# Patient Record
Sex: MALE | Race: WHITE | NOT HISPANIC OR LATINO | URBAN - METROPOLITAN AREA
[De-identification: names, ages, dates, MRNs, and addresses within clinical notes are randomized per-mention and may not be internally consistent; named-entity substitution may affect disease eponyms.]

---

## 2021-03-08 ENCOUNTER — APPOINTMENT (OUTPATIENT)
Dept: CT IMAGING | Facility: HOSPITAL | Age: 61
End: 2021-03-08

## 2021-03-08 ENCOUNTER — HOSPITAL ENCOUNTER (EMERGENCY)
Facility: HOSPITAL | Age: 61
Discharge: HOME | End: 2021-03-08
Attending: EMERGENCY MEDICINE | Admitting: EMERGENCY MEDICINE

## 2021-03-08 VITALS
WEIGHT: 215.8 LBS | DIASTOLIC BLOOD PRESSURE: 79 MMHG | OXYGEN SATURATION: 92 % | HEART RATE: 87 BPM | SYSTOLIC BLOOD PRESSURE: 147 MMHG | HEIGHT: 66 IN | TEMPERATURE: 97.3 F | BODY MASS INDEX: 34.68 KG/M2 | RESPIRATION RATE: 18 BRPM

## 2021-03-08 DIAGNOSIS — R36.9 BLOODY DISCHARGE FROM PENIS: ICD-10-CM

## 2021-03-08 DIAGNOSIS — R10.9 FLANK PAIN: Primary | ICD-10-CM

## 2021-03-08 LAB
ALBUMIN SERPL-MCNC: 4 G/DL (ref 3.5–5.2)
ALBUMIN/GLOB SERPL: 1.3 G/DL
ALP SERPL-CCNC: 63 U/L (ref 39–117)
ALT SERPL W P-5'-P-CCNC: 20 U/L (ref 1–41)
ANION GAP SERPL CALCULATED.3IONS-SCNC: 7.5 MMOL/L (ref 5–15)
AST SERPL-CCNC: 25 U/L (ref 1–40)
BASOPHILS # BLD AUTO: 0.06 10*3/MM3 (ref 0–0.2)
BASOPHILS NFR BLD AUTO: 0.9 % (ref 0–1.5)
BILIRUB SERPL-MCNC: <0.2 MG/DL (ref 0–1.2)
BILIRUB UR QL STRIP: NEGATIVE
BUN SERPL-MCNC: 14 MG/DL (ref 8–23)
BUN/CREAT SERPL: 22.2 (ref 7–25)
CALCIUM SPEC-SCNC: 9.2 MG/DL (ref 8.6–10.5)
CHLORIDE SERPL-SCNC: 107 MMOL/L (ref 98–107)
CLARITY UR: CLEAR
CO2 SERPL-SCNC: 25.5 MMOL/L (ref 22–29)
COLOR UR: YELLOW
CREAT SERPL-MCNC: 0.63 MG/DL (ref 0.76–1.27)
DEPRECATED RDW RBC AUTO: 44.1 FL (ref 37–54)
EOSINOPHIL # BLD AUTO: 0.16 10*3/MM3 (ref 0–0.4)
EOSINOPHIL NFR BLD AUTO: 2.4 % (ref 0.3–6.2)
ERYTHROCYTE [DISTWIDTH] IN BLOOD BY AUTOMATED COUNT: 14 % (ref 12.3–15.4)
GFR SERPL CREATININE-BSD FRML MDRD: 129 ML/MIN/1.73
GLOBULIN UR ELPH-MCNC: 3 GM/DL
GLUCOSE SERPL-MCNC: 133 MG/DL (ref 65–99)
GLUCOSE UR STRIP-MCNC: ABNORMAL MG/DL
HCT VFR BLD AUTO: 40.5 % (ref 37.5–51)
HGB BLD-MCNC: 13.2 G/DL (ref 13–17.7)
HGB UR QL STRIP.AUTO: NEGATIVE
IMM GRANULOCYTES # BLD AUTO: 0.02 10*3/MM3 (ref 0–0.05)
IMM GRANULOCYTES NFR BLD AUTO: 0.3 % (ref 0–0.5)
KETONES UR QL STRIP: NEGATIVE
LEUKOCYTE ESTERASE UR QL STRIP.AUTO: NEGATIVE
LIPASE SERPL-CCNC: 20 U/L (ref 13–60)
LYMPHOCYTES # BLD AUTO: 1.17 10*3/MM3 (ref 0.7–3.1)
LYMPHOCYTES NFR BLD AUTO: 17.5 % (ref 19.6–45.3)
MCH RBC QN AUTO: 28.8 PG (ref 26.6–33)
MCHC RBC AUTO-ENTMCNC: 32.6 G/DL (ref 31.5–35.7)
MCV RBC AUTO: 88.2 FL (ref 79–97)
MONOCYTES # BLD AUTO: 0.51 10*3/MM3 (ref 0.1–0.9)
MONOCYTES NFR BLD AUTO: 7.6 % (ref 5–12)
NEUTROPHILS NFR BLD AUTO: 4.78 10*3/MM3 (ref 1.7–7)
NEUTROPHILS NFR BLD AUTO: 71.3 % (ref 42.7–76)
NITRITE UR QL STRIP: NEGATIVE
NRBC BLD AUTO-RTO: 0 /100 WBC (ref 0–0.2)
PH UR STRIP.AUTO: 5.5 [PH] (ref 5–8)
PLATELET # BLD AUTO: 287 10*3/MM3 (ref 140–450)
PMV BLD AUTO: 9.9 FL (ref 6–12)
POTASSIUM SERPL-SCNC: 4.2 MMOL/L (ref 3.5–5.2)
PROT SERPL-MCNC: 7 G/DL (ref 6–8.5)
PROT UR QL STRIP: ABNORMAL
RBC # BLD AUTO: 4.59 10*6/MM3 (ref 4.14–5.8)
SODIUM SERPL-SCNC: 140 MMOL/L (ref 136–145)
SP GR UR STRIP: >=1.03 (ref 1–1.03)
UROBILINOGEN UR QL STRIP: ABNORMAL
WBC # BLD AUTO: 6.7 10*3/MM3 (ref 3.4–10.8)

## 2021-03-08 PROCEDURE — 74176 CT ABD & PELVIS W/O CONTRAST: CPT

## 2021-03-08 PROCEDURE — 81003 URINALYSIS AUTO W/O SCOPE: CPT | Performed by: PHYSICIAN ASSISTANT

## 2021-03-08 PROCEDURE — 99283 EMERGENCY DEPT VISIT LOW MDM: CPT

## 2021-03-08 PROCEDURE — 85025 COMPLETE CBC W/AUTO DIFF WBC: CPT | Performed by: PHYSICIAN ASSISTANT

## 2021-03-08 PROCEDURE — 80053 COMPREHEN METABOLIC PANEL: CPT | Performed by: PHYSICIAN ASSISTANT

## 2021-03-08 PROCEDURE — 83690 ASSAY OF LIPASE: CPT | Performed by: PHYSICIAN ASSISTANT

## 2021-03-08 RX ORDER — SODIUM CHLORIDE 0.9 % (FLUSH) 0.9 %
10 SYRINGE (ML) INJECTION AS NEEDED
Status: DISCONTINUED | OUTPATIENT
Start: 2021-03-08 | End: 2021-03-08 | Stop reason: HOSPADM

## 2021-03-08 NOTE — ED NOTES
Pt with intermittent, spasmatic right flank pain for about 1 week. He is also noticing intermittent blood from his penis, such as last night when he sneezed. But he has voided this morning and he noted there was no blood present. Also states he had one episode of sudden and explosive diarrhea at work last week that he felt coming on but was unable to make it to the restroom in time. He does report having a normal bowel movement this morning. Patient was wearing a face mask when I entered the room and they continued to wear a mask throughout our encounter. I wore PPE including gloves, eye protection, and a surgical mask whenever I was in the room with patient. Hand hygiene performed.     Harvey Max RN  03/08/21 5941

## 2021-03-08 NOTE — PROGRESS NOTES
Entered room, iintroduced self and explained role w/mask in place on self and patient. Verified information on facesheet; Patient verified that he is currently without a PCP- Provided [facility] provider list and instructed on contact information to obtain an appt. No further needs at this time. Gely Esquivel RN

## 2021-03-08 NOTE — ED PROVIDER NOTES
Pt presents to the ED c/o  1 week of intermittent right-sided flank pain.  Today he reports that he sneezed and had some bloody discharge in his underwear that he believes comes from his penis.  He has since urinated without difficulty.     On exam,   Awake and alert, no acute distress.  Normal work of breathing.     Plan: Labs and CT abdomen reviewed and reassuring today.  Recommended outpatient urology follow-up for further management as indicated.      I wore a mask, face shield, and gloves during this patient encounter.  Patient also wearing a surgical mask.  Hand hygeine performed before and after seeing the patient.     Attestation:  The DAR and I have discussed this patient's history, physical exam, and treatment plan.  I have reviewed the documentation and personally had a face to face interaction with the patient. I affirm the documentation and agree with the treatment and plan.  The attached note describes my personal findings.            Jorge Luis Rodriguez MD  03/08/21 [id number]

## 2021-03-08 NOTE — ED TRIAGE NOTES
Pt has right flank pain.  He sneezed last night and blood came out of his penis.  No urinary symptoms    Patient was placed in face mask during first look triage.  Patient was wearing a face mask throughout encounter.  I wore personal protective equipment throughout the encounter.  Hand hygiene was performed before and after patient encounter.

## 2021-03-08 NOTE — ED PROVIDER NOTES
EMERGENCY DEPARTMENT ENCOUNTER    Room Number:  02/02  Date of encounter:  3/8/2021  PCP: Provider, No Known  Historian: Patient      I used full protective equipment while examining this patient.  This includes face mask, gloves and protective eyewear.  I washed my hands before entering the room and immediately upon leaving the room      HPI:  Chief Complaint: Flank pain  A complete HPI/ROS/PMH/PSH/SH/FH are unobtainable due to: Nothing    Context: Sebastien Tang is a 61 y.o. male who presents to the ED c/o 1 week history of intermittent right flank pain.  Patient states the pain is intermittent.  He denies any precipitating alleviating factors.  He describes the pain is an achy sensation.  He states the pain can last several hours.  He denies any radiation of the pain.  He denies any associated nausea or vomiting.  Patient states yesterday he had an episode of pain followed by a sneeze.  He reports that after the sneeze he noticed that he had bloody discharge in his underwear.  He suspected this is from his penis.  He has denied any subsequent bleeding.  He denies any dysuria or gross hematuria.  He denies any history of kidney stones.  He denies any radicular pain down his legs.    Review of Medical Records  I reviewed patient's urgent care visit from yesterday.  Patient has a negative urinalysis.  He was referred to the ER for further evaluation.    PAST MEDICAL HISTORY  Active Ambulatory Problems     Diagnosis Date Noted   • No Active Ambulatory Problems     Resolved Ambulatory Problems     Diagnosis Date Noted   • No Resolved Ambulatory Problems     No Additional Past Medical History         PAST SURGICAL HISTORY  History reviewed. No pertinent surgical history.      FAMILY HISTORY  History reviewed. No pertinent family history.      SOCIAL HISTORY  Social History     Socioeconomic History   • Marital status: Single     Spouse name: Not on file   • Number of children: Not on file   • Years of education: Not on  file   • Highest education level: Not on file   Tobacco Use   • Smoking status: Current Every Day Smoker     Packs/day: 1.50     Years: 10.00     Pack years: 15.00     Types: Cigarettes   Substance and Sexual Activity   • Drug use: Not Currently   • Sexual activity: Not Currently         ALLERGIES  Patient has no known allergies.        REVIEW OF SYSTEMS  All systems reviewed and negative except for those discussed in HPI.       PHYSICAL EXAM    I have reviewed the triage vital signs and nursing notes.    ED Triage Vitals   Temp Heart Rate Resp BP SpO2   03/08/21 0948 03/08/21 0948 03/08/21 0948 03/08/21 1014 03/08/21 0948   97.3 °F (36.3 °C) 94 18 (!) 162/105 92 %      Temp src Heart Rate Source Patient Position BP Location FiO2 (%)   03/08/21 0948 03/08/21 0948 03/08/21 1014 03/08/21 1014 --   Tympanic Monitor Lying Left arm        Physical Exam  GENERAL: Alert, oriented, not distressed  HENT: head atraumatic, no nuchal rigidity  EYES: no scleral icterus, EOMI  CV: regular rhythm, regular rate, no murmur  RESPIRATORY: normal effort, CTA  ABDOMEN: soft, nontender  : Normal external genitalia.  No bleeding on exam.  MUSCULOSKELETAL: Mild right flank tenderness.  No vertebral tenderness.  NEURO: alert, moves all extremities, follows commands  SKIN: warm, dry, no rash        LAB RESULTS  Recent Results (from the past 24 hour(s))   Comprehensive Metabolic Panel    Collection Time: 03/08/21 11:08 AM    Specimen: Blood   Result Value Ref Range    Glucose 133 (H) 65 - 99 mg/dL    BUN 14 8 - 23 mg/dL    Creatinine 0.63 (L) 0.76 - 1.27 mg/dL    Sodium 140 136 - 145 mmol/L    Potassium 4.2 3.5 - 5.2 mmol/L    Chloride 107 98 - 107 mmol/L    CO2 25.5 22.0 - 29.0 mmol/L    Calcium 9.2 8.6 - 10.5 mg/dL    Total Protein 7.0 6.0 - 8.5 g/dL    Albumin 4.00 3.50 - 5.20 g/dL    ALT (SGPT) 20 1 - 41 U/L    AST (SGOT) 25 1 - 40 U/L    Alkaline Phosphatase 63 39 - 117 U/L    Total Bilirubin <0.2 0.0 - 1.2 mg/dL    eGFR Non African  Amer 129 >60 mL/min/1.73    Globulin 3.0 gm/dL    A/G Ratio 1.3 g/dL    BUN/Creatinine Ratio 22.2 7.0 - 25.0    Anion Gap 7.5 5.0 - 15.0 mmol/L   Lipase    Collection Time: 03/08/21 11:08 AM    Specimen: Blood   Result Value Ref Range    Lipase 20 13 - 60 U/L   CBC Auto Differential    Collection Time: 03/08/21 11:08 AM    Specimen: Blood   Result Value Ref Range    WBC 6.70 3.40 - 10.80 10*3/mm3    RBC 4.59 4.14 - 5.80 10*6/mm3    Hemoglobin 13.2 13.0 - 17.7 g/dL    Hematocrit 40.5 37.5 - 51.0 %    MCV 88.2 79.0 - 97.0 fL    MCH 28.8 26.6 - 33.0 pg    MCHC 32.6 31.5 - 35.7 g/dL    RDW 14.0 12.3 - 15.4 %    RDW-SD 44.1 37.0 - 54.0 fl    MPV 9.9 6.0 - 12.0 fL    Platelets 287 140 - 450 10*3/mm3    Neutrophil % 71.3 42.7 - 76.0 %    Lymphocyte % 17.5 (L) 19.6 - 45.3 %    Monocyte % 7.6 5.0 - 12.0 %    Eosinophil % 2.4 0.3 - 6.2 %    Basophil % 0.9 0.0 - 1.5 %    Immature Grans % 0.3 0.0 - 0.5 %    Neutrophils, Absolute 4.78 1.70 - 7.00 10*3/mm3    Lymphocytes, Absolute 1.17 0.70 - 3.10 10*3/mm3    Monocytes, Absolute 0.51 0.10 - 0.90 10*3/mm3    Eosinophils, Absolute 0.16 0.00 - 0.40 10*3/mm3    Basophils, Absolute 0.06 0.00 - 0.20 10*3/mm3    Immature Grans, Absolute 0.02 0.00 - 0.05 10*3/mm3    nRBC 0.0 0.0 - 0.2 /100 WBC   Urinalysis With Microscopic If Indicated (No Culture) - Urine, Clean Catch    Collection Time: 03/08/21 11:09 AM    Specimen: Urine, Clean Catch   Result Value Ref Range    Color, UA Yellow Yellow, Straw    Appearance, UA Clear Clear    pH, UA 5.5 5.0 - 8.0    Specific Gravity, UA >=1.030 1.005 - 1.030    Glucose,  mg/dL (Trace) (A) Negative    Ketones, UA Negative Negative    Bilirubin, UA Negative Negative    Blood, UA Negative Negative    Protein, UA Trace (A) Negative    Leuk Esterase, UA Negative Negative    Nitrite, UA Negative Negative    Urobilinogen, UA 0.2 E.U./dL 0.2 - 1.0 E.U./dL       Ordered the above labs and independently reviewed the results.        RADIOLOGY  CT Abdomen  Pelvis Without Contrast    Result Date: 3/8/2021  CT OF THE ABDOMEN AND PELVIS WITHOUT CONTRAST 03/08/2021  HISTORY: Left flank pain.  Axial images were obtained from the lung bases to the symphysis pubis. No intravenous or oral contrast was given.  There are emphysematous changes of the bilateral lung bases.  The liver, gallbladder, spleen, pancreas, and adrenals appear unremarkable. There is an approximately 2 mm nonobstructing left renal stone on image 70. One or 2 small low-density left renal lesions are seen, presumably renal cysts.  There is colonic diverticulosis. Prostate gland is moderately enlarged. Urinary bladder is unremarkable.      1.  Emphysematous changes of both lung bases. 2.  Tiny nonobstructing lower pole left renal stone. No hydronephrosis or hydroureter is seen bilaterally. 3.  Colonic diverticulosis. 4.  Prostate gland enlargement.  Radiation dose reduction techniques were utilized, including automated exposure control and exposure modulation based on body size.           I ordered the above noted radiological studies. Reviewed by me and discussed with radiologist.  See dictation for official radiology interpretation.    PROGRESS, DATA ANALYSIS, CONSULTS, AND MEDICAL DECISION MAKING    All labs have been independently reviewed by me.  All radiology studies have been reviewed by me and discussed with radiologist dictating the report.   EKG's independently viewed and interpreted by me.  Discussion below represents my analysis of pertinent findings related to patient's condition, differential diagnosis, treatment plan and final disposition.    I have discussed case with Dr. Rodriguez, emergency room physician.  He has performed his own bedside examination and agrees with treatment plan.    ED Course as of Mar 08 1459   Mon Mar 08, 2021   1115 Patient presents with 1 week history of right flank pain with episode of bloody penile discharge.  Differential diagnoses include but not limited to  ureterolithiasis, UTI, urethritis.    [EE]   1124 WBC: 6.70 [EE]   1207 I discussed CT findings with Dr. Chin.  Patient has no acute ureteral stones.  He does have an enlarged prostate.    [EE]   1207 Blood, UA: Negative [EE]   1207 I updated patient on work-up.  No clear etiology to patient's flank pain and bloody discharge.  Plan to have him follow-up with urology and a new family doctor.    [EE]      ED Course User Index  [EE] Gal Corrigan PA       AS OF 14:59 EST VITALS:    BP - 147/79  HR - 87  TEMP - 97.3 °F (36.3 °C) (Tympanic)  O2 SATS - 92%        DIAGNOSIS  Final diagnoses:   Flank pain   Bloody discharge from penis         DISPOSITION  Discharged           Gal Corrigan PA  03/08/21 1500

## 2021-03-16 ENCOUNTER — BULK ORDERING (OUTPATIENT)
Dept: CASE MANAGEMENT | Facility: OTHER | Age: 61
End: 2021-03-16

## 2021-03-16 DIAGNOSIS — Z23 IMMUNIZATION DUE: ICD-10-CM

## 2021-04-01 ENCOUNTER — APPOINTMENT (OUTPATIENT)
Dept: CT IMAGING | Facility: HOSPITAL | Age: 61
End: 2021-04-01

## 2021-04-01 ENCOUNTER — APPOINTMENT (OUTPATIENT)
Dept: CARDIOLOGY | Facility: HOSPITAL | Age: 61
End: 2021-04-01

## 2021-04-01 ENCOUNTER — APPOINTMENT (OUTPATIENT)
Dept: GENERAL RADIOLOGY | Facility: HOSPITAL | Age: 61
End: 2021-04-01

## 2021-04-01 ENCOUNTER — HOSPITAL ENCOUNTER (INPATIENT)
Facility: HOSPITAL | Age: 61
LOS: 7 days | Discharge: HOME | End: 2021-04-08
Attending: EMERGENCY MEDICINE | Admitting: INTERNAL MEDICINE

## 2021-04-01 DIAGNOSIS — I49.01 VENTRICULAR FIBRILLATION (HCC): ICD-10-CM

## 2021-04-01 DIAGNOSIS — I25.5 ISCHEMIC CARDIOMYOPATHY: ICD-10-CM

## 2021-04-01 DIAGNOSIS — I46.9 CARDIAC ARREST (HCC): Primary | ICD-10-CM

## 2021-04-01 DIAGNOSIS — R73.9 HYPERGLYCEMIA: ICD-10-CM

## 2021-04-01 DIAGNOSIS — R94.31 ABNORMAL EKG: ICD-10-CM

## 2021-04-01 DIAGNOSIS — E87.6 HYPOKALEMIA: ICD-10-CM

## 2021-04-01 DIAGNOSIS — Z74.09 DECREASED MOBILITY AND ENDURANCE: ICD-10-CM

## 2021-04-01 DIAGNOSIS — R93.89 ABNORMAL CXR: ICD-10-CM

## 2021-04-01 LAB
ALBUMIN SERPL-MCNC: 4.2 G/DL (ref 3.5–5.2)
ALBUMIN/GLOB SERPL: 1.5 G/DL
ALP SERPL-CCNC: 81 U/L (ref 39–117)
ALT SERPL W P-5'-P-CCNC: 43 U/L (ref 1–41)
ANION GAP SERPL CALCULATED.3IONS-SCNC: 23.3 MMOL/L (ref 5–15)
AORTIC DIMENSIONLESS INDEX: 0.6 (DI)
APTT PPP: 39.7 SECONDS (ref 22.7–35.4)
ARTERIAL PATENCY WRIST A: POSITIVE
ARTERIAL PATENCY WRIST A: POSITIVE
AST SERPL-CCNC: 53 U/L (ref 1–40)
ATMOSPHERIC PRESS: 760.8 MMHG
ATMOSPHERIC PRESS: 761 MMHG
BASE EXCESS BLDA CALC-SCNC: -1.9 MMOL/L (ref 0–2)
BASE EXCESS BLDA CALC-SCNC: -16.9 MMOL/L (ref 0–2)
BASOPHILS # BLD AUTO: 0.09 10*3/MM3 (ref 0–0.2)
BASOPHILS NFR BLD AUTO: 0.9 % (ref 0–1.5)
BDY SITE: ABNORMAL
BDY SITE: ABNORMAL
BH CV ECHO MEAS - AO MAX PG (FULL): 2.5 MMHG
BH CV ECHO MEAS - AO MAX PG: 3.9 MMHG
BH CV ECHO MEAS - AO MEAN PG (FULL): 2 MMHG
BH CV ECHO MEAS - AO MEAN PG: 3 MMHG
BH CV ECHO MEAS - AO ROOT AREA (BSA CORRECTED): 1.8
BH CV ECHO MEAS - AO ROOT AREA: 10.2 CM^2
BH CV ECHO MEAS - AO ROOT DIAM: 3.6 CM
BH CV ECHO MEAS - AO V2 MAX: 99.2 CM/SEC
BH CV ECHO MEAS - AO V2 MEAN: 78.1 CM/SEC
BH CV ECHO MEAS - AO V2 VTI: 16.6 CM
BH CV ECHO MEAS - AVA(I,A): 2.4 CM^2
BH CV ECHO MEAS - AVA(I,D): 2.4 CM^2
BH CV ECHO MEAS - AVA(V,A): 2.5 CM^2
BH CV ECHO MEAS - AVA(V,D): 2.5 CM^2
BH CV ECHO MEAS - BSA(HAYCOCK): 2.2 M^2
BH CV ECHO MEAS - BSA: 2.1 M^2
BH CV ECHO MEAS - BZI_BMI: 34.4 KILOGRAMS/M^2
BH CV ECHO MEAS - BZI_METRIC_HEIGHT: 167.6 CM
BH CV ECHO MEAS - BZI_METRIC_WEIGHT: 96.6 KG
BH CV ECHO MEAS - EDV(CUBED): 157.5 ML
BH CV ECHO MEAS - EDV(MOD-SP2): 119 ML
BH CV ECHO MEAS - EDV(MOD-SP4): 96 ML
BH CV ECHO MEAS - EDV(TEICH): 141.3 ML
BH CV ECHO MEAS - EF(CUBED): 42.1 %
BH CV ECHO MEAS - EF(MOD-BP): 38.9 %
BH CV ECHO MEAS - EF(MOD-SP2): 40.3 %
BH CV ECHO MEAS - EF(MOD-SP4): 40.6 %
BH CV ECHO MEAS - EF(TEICH): 34.6 %
BH CV ECHO MEAS - ESV(CUBED): 91.1 ML
BH CV ECHO MEAS - ESV(MOD-SP2): 71 ML
BH CV ECHO MEAS - ESV(MOD-SP4): 57 ML
BH CV ECHO MEAS - ESV(TEICH): 92.4 ML
BH CV ECHO MEAS - FS: 16.7 %
BH CV ECHO MEAS - IVS/LVPW: 1.3
BH CV ECHO MEAS - IVSD: 1.4 CM
BH CV ECHO MEAS - LAT PEAK E' VEL: 4.8 CM/SEC
BH CV ECHO MEAS - LV DIASTOLIC VOL/BSA (35-75): 46.7 ML/M^2
BH CV ECHO MEAS - LV MASS(C)D: 279.8 GRAMS
BH CV ECHO MEAS - LV MASS(C)DI: 136.2 GRAMS/M^2
BH CV ECHO MEAS - LV MAX PG: 1.5 MMHG
BH CV ECHO MEAS - LV MEAN PG: 1 MMHG
BH CV ECHO MEAS - LV SYSTOLIC VOL/BSA (12-30): 27.7 ML/M^2
BH CV ECHO MEAS - LV V1 MAX: 60.4 CM/SEC
BH CV ECHO MEAS - LV V1 MEAN: 41.2 CM/SEC
BH CV ECHO MEAS - LV V1 VTI: 9.6 CM
BH CV ECHO MEAS - LVIDD: 5.4 CM
BH CV ECHO MEAS - LVIDS: 4.5 CM
BH CV ECHO MEAS - LVLD AP2: 8.5 CM
BH CV ECHO MEAS - LVLD AP4: 8.8 CM
BH CV ECHO MEAS - LVLS AP2: 8.3 CM
BH CV ECHO MEAS - LVLS AP4: 7.7 CM
BH CV ECHO MEAS - LVOT AREA (M): 4.2 CM^2
BH CV ECHO MEAS - LVOT AREA: 4.2 CM^2
BH CV ECHO MEAS - LVOT DIAM: 2.3 CM
BH CV ECHO MEAS - LVPWD: 1.1 CM
BH CV ECHO MEAS - MED PEAK E' VEL: 5.8 CM/SEC
BH CV ECHO MEAS - MR MAX PG: 28.9 MMHG
BH CV ECHO MEAS - MR MAX VEL: 269 CM/SEC
BH CV ECHO MEAS - MV A MAX VEL: 33.8 CM/SEC
BH CV ECHO MEAS - MV DEC SLOPE: 197 CM/SEC^2
BH CV ECHO MEAS - MV DEC TIME: 186 SEC
BH CV ECHO MEAS - MV E MAX VEL: 48.7 CM/SEC
BH CV ECHO MEAS - MV E/A: 1.4
BH CV ECHO MEAS - MV MAX PG: 1.2 MMHG
BH CV ECHO MEAS - MV MEAN PG: 1 MMHG
BH CV ECHO MEAS - MV P1/2T MAX VEL: 55.7 CM/SEC
BH CV ECHO MEAS - MV P1/2T: 82.8 MSEC
BH CV ECHO MEAS - MV V2 MAX: 55.4 CM/SEC
BH CV ECHO MEAS - MV V2 MEAN: 34.7 CM/SEC
BH CV ECHO MEAS - MV V2 VTI: 21.2 CM
BH CV ECHO MEAS - MVA P1/2T LCG: 3.9 CM^2
BH CV ECHO MEAS - MVA(P1/2T): 2.7 CM^2
BH CV ECHO MEAS - MVA(VTI): 1.9 CM^2
BH CV ECHO MEAS - RAP SYSTOLE: 3 MMHG
BH CV ECHO MEAS - RVSP: 24 MMHG
BH CV ECHO MEAS - SI(AO): 82.2 ML/M^2
BH CV ECHO MEAS - SI(CUBED): 32.3 ML/M^2
BH CV ECHO MEAS - SI(LVOT): 19.3 ML/M^2
BH CV ECHO MEAS - SI(MOD-SP2): 23.4 ML/M^2
BH CV ECHO MEAS - SI(MOD-SP4): 19 ML/M^2
BH CV ECHO MEAS - SI(TEICH): 23.8 ML/M^2
BH CV ECHO MEAS - SV(AO): 169 ML
BH CV ECHO MEAS - SV(CUBED): 66.3 ML
BH CV ECHO MEAS - SV(LVOT): 39.7 ML
BH CV ECHO MEAS - SV(MOD-SP2): 48 ML
BH CV ECHO MEAS - SV(MOD-SP4): 39 ML
BH CV ECHO MEAS - SV(TEICH): 48.9 ML
BH CV ECHO MEAS - TAPSE (>1.6): 2 CM
BH CV ECHO MEAS - TR MAX VEL: 226 CM/SEC
BH CV ECHO MEASUREMENTS AVERAGE E/E' RATIO: 9.19
BH CV XLRA - RV BASE: 3.9 CM
BH CV XLRA - TDI S': 12.4 CM/SEC
BILIRUB SERPL-MCNC: 0.2 MG/DL (ref 0–1.2)
BUN SERPL-MCNC: 15 MG/DL (ref 8–23)
BUN/CREAT SERPL: 14.6 (ref 7–25)
CALCIUM SPEC-SCNC: 9.7 MG/DL (ref 8.6–10.5)
CHLORIDE SERPL-SCNC: 99 MMOL/L (ref 98–107)
CO2 SERPL-SCNC: 18.7 MMOL/L (ref 22–29)
CREAT SERPL-MCNC: 1.03 MG/DL (ref 0.76–1.27)
DEPRECATED RDW RBC AUTO: 46.1 FL (ref 37–54)
EOSINOPHIL # BLD AUTO: 0.16 10*3/MM3 (ref 0–0.4)
EOSINOPHIL NFR BLD AUTO: 1.6 % (ref 0.3–6.2)
ERYTHROCYTE [DISTWIDTH] IN BLOOD BY AUTOMATED COUNT: 13.2 % (ref 12.3–15.4)
GFR SERPL CREATININE-BSD FRML MDRD: 73 ML/MIN/1.73
GLOBULIN UR ELPH-MCNC: 2.8 GM/DL
GLUCOSE BLDC GLUCOMTR-MCNC: 127 MG/DL (ref 70–130)
GLUCOSE BLDC GLUCOMTR-MCNC: 198 MG/DL (ref 70–130)
GLUCOSE SERPL-MCNC: 316 MG/DL (ref 65–99)
HCO3 BLDA-SCNC: 13.3 MMOL/L (ref 22–28)
HCO3 BLDA-SCNC: 24.4 MMOL/L (ref 22–28)
HCT VFR BLD AUTO: 45.8 % (ref 37.5–51)
HCT VFR BLDA CALC: 40 % (ref 38–51)
HGB BLD-MCNC: 14.3 G/DL (ref 13–17.7)
HGB BLDA-MCNC: 13.6 G/DL (ref 12–17)
HOLD SPECIMEN: NORMAL
HOLD SPECIMEN: NORMAL
IMM GRANULOCYTES # BLD AUTO: 0.5 10*3/MM3 (ref 0–0.05)
IMM GRANULOCYTES NFR BLD AUTO: 5 % (ref 0–0.5)
INHALED O2 CONCENTRATION: 100 %
INR PPP: 1.17 (ref 0.9–1.1)
LEFT ATRIUM VOLUME INDEX: 28 ML/M2
LYMPHOCYTES # BLD AUTO: 3.19 10*3/MM3 (ref 0.7–3.1)
LYMPHOCYTES NFR BLD AUTO: 32.2 % (ref 19.6–45.3)
MAXIMAL PREDICTED HEART RATE: 157 BPM
MCH RBC QN AUTO: 29.4 PG (ref 26.6–33)
MCHC RBC AUTO-ENTMCNC: 31.2 G/DL (ref 31.5–35.7)
MCV RBC AUTO: 94.2 FL (ref 79–97)
MODALITY: ABNORMAL
MODALITY: ABNORMAL
MONOCYTES # BLD AUTO: 0.75 10*3/MM3 (ref 0.1–0.9)
MONOCYTES NFR BLD AUTO: 7.6 % (ref 5–12)
NEUTROPHILS NFR BLD AUTO: 5.23 10*3/MM3 (ref 1.7–7)
NEUTROPHILS NFR BLD AUTO: 52.7 % (ref 42.7–76)
NRBC BLD AUTO-RTO: 0.2 /100 WBC (ref 0–0.2)
O2 A-A PPRESDIFF RESPIRATORY: 0.2 MMHG
PCO2 BLDA: 46.2 MM HG (ref 35–45)
PCO2 BLDA: 46.6 MM HG (ref 35–45)
PEEP RESPIRATORY: 7 CM[H2O]
PH BLDA: 7.06 PH UNITS (ref 7.35–7.45)
PH BLDA: 7.33 PH UNITS (ref 7.35–7.45)
PLATELET # BLD AUTO: 313 10*3/MM3 (ref 140–450)
PMV BLD AUTO: 10 FL (ref 6–12)
PO2 BLDA: 120.2 MM HG (ref 80–100)
PO2 BLDA: 59.6 MM HG (ref 80–100)
POTASSIUM SERPL-SCNC: 2.9 MMOL/L (ref 3.5–5.2)
PROT SERPL-MCNC: 7 G/DL (ref 6–8.5)
PROTHROMBIN TIME: 14.8 SECONDS (ref 11.7–14.2)
PSA SERPL-MCNC: 10.9 NG/ML (ref 0–4)
QT INTERVAL: 279 MS
QT INTERVAL: 368 MS
QT INTERVAL: 402 MS
RBC # BLD AUTO: 4.86 10*6/MM3 (ref 4.14–5.8)
SAO2 % BLDA: 99 % (ref 95–98)
SAO2 % BLDCOA: 78.1 % (ref 92–99)
SAO2 % BLDCOA: 98.4 % (ref 92–99)
SARS-COV-2 RNA RESP QL NAA+PROBE: NOT DETECTED
SET MECH RESP RATE: 24
SODIUM SERPL-SCNC: 141 MMOL/L (ref 136–145)
STRESS TARGET HR: 133 BPM
TOTAL RATE: 20 BREATHS/MINUTE
TOTAL RATE: 24 BREATHS/MINUTE
TROPONIN T SERPL-MCNC: 0.01 NG/ML (ref 0–0.03)
TROPONIN T SERPL-MCNC: 1.05 NG/ML (ref 0–0.03)
VENTILATOR MODE: AC
WBC # BLD AUTO: 9.92 10*3/MM3 (ref 3.4–10.8)
WHOLE BLOOD HOLD SPECIMEN: NORMAL
WHOLE BLOOD HOLD SPECIMEN: NORMAL

## 2021-04-01 PROCEDURE — 25010000002 MIDAZOLAM PER 1 MG: Performed by: INTERNAL MEDICINE

## 2021-04-01 PROCEDURE — 25010000002 HEPARIN (PORCINE) PER 1000 UNITS: Performed by: INTERNAL MEDICINE

## 2021-04-01 PROCEDURE — 71045 X-RAY EXAM CHEST 1 VIEW: CPT

## 2021-04-01 PROCEDURE — 85610 PROTHROMBIN TIME: CPT | Performed by: EMERGENCY MEDICINE

## 2021-04-01 PROCEDURE — 25010000002 PROPOFOL 10 MG/ML EMULSION: Performed by: INTERNAL MEDICINE

## 2021-04-01 PROCEDURE — 99223 1ST HOSP IP/OBS HIGH 75: CPT | Performed by: INTERNAL MEDICINE

## 2021-04-01 PROCEDURE — 82803 BLOOD GASES ANY COMBINATION: CPT

## 2021-04-01 PROCEDURE — U0003 INFECTIOUS AGENT DETECTION BY NUCLEIC ACID (DNA OR RNA); SEVERE ACUTE RESPIRATORY SYNDROME CORONAVIRUS 2 (SARS-COV-2) (CORONAVIRUS DISEASE [COVID-19]), AMPLIFIED PROBE TECHNIQUE, MAKING USE OF HIGH THROUGHPUT TECHNOLOGIES AS DESCRIBED BY CMS-2020-01-R: HCPCS | Performed by: EMERGENCY MEDICINE

## 2021-04-01 PROCEDURE — 71275 CT ANGIOGRAPHY CHEST: CPT

## 2021-04-01 PROCEDURE — C1887 CATHETER, GUIDING: HCPCS | Performed by: INTERNAL MEDICINE

## 2021-04-01 PROCEDURE — 5A1935Z RESPIRATORY VENTILATION, LESS THAN 24 CONSECUTIVE HOURS: ICD-10-PCS | Performed by: INTERNAL MEDICINE

## 2021-04-01 PROCEDURE — C1751 CATH, INF, PER/CENT/MIDLINE: HCPCS | Performed by: INTERNAL MEDICINE

## 2021-04-01 PROCEDURE — 25010000002 FENTANYL CITRATE (PF) 100 MCG/2ML SOLUTION: Performed by: INTERNAL MEDICINE

## 2021-04-01 PROCEDURE — 4A023N7 MEASUREMENT OF CARDIAC SAMPLING AND PRESSURE, LEFT HEART, PERCUTANEOUS APPROACH: ICD-10-PCS | Performed by: INTERNAL MEDICINE

## 2021-04-01 PROCEDURE — 85730 THROMBOPLASTIN TIME PARTIAL: CPT | Performed by: EMERGENCY MEDICINE

## 2021-04-01 PROCEDURE — 36600 WITHDRAWAL OF ARTERIAL BLOOD: CPT

## 2021-04-01 PROCEDURE — B2111ZZ FLUOROSCOPY OF MULTIPLE CORONARY ARTERIES USING LOW OSMOLAR CONTRAST: ICD-10-PCS | Performed by: INTERNAL MEDICINE

## 2021-04-01 PROCEDURE — 85018 HEMOGLOBIN: CPT

## 2021-04-01 PROCEDURE — 25010000002 PHENYLEPHRINE PER 1 ML: Performed by: INTERNAL MEDICINE

## 2021-04-01 PROCEDURE — 93005 ELECTROCARDIOGRAM TRACING: CPT | Performed by: EMERGENCY MEDICINE

## 2021-04-01 PROCEDURE — 25010000002 ENOXAPARIN PER 10 MG: Performed by: INTERNAL MEDICINE

## 2021-04-01 PROCEDURE — C1894 INTRO/SHEATH, NON-LASER: HCPCS | Performed by: INTERNAL MEDICINE

## 2021-04-01 PROCEDURE — 93458 L HRT ARTERY/VENTRICLE ANGIO: CPT | Performed by: INTERNAL MEDICINE

## 2021-04-01 PROCEDURE — 02HV33Z INSERTION OF INFUSION DEVICE INTO SUPERIOR VENA CAVA, PERCUTANEOUS APPROACH: ICD-10-PCS | Performed by: INTERNAL MEDICINE

## 2021-04-01 PROCEDURE — 94640 AIRWAY INHALATION TREATMENT: CPT

## 2021-04-01 PROCEDURE — 93306 TTE W/DOPPLER COMPLETE: CPT

## 2021-04-01 PROCEDURE — 25010000002 FENTANYL CITRATE (PF) 100 MCG/2ML SOLUTION: Performed by: EMERGENCY MEDICINE

## 2021-04-01 PROCEDURE — 0 IOPAMIDOL PER 1 ML: Performed by: INTERNAL MEDICINE

## 2021-04-01 PROCEDURE — 25010000002 AMIODARONE IN DEXTROSE 5% 360-4.14 MG/200ML-% SOLUTION: Performed by: INTERNAL MEDICINE

## 2021-04-01 PROCEDURE — 84153 ASSAY OF PSA TOTAL: CPT | Performed by: INTERNAL MEDICINE

## 2021-04-01 PROCEDURE — 25010000002 FUROSEMIDE PER 20 MG: Performed by: INTERNAL MEDICINE

## 2021-04-01 PROCEDURE — 94799 UNLISTED PULMONARY SVC/PX: CPT

## 2021-04-01 PROCEDURE — 25010000002 MAGNESIUM SULFATE 2 GM/50ML SOLUTION: Performed by: INTERNAL MEDICINE

## 2021-04-01 PROCEDURE — 25010000002 LORAZEPAM PER 2 MG: Performed by: INTERNAL MEDICINE

## 2021-04-01 PROCEDURE — 85025 COMPLETE CBC W/AUTO DIFF WBC: CPT | Performed by: EMERGENCY MEDICINE

## 2021-04-01 PROCEDURE — 82565 ASSAY OF CREATININE: CPT

## 2021-04-01 PROCEDURE — 25010000003 POTASSIUM CHLORIDE 10 MEQ/100ML SOLUTION: Performed by: INTERNAL MEDICINE

## 2021-04-01 PROCEDURE — 36556 INSERT NON-TUNNEL CV CATH: CPT | Performed by: INTERNAL MEDICINE

## 2021-04-01 PROCEDURE — C1769 GUIDE WIRE: HCPCS | Performed by: INTERNAL MEDICINE

## 2021-04-01 PROCEDURE — 85347 COAGULATION TIME ACTIVATED: CPT

## 2021-04-01 PROCEDURE — 25010000003 POTASSIUM CHLORIDE 10 MEQ/100ML SOLUTION: Performed by: EMERGENCY MEDICINE

## 2021-04-01 PROCEDURE — 84484 ASSAY OF TROPONIN QUANT: CPT | Performed by: EMERGENCY MEDICINE

## 2021-04-01 PROCEDURE — 0 IOPAMIDOL PER 1 ML: Performed by: EMERGENCY MEDICINE

## 2021-04-01 PROCEDURE — 85014 HEMATOCRIT: CPT

## 2021-04-01 PROCEDURE — 74174 CTA ABD&PLVS W/CONTRAST: CPT

## 2021-04-01 PROCEDURE — 93010 ELECTROCARDIOGRAM REPORT: CPT | Performed by: INTERNAL MEDICINE

## 2021-04-01 PROCEDURE — 93005 ELECTROCARDIOGRAM TRACING: CPT | Performed by: INTERNAL MEDICINE

## 2021-04-01 PROCEDURE — 99285 EMERGENCY DEPT VISIT HI MDM: CPT

## 2021-04-01 PROCEDURE — B2151ZZ FLUOROSCOPY OF LEFT HEART USING LOW OSMOLAR CONTRAST: ICD-10-PCS | Performed by: INTERNAL MEDICINE

## 2021-04-01 PROCEDURE — 80053 COMPREHEN METABOLIC PANEL: CPT | Performed by: EMERGENCY MEDICINE

## 2021-04-01 PROCEDURE — 0BH17EZ INSERTION OF ENDOTRACHEAL AIRWAY INTO TRACHEA, VIA NATURAL OR ARTIFICIAL OPENING: ICD-10-PCS | Performed by: ANESTHESIOLOGY

## 2021-04-01 PROCEDURE — 93306 TTE W/DOPPLER COMPLETE: CPT | Performed by: INTERNAL MEDICINE

## 2021-04-01 PROCEDURE — 92920 PRQ TRLUML C ANGIOP 1ART&/BR: CPT | Performed by: INTERNAL MEDICINE

## 2021-04-01 PROCEDURE — 94002 VENT MGMT INPAT INIT DAY: CPT

## 2021-04-01 PROCEDURE — 82962 GLUCOSE BLOOD TEST: CPT

## 2021-04-01 RX ORDER — PHENYLEPHRINE HYDROCHLORIDE 10 MG/ML
INJECTION INTRAVENOUS
Status: DISPENSED
Start: 2021-04-01 | End: 2021-04-02

## 2021-04-01 RX ORDER — LORAZEPAM 2 MG/ML
2 INJECTION INTRAMUSCULAR ONCE
Status: COMPLETED | OUTPATIENT
Start: 2021-04-01 | End: 2021-04-01

## 2021-04-01 RX ORDER — POTASSIUM CHLORIDE 7.45 MG/ML
10 INJECTION INTRAVENOUS
Status: DISCONTINUED | OUTPATIENT
Start: 2021-04-01 | End: 2021-04-08 | Stop reason: HOSPADM

## 2021-04-01 RX ORDER — POTASSIUM CHLORIDE 7.45 MG/ML
10 INJECTION INTRAVENOUS ONCE
Status: COMPLETED | OUTPATIENT
Start: 2021-04-01 | End: 2021-04-01

## 2021-04-01 RX ORDER — NOREPINEPHRINE BIT/0.9 % NACL 8 MG/250ML
INFUSION BOTTLE (ML) INTRAVENOUS CONTINUOUS PRN
Status: COMPLETED | OUTPATIENT
Start: 2021-04-01 | End: 2021-04-01

## 2021-04-01 RX ORDER — SODIUM CHLORIDE 0.9 % (FLUSH) 0.9 %
10 SYRINGE (ML) INJECTION EVERY 12 HOURS SCHEDULED
Status: DISCONTINUED | OUTPATIENT
Start: 2021-04-01 | End: 2021-04-08 | Stop reason: HOSPADM

## 2021-04-01 RX ORDER — FENTANYL CITRATE 50 UG/ML
100 INJECTION, SOLUTION INTRAMUSCULAR; INTRAVENOUS
Status: DISCONTINUED | OUTPATIENT
Start: 2021-04-01 | End: 2021-04-03

## 2021-04-01 RX ORDER — POTASSIUM CHLORIDE 7.45 MG/ML
INJECTION INTRAVENOUS CONTINUOUS PRN
Status: COMPLETED | OUTPATIENT
Start: 2021-04-01 | End: 2021-04-01

## 2021-04-01 RX ORDER — NOREPINEPHRINE BIT/0.9 % NACL 8 MG/250ML
.02-.3 INFUSION BOTTLE (ML) INTRAVENOUS
Status: DISCONTINUED | OUTPATIENT
Start: 2021-04-01 | End: 2021-04-03

## 2021-04-01 RX ORDER — ACETAMINOPHEN 325 MG/1
650 TABLET ORAL EVERY 4 HOURS PRN
Status: DISCONTINUED | OUTPATIENT
Start: 2021-04-01 | End: 2021-04-05 | Stop reason: SDUPTHER

## 2021-04-01 RX ORDER — FENTANYL CITRATE 50 UG/ML
50 INJECTION, SOLUTION INTRAMUSCULAR; INTRAVENOUS
Status: DISCONTINUED | OUTPATIENT
Start: 2021-04-01 | End: 2021-04-03

## 2021-04-01 RX ORDER — ONDANSETRON 4 MG/1
4 TABLET, FILM COATED ORAL EVERY 6 HOURS PRN
Status: DISCONTINUED | OUTPATIENT
Start: 2021-04-01 | End: 2021-04-08 | Stop reason: HOSPADM

## 2021-04-01 RX ORDER — MAGNESIUM SULFATE HEPTAHYDRATE 40 MG/ML
2 INJECTION, SOLUTION INTRAVENOUS AS NEEDED
Status: DISCONTINUED | OUTPATIENT
Start: 2021-04-01 | End: 2021-04-08 | Stop reason: HOSPADM

## 2021-04-01 RX ORDER — FENTANYL CITRATE 50 UG/ML
50 INJECTION, SOLUTION INTRAMUSCULAR; INTRAVENOUS ONCE
Status: COMPLETED | OUTPATIENT
Start: 2021-04-01 | End: 2021-04-01

## 2021-04-01 RX ORDER — FENTANYL CITRATE 50 UG/ML
INJECTION, SOLUTION INTRAMUSCULAR; INTRAVENOUS AS NEEDED
Status: DISCONTINUED | OUTPATIENT
Start: 2021-04-01 | End: 2021-04-01 | Stop reason: HOSPADM

## 2021-04-01 RX ORDER — FUROSEMIDE 10 MG/ML
INJECTION INTRAMUSCULAR; INTRAVENOUS AS NEEDED
Status: DISCONTINUED | OUTPATIENT
Start: 2021-04-01 | End: 2021-04-01 | Stop reason: HOSPADM

## 2021-04-01 RX ORDER — SODIUM CHLORIDE 9 MG/ML
INJECTION, SOLUTION INTRAVENOUS CONTINUOUS PRN
Status: COMPLETED | OUTPATIENT
Start: 2021-04-01 | End: 2021-04-01

## 2021-04-01 RX ORDER — MAGNESIUM SULFATE HEPTAHYDRATE 40 MG/ML
2 INJECTION, SOLUTION INTRAVENOUS ONCE
Status: COMPLETED | OUTPATIENT
Start: 2021-04-01 | End: 2021-04-01

## 2021-04-01 RX ORDER — SODIUM CHLORIDE 0.9 % (FLUSH) 0.9 %
10 SYRINGE (ML) INJECTION AS NEEDED
Status: DISCONTINUED | OUTPATIENT
Start: 2021-04-01 | End: 2021-04-08 | Stop reason: HOSPADM

## 2021-04-01 RX ORDER — ONDANSETRON 2 MG/ML
4 INJECTION INTRAMUSCULAR; INTRAVENOUS EVERY 6 HOURS PRN
Status: DISCONTINUED | OUTPATIENT
Start: 2021-04-01 | End: 2021-04-08 | Stop reason: HOSPADM

## 2021-04-01 RX ORDER — POTASSIUM CHLORIDE 7.45 MG/ML
10 INJECTION INTRAVENOUS ONCE
Status: DISCONTINUED | OUTPATIENT
Start: 2021-04-01 | End: 2021-04-08 | Stop reason: HOSPADM

## 2021-04-01 RX ORDER — ALBUTEROL SULFATE 90 UG/1
2 AEROSOL, METERED RESPIRATORY (INHALATION)
Status: DISCONTINUED | OUTPATIENT
Start: 2021-04-01 | End: 2021-04-02

## 2021-04-01 RX ORDER — POTASSIUM CHLORIDE 1.5 G/1.77G
40 POWDER, FOR SOLUTION ORAL AS NEEDED
Status: DISCONTINUED | OUTPATIENT
Start: 2021-04-01 | End: 2021-04-08 | Stop reason: HOSPADM

## 2021-04-01 RX ORDER — MIDAZOLAM HYDROCHLORIDE 1 MG/ML
INJECTION INTRAMUSCULAR; INTRAVENOUS AS NEEDED
Status: DISCONTINUED | OUTPATIENT
Start: 2021-04-01 | End: 2021-04-01 | Stop reason: HOSPADM

## 2021-04-01 RX ORDER — MAGNESIUM SULFATE HEPTAHYDRATE 40 MG/ML
4 INJECTION, SOLUTION INTRAVENOUS AS NEEDED
Status: DISCONTINUED | OUTPATIENT
Start: 2021-04-01 | End: 2021-04-08 | Stop reason: HOSPADM

## 2021-04-01 RX ORDER — POTASSIUM CHLORIDE 750 MG/1
40 TABLET, FILM COATED, EXTENDED RELEASE ORAL AS NEEDED
Status: DISCONTINUED | OUTPATIENT
Start: 2021-04-01 | End: 2021-04-08 | Stop reason: HOSPADM

## 2021-04-01 RX ORDER — ACETAMINOPHEN 650 MG/1
650 SUPPOSITORY RECTAL EVERY 4 HOURS PRN
Status: DISCONTINUED | OUTPATIENT
Start: 2021-04-01 | End: 2021-04-08 | Stop reason: HOSPADM

## 2021-04-01 RX ORDER — FUROSEMIDE 10 MG/ML
40 INJECTION INTRAMUSCULAR; INTRAVENOUS ONCE
Status: COMPLETED | OUTPATIENT
Start: 2021-04-02 | End: 2021-04-02

## 2021-04-01 RX ORDER — LIDOCAINE HYDROCHLORIDE 20 MG/ML
INJECTION, SOLUTION INFILTRATION; PERINEURAL AS NEEDED
Status: DISCONTINUED | OUTPATIENT
Start: 2021-04-01 | End: 2021-04-01 | Stop reason: HOSPADM

## 2021-04-01 RX ORDER — ASPIRIN 325 MG
325 TABLET ORAL DAILY
Status: DISCONTINUED | OUTPATIENT
Start: 2021-04-02 | End: 2021-04-05

## 2021-04-01 RX ORDER — HEPARIN SODIUM 1000 [USP'U]/ML
INJECTION, SOLUTION INTRAVENOUS; SUBCUTANEOUS AS NEEDED
Status: DISCONTINUED | OUTPATIENT
Start: 2021-04-01 | End: 2021-04-01 | Stop reason: HOSPADM

## 2021-04-01 RX ORDER — ACETAMINOPHEN 325 MG/1
650 TABLET ORAL EVERY 4 HOURS PRN
Status: DISCONTINUED | OUTPATIENT
Start: 2021-04-01 | End: 2021-04-08 | Stop reason: HOSPADM

## 2021-04-01 RX ORDER — DEXTROSE MONOHYDRATE 25 G/50ML
25 INJECTION, SOLUTION INTRAVENOUS
Status: DISCONTINUED | OUTPATIENT
Start: 2021-04-01 | End: 2021-04-04

## 2021-04-01 RX ORDER — ASPIRIN 300 MG/1
300 SUPPOSITORY RECTAL ONCE
Status: COMPLETED | OUTPATIENT
Start: 2021-04-01 | End: 2021-04-01

## 2021-04-01 RX ORDER — NICOTINE POLACRILEX 4 MG
15 LOZENGE BUCCAL
Status: DISCONTINUED | OUTPATIENT
Start: 2021-04-01 | End: 2021-04-04

## 2021-04-01 RX ADMIN — POTASSIUM CHLORIDE 10 MEQ: 7.46 INJECTION, SOLUTION INTRAVENOUS at 16:28

## 2021-04-01 RX ADMIN — PROPOFOL 20 MCG/KG/MIN: 10 INJECTION, EMULSION INTRAVENOUS at 17:30

## 2021-04-01 RX ADMIN — SODIUM CHLORIDE, PRESERVATIVE FREE 10 ML: 5 INJECTION INTRAVENOUS at 22:07

## 2021-04-01 RX ADMIN — AMIODARONE HYDROCHLORIDE 1 MG/MIN: 1.8 INJECTION, SOLUTION INTRAVENOUS at 16:00

## 2021-04-01 RX ADMIN — ASPIRIN 300 MG: 300 SUPPOSITORY RECTAL at 12:23

## 2021-04-01 RX ADMIN — MAGNESIUM SULFATE HEPTAHYDRATE 2 G: 2 INJECTION, SOLUTION INTRAVENOUS at 17:40

## 2021-04-01 RX ADMIN — POTASSIUM CHLORIDE 10 MEQ: 7.46 INJECTION, SOLUTION INTRAVENOUS at 15:26

## 2021-04-01 RX ADMIN — Medication 0.3 MCG/KG/MIN: at 15:22

## 2021-04-01 RX ADMIN — Medication 0.3 MCG/KG/MIN: at 18:41

## 2021-04-01 RX ADMIN — ENOXAPARIN SODIUM 40 MG: 40 INJECTION SUBCUTANEOUS at 16:00

## 2021-04-01 RX ADMIN — POTASSIUM CHLORIDE 10 MEQ: 7.46 INJECTION, SOLUTION INTRAVENOUS at 20:05

## 2021-04-01 RX ADMIN — Medication 0.28 MCG/KG/MIN: at 22:07

## 2021-04-01 RX ADMIN — FENTANYL CITRATE 50 MCG: 50 INJECTION, SOLUTION INTRAMUSCULAR; INTRAVENOUS at 12:21

## 2021-04-01 RX ADMIN — POTASSIUM CHLORIDE 10 MEQ: 7.46 INJECTION, SOLUTION INTRAVENOUS at 12:47

## 2021-04-01 RX ADMIN — POTASSIUM CHLORIDE 10 MEQ: 7.46 INJECTION, SOLUTION INTRAVENOUS at 18:29

## 2021-04-01 RX ADMIN — FENTANYL CITRATE 50 MCG: 0.05 INJECTION, SOLUTION INTRAMUSCULAR; INTRAVENOUS at 21:57

## 2021-04-01 RX ADMIN — LORAZEPAM 2 MG: 2 INJECTION INTRAMUSCULAR; INTRAVENOUS at 15:10

## 2021-04-01 RX ADMIN — POTASSIUM CHLORIDE 10 MEQ: 7.46 INJECTION, SOLUTION INTRAVENOUS at 14:24

## 2021-04-01 RX ADMIN — ALBUTEROL SULFATE 2 PUFF: 90 AEROSOL, METERED RESPIRATORY (INHALATION) at 20:06

## 2021-04-01 RX ADMIN — IOPAMIDOL 95 ML: 755 INJECTION, SOLUTION INTRAVENOUS at 12:34

## 2021-04-01 RX ADMIN — PROPOFOL 10 MCG/KG/MIN: 10 INJECTION, EMULSION INTRAVENOUS at 14:29

## 2021-04-01 RX ADMIN — AMIODARONE HYDROCHLORIDE 0.5 MG/MIN: 1.8 INJECTION, SOLUTION INTRAVENOUS at 21:56

## 2021-04-01 NOTE — ED NOTES
"Pt answering some questions at this time. Will say his name is \"Sebastien\" and keeps asking \"what happened\".      Aliya Smith RN  04/01/21 1248    "

## 2021-04-01 NOTE — ED PROVIDER NOTES
EMERGENCY DEPARTMENT ENCOUNTER  Patient was placed in face mask in first look and the following protective measures were taken unless additional measures were taken and documented below in the ED course. Patient was wearing facemask when I entered the room and throughout our encounter. I wore full protective equipment throughout this patient encounter including a face mask, and gloves. Hand hygiene was performed before donning protective equipment and after removal when leaving the room.    Room Number:  Pool/CATH  Date of encounter:  4/1/2021  PCP: Provider, No Known    HPI:  Context: Sebastien Tang is a 61 y.o. male who presents to the ED c/o chief complaint of cardiac arrest.  Per report patient had just quit his job, walked outside and collapsed.  Patient immediately received bystander CPR, fire was first on scene.  Prior to prior arrival, patient had ED work-up by nonmedical staff, reports delivered 1 shock.  When fire first arrived, ED again delivered 1 shock.  When EMS arrived, patient was in V. fib.  Patient received a total of 3 doses of epinephrine, 300 mg of amiodarone, 150 mg of amiodarone, received a total of 5 shocks with return of spontaneous circulation.  Patient has been crying and confused since resuscitation, not provide any history.  History limited.    MEDICAL HISTORY REVIEW  Reviewed in EPIC    PAST MEDICAL HISTORY  Active Ambulatory Problems     Diagnosis Date Noted   • No Active Ambulatory Problems     Resolved Ambulatory Problems     Diagnosis Date Noted   • No Resolved Ambulatory Problems     No Additional Past Medical History       PAST SURGICAL HISTORY  No past surgical history on file.    FAMILY HISTORY  No family history on file.    SOCIAL HISTORY  Social History     Socioeconomic History   • Marital status: Single     Spouse name: Not on file   • Number of children: Not on file   • Years of education: Not on file   • Highest education level: Not on file   Tobacco Use   • Smoking status:  Current Every Day Smoker     Packs/day: 1.50     Years: 10.00     Pack years: 15.00     Types: Cigarettes   Substance and Sexual Activity   • Drug use: Not Currently   • Sexual activity: Not Currently       ALLERGIES  Patient has no known allergies.    The patient's allergies have been reviewed    REVIEW OF SYSTEMS  Unable to obtain review of systems secondary to altered mental status  PHYSICAL EXAM  I have reviewed the triage vital signs and nursing notes.  ED Triage Vitals   Temp Pulse Resp BP SpO2   -- -- -- -- --      Temp src Heart Rate Source Patient Position BP Location FiO2 (%)   -- -- -- -- --       General: Crying, in distress, occasionally following commands, has spoken with minimal words.  HENT: NCAT, PERRL, Nares patent.  Eyes: no scleral icterus.  Neck: trachea midline, no ROM limitations.  CV: regular rhythm, regular rate.  Respiratory: normal effort, CTAB.  Abdomen: soft, nondistended, NTTP, no rebound tenderness, no guarding or rigidity  : deferred.  Musculoskeletal: no deformity.  Neuro: alert, moves all extremities, follows commands.  Skin: warm, dry.    LAB RESULTS  Recent Results (from the past 24 hour(s))   ECG 12 Lead    Collection Time: 04/01/21 12:04 PM   Result Value Ref Range    QT Interval 368 ms   ECG 12 Lead    Collection Time: 04/01/21 12:06 PM   Result Value Ref Range    QT Interval 279 ms   Protime-INR    Collection Time: 04/01/21 12:12 PM    Specimen: Blood   Result Value Ref Range    Protime 14.8 (H) 11.7 - 14.2 Seconds    INR 1.17 (H) 0.90 - 1.10   aPTT    Collection Time: 04/01/21 12:12 PM    Specimen: Blood   Result Value Ref Range    PTT 39.7 (H) 22.7 - 35.4 seconds   Comprehensive Metabolic Panel    Collection Time: 04/01/21 12:12 PM    Specimen: Blood   Result Value Ref Range    Glucose 316 (H) 65 - 99 mg/dL    BUN 15 8 - 23 mg/dL    Creatinine 1.03 0.76 - 1.27 mg/dL    Sodium 141 136 - 145 mmol/L    Potassium 2.9 (L) 3.5 - 5.2 mmol/L    Chloride 99 98 - 107 mmol/L    CO2  18.7 (L) 22.0 - 29.0 mmol/L    Calcium 9.7 8.6 - 10.5 mg/dL    Total Protein 7.0 6.0 - 8.5 g/dL    Albumin 4.20 3.50 - 5.20 g/dL    ALT (SGPT) 43 (H) 1 - 41 U/L    AST (SGOT) 53 (H) 1 - 40 U/L    Alkaline Phosphatase 81 39 - 117 U/L    Total Bilirubin 0.2 0.0 - 1.2 mg/dL    eGFR Non African Amer 73 >60 mL/min/1.73    Globulin 2.8 gm/dL    A/G Ratio 1.5 g/dL    BUN/Creatinine Ratio 14.6 7.0 - 25.0    Anion Gap 23.3 (H) 5.0 - 15.0 mmol/L   Troponin    Collection Time: 04/01/21 12:12 PM    Specimen: Blood   Result Value Ref Range    Troponin T 0.012 0.000 - 0.030 ng/mL   CBC Auto Differential    Collection Time: 04/01/21 12:12 PM    Specimen: Blood   Result Value Ref Range    WBC 9.92 3.40 - 10.80 10*3/mm3    RBC 4.86 4.14 - 5.80 10*6/mm3    Hemoglobin 14.3 13.0 - 17.7 g/dL    Hematocrit 45.8 37.5 - 51.0 %    MCV 94.2 79.0 - 97.0 fL    MCH 29.4 26.6 - 33.0 pg    MCHC 31.2 (L) 31.5 - 35.7 g/dL    RDW 13.2 12.3 - 15.4 %    RDW-SD 46.1 37.0 - 54.0 fl    MPV 10.0 6.0 - 12.0 fL    Platelets 313 140 - 450 10*3/mm3    Neutrophil % 52.7 42.7 - 76.0 %    Lymphocyte % 32.2 19.6 - 45.3 %    Monocyte % 7.6 5.0 - 12.0 %    Eosinophil % 1.6 0.3 - 6.2 %    Basophil % 0.9 0.0 - 1.5 %    Immature Grans % 5.0 (H) 0.0 - 0.5 %    Neutrophils, Absolute 5.23 1.70 - 7.00 10*3/mm3    Lymphocytes, Absolute 3.19 (H) 0.70 - 3.10 10*3/mm3    Monocytes, Absolute 0.75 0.10 - 0.90 10*3/mm3    Eosinophils, Absolute 0.16 0.00 - 0.40 10*3/mm3    Basophils, Absolute 0.09 0.00 - 0.20 10*3/mm3    Immature Grans, Absolute 0.50 (H) 0.00 - 0.05 10*3/mm3    nRBC 0.2 0.0 - 0.2 /100 WBC   Blood Gas, Arterial -    Collection Time: 04/01/21 12:22 PM    Specimen: Arterial Blood   Result Value Ref Range    Site Arterial: right radial     Elton's Test Positive     pH, Arterial 7.064 (C) 7.350 - 7.450 pH units    pCO2, Arterial 46.6 (H) 35.0 - 45.0 mm Hg    pO2, Arterial 59.6 (L) 80.0 - 100.0 mm Hg    HCO3, Arterial 13.3 (L) 22.0 - 28.0 mmol/L    Base Excess,  Arterial -16.9 (L) 0.0 - 2.0 mmol/L    O2 Saturation Calculated 78.1 (L) 92.0 - 99.0 %    Barometric Pressure for Blood Gas 761.0 mmHg    Modality Room Air     Rate 20 Breaths/minute       I ordered the above labs and reviewed the results.    RADIOLOGY  XR Chest 1 View    Result Date: 4/1/2021  ONE VIEW PORTABLE CHEST AT 12:02 PM  HISTORY: Chest pain and respiratory distress.  FINDINGS: There are no prior chest x-rays for comparison. There is cardiomegaly with what appears to be prominent vascular congestion and some vague haziness and peribronchial thickening and a component of this can also be appreciated on an abdominal CT scan dated 03/08/2021. I suspect much of this relates to congestive heart failure but there may also be a chronic component as well. I cannot completely exclude somewhat diffuse changes of pneumonia and further clinical correlation is required.  This report was finalized on 4/1/2021 12:37 PM by Dr. Romero Crespo M.D.        I ordered the above noted radiological studies. I reviewed the images and results. I agree with the radiologist interpretation.    PROCEDURES  Procedures    MEDICATIONS GIVEN IN ER  Medications   sodium chloride 0.9 % flush 10 mL ( Intravenous MAR Hold 4/1/21 1253)   sodium chloride 0.9 % bolus 500 mL ( Intravenous MAR Hold 4/1/21 1253)   magnesium sulfate 2g/50 mL (PREMIX) infusion ( Intravenous MAR Hold 4/1/21 1253)   potassium chloride 10 mEq in 100 mL IVPB (has no administration in time range)     And   potassium chloride 10 mEq in 100 mL IVPB (has no administration in time range)     And   potassium chloride 10 mEq in 100 mL IVPB (has no administration in time range)     And   potassium chloride 10 mEq in 100 mL IVPB (has no administration in time range)     And   potassium chloride 10 mEq in 100 mL IVPB (has no administration in time range)     And   potassium chloride 10 mEq in 100 mL IVPB (has no administration in time range)   fentaNYL citrate (PF) (SUBLIMAZE)  injection 50 mcg (50 mcg Intravenous Given 4/1/21 1221)   aspirin suppository 300 mg (300 mg Rectal Given 4/1/21 1223)   iopamidol (ISOVUE-370) 76 % injection 100 mL (95 mL Intravenous Given by Other 4/1/21 1234)       PROGRESS, DATA ANALYSIS, CONSULTS, AND MEDICAL DECISION MAKING  A complete history and physical exam have been performed.  All available laboratory and imaging results have been reviewed by myself prior to disposition.    MDM  After the initial H&P, I discussed pertinent information from history and physical exam with patient/family.  Discussed differential diagnosis.  Discussed plan for ED evaluation/work-up/treatment.  All questions answered.  Patient/family is agreeable with plan.  ED Course as of Apr 01 1255   Thu Apr 01, 2021   1202 Patient immediately evaluated upon his arrival to the emergency department.    [JG]   1203 I wore full protective equipment throughout this patient encounter including a CAPPR, gown and gloves. Hand hygiene was performed before donning protective equipment and after removal when leaving the room.        [JG]   1204 EKG independently viewed and contemporaneously interpreted by ED physician. Time: 12:04 PM.  Rate 120.  Interpretation: Atrial fibrillation, left axis deviation, right bundle branch block, no ST elevation, ST depression with T wave inversion in anterior lateral leads.  ST depression in V2 concerning for possible posterior MI.  Multiple multifocal PVCs.  EKG extremely difficult to read given multiple PVCs of different morphology.  Obtaining repeat.    [JG]   1206 EKG independently viewed and contemporaneously interpreted by ED physician. Time: 12:06 PM.  Rate 110.  Interpretation: Atrial fibrillation, left axis deviation.  EKG is extremely difficult to interpret as patient has numerous PVCs of different morphology.  Patient appears to have normal QRS with no obvious ST elevation but does appear to have ST depression in anterior lateral leads with T wave  inversion.    [JG]   1211 Consulting interventionalists.    [JG]   1216 Chest x-ray reviewed, patient appears to have cardiomegaly with possible mediastinal widening, diffuse pulmonary edema.  Given patient had recent cardiac arrest, concern for mediastinal widening, sending for emergent CT angiogram to rule out dissection.  Discussed with charge nurse, nursing staff, CT imaging, patient will receive point-of-care creatinine prior to CTA.    [JG]   1218 Phone call with Dr. Puga.  Discussed the patient, relevant history, exam, diagnostics, ED findings/progress, and concerns.  She agrees that the patient's presentation is concerning for cardiac arrest of primary cardiac etiology.  She agrees with CTA imaging but states if CTA is negative for dissection, she agrees to take patient to the Cath Lab.        [JG]   1220 Patient now answering questions, following commands.    [JG]   1229 Radiology aware of need for stat read to facilitate transfer to cardiac Cath Lab.    [JG]   1240 I have reviewed CTA on PACS, no dissection per my read.  Pending official read by radiology.    [JG]   1245 Phone call from Dr. Puga.  I discussed with her that I have reviewed the CTA and I do not see any dissection but I am waiting for official read by radiology.  She reports that she has also reviewed the CTA.  She request patient to be brought to the Cath Lab emergently.  She request callback from radiology if CTA shows aortic pathology.  She request consult with pulmonology for ICU admission.    [JG]   1254 Phone call with Dr. Vernon Sanford.  Discussed the patient, relevant history, exam, diagnostics, ED findings/progress, and concerns. They agree to admit the patient to ICU. Care assumed by the admitting physician at this time.        [JG]      ED Course User Index  [JG] Benjamín Roque MD       AS OF 12:55 EDT VITALS:    BP - 117/82  HR - 81  TEMP - 96.9 °F (36.1 °C) (Tympanic)  O2 SATS - 93%    DIAGNOSIS  Final diagnoses:   Cardiac  arrest (CMS/[facility])   Abnormal EKG   Abnormal CXR   Hypokalemia   Hyperglycemia     Critical care:  Total critical care time of 41 minutes is exclusive of any other billable procedures and includes time spent with direct patient care and observation, retrospective chart review, management of acute condition, and consultation with other physicians.      DISPOSITION  ADMISSION    Discussed treatment plan and reason for admission with pt/family and admitting physician.  Pt/family voiced understanding of the plan for admission for further testing/treatment as needed.          Benjamín Roque MD  04/01/21 7540

## 2021-04-01 NOTE — PLAN OF CARE
Goal Outcome Evaluation:  Plan of Care Reviewed With: patient  Pt admitted s/p vfib arrest. Replacing K. Recheck this evening. amio, levo, and propofol hanging. Max'd on levo. Epi to hang next. Hypothermic on arrival. Warming blanket applied. Wife at the bedside. Pt now following commands and nodding appropriately. Afebrile. Laure Slaughter RN

## 2021-04-01 NOTE — CONSULTS
Called at 1315 to Cath Lab #2 to intubate restless patient after cardiac arrest and resuscitation. After preoxygenation and with cricoid pressure, Propofol 100mg IV and Sux 150mg IV given, pt intubated with ease using CMAC D blade and stylet, placed on ventilator and Rocuronium 50 mg IV given, then IV  Propofol drip started.

## 2021-04-01 NOTE — PROCEDURES
Airway  Urgency: emergent    Date/Time: 4/1/2021 2:03 PM    General Information and Staff    Anesthesiologist: Gavino Neil MD    Consent for Airway (if performed for an anesthetic, see related documentation for consents)  Patient identity confirmed: hospital-assigned identification number and provided demographic data      Indications and Patient Condition  Indications for airway management: airway protection, cardio/pulmonary arrest and CNS depression

## 2021-04-01 NOTE — H&P
History and Physical    Patient Name: Sebastien Tang  Age/Sex: 61 y.o. male  : 1960  MRN: [id number]    Date of Admission: 2021  Date of Encounter Visit: 21  Encounter Provider: Meg Kessler MD  Place of Service: [facility]   Patient Care Team:  Provider, No Known as PCP - General      Subjective:     Chief Complaint: Resuscitated cardiac arrest outside the hospital    History of Present Illness:  Sebastien Tang is a 61 y.o. male with limited history who collapsed on his way out from work today, CPR initiated by bystanders and EMS were called, documented rhythm was V. fib arrest, patient received several rounds of amiodarone and epinephrine before he was able to have a ROSC.  On arrival patient had some ischemic changes on his EKG, was rushed to the cardiac Cath Lab he has chronic obstructive coronary stent were not amenable for any angioplasty and the patient was sent up to the intensive care unit for further management.  On initial presentation the patient was restless, confused but having purposeful movements so no therapeutic hypothermia or targeted temperature management was indicated.  He had to be intubated during the cardiac cath in order to allow safe procedure and to protect the airway given his altered mental status.  His imaging were done and were independently reviewed, he has background of emphysema with suspected underlying COPD, there was evidence of significant pulmonary edema as well.  His heart cath showed chronically occluded RCA with bridging and robust left-to-right collaterals and there is an OM that has faint left-to-right collateral and was felt to be chronic as well the left main and the LAD were relatively normal.  Patient had his initial ABG showing pH around 7.0, repeat was 7.1, by the time he had further adjustment on the ventilator setting and hemodynamic support his pH is up to 7.33 on the most recent ABG done just now    Pulmonary Functions Testing Results:    No  results found for: FEV1, FVC, GNW2DTV, TLC, DLCO    Review of Systems:   Review of Systems   Unable to perform ROS: Intubated       Past Medical History:  No past medical history on file.    No past surgical history on file.    Home Medications:   No medications prior to admission.       Inpatient Medications:  Scheduled Meds:magnesium sulfate, 2 g, Intravenous, Once  potassium chloride, 10 mEq, Intravenous, Once   And  potassium chloride, 10 mEq, Intravenous, Once   And  potassium chloride, 10 mEq, Intravenous, Once   And  potassium chloride, 10 mEq, Intravenous, Once  sodium chloride, 500 mL, Intravenous, Once      Continuous Infusions:norepinephrine, 0.02-0.3 mcg/kg/min, Last Rate: 0.3 mcg/kg/min (04/01/21 1522)  propofol, 5-50 mcg/kg/min, Last Rate: 20 mcg/kg/min (04/01/21 1517)      PRN Meds:.•  acetaminophen  •  sodium chloride    Allergies:  No Known Allergies    Past Social History:  Social History     Socioeconomic History   • Marital status: Single     Spouse name: Not on file   • Number of children: Not on file   • Years of education: Not on file   • Highest education level: Not on file   Tobacco Use   • Smoking status: Current Every Day Smoker     Packs/day: 1.50     Years: 10.00     Pack years: 15.00     Types: Cigarettes   Substance and Sexual Activity   • Drug use: Not Currently   • Sexual activity: Not Currently       Past Family History:  No family history on file.        Objective:   Temp:  [95.5 °F (35.3 °C)-96.9 °F (36.1 °C)] 95.5 °F (35.3 °C)  Heart Rate:  [74-91] 74  Resp:  [9-25] 25  BP: ()/() 63/35  FiO2 (%):  [100 %] 100 %   SpO2:  [80 %-100 %] 100 %  on  Flow (L/min):  [2-10] 6 Device (Oxygen Therapy): ventilator    Intake/Output Summary (Last 24 hours) at 4/1/2021 1535  Last data filed at 4/1/2021 1355  Gross per 24 hour   Intake 500 ml   Output --   Net 500 ml     Body mass index is 34.48 kg/m².      04/01/21  1437   Weight: 96.9 kg (213 lb 10 oz)     Weight change:      Physical Exam:   Physical Exam   General:   Sickly looking gentleman, orally intubated, restless, on the ventilator, moving all extremities, but is not following any commands                   Head:    Normocephalic, atraumatic.   Eyes:          Conjunctivae and sclerae normal, no icterus, PERRLA   Throat:   No oral lesions, no thrush, oral mucosa moist.  Oral ET tube   Neck:   Supple, trachea midline.   Lungs:     N on chest palpation you can palpate the broken ribs specially on the right third and fourth rib.  Otherwise patient has no major abnormality on inspection, lung exam showed coarse crackles bilaterally with frothy pinkish secretion typical of pulmonary edema with coarse rhonchi, no prolongation of the expiratory phase, lung compliance was slightly diminished.  Breathing in sync and slightly over the ventilator    Heart:    Regular rhythm and normal rate.  No murmurs, gallops, or rubs noted.   Abdomen:     Soft, non-tender, non-distended, positive bowel sounds.    Extremities:   No clubbing, cyanosis, or edema.     Pulses:   Pulses palpable and equal bilaterally.  He had his radial artery used for access with no cyanosis distal to it   Skin:   No bleeding or rash.   Neuro:  Unable to do thorough neuro assessment but he was having good strength 5/5 over both upper and lower extremity, he was moving all extremities, some of these movement were due to agitation restlessness and some of them were purposeful.   Psychiatric:  Unable to assess.     Lab Review:   Results from last 7 days   Lab Units 04/01/21  1212   SODIUM mmol/L 141   POTASSIUM mmol/L 2.9*   CHLORIDE mmol/L 99   CO2 mmol/L 18.7*   BUN mg/dL 15   CREATININE mg/dL 1.03   GLUCOSE mg/dL 316*   CALCIUM mg/dL 9.7   AST (SGOT) U/L 53*   ALT (SGPT) U/L 43*   ALBUMIN g/dL 4.20     Results from last 7 days   Lab Units 04/01/21  1212   TROPONIN T ng/mL 0.012     Results from last 7 days   Lab Units 04/01/21  1336 04/01/21  1212   WBC 10*3/mm3  --  9.92    HEMOGLOBIN g/dL  --  14.3   HEMOGLOBIN, POC g/dL 13.6  --    HEMATOCRIT %  --  45.8   HEMATOCRIT POC % 40  --    PLATELETS 10*3/mm3  --  313   MCV fL  --  94.2   MCH pg  --  29.4   MCHC g/dL  --  31.2*   RDW %  --  13.2   RDW-SD fl  --  46.1   MPV fL  --  10.0   NEUTROPHIL % %  --  52.7   LYMPHOCYTE % %  --  32.2   MONOCYTES % %  --  7.6   EOSINOPHIL % %  --  1.6   BASOPHIL % %  --  0.9   IMM GRAN % %  --  5.0*   NEUTROS ABS 10*3/mm3  --  5.23   LYMPHS ABS 10*3/mm3  --  3.19*   MONOS ABS 10*3/mm3  --  0.75   EOS ABS 10*3/mm3  --  0.16   BASOS ABS 10*3/mm3  --  0.09   IMMATURE GRANS (ABS) 10*3/mm3  --  0.50*   NRBC /100 WBC  --  0.2     Results from last 7 days   Lab Units 04/01/21  1212   INR  1.17*   APTT seconds 39.7*               Invalid input(s): LDLCALC          Results from last 7 days   Lab Units 04/01/21  1531   PH, ARTERIAL pH units 7.331*   PCO2, ARTERIAL mm Hg 46.2*   PO2 ART mm Hg 120.2*   HCO3 ART mmol/L 24.4                                   Imaging:  Imaging Results (Most Recent)     Procedure Component Value Units Date/Time    CT Angiogram Chest [[id number]] Collected: 04/01/21 1357     Updated: 04/01/21 1450    Narrative:      CT ANGIOGRAM CHEST, ABDOMEN AND PELVIS WITH IV CONTRAST     HISTORY: Cardiac arrest. Resuscitated. Rule out dissection of the  thoracic aorta.     TECHNIQUE: CT angiogram chest, abdomen and pelvis in the aortic phase  was performed to evaluate for dissection and includes imaging from the  thoracic inlet through the trochanters. Data reconstructed in coronal  and sagittal planes. 3-D volume rendering performed. As part of the  technique for this CT exam, radiation dose reduction techniques were  utilized, including automated exposure control and exposure modulation  based on body size.     COMPARISON: CT abdomen and pelvis without contrast 03/08/2021.     FINDINGS: Mild atherosclerotic disease and calcified and noncalcified  plaque is present involving the aortic arch,  particularly at the level  of the top of the aortic arch anteriorly. There is no evidence for  thoracic or abdominal aortic dissection. The great vessel origins are  patent. There is mild enlargement of the infrarenal abdominal aorta  measuring 2.8 cm AP dimension.  Atherosclerotic disease is present  involving the abdominal aorta and iliac vasculature. On the right, there  is moderate stenosis of the proximal right common iliac artery  associated with partially calcified plaque. Right internal/external  iliac arteries are patent. There is a moderate stenosis of the proximal  right external iliac artery. The right common femoral artery is patent.  The right proximal superficial and deep femoral arteries are patent. On  the left, there is mild enlargement of the left proximal common iliac  artery measuring 1.6 cm diameter. 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 This is just  proximal to its bifurcation. The left internal and external iliac  arteries are patent. There is partially calcified plaque involving the  left common femoral artery with mild narrowing. The left proximal  superficial and deep femoral arteries are patent.     NONANGIOGRAPHIC FINDINGS: The heart size is enlarged and there is  pulmonary arterial enlargement. The exam does not evaluate for pulmonary  embolus due to the phase of contrast. There is no pleural or pericardial  effusion.     There is diffuse pulmonary emphysema with superimposed thickening of the  septal lines and hazy pulmonary opacities suspected to represent  bilateral pulmonary edema. There are acute fractures of bilateral  anterior 3rd and 4th ribs. Liver, spleen, adrenal glands, and pancreas  exhibit normal early arterial phase of contrast appearance. There is a 3  mm left lower pole nonobstructing renal stone. There is no  hydronephrosis. There is no bowel  dilatation or evidence for bowel  obstruction. Sigmoid diverticulosis is present without evidence for  diverticulitis. There is prostate gland enlargement and the prostate  gland measures approximately 6.5 x 7.5 cm axial dimension with median  lobe hypertrophy.       Impression:      1. Diffuse pulmonary emphysema with hydrostatic and borderline alveolar  pulmonary edema.  2. Acute bilateral anterior 3rd and 4th rib fractures.  3. No evidence for thoracic aortic dissection. Diffuse, multifocal  atherosclerotic disease with mild aneurysmal dilatation of the  infrarenal abdominal aorta measuring 2.8 cm in diameter. 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 Moderate stenosis of right proximal common iliac  artery and distal left common iliac artery at the site of the distal  aneurysm.  4. 3 mm nonobstructing left lower pole renal stone.  5. Sigmoid diverticulosis without evidence for diverticulitis.  6. Prostate gland enlargement.     Discussed with Dr. Roque in the emergency department on 04/01/2021 at  1:05 PM.     This report was finalized on 4/1/2021 2:47 PM by Dr. Frandy Skelton M.D.       CT Angiogram Abdomen Pelvis [[id number]] Collected: 04/01/21 1357     Updated: 04/01/21 1450    Narrative:      CT ANGIOGRAM CHEST, ABDOMEN AND PELVIS WITH IV CONTRAST     HISTORY: Cardiac arrest. Resuscitated. Rule out dissection of the  thoracic aorta.     TECHNIQUE: CT angiogram chest, abdomen and pelvis in the aortic phase  was performed to evaluate for dissection and includes imaging from the  thoracic inlet through the trochanters. Data reconstructed in coronal  and sagittal planes. 3-D volume rendering performed. As part of the  technique for this CT exam, radiation dose reduction techniques were  utilized, including automated exposure control and exposure modulation  based on body size.     COMPARISON: CT abdomen and pelvis without contrast 03/08/2021.      FINDINGS: Mild atherosclerotic disease and calcified and noncalcified  plaque is present involving the aortic arch, particularly at the level  of the top of the aortic arch anteriorly. There is no evidence for  thoracic or abdominal aortic dissection. The great vessel origins are  patent. There is mild enlargement of the infrarenal abdominal aorta  measuring 2.8 cm AP dimension.  Atherosclerotic disease is present  involving the abdominal aorta and iliac vasculature. On the right, there  is moderate stenosis of the proximal right common iliac artery  associated with partially calcified plaque. Right internal/external  iliac arteries are patent. There is a moderate stenosis of the proximal  right external iliac artery. The right common femoral artery is patent.  The right proximal superficial and deep femoral arteries are patent. On  the left, there is mild enlargement of the left proximal common iliac  artery measuring 1.6 cm diameter. 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 This is just  proximal to its bifurcation. The left internal and external iliac  arteries are patent. There is partially calcified plaque involving the  left common femoral artery with mild narrowing. The left proximal  superficial and deep femoral arteries are patent.     NONANGIOGRAPHIC FINDINGS: The heart size is enlarged and there is  pulmonary arterial enlargement. The exam does not evaluate for pulmonary  embolus due to the phase of contrast. There is no pleural or pericardial  effusion.     There is diffuse pulmonary emphysema with superimposed thickening of the  septal lines and hazy pulmonary opacities suspected to represent  bilateral pulmonary edema. There are acute fractures of bilateral  anterior 3rd and 4th ribs. Liver, spleen, adrenal glands, and pancreas  exhibit normal early arterial phase of contrast  appearance. There is a 3  mm left lower pole nonobstructing renal stone. There is no  hydronephrosis. There is no bowel dilatation or evidence for bowel  obstruction. Sigmoid diverticulosis is present without evidence for  diverticulitis. There is prostate gland enlargement and the prostate  gland measures approximately 6.5 x 7.5 cm axial dimension with median  lobe hypertrophy.       Impression:      1. Diffuse pulmonary emphysema with hydrostatic and borderline alveolar  pulmonary edema.  2. Acute bilateral anterior 3rd and 4th rib fractures.  3. No evidence for thoracic aortic dissection. Diffuse, multifocal  atherosclerotic disease with mild aneurysmal dilatation of the  infrarenal abdominal aorta measuring 2.8 cm in diameter. 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 Moderate stenosis of right proximal common iliac  artery and distal left common iliac artery at the site of the distal  aneurysm.  4. 3 mm nonobstructing left lower pole renal stone.  5. Sigmoid diverticulosis without evidence for diverticulitis.  6. Prostate gland enlargement.     Discussed with Dr. Roque in the emergency department on 04/01/2021 at  1:05 PM.     This report was finalized on 4/1/2021 2:47 PM by Dr. Frandy Skelton M.D.       XR Chest 1 View [[id number]] Collected: 04/01/21 1235     Updated: 04/01/21 1240    Narrative:      ONE VIEW PORTABLE CHEST AT 12:02 PM     HISTORY: Chest pain and respiratory distress.     FINDINGS: There are no prior chest x-rays for comparison. There is  cardiomegaly with what appears to be prominent vascular congestion and  some vague haziness and peribronchial thickening and a component of this  can also be appreciated on an abdominal CT scan dated 03/08/2021. I  suspect much of this relates to congestive heart failure but there may  also be a chronic component as well. I cannot completely exclude  somewhat diffuse changes of pneumonia and  further clinical correlation  is required.     This report was finalized on 4/1/2021 12:37 PM by Dr. Romero Crespo M.D.             I personally viewed and interpreted the patient's imaging studies.    Assessment:   1. Resuscitated V. fib arrest  2. Coronary artery disease with ischemic cardiomyopathy,  3. Acute hypoxemic respiratory failure  4. Pulmonary edema  5. Emphysema with possible underlying COPD  6. Metabolic and respiratory acidosis  7. Enlarged prostate  8. Diverticulosis without diverticulitis  9. Limited medical history  10. Severe hypokalemia, likely worse after the acidosis has been corrected  11. Transaminitis, likely secondary to passive hepatic congestion  12. Hematuria, mild, no need to withhold any DVT prophylaxis or aspirin at this point unless hematuria is worse      Plan:     Patien did sustain from anoxic/hypoxic brain injury however there was no indication for any targeted temperature management since he was having purposeful movement upon initial presentation despite the confusion.  Respiratory failure is likely triggered by the element of pulmonary edema with background of emphysema and the patient will be diuresed and will support his ventilation, ventilator setting were adjusted and we will repeat the ABG for further recommendations  The films were reviewed, the prostate is enlarged and we will check a PSA level will consider further recommendation and consultation however that might not be urgent and can be finished as an outpatient.  So far no need for any antibiotic, no reported aspiration, will follow up on his clinical picture and decide on coverage if becomes necessary, the usual suspect would be aspiration pneumonia in that case.  We will follow up closely on the renal function specially after a cardiac arrest and IV contrast during the attempted PCI.  Will consider consulting nephrology in case of persistent elevation or worsening renal function despite initial supportive  measures meanwhile we will avoid any further nephrotoxic agents  Titrate IV pressors as needed to maintain adequate mean arterial pressure above 65 at all times  Follow-up on the liver enzymes  Check hemoglobin A1c  Giraldo catheter and accurate intake and output        Time: Critical care 41 min    Meg Kessler MD  [facility]   04/01/21  15:35 EDT    Dictated utilizing Dragon dictation

## 2021-04-01 NOTE — ED NOTES
Pt arrives via EMS. Pt apparently went into place of employment, quit his job and walked out and collapsed. CPR was immediately started by staff and pt was shocked by AED by staff. Vfib on monitor. Shocked 5 times by EMS. Given 5 epi and 150mg and 300 mg of amio en route to ER. ROSC obtained. Pt moaning and crying on arrival to ER.  Speech incomprehensible at this time.           Aliya Smith, RN  04/01/21 5800

## 2021-04-01 NOTE — CONSULTS
Patient Name: Sebastien Tang  :1960  61 y.o.    Date of Admission: 2021  Date of Consultation:  21  Encounter Provider: Anna Puga MD  Place of Service: [facility] CARDIOLOGY  Referring Provider: Vernon Sanford MD  Patient Care Team:  Provider, No Known as PCP - General      Chief complaint:  Cardiac arrest    History of Present Illness:  This is a 61-year-old man with unknown past medical history.  He is unable to provide a history.  History was got obtained from the emergency room physician.  Apparently he quit his job today and as he was walking out of work he collapsed.  Bystander CPR was performed.  EMS eventually arrived and was he was found to be in VF arrest.  He received several shocks.  By the time he was arrived to the ER he was awake and alert moaning not following all commands but was making purposeful movements.  He did not require intubation.  He did require several rounds of amnio and epi.  They performed a CTA chest abdomen pelvis to rule out dissection which I reviewed myself.  He has a small aortic arc arch plaque but no dissection.  His lungs appeared overloaded.  We brought him to the cardiac Cath Lab.  He is a chronically occluded right and a chronically occluded OM branch.  His EF appears moderately reduced.  He was hypotensive and unable to follow commands so for this reason we had him intubated electively for airway protection  His O2 sats on arrival to the cardiac Cath Lab were 80 so he was initially started on facemask but given the overall picture without intubation was most reasonable.      No past medical history on file.    No past surgical history on file.      Prior to Admission medications    Not on File       No Known Allergies    Social History     Socioeconomic History   • Marital status: Single     Spouse name: Not on file   • Number of children: Not on file   • Years of education: Not on file   • Highest education level: Not on  file   Tobacco Use   • Smoking status: Current Every Day Smoker     Packs/day: 1.50     Years: 10.00     Pack years: 15.00     Types: Cigarettes   Substance and Sexual Activity   • Drug use: Not Currently   • Sexual activity: Not Currently       No family history on file.    REVIEW OF SYSTEMS:   All systems reviewed.  Pertinent positives identified in HPI.  All other systems are negative.      Objective:     Vitals:    04/01/21 1345 04/01/21 1349 04/01/21 1355 04/01/21 1400   BP:       BP Location:       Patient Position:       Pulse:       Resp: 24 24 24 24   Temp:       TempSrc:       SpO2:         There is no height or weight on file to calculate BMI.    General Appearance:    intubated   Head:    Normocephalic, without obvious abnormality, atraumatic   Eyes:            Lids and lashes normal, conjunctivae and sclerae normal, no icterus, no pallor, corneas clear, PERRLA   Ears:    Ears appear intact with no abnormalities noted   Throat:   No oral lesions, no thrush, oral mucosa moist   Neck:   No adenopathy, supple, trachea midline, no thyromegaly, no carotid bruit, no JVD   Back:     No kyphosis present, no scoliosis present, no skin lesions, erythema or scars, no tenderness to percussion or palpation, range of motion normal   Lungs:     rales    Heart:    Regular rhythm and normal rate, normal S1 and S2, no murmur, no gallop, no rub, no click   Chest Wall:    No abnormalities observed   Abdomen:     Normal bowel sounds, no masses, no organomegaly, soft, nontender, nondistended, no guarding, no rebound  tenderness   Extremities:   Moves all extremities well, no edema, no cyanosis, no redness   Pulses:   Pulses palpable and equal bilaterally. Normal radial, carotid, femoral, dorsalis pedis and posterior tibial pulses bilaterally. Normal abdominal aorta   Skin:  Psychiatric:   No bleeding, bruising or rash  disoriented   Lab Review:     Results from last 7 days   Lab Units 04/01/21  1212   SODIUM mmol/L 141    POTASSIUM mmol/L 2.9*   CHLORIDE mmol/L 99   CO2 mmol/L 18.7*   BUN mg/dL 15   CREATININE mg/dL 1.03   CALCIUM mg/dL 9.7   BILIRUBIN mg/dL 0.2   ALK PHOS U/L 81   ALT (SGPT) U/L 43*   AST (SGOT) U/L 53*   GLUCOSE mg/dL 316*     Results from last 7 days   Lab Units 04/01/21  1212   TROPONIN T ng/mL 0.012     Results from last 7 days   Lab Units 04/01/21  1212   WBC 10*3/mm3 9.92   HEMOGLOBIN g/dL 14.3   HEMATOCRIT % 45.8   PLATELETS 10*3/mm3 313     Results from last 7 days   Lab Units 04/01/21  1212   INR  1.17*   APTT seconds 39.7*                       I personally viewed and interpreted the patient's EKG/Telemetry data.  Wide complex, diffuse ST changes suggesting global ischemia      Assessment and Plan:       1. VF arrest  2. MVD CAD  3. Ischemic CM  4. Acute hypsoxic respiratory failure  5. Mixed resp/metabolic acidosis    This is a 61-year-old man who suffered VF arrest.  He had return of spontaneous circulation after several rounds of epinephrine, amnio and several defibrillations.  He initially was following commands and could tell us his name.  He was brought emergently to the cardiac catheterization lab given his EKG showing global ischemia and concern for acute coronary syndrome.    During the procedure he was disoriented/combative, hypoxic, hypotensive.  We electively intubated him for airway protection.  Cardiac catheterization showed a chronically occluded RCA with bridging and robust left-to-right collaterals.  There is an OM that has faint left to left collaterals.  I tried to wire this lesion but cannot find the origin.  I the feeling it is more chronic.  The left main and LAD appear relatively normal.    The EF appears moderately reduced.  We will get a formal echo.  He has received Lasix 40 mg IV given the pulmonary edema noted on chest CT.  Thus far he has made 250 cc of blood-tinged urine.    He is hypotensive.  We started him on Levophed with good response.  He received several neosticks as  well.  If he maxes out on levo needs further pressor support we will add epi.  Extubation per pulmonary team.    Discussed with Dr. Kessler.    Anna Puga MD  04/01/21  14:07 EDT

## 2021-04-02 ENCOUNTER — APPOINTMENT (OUTPATIENT)
Dept: GENERAL RADIOLOGY | Facility: HOSPITAL | Age: 61
End: 2021-04-02

## 2021-04-02 LAB
ACT BLD: 213 SECONDS (ref 82–152)
ALBUMIN SERPL-MCNC: 3.9 G/DL (ref 3.5–5.2)
ALBUMIN/GLOB SERPL: 1.9 G/DL
ALP SERPL-CCNC: 67 U/L (ref 39–117)
ALT SERPL W P-5'-P-CCNC: 59 U/L (ref 1–41)
ANION GAP SERPL CALCULATED.3IONS-SCNC: 12.2 MMOL/L (ref 5–15)
AST SERPL-CCNC: 107 U/L (ref 1–40)
BILIRUB SERPL-MCNC: 0.3 MG/DL (ref 0–1.2)
BUN SERPL-MCNC: 24 MG/DL (ref 8–23)
BUN/CREAT SERPL: 19.2 (ref 7–25)
CA-I BLD-MCNC: 4.9 MG/DL (ref 4.6–5.4)
CA-I SERPL ISE-MCNC: 1.23 MMOL/L (ref 1.15–1.35)
CALCIUM SPEC-SCNC: 8.8 MG/DL (ref 8.6–10.5)
CHLORIDE SERPL-SCNC: 102 MMOL/L (ref 98–107)
CHOLEST SERPL-MCNC: 182 MG/DL (ref 0–200)
CO2 SERPL-SCNC: 22.8 MMOL/L (ref 22–29)
CREAT BLDA-MCNC: 1 MG/DL (ref 0.6–1.3)
CREAT SERPL-MCNC: 1.25 MG/DL (ref 0.76–1.27)
D-LACTATE SERPL-SCNC: 1.6 MMOL/L (ref 0.5–2)
DEPRECATED RDW RBC AUTO: 44.2 FL (ref 37–54)
ERYTHROCYTE [DISTWIDTH] IN BLOOD BY AUTOMATED COUNT: 13.9 % (ref 12.3–15.4)
GFR SERPL CREATININE-BSD FRML MDRD: 58 ML/MIN/1.73
GLOBULIN UR ELPH-MCNC: 2.1 GM/DL
GLUCOSE BLDC GLUCOMTR-MCNC: 101 MG/DL (ref 70–130)
GLUCOSE BLDC GLUCOMTR-MCNC: 116 MG/DL (ref 70–130)
GLUCOSE BLDC GLUCOMTR-MCNC: 130 MG/DL (ref 70–130)
GLUCOSE BLDC GLUCOMTR-MCNC: 156 MG/DL (ref 70–130)
GLUCOSE BLDC GLUCOMTR-MCNC: 159 MG/DL (ref 70–130)
GLUCOSE SERPL-MCNC: 134 MG/DL (ref 65–99)
HBA1C MFR BLD: 5.9 % (ref 4.8–5.6)
HCT VFR BLD AUTO: 38.2 % (ref 37.5–51)
HDLC SERPL-MCNC: 28 MG/DL (ref 40–60)
HGB BLD-MCNC: 12.9 G/DL (ref 13–17.7)
LDLC SERPL CALC-MCNC: 104 MG/DL (ref 0–100)
LDLC/HDLC SERPL: 3.41 {RATIO}
MAGNESIUM SERPL-MCNC: 2.4 MG/DL (ref 1.6–2.4)
MCH RBC QN AUTO: 29.6 PG (ref 26.6–33)
MCHC RBC AUTO-ENTMCNC: 33.8 G/DL (ref 31.5–35.7)
MCV RBC AUTO: 87.6 FL (ref 79–97)
NT-PROBNP SERPL-MCNC: 922.1 PG/ML (ref 0–900)
PHOSPHATE SERPL-MCNC: 2.9 MG/DL (ref 2.5–4.5)
PLATELET # BLD AUTO: 367 10*3/MM3 (ref 140–450)
PMV BLD AUTO: 10.4 FL (ref 6–12)
POTASSIUM SERPL-SCNC: 4.3 MMOL/L (ref 3.5–5.2)
PROCALCITONIN SERPL-MCNC: 7.44 NG/ML (ref 0–0.25)
PROT SERPL-MCNC: 6 G/DL (ref 6–8.5)
RBC # BLD AUTO: 4.36 10*6/MM3 (ref 4.14–5.8)
SODIUM SERPL-SCNC: 137 MMOL/L (ref 136–145)
TRIGL SERPL-MCNC: 293 MG/DL (ref 0–150)
TROPONIN T SERPL-MCNC: 1.19 NG/ML (ref 0–0.03)
TSH SERPL DL<=0.05 MIU/L-ACNC: 1.69 UIU/ML (ref 0.27–4.2)
VLDLC SERPL-MCNC: 50 MG/DL (ref 5–40)
WBC # BLD AUTO: 13.87 10*3/MM3 (ref 3.4–10.8)

## 2021-04-02 PROCEDURE — 99232 SBSQ HOSP IP/OBS MODERATE 35: CPT | Performed by: INTERNAL MEDICINE

## 2021-04-02 PROCEDURE — 97165 OT EVAL LOW COMPLEX 30 MIN: CPT

## 2021-04-02 PROCEDURE — 83735 ASSAY OF MAGNESIUM: CPT | Performed by: INTERNAL MEDICINE

## 2021-04-02 PROCEDURE — 84100 ASSAY OF PHOSPHORUS: CPT | Performed by: INTERNAL MEDICINE

## 2021-04-02 PROCEDURE — 94640 AIRWAY INHALATION TREATMENT: CPT

## 2021-04-02 PROCEDURE — 83880 ASSAY OF NATRIURETIC PEPTIDE: CPT | Performed by: INTERNAL MEDICINE

## 2021-04-02 PROCEDURE — 84484 ASSAY OF TROPONIN QUANT: CPT | Performed by: INTERNAL MEDICINE

## 2021-04-02 PROCEDURE — 71045 X-RAY EXAM CHEST 1 VIEW: CPT

## 2021-04-02 PROCEDURE — 25010000002 ENOXAPARIN PER 10 MG: Performed by: INTERNAL MEDICINE

## 2021-04-02 PROCEDURE — 85027 COMPLETE CBC AUTOMATED: CPT | Performed by: INTERNAL MEDICINE

## 2021-04-02 PROCEDURE — 25010000002 FUROSEMIDE PER 20 MG: Performed by: INTERNAL MEDICINE

## 2021-04-02 PROCEDURE — 99222 1ST HOSP IP/OBS MODERATE 55: CPT | Performed by: INTERNAL MEDICINE

## 2021-04-02 PROCEDURE — 94799 UNLISTED PULMONARY SVC/PX: CPT

## 2021-04-02 PROCEDURE — 83605 ASSAY OF LACTIC ACID: CPT | Performed by: INTERNAL MEDICINE

## 2021-04-02 PROCEDURE — 63710000001 INSULIN REGULAR HUMAN PER 5 UNITS: Performed by: INTERNAL MEDICINE

## 2021-04-02 PROCEDURE — 25010000002 PIPERACILLIN SOD-TAZOBACTAM PER 1 G: Performed by: INTERNAL MEDICINE

## 2021-04-02 PROCEDURE — 84443 ASSAY THYROID STIM HORMONE: CPT | Performed by: INTERNAL MEDICINE

## 2021-04-02 PROCEDURE — 94003 VENT MGMT INPAT SUBQ DAY: CPT

## 2021-04-02 PROCEDURE — 25010000002 PROPOFOL 10 MG/ML EMULSION: Performed by: INTERNAL MEDICINE

## 2021-04-02 PROCEDURE — 84145 PROCALCITONIN (PCT): CPT | Performed by: INTERNAL MEDICINE

## 2021-04-02 PROCEDURE — 82962 GLUCOSE BLOOD TEST: CPT

## 2021-04-02 PROCEDURE — 83036 HEMOGLOBIN GLYCOSYLATED A1C: CPT | Performed by: INTERNAL MEDICINE

## 2021-04-02 PROCEDURE — 80061 LIPID PANEL: CPT | Performed by: INTERNAL MEDICINE

## 2021-04-02 PROCEDURE — 82330 ASSAY OF CALCIUM: CPT | Performed by: INTERNAL MEDICINE

## 2021-04-02 PROCEDURE — 25010000002 FENTANYL CITRATE (PF) 100 MCG/2ML SOLUTION: Performed by: INTERNAL MEDICINE

## 2021-04-02 PROCEDURE — 80053 COMPREHEN METABOLIC PANEL: CPT | Performed by: INTERNAL MEDICINE

## 2021-04-02 RX ORDER — HYDROCODONE BITARTRATE AND ACETAMINOPHEN 5; 325 MG/1; MG/1
1 TABLET ORAL EVERY 6 HOURS PRN
Status: DISCONTINUED | OUTPATIENT
Start: 2021-04-02 | End: 2021-04-08 | Stop reason: HOSPADM

## 2021-04-02 RX ORDER — VANCOMYCIN HYDROCHLORIDE 1 G/200ML
1000 INJECTION, SOLUTION INTRAVENOUS
Status: ACTIVE | OUTPATIENT
Start: 2021-04-05 | End: 2021-04-05

## 2021-04-02 RX ORDER — HYDROCODONE BITARTRATE AND ACETAMINOPHEN 5; 325 MG/1; MG/1
2 TABLET ORAL EVERY 6 HOURS PRN
Status: DISCONTINUED | OUTPATIENT
Start: 2021-04-02 | End: 2021-04-08 | Stop reason: HOSPADM

## 2021-04-02 RX ORDER — FUROSEMIDE 10 MG/ML
40 INJECTION INTRAMUSCULAR; INTRAVENOUS EVERY 8 HOURS
Status: COMPLETED | OUTPATIENT
Start: 2021-04-02 | End: 2021-04-03

## 2021-04-02 RX ORDER — IPRATROPIUM BROMIDE AND ALBUTEROL SULFATE 2.5; .5 MG/3ML; MG/3ML
3 SOLUTION RESPIRATORY (INHALATION)
Status: DISCONTINUED | OUTPATIENT
Start: 2021-04-02 | End: 2021-04-03

## 2021-04-02 RX ADMIN — PROPOFOL 45 MCG/KG/MIN: 10 INJECTION, EMULSION INTRAVENOUS at 06:10

## 2021-04-02 RX ADMIN — IPRATROPIUM BROMIDE AND ALBUTEROL SULFATE 3 ML: 2.5; .5 SOLUTION RESPIRATORY (INHALATION) at 20:01

## 2021-04-02 RX ADMIN — ACETAMINOPHEN 650 MG: 325 TABLET ORAL at 23:11

## 2021-04-02 RX ADMIN — ALBUTEROL SULFATE 2 PUFF: 90 AEROSOL, METERED RESPIRATORY (INHALATION) at 07:11

## 2021-04-02 RX ADMIN — SODIUM CHLORIDE 500 ML: 9 INJECTION, SOLUTION INTRAVENOUS at 01:39

## 2021-04-02 RX ADMIN — TAZOBACTAM SODIUM AND PIPERACILLIN SODIUM 3.38 G: 375; 3 INJECTION, SOLUTION INTRAVENOUS at 17:51

## 2021-04-02 RX ADMIN — FENTANYL CITRATE 100 MCG: 0.05 INJECTION, SOLUTION INTRAMUSCULAR; INTRAVENOUS at 03:03

## 2021-04-02 RX ADMIN — METOPROLOL TARTRATE 12.5 MG: 25 TABLET, FILM COATED ORAL at 12:28

## 2021-04-02 RX ADMIN — FENTANYL CITRATE 100 MCG: 0.05 INJECTION, SOLUTION INTRAMUSCULAR; INTRAVENOUS at 00:02

## 2021-04-02 RX ADMIN — METOPROLOL TARTRATE 12.5 MG: 25 TABLET, FILM COATED ORAL at 20:09

## 2021-04-02 RX ADMIN — ENOXAPARIN SODIUM 40 MG: 40 INJECTION SUBCUTANEOUS at 17:51

## 2021-04-02 RX ADMIN — HYDROCODONE BITARTRATE AND ACETAMINOPHEN 2 TABLET: 5; 325 TABLET ORAL at 18:00

## 2021-04-02 RX ADMIN — PROPOFOL 40 MCG/KG/MIN: 10 INJECTION, EMULSION INTRAVENOUS at 02:59

## 2021-04-02 RX ADMIN — FENTANYL CITRATE 50 MCG: 0.05 INJECTION, SOLUTION INTRAMUSCULAR; INTRAVENOUS at 06:17

## 2021-04-02 RX ADMIN — Medication 0.24 MCG/KG/MIN: at 06:11

## 2021-04-02 RX ADMIN — Medication 0.28 MCG/KG/MIN: at 02:59

## 2021-04-02 RX ADMIN — SODIUM CHLORIDE, PRESERVATIVE FREE 10 ML: 5 INJECTION INTRAVENOUS at 08:30

## 2021-04-02 RX ADMIN — ALBUTEROL SULFATE 2 PUFF: 90 AEROSOL, METERED RESPIRATORY (INHALATION) at 00:28

## 2021-04-02 RX ADMIN — IPRATROPIUM BROMIDE AND ALBUTEROL SULFATE 3 ML: 2.5; .5 SOLUTION RESPIRATORY (INHALATION) at 11:44

## 2021-04-02 RX ADMIN — HYDROCODONE BITARTRATE AND ACETAMINOPHEN 1 TABLET: 5; 325 TABLET ORAL at 20:10

## 2021-04-02 RX ADMIN — FENTANYL CITRATE 50 MCG: 0.05 INJECTION, SOLUTION INTRAMUSCULAR; INTRAVENOUS at 04:08

## 2021-04-02 RX ADMIN — FENTANYL CITRATE 50 MCG: 0.05 INJECTION, SOLUTION INTRAMUSCULAR; INTRAVENOUS at 12:30

## 2021-04-02 RX ADMIN — TAZOBACTAM SODIUM AND PIPERACILLIN SODIUM 3.38 G: 375; 3 INJECTION, SOLUTION INTRAVENOUS at 08:39

## 2021-04-02 RX ADMIN — FUROSEMIDE 40 MG: 10 INJECTION, SOLUTION INTRAMUSCULAR; INTRAVENOUS at 17:51

## 2021-04-02 RX ADMIN — FUROSEMIDE 40 MG: 10 INJECTION, SOLUTION INTRAMUSCULAR; INTRAVENOUS at 00:30

## 2021-04-02 RX ADMIN — FUROSEMIDE 40 MG: 10 INJECTION, SOLUTION INTRAMUSCULAR; INTRAVENOUS at 08:37

## 2021-04-02 RX ADMIN — INSULIN HUMAN 2 UNITS: 100 INJECTION, SOLUTION PARENTERAL at 00:30

## 2021-04-02 RX ADMIN — TAZOBACTAM SODIUM AND PIPERACILLIN SODIUM 3.38 G: 375; 3 INJECTION, SOLUTION INTRAVENOUS at 23:05

## 2021-04-02 RX ADMIN — INSULIN HUMAN 2 UNITS: 100 INJECTION, SOLUTION PARENTERAL at 23:11

## 2021-04-02 RX ADMIN — ALBUTEROL SULFATE 2 PUFF: 90 AEROSOL, METERED RESPIRATORY (INHALATION) at 04:24

## 2021-04-02 RX ADMIN — SODIUM CHLORIDE, PRESERVATIVE FREE 10 ML: 5 INJECTION INTRAVENOUS at 20:09

## 2021-04-02 RX ADMIN — PROPOFOL 35 MCG/KG/MIN: 10 INJECTION, EMULSION INTRAVENOUS at 00:03

## 2021-04-02 NOTE — PROGRESS NOTES
PROGRESS NOTE  Patient Name: Sebastien Tang  Age/Sex: 61 y.o. male  : 1960  MRN: [id number]    Date of Admission: 2021  Date of Encounter Visit: 21   LOS: 1 day   Patient Care Team:  Provider, No Known as PCP - General    Chief Complaint: Status post V. fib resuscitated arrest with coronary artery disease, pulmonary edema, COPD and sepsis    Hospital course: Status post resuscitated V. fib cardiac arrest with septic and cardiogenic shock patient has elevated procalcitonin, his EF was down to 38% with no prior numbers to compare to.  He is a smoker, with underlying COPD and developed pulmonary edema and is currently on the ventilator.  Patient is still requiring IV pressors with norepinephrine currently at 0.25 mics per kilogram per minute.  Patient is still considered afebrile with T-max of 99.7.  This morning the patient is much more awake and coherent and able to communicate and follow complex commands.  Urine output is satisfactory      REVIEW OF SYSTEMS:   CONSTITUTIONAL: no fever or chills  CARDIOVASCULAR: Despite the rib fracture patient is denying any significant chest pain on breathing, no edema  RESPIRATORY: On the ventilator currently on 7.5 of PEEP with a 5% FiO2.   GASTROINTESTINAL: No anorexia, nausea, vomiting or diarrhea. No abdominal pain or blood.  Patient is currently n.p.o.  HEMATOLOGIC: No bleeding or bruising.     Ventilator/Non-Invasive Ventilation Settings (From admission, onward) Comment     Start     Ordered    21 0530  Ventilator - AC/PC; (24); (35); 90%; 7.5; 23  Continuous     Question Answer Comment   Vent Mode AC/PC    Breath rate  24   FiO2  35   FiO2 titrate for Sp02% =/> 90%    PEEP 7.5    Inspiratory Pressure 23        21 0530    21 1756  Ventilator - AC/PC; (24); 60; 90%; 7.5  Continuous,   Status:  Canceled     Question Answer Comment   Vent Mode AC/PC    Breath rate  24   FiO2 60    FiO2 titrate for Sp02% =/> 90%    PEEP 7.5        21 1756  "                 Vital Signs  Temp:  [95.5 °F (35.3 °C)-99.7 °F (37.6 °C)] 99.6 °F (37.6 °C)  Heart Rate:  [61-91] 68  Resp:  [9-25] 24  BP: ()/() 109/70  FiO2 (%):  [35 %-100 %] 36 %  SpO2:  [80 %-100 %] 96 %  on  Flow (L/min):  [2-10] 6 Device (Oxygen Therapy): ventilator    Intake/Output Summary (Last 24 hours) at 4/2/2021 0742  Last data filed at 4/2/2021 0400  Gross per 24 hour   Intake 3882 ml   Output 1825 ml   Net 2057 ml     Flowsheet Rows      First Filed Value   Admission Height  167.6 cm (66\") Documented at 04/01/2021 1956   Admission Weight  96.9 kg (213 lb 10 oz) Documented at 04/01/2021 1437        Body mass index is 35.09 kg/m².      04/01/21  1437 04/01/21 1956 04/02/21  0400   Weight: 96.9 kg (213 lb 10 oz) 98.3 kg (216 lb 11.4 oz) 98.6 kg (217 lb 6 oz)       Physical Exam:  GEN: Awake and responsive, orally intubated, on the ventilator, able to follow commands    EYES:   Sclerae clear. No icterus. PERRL. Normal EOM  ENT:   External ears/nose normal, no oral lesions, no thrush, mucous membranes moist, oral ET tube  NECK:  Supple, midline trachea, no JVD  LUNGS: Normal chest on inspection, crackles are much less prominent, minimal rhonchi no wheezes, breathing in sync with the ventilator, good respiratory drive and good lung compliance, tolerated CPAP bedside trial.   CV:  Regular rhythm and rate. Normal S1/S2. No murmurs, gallops, or rubs noted.  ABD:  Soft, nontender and nondistended. Normal bowel sounds. No guarding  EXT:  Moves all extremities well. No cyanosis. No redness. No edema.   Skin: Dry, intact, no bleeding    Results Review:    Results From Last 14 Days   Lab Units 04/02/21  0415   LACTATE mmol/L 1.6   TRIGLYCERIDES mg/dL 293*     Results from last 7 days   Lab Units 04/02/21  0415 04/01/21  1233 04/01/21  1212   SODIUM mmol/L 137  --  141   POTASSIUM mmol/L 4.3  --  2.9*   CHLORIDE mmol/L 102  --  99   CO2 mmol/L 22.8  --  18.7*   BUN mg/dL 24*  --  15   CREATININE mg/dL " 1.25 1.00 1.03   CALCIUM mg/dL 8.8  --  9.7   AST (SGOT) U/L 107*  --  53*   ALT (SGPT) U/L 59*  --  43*   ANION GAP mmol/L 12.2  --  23.3*   ALBUMIN g/dL 3.90  --  4.20     Results from last 7 days   Lab Units 04/02/21  0415 04/01/21  1530 04/01/21  1212   TROPONIN T ng/mL 1.190* 1.050* 0.012     Results from last 7 days   Lab Units 04/02/21  0415   TSH uIU/mL 1.690     Results from last 7 days   Lab Units 04/02/21  0415   PROBNP pg/mL 922.1*     Results from last 7 days   Lab Units 04/02/21 0415 04/01/21  1336 04/01/21  1212   WBC 10*3/mm3 13.87*  --  9.92   HEMOGLOBIN g/dL 12.9*  --  14.3   HEMOGLOBIN, POC g/dL  --  13.6  --    HEMATOCRIT % 38.2  --  45.8   HEMATOCRIT POC %  --  40  --    PLATELETS 10*3/mm3 367  --  313   MCV fL 87.6  --  94.2   NEUTROPHIL % %  --   --  52.7   LYMPHOCYTE % %  --   --  32.2   MONOCYTES % %  --   --  7.6   EOSINOPHIL % %  --   --  1.6   BASOPHIL % %  --   --  0.9   IMM GRAN % %  --   --  5.0*     Results from last 7 days   Lab Units 04/01/21  1212   INR  1.17*   APTT seconds 39.7*     Results from last 7 days   Lab Units 04/02/21  0415   MAGNESIUM mg/dL 2.4     Results from last 7 days   Lab Units 04/02/21  0415   CHOLESTEROL mg/dL 182   TRIGLYCERIDES mg/dL 293*   HDL CHOL mg/dL 28*     Results from last 7 days   Lab Units 04/01/21  1531 04/01/21  1222   PH, ARTERIAL pH units 7.331* 7.064*   PCO2, ARTERIAL mm Hg 46.2* 46.6*   PO2 ART mm Hg 120.2* 59.6*   HCO3 ART mmol/L 24.4 13.3*     Results from last 7 days   Lab Units 04/02/21  0415   HEMOGLOBIN A1C % 5.90*     Glucose   Date/Time Value Ref Range Status   04/02/2021 0610 116 70 - 130 mg/dL Final   04/02/2021 0008 159 (H) 70 - 130 mg/dL Final   04/01/2021 1834 127 70 - 130 mg/dL Final   04/01/2021 1446 198 (H) 70 - 130 mg/dL Final     Results from last 7 days   Lab Units 04/02/21  0415   PROCALCITONIN ng/mL 7.44*   LACTATE mmol/L 1.6             Results from last 7 days   Lab Units 04/01/21  1221   COVID19  Not Detected   TSH  1.69  Hemoglobin A1c 5.9  Phosphate 2.9  PSA still pending            Echocardiogram 4/1/2021:  · Calculated left ventricular EF = 38.9% Estimated left ventricular EF was in agreement with the calculated left ventricular EF. Left ventricular systolic function is moderately decreased. Normal left ventricular cavity size noted. Left ventricular wall thickness is consistent with mild concentric hypertrophy. Left ventricular diastolic function was normal.  · See wall scoring diagram.  Imaging:   Imaging Results (All)     Procedure Component Value Units Date/Time              I reviewed the patient's new clinical results.  I personally viewed and interpreted the patient's imaging results:X-ray today showed increase in the interstitial edema on the left side as compared to the prior film, no significant changes on the right        Medication Review:   albuterol sulfate HFA, 2 puff, Inhalation, 4x Daily - RT  aspirin, 325 mg, Oral, Daily  enoxaparin, 40 mg, Subcutaneous, Q24H  insulin regular, 0-7 Units, Subcutaneous, Q6H  potassium chloride, 10 mEq, Intravenous, Once  sodium chloride, 10 mL, Intravenous, Q12H        amiodarone, 0.5 mg/min, Last Rate: 0.5 mg/min (04/01/21 2156)  EPINEPHrine, 0.02-0.3 mcg/kg/min  norepinephrine, 0.02-0.3 mcg/kg/min, Last Rate: 0.24 mcg/kg/min (04/02/21 0611)  propofol, 5-50 mcg/kg/min, Last Rate: 45 mcg/kg/min (04/02/21 0610)        ASSESSMENT:   1. Resuscitated V. fib arrest  2. Septic shock, on pressors with cardiogenic shock component as well  3. Coronary artery disease with ischemic cardiomyopathy,  4. Acute non-ST elevation MI  5. Acute hypoxemic respiratory failure  6. Pulmonary edema  7. Emphysema with possible underlying COPD  8. Metabolic and respiratory acidosis  9. Enlarged prostate  10. Diverticulosis without diverticulitis  11. Limited medical history  12. Severe hypokalemia, likely worse after the acidosis has been corrected  13. Transaminitis, likely secondary to passive  hepatic congestion  14. Hematuria, mild, no need to withhold any DVT prophylaxis or aspirin at this point unless hematuria is worse     PLAN:  Patient is on amiodarone drip for the V. fib arrest  Patient is still on IV pressors with norepinephrine at 0.24 micrograms per kilogram per minute and weaning.  Patient is on Lovenox for DVT prophylaxis  Unable to do fluid challenge for the septic shock given the cardiogenic component and the existing pulmonary edema  Procalcitonin is positive at 7.44 and patient need to be covered for aspiration pneumonia  Renal function is showing some mild increase in the creatinine  Except the patient to be able to come off the pressors once he is off all the sedation specially that he is requiring 45 mcg/kg/min of propofol which is definitely affecting his blood pressure  From the neurological standpoint the patient is much more awake and alert and coherent this morning and was able to clearly respond to my conversation even though he is unable to talk with the ET tube  We will continue to follow-up on the renal function and continue with the hemodynamic support  Patient was switched to CPAP and will try to wean and extubate this morning  We will give 1 dose of Lasix prior to extubation  Discussed with patient    Disposition: Continue to monitor in the CCU    Meg Kessler MD  04/02/21  07:42 EDT      Time: Critical care 35 min      Dictated utilizing Dragon dictation

## 2021-04-02 NOTE — PROGRESS NOTES
Discharge Planning Assessment  [facility]     Patient Name: Sebastien Tang  MRN: [id number]  Today's Date: 4/2/2021    Admit Date: 4/1/2021    Discharge Needs Assessment     Row Name 04/02/21 1341       Living Environment    Lives With  other (see comments)    Current Living Arrangements  home/apartment/condo    Potentially Unsafe Housing Conditions  unable to assess    Primary Care Provided by  self    Provides Primary Care For  no one    Family Caregiver if Needed  none    Quality of Family Relationships  unable to assess    Able to Return to Prior Arrangements  yes       Transition Planning    Patient/Family Anticipates Transition to  home    Patient/Family Anticipated Services at Transition  none    Transportation Anticipated  family or friend will provide       Discharge Needs Assessment    Equipment Currently Used at Home  none    Concerns to be Addressed  discharge planning;denies needs/concerns at this time        Discharge Plan     Row Name 04/02/21 3722       Plan    Plan  Home    Plan Comments  IMM noted.  CCP spoke with patient at bedside.  CCP role explained and discharge planning discussed. Face sheet verified.  Pt emergency contact is his “mother of his daughter “ , Leighann, [phone]  Pt lives in a single story with his ”mother of his daughter.”   Pt states she is not wife, common law wife or friend  and states he will not discuss their relationship.  Pt has a daughter Zara [phone] that can be a legal decision maker if needed.  He uses no DME to ambulate.  Patient is independent with ADL’s. Pt has no past history of HH.  Pt has not been to rehab in the past.  [facility] [address] [facility].   Plan is home.  CCP discussed home health; patient declined a referral.  CCP will follow for discharge home        Continued Care and Services - Admitted Since 4/1/2021    Coordination has not been started for this encounter.         Demographic Summary    No  documentation.       Functional Status    No documentation.       Psychosocial    No documentation.       Abuse/Neglect    No documentation.       Legal    No documentation.       Substance Abuse    No documentation.       Patient Forms    No documentation.           Tarah Avalos RN

## 2021-04-02 NOTE — THERAPY EVALUATION
Patient Name: Sebastien Tang  : 1960    MRN: [id number]                              Today's Date: 2021       Admit Date: 2021    Visit Dx:     ICD-10-CM ICD-9-CM   1. Cardiac arrest (CMS/HCC)  I46.9 427.5   2. Abnormal EKG  R94.31 794.31   3. Abnormal CXR  R93.89 793.2   4. Hypokalemia  E87.6 276.8   5. Hyperglycemia  R73.9 790.29     Patient Active Problem List   Diagnosis   • Cardiac arrest (CMS/[facility])     No past medical history on file.  Past Surgical History:   Procedure Laterality Date   • CARDIAC CATHETERIZATION Left 2021    Procedure: Coronary Angiography;  Surgeon: Anna Puga MD;  Location:  [facility];  Service: Cardiology;  Laterality: Left;   • CARDIAC CATHETERIZATION N/A 2021    Procedure: Left ventriculography;  Surgeon: Anna Puga MD;  Location:  [facility];  Service: Cardiology;  Laterality: N/A;   • CARDIAC CATHETERIZATION N/A 2021    Procedure: Left Heart Cath;  Surgeon: Anna Puga MD;  Location:  [facility];  Service: Cardiology;  Laterality: N/A;     General Information     Row Name 21 1447          OT Time and Intention    Document Type  evaluation  -VS     Mode of Treatment  occupational therapy  -VS     Row Name 21 1447          General Information    Patient Profile Reviewed  yes  -VS     Prior Level of Function  independent:;ADL's;work  -VS     Existing Precautions/Restrictions  fall  -VS     Row Name 21 1447          Cognition    Orientation Status (Cognition)  oriented to;person  -VS     Row Name 21 1447          Safety Issues, Functional Mobility    Safety Issues Affecting Function (Mobility)  insight into deficits/self-awareness;safety precaution awareness;awareness of need for assistance  -VS     Impairments Affecting Function (Mobility)  other (see comments)  -VS     Comment, Safety Issues/Impairments (Mobility)  Currently both STM & LTM are a problem, forgets conversation  and ask same question several times in 10 minutes  -VS       User Key  (r) = Recorded By, (t) = Taken By, (c) = Cosigned By    Initials Name Provider Type    VS Violeta Orantes OTR Occupational Therapist          Mobility/ADL's     Row Name 04/02/21 1449          Bed Mobility    Comment (Bed Mobility)  NA at this time per RN, just extubated earlier this a.m.  -VS     Row Name 04/02/21 1449          Transfers    Comment (Transfers)  NA at this time per RN  -VS       User Key  (r) = Recorded By, (t) = Taken By, (c) = Cosigned By    Initials Name Provider Type    VS Violeta Orantes OTR Occupational Therapist        Obj/Interventions     Row Name 04/02/21 1451          Range of Motion Comprehensive    General Range of Motion  bilateral upper extremity ROM WFL  -VS     Row Name 04/02/21 1451          Strength Comprehensive (MMT)    Comment, General Manual Muscle Testing (MMT) Assessment  NA due to pain in chest area due to chest compression from CPR  -VS       User Key  (r) = Recorded By, (t) = Taken By, (c) = Cosigned By    Initials Name Provider Type    VS Violeta Orantes OTR Occupational Therapist        Goals/Plan     Row Name 04/02/21 1500          Transfer Goal 1 (OT)    Activity/Assistive Device (Transfer Goal 1, OT)  sit-to-stand/stand-to-sit;bed-to-chair/chair-to-bed;toilet  -VS     [place] Level/Cues Needed (Transfer Goal 1, OT)  independent  -VS     Time Frame (Transfer Goal 1, OT)  short term goal (STG);2 weeks  -VS     Progress/Outcome (Transfer Goal 1, OT)  continuing progress toward goal  -VS     Row Name 04/02/21 1500          Bathing Goal 1 (OT)    Activity/Device (Bathing Goal 1, OT)  bathing skills, all  -VS     [place] Level/Cues Needed (Bathing Goal 1, OT)  independent  -VS     Time Frame (Bathing Goal 1, OT)  short term goal (STG);2 weeks  -VS     Progress/Outcomes (Bathing Goal 1, OT)  good progress toward goal  -VS     Row Name 04/02/21 1500          Strength Goal 1 (OT)     Strength Goal 1 (OT)  Pt's UE strength to be WFls to (A) with BADLs  -VS     Time Frame (Strength Goal 1, OT)  short term goal (STG)  -VS       User Key  (r) = Recorded By, (t) = Taken By, (c) = Cosigned By    Initials Name Provider Type    VS Violeta Orantes OTR Occupational Therapist        Clinical Impression     Row Name 04/02/21 1452          Pain Assessment    Additional Documentation  Pain Scale: FACES Pre/Post-Treatment (Group)  -VS     Row Name 04/02/21 1452          Pain Scale: Numbers Pre/Post-Treatment    Pain Intervention(s)  Rest  -VS     [facility] Name 04/02/21 1452          Pain Scale: FACES Pre/Post-Treatment    Pain: FACES Scale, Pretreatment  0-->no hurt  -VS     Posttreatment Pain Rating  10-->hurts worst  -VS     Pain Location - Side  Right  -VS     Pain Location  chest  -VS     Pre/Posttreatment Pain Comment  RN present for eval  -VS     Row Name 04/02/21 1452          Plan of Care Review    Plan of Care Reviewed With  patient  -VS     Outcome Summary  OT:  OT began the evaluation but was not able to complete at this time. Pt. is a 61 year old male who had a MI was placed on a vent an was extubated earlier this morning.  Pt. was O x 1, asking where he was, what happen to him several times during the time the therapist was in the room.  Pt's UE AROM WFLs, MMT was not able to be completed today.  RN was prestent during the session, OT ask to come back later to complete mobility and BADL evaluation.  Pt. was not able to tolerate wearing a mask.  OT and RN were both wearing masks and eye protection.  OT washed her hands before and afte the session.  -VS     Row Name 04/02/21 1452          Therapy Assessment/Plan (OT)    Patient/Family Therapy Goal Statement (OT)  Complete functional ADL and mobility evaluation  -VS     Rehab Potential (OT)  good, to achieve stated therapy goals  -VS     Criteria for Skilled Therapeutic Interventions Met (OT)  skilled treatment is necessary  -VS     Therapy Frequency  (OT)  5 times/wk  -VS     Row Name 04/02/21 1452          Therapy Plan Review/Discharge Plan (OT)    Anticipated Discharge Disposition (OT)  home with assist  -VS     Row Name 04/02/21 1452          Positioning and Restraints    Pre-Treatment Position  in bed  -VS     Post Treatment Position  bed  -VS     In Bed  supine;call light within reach;encouraged to call for assist;with nsg;side rails up x3  -VS       User Key  (r) = Recorded By, (t) = Taken By, (c) = Cosigned By    Initials Name Provider Type    VS Violeta Orantes OTR Occupational Therapist        Outcome Measures     Row Name 04/02/21 1505          How much help from another is currently needed...    Putting on and taking off regular lower body clothing?  3  -VS     Bathing (including washing, rinsing, and drying)  3  -VS     Toileting (which includes using toilet bed pan or urinal)  3  -VS     Putting on and taking off regular upper body clothing  3  -VS     Taking care of personal grooming (such as brushing teeth)  3  -VS     Eating meals  3  -VS     AM-PAC 6 Clicks Score (OT)  18  -VS     [facility] Name 04/02/21 1505          Functional Assessment    Outcome Measure Options  AM-PAC 6 Clicks Daily Activity (OT)  -VS       User Key  (r) = Recorded By, (t) = Taken By, (c) = Cosigned By    Initials Name Provider Type    VS Violeta Orantes OTR Occupational Therapist        Occupational Therapy Education                 Title: PT OT SLP Therapies (In Progress)     Topic: Occupational Therapy (In Progress)     Point: ADL training (In Progress)     Description:   Instruct learner(s) on proper safety adaptation and remediation techniques during self care or transfers.   Instruct in proper use of assistive devices.              Learning Progress Summary           Patient Acceptance, E, NR by VS at 4/2/2021 1506    Comment: OT disussed POC and Goals with the pt.                   Point: Precautions (In Progress)     Description:   Instruct learner(s) on prescribed  precautions during self-care and functional transfers.              Learning Progress Summary           Patient Acceptance, E, NR by VS at 4/2/2021 1506    Comment: OT disussed POC and Goals with the pt.                               User Key     Initials Effective Dates Name Provider Type Discipline    VS 03/02/20 -  Violeta Orantes OTR Occupational Therapist OT              OT Recommendation and Plan  Therapy Frequency (OT): 5 times/wk  Plan of Care Review  Plan of Care Reviewed With: patient  Outcome Summary: OT:  OT began the evaluation but was not able to complete at this time. Pt. is a 61 year old male who had a MI was placed on a vent an was extubated earlier this morning.  Pt. was O x 1, asking where he was, what happen to him several times during the time the therapist was in the room.  Pt's UE AROM WFLs, MMT was not able to be completed today.  RN was prestent during the session, OT ask to come back later to complete mobility and BADL evaluation.  Pt. was not able to tolerate wearing a mask.  OT and RN were both wearing masks and eye protection.  OT washed her hands before and afte the session.     Time Calculation:   Time Calculation- OT     Row Name 04/02/21 1507             Time Calculation- OT    OT Start Time  1013  -VS      OT Stop Time  1038  -VS      OT Time Calculation (min)  25 min  -VS      OT Received On  04/02/21  -VS      OT - Next Appointment  04/05/21  -VS      OT Goal Re-Cert Due Date  04/16/21  -VS        User Key  (r) = Recorded By, (t) = Taken By, (c) = Cosigned By    Initials Name Provider Type    VS Violeta Orantes OTR Occupational Therapist        Therapy Charges for Today     Code Description Service Date Service Provider Modifiers Qty    [id number]  OT EVAL LOW COMPLEXITY 2 4/2/2021 Violeta Orantes OTR GO 1               HARITHA Pina  4/2/2021

## 2021-04-02 NOTE — PLAN OF CARE
Goal Outcome Evaluation:  Plan of Care Reviewed With: patient     Outcome Summary: OT:  OT began the evaluation but was not able to complete at this time. Pt. is a 61 year old male who had a MI was placed on a vent an was extubated earlier this morning.  Pt. was O x 1, asking where he was, what happen to him several times during the time the therapist was in the room.  Pt's UE AROM WFLs, MMT was not able to be completed today.  RN was prestent during the session, OT ask to come back later to complete mobility and BADL evaluation.  Pt. was not able to tolerate wearing a mask.  OT and RN were both wearing masks and eye protection.  OT washed her hands before and afte the session.

## 2021-04-02 NOTE — CONSULTS
Patient Name: Sebastien Tang  Age/Sex: 61 y.o. male  : 1960  MRN: [id number]    Date of Admission: 2021  Date of Encounter Visit: 21  Encounter Provider: Nik Rosas MD  Place of Service: [facility] CARDIOLOGY      Referring Provider: Vernon Sanford MD  Patient Care Team:  Provider, No Known as PCP - General    Subjective:       Chief Complaint:   Sudden Cardiac Death    History of Present Illness:  Sebastien Tang is a 61 y.o. male who presents after sudden cardiac death.     The patient was outside his workplace when he collapsed.  He received bystander CPR.  When EMS arrived he was in VF and was cardioverted several times.   He was awake, but disoriented in emergency room requiring intubation to complete angiography.  He was found to have chronic coronary artery disease, no acute lesion identified.  He has had an echocardiogram with and ejection fraction that is moderately reduced.     This morning he is intubated, but alert.  Plan is for extubation later today.           Past Medical History:  No past medical history on file.    Past Surgical History:   Procedure Laterality Date   • CARDIAC CATHETERIZATION Left 2021    Procedure: Coronary Angiography;  Surgeon: Anna Puga MD;  Location: [facility] CATH INVASIVE LOCATION;  Service: Cardiology;  Laterality: Left;   • CARDIAC CATHETERIZATION N/A 2021    Procedure: Left ventriculography;  Surgeon: Anna Puga MD;  Location: [facility] CATH INVASIVE LOCATION;  Service: Cardiology;  Laterality: N/A;   • CARDIAC CATHETERIZATION N/A 2021    Procedure: Left Heart Cath;  Surgeon: Anna Puga MD;  Location: [facility] CATH INVASIVE LOCATION;  Service: Cardiology;  Laterality: N/A;       Home Medications:   No medications prior to admission.       Allergies:  No Known Allergies    Past Social History:  Social History     Socioeconomic History   • Marital status: Single     Spouse name: Not on file   • Number of  children: Not on file   • Years of education: Not on file   • Highest education level: Not on file   Tobacco Use   • Smoking status: Current Every Day Smoker     Packs/day: 1.50     Years: 10.00     Pack years: 15.00     Types: Cigarettes   Substance and Sexual Activity   • Drug use: Not Currently   • Sexual activity: Not Currently        Past Family History:  No family history on file.    Review of Systems: All systems reviewed. Pertinent positives identified in HPI. All other systems are negative.     Review of Systems   Unable to perform ROS: intubated         Objective:     Objective:  Temp:  [95.5 °F (35.3 °C)-99.7 °F (37.6 °C)] 99.6 °F (37.6 °C)  Heart Rate:  [61-91] 80  Resp:  [9-25] 24  BP: ()/() 133/76  FiO2 (%):  [35 %-100 %] 36 %    Intake/Output Summary (Last 24 hours) at 4/2/2021 1041  Last data filed at 4/2/2021 0400  Gross per 24 hour   Intake 3882 ml   Output 1825 ml   Net 2057 ml     Body mass index is 35.09 kg/m².      04/01/21  1437 04/01/21  1956 04/02/21  0400   Weight: 96.9 kg (213 lb 10 oz) 98.3 kg (216 lb 11.4 oz) 98.6 kg (217 lb 6 oz)           Physical Exam:   Vitals and nursing note reviewed.   Constitutional:       Appearance: Healthy appearance.      Interventions: Intubated.   Eyes:      General: Lids are normal.   HENT:    Mouth/Throat:      Pharynx: Oropharynx is clear.   Pulmonary:      Effort: Intubated.      Breath sounds: Normal breath sounds.   Cardiovascular:      Normal rate. Regular rhythm.      Murmurs: There is no murmur.   Skin:     General: Skin is warm.   Neurological:      Mental Status: Alert and oriented to person, place and time.   Psychiatric:         Attention and Perception: Attention normal.         Behavior: Behavior is cooperative.           Lab Review:     Results from last 7 days   Lab Units 04/02/21  0415 04/01/21  1233 04/01/21  1212   SODIUM mmol/L 137  --  141   POTASSIUM mmol/L 4.3  --  2.9*   CHLORIDE mmol/L 102  --  99   CO2 mmol/L 22.8  --   18.7*   BUN mg/dL 24*  --  15   CREATININE mg/dL 1.25 1.00 1.03   GLUCOSE mg/dL 134*  --  316*   CALCIUM mg/dL 8.8  --  9.7       Results from last 7 days   Lab Units 04/02/21  0415 04/01/21  1530 04/01/21  1212   TROPONIN T ng/mL 1.190* 1.050* 0.012     Results from last 7 days   Lab Units 04/02/21  0415   WBC 10*3/mm3 13.87*   HEMOGLOBIN g/dL 12.9*   HEMATOCRIT % 38.2   PLATELETS 10*3/mm3 367     Results from last 7 days   Lab Units 04/01/21  1212   INR  1.17*   APTT seconds 39.7*     Results from last 7 days   Lab Units 04/02/21  0415   CHOLESTEROL mg/dL 182     Results from last 7 days   Lab Units 04/02/21  0415   MAGNESIUM mg/dL 2.4     Results from last 7 days   Lab Units 04/02/21  0415   CHOLESTEROL mg/dL 182   TRIGLYCERIDES mg/dL 293*   HDL CHOL mg/dL 28*   LDL CHOL mg/dL 104*     Results from last 7 days   Lab Units 04/02/21  0415   PROBNP pg/mL 922.1*         Results from last 7 days   Lab Units 04/02/21  0415   TSH uIU/mL 1.690       Imaging:    Imaging Results (Most Recent)     Procedure Component Value Units Date/Time    XR Chest 1 View [[id number]] Collected: 04/02/21 0445     Updated: 04/02/21 0445    Narrative:        Patient: GUMARO PRABHAKAR  Time Out: 04:44  Exam(s): FILM CXR 1 VIEW     EXAM:    XR Chest, 1 View    CLINICAL HISTORY:     Respiratory failure  Reason for exam: Respiratory failure.    TECHNIQUE:    Frontal view of the chest.    COMPARISON:  4 1 2021     FINDINGS:    Lungs: Bilateral hazy opacities, left greater than right slightly   increased in the left upper lobe compared to prior study.    Pleural space:  Unremarkable.  No pneumothorax.    Heart: Moderate cardiomegaly.    Mediastinum:  Unremarkable.    Bones joints:  Unremarkable.    Lines and tubes: Endotracheal tube terminates 2.4 cm above the level of   the mitesh.    IMPRESSION:       1. Interval intubation.  Endotracheal tube is in appropriate position.    2. Bilateral hazy opacities, left greater than right slightly increased   in  the left upper lobe compared to prior study which may be due to   worsening edema or infection.    3. No pleural effusions or pneumothorax.    Impression:          Electronically signed by Tyler Kulkarni M.D. on 04-02-21 at 0444    CT Angiogram Chest [[id number]] Collected: 04/01/21 1357     Updated: 04/01/21 1450    Narrative:      CT ANGIOGRAM CHEST, ABDOMEN AND PELVIS WITH IV CONTRAST     HISTORY: Cardiac arrest. Resuscitated. Rule out dissection of the  thoracic aorta.     TECHNIQUE: CT angiogram chest, abdomen and pelvis in the aortic phase  was performed to evaluate for dissection and includes imaging from the  thoracic inlet through the trochanters. Data reconstructed in coronal  and sagittal planes. 3-D volume rendering performed. As part of the  technique for this CT exam, radiation dose reduction techniques were  utilized, including automated exposure control and exposure modulation  based on body size.     COMPARISON: CT abdomen and pelvis without contrast 03/08/2021.     FINDINGS: Mild atherosclerotic disease and calcified and noncalcified  plaque is present involving the aortic arch, particularly at the level  of the top of the aortic arch anteriorly. There is no evidence for  thoracic or abdominal aortic dissection. The great vessel origins are  patent. There is mild enlargement of the infrarenal abdominal aorta  measuring 2.8 cm AP dimension.  Atherosclerotic disease is present  involving the abdominal aorta and iliac vasculature. On the right, there  is moderate stenosis of the proximal right common iliac artery  associated with partially calcified plaque. Right internal/external  iliac arteries are patent. There is a moderate stenosis of the proximal  right external iliac artery. The right common femoral artery is patent.  The right proximal superficial and deep femoral arteries are patent. On  the left, there is mild enlargement of the left proximal common iliac  artery measuring 1.6 cm diameter.  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 This is just  proximal to its bifurcation. The left internal and external iliac  arteries are patent. There is partially calcified plaque involving the  left common femoral artery with mild narrowing. The left proximal  superficial and deep femoral arteries are patent.     NONANGIOGRAPHIC FINDINGS: The heart size is enlarged and there is  pulmonary arterial enlargement. The exam does not evaluate for pulmonary  embolus due to the phase of contrast. There is no pleural or pericardial  effusion.     There is diffuse pulmonary emphysema with superimposed thickening of the  septal lines and hazy pulmonary opacities suspected to represent  bilateral pulmonary edema. There are acute fractures of bilateral  anterior 3rd and 4th ribs. Liver, spleen, adrenal glands, and pancreas  exhibit normal early arterial phase of contrast appearance. There is a 3  mm left lower pole nonobstructing renal stone. There is no  hydronephrosis. There is no bowel dilatation or evidence for bowel  obstruction. Sigmoid diverticulosis is present without evidence for  diverticulitis. There is prostate gland enlargement and the prostate  gland measures approximately 6.5 x 7.5 cm axial dimension with median  lobe hypertrophy.       Impression:      1. Diffuse pulmonary emphysema with hydrostatic and borderline alveolar  pulmonary edema.  2. Acute bilateral anterior 3rd and 4th rib fractures.  3. No evidence for thoracic aortic dissection. Diffuse, multifocal  atherosclerotic disease with mild aneurysmal dilatation of the  infrarenal abdominal aorta measuring 2.8 cm in diameter. 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 Moderate stenosis of right proximal common iliac  artery and distal left common iliac artery at the site of the  distal  aneurysm.  4. 3 mm nonobstructing left lower pole renal stone.  5. Sigmoid diverticulosis without evidence for diverticulitis.  6. Prostate gland enlargement.     Discussed with Dr. Roque in the emergency department on 04/01/2021 at  1:05 PM.     This report was finalized on 4/1/2021 2:47 PM by Dr. Frandy Skelton M.D.       CT Angiogram Abdomen Pelvis [[id number]] Collected: 04/01/21 1357     Updated: 04/01/21 1450    Narrative:      CT ANGIOGRAM CHEST, ABDOMEN AND PELVIS WITH IV CONTRAST     HISTORY: Cardiac arrest. Resuscitated. Rule out dissection of the  thoracic aorta.     TECHNIQUE: CT angiogram chest, abdomen and pelvis in the aortic phase  was performed to evaluate for dissection and includes imaging from the  thoracic inlet through the trochanters. Data reconstructed in coronal  and sagittal planes. 3-D volume rendering performed. As part of the  technique for this CT exam, radiation dose reduction techniques were  utilized, including automated exposure control and exposure modulation  based on body size.     COMPARISON: CT abdomen and pelvis without contrast 03/08/2021.     FINDINGS: Mild atherosclerotic disease and calcified and noncalcified  plaque is present involving the aortic arch, particularly at the level  of the top of the aortic arch anteriorly. There is no evidence for  thoracic or abdominal aortic dissection. The great vessel origins are  patent. There is mild enlargement of the infrarenal abdominal aorta  measuring 2.8 cm AP dimension.  Atherosclerotic disease is present  involving the abdominal aorta and iliac vasculature. On the right, there  is moderate stenosis of the proximal right common iliac artery  associated with partially calcified plaque. Right internal/external  iliac arteries are patent. There is a moderate stenosis of the proximal  right external iliac artery. The right common femoral artery is patent.  The right proximal superficial and deep femoral arteries are  patent. On  the left, there is mild enlargement of the left proximal common iliac  artery measuring 1.6 cm diameter. 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 This is just  proximal to its bifurcation. The left internal and external iliac  arteries are patent. There is partially calcified plaque involving the  left common femoral artery with mild narrowing. The left proximal  superficial and deep femoral arteries are patent.     NONANGIOGRAPHIC FINDINGS: The heart size is enlarged and there is  pulmonary arterial enlargement. The exam does not evaluate for pulmonary  embolus due to the phase of contrast. There is no pleural or pericardial  effusion.     There is diffuse pulmonary emphysema with superimposed thickening of the  septal lines and hazy pulmonary opacities suspected to represent  bilateral pulmonary edema. There are acute fractures of bilateral  anterior 3rd and 4th ribs. Liver, spleen, adrenal glands, and pancreas  exhibit normal early arterial phase of contrast appearance. There is a 3  mm left lower pole nonobstructing renal stone. There is no  hydronephrosis. There is no bowel dilatation or evidence for bowel  obstruction. Sigmoid diverticulosis is present without evidence for  diverticulitis. There is prostate gland enlargement and the prostate  gland measures approximately 6.5 x 7.5 cm axial dimension with median  lobe hypertrophy.       Impression:      1. Diffuse pulmonary emphysema with hydrostatic and borderline alveolar  pulmonary edema.  2. Acute bilateral anterior 3rd and 4th rib fractures.  3. No evidence for thoracic aortic dissection. Diffuse, multifocal  atherosclerotic disease with mild aneurysmal dilatation of the  infrarenal abdominal aorta measuring 2.8 cm in diameter. 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 Moderate stenosis of right proximal common iliac  artery and distal left common iliac artery at the site of the distal  aneurysm.  4. 3 mm nonobstructing left lower pole renal stone.  5. Sigmoid diverticulosis without evidence for diverticulitis.  6. Prostate gland enlargement.     Discussed with Dr. Roque in the emergency department on 04/01/2021 at  1:05 PM.     This report was finalized on 4/1/2021 2:47 PM by Dr. Frandy Skelton M.D.       XR Chest 1 View [[id number]] Collected: 04/01/21 1235     Updated: 04/01/21 1240    Narrative:      ONE VIEW PORTABLE CHEST AT 12:02 PM     HISTORY: Chest pain and respiratory distress.     FINDINGS: There are no prior chest x-rays for comparison. There is  cardiomegaly with what appears to be prominent vascular congestion and  some vague haziness and peribronchial thickening and a component of this  can also be appreciated on an abdominal CT scan dated 03/08/2021. I  suspect much of this relates to congestive heart failure but there may  also be a chronic component as well. I cannot completely exclude  somewhat diffuse changes of pneumonia and further clinical correlation  is required.     This report was finalized on 4/1/2021 12:37 PM by Dr. Romero Crespo M.D.             EKG:   His ekg demonstrates normal sinus rhythm      Baseline:     I personally viewed and interpreted the patient's EKG/Telemetry tracings.    Assessment:   Assessment/Plan         Cardiac arrest (CMS/HCC)  Chronic Coronary Artery Disease      Plan:       He has an indication for ICD implantation for secondary prevention of sudden cardiac death prior to discharge.  We he is extubated we will return and discuss with him.     Thank you for allowing me to participate in the care of Sebastien Tang. Feel free to contact me directly with any further questions or concerns.    Nik Rosas MD  04/02/21  10:41 EDT

## 2021-04-02 NOTE — PLAN OF CARE
Goal Outcome Evaluation:  Plan of Care Reviewed With: patient, significant other  Progress: improving       Pt in CCU for S/P and V- Fib arrest. Pt weaned down on ventilation to 35% FIO2. Titrated down on Levo. Potassium replaced. Prop and amnio hanging. Significant other updated at bedside. Pt following commands ands alert. Continue to monitor.

## 2021-04-03 ENCOUNTER — APPOINTMENT (OUTPATIENT)
Dept: GENERAL RADIOLOGY | Facility: HOSPITAL | Age: 61
End: 2021-04-03

## 2021-04-03 LAB
ANION GAP SERPL CALCULATED.3IONS-SCNC: 11.6 MMOL/L (ref 5–15)
BUN SERPL-MCNC: 22 MG/DL (ref 8–23)
BUN/CREAT SERPL: 25 (ref 7–25)
CALCIUM SPEC-SCNC: 9.3 MG/DL (ref 8.6–10.5)
CHLORIDE SERPL-SCNC: 101 MMOL/L (ref 98–107)
CO2 SERPL-SCNC: 29.4 MMOL/L (ref 22–29)
CREAT SERPL-MCNC: 0.88 MG/DL (ref 0.76–1.27)
DEPRECATED RDW RBC AUTO: 45.3 FL (ref 37–54)
ERYTHROCYTE [DISTWIDTH] IN BLOOD BY AUTOMATED COUNT: 14 % (ref 12.3–15.4)
GFR SERPL CREATININE-BSD FRML MDRD: 87 ML/MIN/1.73
GLUCOSE BLDC GLUCOMTR-MCNC: 122 MG/DL (ref 70–130)
GLUCOSE BLDC GLUCOMTR-MCNC: 122 MG/DL (ref 70–130)
GLUCOSE BLDC GLUCOMTR-MCNC: 125 MG/DL (ref 70–130)
GLUCOSE BLDC GLUCOMTR-MCNC: 145 MG/DL (ref 70–130)
GLUCOSE BLDC GLUCOMTR-MCNC: 146 MG/DL (ref 70–130)
GLUCOSE SERPL-MCNC: 105 MG/DL (ref 65–99)
HCT VFR BLD AUTO: 38.1 % (ref 37.5–51)
HGB BLD-MCNC: 12.5 G/DL (ref 13–17.7)
MCH RBC QN AUTO: 29.5 PG (ref 26.6–33)
MCHC RBC AUTO-ENTMCNC: 32.8 G/DL (ref 31.5–35.7)
MCV RBC AUTO: 89.9 FL (ref 79–97)
MRSA DNA SPEC QL NAA+PROBE: ABNORMAL
PLATELET # BLD AUTO: 257 10*3/MM3 (ref 140–450)
PMV BLD AUTO: 10.4 FL (ref 6–12)
POTASSIUM SERPL-SCNC: 3.9 MMOL/L (ref 3.5–5.2)
PROCALCITONIN SERPL-MCNC: 2.13 NG/ML (ref 0–0.25)
QT INTERVAL: 336 MS
RBC # BLD AUTO: 4.24 10*6/MM3 (ref 4.14–5.8)
SODIUM SERPL-SCNC: 142 MMOL/L (ref 136–145)
WBC # BLD AUTO: 13.13 10*3/MM3 (ref 3.4–10.8)

## 2021-04-03 PROCEDURE — 25010000002 PIPERACILLIN SOD-TAZOBACTAM PER 1 G: Performed by: INTERNAL MEDICINE

## 2021-04-03 PROCEDURE — 25010000002 FUROSEMIDE PER 20 MG: Performed by: INTERNAL MEDICINE

## 2021-04-03 PROCEDURE — 87070 CULTURE OTHR SPECIMN AEROBIC: CPT | Performed by: INTERNAL MEDICINE

## 2021-04-03 PROCEDURE — 97530 THERAPEUTIC ACTIVITIES: CPT

## 2021-04-03 PROCEDURE — 85027 COMPLETE CBC AUTOMATED: CPT | Performed by: INTERNAL MEDICINE

## 2021-04-03 PROCEDURE — 84145 PROCALCITONIN (PCT): CPT | Performed by: INTERNAL MEDICINE

## 2021-04-03 PROCEDURE — 97116 GAIT TRAINING THERAPY: CPT

## 2021-04-03 PROCEDURE — 80048 BASIC METABOLIC PNL TOTAL CA: CPT | Performed by: INTERNAL MEDICINE

## 2021-04-03 PROCEDURE — 93010 ELECTROCARDIOGRAM REPORT: CPT | Performed by: INTERNAL MEDICINE

## 2021-04-03 PROCEDURE — 25010000002 LORAZEPAM PER 2 MG: Performed by: INTERNAL MEDICINE

## 2021-04-03 PROCEDURE — 94640 AIRWAY INHALATION TREATMENT: CPT

## 2021-04-03 PROCEDURE — 87641 MR-STAPH DNA AMP PROBE: CPT | Performed by: INTERNAL MEDICINE

## 2021-04-03 PROCEDURE — 94799 UNLISTED PULMONARY SVC/PX: CPT

## 2021-04-03 PROCEDURE — 97162 PT EVAL MOD COMPLEX 30 MIN: CPT

## 2021-04-03 PROCEDURE — 25010000002 ENOXAPARIN PER 10 MG: Performed by: INTERNAL MEDICINE

## 2021-04-03 PROCEDURE — 94664 DEMO&/EVAL PT USE INHALER: CPT

## 2021-04-03 PROCEDURE — 93005 ELECTROCARDIOGRAM TRACING: CPT | Performed by: INTERNAL MEDICINE

## 2021-04-03 PROCEDURE — 71045 X-RAY EXAM CHEST 1 VIEW: CPT

## 2021-04-03 PROCEDURE — 87205 SMEAR GRAM STAIN: CPT | Performed by: INTERNAL MEDICINE

## 2021-04-03 PROCEDURE — 99233 SBSQ HOSP IP/OBS HIGH 50: CPT | Performed by: INTERNAL MEDICINE

## 2021-04-03 PROCEDURE — 82962 GLUCOSE BLOOD TEST: CPT

## 2021-04-03 RX ORDER — LORAZEPAM 2 MG/ML
0.5 INJECTION INTRAMUSCULAR ONCE
Status: COMPLETED | OUTPATIENT
Start: 2021-04-03 | End: 2021-04-03

## 2021-04-03 RX ORDER — FUROSEMIDE 10 MG/ML
40 INJECTION INTRAMUSCULAR; INTRAVENOUS EVERY 8 HOURS
Status: DISCONTINUED | OUTPATIENT
Start: 2021-04-03 | End: 2021-04-03

## 2021-04-03 RX ORDER — BUDESONIDE AND FORMOTEROL FUMARATE DIHYDRATE 160; 4.5 UG/1; UG/1
2 AEROSOL RESPIRATORY (INHALATION)
Status: DISCONTINUED | OUTPATIENT
Start: 2021-04-03 | End: 2021-04-08 | Stop reason: HOSPADM

## 2021-04-03 RX ORDER — NICOTINE 21 MG/24HR
1 PATCH, TRANSDERMAL 24 HOURS TRANSDERMAL
Status: DISCONTINUED | OUTPATIENT
Start: 2021-04-03 | End: 2021-04-08 | Stop reason: HOSPADM

## 2021-04-03 RX ORDER — IPRATROPIUM BROMIDE AND ALBUTEROL SULFATE 2.5; .5 MG/3ML; MG/3ML
3 SOLUTION RESPIRATORY (INHALATION) EVERY 4 HOURS PRN
Status: DISCONTINUED | OUTPATIENT
Start: 2021-04-03 | End: 2021-04-08 | Stop reason: HOSPADM

## 2021-04-03 RX ORDER — FUROSEMIDE 10 MG/ML
40 INJECTION INTRAMUSCULAR; INTRAVENOUS ONCE
Status: COMPLETED | OUTPATIENT
Start: 2021-04-03 | End: 2021-04-03

## 2021-04-03 RX ADMIN — BUDESONIDE AND FORMOTEROL FUMARATE DIHYDRATE 2 PUFF: 160; 4.5 AEROSOL RESPIRATORY (INHALATION) at 19:43

## 2021-04-03 RX ADMIN — HYDROCODONE BITARTRATE AND ACETAMINOPHEN 2 TABLET: 5; 325 TABLET ORAL at 02:48

## 2021-04-03 RX ADMIN — TAZOBACTAM SODIUM AND PIPERACILLIN SODIUM 3.38 G: 375; 3 INJECTION, SOLUTION INTRAVENOUS at 23:21

## 2021-04-03 RX ADMIN — SODIUM CHLORIDE, PRESERVATIVE FREE 10 ML: 5 INJECTION INTRAVENOUS at 20:53

## 2021-04-03 RX ADMIN — ASPIRIN 325 MG: 325 TABLET ORAL at 08:26

## 2021-04-03 RX ADMIN — SODIUM CHLORIDE, PRESERVATIVE FREE 10 ML: 5 INJECTION INTRAVENOUS at 08:27

## 2021-04-03 RX ADMIN — ENOXAPARIN SODIUM 40 MG: 40 INJECTION SUBCUTANEOUS at 16:28

## 2021-04-03 RX ADMIN — METOPROLOL TARTRATE 12.5 MG: 25 TABLET, FILM COATED ORAL at 08:27

## 2021-04-03 RX ADMIN — HYDROCODONE BITARTRATE AND ACETAMINOPHEN 2 TABLET: 5; 325 TABLET ORAL at 18:30

## 2021-04-03 RX ADMIN — FUROSEMIDE 40 MG: 10 INJECTION, SOLUTION INTRAMUSCULAR; INTRAVENOUS at 00:46

## 2021-04-03 RX ADMIN — HYDROCODONE BITARTRATE AND ACETAMINOPHEN 2 TABLET: 5; 325 TABLET ORAL at 11:57

## 2021-04-03 RX ADMIN — LORAZEPAM 0.5 MG: 2 INJECTION INTRAMUSCULAR; INTRAVENOUS at 11:40

## 2021-04-03 RX ADMIN — TAZOBACTAM SODIUM AND PIPERACILLIN SODIUM 3.38 G: 375; 3 INJECTION, SOLUTION INTRAVENOUS at 16:29

## 2021-04-03 RX ADMIN — METOPROLOL TARTRATE 25 MG: 25 TABLET, FILM COATED ORAL at 18:31

## 2021-04-03 RX ADMIN — IPRATROPIUM BROMIDE AND ALBUTEROL SULFATE 3 ML: 2.5; .5 SOLUTION RESPIRATORY (INHALATION) at 08:22

## 2021-04-03 RX ADMIN — IPRATROPIUM BROMIDE AND ALBUTEROL SULFATE 3 ML: .5; 3 SOLUTION RESPIRATORY (INHALATION) at 15:42

## 2021-04-03 RX ADMIN — Medication 1 PATCH: at 20:47

## 2021-04-03 RX ADMIN — FUROSEMIDE 40 MG: 10 INJECTION, SOLUTION INTRAMUSCULAR; INTRAVENOUS at 13:30

## 2021-04-03 RX ADMIN — FUROSEMIDE 40 MG: 10 INJECTION, SOLUTION INTRAMUSCULAR; INTRAVENOUS at 12:06

## 2021-04-03 RX ADMIN — METOPROLOL TARTRATE 12.5 MG: 25 TABLET, FILM COATED ORAL at 20:47

## 2021-04-03 RX ADMIN — IPRATROPIUM BROMIDE AND ALBUTEROL SULFATE 3 ML: 2.5; .5 SOLUTION RESPIRATORY (INHALATION) at 12:16

## 2021-04-03 RX ADMIN — TAZOBACTAM SODIUM AND PIPERACILLIN SODIUM 3.38 G: 375; 3 INJECTION, SOLUTION INTRAVENOUS at 08:27

## 2021-04-03 NOTE — THERAPY EVALUATION
Acute Care - Physical Therapy Initial Evaluation  [facility]     Patient Name: Sebastien Tang  : 1960  MRN: [id number]  Today's Date: 4/3/2021   Onset of Illness/Injury or Date of Surgery: 21       PT Assessment (last 12 hours)      PT Evaluation and Treatment     Row Name 21 1459          Physical Therapy Time and Intention    Subjective Information  complains of;pain  -JR     Document Type  evaluation  -JR     Mode of Treatment  physical therapy  -JR     Patient Effort  good  -JR     Row Name 21 1459          General Information    Patient Profile Reviewed  yes  -JR     Onset of Illness/Injury or Date of Surgery  21  -JR     Referring Physician  [place]  -JR     Patient Observations  alert;cooperative;agree to therapy  -JR     General Observations of Patient  supine in bed in no acute distress.    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    -JR     Equipment Currently Used at Home  none  -JR     Pertinent History of Current Functional Problem  sudden cardiac arrest.    -JR     Risks Reviewed  patient:;LOB;nausea/vomiting;dizziness;increased discomfort;change in vital signs;increased drainage;lines disloged  -JR     Benefits Reviewed  patient:;improve function;increase independence;increase strength;increase balance;decrease pain;decrease risk of DVT;improve skin integrity;increase knowledge  -JR     Barriers to Rehab  cognitive status  -JR     Row Name 21 1459          Living Environment    Current Living Arrangements  home/apartment/condo  -JR     Lives With  significant other  -JR     Row Name 21 1459          Cognition    Orientation Status (Cognition)  oriented to;person;place;situation;other (see comments) difficulty recalling event which led to his admit.    -JR     Follows Commands (Cognition)  follows two-step commands;75-90% accuracy  -JR     Cognitive Function  (Cognitive)  memory deficit  -     Row Name 04/03/21 1459          Pain Scale: Word Pre/Post-Treatment    Pain Intervention(s)  Repositioned  -     Row Name 04/03/21 1459          Pain Scale: FACES Pre/Post-Treatment    Pain: FACES Scale, Pretreatment  6-->hurts even more  -JR     Posttreatment Pain Rating  6-->hurts even more  -JR     Pain Location  chest  -     Row Name 04/03/21 1459          Range of Motion Comprehensive    General Range of Motion  bilateral upper extremity ROM WFL;bilateral lower extremity ROM WFL  -     Row Name 04/03/21 1459          Strength (Manual Muscle Testing)    Additional Documentation  -- 3/5 strength noted throughout all 4 extremities.    -     Row Name 04/03/21 1459          Bed Mobility    Bed Mobility  supine-sit;sit-supine  -     Supine-Sit [place] (Bed Mobility)  minimum assist (75% patient effort);1 person assist  -     Sit-Supine [place] (Bed Mobility)  moderate assist (50% patient effort);1 person assist  -     Bed Mobility, Safety Issues  impaired trunk control for bed mobility;other (see comments) required assistance due to pain in chest.    -     Row Name 04/03/21 1459          Transfers    Transfers  sit-stand transfer;stand-sit transfer  -     Sit-Stand [place] (Transfers)  minimum assist (75% patient effort);1 person assist  -     Stand-Sit [place] (Transfers)  minimum assist (75% patient effort);1 person assist  -[facility] Name 04/03/21 1459          Gait/Stairs (Locomotion)    Gait/Stairs Locomotion  gait/ambulation independence;distance ambulated;gait deviations  -     [place] Level (Gait)  minimum assist (75% patient effort);1 person assist  -     Distance in Feet (Gait)  -- 20  -JR     Pattern (Gait)  step-to  -JR     Deviations/Abnormal Patterns (Gait)  cinthia decreased;gait speed decreased;stride length decreased  -JR     Comment (Gait/Stairs)  ambulated forwards and backwards within his room x 5 reps.    -JR      Row Name 04/03/21 1459          Safety Issues, Functional Mobility    Safety Issues Affecting Function (Mobility)  insight into deficits/self-awareness;judgment  -JR     Impairments Affecting Function (Mobility)  balance;cognition;coordination;endurance/activity tolerance;strength  -     Row Name 04/03/21 1459          Balance    Balance Assessment  sitting static balance;sit to stand dynamic balance;standing static balance;standing dynamic balance  -JR     Static Sitting Balance  WFL  -JR     Sit to Stand Dynamic Balance  WFL;supported  -JR     Static Standing Balance  mild impairment;supported  -JR     Dynamic Standing Balance  mild impairment;supported  -JR     Balance Interventions  sitting;standing;sit to stand;supported  -JR     Row Name 04/03/21 1459          Plan of Care Review    Plan of Care Reviewed With  patient  -JR     Progress  improving  -     Row Name 04/03/21 1459          Vital Signs    Pre Systolic BP Rehab  102  -JR     Pre Treatment Diastolic BP  74  -JR     Pretreatment Heart Rate (beats/min)  140  -JR     Pre SpO2 (%)  94  -JR     O2 Delivery Pre Treatment  supplemental O2 4L  -JR     Intra SpO2 (%)  -- 93  -JR     O2 Delivery Intra Treatment  supplemental O2  -JR     Post SpO2 (%)  93  -JR     O2 Delivery Post Treatment  supplemental O2  -JR     Row Name 04/03/21 1459          Physical Therapy Goals    Bed Mobility Goal Selection (PT)  bed mobility, PT goal 1  -JR     Transfer Goal Selection (PT)  transfer, PT goal 1  -JR     Gait Training Goal Selection (PT)  gait training, PT goal 1  -     Row Name 04/03/21 1459          Bed Mobility Goal 1 (PT)    Activity/Assistive Device (Bed Mobility Goal 1, PT)  bed mobility activities, all  -JR     [place] Level/Cues Needed (Bed Mobility Goal 1, PT)  independent  -JR     Time Frame (Bed Mobility Goal 1, PT)  1 week  -JR     Strategies/Barriers (Bed Mobility Goal 1, PT)  cognitive status  -     Row Name 04/03/21 1459          Transfer  Goal 1 (PT)    Activity/Assistive Device (Transfer Goal 1, PT)  transfers, all  -JR     [place] Level/Cues Needed (Transfer Goal 1, PT)  independent  -JR     Time Frame (Transfer Goal 1, PT)  1 week  -JR     Strategies/Barriers (Transfers Goal 1, PT)  -- cognitive status   -JR     Row Name 04/03/21 2580          Gait Training Goal 1 (PT)    Activity/Assistive Device (Gait Training Goal 1, PT)  gait (walking locomotion);decrease asymmetrical patterns;decrease fall risk;forward stepping;improve balance and speed;increase endurance/gait distance  -JR     [place] Level (Gait Training Goal 1, PT)  independent  -JR     Distance (Gait Training Goal 1, PT)  -- 300  -JR     Time Frame (Gait Training Goal 1, PT)  1 week  -JR     Row Name 04/03/21 5113          Positioning and Restraints    Pre-Treatment Position  in bed  -JR     Post Treatment Position  bed  -JR     In Bed  notified nsg;supine;call light within reach;encouraged to call for assist;patient within staff view  -     Row Name 04/03/21 7519          Therapy Assessment/Plan (PT)    Patient/Family Therapy Goals Statement (PT)  go home  -JR     Functional Level at Time of Evaluation (PT)  min assist for mobility  -JR     PT Diagnosis (PT)  decreased mobility and endurance.   -JR     Rehab Potential (PT)  good, to achieve stated therapy goals  -JR     Criteria for Skilled Interventions Met (PT)  yes;meets criteria  -JR     Predicted Duration of Therapy Intervention (PT)  -- 1 month  -JR     Problem List (PT)  problems related to;balance;cognition;strength  -     Row Name 04/03/21 6776          PT Evaluation Complexity    History, PT Evaluation Complexity  1-2 personal factors and/or comorbidities  -JR     Examination of Body Systems (PT Eval Complexity)  1-2 elements  -JR     Clinical Presentation (PT Evaluation Complexity)  stable  -JR     Clinical Decision Making (PT Evaluation Complexity)  low complexity  -JR     Overall Complexity (PT Evaluation  Complexity)  low complexity  -     Row Name 04/03/21 1459          Therapy Plan Review/Discharge Plan (PT)    Therapy Plan Review (PT)  evaluation/treatment results reviewed;care plan/treatment goals reviewed;risks/benefits reviewed;current/potential barriers reviewed;participants voiced agreement with care plan;participants included;patient  -JR       User Key  (r) = Recorded By, (t) = Taken By, (c) = Cosigned By    Initials Name Provider Type    Abdoulaye Olivarez, PT Physical Therapist        Physical Therapy Education                 Title: PT OT SLP Therapies (In Progress)     Topic: Physical Therapy (In Progress)     Point: Mobility training (In Progress)     Learning Progress Summary           Patient Acceptance, E,D, NR by  at 4/3/2021 1455                   Point: Home exercise program (In Progress)     Learning Progress Summary           Patient Acceptance, E,D, NR by  at 4/3/2021 1455                   Point: Body mechanics (In Progress)     Learning Progress Summary           Patient Acceptance, E,D, NR by  at 4/3/2021 1455                   Point: Precautions (In Progress)     Learning Progress Summary           Patient Acceptance, E,D, NR by  at 4/3/2021 1455                               User Key     Initials Effective Dates Name Provider Type [facility] 04/03/18 -  Abdoulaye Luna, PT Physical Therapist PT              PT Recommendation and Plan  Anticipated Discharge Disposition (PT): home with assist  Planned Therapy Interventions (PT): balance training, bed mobility training, gait training, strengthening, transfer training  Therapy Frequency (PT): daily  Plan of Care Reviewed With: patient  Progress: improving  Outcome Summary: Pt evaluated for PT.  He does have difficulty with recall of recent event and has c/o chest pain throughout session.  He was able to ambulate within his room x 20 ft with CGA.  HR remained in the 140s throughout and BP was 102/74  Outcome Measures     Row  Name 04/03/21 1458             How much help from another person do you currently need...    Turning from your back to your side while in flat bed without using bedrails?  3  -JR      Moving from lying on back to sitting on the side of a flat bed without bedrails?  3  -JR      Moving to and from a bed to a chair (including a wheelchair)?  3  -JR      Standing up from a chair using your arms (e.g., wheelchair, bedside chair)?  3  -JR      Climbing 3-5 steps with a railing?  3  -JR      To walk in hospital room?  3  -JR      AM-PAC 6 Clicks Score (PT)  18  -JR        User Key  (r) = Recorded By, (t) = Taken By, (c) = Cosigned By    Initials Name Provider Type    Abdoulaye Olivarez, PT Physical Therapist           Time Calculation:   PT Charges     Row Name 04/03/21 1510             Time Calculation    Start Time  1415  -JR      Stop Time  1450  -JR      Time Calculation (min)  35 min  -JR      PT Received On  04/03/21  -      PT - Next Appointment  04/04/21  -      PT Goal Re-Cert Due Date  04/17/21  -        User Key  (r) = Recorded By, (t) = Taken By, (c) = Cosigned By    Initials Name Provider Type    Abdoulaye Olivarez, PT Physical Therapist        Therapy Charges for Today     Code Description Service Date Service Provider Modifiers Qty    [id number]  PT EVAL MOD COMPLEXITY 1 4/3/2021 Abdoualye Luna, PT GP 1    [id number]  PT THERAPEUTIC ACT EA 15 MIN 4/3/2021 Abdoulaye Luna, PT GP 1    [id number] HC GAIT TRAINING EA 15 MIN 4/3/2021 Abdoulaye Luna, PT GP 1          PT G-Codes  Outcome Measure Options: AM-PAC 6 Clicks Daily Activity (OT)  AM-PAC 6 Clicks Score (PT): 18  AM-PAC 6 Clicks Score (OT): 18    Abdoulaye Luna PT  4/3/2021

## 2021-04-03 NOTE — CONSULTS
CONSULT NOTE    INTERNAL MEDICINE   [facility]       Patient Identification:  Name: Sebastien Tang  Age: 61 y.o.  Sex: male  :  1960  MRN: [id number]             Date of Consultation:  21          Primary Care Physician: Provider, No Known                               Requesting Physician: dr jane  Reason for Consultation:assumption of care    Chief Complaint:  61 year old gentleman who presented after a v fib arrest; he was leaving work and went down; cpr was started and ems arrived; he was given amiodarone and epinephrine with achieval of rosc; he was taken to the ER and then cath lab but no lesions amenable to intervention were found; he was sent to the icu; he has been extubated and was moved out of the icu; he remains somewhat forgetful and slightly confused; he does not recall the episode    History of Present Illness:   As above      Past Medical History:  No past medical history on file.  Past Surgical History:  Past Surgical History:   Procedure Laterality Date   • CARDIAC CATHETERIZATION Left 2021    Procedure: Coronary Angiography;  Surgeon: Anna Puga MD;  Location: [facility] CATH INVASIVE LOCATION;  Service: Cardiology;  Laterality: Left;   • CARDIAC CATHETERIZATION N/A 2021    Procedure: Left ventriculography;  Surgeon: Anna Puga MD;  Location: [facility] CATH INVASIVE LOCATION;  Service: Cardiology;  Laterality: N/A;   • CARDIAC CATHETERIZATION N/A 2021    Procedure: Left Heart Cath;  Surgeon: Anna Puga MD;  Location: [facility] CATH INVASIVE LOCATION;  Service: Cardiology;  Laterality: N/A;      Home Meds:  No medications prior to admission.     Current Meds:     Current Facility-Administered Medications:   •  acetaminophen (TYLENOL) tablet 650 mg, 650 mg, Oral, Q4H PRN **OR** acetaminophen (TYLENOL) suppository 650 mg, 650 mg, Rectal, Q4H PRN, Thiago Jane MD  •  acetaminophen (TYLENOL) tablet 650 mg, 650 mg, Oral, Q4H PRN, Lucinda  Thiago Mckeon MD, 650 mg at 04/02/21 2311  •  aspirin tablet 325 mg, 325 mg, Oral, Daily, Thiago Jane MD, 325 mg at 04/03/21 0826  •  budesonide-formoterol (SYMBICORT) 160-4.5 MCG/ACT inhaler 2 puff, 2 puff, Inhalation, BID - RT, Thiago Jane MD  •  dextrose (D50W) 25 g/ 50mL Intravenous Solution 25 g, 25 g, Intravenous, Q15 Min PRN, Thiago Jane MD  •  dextrose (GLUTOSE) oral gel 15 g, 15 g, Oral, Q15 Min PRN, Thiago Jane MD  •  enoxaparin (LOVENOX) syringe 40 mg, 40 mg, Subcutaneous, Q24H, Thiago Jane MD, 40 mg at 04/03/21 1628  •  glucagon (human recombinant) (GLUCAGEN DIAGNOSTIC) injection 1 mg, 1 mg, Subcutaneous, Q15 Min PRN, Thiago Jane MD  •  HYDROcodone-acetaminophen (NORCO) 5-325 MG per tablet 1 tablet, 1 tablet, Oral, Q6H PRN, 1 tablet at 04/02/21 2010 **OR** HYDROcodone-acetaminophen (NORCO) 5-325 MG per tablet 2 tablet, 2 tablet, Oral, Q6H PRN, Thiago Jane MD, 2 tablet at 04/03/21 1157  •  insulin regular (humuLIN R,novoLIN R) injection 0-7 Units, 0-7 Units, Subcutaneous, Q6H, Thiago Jane MD, 2 Units at 04/02/21 2311  •  ipratropium-albuterol (DUO-NEB) nebulizer solution 3 mL, 3 mL, Nebulization, Q4H PRN, Thiago Jane MD, 3 mL at 04/03/21 1542  •  Magnesium Sulfate 2 gram Bolus, followed by 8 gram infusion (total Mg dose 10 grams)- Mg less than or equal to 1mg/dL, 2 g, Intravenous, PRN **OR** Magnesium Sulfate 2 gram / 50mL Infusion (GIVE X 3 BAGS TO EQUAL 6GM TOTAL DOSE) - Mg 1.1 - 1.5 mg/dl, 2 g, Intravenous, PRN **OR** Magnesium Sulfate 4 gram infusion- Mg 1.6-1.9 mg/dL, 4 g, Intravenous, PRN, Thiago Jane MD  •  metoprolol tartrate (LOPRESSOR) tablet 12.5 mg, 12.5 mg, Oral, Q12H, Thiago Jane MD, 12.5 mg at 04/03/21 0827  •  ondansetron (ZOFRAN) tablet 4 mg, 4 mg, Oral, Q6H PRN **OR** ondansetron (ZOFRAN) injection 4 mg, 4 mg, Intravenous, Q6H PRN, Lucinda  Thiago Mckeon MD  •  Pharmacy to Dose Zosyn, , Does not apply, Continuous PRN, Thiago Jane MD  •  piperacillin-tazobactam (ZOSYN) 3.375 g in iso-osmotic dextrose 50 ml (premix), 3.375 g, Intravenous, Q8H, Thiago Jane MD, 3.375 g at 04/03/21 1629  •  potassium chloride (K-DUR,KLOR-CON) ER tablet 40 mEq, 40 mEq, Oral, PRN **OR** potassium chloride (KLOR-CON) packet 40 mEq, 40 mEq, Oral, PRN **OR** potassium chloride 10 mEq in 100 mL IVPB, 10 mEq, Intravenous, Q1H PRN, Thiago Jane MD, Last Rate: 100 mL/hr at 04/01/21 1829, 10 mEq at 04/01/21 1829  •  [COMPLETED] potassium chloride 10 mEq in 100 mL IVPB, 10 mEq, Intravenous, Once, Last Rate: 100 mL/hr at 04/01/21 1424, 10 mEq at 04/01/21 1424 **AND** [COMPLETED] potassium chloride 10 mEq in 100 mL IVPB, 10 mEq, Intravenous, Once, Last Rate: 100 mL/hr at 04/01/21 1247, 10 mEq at 04/01/21 1247 **AND** [COMPLETED] potassium chloride 10 mEq in 100 mL IVPB, 10 mEq, Intravenous, Once, Last Rate: 100 mL/hr at 04/01/21 1526, 10 mEq at 04/01/21 1526 **AND** [COMPLETED] potassium chloride 10 mEq in 100 mL IVPB, 10 mEq, Intravenous, Once, Last Rate: 100 mL/hr at 04/01/21 1628, 10 mEq at 04/01/21 1628 **AND** potassium chloride 10 mEq in 100 mL IVPB, 10 mEq, Intravenous, Once **AND** [COMPLETED] potassium chloride 10 mEq in 100 mL IVPB, 10 mEq, Intravenous, Once, Last Rate: 100 mL/hr at 04/01/21 2005, 10 mEq at 04/01/21 2005 **AND** Potassium, , , PRN, Thiago Jane MD  •  potassium phosphate 45 mmol in sodium chloride 0.9 % 250 mL infusion, 45 mmol, Intravenous, PRN **OR** potassium phosphate 30 mmol in sodium chloride 0.9 % 100 mL infusion, 30 mmol, Intravenous, PRN **OR** potassium phosphate 15 mmol in sodium chloride 0.9 % 100 mL infusion, 15 mmol, Intravenous, PRN **OR** sodium phosphates 40 mmol in sodium chloride 0.9 % 500 mL IVPB, 40 mmol, Intravenous, PRN **OR** sodium phosphates 20 mmol in sodium chloride 0.9 % 250 mL IVPB,  20 mmol, Intravenous, PRN, Thiago Jane MD  •  sodium chloride 0.9 % flush 10 mL, 10 mL, Intravenous, PRN, Thiago Jane MD  •  sodium chloride 0.9 % flush 10 mL, 10 mL, Intravenous, Q12H, Thiago Jane MD, 10 mL at 04/03/21 0827  •  sodium chloride 0.9 % flush 10 mL, 10 mL, Intravenous, PRN, Thiago Jane MD  •  tiotropium (SPIRIVA RESPIMAT) 2.5 mcg/act aerosol solution inhaler, 2 puff, Inhalation, Daily - RT, Thiago Jane MD  •  [START ON 4/5/2021] vancomycin (VANCOCIN) in iso-osmotic dextrose IVPB 1 g (premix) 200 mL, 1,000 mg, Intravenous, On Call, Thiago Jane MD  Allergies:  No Known Allergies  Social History:   Social History     Socioeconomic History   • Marital status: Single     Spouse name: Not on file   • Number of children: Not on file   • Years of education: Not on file   • Highest education level: Not on file   Tobacco Use   • Smoking status: Current Every Day Smoker     Packs/day: 1.50     Years: 10.00     Pack years: 15.00     Types: Cigarettes   Substance and Sexual Activity   • Drug use: Not Currently   • Sexual activity: Not Currently     Family History:  No family history on file.       Review of Systems  See history of present illness and past medical history.  Patient denies headache, dizziness, syncope, falls, trauma, change in vision, change in hearing, change in taste, changes in weight, changes in appetite, focal weakness, numbness, or paresthesia.  Patient denies chest pain, palpitations, dyspnea, orthopnea, PND, cough, sinus pressure, rhinorrhea, epistaxis, hemoptysis, nausea, vomiting,hematemesis, diarrhea, constipation or hematchezia.  Denies cold or heat intolerance, polydipsia, polyuria, polyphagia. Denies hematuria, pyuria, dysuria, hesitancy, frequency or urgency. Denies consumption of raw and under cooked meats foods or change in water source.  Denies fever, chills, sweats, night sweats.  Denies missing any  "routine medications. Remainder of ROS is negative.      Vitals:   /88   Pulse (!) 129   Temp 99.5 °F (37.5 °C) (Rectal)   Resp 22   Ht 167.6 cm (66\")   Wt 98.6 kg (217 lb 6 oz)   SpO2 94%   BMI 35.09 kg/m²   I/O:     Intake/Output Summary (Last 24 hours) at 4/3/2021 1709  Last data filed at 4/3/2021 0400  Gross per 24 hour   Intake 801 ml   Output 3600 ml   Net -2799 ml     Exam:  General Appearance:    Alert, cooperative, no distress, appears stated age   Head:    Normocephalic, without obvious abnormality, atraumatic   Eyes:    PERRL, conjunctivae/corneas clear, EOM's intact, both eyes   Ears:    Normal external ear canals, both ears   Nose:   Nares normal, septum midline, mucosa normal, no drainage    or sinus tenderness   Throat:   Lips, tongue, gums normal; oral mucosa pink and moist   Neck:   Supple, symmetrical, trachea midline, no adenopathy;     thyroid:  no enlargement/tenderness/nodules; no carotid    bruit or JVD   Back:     Symmetric, no curvature, ROM normal, no CVA tenderness   Lungs:     Clear to auscultation bilaterally, respirations unlabored   Chest Wall:    No tenderness or deformity    Heart:    Regular rate and rhythm, S1 and S2 normal, no murmur, rub   or gallop   Abdomen:     Soft, nontender, bowel sounds active all four quadrants,     no masses, no hepatomegaly, no splenomegaly   Extremities:   Extremities normal, atraumatic, no cyanosis or edema   Pulses:   Pulses palpable in all extremities; symmetric all extremities   Skin:   Skin color normal, Skin is warm and dry,  no rashes or palpable lesions   Neurologic:   CNII-XII intact, motor strength grossly intact, sensation grossly intact to light touch, no focal deficits noted       Data Review:  Labs in chart were reviewed.  WBC   Date Value Ref Range Status   04/03/2021 13.13 (H) 3.40 - 10.80 10*3/mm3 Final     Hemoglobin   Date Value Ref Range Status   04/03/2021 12.5 (L) 13.0 - 17.7 g/dL Final     Hematocrit   Date Value Ref " Range Status   04/03/2021 38.1 37.5 - 51.0 % Final     Platelets   Date Value Ref Range Status   04/03/2021 257 140 - 450 10*3/mm3 Final     Sodium   Date Value Ref Range Status   04/03/2021 142 136 - 145 mmol/L Final     Potassium   Date Value Ref Range Status   04/03/2021 3.9 3.5 - 5.2 mmol/L Final     Chloride   Date Value Ref Range Status   04/03/2021 101 98 - 107 mmol/L Final     CO2   Date Value Ref Range Status   04/03/2021 29.4 (H) 22.0 - 29.0 mmol/L Final     BUN   Date Value Ref Range Status   04/03/2021 22 8 - 23 mg/dL Final     Creatinine   Date Value Ref Range Status   04/03/2021 0.88 0.76 - 1.27 mg/dL Final   04/01/2021 1.00 0.60 - 1.30 mg/dL Final     Comment:     Serial Number: [id number]:  [id number]     Glucose   Date Value Ref Range Status   04/03/2021 105 (H) 65 - 99 mg/dL Final     Calcium   Date Value Ref Range Status   04/03/2021 9.3 8.6 - 10.5 mg/dL Final     Magnesium   Date Value Ref Range Status   04/02/2021 2.4 1.6 - 2.4 mg/dL Final     Phosphorus   Date Value Ref Range Status   04/02/2021 2.9 2.5 - 4.5 mg/dL Final     AST (SGOT)   Date Value Ref Range Status   04/02/2021 107 (H) 1 - 40 U/L Final     ALT (SGPT)   Date Value Ref Range Status   04/02/2021 59 (H) 1 - 41 U/L Final     Alkaline Phosphatase   Date Value Ref Range Status   04/02/2021 67 39 - 117 U/L Final     Results from last 7 days   Lab Units 04/02/21  0415   TSH uIU/mL 1.690     Results from last 7 days   Lab Units 04/02/21  0415   HEMOGLOBIN A1C % 5.90*       Imaging Results (Last 7 Days)     Procedure Component Value Units Date/Time    XR Chest 1 View [[id number]] Collected: 04/03/21 0335     Updated: 04/03/21 0340    Narrative:      SINGLE VIEW OF THE CHEST     HISTORY: Respiratory failure     COMPARISON: 04/02/2021     FINDINGS:  Cardiomegaly is present. Endotracheal tube has been removed. There are  bilateral alveolar and interstitial infiltrates. These do not appear  significantly changed when compared to prior exam. No  pneumothorax or  pleural effusion is seen.       Impression:      No significant interval change in bilateral alveolar and interstitial  infiltrates.     This report was finalized on 4/3/2021 3:37 AM by Dr. Judi Chow M.D.       XR Chest 1 View [[id number]] Collected: 04/02/21 0445     Updated: 04/02/21 0445    Narrative:        Patient: GUMARO PRABHAKAR  Time Out: 04:44  Exam(s): FILM CXR 1 VIEW     EXAM:    XR Chest, 1 View    CLINICAL HISTORY:     Respiratory failure  Reason for exam: Respiratory failure.    TECHNIQUE:    Frontal view of the chest.    COMPARISON:  4 1 2021     FINDINGS:    Lungs: Bilateral hazy opacities, left greater than right slightly   increased in the left upper lobe compared to prior study.    Pleural space:  Unremarkable.  No pneumothorax.    Heart: Moderate cardiomegaly.    Mediastinum:  Unremarkable.    Bones joints:  Unremarkable.    Lines and tubes: Endotracheal tube terminates 2.4 cm above the level of   the mitesh.    IMPRESSION:       1. Interval intubation.  Endotracheal tube is in appropriate position.    2. Bilateral hazy opacities, left greater than right slightly increased   in the left upper lobe compared to prior study which may be due to   worsening edema or infection.    3. No pleural effusions or pneumothorax.    Impression:          Electronically signed by Tyler Kulkarni M.D. on 04-02-21 at 0444    CT Angiogram Chest [[id number]] Collected: 04/01/21 1357     Updated: 04/01/21 1450    Narrative:      CT ANGIOGRAM CHEST, ABDOMEN AND PELVIS WITH IV CONTRAST     HISTORY: Cardiac arrest. Resuscitated. Rule out dissection of the  thoracic aorta.     TECHNIQUE: CT angiogram chest, abdomen and pelvis in the aortic phase  was performed to evaluate for dissection and includes imaging from the  thoracic inlet through the trochanters. Data reconstructed in coronal  and sagittal planes. 3-D volume rendering performed. As part of the  technique for this CT exam, radiation dose  reduction techniques were  utilized, including automated exposure control and exposure modulation  based on body size.     COMPARISON: CT abdomen and pelvis without contrast 03/08/2021.     FINDINGS: Mild atherosclerotic disease and calcified and noncalcified  plaque is present involving the aortic arch, particularly at the level  of the top of the aortic arch anteriorly. There is no evidence for  thoracic or abdominal aortic dissection. The great vessel origins are  patent. There is mild enlargement of the infrarenal abdominal aorta  measuring 2.8 cm AP dimension.  Atherosclerotic disease is present  involving the abdominal aorta and iliac vasculature. On the right, there  is moderate stenosis of the proximal right common iliac artery  associated with partially calcified plaque. Right internal/external  iliac arteries are patent. There is a moderate stenosis of the proximal  right external iliac artery. The right common femoral artery is patent.  The right proximal superficial and deep femoral arteries are patent. On  the left, there is mild enlargement of the left proximal common iliac  artery measuring 1.6 cm diameter. 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 This is just  proximal to its bifurcation. The left internal and external iliac  arteries are patent. There is partially calcified plaque involving the  left common femoral artery with mild narrowing. The left proximal  superficial and deep femoral arteries are patent.     NONANGIOGRAPHIC FINDINGS: The heart size is enlarged and there is  pulmonary arterial enlargement. The exam does not evaluate for pulmonary  embolus due to the phase of contrast. There is no pleural or pericardial  effusion.     There is diffuse pulmonary emphysema with superimposed thickening of the  septal lines and hazy pulmonary opacities suspected to  represent  bilateral pulmonary edema. There are acute fractures of bilateral  anterior 3rd and 4th ribs. Liver, spleen, adrenal glands, and pancreas  exhibit normal early arterial phase of contrast appearance. There is a 3  mm left lower pole nonobstructing renal stone. There is no  hydronephrosis. There is no bowel dilatation or evidence for bowel  obstruction. Sigmoid diverticulosis is present without evidence for  diverticulitis. There is prostate gland enlargement and the prostate  gland measures approximately 6.5 x 7.5 cm axial dimension with median  lobe hypertrophy.       Impression:      1. Diffuse pulmonary emphysema with hydrostatic and borderline alveolar  pulmonary edema.  2. Acute bilateral anterior 3rd and 4th rib fractures.  3. No evidence for thoracic aortic dissection. Diffuse, multifocal  atherosclerotic disease with mild aneurysmal dilatation of the  infrarenal abdominal aorta measuring 2.8 cm in diameter. 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 Moderate stenosis of right proximal common iliac  artery and distal left common iliac artery at the site of the distal  aneurysm.  4. 3 mm nonobstructing left lower pole renal stone.  5. Sigmoid diverticulosis without evidence for diverticulitis.  6. Prostate gland enlargement.     Discussed with Dr. Roque in the emergency department on 04/01/2021 at  1:05 PM.     This report was finalized on 4/1/2021 2:47 PM by Dr. Frandy Skelton M.D.       CT Angiogram Abdomen Pelvis [[id number]] Collected: 04/01/21 1357     Updated: 04/01/21 1450    Narrative:      CT ANGIOGRAM CHEST, ABDOMEN AND PELVIS WITH IV CONTRAST     HISTORY: Cardiac arrest. Resuscitated. Rule out dissection of the  thoracic aorta.     TECHNIQUE: CT angiogram chest, abdomen and pelvis in the aortic phase  was performed to evaluate for dissection and includes imaging from the  thoracic inlet through the trochanters. Data  reconstructed in coronal  and sagittal planes. 3-D volume rendering performed. As part of the  technique for this CT exam, radiation dose reduction techniques were  utilized, including automated exposure control and exposure modulation  based on body size.     COMPARISON: CT abdomen and pelvis without contrast 03/08/2021.     FINDINGS: Mild atherosclerotic disease and calcified and noncalcified  plaque is present involving the aortic arch, particularly at the level  of the top of the aortic arch anteriorly. There is no evidence for  thoracic or abdominal aortic dissection. The great vessel origins are  patent. There is mild enlargement of the infrarenal abdominal aorta  measuring 2.8 cm AP dimension.  Atherosclerotic disease is present  involving the abdominal aorta and iliac vasculature. On the right, there  is moderate stenosis of the proximal right common iliac artery  associated with partially calcified plaque. Right internal/external  iliac arteries are patent. There is a moderate stenosis of the proximal  right external iliac artery. The right common femoral artery is patent.  The right proximal superficial and deep femoral arteries are patent. On  the left, there is mild enlargement of the left proximal common iliac  artery measuring 1.6 cm diameter. 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 This is just  proximal to its bifurcation. The left internal and external iliac  arteries are patent. There is partially calcified plaque involving the  left common femoral artery with mild narrowing. The left proximal  superficial and deep femoral arteries are patent.     NONANGIOGRAPHIC FINDINGS: The heart size is enlarged and there is  pulmonary arterial enlargement. The exam does not evaluate for pulmonary  embolus due to the phase of contrast. There is no pleural or pericardial  effusion.     There  is diffuse pulmonary emphysema with superimposed thickening of the  septal lines and hazy pulmonary opacities suspected to represent  bilateral pulmonary edema. There are acute fractures of bilateral  anterior 3rd and 4th ribs. Liver, spleen, adrenal glands, and pancreas  exhibit normal early arterial phase of contrast appearance. There is a 3  mm left lower pole nonobstructing renal stone. There is no  hydronephrosis. There is no bowel dilatation or evidence for bowel  obstruction. Sigmoid diverticulosis is present without evidence for  diverticulitis. There is prostate gland enlargement and the prostate  gland measures approximately 6.5 x 7.5 cm axial dimension with median  lobe hypertrophy.       Impression:      1. Diffuse pulmonary emphysema with hydrostatic and borderline alveolar  pulmonary edema.  2. Acute bilateral anterior 3rd and 4th rib fractures.  3. No evidence for thoracic aortic dissection. Diffuse, multifocal  atherosclerotic disease with mild aneurysmal dilatation of the  infrarenal abdominal aorta measuring 2.8 cm in diameter. 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 Moderate stenosis of right proximal common iliac  artery and distal left common iliac artery at the site of the distal  aneurysm.  4. 3 mm nonobstructing left lower pole renal stone.  5. Sigmoid diverticulosis without evidence for diverticulitis.  6. Prostate gland enlargement.     Discussed with Dr. Roque in the emergency department on 04/01/2021 at  1:05 PM.     This report was finalized on 4/1/2021 2:47 PM by Dr. Frandy Skelton M.D.       XR Chest 1 View [[id number]] Collected: 04/01/21 1235     Updated: 04/01/21 1240    Narrative:      ONE VIEW PORTABLE CHEST AT 12:02 PM     HISTORY: Chest pain and respiratory distress.     FINDINGS: There are no prior chest x-rays for comparison. There is  cardiomegaly with what appears to be prominent vascular congestion  and  some vague haziness and peribronchial thickening and a component of this  can also be appreciated on an abdominal CT scan dated 03/08/2021. I  suspect much of this relates to congestive heart failure but there may  also be a chronic component as well. I cannot completely exclude  somewhat diffuse changes of pneumonia and further clinical correlation  is required.     This report was finalized on 4/1/2021 12:37 PM by Dr. Romero Crespo M.D.           No past medical history on file.    Assessment:  Active Hospital Problems    Diagnosis  POA   • Cardiac arrest (CMS/HCC) [I46.9]  Yes      Resolved Hospital Problems   No resolved problems to display.   cad  Leukocytosis  Copd  Acute hypoxic respiratory failure  Obesity  Anoxic brain injury suspected    Plan:  Suspect he has a mild anoxic brain injury due to forgetfulness and confusion  icd to be placed  On amiodarone  Wean oxygen as tolerated  Control blood pressure  Thanks for involving us in his care  Notes and labs reviewed    Heather Doherty MD   4/3/2021  17:09 EDT

## 2021-04-03 NOTE — PROGRESS NOTES
Thiago Jane MD                          [phone]      Patient ID:    Name:  Sebastien Tang    MRN:  [id number]    1960   61 y.o.  male            Patient Care Team:  Provider, No Known as PCP - General    CC/ Reason for visit: V. fib arrest, acute respiratory failure, pneumonia, NSTEMI, cardiomyopathy    Subjective: Pt seen and examined this AM. No acute overnight events noted. Doing better.  Still with supplemental oxygen needs up to 2-3 L nasal cannula.  Complains of significant chest pain with any deep breath.  Confused about overall events and etiology and prognosis.    ROS: Denies any subjective fevers, syncope or presyncopal events, new neurological deficits, nausea or vomiting currently    Objective     Vital Signs past 24hrs    BP range: BP: ()/(44-92) 107/44  Pulse range: Heart Rate:  [] 120  Resp rate range: Resp:  [20-22] 22  Temp range: Temp (24hrs), Av.7 °F (37.6 °C), Min:99.7 °F (37.6 °C), Max:99.7 °F (37.6 °C)      Ventilator/Non-Invasive Ventilation Settings (From admission, onward) Comment     Device (Oxygen Therapy): nasal cannula FiO2 (%): 36 %     98.6 kg (217 lb 6 oz); Body mass index is 35.09 kg/m².      Intake/Output Summary (Last 24 hours) at 4/3/2021 1124  Last data filed at 4/3/2021 0400  Gross per 24 hour   Intake 801 ml   Output 3600 ml   Net -2799 ml       PHYSICAL EXAM   Constitutional: Middle aged pt in bed, No acute respiratory distress, + accessory muscle use  Head: - NCAT  Eyes: No pallor.  Anicteric sclerae, EOMI.  ENMT:  Mallampati 4, no oral thrush. Moist MM.   NECK: Trachea midline, No thyromegaly, no palpable cervical lymphadenopathy  Heart: RRR, no murmur. No pedal edema.  Midsternal chest pain   Lungs: SADA +, distant breath sounds.  Occasional no wheezes/ crackles heard    Abdomen: Soft.  Obese no tenderness, guarding or rigidity. No palpable masses  Extremities: Extremities warm and well perfused. No cyanosis/  clubbing  Neuro: Conscious, some confusion answers appropriately, no gross focal neuro deficits  Psych: Mood and affect appropriate    PPE recommended per [facility] infectious disease Isolation protocol for the current clinical scenario(as mentioned below) was followed.     Scheduled meds:  aspirin, 325 mg, Oral, Daily  enoxaparin, 40 mg, Subcutaneous, Q24H  insulin regular, 0-7 Units, Subcutaneous, Q6H  ipratropium-albuterol, 3 mL, Nebulization, 4x Daily - RT  LORazepam, 0.5 mg, Intravenous, Once  metoprolol tartrate, 12.5 mg, Oral, Q12H  piperacillin-tazobactam, 3.375 g, Intravenous, Q8H  potassium chloride, 10 mEq, Intravenous, Once  sodium chloride, 10 mL, Intravenous, Q12H  [START ON 4/5/2021] vancomycin, 1,000 mg, Intravenous, On Call        IV meds:                      norepinephrine, 0.02-0.3 mcg/kg/min, Last Rate: 0.24 mcg/kg/min (04/02/21 0611)  Pharmacy to Dose Zosyn,   propofol, 5-50 mcg/kg/min, Last Rate: 45 mcg/kg/min (04/02/21 0610)        Data Review:      Results from last 7 days   Lab Units 04/03/21  0402 04/02/21  0415 04/01/21  1336 04/01/21  1233 04/01/21  1212   SODIUM mmol/L 142 137  --   --  141   POTASSIUM mmol/L 3.9 4.3  --   --  2.9*   CHLORIDE mmol/L 101 102  --   --  99   CO2 mmol/L 29.4* 22.8  --   --  18.7*   BUN mg/dL 22 24*  --   --  15   CREATININE mg/dL 0.88 1.25  --  1.00 1.03   CALCIUM mg/dL 9.3 8.8  --   --  9.7   BILIRUBIN mg/dL  --  0.3  --   --  0.2   ALK PHOS U/L  --  67  --   --  81   ALT (SGPT) U/L  --  59*  --   --  43*   AST (SGOT) U/L  --  107*  --   --  53*   GLUCOSE mg/dL 105* 134*  --   --  316*   WBC 10*3/mm3 13.13* 13.87*  --   --  9.92   HEMOGLOBIN g/dL 12.5* 12.9*  --   --  14.3   HEMOGLOBIN, POC g/dL  --   --  13.6  --   --    PLATELETS 10*3/mm3 257 367  --   --  313   INR   --   --   --   --  1.17*   PROBNP pg/mL  --  922.1*  --   --   --    PROCALCITONIN ng/mL  --  7.44*  --   --   --        Lab Results   Component Value Date    CALCIUM 9.3 04/03/2021     PHOS 2.9 04/02/2021             Results from last 7 days   Lab Units 04/01/21  1531 04/01/21  1222   PH, ARTERIAL pH units 7.331* 7.064*   PO2 ART mm Hg 120.2* 59.6*   PCO2, ARTERIAL mm Hg 46.2* 46.6*   HCO3 ART mmol/L 24.4 13.3*        Results Review:    I have reviewed the relevant laboratory results and independently reviewed the chest imaging from this hospitalization including the available echocardiogram reports personally and summarized it if/ when appropriate below    Assessment    Resuscitated V. fib arrest  Acute hypoxic respiratory failure s/p vent 4/1  Shock-septic and cardiogenic  Aspiration pneumonia  Acute NSTEMI  Ischemic cardiomyopathy -EF of 40%  Mild hepatitis  CAD s/p LHC 4/2 -multivessel CAD  PAD -AAA and iliac artery aneurysm  Iliac artery stenosis - bilateral  COPD  Prostate enlargement  Diverticulosis  Tobacco abuse    PLAN:  Work-up done so far as well as recommendations from prior intensivist as well as other consultants noted.  Patient stable from a respiratory standpoint.  Will back off scheduled bronchodilators given persistent tachycardia and anxiety with it.  Persistent oxygen needs noted.  We will continue with aggressive diuresis.  Agree with concern for secondary pneumonia likely aspiration and will finish antibiotic course.  We will also get an MRSA screen and respiratory cultures  Cardiology input noted and patient was in sinus rhythm now and off amiodarone until this a.m. when he is having persistent tachycardia with heart rates in the 120s and is irregular.  We will get an EKG to evaluate rhythm. Noted plan for AICD for secondary prevention. Multivessel CAD on left heart catheter with plan for medical management  Renal function is good.  Responding well to diuretics  Patient does not have any gross neurological deficits other than some confusion. Is very anxious and concerned that he will not live whenever he gets tachycardic.  Will consult vascular surgery regarding  peripheral arterial disease  Will get therapy evaluation  Tobacco cessation counseling provided  Guarded prognosis    Patient will be transferred to the floor and we will consult hospitalist service to take over the patient's care as primary.  We will be available for any ongoing pulmonary as well as new critical care issues. Appreciate their assistance.    Thiago Jane MD  4/3/2021

## 2021-04-03 NOTE — PROGRESS NOTES
Patient Name: Sebastien Tang  Patient : 1960        Date of Service:21  Provider of Service: Jr Hardin MD  Place of Service: [facility]  Referral Provider: Vernon Sanford MD          Follow Up: VF arrest, coronary artery disease    Interval Hx: Currently no insight into his condition.  He is off pressors.  Blood pressure more stable.        OBJECTIVE  Temp:  [99.6 °F (37.6 °C)] 99.6 °F (37.6 °C)  Heart Rate:  [75-98] 75  Resp:  [20] 20  BP: ()/(66-92) 93/82  FiO2 (%):  [36 %] 36 %     Intake/Output Summary (Last 24 hours) at 4/3/2021 0701  Last data filed at 4/3/2021 0400  Gross per 24 hour   Intake 801 ml   Output 3600 ml   Net -2799 ml     Body mass index is 35.09 kg/m².      21  1437 21  1956 21  0400   Weight: 96.9 kg (213 lb 10 oz) 98.3 kg (216 lb 11.4 oz) 98.6 kg (217 lb 6 oz)         Physical Exam:   Constitutional:       Appearance: Healthy appearance.   Neck:      Vascular: JVD normal.   Pulmonary:      Effort: Pulmonary effort is normal.      Breath sounds: Normal breath sounds.   Cardiovascular:      Tachycardia present. Regular rhythm. Normal S1. Normal S2.      Murmurs: There is no murmur.      No click. No rub.   Neurological:      Mental Status: Alert. Exhibits a cognitive deficit.           CURRENT MEDS    Scheduled Meds:aspirin, 325 mg, Oral, Daily  enoxaparin, 40 mg, Subcutaneous, Q24H  insulin regular, 0-7 Units, Subcutaneous, Q6H  ipratropium-albuterol, 3 mL, Nebulization, 4x Daily - RT  metoprolol tartrate, 12.5 mg, Oral, Q12H  piperacillin-tazobactam, 3.375 g, Intravenous, Q8H  potassium chloride, 10 mEq, Intravenous, Once  sodium chloride, 10 mL, Intravenous, Q12H  [START ON 2021] vancomycin, 1,000 mg, Intravenous, On Call      Continuous Infusions:norepinephrine, 0.02-0.3 mcg/kg/min, Last Rate: 0.24 mcg/kg/min (21 0611)  Pharmacy to Dose Zosyn,   propofol, 5-50 mcg/kg/min, Last Rate: 45 mcg/kg/min (21  0610)          Lab Review:   Results from last 7 days   Lab Units 04/03/21  0402 04/02/21  0415 04/01/21  1212   SODIUM mmol/L 142 137 141   POTASSIUM mmol/L 3.9 4.3 2.9*   CHLORIDE mmol/L 101 102 99   CO2 mmol/L 29.4* 22.8 18.7*   BUN mg/dL 22 24* 15   CREATININE mg/dL 0.88 1.25 1.03   GLUCOSE mg/dL 105* 134* 316*   CALCIUM mg/dL 9.3 8.8 9.7   AST (SGOT) U/L  --  107* 53*   ALT (SGPT) U/L  --  59* 43*     Results from last 7 days   Lab Units 04/02/21  0415 04/01/21  1530 04/01/21  1212   TROPONIN T ng/mL 1.190* 1.050* 0.012     Results from last 7 days   Lab Units 04/03/21  0402 04/02/21  0415   WBC 10*3/mm3 13.13* 13.87*   HEMOGLOBIN g/dL 12.5* 12.9*   HEMATOCRIT % 38.1 38.2   PLATELETS 10*3/mm3 257 367     Results from last 7 days   Lab Units 04/01/21  1212   INR  1.17*   APTT seconds 39.7*     Results from last 7 days   Lab Units 04/02/21  0415   MAGNESIUM mg/dL 2.4     Results from last 7 days   Lab Units 04/02/21  0415   CHOLESTEROL mg/dL 182   TRIGLYCERIDES mg/dL 293*   HDL CHOL mg/dL 28*   LDL CHOL mg/dL 104*     Results from last 7 days   Lab Units 04/02/21  0415   PROBNP pg/mL 922.1*     Results from last 7 days   Lab Units 04/02/21  0415   TSH uIU/mL 1.690       I personally reviewed the patient's ECG and telemetry data    ASSESSMENT & PLAN    Cardiac arrest (CMS/[facility])    1.  Status post VF arrest: Presently off amiodarone.  Rhythm remains sinus tachycardia  2.  Coronary artery disease: Multivessel.  On medical therapy at present.  3.  Pulmonary edema: Responded to oral diuretics  4.  Cardiogenic and septic shock: On antibiotics.  Hemodynamics improving  5.  History of tobacco use    Continue to provide supportive care.  Overall condition remains critical.  Will require ICD prior to discharge for secondary prevention.      Jr Hardin MD  04/03/21

## 2021-04-03 NOTE — PLAN OF CARE
Goal Outcome Evaluation:      Patient remains in the CCU on 6L NC and no drips.  HR has become more regulated and BP is stable.  He is complaining of pain in the chest, most likely r/t cpr.  He is having some short term memory loss and has required frequent reorientation today.  He has been told several times what happen and why he is here, but still doesn't remember after a brief period of time and requires reorientation again.  He is tolerating meals.  The plan is for a possible defibrillator to be place on Monday.

## 2021-04-03 NOTE — CONSULTS
Name: Sebastien Tang ADMIT: 2021   : 1960  PCP: Nirav, No Known    MRN: [id number] LOS: 2 days   AGE/SEX: 61 y.o. male  ROOM: N3/1   [facility]      Patient Care Team:  Provider, No Known as PCP - General  Chief Complaint   Patient presents with   • Cardiac Arrest     CC: Aortoiliac aneurysms.    Subjective     Inpatient Vascular Surgery Consult  Consult performed by: Garry Huff MD  Consult ordered by: Thiago Jane MD  Reason for consult: Peripheral vascular disease and aneurysm evaluation.        History generated from review of chart and verification of findings at bedside with patient.    Sebastien Tang is a 61 y.o. male with limited history who collapsed on his way out from work today, CPR initiated by bystanders and EMS were called, documented rhythm was V. fib arrest, patient received several rounds of amiodarone and epinephrine before he was able to have a ROSC.  On arrival patient had some ischemic changes on his EKG, was rushed to the cardiac Cath Lab he has chronic obstructive coronary stent were not amenable for any angioplasty and the patient was sent up to the intensive care unit for further management.  On initial presentation the patient was restless, confused but having purposeful movements so no therapeutic hypothermia or targeted temperature management was indicated.  He had to be intubated during the cardiac cath in order to allow safe procedure and to protect the airway given his altered mental status.  His imaging were done and were independently reviewed, he has background of emphysema with suspected underlying COPD, there was evidence of significant pulmonary edema as well.  His heart cath showed chronically occluded RCA with bridging and robust left-to-right collaterals and there is an OM that has faint left-to-right collateral and was felt to be chronic as well the left main and the LAD were relatively normal.  Patient had his initial ABG showing pH around  7.0, repeat was 7.1, by the time he had further adjustment on the ventilator setting and hemodynamic support his pH is up to 7.33 on the most recent ABG done just now       CT scan to rule out dissection of the aorta iliac system revealed mild aneurysms.    Review of Systems   All other systems reviewed and are negative.      No past medical history on file.  Past Surgical History:   Procedure Laterality Date   • CARDIAC CATHETERIZATION Left 4/1/2021    Procedure: Coronary Angiography;  Surgeon: Anna Puga MD;  Location:  [facility];  Service: Cardiology;  Laterality: Left;   • CARDIAC CATHETERIZATION N/A 4/1/2021    Procedure: Left ventriculography;  Surgeon: Anna Puga MD;  Location:  [facility];  Service: Cardiology;  Laterality: N/A;   • CARDIAC CATHETERIZATION N/A 4/1/2021    Procedure: Left Heart Cath;  Surgeon: Anna Puga MD;  Location:  [facility];  Service: Cardiology;  Laterality: N/A;     No family history on file.  Social History     Tobacco Use   • Smoking status: Current Every Day Smoker     Packs/day: 1.50     Years: 10.00     Pack years: 15.00     Types: Cigarettes   Substance Use Topics   • Alcohol use: Not on file   • Drug use: Not Currently     No medications prior to admission.     aspirin, 325 mg, Oral, Daily  budesonide-formoterol, 2 puff, Inhalation, BID - RT  enoxaparin, 40 mg, Subcutaneous, Q24H  furosemide, 40 mg, Intravenous, Once  insulin regular, 0-7 Units, Subcutaneous, Q6H  metoprolol tartrate, 12.5 mg, Oral, Q12H  piperacillin-tazobactam, 3.375 g, Intravenous, Q8H  potassium chloride, 10 mEq, Intravenous, Once  sodium chloride, 10 mL, Intravenous, Q12H  tiotropium bromide monohydrate, 2 puff, Inhalation, Daily - RT  [START ON 4/5/2021] vancomycin, 1,000 mg, Intravenous, On Call      Pharmacy to Dose Zosyn,       •  acetaminophen **OR** acetaminophen  •  acetaminophen  •  dextrose  •  dextrose  •  glucagon (human  recombinant)  •  HYDROcodone-acetaminophen **OR** HYDROcodone-acetaminophen  •  ipratropium-albuterol  •  magnesium sulfate **OR** magnesium sulfate **OR** magnesium sulfate  •  ondansetron **OR** ondansetron  •  Pharmacy to Dose Zosyn  •  potassium chloride **OR** potassium chloride **OR** potassium chloride  •  potassium phosphate infusion greater than 15 mMoles **OR** potassium phosphate infusion greater than 15 mMoles **OR** potassium phosphate infusion 15 mMol or less **OR** sodium phosphate IVPB **OR** sodium phosphate IVPB  •  sodium chloride  •  sodium chloride  Patient has no known allergies.    Objective     Physical Exam:  Physical Exam  Vitals and nursing note reviewed.   Constitutional:       Appearance: Normal appearance.   HENT:      Head: Normocephalic and atraumatic.      Right Ear: External ear normal.      Left Ear: External ear normal.      Nose: Nose normal.      Mouth/Throat:      Mouth: Mucous membranes are moist.   Eyes:      Extraocular Movements: Extraocular movements intact.      Pupils: Pupils are equal, round, and reactive to light.   Cardiovascular:      Rate and Rhythm: Regular rhythm. Tachycardia present.      Pulses: Normal pulses.      Heart sounds: Normal heart sounds.   Pulmonary:      Effort: Pulmonary effort is normal.      Breath sounds: Normal breath sounds.   Abdominal:      General: Abdomen is flat.      Palpations: Abdomen is soft.   Musculoskeletal:         General: No swelling. Normal range of motion.      Cervical back: Normal range of motion and neck supple.   Skin:     General: Skin is warm.      Capillary Refill: Capillary refill takes less than 2 seconds.   Neurological:      General: No focal deficit present.      Mental Status: He is alert and oriented to person, place, and time.   Psychiatric:         Mood and Affect: Mood normal.         Behavior: Behavior normal.     Easily palpable 2+ pedal pulses bilaterally.  Easily palpable two fourths femoral pulses  bilaterally.    Vital Signs and Labs:  Vital Signs Patient Vitals for the past 24 hrs:   BP Temp Temp src Pulse Resp SpO2   04/03/21 1224 -- -- -- 116 -- 96 %   04/03/21 1216 -- -- -- 112 18 92 %   04/03/21 1200 120/88 -- -- 116 -- (!) 85 %   04/03/21 1139 -- 99.5 °F (37.5 °C) Rectal -- -- --   04/03/21 1100 100/69 -- -- 120 -- 92 %   04/03/21 1000 97/65 -- -- (!) 128 -- (!) 89 %   04/03/21 0910 -- -- -- 120 -- 91 %   04/03/21 0900 107/44 -- -- -- -- --   04/03/21 0830 -- -- -- 98 -- (!) 89 %   04/03/21 0822 -- -- -- 115 22 90 %   04/03/21 0800 91/68 -- -- 118 -- 91 %   04/03/21 0730 -- 99.7 °F (37.6 °C) Rectal -- -- --   04/03/21 0600 93/82 -- -- 75 -- 93 %   04/03/21 0501 -- -- -- 81 -- 92 %   04/03/21 0500 (!) 88/66 -- -- 81 -- 92 %   04/03/21 0400 110/78 -- -- 84 -- (!) 89 %   04/03/21 0300 116/74 -- -- 82 -- 94 %   04/03/21 0200 106/86 -- -- 84 -- 92 %   04/03/21 0100 114/77 -- -- 84 -- 94 %   04/03/21 0000 103/77 -- -- -- -- --   04/02/21 2300 113/82 -- -- -- -- --   04/02/21 2200 97/82 -- -- 82 -- 93 %   04/02/21 2100 116/90 -- -- 85 -- 92 %   04/02/21 2002 -- -- -- 98 20 94 %   04/02/21 2001 -- -- -- 87 -- 90 %   04/02/21 2000 (!) 89/72 -- -- -- -- --   04/02/21 1900 126/83 -- -- -- -- --   04/02/21 1859 -- -- -- 94 -- 92 %   04/02/21 1800 112/92 -- -- 84 -- 96 %   04/02/21 1700 107/74 -- -- 83 -- 94 %   04/02/21 1600 104/84 -- -- 80 -- 96 %   04/02/21 1500 97/69 -- -- 79 -- 94 %   04/02/21 1401 -- -- -- 82 -- 96 %   04/02/21 1400 107/82 -- -- -- -- --     I/O:  I/O last 3 completed shifts:  In: 2381 [I.V.:2381]  Out: 4150 [Urine:4150]    CBC    Results from last 7 days   Lab Units 04/03/21  0402 04/02/21  0415 04/01/21  1336 04/01/21  1212   WBC 10*3/mm3 13.13* 13.87*  --  9.92   HEMOGLOBIN g/dL 12.5* 12.9*  --  14.3   HEMOGLOBIN, POC g/dL  --   --  13.6  --    PLATELETS 10*3/mm3 257 367  --  313     BMP   Results from last 7 days   Lab Units 04/03/21  0402 04/02/21  0415 04/01/21  1233 04/01/21  1212    SODIUM mmol/L 142 137  --  141   POTASSIUM mmol/L 3.9 4.3  --  2.9*   CHLORIDE mmol/L 101 102  --  99   CO2 mmol/L 29.4* 22.8  --  18.7*   BUN mg/dL 22 24*  --  15   CREATININE mg/dL 0.88 1.25 1.00 1.03   GLUCOSE mg/dL 105* 134*  --  316*   MAGNESIUM mg/dL  --  2.4  --   --    PHOSPHORUS mg/dL  --  2.9  --   --      Cr Clearance Estimated Creatinine Clearance: 94.4 mL/min (by C-G formula based on SCr of 0.88 mg/dL).  Coag   Results from last 7 days   Lab Units 04/01/21  1212   INR  1.17*   APTT seconds 39.7*     HbA1C   Lab Results   Component Value Date    HGBA1C 5.90 (H) 04/02/2021     Blood Glucose   Glucose   Date/Time Value Ref Range Status   04/03/2021 1140 125 70 - 130 mg/dL Final   04/03/2021 0729 122 70 - 130 mg/dL Final   04/02/2021 2303 156 (H) 70 - 130 mg/dL Final   04/02/2021 1749 101 70 - 130 mg/dL Final   04/02/2021 1237 130 70 - 130 mg/dL Final   04/02/2021 0610 116 70 - 130 mg/dL Final   04/02/2021 0008 159 (H) 70 - 130 mg/dL Final   04/01/2021 1834 127 70 - 130 mg/dL Final     Infection   Results from last 7 days   Lab Units 04/02/21  0415   PROCALCITONIN ng/mL 7.44*     CMP   Results from last 7 days   Lab Units 04/03/21  0402 04/02/21  0415 04/01/21  1233 04/01/21  1212   SODIUM mmol/L 142 137  --  141   POTASSIUM mmol/L 3.9 4.3  --  2.9*   CHLORIDE mmol/L 101 102  --  99   CO2 mmol/L 29.4* 22.8  --  18.7*   BUN mg/dL 22 24*  --  15   CREATININE mg/dL 0.88 1.25 1.00 1.03   GLUCOSE mg/dL 105* 134*  --  316*   ALBUMIN g/dL  --  3.90  --  4.20   BILIRUBIN mg/dL  --  0.3  --  0.2   ALK PHOS U/L  --  67  --  81   AST (SGOT) U/L  --  107*  --  53*   ALT (SGPT) U/L  --  59*  --  43*     ABG    Results from last 7 days   Lab Units 04/01/21  1531 04/01/21  1222   PH, ARTERIAL pH units 7.331* 7.064*   PCO2, ARTERIAL mm Hg 46.2* 46.6*   PO2 ART mm Hg 120.2* 59.6*   BASE EXCESS ART mmol/L -1.9* -16.9*     UA      EFRAIN  No results found for: POCMETH, POCAMPHET, POCBARBITUR, POCBENZO, POCCOCAINE, POCOPIATES,  POCOXYCODO, POCPHENCYC, POCPROPOXY, POCTHC, POCTRICYC  Radiology(recent) XR Chest 1 View    Result Date: 4/3/2021  No significant interval change in bilateral alveolar and interstitial infiltrates.  This report was finalized on 4/3/2021 3:37 AM by Dr. Judi Chow M.D.      XR Chest 1 View    Result Date: 4/2/2021  Electronically signed by Tyler Kulkarni M.D. on 04-02-21 at 0444      Active Hospital Problems    Diagnosis  POA   • Cardiac arrest (CMS/HCC) [I46.9]  Yes      Resolved Hospital Problems   No resolved problems to display.     Problem Points:  3:  Patient has a new problem, with no additional work-up planned (max of 1)  Total problem points:3    Data Points:  1:  I have reviewed or order clinical lab test  1:  I have reviewed or order radiology test (except heart catheterization or echo)  2:  I have personally and independently review of image, tracing, or specimen  Total data points:4 or more   IMPRESSION:  1. Diffuse pulmonary emphysema with hydrostatic and borderline alveolar  pulmonary edema.  2. Acute bilateral anterior 3rd and 4th rib fractures.  3. No evidence for thoracic aortic dissection. Diffuse, multifocal  atherosclerotic disease with mild aneurysmal dilatation of the  infrarenal abdominal aorta measuring 2.8 cm in diameter. 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 Moderate stenosis of right proximal common iliac  artery and distal left common iliac artery at the site of the distal  aneurysm.  4. 3 mm nonobstructing left lower pole renal stone.  5. Sigmoid diverticulosis without evidence for diverticulitis.  6. Prostate gland enlargement.  Personally reviewed CT angiogram and agree with primary findings.  Risk Points:  Moderate: New diagnosis with unknown prognosis    MDM requires 2/3 (Problem points, Data points and Risk)  MDM Prob point Data point Risk   SF 1 1 Minimal   Low 2 2 Low   Mod 3 3 Moderate   High 4 4 High      Code requires 3/3 (MDM, History and Exam)  Code MDM History Exam Time   12908 SF/Low Detailed Detailed 30   61783 Mod Comprehensive Comprehensive 50   86467 High Comprehensive Comprehensive 70     Detailed history:  4 elements HPI or status of 3 chronic problems; 2-9 system ROS  Comprehensive:  4 elements HPI or status of 3 chronic problems;  10 system ROS    Detailed Exam:  12 findings from any organ system  Comprehensive Exam:  2 findings from each of 9 systems.   [id number]    Assessment/Plan       Cardiac arrest (CMS/[facility])      61 y.o. male patient status post cardiac arrest.  CT angiogram was performed to evaluate for possible dissection this did not show any but did show some mild aneurysms of the aortoiliac system.  There is no concerning features on the CT angiogram.  Easily palpable 2+ pedal pulses bilaterally.  We will see him back in my office in 6 months time for surveillance aortoiliac duplex.  Please call with further questions while patient made an inpatient.    I discussed the patients findings and my recommendations with patient and nursing staff.    Garry Huff MD  04/03/21  13:27 EDT    Please call my office with any question: [phone]

## 2021-04-03 NOTE — PLAN OF CARE
Problem: Adult Inpatient Plan of Care  Goal: Plan of Care Review  Flowsheets (Taken 4/3/2021 3355)  Plan of Care Reviewed With: patient  Outcome Summary: Pt evaluated for PT.  He does have difficulty with recall of recent event and has c/o chest pain throughout session.  He was able to ambulate within his room x 20 ft with CGA.  HR remained in the 140s throughout and BP was 102/74   Goal Outcome Evaluation:  Plan of Care Reviewed With: patient     Outcome Summary: Pt evaluated for PT.  He does have difficulty with recall of recent event and has c/o chest pain throughout session.  He was able to ambulate within his room x 20 ft with CGA.  HR remained in the 140s throughout and BP was 102/74. Patient was intermittently wearing a face mask during this therapy encounter. Therapist used appropriate personal protective equipment including eye protection, mask, and gloves.  Mask used was standard procedure mask. Appropriate PPE was worn during the entire therapy session. Hand hygiene was completed before and after therapy session. Patient is not in enhanced droplet precautions.

## 2021-04-04 PROBLEM — I49.01 VENTRICULAR FIBRILLATION: Status: ACTIVE | Noted: 2021-04-01

## 2021-04-04 LAB
ALBUMIN SERPL-MCNC: 3.9 G/DL (ref 3.5–5.2)
ALBUMIN/GLOB SERPL: 1.3 G/DL
ALP SERPL-CCNC: 61 U/L (ref 39–117)
ALT SERPL W P-5'-P-CCNC: 46 U/L (ref 1–41)
ANION GAP SERPL CALCULATED.3IONS-SCNC: 11.1 MMOL/L (ref 5–15)
AST SERPL-CCNC: 74 U/L (ref 1–40)
BASOPHILS # BLD AUTO: 0.04 10*3/MM3 (ref 0–0.2)
BASOPHILS NFR BLD AUTO: 0.3 % (ref 0–1.5)
BILIRUB SERPL-MCNC: 0.9 MG/DL (ref 0–1.2)
BUN SERPL-MCNC: 27 MG/DL (ref 8–23)
BUN/CREAT SERPL: 37.5 (ref 7–25)
CALCIUM SPEC-SCNC: 9.6 MG/DL (ref 8.6–10.5)
CHLORIDE SERPL-SCNC: 97 MMOL/L (ref 98–107)
CO2 SERPL-SCNC: 28.9 MMOL/L (ref 22–29)
CREAT SERPL-MCNC: 0.72 MG/DL (ref 0.76–1.27)
DEPRECATED RDW RBC AUTO: 43.1 FL (ref 37–54)
EOSINOPHIL # BLD AUTO: 0.03 10*3/MM3 (ref 0–0.4)
EOSINOPHIL NFR BLD AUTO: 0.2 % (ref 0.3–6.2)
ERYTHROCYTE [DISTWIDTH] IN BLOOD BY AUTOMATED COUNT: 13.5 % (ref 12.3–15.4)
GFR SERPL CREATININE-BSD FRML MDRD: 110 ML/MIN/1.73
GLOBULIN UR ELPH-MCNC: 3 GM/DL
GLUCOSE BLDC GLUCOMTR-MCNC: 120 MG/DL (ref 70–130)
GLUCOSE BLDC GLUCOMTR-MCNC: 121 MG/DL (ref 70–130)
GLUCOSE BLDC GLUCOMTR-MCNC: 136 MG/DL (ref 70–130)
GLUCOSE SERPL-MCNC: 119 MG/DL (ref 65–99)
HCT VFR BLD AUTO: 34.4 % (ref 37.5–51)
HGB BLD-MCNC: 11.3 G/DL (ref 13–17.7)
IMM GRANULOCYTES # BLD AUTO: 0.07 10*3/MM3 (ref 0–0.05)
IMM GRANULOCYTES NFR BLD AUTO: 0.6 % (ref 0–0.5)
LYMPHOCYTES # BLD AUTO: 1.38 10*3/MM3 (ref 0.7–3.1)
LYMPHOCYTES NFR BLD AUTO: 11.4 % (ref 19.6–45.3)
MCH RBC QN AUTO: 28.9 PG (ref 26.6–33)
MCHC RBC AUTO-ENTMCNC: 32.8 G/DL (ref 31.5–35.7)
MCV RBC AUTO: 88 FL (ref 79–97)
MONOCYTES # BLD AUTO: 1.33 10*3/MM3 (ref 0.1–0.9)
MONOCYTES NFR BLD AUTO: 11 % (ref 5–12)
NEUTROPHILS NFR BLD AUTO: 76.5 % (ref 42.7–76)
NEUTROPHILS NFR BLD AUTO: 9.25 10*3/MM3 (ref 1.7–7)
NRBC BLD AUTO-RTO: 0 /100 WBC (ref 0–0.2)
PLATELET # BLD AUTO: 263 10*3/MM3 (ref 140–450)
PMV BLD AUTO: 10.4 FL (ref 6–12)
POTASSIUM SERPL-SCNC: 4 MMOL/L (ref 3.5–5.2)
PROT SERPL-MCNC: 6.9 G/DL (ref 6–8.5)
RBC # BLD AUTO: 3.91 10*6/MM3 (ref 4.14–5.8)
SODIUM SERPL-SCNC: 137 MMOL/L (ref 136–145)
WBC # BLD AUTO: 12.1 10*3/MM3 (ref 3.4–10.8)

## 2021-04-04 PROCEDURE — 82962 GLUCOSE BLOOD TEST: CPT

## 2021-04-04 PROCEDURE — 80053 COMPREHEN METABOLIC PANEL: CPT | Performed by: INTERNAL MEDICINE

## 2021-04-04 PROCEDURE — 94799 UNLISTED PULMONARY SVC/PX: CPT

## 2021-04-04 PROCEDURE — 85025 COMPLETE CBC W/AUTO DIFF WBC: CPT | Performed by: INTERNAL MEDICINE

## 2021-04-04 PROCEDURE — 93005 ELECTROCARDIOGRAM TRACING: CPT | Performed by: INTERNAL MEDICINE

## 2021-04-04 PROCEDURE — 25010000002 PIPERACILLIN SOD-TAZOBACTAM PER 1 G: Performed by: INTERNAL MEDICINE

## 2021-04-04 PROCEDURE — 25010000002 ENOXAPARIN PER 10 MG: Performed by: INTERNAL MEDICINE

## 2021-04-04 PROCEDURE — 99233 SBSQ HOSP IP/OBS HIGH 50: CPT | Performed by: NURSE PRACTITIONER

## 2021-04-04 PROCEDURE — 93010 ELECTROCARDIOGRAM REPORT: CPT | Performed by: INTERNAL MEDICINE

## 2021-04-04 RX ORDER — DILTIAZEM HCL IN NACL,ISO-OSM 125 MG/125
5-15 PLASTIC BAG, INJECTION (ML) INTRAVENOUS
Status: DISCONTINUED | OUTPATIENT
Start: 2021-04-04 | End: 2021-04-05

## 2021-04-04 RX ORDER — AMOXICILLIN AND CLAVULANATE POTASSIUM 875; 125 MG/1; MG/1
1 TABLET, FILM COATED ORAL EVERY 12 HOURS SCHEDULED
Status: DISCONTINUED | OUTPATIENT
Start: 2021-04-04 | End: 2021-04-08 | Stop reason: HOSPADM

## 2021-04-04 RX ORDER — DILTIAZEM HYDROCHLORIDE 5 MG/ML
5 INJECTION INTRAVENOUS ONCE
Status: COMPLETED | OUTPATIENT
Start: 2021-04-04 | End: 2021-04-04

## 2021-04-04 RX ADMIN — METOPROLOL TARTRATE 25 MG: 25 TABLET, FILM COATED ORAL at 18:54

## 2021-04-04 RX ADMIN — ENOXAPARIN SODIUM 40 MG: 40 INJECTION SUBCUTANEOUS at 16:42

## 2021-04-04 RX ADMIN — HYDROCODONE BITARTRATE AND ACETAMINOPHEN 2 TABLET: 5; 325 TABLET ORAL at 16:37

## 2021-04-04 RX ADMIN — DILTIAZEM HYDROCHLORIDE 5 MG: 5 INJECTION INTRAVENOUS at 19:55

## 2021-04-04 RX ADMIN — TAZOBACTAM SODIUM AND PIPERACILLIN SODIUM 3.38 G: 375; 3 INJECTION, SOLUTION INTRAVENOUS at 08:28

## 2021-04-04 RX ADMIN — BUDESONIDE AND FORMOTEROL FUMARATE DIHYDRATE 2 PUFF: 160; 4.5 AEROSOL RESPIRATORY (INHALATION) at 06:43

## 2021-04-04 RX ADMIN — AMOXICILLIN AND CLAVULANATE POTASSIUM 1 TABLET: 875; 125 TABLET, FILM COATED ORAL at 20:42

## 2021-04-04 RX ADMIN — Medication 5 MG/HR: at 20:23

## 2021-04-04 RX ADMIN — METOPROLOL TARTRATE 12.5 MG: 25 TABLET, FILM COATED ORAL at 12:07

## 2021-04-04 RX ADMIN — HYDROCODONE BITARTRATE AND ACETAMINOPHEN 2 TABLET: 5; 325 TABLET ORAL at 00:30

## 2021-04-04 RX ADMIN — SODIUM CHLORIDE, PRESERVATIVE FREE 10 ML: 5 INJECTION INTRAVENOUS at 20:43

## 2021-04-04 RX ADMIN — METOPROLOL TARTRATE 12.5 MG: 25 TABLET, FILM COATED ORAL at 08:27

## 2021-04-04 RX ADMIN — HYDROCODONE BITARTRATE AND ACETAMINOPHEN 2 TABLET: 5; 325 TABLET ORAL at 23:02

## 2021-04-04 RX ADMIN — TIOTROPIUM BROMIDE INHALATION SPRAY 2 PUFF: 3.12 SPRAY, METERED RESPIRATORY (INHALATION) at 06:45

## 2021-04-04 RX ADMIN — HYDROCODONE BITARTRATE AND ACETAMINOPHEN 2 TABLET: 5; 325 TABLET ORAL at 08:27

## 2021-04-04 RX ADMIN — SODIUM CHLORIDE, PRESERVATIVE FREE 10 ML: 5 INJECTION INTRAVENOUS at 08:28

## 2021-04-04 RX ADMIN — Medication 1 PATCH: at 12:08

## 2021-04-04 RX ADMIN — ASPIRIN 325 MG: 325 TABLET ORAL at 08:29

## 2021-04-04 NOTE — PROGRESS NOTES
"  PROGRESS NOTE  Patient Name: Sebastien Tang  Age/Sex: 61 y.o. male  : 1960  MRN: [id number]    Date of Admission: 2021  Date of Encounter Visit: 21   LOS: 3 days   Patient Care Team:  Provider, No Known as PCP - General    Chief Complaint: Status post V. fib resuscitated arrest with coronary artery disease, pulmonary edema, COPD and sepsis    Hospital course: Patient has been progressing, he is still oxygen dependent however, complaining of the chest pain limiting his ability to cough and take a deep breath.  Afebrile, no more arrhythmias, followed by cardiology.      REVIEW OF SYSTEMS:   CONSTITUTIONAL: no fever or chills  CARDIOVASCULAR: Chest pain from the CPR and rib fracture g, no edema  RESPIRATORY: On exam cannula oxygen.   GASTROINTESTINAL: No anorexia, nausea, vomiting or diarrhea. No abdominal pain or blood.  HEMATOLOGIC: No bleeding or bruising.     Ventilator/Non-Invasive Ventilation Settings (From admission, onward) Comment         Vital Signs  Temp:  [98.8 °F (37.1 °C)-99.1 °F (37.3 °C)] 98.8 °F (37.1 °C)  Heart Rate:  [] 119  Resp:  [19-22] 20  BP: (108-144)/() 122/84  SpO2:  [91 %-94 %] 92 %  on  Flow (L/min):  [3-5] 4 Device (Oxygen Therapy): nasal cannula    Intake/Output Summary (Last 24 hours) at 2021 1516  Last data filed at 2021 1232  Gross per 24 hour   Intake 650 ml   Output 600 ml   Net 50 ml     Flowsheet Rows      First Filed Value   Admission Height  167.6 cm (66\") Documented at 2021   Admission Weight  96.9 kg (213 lb 10 oz) Documented at 2021 1437        Body mass index is 33.78 kg/m².      210 21  0548   Weight: 98.3 kg (216 lb 11.4 oz) 98.6 kg (217 lb 6 oz) 94.9 kg (209 lb 4.8 oz)       Physical Exam:  GEN: Awake and responsive, on nasal cannula oxygen, talking, forgetful, oriented x3  EYES:   Sclerae clear. No icterus. PERRL. Normal EOM  ENT:   External ears/nose normal, no oral lesions, no thrush, " mucous membranes moist  NECK:  Supple, midline trachea, no JVD  LUNGS: Normal chest on inspection, normal crackles, good breath sounds, tender to palpation.   CV:  Regular rhythm and rate. Normal S1/S2. No murmurs, gallops, or rubs noted.  ABD:  Soft, nontender and nondistended. Normal bowel sounds. No guarding  EXT:  Moves all extremities well. No cyanosis. No redness. No edema.   Skin: Dry, intact, no bleeding    Results Review:    Results From Last 14 Days   Lab Units 04/02/21  0415   LACTATE mmol/L 1.6   TRIGLYCERIDES mg/dL 293*     Results from last 7 days   Lab Units 04/04/21  0404 04/03/21  0402 04/02/21  0415 04/01/21  1233 04/01/21  1212   SODIUM mmol/L 137 142 137  --  141   POTASSIUM mmol/L 4.0 3.9 4.3  --  2.9*   CHLORIDE mmol/L 97* 101 102  --  99   CO2 mmol/L 28.9 29.4* 22.8  --  18.7*   BUN mg/dL 27* 22 24*  --  15   CREATININE mg/dL 0.72* 0.88 1.25 1.00 1.03   CALCIUM mg/dL 9.6 9.3 8.8  --  9.7   AST (SGOT) U/L 74*  --  107*  --  53*   ALT (SGPT) U/L 46*  --  59*  --  43*   ANION GAP mmol/L 11.1 11.6 12.2  --  23.3*   ALBUMIN g/dL 3.90  --  3.90  --  4.20     Results from last 7 days   Lab Units 04/02/21  0415 04/01/21  1530 04/01/21  1212   TROPONIN T ng/mL 1.190* 1.050* 0.012     Results from last 7 days   Lab Units 04/02/21  0415   TSH uIU/mL 1.690     Results from last 7 days   Lab Units 04/02/21  0415   PROBNP pg/mL 922.1*     Results from last 7 days   Lab Units 04/04/21  0404 04/03/21  0402 04/02/21  0415 04/01/21  1336 04/01/21  1212   WBC 10*3/mm3 12.10* 13.13* 13.87*  --  9.92   HEMOGLOBIN g/dL 11.3* 12.5* 12.9*  --  14.3   HEMOGLOBIN, POC g/dL  --   --   --  13.6  --    HEMATOCRIT % 34.4* 38.1 38.2  --  45.8   HEMATOCRIT POC %  --   --   --  40  --    PLATELETS 10*3/mm3 263 257 367  --  313   MCV fL 88.0 89.9 87.6  --  94.2   NEUTROPHIL % % 76.5*  --   --   --  52.7   LYMPHOCYTE % % 11.4*  --   --   --  32.2   MONOCYTES % % 11.0  --   --   --  7.6   EOSINOPHIL % % 0.2*  --   --   --  1.6    BASOPHIL % % 0.3  --   --   --  0.9   IMM GRAN % % 0.6*  --   --   --  5.0*     Results from last 7 days   Lab Units 04/01/21  1212   INR  1.17*   APTT seconds 39.7*     Results from last 7 days   Lab Units 04/02/21  0415   MAGNESIUM mg/dL 2.4     Results from last 7 days   Lab Units 04/02/21  0415   CHOLESTEROL mg/dL 182   TRIGLYCERIDES mg/dL 293*   HDL CHOL mg/dL 28*     Results from last 7 days   Lab Units 04/01/21  1531 04/01/21  1222   PH, ARTERIAL pH units 7.331* 7.064*   PCO2, ARTERIAL mm Hg 46.2* 46.6*   PO2 ART mm Hg 120.2* 59.6*   HCO3 ART mmol/L 24.4 13.3*     Results from last 7 days   Lab Units 04/02/21  0415   HEMOGLOBIN A1C % 5.90*     Glucose   Date/Time Value Ref Range Status   04/04/2021 1031 120 70 - 130 mg/dL Final   04/04/2021 0558 121 70 - 130 mg/dL Final   04/03/2021 2318 122 70 - 130 mg/dL Final   04/03/2021 1932 145 (H) 70 - 130 mg/dL Final   04/03/2021 1800 146 (H) 70 - 130 mg/dL Final   04/03/2021 1140 125 70 - 130 mg/dL Final   04/03/2021 0729 122 70 - 130 mg/dL Final   04/02/2021 2303 156 (H) 70 - 130 mg/dL Final     Results from last 7 days   Lab Units 04/03/21  1203 04/02/21  0415   PROCALCITONIN ng/mL 2.13* 7.44*   LACTATE mmol/L  --  1.6             Results from last 7 days   Lab Units 04/01/21  1221   COVID19  Not Detected   TSH 1.69  Hemoglobin A1c 5.9  Phosphate 2.9  PSA still pending            Echocardiogram 4/1/2021:  · Calculated left ventricular EF = 38.9% Estimated left ventricular EF was in agreement with the calculated left ventricular EF. Left ventricular systolic function is moderately decreased. Normal left ventricular cavity size noted. Left ventricular wall thickness is consistent with mild concentric hypertrophy. Left ventricular diastolic function was normal.  · See wall scoring diagram.  Imaging:   Imaging Results (All)     Procedure Component Value Units Date/Time              I reviewed the patient's new clinical results.  I personally viewed and interpreted  the patient's imaging results:X-ray showed persistent bilateral interstitial edema left more than right        Medication Review:   aspirin, 325 mg, Oral, Daily  budesonide-formoterol, 2 puff, Inhalation, BID - RT  enoxaparin, 40 mg, Subcutaneous, Q24H  insulin regular, 0-7 Units, Subcutaneous, Q6H  metoprolol tartrate, 25 mg, Oral, Q12H  nicotine, 1 patch, Transdermal, Q24H  piperacillin-tazobactam, 3.375 g, Intravenous, Q8H  potassium chloride, 10 mEq, Intravenous, Once  sodium chloride, 10 mL, Intravenous, Q12H  tiotropium bromide monohydrate, 2 puff, Inhalation, Daily - RT  [START ON 4/5/2021] vancomycin, 1,000 mg, Intravenous, On Call        Pharmacy to Dose Zosyn,         ASSESSMENT:   1. Resuscitated V. fib arrest  2. Septic shock, on pressors with cardiogenic shock component as well  3. Coronary artery disease with ischemic cardiomyopathy,  4. Acute non-ST elevation MI  5. Acute hypoxemic respiratory failure  6. Pulmonary edema  7. Emphysema with possible underlying COPD  8. Metabolic and respiratory acidosis  9. Enlarged prostate  10. Diverticulosis without diverticulitis  11. Limited medical history  12. Severe hypokalemia, likely worse after the acidosis has been corrected  13. Transaminitis, likely secondary to passive hepatic congestion  14. Hematuria, mild, no need to withhold any DVT prophylaxis or aspirin at this point unless hematuria is worse     PLAN:  Patient is on nasal cannula oxygen and will wean as tolerated  Continue with the physical therapy  Need to have as needed pain medication for his chest pain with encouragement to work on the deep breathing and the cough and expectoration.  Otherwise per cardiology for future measures to prevent similar events.  Patient is already on aspirin, patient is on metoprolol, he is on Zosyn antibiotic which will be transition to p.o. Augmentin since his white count is trending down and he has been afebrile for more than 24 hours.          Disposition:  Continue to monitor in the CCU    Meg Kessler MD  04/04/21  15:16 EDT        Dictated utilizing Dragon dictation

## 2021-04-04 NOTE — PROGRESS NOTES
"    Patient Name: Sebastien Tang  :1960  61 y.o.      Patient Care Team:  Provider, No Known as PCP - General    Chief Complaint: VF arrest, CAD    Interval History: Continues to be confused as to his condition and how he got to be in the hospital.  He denies any chest pain or shortness of breath.  His heart rate remains elevated in the 120s.       Objective   Vital Signs  Temp:  [98.8 °F (37.1 °C)-99.5 °F (37.5 °C)] 98.8 °F (37.1 °C)  Heart Rate:  [] 120  Resp:  [18-22] 20  BP: (100-144)/() 144/89    Intake/Output Summary (Last 24 hours) at 2021 1037  Last data filed at 2021 0935  Gross per 24 hour   Intake 530 ml   Output 600 ml   Net -70 ml     Flowsheet Rows      First Filed Value   Admission Height  167.6 cm (66\") Documented at 2021 1956   Admission Weight  96.9 kg (213 lb 10 oz) Documented at 2021 1437          Physical Exam:   General Appearance:    Alert, cooperative, in no acute distress   Lungs:     Clear to auscultation.  Normal respiratory effort and rate.      Heart:    Regular rhythm and increased rate, normal S1 and S2, no murmurs, gallops or rubs.     Chest Wall:    No abnormalities observed   Abdomen:     Soft, nontender, positive bowel sounds.     Extremities:   no cyanosis, clubbing or edema.  No marked joint deformities.  Adequate musculoskeletal strength.       Results Review:    Results from last 7 days   Lab Units 21  0404   SODIUM mmol/L 137   POTASSIUM mmol/L 4.0   CHLORIDE mmol/L 97*   CO2 mmol/L 28.9   BUN mg/dL 27*   CREATININE mg/dL 0.72*   GLUCOSE mg/dL 119*   CALCIUM mg/dL 9.6     Results from last 7 days   Lab Units 21  0415 21  1530 21  1212   TROPONIN T ng/mL 1.190* 1.050* 0.012     Results from last 7 days   Lab Units 21  0404   WBC 10*3/mm3 12.10*   HEMOGLOBIN g/dL 11.3*   HEMATOCRIT % 34.4*   PLATELETS 10*3/mm3 263     Results from last 7 days   Lab Units 21  1212   INR  1.17*   APTT seconds 39.7* "     Results from last 7 days   Lab Units 04/02/21  0415   MAGNESIUM mg/dL 2.4     Results from last 7 days   Lab Units 04/02/21  0415   CHOLESTEROL mg/dL 182   TRIGLYCERIDES mg/dL 293*   HDL CHOL mg/dL 28*   LDL CHOL mg/dL 104*               Medication Review:   aspirin, 325 mg, Oral, Daily  budesonide-formoterol, 2 puff, Inhalation, BID - RT  enoxaparin, 40 mg, Subcutaneous, Q24H  insulin regular, 0-7 Units, Subcutaneous, Q6H  metoprolol tartrate, 12.5 mg, Oral, Q12H  nicotine, 1 patch, Transdermal, Q24H  piperacillin-tazobactam, 3.375 g, Intravenous, Q8H  potassium chloride, 10 mEq, Intravenous, Once  sodium chloride, 10 mL, Intravenous, Q12H  tiotropium bromide monohydrate, 2 puff, Inhalation, Daily - RT  [START ON 4/5/2021] vancomycin, 1,000 mg, Intravenous, On Call         Pharmacy to Dose Zosyn,         Assessment/Plan     1.  Status post VF arrest: Presently off amiodarone.  Rhythm remains sinus tachycardia 120s.  He cannot feel his heart racing. I will increase his metoprolol to 25 mg BID.  Will give extra 12.5 mg this AM.  2.  Coronary artery disease: Multivessel.  On medical therapy at present.  Denies angina  3.  Pulmonary edema: Responded well to oral diuretics.  Currently off of diuretics and appears euvolemic.  4.  Cardiogenic and septic shock: remains on IV abx  5.   History of tobacco use - had refused Nicoderm patch and now willing to wear.    Overall stable.  He will require ICD prior to discharge for secondary prevention.      ERIC Valverde  [facility]  04/04/21  10:37 EDT

## 2021-04-04 NOTE — SIGNIFICANT NOTE
04/04/21 1314   OTHER   Discipline physical therapy assistant   Rehab Time/Intention   Session Not Performed patient/family declined treatment  (Pt requested that PT come back tomorrow due to pt wanting to rest because he didn't sleep much last night)   Recommendation   PT - Next Appointment 04/05/21

## 2021-04-04 NOTE — PLAN OF CARE
Goal Outcome Evaluation:     Progress: improving  Outcome Summary: Pt is tachycardic.  Pt continues to require O2 at 4L NC. c/o centralized soreness of the chest. Still has problem with forgetfullness and short term memory of events that led up to hospitalization. Contact/droplet isolation maintained. Will continue to monitor closely.

## 2021-04-04 NOTE — PROGRESS NOTES
Name: Sebastien Tang ADMIT: 2021   : 1960  PCP: Provider, No Known    MRN: [id number] LOS: 3 days   AGE/SEX: 61 y.o. male  ROOM: /     Subjective   Subjective   Patient continues to have some confusion this morning.  He denies chest pain shortness of air.  Has had some tachycardia this morning heart rate in the 120s.    Review of Systems   All other systems reviewed and are negative.       Objective   Objective   Vital Signs  Temp:  [98.8 °F (37.1 °C)-99.1 °F (37.3 °C)] 98.8 °F (37.1 °C)  Heart Rate:  [] 119  Resp:  [19-22] 20  BP: (108-144)/() 122/84  SpO2:  [91 %-94 %] 92 %  on  Flow (L/min):  [3-5] 4;   Device (Oxygen Therapy): nasal cannula  Body mass index is 33.78 kg/m².  Physical Exam  Vitals and nursing note reviewed.   Constitutional:       General: He is not in acute distress.     Appearance: Normal appearance.   HENT:      Head: Normocephalic and atraumatic.      Nose: Nose normal.      Mouth/Throat:      Pharynx: Oropharynx is clear.   Eyes:      General: No scleral icterus.     Extraocular Movements: Extraocular movements intact.   Cardiovascular:      Rate and Rhythm: Regular rhythm. Tachycardia present.      Pulses: Normal pulses.      Heart sounds: Normal heart sounds.   Pulmonary:      Effort: Pulmonary effort is normal.      Breath sounds: Normal breath sounds.   Abdominal:      General: Abdomen is flat. Bowel sounds are normal.      Palpations: Abdomen is soft.   Musculoskeletal:         General: Normal range of motion.      Cervical back: Normal range of motion and neck supple.   Skin:     General: Skin is warm and dry.      Capillary Refill: Capillary refill takes less than 2 seconds.   Neurological:      General: No focal deficit present.      Mental Status: He is alert and oriented to person, place, and time.   Psychiatric:         Mood and Affect: Mood normal.         Results Review     I reviewed the patient's new clinical results.  Results from last 7 days    Lab Units 04/04/21  0404 04/03/21  0402 04/02/21  0415 04/01/21  1336 04/01/21  1212   WBC 10*3/mm3 12.10* 13.13* 13.87*  --  9.92   HEMOGLOBIN g/dL 11.3* 12.5* 12.9*  --  14.3   HEMOGLOBIN, POC g/dL  --   --   --  13.6  --    PLATELETS 10*3/mm3 263 257 367  --  313     Results from last 7 days   Lab Units 04/04/21  0404 04/03/21  0402 04/02/21  0415 04/01/21  1233 04/01/21  1212   SODIUM mmol/L 137 142 137  --  141   POTASSIUM mmol/L 4.0 3.9 4.3  --  2.9*   CHLORIDE mmol/L 97* 101 102  --  99   CO2 mmol/L 28.9 29.4* 22.8  --  18.7*   BUN mg/dL 27* 22 24*  --  15   CREATININE mg/dL 0.72* 0.88 1.25 1.00 1.03   GLUCOSE mg/dL 119* 105* 134*  --  316*   Estimated Creatinine Clearance: 113.2 mL/min (A) (by C-G formula based on SCr of 0.72 mg/dL (L)).  Results from last 7 days   Lab Units 04/04/21 0404 04/02/21 0415 04/01/21  1212   ALBUMIN g/dL 3.90 3.90 4.20   BILIRUBIN mg/dL 0.9 0.3 0.2   ALK PHOS U/L 61 67 81   AST (SGOT) U/L 74* 107* 53*   ALT (SGPT) U/L 46* 59* 43*     Results from last 7 days   Lab Units 04/04/21  0404 04/03/21  0402 04/02/21  0415 04/01/21  1212   CALCIUM mg/dL 9.6 9.3 8.8 9.7   ALBUMIN g/dL 3.90  --  3.90 4.20   MAGNESIUM mg/dL  --   --  2.4  --    PHOSPHORUS mg/dL  --   --  2.9  --      Results from last 7 days   Lab Units 04/03/21  1203 04/02/21  0415   PROCALCITONIN ng/mL 2.13* 7.44*   LACTATE mmol/L  --  1.6     COVID19   Date Value Ref Range Status   04/01/2021 Not Detected Not Detected - Ref. Range Final     Hemoglobin A1C   Date/Time Value Ref Range Status   04/02/2021 0415 5.90 (H) 4.80 - 5.60 % Final     Glucose   Date/Time Value Ref Range Status   04/04/2021 1031 120 70 - 130 mg/dL Final   04/04/2021 0558 121 70 - 130 mg/dL Final   04/03/2021 2318 122 70 - 130 mg/dL Final   04/03/2021 1932 145 (H) 70 - 130 mg/dL Final   04/03/2021 1800 146 (H) 70 - 130 mg/dL Final   04/03/2021 1140 125 70 - 130 mg/dL Final   04/03/2021 0729 122 70 - 130 mg/dL Final       XR Chest 1 View  Narrative:  SINGLE VIEW OF THE CHEST     HISTORY: Respiratory failure     COMPARISON: 04/02/2021     FINDINGS:  Cardiomegaly is present. Endotracheal tube has been removed. There are  bilateral alveolar and interstitial infiltrates. These do not appear  significantly changed when compared to prior exam. No pneumothorax or  pleural effusion is seen.     Impression: No significant interval change in bilateral alveolar and interstitial  infiltrates.     This report was finalized on 4/3/2021 3:37 AM by Dr. Judi Chow M.D.       Scheduled Medications  aspirin, 325 mg, Oral, Daily  budesonide-formoterol, 2 puff, Inhalation, BID - RT  enoxaparin, 40 mg, Subcutaneous, Q24H  insulin regular, 0-7 Units, Subcutaneous, Q6H  metoprolol tartrate, 25 mg, Oral, Q12H  nicotine, 1 patch, Transdermal, Q24H  piperacillin-tazobactam, 3.375 g, Intravenous, Q8H  potassium chloride, 10 mEq, Intravenous, Once  sodium chloride, 10 mL, Intravenous, Q12H  tiotropium bromide monohydrate, 2 puff, Inhalation, Daily - RT  [START ON 4/5/2021] vancomycin, 1,000 mg, Intravenous, On Call    Infusions  Pharmacy to Dose Zosyn,     Diet  NPO Diet  Diet Regular       Assessment/Plan     Active Hospital Problems    Diagnosis  POA   • Cardiac arrest (CMS/[facility]) [I46.9]  Yes      Resolved Hospital Problems   No resolved problems to display.       61 y.o. male admitted with <principal problem not specified>.    Transferred to the floor out of the ICU 4/3/2021.  Status post VF arrest.  Multivessel coronary artery disease.  Echocardiogram with ejection fraction less than 40%.  On medical management at present.  Pulmonary edema resolved currently off diuretic therapy.  Cardiology increased his metoprolol tartrate 5 mg twice daily was given extra 12.5 mg this morning.  Bronchodilator therapy has been decreased secondary to tachycardia.  Maintaining adequate saturations on 2 to 3 L of oxygen.  Chest x-ray 04/03/2021 continues to show bilateral infiltrates continue  current antibiotics vancomycin and Zosyn for presumed aspiration pneumonia.      · Lovenox 40 mg SC daily for DVT prophylaxis.  · Full code.  · Discussed with patient.  · Anticipate discharge home timing yet to be determined.      Luis Flowers MD  [facility]  04/04/21  13:13 EDT

## 2021-04-05 PROBLEM — R74.01 TRANSAMINITIS: Status: ACTIVE | Noted: 2021-04-05

## 2021-04-05 PROBLEM — Z72.0 TOBACCO ABUSE: Status: ACTIVE | Noted: 2021-04-05

## 2021-04-05 PROBLEM — I48.92 ATRIAL FLUTTER (HCC): Status: ACTIVE | Noted: 2021-04-05

## 2021-04-05 PROBLEM — R79.89 AZOTEMIA: Status: ACTIVE | Noted: 2021-04-05

## 2021-04-05 PROBLEM — Z22.322 MRSA NASAL COLONIZATION: Status: ACTIVE | Noted: 2021-04-05

## 2021-04-05 PROBLEM — I25.5 ISCHEMIC CARDIOMYOPATHY: Status: ACTIVE | Noted: 2021-04-05

## 2021-04-05 PROBLEM — D64.9 ANEMIA: Status: ACTIVE | Noted: 2021-04-05

## 2021-04-05 PROBLEM — E66.9 OBESITY (BMI 30-39.9): Status: ACTIVE | Noted: 2021-04-05

## 2021-04-05 PROBLEM — J44.9 COPD (CHRONIC OBSTRUCTIVE PULMONARY DISEASE) (HCC): Status: ACTIVE | Noted: 2021-04-05

## 2021-04-05 LAB
ALBUMIN SERPL-MCNC: 3.7 G/DL (ref 3.5–5.2)
ALBUMIN/GLOB SERPL: 1.2 G/DL
ALP SERPL-CCNC: 62 U/L (ref 39–117)
ALT SERPL W P-5'-P-CCNC: 47 U/L (ref 1–41)
ANION GAP SERPL CALCULATED.3IONS-SCNC: 10.1 MMOL/L (ref 5–15)
AST SERPL-CCNC: 54 U/L (ref 1–40)
BACTERIA SPEC RESP CULT: NORMAL
BASOPHILS # BLD AUTO: 0.04 10*3/MM3 (ref 0–0.2)
BASOPHILS NFR BLD AUTO: 0.5 % (ref 0–1.5)
BILIRUB SERPL-MCNC: 0.5 MG/DL (ref 0–1.2)
BUN SERPL-MCNC: 26 MG/DL (ref 8–23)
BUN/CREAT SERPL: 44.1 (ref 7–25)
CALCIUM SPEC-SCNC: 9.8 MG/DL (ref 8.6–10.5)
CHLORIDE SERPL-SCNC: 100 MMOL/L (ref 98–107)
CO2 SERPL-SCNC: 26.9 MMOL/L (ref 22–29)
CREAT SERPL-MCNC: 0.59 MG/DL (ref 0.76–1.27)
DEPRECATED RDW RBC AUTO: 41.9 FL (ref 37–54)
EOSINOPHIL # BLD AUTO: 0.17 10*3/MM3 (ref 0–0.4)
EOSINOPHIL NFR BLD AUTO: 2.1 % (ref 0.3–6.2)
ERYTHROCYTE [DISTWIDTH] IN BLOOD BY AUTOMATED COUNT: 13.3 % (ref 12.3–15.4)
GFR SERPL CREATININE-BSD FRML MDRD: 139 ML/MIN/1.73
GLOBULIN UR ELPH-MCNC: 3.1 GM/DL
GLUCOSE SERPL-MCNC: 99 MG/DL (ref 65–99)
GRAM STN SPEC: NORMAL
HCT VFR BLD AUTO: 35 % (ref 37.5–51)
HGB BLD-MCNC: 11.6 G/DL (ref 13–17.7)
IMM GRANULOCYTES # BLD AUTO: 0.03 10*3/MM3 (ref 0–0.05)
IMM GRANULOCYTES NFR BLD AUTO: 0.4 % (ref 0–0.5)
LYMPHOCYTES # BLD AUTO: 1.64 10*3/MM3 (ref 0.7–3.1)
LYMPHOCYTES NFR BLD AUTO: 19.8 % (ref 19.6–45.3)
MCH RBC QN AUTO: 29 PG (ref 26.6–33)
MCHC RBC AUTO-ENTMCNC: 33.1 G/DL (ref 31.5–35.7)
MCV RBC AUTO: 87.5 FL (ref 79–97)
MONOCYTES # BLD AUTO: 0.89 10*3/MM3 (ref 0.1–0.9)
MONOCYTES NFR BLD AUTO: 10.7 % (ref 5–12)
NEUTROPHILS NFR BLD AUTO: 5.52 10*3/MM3 (ref 1.7–7)
NEUTROPHILS NFR BLD AUTO: 66.5 % (ref 42.7–76)
NRBC BLD AUTO-RTO: 0 /100 WBC (ref 0–0.2)
PLATELET # BLD AUTO: 260 10*3/MM3 (ref 140–450)
PMV BLD AUTO: 10.3 FL (ref 6–12)
POTASSIUM SERPL-SCNC: 4.5 MMOL/L (ref 3.5–5.2)
PROT SERPL-MCNC: 6.8 G/DL (ref 6–8.5)
QT INTERVAL: 303 MS
RBC # BLD AUTO: 4 10*6/MM3 (ref 4.14–5.8)
SODIUM SERPL-SCNC: 137 MMOL/L (ref 136–145)
WBC # BLD AUTO: 8.29 10*3/MM3 (ref 3.4–10.8)

## 2021-04-05 PROCEDURE — 94799 UNLISTED PULMONARY SVC/PX: CPT

## 2021-04-05 PROCEDURE — 97535 SELF CARE MNGMENT TRAINING: CPT

## 2021-04-05 PROCEDURE — 99232 SBSQ HOSP IP/OBS MODERATE 35: CPT | Performed by: INTERNAL MEDICINE

## 2021-04-05 PROCEDURE — 94640 AIRWAY INHALATION TREATMENT: CPT

## 2021-04-05 PROCEDURE — 25010000002 ENOXAPARIN PER 10 MG: Performed by: INTERNAL MEDICINE

## 2021-04-05 PROCEDURE — 25010000002 FUROSEMIDE PER 20 MG: Performed by: INTERNAL MEDICINE

## 2021-04-05 PROCEDURE — 99232 SBSQ HOSP IP/OBS MODERATE 35: CPT | Performed by: NURSE PRACTITIONER

## 2021-04-05 PROCEDURE — 80053 COMPREHEN METABOLIC PANEL: CPT | Performed by: INTERNAL MEDICINE

## 2021-04-05 PROCEDURE — 97110 THERAPEUTIC EXERCISES: CPT

## 2021-04-05 PROCEDURE — 85025 COMPLETE CBC W/AUTO DIFF WBC: CPT | Performed by: INTERNAL MEDICINE

## 2021-04-05 RX ORDER — LISINOPRIL 5 MG/1
5 TABLET ORAL
Status: DISCONTINUED | OUTPATIENT
Start: 2021-04-05 | End: 2021-04-08 | Stop reason: HOSPADM

## 2021-04-05 RX ORDER — ASPIRIN 81 MG/1
81 TABLET ORAL DAILY
Status: DISCONTINUED | OUTPATIENT
Start: 2021-04-05 | End: 2021-04-08 | Stop reason: HOSPADM

## 2021-04-05 RX ORDER — METOPROLOL SUCCINATE 25 MG/1
25 TABLET, EXTENDED RELEASE ORAL
Status: DISCONTINUED | OUTPATIENT
Start: 2021-04-05 | End: 2021-04-05

## 2021-04-05 RX ORDER — METOPROLOL TARTRATE 50 MG/1
50 TABLET, FILM COATED ORAL EVERY 12 HOURS SCHEDULED
Status: DISCONTINUED | OUTPATIENT
Start: 2021-04-05 | End: 2021-04-05

## 2021-04-05 RX ORDER — FUROSEMIDE 10 MG/ML
40 INJECTION INTRAMUSCULAR; INTRAVENOUS EVERY 12 HOURS
Status: DISCONTINUED | OUTPATIENT
Start: 2021-04-05 | End: 2021-04-06

## 2021-04-05 RX ORDER — METOPROLOL SUCCINATE 50 MG/1
50 TABLET, EXTENDED RELEASE ORAL EVERY 12 HOURS SCHEDULED
Status: DISCONTINUED | OUTPATIENT
Start: 2021-04-05 | End: 2021-04-05

## 2021-04-05 RX ORDER — METOPROLOL SUCCINATE 100 MG/1
100 TABLET, EXTENDED RELEASE ORAL EVERY 12 HOURS SCHEDULED
Status: DISCONTINUED | OUTPATIENT
Start: 2021-04-05 | End: 2021-04-08 | Stop reason: HOSPADM

## 2021-04-05 RX ADMIN — TIOTROPIUM BROMIDE INHALATION SPRAY 2 PUFF: 3.12 SPRAY, METERED RESPIRATORY (INHALATION) at 08:47

## 2021-04-05 RX ADMIN — SODIUM CHLORIDE, PRESERVATIVE FREE 10 ML: 5 INJECTION INTRAVENOUS at 21:56

## 2021-04-05 RX ADMIN — HYDROCODONE BITARTRATE AND ACETAMINOPHEN 1 TABLET: 5; 325 TABLET ORAL at 08:49

## 2021-04-05 RX ADMIN — LISINOPRIL 5 MG: 5 TABLET ORAL at 12:00

## 2021-04-05 RX ADMIN — METOPROLOL SUCCINATE 100 MG: 100 TABLET, EXTENDED RELEASE ORAL at 21:45

## 2021-04-05 RX ADMIN — ENOXAPARIN SODIUM 40 MG: 100 INJECTION SUBCUTANEOUS at 21:46

## 2021-04-05 RX ADMIN — METOPROLOL TARTRATE 25 MG: 25 TABLET, FILM COATED ORAL at 16:43

## 2021-04-05 RX ADMIN — AMOXICILLIN AND CLAVULANATE POTASSIUM 1 TABLET: 875; 125 TABLET, FILM COATED ORAL at 21:45

## 2021-04-05 RX ADMIN — AMOXICILLIN AND CLAVULANATE POTASSIUM 1 TABLET: 875; 125 TABLET, FILM COATED ORAL at 08:47

## 2021-04-05 RX ADMIN — BUDESONIDE AND FORMOTEROL FUMARATE DIHYDRATE 2 PUFF: 160; 4.5 AEROSOL RESPIRATORY (INHALATION) at 08:47

## 2021-04-05 RX ADMIN — HYDROCODONE BITARTRATE AND ACETAMINOPHEN 1 TABLET: 5; 325 TABLET ORAL at 16:50

## 2021-04-05 RX ADMIN — BUDESONIDE AND FORMOTEROL FUMARATE DIHYDRATE 2 PUFF: 160; 4.5 AEROSOL RESPIRATORY (INHALATION) at 21:06

## 2021-04-05 RX ADMIN — ASPIRIN 81 MG: 81 TABLET, COATED ORAL at 12:04

## 2021-04-05 RX ADMIN — FUROSEMIDE 40 MG: 10 INJECTION, SOLUTION INTRAMUSCULAR; INTRAVENOUS at 12:01

## 2021-04-05 RX ADMIN — Medication 1 PATCH: at 08:47

## 2021-04-05 RX ADMIN — FUROSEMIDE 40 MG: 10 INJECTION, SOLUTION INTRAMUSCULAR; INTRAVENOUS at 21:46

## 2021-04-05 RX ADMIN — METOPROLOL SUCCINATE 50 MG: 50 TABLET, EXTENDED RELEASE ORAL at 12:01

## 2021-04-05 NOTE — PLAN OF CARE
Goal Outcome Evaluation:  Plan of Care Reviewed With: (P) patient  Progress: (P) improving  Outcome Summary: (P) pt agreeable to PT. No c/o pain prior to PT. pt ambulated 100 ft CGA x1 with no LOB or SOA. pt had loss of memory and did not remember PT working with him over the weekend. STS was CGA x1. pt was able to stand for 4 minutes with no LOB or SOA. Verbal cues to reach back for the chair when sitting for safety. pt is improving and PT will continue to follow up.  Patient was intermittently wearing a face mask during this therapy encounter. Therapist used appropriate personal protective equipment including eye protection, mask, and gloves.  Mask used was standard procedure mask. Appropriate PPE was worn during the entire therapy session. Hand hygiene was completed before and after therapy session. Patient is not in enhanced droplet precautions.

## 2021-04-05 NOTE — PROGRESS NOTES
[facility] INPATIENT PROGRESS NOTE         [address] CVI    2021      PATIENT IDENTIFICATION:  Name: Sebastien Tang ADMIT: 2021   : 1960  PCP: Provider, No Known    MRN: [id number] LOS: 4 days   AGE/SEX: 61 y.o. male  ROOM: 6/1                     LOS 4    Reason for visit: Resuscitated V. fib arrest with septic shock      SUBJECTIVE:      Calm and cooperative with me today.  Denies productive cough, chest pain of a cardiac nature or wheezing.  Complains of chest wall discomfort from chest compressions still.  I am seeing the patient for the first time today.  All patient problems are new to me.      Objective   OBJECTIVE:    Vital Sign Min/Max for last 24 hours  Temp  Min: 97.5 °F (36.4 °C)  Max: 98.9 °F (37.2 °C)   BP  Min: 115/75  Max: 143/89   Pulse  Min: 89  Max: 125   Resp  Min: 18  Max: 20   SpO2  Min: 93 %  Max: 96 %   No data recorded   Weight  Min: 96.8 kg (213 lb 4.8 oz)  Max: 96.8 kg (213 lb 4.8 oz)                   FiO2 (%): 36 %     Body mass index is 34.43 kg/m².    Intake/Output Summary (Last 24 hours) at 2021 1132  Last data filed at 2021 0619  Gross per 24 hour   Intake 120 ml   Output 550 ml   Net -430 ml         Exam:  GEN:  No distress, appears stated age  EYES:   PERRL, anicteric sclera  ENT:    External ears/nose normal, OP clear  NECK:  No adenopathy, midline trachea  LUNGS: Normal chest on inspectionpalpation and diminished breath sounds on auscultation  CV:  Normal S1S2, without murmur  ABD:  Non tender, non distended, no hepatosplenomegaly, +BS  EXT:  No edema, cyanosis or clubbing    Assessment     Scheduled meds:  amoxicillin-clavulanate, 1 tablet, Oral, Q12H  aspirin, 81 mg, Oral, Daily  budesonide-formoterol, 2 puff, Inhalation, BID - RT  enoxaparin, 40 mg, Subcutaneous, Nightly  furosemide, 40 mg, Intravenous, Q12H  lisinopril, 5 mg, Oral, Q24H  metoprolol succinate XL, 50 mg, Oral, Q12H  nicotine, 1 patch, Transdermal, Q24H  potassium  chloride, 10 mEq, Intravenous, Once  sodium chloride, 10 mL, Intravenous, Q12H  tiotropium bromide monohydrate, 2 puff, Inhalation, Daily - RT  vancomycin, 1,000 mg, Intravenous, On Call      IV meds:                         Data Review:  Results from last 7 days   Lab Units 04/05/21 0410 04/04/21  0404 04/03/21  0402 04/02/21  0415 04/01/21  1233 04/01/21  1212   SODIUM mmol/L 137 137 142 137  --  141   POTASSIUM mmol/L 4.5 4.0 3.9 4.3  --  2.9*   CHLORIDE mmol/L 100 97* 101 102  --  99   CO2 mmol/L 26.9 28.9 29.4* 22.8  --  18.7*   BUN mg/dL 26* 27* 22 24*  --  15   CREATININE mg/dL 0.59* 0.72* 0.88 1.25 1.00 1.03   GLUCOSE mg/dL 99 119* 105* 134*  --  316*   CALCIUM mg/dL 9.8 9.6 9.3 8.8  --  9.7         Estimated Creatinine Clearance: 139.6 mL/min (A) (by C-G formula based on SCr of 0.59 mg/dL (L)).  Results from last 7 days   Lab Units 04/05/21 0410 04/04/21 0404 04/03/21  0402 04/02/21  0415 04/01/21  1336 04/01/21  1212   WBC 10*3/mm3 8.29 12.10* 13.13* 13.87*  --  9.92   HEMOGLOBIN g/dL 11.6* 11.3* 12.5* 12.9*  --  14.3   HEMOGLOBIN, POC g/dL  --   --   --   --  13.6  --    PLATELETS 10*3/mm3 260 263 257 367  --  313     Results from last 7 days   Lab Units 04/01/21  1212   INR  1.17*     Results from last 7 days   Lab Units 04/05/21  0410 04/04/21  0404 04/02/21  0415 04/01/21  1212   ALT (SGPT) U/L 47* 46* 59* 43*   AST (SGOT) U/L 54* 74* 107* 53*     Results from last 7 days   Lab Units 04/01/21  1531 04/01/21  1222   PH, ARTERIAL pH units 7.331* 7.064*   PO2 ART mm Hg 120.2* 59.6*   PCO2, ARTERIAL mm Hg 46.2* 46.6*   HCO3 ART mmol/L 24.4 13.3*     Results from last 7 days   Lab Units 04/03/21  1203 04/02/21  0415   PROCALCITONIN ng/mL 2.13* 7.44*   LACTATE mmol/L  --  1.6         Chest x-ray/3/21 reviewed        Microbiology reviewed  )        Active Hospital Problems    Diagnosis  POA   • Cardiac arrest (CMS/HCC) [I46.9]  Yes   • Ventricular fibrillation (CMS/HCC) [I49.01]  Unknown      Resolved  Hospital Problems   No resolved problems to display.         ASSESSMENT:    Resuscitated V. fib arrest  Sepsis with shock requiring pressors: Improved  Cardiogenic shock  Ischemic cardiomyopathy/CAD  Acute non-ST elevation MI  Acute hypoxemic respiratory failure  Pulmonary edema  COPD and emphysema  Metabolic and respiratory acidosis: Improved  Diverticulosis without diverticulitis  Hypokalemia: Improved  Transaminitis with passive hepatic congestion  Hematuria  BPH          PLAN:    Encourage pulmonary toilet.  Bronchodilators for COPD symptoms as needed.  Would benefit from pulmonary function testing as an outpatient for further evaluation after discharge.  Completing antibiotic course.  Cardiology plans for ICD in the near future.  Diuretics per cardiology orders.        Moe Azevedo MD  Pulmonary and Critical Care Medicine  [facility], [facility]  4/5/2021    11:32 EDT

## 2021-04-05 NOTE — PROGRESS NOTES
"    Patient Name: Sebastien Tang  :1960  61 y.o.      Patient Care Team:  Provider, No Known as PCP - General    Chief Complaint: VF    Interval History:    for an unclear reason was started on diltizaem overnight. Sinus tach. Some memory issues.     Objective   Vital Signs  Temp:  [97.5 °F (36.4 °C)-98.9 °F (37.2 °C)] 97.5 °F (36.4 °C)  Heart Rate:  [] 105  Resp:  [18-20] 20  BP: (115-143)/(62-95) 119/95    Intake/Output Summary (Last 24 hours) at 2021 0829  Last data filed at 2021 0619  Gross per 24 hour   Intake 360 ml   Output 550 ml   Net -190 ml     Flowsheet Rows      First Filed Value   Admission Height  167.6 cm (66\") Documented at 20216   Admission Weight  96.9 kg (213 lb 10 oz) Documented at 2021 1437          Physical Exam:   General Appearance:    Alert, cooperative, in no acute distress   Lungs:   Faint rales b/l    Heart:    Regular rhythm and normal rate, normal S1 and S2, no murmurs, gallops or rubs.     Chest Wall:    No abnormalities observed   Abdomen:     Soft, nontender, positive bowel sounds.     Extremities:   no cyanosis, clubbing or edema.  No marked joint deformities.  Adequate musculoskeletal strength.       Results Review:    Results from last 7 days   Lab Units 21  0410   SODIUM mmol/L 137   POTASSIUM mmol/L 4.5   CHLORIDE mmol/L 100   CO2 mmol/L 26.9   BUN mg/dL 26*   CREATININE mg/dL 0.59*   GLUCOSE mg/dL 99   CALCIUM mg/dL 9.8     Results from last 7 days   Lab Units 21  0415 21  1530 21  1212   TROPONIN T ng/mL 1.190* 1.050* 0.012     Results from last 7 days   Lab Units 21  0410   WBC 10*3/mm3 8.29   HEMOGLOBIN g/dL 11.6*   HEMATOCRIT % 35.0*   PLATELETS 10*3/mm3 260     Results from last 7 days   Lab Units 21  1212   INR  1.17*   APTT seconds 39.7*     Results from last 7 days   Lab Units 21  0415   MAGNESIUM mg/dL 2.4     Results from last 7 days   Lab Units 21  0415   CHOLESTEROL mg/dL 182 "   TRIGLYCERIDES mg/dL 293*   HDL CHOL mg/dL 28*   LDL CHOL mg/dL 104*               Medication Review:   amoxicillin-clavulanate, 1 tablet, Oral, Q12H  aspirin, 325 mg, Oral, Daily  budesonide-formoterol, 2 puff, Inhalation, BID - RT  enoxaparin, 1 mg/kg, Subcutaneous, Q12H  lisinopril, 5 mg, Oral, Q24H  metoprolol succinate XL, 25 mg, Oral, Q24H  nicotine, 1 patch, Transdermal, Q24H  potassium chloride, 10 mEq, Intravenous, Once  sodium chloride, 10 mL, Intravenous, Q12H  tiotropium bromide monohydrate, 2 puff, Inhalation, Daily - RT  vancomycin, 1,000 mg, Intravenous, On Call              Assessment/Plan   1. VF arrest  2. CAD, CTA RCA and OM  3. Ischemic CM, EF in the 30s  4. Resp failure    - ICD at some point this week. Hopefully mentation will continue to clear.   - sinus tachycardia likely related to hypoxia, CHF. Will give some lasix today, 40 IV BID. Stop dilt. No AF or flutter noted.   - Change metoprolol to succinate, add ACE for CHF  - ASA 81 is sufficient. No need for full dose lovenox.     Anna Puga MD  [facility]  04/05/21  08:29 EDT

## 2021-04-05 NOTE — THERAPY TREATMENT NOTE
Patient Name: Sebastien Tang  : 1960    MRN: [id number]                              Today's Date: 2021       Admit Date: 2021    Visit Dx:     ICD-10-CM ICD-9-CM   1. Cardiac arrest (CMS/HCC)  I46.9 427.5   2. Abnormal EKG  R94.31 794.31   3. Abnormal CXR  R93.89 793.2   4. Hypokalemia  E87.6 276.8   5. Hyperglycemia  R73.9 790.29   6. Ventricular fibrillation (CMS/HCC)  I49.01 427.41   7. Decreased mobility and endurance  Z74.09 780.99     Patient Active Problem List   Diagnosis   • Cardiac arrest (CMS/HCC)   • Ventricular fibrillation (CMS/HCC)     No past medical history on file.  Past Surgical History:   Procedure Laterality Date   • CARDIAC CATHETERIZATION Left 2021    Procedure: Coronary Angiography;  Surgeon: Anna Puga MD;  Location: [facility] CATH INVASIVE LOCATION;  Service: Cardiology;  Laterality: Left;   • CARDIAC CATHETERIZATION N/A 2021    Procedure: Left ventriculography;  Surgeon: Anna Puga MD;  Location: [facility] CATH INVASIVE LOCATION;  Service: Cardiology;  Laterality: N/A;   • CARDIAC CATHETERIZATION N/A 2021    Procedure: Left Heart Cath;  Surgeon: Anna Puga MD;  Location: [facility] CATH INVASIVE LOCATION;  Service: Cardiology;  Laterality: N/A;     General Information     Row Name 21 1409          Physical Therapy Time and Intention    Document Type  therapy note (daily note)  (Pended)   -ME     Mode of Treatment  physical therapy;individual therapy  (Pended)   -ME     Row Name 21 1409          General Information    Existing Precautions/Restrictions  fall  (Pended)   -ME       User Key  (r) = Recorded By, (t) = Taken By, (c) = Cosigned By    Initials Name Provider Type    ME Sang Chinchilla, PT Student PT Student        Mobility     Row Name 21 1410          Sit-Stand Transfer    Sit-Stand [place] (Transfers)  contact guard;1 person assist  (Pended)   -ME     Row Name 21 1410          Gait/Stairs (Locomotion)    [place]  Level (Gait)  contact guard;1 person assist  (Pended)   -ME     Distance in Feet (Gait)  100 ft  (Pended)   -ME     Deviations/Abnormal Patterns (Gait)  cinthia decreased;stride length decreased;gait speed decreased  (Pended)   -ME       User Key  (r) = Recorded By, (t) = Taken By, (c) = Cosigned By    Initials Name Provider Type    ME Sang Chinchilla, PT Student PT Student        Obj/Interventions     [facility] Name 04/05/21 1411          Motor Skills    Therapeutic Exercise  --  (Pended)  APs and LAQs  -ME     Row Name 04/05/21 1411          Balance    Static Standing Balance  WFL  (Pended)   -ME     Dynamic Standing Balance  WFL  (Pended)   -ME       User Key  (r) = Recorded By, (t) = Taken By, (c) = Cosigned By    Initials Name Provider Type    ME Sang Chinchilla, PT Student PT Student        Goals/Plan    No documentation.       Clinical Impression     Row Name 04/05/21 1412          Pain    Additional Documentation  Pain Scale: Numbers Pre/Post-Treatment (Group)  (Pended)   -ME     Row Name 04/05/21 1412          Pain Scale: Numbers Pre/Post-Treatment    Pretreatment Pain Rating  0/10 - no pain  (Pended)   -ME     Posttreatment Pain Rating  0/10 - no pain  (Pended)   -ME     Row Name 04/05/21 1412          Plan of Care Review    Plan of Care Reviewed With  patient  (Pended)   -ME     Progress  improving  (Pended)   -ME     Outcome Summary  pt agreeable to PT. No c/o pain prior to PT. pt ambulated 100 ft CGA x1 with no LOB or SOA. pt had loss of memory and did not remember PT working with him over the weekend. STS was CGA x1. pt was able to stand for 4 minutes with no LOB or SOA. Verbal cues to reach back for the chair when sitting for safety. pt is improving and PT will continue to follow up.  (Pended)   -ME     Row Name 04/05/21 1412          Vital Signs    Pre Systolic BP Rehab  127  (Pended)   -ME     Pre Treatment Diastolic BP  72  (Pended)   -ME     Pretreatment Heart Rate (beats/min)  136  (Pended)   -ME      Posttreatment Heart Rate (beats/min)  139  (Pended)   -ME     Pre SpO2 (%)  --  (Pended)  O2 sat ine was out of the monitor  -ME     O2 Delivery Pre Treatment  supplemental O2  (Pended)  4 L  -ME     Post SpO2 (%)  94  (Pended)   -ME     O2 Delivery Post Treatment  supplemental O2  (Pended)   -ME     Row Name 04/05/21 1412          Positioning and Restraints    Pre-Treatment Position  sitting in chair/recliner  (Pended)   -ME     Post Treatment Position  chair  (Pended)   -ME     In Chair  reclined;call light within reach  (Pended)   -ME       User Key  (r) = Recorded By, (t) = Taken By, (c) = Cosigned By    Initials Name Provider Type    ME Sang Chinchilla, PT Student PT Student        Outcome Measures     Row Name 04/05/21 1417          How much help from another person do you currently need...    Turning from your back to your side while in flat bed without using bedrails?  3  (Pended)   -ME     Moving from lying on back to sitting on the side of a flat bed without bedrails?  3  (Pended)   -ME     Moving to and from a bed to a chair (including a wheelchair)?  3  (Pended)   -ME     Standing up from a chair using your arms (e.g., wheelchair, bedside chair)?  3  (Pended)   -ME     Climbing 3-5 steps with a railing?  3  (Pended)   -ME     To walk in hospital room?  3  (Pended)   -ME     AM-PAC 6 Clicks Score (PT)  18  (Pended)   -ME     Row Name 04/05/21 1417          Functional Assessment    Outcome Measure Options  AM-PAC 6 Clicks Basic Mobility (PT)  (Pended)   -ME       User Key  (r) = Recorded By, (t) = Taken By, (c) = Cosigned By    Initials Name Provider Type    Sang Thomas, PT Student PT Student        Physical Therapy Education                 Title: PT OT SLP Therapies (In Progress)     Topic: Physical Therapy (In Progress)     Point: Mobility training (Done)     Learning Progress Summary           Patient Acceptance, E, VU by ME at 4/5/2021 1417    Acceptance, E,D, NR by  at 4/3/2021  1455                   Point: Home exercise program (In Progress)     Learning Progress Summary           Patient Acceptance, E,D, NR by  at 4/3/2021 1455                   Point: Body mechanics (In Progress)     Learning Progress Summary           Patient Acceptance, E,D, NR by  at 4/3/2021 1455                   Point: Precautions (In Progress)     Learning Progress Summary           Patient Acceptance, E,D, NR by  at 4/3/2021 1455                               User Key     Initials Effective Dates Name Provider Type [facility] 04/03/18 -  Abdoulaye Luna, PT Physical Therapist PT    ME 03/12/21 -  Sang Chinchilla, PT Student PT Student PT              PT Recommendation and Plan     Plan of Care Reviewed With: (P) patient  Progress: (P) improving  Outcome Summary: (P) pt agreeable to PT. No c/o pain prior to PT. pt ambulated 100 ft CGA x1 with no LOB or SOA. pt had loss of memory and did not remember PT working with him over the weekend. STS was CGA x1. pt was able to stand for 4 minutes with no LOB or SOA. Verbal cues to reach back for the chair when sitting for safety. pt is improving and PT will continue to follow up.     Time Calculation:   PT Charges     Row Name 04/05/21 1418             Time Calculation    Start Time  1352  (Pended)   -ME      Stop Time  1406  (Pended)   -ME      Time Calculation (min)  14 min  (Pended)   -ME      PT Received On  04/05/21  (Pended)   -ME      PT - Next Appointment  04/06/21  (Pended)   -ME         Time Calculation- PT    Total Timed Code Minutes- PT  14 minute(s)  (Pended)   -ME        User Key  (r) = Recorded By, (t) = Taken By, (c) = Cosigned By    Initials Name Provider Type    ME Sang Chinchilla, PT Student PT Student        Therapy Charges for Today     Code Description Service Date Service Provider Modifiers Qty    72770048950 HC PT THER PROC EA 15 MIN 4/5/2021 Sang Chinchilla, PT Student GP 1          PT G-Codes  Outcome Measure Options: (P)  AM-PAC 6 Clicks Basic Mobility (PT)  AM-PAC 6 Clicks Score (PT): (P) 18  AM-[facility] 6 Clicks Score (OT): 18    SCOOBY MAGAÑA, PT Student  4/5/2021

## 2021-04-05 NOTE — THERAPY TREATMENT NOTE
Patient Name: Sebastien Tnag  : 1960    MRN: [id number]                              Today's Date: 2021       Admit Date: 2021    Visit Dx:     ICD-10-CM ICD-9-CM   1. Cardiac arrest (CMS/HCC)  I46.9 427.5   2. Abnormal EKG  R94.31 794.31   3. Abnormal CXR  R93.89 793.2   4. Hypokalemia  E87.6 276.8   5. Hyperglycemia  R73.9 790.29   6. Ventricular fibrillation (CMS/HCC)  I49.01 427.41   7. Decreased mobility and endurance  Z74.09 780.99     Patient Active Problem List   Diagnosis   • Cardiac arrest (CMS/HCC)   • Ventricular fibrillation (CMS/HCC)   • Atrial flutter (CMS/HCC)   • Tobacco abuse   • Azotemia   • COPD (chronic obstructive pulmonary disease) (CMS/HCC)   • MRSA nasal colonization   • Transaminitis   • Obesity (BMI 30-39.9)   • Ischemic cardiomyopathy   • Anemia     No past medical history on file.  Past Surgical History:   Procedure Laterality Date   • CARDIAC CATHETERIZATION Left 2021    Procedure: Coronary Angiography;  Surgeon: Anna Puga MD;  Location:  [facility] CATH INVASIVE LOCATION;  Service: Cardiology;  Laterality: Left;   • CARDIAC CATHETERIZATION N/A 2021    Procedure: Left ventriculography;  Surgeon: Anna Puga MD;  Location:  [facility] CATH INVASIVE LOCATION;  Service: Cardiology;  Laterality: N/A;   • CARDIAC CATHETERIZATION N/A 2021    Procedure: Left Heart Cath;  Surgeon: Anna Puga MD;  Location: [facility] CATH INVASIVE LOCATION;  Service: Cardiology;  Laterality: N/A;     General Information     Row Name 21 1509          OT Time and Intention    Document Type  therapy note (daily note)  -AF     Mode of Treatment  occupational therapy;individual therapy  -AF     Row Name 21 1509          General Information    Existing Precautions/Restrictions  fall  -AF     Barriers to Rehab  cognitive status  -AF     Row Name 21 1509          Cognition    Orientation Status (Cognition)  oriented to;person;place  -AF     Row Name 21 1502          Safety  Issues, Functional Mobility    Safety Issues Affecting Function (Mobility)  insight into deficits/self-awareness;safety precaution awareness  -AF     Impairments Affecting Function (Mobility)  cognition;endurance/activity tolerance  -AF     Comment, Safety Issues/Impairments (Mobility)  demos improved STM during OT session with familiar activites  -AF       User Key  (r) = Recorded By, (t) = Taken By, (c) = Cosigned By    Initials Name Provider Type    AF Sweetie Bull, OTR Occupational Therapist          Mobility/ADL's     Row Name 04/05/21 1510          Bed Mobility    Comment (Bed Mobility)  sitting up in recliner chair  -AF     Row Name 04/05/21 1510          Transfers    Transfers  toilet transfer  -AF     Sit-Stand [place] (Transfers)  contact guard  -AF     [place] Level (Toilet Transfer)  -- stood to use commode did hold on to grab bar for support CGS/SBA  -AF     Row Name 04/05/21 1510          Functional Mobility    Functional Mobility- Comment  walked in room without AD handheld from therapist CGA  -AF     Row Name 04/05/21 1510          Activities of Daily Living    BADL Assessment/Intervention  toileting;grooming  -AF     Row Name 04/05/21 1510          Toileting Assessment/Training    [place] Level (Toileting)  toileting skills;standby assist;contact guard assist  -AF     Row Name 04/05/21 1510          Grooming Assessment/Training    [place] Level (Grooming)  grooming skills;standby assist;contact guard assist  -AF     Position (Grooming)  unsupported standing  -AF       User Key  (r) = Recorded By, (t) = Taken By, (c) = Cosigned By    Initials Name Provider Type    AF Sweetie Bull, OTR Occupational Therapist        Obj/Interventions     Row Name 04/05/21 1512          Motor Skills    Motor Skills  functional endurance  -AF     Functional Endurance  stood at sink to complete grooming tasks for 6 mins stated that he felt tired at the end  -AF     Row Name 04/05/21 1512           Balance    Static Sitting Balance  WFL  -AF     Static Standing Balance  WFL  -AF       User Key  (r) = Recorded By, (t) = Taken By, (c) = Cosigned By    Initials Name Provider Type    Sweetie Wilson OTR Occupational Therapist        Goals/Plan    No documentation.       Clinical Impression     Row Name 04/05/21 1512          Pain Scale: Numbers Pre/Post-Treatment    Pretreatment Pain Rating  0/10 - no pain  -AF     Posttreatment Pain Rating  0/10 - no pain  -AF     Row Name 04/05/21 1512          Vital Signs    O2 Delivery Pre Treatment  supplemental O2  -AF     O2 Delivery Intra Treatment  supplemental O2  -AF     O2 Delivery Post Treatment  supplemental O2  -AF     Row Name 04/05/21 1512          Positioning and Restraints    Pre-Treatment Position  sitting in chair/recliner  -AF     Post Treatment Position  chair  -AF     In Chair  call light within reach;sitting;encouraged to call for assist  -AF       User Key  (r) = Recorded By, (t) = Taken By, (c) = Cosigned By    Initials Name Provider Type    Sweetie Wilson OTR Occupational Therapist        Outcome Measures     Row Name 04/05/21 1513          How much help from another is currently needed...    Putting on and taking off regular lower body clothing?  3  -AF     Bathing (including washing, rinsing, and drying)  3  -AF     Toileting (which includes using toilet bed pan or urinal)  3  -AF     Putting on and taking off regular upper body clothing  3  -AF     Taking care of personal grooming (such as brushing teeth)  3  -AF     Eating meals  4  -AF     AM-PAC 6 Clicks Score (OT)  19  -AF       User Key  (r) = Recorded By, (t) = Taken By, (c) = Cosigned By    Initials Name Provider Type    Sweetie Wilson OTR Occupational Therapist        Occupational Therapy Education                 Title: PT OT SLP Therapies (In Progress)     Topic: Occupational Therapy (In Progress)     Point: ADL training (In Progress)     Description:   Instruct learner(s) on  proper safety adaptation and remediation techniques during self care or transfers.   Instruct in proper use of assistive devices.              Learning Progress Summary           Patient Acceptance, E, NR by VS at 4/2/2021 1506    Comment: OT disussed POC and Goals with the pt.                   Point: Precautions (In Progress)     Description:   Instruct learner(s) on prescribed precautions during self-care and functional transfers.              Learning Progress Summary           Patient Acceptance, E, NR by VS at 4/2/2021 1506    Comment: OT disussed POC and Goals with the pt.                               User Key     Initials Effective Dates Name Provider Type Discipline    VS 03/02/20 -  Violtea Orantes OTR Occupational Therapist OT              OT Recommendation and Plan           Time Calculation:   Time Calculation- OT     Row Name 04/05/21 1513 04/05/21 1437          Time Calculation- OT    OT Start Time  1426  -AF  --     OT Stop Time  1440  -AF  --     OT Time Calculation (min)  14 min  -AF  --     Total Timed Code Minutes- OT  14 minute(s)  -AF  --     OT - Next Appointment  04/06/21  -AF  04/06/21  -CC       User Key  (r) = Recorded By, (t) = Taken By, (c) = Cosigned By    Initials Name Provider Type    CC Rosa Levin OTR Occupational Therapist    AF Sweetie Bull OTBART Occupational Therapist        Therapy Charges for Today     Code Description Service Date Service Provider Modifiers Qty    11831841222 HC OT SELF CARE/MGMT/TRAIN EA 15 MIN 4/5/2021 Sweetie Bull OTBART GO 1               HARITHA Garnett  4/5/2021

## 2021-04-05 NOTE — PLAN OF CARE
Goal Outcome Evaluation:     Pt participated in OT session with CGA with mobility, CGA/SBA with toileting and standing grooming tasks.  Decreased endurance noted and memory deficits. Pt will continue to benefit from skilled OT services prior to d/c.       Patient was not wearing a face mask during this therapy encounter. Therapist used appropriate personal protective equipment including mask, gloves, and eye protection.  Mask used was standard procedure mask. Appropriate PPE was worn during the entire therapy session. Hand hygiene was completed before and after therapy session. Patient is not in enhanced droplet precautions.

## 2021-04-06 PROBLEM — K59.00 CONSTIPATION: Status: ACTIVE | Noted: 2021-04-06

## 2021-04-06 LAB
ALBUMIN SERPL-MCNC: 3.7 G/DL (ref 3.5–5.2)
ALBUMIN/GLOB SERPL: 1.2 G/DL
ALP SERPL-CCNC: 70 U/L (ref 39–117)
ALT SERPL W P-5'-P-CCNC: 59 U/L (ref 1–41)
ANION GAP SERPL CALCULATED.3IONS-SCNC: 12.7 MMOL/L (ref 5–15)
AST SERPL-CCNC: 55 U/L (ref 1–40)
BASOPHILS # BLD AUTO: 0.07 10*3/MM3 (ref 0–0.2)
BASOPHILS NFR BLD AUTO: 0.9 % (ref 0–1.5)
BILIRUB SERPL-MCNC: 0.4 MG/DL (ref 0–1.2)
BUN SERPL-MCNC: 30 MG/DL (ref 8–23)
BUN/CREAT SERPL: 39.5 (ref 7–25)
CALCIUM SPEC-SCNC: 9.9 MG/DL (ref 8.6–10.5)
CHLORIDE SERPL-SCNC: 97 MMOL/L (ref 98–107)
CO2 SERPL-SCNC: 29.3 MMOL/L (ref 22–29)
CREAT SERPL-MCNC: 0.76 MG/DL (ref 0.76–1.27)
DEPRECATED RDW RBC AUTO: 42.3 FL (ref 37–54)
EOSINOPHIL # BLD AUTO: 0.23 10*3/MM3 (ref 0–0.4)
EOSINOPHIL NFR BLD AUTO: 2.9 % (ref 0.3–6.2)
ERYTHROCYTE [DISTWIDTH] IN BLOOD BY AUTOMATED COUNT: 13.2 % (ref 12.3–15.4)
FERRITIN SERPL-MCNC: 103 NG/ML (ref 30–400)
GFR SERPL CREATININE-BSD FRML MDRD: 104 ML/MIN/1.73
GLOBULIN UR ELPH-MCNC: 3.1 GM/DL
GLUCOSE SERPL-MCNC: 104 MG/DL (ref 65–99)
HCT VFR BLD AUTO: 34.7 % (ref 37.5–51)
HGB BLD-MCNC: 11.6 G/DL (ref 13–17.7)
IMM GRANULOCYTES # BLD AUTO: 0.05 10*3/MM3 (ref 0–0.05)
IMM GRANULOCYTES NFR BLD AUTO: 0.6 % (ref 0–0.5)
IRON 24H UR-MRATE: 31 MCG/DL (ref 59–158)
IRON SATN MFR SERPL: 7 % (ref 20–50)
LYMPHOCYTES # BLD AUTO: 1.16 10*3/MM3 (ref 0.7–3.1)
LYMPHOCYTES NFR BLD AUTO: 14.4 % (ref 19.6–45.3)
MCH RBC QN AUTO: 29.4 PG (ref 26.6–33)
MCHC RBC AUTO-ENTMCNC: 33.4 G/DL (ref 31.5–35.7)
MCV RBC AUTO: 88.1 FL (ref 79–97)
MONOCYTES # BLD AUTO: 0.95 10*3/MM3 (ref 0.1–0.9)
MONOCYTES NFR BLD AUTO: 11.8 % (ref 5–12)
NEUTROPHILS NFR BLD AUTO: 5.58 10*3/MM3 (ref 1.7–7)
NEUTROPHILS NFR BLD AUTO: 69.4 % (ref 42.7–76)
NRBC BLD AUTO-RTO: 0 /100 WBC (ref 0–0.2)
PLATELET # BLD AUTO: 289 10*3/MM3 (ref 140–450)
PMV BLD AUTO: 10.5 FL (ref 6–12)
POTASSIUM SERPL-SCNC: 3.9 MMOL/L (ref 3.5–5.2)
PROT SERPL-MCNC: 6.8 G/DL (ref 6–8.5)
QT INTERVAL: 331 MS
RBC # BLD AUTO: 3.94 10*6/MM3 (ref 4.14–5.8)
SODIUM SERPL-SCNC: 139 MMOL/L (ref 136–145)
TIBC SERPL-MCNC: 434 MCG/DL (ref 298–536)
TRANSFERRIN SERPL-MCNC: 291 MG/DL (ref 200–360)
WBC # BLD AUTO: 8.04 10*3/MM3 (ref 3.4–10.8)

## 2021-04-06 PROCEDURE — 84466 ASSAY OF TRANSFERRIN: CPT | Performed by: NURSE PRACTITIONER

## 2021-04-06 PROCEDURE — 97110 THERAPEUTIC EXERCISES: CPT

## 2021-04-06 PROCEDURE — 25010000002 FUROSEMIDE PER 20 MG: Performed by: INTERNAL MEDICINE

## 2021-04-06 PROCEDURE — 99232 SBSQ HOSP IP/OBS MODERATE 35: CPT | Performed by: NURSE PRACTITIONER

## 2021-04-06 PROCEDURE — 94799 UNLISTED PULMONARY SVC/PX: CPT

## 2021-04-06 PROCEDURE — 97535 SELF CARE MNGMENT TRAINING: CPT

## 2021-04-06 PROCEDURE — 83540 ASSAY OF IRON: CPT | Performed by: NURSE PRACTITIONER

## 2021-04-06 PROCEDURE — 25010000002 ENOXAPARIN PER 10 MG: Performed by: INTERNAL MEDICINE

## 2021-04-06 PROCEDURE — 93010 ELECTROCARDIOGRAM REPORT: CPT | Performed by: INTERNAL MEDICINE

## 2021-04-06 PROCEDURE — 80053 COMPREHEN METABOLIC PANEL: CPT | Performed by: INTERNAL MEDICINE

## 2021-04-06 PROCEDURE — 25010000002 DIGOXIN PER 500 MCG: Performed by: NURSE PRACTITIONER

## 2021-04-06 PROCEDURE — 85025 COMPLETE CBC W/AUTO DIFF WBC: CPT | Performed by: INTERNAL MEDICINE

## 2021-04-06 PROCEDURE — 82728 ASSAY OF FERRITIN: CPT | Performed by: NURSE PRACTITIONER

## 2021-04-06 PROCEDURE — 25010000002 IRON SUCROSE PER 1 MG: Performed by: NURSE PRACTITIONER

## 2021-04-06 PROCEDURE — 93005 ELECTROCARDIOGRAM TRACING: CPT | Performed by: NURSE PRACTITIONER

## 2021-04-06 RX ORDER — DIGOXIN 0.25 MG/ML
250 INJECTION INTRAMUSCULAR; INTRAVENOUS ONCE
Status: COMPLETED | OUTPATIENT
Start: 2021-04-06 | End: 2021-04-06

## 2021-04-06 RX ORDER — VANCOMYCIN HYDROCHLORIDE 1 G/200ML
1000 INJECTION, SOLUTION INTRAVENOUS
Status: DISCONTINUED | OUTPATIENT
Start: 2021-04-07 | End: 2021-04-07

## 2021-04-06 RX ORDER — DOCUSATE SODIUM 100 MG/1
100 CAPSULE, LIQUID FILLED ORAL 2 TIMES DAILY
Status: DISCONTINUED | OUTPATIENT
Start: 2021-04-06 | End: 2021-04-08 | Stop reason: HOSPADM

## 2021-04-06 RX ORDER — IPRATROPIUM BROMIDE AND ALBUTEROL SULFATE 2.5; .5 MG/3ML; MG/3ML
3 SOLUTION RESPIRATORY (INHALATION)
Status: DISCONTINUED | OUTPATIENT
Start: 2021-04-06 | End: 2021-04-08 | Stop reason: HOSPADM

## 2021-04-06 RX ORDER — DIGOXIN 0.25 MG/ML
500 INJECTION INTRAMUSCULAR; INTRAVENOUS ONCE
Status: COMPLETED | OUTPATIENT
Start: 2021-04-06 | End: 2021-04-06

## 2021-04-06 RX ORDER — FUROSEMIDE 20 MG/1
20 TABLET ORAL DAILY
Status: DISCONTINUED | OUTPATIENT
Start: 2021-04-07 | End: 2021-04-07

## 2021-04-06 RX ORDER — AMOXICILLIN 250 MG
1 CAPSULE ORAL 2 TIMES DAILY
Status: DISCONTINUED | OUTPATIENT
Start: 2021-04-06 | End: 2021-04-08 | Stop reason: HOSPADM

## 2021-04-06 RX ADMIN — DOCUSATE SODIUM 50MG AND SENNOSIDES 8.6MG 1 TABLET: 8.6; 5 TABLET, FILM COATED ORAL at 22:06

## 2021-04-06 RX ADMIN — TIOTROPIUM BROMIDE INHALATION SPRAY 2 PUFF: 3.12 SPRAY, METERED RESPIRATORY (INHALATION) at 08:22

## 2021-04-06 RX ADMIN — AMOXICILLIN AND CLAVULANATE POTASSIUM 1 TABLET: 875; 125 TABLET, FILM COATED ORAL at 08:54

## 2021-04-06 RX ADMIN — ASPIRIN 81 MG: 81 TABLET, COATED ORAL at 08:53

## 2021-04-06 RX ADMIN — HYDROCODONE BITARTRATE AND ACETAMINOPHEN 1 TABLET: 5; 325 TABLET ORAL at 02:15

## 2021-04-06 RX ADMIN — DOCUSATE SODIUM 100 MG: 100 CAPSULE, LIQUID FILLED ORAL at 22:03

## 2021-04-06 RX ADMIN — IRON SUCROSE 200 MG: 20 INJECTION, SOLUTION INTRAVENOUS at 16:59

## 2021-04-06 RX ADMIN — BUDESONIDE AND FORMOTEROL FUMARATE DIHYDRATE 2 PUFF: 160; 4.5 AEROSOL RESPIRATORY (INHALATION) at 08:23

## 2021-04-06 RX ADMIN — METOPROLOL SUCCINATE 100 MG: 100 TABLET, EXTENDED RELEASE ORAL at 08:53

## 2021-04-06 RX ADMIN — HYDROCODONE BITARTRATE AND ACETAMINOPHEN 1 TABLET: 5; 325 TABLET ORAL at 15:13

## 2021-04-06 RX ADMIN — Medication 1 PATCH: at 08:54

## 2021-04-06 RX ADMIN — ENOXAPARIN SODIUM 40 MG: 100 INJECTION SUBCUTANEOUS at 22:02

## 2021-04-06 RX ADMIN — AMOXICILLIN AND CLAVULANATE POTASSIUM 1 TABLET: 875; 125 TABLET, FILM COATED ORAL at 22:05

## 2021-04-06 RX ADMIN — DIGOXIN 500 MCG: 0.25 INJECTION INTRAMUSCULAR; INTRAVENOUS at 15:16

## 2021-04-06 RX ADMIN — LISINOPRIL 5 MG: 5 TABLET ORAL at 08:53

## 2021-04-06 RX ADMIN — FUROSEMIDE 40 MG: 10 INJECTION, SOLUTION INTRAMUSCULAR; INTRAVENOUS at 08:54

## 2021-04-06 RX ADMIN — DIGOXIN 250 MCG: 0.25 INJECTION INTRAMUSCULAR; INTRAVENOUS at 22:03

## 2021-04-06 RX ADMIN — IPRATROPIUM BROMIDE AND ALBUTEROL SULFATE 3 ML: 2.5; .5 SOLUTION RESPIRATORY (INHALATION) at 16:04

## 2021-04-06 RX ADMIN — HYDROCODONE BITARTRATE AND ACETAMINOPHEN 2 TABLET: 5; 325 TABLET ORAL at 08:59

## 2021-04-06 RX ADMIN — BUDESONIDE AND FORMOTEROL FUMARATE DIHYDRATE 2 PUFF: 160; 4.5 AEROSOL RESPIRATORY (INHALATION) at 19:33

## 2021-04-06 RX ADMIN — METOPROLOL SUCCINATE 100 MG: 100 TABLET, EXTENDED RELEASE ORAL at 22:05

## 2021-04-06 NOTE — PROGRESS NOTES
Electrophysiology Follow-Up Note      Patient Name: Sebastien Tang  Age/Sex: 61 y.o. male  : 1960  MRN: [id number]      Day of Service: 21       Chief Complaint/Follow-up: Out of hospital arrest/VF, CAD>> medical therapy, Afib    S: Feeling okay, no chest pain or dyspnea, seems more clear on memory      Temp:  [97.8 °F (36.6 °C)-98.6 °F (37 °C)] 98.1 °F (36.7 °C)  Heart Rate:  [] 117  Resp:  [18-20] 18  BP: ()/(63-88) 110/70     PHYSICAL EXAM:    General Appearance: No acute distress, well developed and well nourished.   Eyes: Conjunctiva and lids: No erythema, swelling, or discharge. Sclera non-icteric.   HENT: Atraumatic, normocephalic. External eyes, ears, and nose normal.   Respiratory: No signs of respiratory distress. Respiration rhythm and depth normal.   Clear to auscultation. No rales, crackles, rhonchi, or wheezing auscultated.   Cardiovascular:  Heart Rate and Rhythm: Irregularly, irregular.  Heart Sounds: S1 and S2.  Murmurs: No murmurs noted. No rubs, thrills, or gallops.   Arterial Pulses: Posterior tibialis and dorsalis pedis pulses normal.   Lower Extremities: No edema noted.  Gastrointestinal:  Abdomen soft, non-distended, non-tender.  Musculoskeletal: Normal movement of extremities  Skin: Warm and dry.   Psychiatric: Patient alert and oriented to person, place, and time. Speech and behavior appropriate. Normal mood and affect.       ECG/TELE:           Results from last 7 days   Lab Units 21  0444 21  0410 21  0404   SODIUM mmol/L 139 137 137   POTASSIUM mmol/L 3.9 4.5 4.0   CHLORIDE mmol/L 97* 100 97*   CO2 mmol/L 29.3* 26.9 28.9   BUN mg/dL 30* 26* 27*   CREATININE mg/dL 0.76 0.59* 0.72*   GLUCOSE mg/dL 104* 99 119*   CALCIUM mg/dL 9.9 9.8 9.6     Results from last 7 days   Lab Units 21  0444 21  0410 21  0404   WBC 10*3/mm3 8.04 8.29 12.10*   HEMOGLOBIN g/dL 11.6* 11.6* 11.3*   HEMATOCRIT % 34.7* 35.0* 34.4*   PLATELETS  10*3/mm3 289 260 263     Results from last 7 days   Lab Units 04/01/21  1212   INR  1.17*     Results from last 7 days   Lab Units 04/02/21  0415 04/01/21  1530 04/01/21  1212   TROPONIN T ng/mL 1.190* 1.050* 0.012     Results from last 7 days   Lab Units 04/02/21  0415   TSH uIU/mL 1.690           Current Medications:   Scheduled Meds:amoxicillin-clavulanate, 1 tablet, Oral, Q12H  aspirin, 81 mg, Oral, Daily  budesonide-formoterol, 2 puff, Inhalation, BID - RT  enoxaparin, 40 mg, Subcutaneous, Nightly  furosemide, 40 mg, Intravenous, Q12H  lisinopril, 5 mg, Oral, Q24H  metoprolol succinate XL, 100 mg, Oral, Q12H  nicotine, 1 patch, Transdermal, Q24H  potassium chloride, 10 mEq, Intravenous, Once  sodium chloride, 10 mL, Intravenous, Q12H  tiotropium bromide monohydrate, 2 puff, Inhalation, Daily - RT            Cardiac arrest (CMS/HCC)    Ventricular fibrillation (CMS/HCC)    Atrial flutter (CMS/HCC)    Tobacco abuse    Azotemia    COPD (chronic obstructive pulmonary disease) (CMS/HCC)    MRSA nasal colonization    Transaminitis    Obesity (BMI 30-39.9)    Ischemic cardiomyopathy    Anemia       Plan:     Dr. Rosas and I saw patient this morning. He seems more clear as far a memory, he was on 4 liters oxygen--RN said she had it weaned down yesterday so must have been turned up during night---his sats were 97% so she will try to wean again.     She getting IV diuretics, will defer to primary cardiology.     He is have some afib, HR was up some yesterday afternoon- 113 this am---increased metoprolol and give some digoxin.     Will tentatively add on for ICD tomorrow.     ERIC Anderson  04/06/21  09:50 EDT

## 2021-04-06 NOTE — THERAPY TREATMENT NOTE
Acute Care - Occupational Therapy Treatment Note  [facility]     Patient Name: Sebastien Tang  : 1960  MRN: [id number]  Today's Date: 2021  Onset of Illness/Injury or Date of Surgery: 21     Referring Physician: Checo    Admit Date: 2021       ICD-10-CM ICD-9-CM   1. Cardiac arrest (CMS/HCC)  I46.9 427.5   2. Abnormal EKG  R94.31 794.31   3. Abnormal CXR  R93.89 793.2   4. Hypokalemia  E87.6 276.8   5. Hyperglycemia  R73.9 790.29   6. Ventricular fibrillation (CMS/HCC)  I49.01 427.41   7. Decreased mobility and endurance  Z74.09 780.99     Patient Active Problem List   Diagnosis   • Cardiac arrest (CMS/HCC)   • Ventricular fibrillation (CMS/HCC)   • Atrial flutter (CMS/HCC)   • Tobacco abuse   • Azotemia   • COPD (chronic obstructive pulmonary disease) (CMS/[facility])   • MRSA nasal colonization   • Transaminitis   • Obesity (BMI 30-39.9)   • Ischemic cardiomyopathy   • Anemia     No past medical history on file.  Past Surgical History:   Procedure Laterality Date   • CARDIAC CATHETERIZATION Left 2021    Procedure: Coronary Angiography;  Surgeon: Anna Puga MD;  Location: [facility] CATH INVASIVE LOCATION;  Service: Cardiology;  Laterality: Left;   • CARDIAC CATHETERIZATION N/A 2021    Procedure: Left ventriculography;  Surgeon: Anna Puga MD;  Location: [facility] INVASIVE LOCATION;  Service: Cardiology;  Laterality: N/A;   • CARDIAC CATHETERIZATION N/A 2021    Procedure: Left Heart Cath;  Surgeon: Anna Puga MD;  Location: [facility] CATH INVASIVE LOCATION;  Service: Cardiology;  Laterality: N/A;            OT ASSESSMENT FLOWSHEET (last 12 hours)      OT Evaluation and Treatment     Row Name 21 1200                   OT Time and Intention    Document Type  therapy note (daily note)  -MR        Mode of Treatment  occupational therapy  -MR           General Information    Patient Profile Reviewed  yes  -MR        Patient/Family/Caregiver Comments/Observations  pt seated in  chair, no acute distress  -MR        Existing Precautions/Restrictions  fall;cardiac  -MR           Cognition    Orientation Status (Cognition)  oriented to;person;place;situation  -MR        Follows Commands (Cognition)  follows one-step commands  -MR        Cognitive Function (Cognitive)  memory deficit  -MR           Pain Scale: Numbers Pre/Post-Treatment    Pretreatment Pain Rating  0/10 - no pain  -MR           Bed Mobility    Comment (Bed Mobility)  up in chair  -MR           Functional Mobility    Functional Mobility- Ind. Level  supervision required  -MR        Functional Mobility- Comment  pt performs functional mobility from the chair to/from bathroom, no assistive device  -MR           Transfer Assessment/Treatment    Transfers  toilet transfer  -MR           Transfers    Hallock Level (Toilet Transfer)  standby assist  -MR        Assistive Device (Toilet Transfer)  grab bars/safety frame  -MR           Toilet Transfer    Type (Toilet Transfer)  sit-stand;stand-sit  -MR           Safety Issues, Functional Mobility    Safety Issues Affecting Function (Mobility)  insight into deficits/self-awareness;safety precaution awareness  -MR        Impairments Affecting Function (Mobility)  cognition;endurance/activity tolerance  -MR           Activities of Daily Living    BADL Assessment/Intervention  grooming;toileting  -MR           Grooming Assessment/Training    Hallock Level (Grooming)  grooming skills;standby assist  -MR        Position (Grooming)  sink side;unsupported standing  -MR           Toileting Assessment/Training    Hallock Level (Toileting)  toileting skills;standby assist  -MR           Plan of Care Review    Plan of Care Reviewed With  patient  -MR        Outcome Summary  Pt seen for OT session this am, is able to perform functional mobility to the bathroom and complete toileting and grooming tasks while standing at the sink with SBA. OT provided education on role of therapy as pt  with questions regarding this. Continue OT to ensure safety and independence with performance of ADLs.  -MR           Positioning and Restraints    Pre-Treatment Position  sitting in chair/recliner  -MR        Post Treatment Position  chair  -MR        In Chair  sitting;notified nsg;call light within reach;encouraged to call for assist  -MR          User Key  (r) = Recorded By, (t) = Taken By, (c) = Cosigned By    Initials Name Effective Dates    MR Gely De Santiago, OT 06/22/16 -          Occupational Therapy Education                 Title: PT OT SLP Therapies (In Progress)     Topic: Occupational Therapy (In Progress)     Point: ADL training (Done)     Description:   Instruct learner(s) on proper safety adaptation and remediation techniques during self care or transfers.   Instruct in proper use of assistive devices.              Learning Progress Summary           Patient Acceptance, E,TB, VU,NR by MR at 4/6/2021 1216    Comment: OT provided education on role of occupational therapy in acute care, difference between occupational and physical therapy, as pt with questions regarding this.    Acceptance, E, NR by VS at 4/2/2021 1506    Comment: OT disussed POC and Goals with the pt.                   Point: Precautions (In Progress)     Description:   Instruct learner(s) on prescribed precautions during self-care and functional transfers.              Learning Progress Summary           Patient Acceptance, E, NR by VS at 4/2/2021 1506    Comment: OT disussed POC and Goals with the pt.                               User Key     Initials Effective Dates Name Provider Type Discipline    VS 03/02/20 -  Violeta Orantes OTR Occupational Therapist OT    MR 06/22/16 -  Gely De Santiago, OT Occupational Therapist OT                  OT Recommendation and Plan     Plan of Care Review  Plan of Care Reviewed With: patient  Outcome Summary: Pt seen for OT session this am, is able to perform functional mobility to the  bathroom and complete toileting and grooming tasks while standing at the sink with SBA. OT provided education on role of therapy as pt with questions regarding this. Continue OT to ensure safety and independence with performance of ADLs.  Plan of Care Reviewed With: patient  Outcome Summary: Pt seen for OT session this am, is able to perform functional mobility to the bathroom and complete toileting and grooming tasks while standing at the sink with SBA. OT provided education on role of therapy as pt with questions regarding this. Continue OT to ensure safety and independence with performance of ADLs.    Outcome Measures     Row Name 04/06/21 1200 04/03/21 1458          How much help from another person do you currently need...    Turning from your back to your side while in flat bed without using bedrails?  --  3  -JR     Moving from lying on back to sitting on the side of a flat bed without bedrails?  --  3  -JR     Moving to and from a bed to a chair (including a wheelchair)?  --  3  -JR     Standing up from a chair using your arms (e.g., wheelchair, bedside chair)?  --  3  -JR     Climbing 3-5 steps with a railing?  --  3  -JR     To walk in hospital room?  --  3  -JR     AM-PAC 6 Clicks Score (PT)  --  18  -JR        How much help from another is currently needed...    Putting on and taking off regular lower body clothing?  3  -MR  --     Bathing (including washing, rinsing, and drying)  3  -MR  --     Toileting (which includes using toilet bed pan or urinal)  3  -MR  --     Putting on and taking off regular upper body clothing  3  -MR  --     Taking care of personal grooming (such as brushing teeth)  3  -MR  --     Eating meals  4  -MR  --     AM-PAC 6 Clicks Score (OT)  19  -MR  --       User Key  (r) = Recorded By, (t) = Taken By, (c) = Cosigned By    Initials Name Provider Type    Gely Carlisle, OT Occupational Therapist    Abdoulaye Olivarez, PT Physical Therapist          Time Calculation:   Time  Calculation- OT     Row Name 04/06/21 1218             Time Calculation- OT    OT Start Time  0824  -MR      OT Stop Time  0853  -MR      OT Time Calculation (min)  29 min  -MR      Total Timed Code Minutes- OT  29 minute(s)  -MR      OT Received On  04/06/21  -MR      OT - Next Appointment  04/07/21  -MR        User Key  (r) = Recorded By, (t) = Taken By, (c) = Cosigned By    Initials Name Provider Type     Gely De Santiago, OT Occupational Therapist        Therapy Charges for Today     Code Description Service Date Service Provider Modifiers Qty    [id number]  OT SELF CARE/MGMT/TRAIN EA 15 MIN 4/6/2021 Gely De Santiago OT GO 2               Gely De Santiago OT  4/6/2021

## 2021-04-06 NOTE — PLAN OF CARE
Goal Outcome Evaluation:  Plan of Care Reviewed With: patient     Outcome Summary: Pt seen for OT session this am, is able to perform functional mobility to the bathroom and complete toileting and grooming tasks while standing at the sink with SBA. OT provided education on role of therapy as pt with questions regarding this. Continue OT to ensure safety and independence with performance of ADLs. Therapist used appropriate personal protective equipment including mask, gloves, and eye protection.  Mask used was standard procedure mask. Appropriate PPE was worn during the entire therapy session. Hand hygiene was completed before and after therapy session. Patient is not in enhanced droplet precautions.

## 2021-04-06 NOTE — PROGRESS NOTES
[facility] INPATIENT PROGRESS NOTE         [address] [facility]I    2021      PATIENT IDENTIFICATION:  Name: Sebastien Tang ADMIT: 2021   : 1960  PCP: Provider, No Known    MRN: [id number] LOS: 5 days   AGE/SEX: 61 y.o. male  ROOM: 220/1                     LOS 5    Reason for visit: Resuscitated V. fib arrest with septic shock      SUBJECTIVE:      No new complaints.      Objective   OBJECTIVE:    Vital Sign Min/Max for last 24 hours  Temp  Min: 97.8 °F (36.6 °C)  Max: 98.6 °F (37 °C)   BP  Min: 90/71  Max: 122/88   Pulse  Min: 94  Max: 125   Resp  Min: 18  Max: 20   SpO2  Min: 92 %  Max: 97 %   No data recorded   Weight  Min: 95.9 kg (211 lb 8 oz)  Max: 95.9 kg (211 lb 8 oz)                   FiO2 (%): 36 %     Body mass index is 34.14 kg/m².    Intake/Output Summary (Last 24 hours) at 2021 1142  Last data filed at 2021 0326  Gross per 24 hour   Intake --   Output 2450 ml   Net -2450 ml         Exam:  GEN:  No distress, appears stated age  EYES:   PERRL, anicteric sclera  ENT:    External ears/nose normal, OP clear  NECK:  No adenopathy, midline trachea  LUNGS: Normal chest on inspectionpalpation and diminished breath sounds on auscultation  CV:  Normal S1S2, without murmur  ABD:  Non tender, non distended, no hepatosplenomegaly, +BS  EXT:  No edema, cyanosis or clubbing    Assessment     Scheduled meds:  amoxicillin-clavulanate, 1 tablet, Oral, Q12H  aspirin, 81 mg, Oral, Daily  budesonide-formoterol, 2 puff, Inhalation, BID - RT  enoxaparin, 40 mg, Subcutaneous, Nightly  [START ON 2021] furosemide, 20 mg, Oral, Daily  lisinopril, 5 mg, Oral, Q24H  metoprolol succinate XL, 100 mg, Oral, Q12H  nicotine, 1 patch, Transdermal, Q24H  potassium chloride, 10 mEq, Intravenous, Once  sodium chloride, 10 mL, Intravenous, Q12H  tiotropium bromide monohydrate, 2 puff, Inhalation, Daily - RT  [START ON 2021] vancomycin, 1,000 mg, Intravenous, On Call      IV meds:                          Data Review:  Results from last 7 days   Lab Units 04/06/21  0444 04/05/21  0410 04/04/21  0404 04/03/21  0402 04/02/21  0415   SODIUM mmol/L 139 137 137 142 137   POTASSIUM mmol/L 3.9 4.5 4.0 3.9 4.3   CHLORIDE mmol/L 97* 100 97* 101 102   CO2 mmol/L 29.3* 26.9 28.9 29.4* 22.8   BUN mg/dL 30* 26* 27* 22 24*   CREATININE mg/dL 0.76 0.59* 0.72* 0.88 1.25   GLUCOSE mg/dL 104* 99 119* 105* 134*   CALCIUM mg/dL 9.9 9.8 9.6 9.3 8.8         Estimated Creatinine Clearance: 107.8 mL/min (by C-G formula based on SCr of 0.76 mg/dL).  Results from last 7 days   Lab Units 04/06/21 0444 04/05/21  0410 04/04/21  0404 04/03/21  0402 04/02/21  0415   WBC 10*3/mm3 8.04 8.29 12.10* 13.13* 13.87*   HEMOGLOBIN g/dL 11.6* 11.6* 11.3* 12.5* 12.9*   PLATELETS 10*3/mm3 289 260 263 257 367     Results from last 7 days   Lab Units 04/01/21  1212   INR  1.17*     Results from last 7 days   Lab Units 04/06/21  0444 04/05/21  0410 04/04/21  0404 04/02/21  0415 04/01/21  1212   ALT (SGPT) U/L 59* 47* 46* 59* 43*   AST (SGOT) U/L 55* 54* 74* 107* 53*     Results from last 7 days   Lab Units 04/01/21  1531 04/01/21  1222   PH, ARTERIAL pH units 7.331* 7.064*   PO2 ART mm Hg 120.2* 59.6*   PCO2, ARTERIAL mm Hg 46.2* 46.6*   HCO3 ART mmol/L 24.4 13.3*     Results from last 7 days   Lab Units 04/03/21  1203 04/02/21  0415   PROCALCITONIN ng/mL 2.13* 7.44*   LACTATE mmol/L  --  1.6         Chest x-ray/3/21 reviewed        Microbiology reviewed  )        Active Hospital Problems    Diagnosis  POA   • **Cardiac arrest (CMS/HCC) [I46.9]  Yes   • Atrial flutter (CMS/HCC) [I48.92]  Unknown   • Tobacco abuse [Z72.0]  Yes   • Azotemia [R79.89]  Unknown   • COPD (chronic obstructive pulmonary disease) (CMS/HCC) [J44.9]  Yes   • MRSA nasal colonization [Z22.322]  Not Applicable   • Transaminitis [R74.01]  Yes   • Obesity (BMI 30-39.9) [E66.9]  Yes   • Ischemic cardiomyopathy [I25.5]  Yes   • Anemia [D64.9]  Unknown   • Ventricular fibrillation (CMS/HCC)  [I49.01]  Unknown      Resolved Hospital Problems   No resolved problems to display.         ASSESSMENT:    Resuscitated V. fib arrest  Sepsis with shock requiring pressors: Improved  Cardiogenic shock  Ischemic cardiomyopathy/CAD  Acute non-ST elevation MI  Acute hypoxemic respiratory failure  Pulmonary edema  COPD and emphysema  Metabolic and respiratory acidosis: Improved  Diverticulosis without diverticulitis  Hypokalemia: Improved  Transaminitis with passive hepatic congestion  Hematuria  BPH          PLAN:    Encourage pulmonary toilet.  Bronchodilators for COPD symptoms as needed.  Would benefit from pulmonary function testing as an outpatient for further evaluation after discharge.  Completing antibiotic course.  Cardiology plans for ICD in the near future.  Diuretics per cardiology orders.  Following peripherally.      Moe Azevedo MD  Pulmonary and Critical Care Medicine  [facility], [facility]  4/6/2021    11:42 EDT

## 2021-04-06 NOTE — THERAPY TREATMENT NOTE
Patient Name: Sebastien Tang  : 1960    MRN: [id number]                              Today's Date: 2021       Admit Date: 2021    Visit Dx:     ICD-10-CM ICD-9-CM   1. Cardiac arrest (CMS/HCC)  I46.9 427.5   2. Abnormal EKG  R94.31 794.31   3. Abnormal CXR  R93.89 793.2   4. Hypokalemia  E87.6 276.8   5. Hyperglycemia  R73.9 790.29   6. Ventricular fibrillation (CMS/HCC)  I49.01 427.41   7. Decreased mobility and endurance  Z74.09 780.99     Patient Active Problem List   Diagnosis   • Cardiac arrest (CMS/HCC)   • Ventricular fibrillation (CMS/HCC)   • Atrial flutter (CMS/HCC)   • Tobacco abuse   • Azotemia   • COPD (chronic obstructive pulmonary disease) (CMS/HCC)   • MRSA nasal colonization   • Transaminitis   • Obesity (BMI 30-39.9)   • Ischemic cardiomyopathy   • Anemia     No past medical history on file.  Past Surgical History:   Procedure Laterality Date   • CARDIAC CATHETERIZATION Left 2021    Procedure: Coronary Angiography;  Surgeon: Anna Puga MD;  Location: [facility] CATH INVASIVE LOCATION;  Service: Cardiology;  Laterality: Left;   • CARDIAC CATHETERIZATION N/A 2021    Procedure: Left ventriculography;  Surgeon: Anna Puga MD;  Location: [facility] CATH INVASIVE LOCATION;  Service: Cardiology;  Laterality: N/A;   • CARDIAC CATHETERIZATION N/A 2021    Procedure: Left Heart Cath;  Surgeon: Anna Puga MD;  Location: [facility] CATH INVASIVE LOCATION;  Service: Cardiology;  Laterality: N/A;     General Information     Row Name 21 1136          Physical Therapy Time and Intention    Document Type  therapy note (daily note)  -PC     Mode of Treatment  physical therapy  -PC     Row Name 21 1136          General Information    Existing Precautions/Restrictions  fall;cardiac  -PC       User Key  (r) = Recorded By, (t) = Taken By, (c) = Cosigned By    Initials Name Provider Type    PC Sophie Zaragoza PT Physical Therapist        Mobility     Row Name 21 1136           Bed Mobility    Comment (Bed Mobility)  sitting up in chair  -PC     Row Name 04/06/21 1136          Sit-Stand Transfer    Sit-Stand Dickens (Transfers)  supervision  -PC     Row Name 04/06/21 1136          Gait/Stairs (Locomotion)    Dickens Level (Gait)  supervision  -PC     Distance in Feet (Gait)  150 ft  -PC     Comment (Gait/Stairs)  no loss of balance, normal gait pattern  -PC       User Key  (r) = Recorded By, (t) = Taken By, (c) = Cosigned By    Initials Name Provider Type    PC Sophie Zaragoza, PT Physical Therapist        Obj/Interventions    No documentation.       Goals/Plan    No documentation.       Clinical Impression     Row Name 04/06/21 1138          Pain Scale: Numbers Pre/Post-Treatment    Pretreatment Pain Rating  0/10 - no pain  -PC     Row Name 04/06/21 1138          Plan of Care Review    Plan of Care Reviewed With  patient  -PC     Progress  improving  -PC     Outcome Summary  pt is doing well as far as mobility goes, able to walk 150 ft with supervision, plans noted for ICD placement tomorrow, PT will follow up post procedure to ensure gait stability  -PC     Row Name 04/06/21 1138          Vital Signs    Pre SpO2 (%)  91  -PC     O2 Delivery Pre Treatment  supplemental O2  -PC     Post SpO2 (%)  90  -PC     O2 Delivery Post Treatment  supplemental O2  -PC     Row Name 04/06/21 1138          Positioning and Restraints    Pre-Treatment Position  sitting in chair/recliner  -PC     Post Treatment Position  chair  -PC     In Chair  sitting;call light within reach;encouraged to call for assist  -PC       User Key  (r) = Recorded By, (t) = Taken By, (c) = Cosigned By    Initials Name Provider Type    PC Sophie Zaragoza, PT Physical Therapist        Outcome Measures     Row Name 04/06/21 1140          How much help from another person do you currently need...    Turning from your back to your side while in flat bed without using bedrails?  4  -PC     Moving from lying on back to sitting  on the side of a flat bed without bedrails?  4  -PC     Moving to and from a bed to a chair (including a wheelchair)?  4  -PC     Standing up from a chair using your arms (e.g., wheelchair, bedside chair)?  4  -PC     Climbing 3-5 steps with a railing?  3  -PC     To walk in hospital room?  4  -PC     AM-PAC 6 Clicks Score (PT)  23  -PC       User Key  (r) = Recorded By, (t) = Taken By, (c) = Cosigned By    Initials Name Provider Type    PC Sophie Zaragoza, PT Physical Therapist        Physical Therapy Education                 Title: PT OT SLP Therapies (In Progress)     Topic: Physical Therapy (Done)     Point: Mobility training (Done)     Learning Progress Summary           Patient Acceptance, E,D, DU by  at 4/6/2021 1140    Acceptance, E, VU by ME at 4/5/2021 1417    Acceptance, E,D, NR by  at 4/3/2021 1455                   Point: Home exercise program (Done)     Learning Progress Summary           Patient Acceptance, E,D, DU by  at 4/6/2021 1140    Acceptance, E,D, NR by  at 4/3/2021 1455                   Point: Body mechanics (Done)     Learning Progress Summary           Patient Acceptance, E,D, DU by  at 4/6/2021 1140    Acceptance, E,D, NR by  at 4/3/2021 1455                   Point: Precautions (Done)     Learning Progress Summary           Patient Acceptance, E,D, DU by  at 4/6/2021 1140    Acceptance, E,D, NR by  at 4/3/2021 1455                               User Key     Initials Effective Dates Name Provider Type Discipline     04/03/18 -  Sophie Zaragoza, PT Physical Therapist PT     04/03/18 -  Abdoulaye Luna, PT Physical Therapist PT    ME 03/12/21 -  Sang Chinchilla PT Student PT Student PT              PT Recommendation and Plan     Plan of Care Reviewed With: patient  Progress: improving  Outcome Summary: pt is doing well as far as mobility goes, able to walk 150 ft with supervision, plans noted for ICD placement tomorrow, PT will follow up post procedure to ensure  gait stability     Time Calculation:   PT Charges     Row Name 04/06/21 1141             Time Calculation    Start Time  1056  -PC      Stop Time  1109  -PC      Time Calculation (min)  13 min  -PC      PT Received On  04/06/21  -PC      PT - Next Appointment  04/07/21  -PC        User Key  (r) = Recorded By, (t) = Taken By, (c) = Cosigned By    Initials Name Provider Type    PC Sophie Zaragoza, PT Physical Therapist        Therapy Charges for Today     Code Description Service Date Service Provider Modifiers Qty    [id number] HC PT THER PROC EA 15 MIN 4/6/2021 Sophie Zaragoza PT GP 1          PT G-Codes  Outcome Measure Options: AM-PAC 6 Clicks Basic Mobility (PT)  AM-PAC 6 Clicks Score (PT): 23  AM-PAC 6 Clicks Score (OT): 19    Sophie Zaragoza PT  4/6/2021

## 2021-04-06 NOTE — PROGRESS NOTES
Hospital Follow Up    LOS:  LOS: 5 days   Patient Name: Sebastien Tang  Age/Sex: 61 y.o. male  : 1960  MRN: [id number]    Day of Service: 21   Length of Stay: 5  Encounter Provider: ERIC Higgins  Place of Service: [facility] CARDIOLOGY  Patient Care Team:  Provider, No Known as PCP - General    Subjective:     Chief Complaint: VF arrest, CAD, Afib, CM Ef 30%    Interval History: No complaints today.     Objective:     Objective:  Temp:  [97.8 °F (36.6 °C)-98.6 °F (37 °C)] 98.1 °F (36.7 °C)  Heart Rate:  [] 117  Resp:  [18-20] 18  BP: ()/(63-88) 110/70     Intake/Output Summary (Last 24 hours) at 2021 0946  Last data filed at 2021 0326  Gross per 24 hour   Intake --   Output 2750 ml   Net -2750 ml     Body mass index is 34.14 kg/m².      21  2302 21  0619 21  0642   Weight: 96.8 kg (213 lb 4.8 oz) 96.8 kg (213 lb 4.8 oz) 95.9 kg (211 lb 8 oz)     Weight change: -0.816 kg (-1 lb 12.8 oz)    Physical Exam:   General Appearance:    Awake alert and oriented in no acute distress.   Color:  Skin:  Neuro:  HEENT:    Lungs:     Pink  Warm and dry  No focal, motor or sensory deficits  Neck supple, pupils equal, round and reactive. No JVD, No Bruit  Clear to auscultation,respirations regular, even and                  unlabored    Heart:    Regular rate and rhythm, S1 and S2, no murmur, no gallop, no rub. No edema, DP/PT pulses are 2+. Right radial cath site stable. No hematoma   Chest Wall:    No abnormalities observed   Abdomen:     Normal bowel sounds, no masses, no organomegaly, soft        non-tender, non-distended, no guarding, no ascites noted   Extremities:   Moves all extremities well, no edema, no cyanosis, no redness       Lab Review:   Results from last 7 days   Lab Units 21  0444 21  0410   SODIUM mmol/L 139 137   POTASSIUM mmol/L 3.9 4.5   CHLORIDE mmol/L 97* 100   CO2 mmol/L 29.3* 26.9   BUN mg/dL 30* 26*    CREATININE mg/dL 0.76 0.59*   GLUCOSE mg/dL 104* 99   CALCIUM mg/dL 9.9 9.8   AST (SGOT) U/L 55* 54*   ALT (SGPT) U/L 59* 47*     Results from last 7 days   Lab Units 04/02/21  0415 04/01/21  1530 04/01/21  1212   TROPONIN T ng/mL 1.190* 1.050* 0.012     Results from last 7 days   Lab Units 04/06/21  0444 04/05/21  0410   WBC 10*3/mm3 8.04 8.29   HEMOGLOBIN g/dL 11.6* 11.6*   HEMATOCRIT % 34.7* 35.0*   PLATELETS 10*3/mm3 289 260     Results from last 7 days   Lab Units 04/01/21  1212   INR  1.17*   APTT seconds 39.7*     Results from last 7 days   Lab Units 04/02/21  0415   MAGNESIUM mg/dL 2.4     Results from last 7 days   Lab Units 04/02/21  0415   CHOLESTEROL mg/dL 182   TRIGLYCERIDES mg/dL 293*   HDL CHOL mg/dL 28*     Results from last 7 days   Lab Units 04/02/21  0415   PROBNP pg/mL 922.1*     Results from last 7 days   Lab Units 04/02/21  0415   TSH uIU/mL 1.690     I reviewed the patient's new clinical results.  I personally viewed and interpreted the patient's EKG  Current Medications:   Scheduled Meds:amoxicillin-clavulanate, 1 tablet, Oral, Q12H  aspirin, 81 mg, Oral, Daily  budesonide-formoterol, 2 puff, Inhalation, BID - RT  enoxaparin, 40 mg, Subcutaneous, Nightly  furosemide, 40 mg, Intravenous, Q12H  lisinopril, 5 mg, Oral, Q24H  metoprolol succinate XL, 100 mg, Oral, Q12H  nicotine, 1 patch, Transdermal, Q24H  potassium chloride, 10 mEq, Intravenous, Once  sodium chloride, 10 mL, Intravenous, Q12H  tiotropium bromide monohydrate, 2 puff, Inhalation, Daily - RT      Continuous Infusions:     Allergies:  No Known Allergies    Assessment:       Cardiac arrest (CMS/HCC)    Ventricular fibrillation (CMS/HCC)    Atrial flutter (CMS/HCC)    Tobacco abuse    Azotemia    COPD (chronic obstructive pulmonary disease) (CMS/HCC)    MRSA nasal colonization    Transaminitis    Obesity (BMI 30-39.9)    Ischemic cardiomyopathy    Anemia    1. VF Arrest  2. Coronary Artery disease-  of the RCA with left to right  collaterals (unsuccesfful PCI attempt) and OM occluded  3. Ischemic CM EF 30%  4. Acute Systolic CHF  5. New onset afib with previous atrial tachycardia      Plan:       In afib on monitor today. EP saw this morning. Plan for ICD this week. HR's fast. Will optimize rate meds. Will need anticoagulation given CHADs score.    ERIC Higgins  04/06/21  09:46 EDT  Electronically signed by ERIC Higgins, 04/06/21, 9:46 AM EDT.

## 2021-04-06 NOTE — PLAN OF CARE
Goal Outcome Evaluation:  Plan of Care Reviewed With: patient  Progress: improving  Outcome Summary: pt is doing well as far as mobility goes, able to walk 150 ft with supervision, plans noted for ICD placement tomorrow, PT will follow up post procedure to ensure gait stability  Patient was intermittently wearing a face mask during this therapy encounter. Therapist used appropriate personal protective equipment including eye protection, mask, and gloves.  Mask used was standard procedure mask. Appropriate PPE was worn during the entire therapy session. Hand hygiene was completed before and after therapy session. Patient is not in enhanced droplet precautions.

## 2021-04-06 NOTE — PROGRESS NOTES
Name: Sebastien Tang ADMIT: 2021   : 1960  PCP: Provider, No Known    MRN: [id number] LOS: 5 days   AGE/SEX: 61 y.o. male  ROOM: 6/     Subjective   Subjective   Patient appears stated age, unkempt, obese, relatively comfortable, and in no apparent distress.  Answering questions appropriately today.  Tolerating P.O.      Review of Systems   Constitutional: Negative for chills and fever.   Respiratory: Negative for cough and shortness of breath.    Cardiovascular: Negative for chest pain and leg swelling.   Gastrointestinal: Negative for abdominal pain, constipation, diarrhea, nausea and vomiting.   Genitourinary: Negative for difficulty urinating and dysuria.   Neurological: Negative for dizziness, facial asymmetry, weakness, light-headedness, numbness and headaches.   Psychiatric/Behavioral: Negative for confusion and hallucinations.   All other systems reviewed and are negative.       Objective   Objective   Vital Signs  Temp:  [97.8 °F (36.6 °C)-98.6 °F (37 °C)] 98.1 °F (36.7 °C)  Heart Rate:  [] 117  Resp:  [18-20] 18  BP: ()/(63-88) 110/70  SpO2:  [92 %-97 %] 92 %  on  Flow (L/min):  [3-4] 4;   Device (Oxygen Therapy): nasal prongs  Body mass index is 34.14 kg/m².     Physical Exam  Constitutional:       Appearance: He is obese.      Comments: Unkempt   HENT:      Head: Normocephalic and atraumatic.   Eyes:      Extraocular Movements: Extraocular movements intact.      Conjunctiva/sclera: Conjunctivae normal.   Cardiovascular:      Rate and Rhythm: Tachycardia present. Rhythm irregular.      Heart sounds: Normal heart sounds.   Pulmonary:      Effort: Pulmonary effort is normal.      Comments: Diminished on expiration throughout all lung fields  Abdominal:      General: Bowel sounds are normal. There is no distension.      Palpations: Abdomen is soft.      Tenderness: There is no abdominal tenderness.   Musculoskeletal:         General: No tenderness.      Cervical back: Normal  range of motion and neck supple.      Right lower leg: Edema (1+ pitting edema BLE) present.      Left lower leg: Edema present.   Skin:     General: Skin is warm and dry.   Neurological:      Mental Status: He is alert and oriented to person, place, and time.      Cranial Nerves: No cranial nerve deficit.   Psychiatric:         Thought Content: Thought content normal.      Comments: Somewhat odd behavior prefers to joke         Results Review     I reviewed the patient's new clinical results.  Results from last 7 days   Lab Units 04/06/21 0444 04/05/21 0410 04/04/21 0404 04/03/21  0402   WBC 10*3/mm3 8.04 8.29 12.10* 13.13*   HEMOGLOBIN g/dL 11.6* 11.6* 11.3* 12.5*   PLATELETS 10*3/mm3 289 260 263 257     Results from last 7 days   Lab Units 04/06/21 0444 04/05/21 0410 04/04/21 0404 04/03/21  0402   SODIUM mmol/L 139 137 137 142   POTASSIUM mmol/L 3.9 4.5 4.0 3.9   CHLORIDE mmol/L 97* 100 97* 101   CO2 mmol/L 29.3* 26.9 28.9 29.4*   BUN mg/dL 30* 26* 27* 22   CREATININE mg/dL 0.76 0.59* 0.72* 0.88   GLUCOSE mg/dL 104* 99 119* 105*   Estimated Creatinine Clearance: 107.8 mL/min (by C-G formula based on SCr of 0.76 mg/dL).  Results from last 7 days   Lab Units 04/06/21 0444 04/05/21 0410 04/04/21 0404 04/02/21  0415   ALBUMIN g/dL 3.70 3.70 3.90 3.90   BILIRUBIN mg/dL 0.4 0.5 0.9 0.3   ALK PHOS U/L 70 62 61 67   AST (SGOT) U/L 55* 54* 74* 107*   ALT (SGPT) U/L 59* 47* 46* 59*     Results from last 7 days   Lab Units 04/06/21 0444 04/05/21 0410 04/04/21 0404 04/03/21 0402 04/02/21  0415   CALCIUM mg/dL 9.9 9.8 9.6 9.3 8.8   ALBUMIN g/dL 3.70 3.70 3.90  --  3.90   MAGNESIUM mg/dL  --   --   --   --  2.4   PHOSPHORUS mg/dL  --   --   --   --  2.9     Results from last 7 days   Lab Units 04/03/21  1203 04/02/21  0415   PROCALCITONIN ng/mL 2.13* 7.44*   LACTATE mmol/L  --  1.6     COVID19   Date Value Ref Range Status   04/01/2021 Not Detected Not Detected - Ref. Range Final     Glucose   Date/Time Value Ref  Range Status   04/04/2021 1537 136 (H) 70 - 130 mg/dL Final   04/04/2021 1031 120 70 - 130 mg/dL Final   04/04/2021 0558 121 70 - 130 mg/dL Final   04/03/2021 2318 122 70 - 130 mg/dL Final   04/03/2021 1932 145 (H) 70 - 130 mg/dL Final   04/03/2021 1800 146 (H) 70 - 130 mg/dL Final       XR Chest 1 View  Narrative: SINGLE VIEW OF THE CHEST     HISTORY: Respiratory failure     COMPARISON: 04/02/2021     FINDINGS:  Cardiomegaly is present. Endotracheal tube has been removed. There are  bilateral alveolar and interstitial infiltrates. These do not appear  significantly changed when compared to prior exam. No pneumothorax or  pleural effusion is seen.     Impression: No significant interval change in bilateral alveolar and interstitial  infiltrates.     This report was finalized on 4/3/2021 3:37 AM by Dr. Judi Chow M.D.       Scheduled Medications  amoxicillin-clavulanate, 1 tablet, Oral, Q12H  aspirin, 81 mg, Oral, Daily  budesonide-formoterol, 2 puff, Inhalation, BID - RT  digoxin, 250 mcg, Intravenous, Once  digoxin, 500 mcg, Intravenous, Once  enoxaparin, 40 mg, Subcutaneous, Nightly  [START ON 4/7/2021] furosemide, 20 mg, Oral, Daily  iron sucrose, 200 mg, Intravenous, Q24H  lisinopril, 5 mg, Oral, Q24H  metoprolol succinate XL, 100 mg, Oral, Q12H  nicotine, 1 patch, Transdermal, Q24H  potassium chloride, 10 mEq, Intravenous, Once  senna-docusate sodium, 1 tablet, Oral, BID  sodium chloride, 10 mL, Intravenous, Q12H  tiotropium bromide monohydrate, 2 puff, Inhalation, Daily - RT  [START ON 4/7/2021] vancomycin, 1,000 mg, Intravenous, On Call    Infusions   Diet  Diet Regular; Cardiac  NPO Diet       Assessment/Plan     Active Hospital Problems    Diagnosis  POA   • **Cardiac arrest (CMS/HCC) [I46.9]  Yes   • Constipation [K59.00]  Unknown   • Atrial flutter (CMS/HCC) [I48.92]  Unknown   • Tobacco abuse [Z72.0]  Yes   • Azotemia [R79.89]  Unknown   • COPD (chronic obstructive pulmonary disease) (CMS/HCC)  [J44.9]  Yes   • MRSA nasal colonization [Z22.322]  Not Applicable   • Transaminitis [R74.01]  Yes   • Obesity (BMI 30-39.9) [E66.9]  Yes   • Ischemic cardiomyopathy [I25.5]  Yes   • Anemia [D64.9]  Unknown   • Ventricular fibrillation (CMS/HCC) [I49.01]  Unknown      Resolved Hospital Problems   No resolved problems to display.       61 y.o. male admitted with Cardiac arrest (CMS/HCC).      Cardiac arrest (CMS/HCC) / Ventricular fibrillation (CMS/HCC) / Atrial flutter (CMS/HCC) / Ischemic cardiomyopathy.  Cardiology following--persistent tachycardia, afib--plan digoxin once.  Furosemide 20 mg daily for now.  Metoprolol 25 mg PO daily increased to metoprolol  mg BID with lisinopril 5 mg PO daily, ASA 81 mg, & ICD tentative plan on 4/7/2021.    Tobacco abuse.  Recommend smoking cessation per hospital protocol.     Azotemia.  Increasing with trend likely secondary to recent aggressive diuresis, renal function stable.    COPD (chronic obstructive pulmonary disease) (CMS/HCC) / MRSA nasal colonization.  Pulmonary following--plan continue antibiotics (procal 2.13 on 4/3/2021): IV vancomycin, 5 days course Augmentin BID.  Bronchodilators PRN, Spiriva, Symbicort.  PFTs in OP setting.  Flutter valve, IS.    Transaminitis.  LFTs mild elevation with trend.  Likely hepatic congestion 2/2 LVEF 38% / CHF vs other.  Afebrile.     Constipation.  Tolerating P.O.  Denies abdominal pain.  Pericolace BID.    Anemia.  Serum Hgb stable with trend.  No overt signs of active bleeding.  Noted iron profile deficiency, negative ferritin 103.  Ordering IV Venofer 200 mg daily x3 days for now.    Obesity (BMI 30-39.9).  Complicating all problems.     · enoxaparin for DVT prophylaxis.  · CPR  · Discussed with Dr. Tatum.  · Anticipate discharge pending clinical course, plan AICD this week per cards / CCP consulted for anticipated needs at [facility].      ERIC Miramontes  [facility]  04/06/21  13:37 EDT

## 2021-04-07 ENCOUNTER — APPOINTMENT (OUTPATIENT)
Dept: GENERAL RADIOLOGY | Facility: HOSPITAL | Age: 61
End: 2021-04-07

## 2021-04-07 LAB
ALBUMIN SERPL-MCNC: 4.1 G/DL (ref 3.5–5.2)
ALBUMIN/GLOB SERPL: 1.4 G/DL
ALP SERPL-CCNC: 78 U/L (ref 39–117)
ALT SERPL W P-5'-P-CCNC: 60 U/L (ref 1–41)
ANION GAP SERPL CALCULATED.3IONS-SCNC: 9.5 MMOL/L (ref 5–15)
AST SERPL-CCNC: 48 U/L (ref 1–40)
BASOPHILS # BLD AUTO: 0.08 10*3/MM3 (ref 0–0.2)
BASOPHILS NFR BLD AUTO: 0.9 % (ref 0–1.5)
BILIRUB SERPL-MCNC: 0.6 MG/DL (ref 0–1.2)
BUN SERPL-MCNC: 22 MG/DL (ref 8–23)
BUN/CREAT SERPL: 36.7 (ref 7–25)
CALCIUM SPEC-SCNC: 10.6 MG/DL (ref 8.6–10.5)
CHLORIDE SERPL-SCNC: 94 MMOL/L (ref 98–107)
CO2 SERPL-SCNC: 31.5 MMOL/L (ref 22–29)
CREAT SERPL-MCNC: 0.6 MG/DL (ref 0.76–1.27)
DEPRECATED RDW RBC AUTO: 43.1 FL (ref 37–54)
EOSINOPHIL # BLD AUTO: 0.45 10*3/MM3 (ref 0–0.4)
EOSINOPHIL NFR BLD AUTO: 5.2 % (ref 0.3–6.2)
ERYTHROCYTE [DISTWIDTH] IN BLOOD BY AUTOMATED COUNT: 13.4 % (ref 12.3–15.4)
GFR SERPL CREATININE-BSD FRML MDRD: 136 ML/MIN/1.73
GLOBULIN UR ELPH-MCNC: 3 GM/DL
GLUCOSE SERPL-MCNC: 105 MG/DL (ref 65–99)
HCT VFR BLD AUTO: 38.6 % (ref 37.5–51)
HGB BLD-MCNC: 12.6 G/DL (ref 13–17.7)
IMM GRANULOCYTES # BLD AUTO: 0.06 10*3/MM3 (ref 0–0.05)
IMM GRANULOCYTES NFR BLD AUTO: 0.7 % (ref 0–0.5)
LYMPHOCYTES # BLD AUTO: 1.39 10*3/MM3 (ref 0.7–3.1)
LYMPHOCYTES NFR BLD AUTO: 16.1 % (ref 19.6–45.3)
MCH RBC QN AUTO: 28.7 PG (ref 26.6–33)
MCHC RBC AUTO-ENTMCNC: 32.6 G/DL (ref 31.5–35.7)
MCV RBC AUTO: 87.9 FL (ref 79–97)
MONOCYTES # BLD AUTO: 0.96 10*3/MM3 (ref 0.1–0.9)
MONOCYTES NFR BLD AUTO: 11.1 % (ref 5–12)
NEUTROPHILS NFR BLD AUTO: 5.7 10*3/MM3 (ref 1.7–7)
NEUTROPHILS NFR BLD AUTO: 66 % (ref 42.7–76)
NRBC BLD AUTO-RTO: 0 /100 WBC (ref 0–0.2)
PLATELET # BLD AUTO: 352 10*3/MM3 (ref 140–450)
PMV BLD AUTO: 10.4 FL (ref 6–12)
POTASSIUM SERPL-SCNC: 4.2 MMOL/L (ref 3.5–5.2)
PROT SERPL-MCNC: 7.1 G/DL (ref 6–8.5)
RBC # BLD AUTO: 4.39 10*6/MM3 (ref 4.14–5.8)
SODIUM SERPL-SCNC: 135 MMOL/L (ref 136–145)
WBC # BLD AUTO: 8.64 10*3/MM3 (ref 3.4–10.8)

## 2021-04-07 PROCEDURE — 80053 COMPREHEN METABOLIC PANEL: CPT | Performed by: INTERNAL MEDICINE

## 2021-04-07 PROCEDURE — C1894 INTRO/SHEATH, NON-LASER: HCPCS | Performed by: INTERNAL MEDICINE

## 2021-04-07 PROCEDURE — 85025 COMPLETE CBC W/AUTO DIFF WBC: CPT | Performed by: INTERNAL MEDICINE

## 2021-04-07 PROCEDURE — 0JH608Z INSERTION OF DEFIBRILLATOR GENERATOR INTO CHEST SUBCUTANEOUS TISSUE AND FASCIA, OPEN APPROACH: ICD-10-PCS | Performed by: INTERNAL MEDICINE

## 2021-04-07 PROCEDURE — 25010000003 LIDOCAINE 1 % SOLUTION: Performed by: INTERNAL MEDICINE

## 2021-04-07 PROCEDURE — 33249 INSJ/RPLCMT DEFIB W/LEAD(S): CPT | Performed by: INTERNAL MEDICINE

## 2021-04-07 PROCEDURE — 71045 X-RAY EXAM CHEST 1 VIEW: CPT

## 2021-04-07 PROCEDURE — 94799 UNLISTED PULMONARY SVC/PX: CPT

## 2021-04-07 PROCEDURE — 99232 SBSQ HOSP IP/OBS MODERATE 35: CPT | Performed by: NURSE PRACTITIONER

## 2021-04-07 PROCEDURE — 25010000002 DIGOXIN PER 500 MCG: Performed by: NURSE PRACTITIONER

## 2021-04-07 PROCEDURE — 97535 SELF CARE MNGMENT TRAINING: CPT | Performed by: OCCUPATIONAL THERAPIST

## 2021-04-07 PROCEDURE — C1722 AICD, SINGLE CHAMBER: HCPCS | Performed by: INTERNAL MEDICINE

## 2021-04-07 PROCEDURE — C1777 LEAD, AICD, ENDO SINGLE COIL: HCPCS | Performed by: INTERNAL MEDICINE

## 2021-04-07 PROCEDURE — C1769 GUIDE WIRE: HCPCS | Performed by: INTERNAL MEDICINE

## 2021-04-07 PROCEDURE — 99152 MOD SED SAME PHYS/QHP 5/>YRS: CPT | Performed by: INTERNAL MEDICINE

## 2021-04-07 PROCEDURE — 0 IOPAMIDOL PER 1 ML: Performed by: INTERNAL MEDICINE

## 2021-04-07 PROCEDURE — 25010000002 MIDAZOLAM PER 1 MG: Performed by: INTERNAL MEDICINE

## 2021-04-07 PROCEDURE — 99153 MOD SED SAME PHYS/QHP EA: CPT | Performed by: INTERNAL MEDICINE

## 2021-04-07 PROCEDURE — 25010000002 ENOXAPARIN PER 10 MG: Performed by: INTERNAL MEDICINE

## 2021-04-07 PROCEDURE — 02HK3KZ INSERTION OF DEFIBRILLATOR LEAD INTO RIGHT VENTRICLE, PERCUTANEOUS APPROACH: ICD-10-PCS | Performed by: INTERNAL MEDICINE

## 2021-04-07 PROCEDURE — 25010000002 VANCOMYCIN PER 500 MG: Performed by: INTERNAL MEDICINE

## 2021-04-07 PROCEDURE — 25010000002 FENTANYL CITRATE (PF) 100 MCG/2ML SOLUTION: Performed by: INTERNAL MEDICINE

## 2021-04-07 DEVICE — LD ICD PLEXA PROMRI SGL COIL S DX 65/15: Type: IMPLANTABLE DEVICE | Status: FUNCTIONAL

## 2021-04-07 DEVICE — IMPLANTABLE DEVICE
Type: IMPLANTABLE DEVICE | Status: FUNCTIONAL
Brand: ACTICOR 7 VR-T DX

## 2021-04-07 RX ORDER — ACETAMINOPHEN 650 MG/1
650 SUPPOSITORY RECTAL EVERY 4 HOURS PRN
Status: DISCONTINUED | OUTPATIENT
Start: 2021-04-07 | End: 2021-04-08 | Stop reason: SDUPTHER

## 2021-04-07 RX ORDER — NALOXONE HCL 0.4 MG/ML
0.4 VIAL (ML) INJECTION
Status: DISCONTINUED | OUTPATIENT
Start: 2021-04-07 | End: 2021-04-08 | Stop reason: HOSPADM

## 2021-04-07 RX ORDER — HYDROMORPHONE HYDROCHLORIDE 1 MG/ML
0.5 INJECTION, SOLUTION INTRAMUSCULAR; INTRAVENOUS; SUBCUTANEOUS
Status: DISCONTINUED | OUTPATIENT
Start: 2021-04-07 | End: 2021-04-08 | Stop reason: HOSPADM

## 2021-04-07 RX ORDER — FUROSEMIDE 40 MG/1
40 TABLET ORAL DAILY
Status: DISCONTINUED | OUTPATIENT
Start: 2021-04-08 | End: 2021-04-08 | Stop reason: HOSPADM

## 2021-04-07 RX ORDER — MIDAZOLAM HYDROCHLORIDE 1 MG/ML
INJECTION INTRAMUSCULAR; INTRAVENOUS AS NEEDED
Status: DISCONTINUED | OUTPATIENT
Start: 2021-04-07 | End: 2021-04-07 | Stop reason: HOSPADM

## 2021-04-07 RX ORDER — FENTANYL CITRATE 50 UG/ML
INJECTION, SOLUTION INTRAMUSCULAR; INTRAVENOUS AS NEEDED
Status: DISCONTINUED | OUTPATIENT
Start: 2021-04-07 | End: 2021-04-07 | Stop reason: HOSPADM

## 2021-04-07 RX ORDER — LIDOCAINE HYDROCHLORIDE 10 MG/ML
INJECTION, SOLUTION INFILTRATION; PERINEURAL AS NEEDED
Status: DISCONTINUED | OUTPATIENT
Start: 2021-04-07 | End: 2021-04-07 | Stop reason: HOSPADM

## 2021-04-07 RX ORDER — SODIUM CHLORIDE 0.9 % (FLUSH) 0.9 %
3 SYRINGE (ML) INJECTION EVERY 12 HOURS SCHEDULED
Status: DISCONTINUED | OUTPATIENT
Start: 2021-04-07 | End: 2021-04-08 | Stop reason: HOSPADM

## 2021-04-07 RX ORDER — DIGOXIN 0.25 MG/ML
250 INJECTION INTRAMUSCULAR; INTRAVENOUS ONCE
Status: COMPLETED | OUTPATIENT
Start: 2021-04-07 | End: 2021-04-07

## 2021-04-07 RX ORDER — ACETAMINOPHEN 325 MG/1
650 TABLET ORAL EVERY 4 HOURS PRN
Status: DISCONTINUED | OUTPATIENT
Start: 2021-04-07 | End: 2021-04-08 | Stop reason: SDUPTHER

## 2021-04-07 RX ORDER — VANCOMYCIN HYDROCHLORIDE 1 G/200ML
INJECTION, SOLUTION INTRAVENOUS CONTINUOUS PRN
Status: COMPLETED | OUTPATIENT
Start: 2021-04-07 | End: 2021-04-07

## 2021-04-07 RX ORDER — SODIUM CHLORIDE 9 MG/ML
INJECTION, SOLUTION INTRAVENOUS CONTINUOUS PRN
Status: DISCONTINUED | OUTPATIENT
Start: 2021-04-07 | End: 2021-04-07 | Stop reason: HOSPADM

## 2021-04-07 RX ORDER — SODIUM CHLORIDE 0.9 % (FLUSH) 0.9 %
10 SYRINGE (ML) INJECTION AS NEEDED
Status: DISCONTINUED | OUTPATIENT
Start: 2021-04-07 | End: 2021-04-08 | Stop reason: HOSPADM

## 2021-04-07 RX ADMIN — DOCUSATE SODIUM 50MG AND SENNOSIDES 8.6MG 1 TABLET: 8.6; 5 TABLET, FILM COATED ORAL at 11:37

## 2021-04-07 RX ADMIN — SODIUM CHLORIDE, PRESERVATIVE FREE 10 ML: 5 INJECTION INTRAVENOUS at 21:04

## 2021-04-07 RX ADMIN — HYDROCODONE BITARTRATE AND ACETAMINOPHEN 1 TABLET: 5; 325 TABLET ORAL at 11:55

## 2021-04-07 RX ADMIN — HYDROCODONE BITARTRATE AND ACETAMINOPHEN 2 TABLET: 5; 325 TABLET ORAL at 19:51

## 2021-04-07 RX ADMIN — AMOXICILLIN AND CLAVULANATE POTASSIUM 1 TABLET: 875; 125 TABLET, FILM COATED ORAL at 21:05

## 2021-04-07 RX ADMIN — METOPROLOL SUCCINATE 100 MG: 100 TABLET, EXTENDED RELEASE ORAL at 21:05

## 2021-04-07 RX ADMIN — TIOTROPIUM BROMIDE INHALATION SPRAY 2 PUFF: 3.12 SPRAY, METERED RESPIRATORY (INHALATION) at 06:53

## 2021-04-07 RX ADMIN — METOPROLOL SUCCINATE 100 MG: 100 TABLET, EXTENDED RELEASE ORAL at 11:38

## 2021-04-07 RX ADMIN — Medication 1 PATCH: at 11:36

## 2021-04-07 RX ADMIN — IPRATROPIUM BROMIDE AND ALBUTEROL SULFATE 3 ML: 2.5; .5 SOLUTION RESPIRATORY (INHALATION) at 13:43

## 2021-04-07 RX ADMIN — ENOXAPARIN SODIUM 40 MG: 100 INJECTION SUBCUTANEOUS at 21:04

## 2021-04-07 RX ADMIN — LISINOPRIL 5 MG: 5 TABLET ORAL at 11:37

## 2021-04-07 RX ADMIN — AMOXICILLIN AND CLAVULANATE POTASSIUM 1 TABLET: 875; 125 TABLET, FILM COATED ORAL at 11:27

## 2021-04-07 RX ADMIN — ASPIRIN 81 MG: 81 TABLET, COATED ORAL at 11:37

## 2021-04-07 RX ADMIN — DOCUSATE SODIUM 100 MG: 100 CAPSULE, LIQUID FILLED ORAL at 11:38

## 2021-04-07 RX ADMIN — DOCUSATE SODIUM 50MG AND SENNOSIDES 8.6MG 1 TABLET: 8.6; 5 TABLET, FILM COATED ORAL at 21:05

## 2021-04-07 RX ADMIN — SODIUM CHLORIDE, PRESERVATIVE FREE 10 ML: 5 INJECTION INTRAVENOUS at 00:46

## 2021-04-07 RX ADMIN — BUDESONIDE AND FORMOTEROL FUMARATE DIHYDRATE 2 PUFF: 160; 4.5 AEROSOL RESPIRATORY (INHALATION) at 06:52

## 2021-04-07 RX ADMIN — BUDESONIDE AND FORMOTEROL FUMARATE DIHYDRATE 2 PUFF: 160; 4.5 AEROSOL RESPIRATORY (INHALATION) at 19:29

## 2021-04-07 RX ADMIN — DIGOXIN 250 MCG: 0.25 INJECTION INTRAMUSCULAR; INTRAVENOUS at 11:38

## 2021-04-07 NOTE — PROGRESS NOTES
[facility] INPATIENT PROGRESS NOTE         [address] CVI    2021      PATIENT IDENTIFICATION:  Name: Sebastien Tang ADMIT: 2021   : 1960  PCP: Provider, No Known    MRN: [id number] LOS: 6 days   AGE/SEX: 61 y.o. male  ROOM: [facility]/                     LOS 6    Reason for visit: Resuscitated V. fib arrest with septic shock      SUBJECTIVE:      No new complaints.      Objective   OBJECTIVE:    Vital Sign Min/Max for last 24 hours  Temp  Min: 97.6 °F (36.4 °C)  Max: 98.3 °F (36.8 °C)   BP  Min: 99/67  Max: 143/86   Pulse  Min: 98  Max: 116   Resp  Min: 18  Max: 20   SpO2  Min: 90 %  Max: 96 %   No data recorded   Weight  Min: 94.9 kg (209 lb 4.8 oz)  Max: 94.9 kg (209 lb 4.8 oz)                   FiO2 (%): 36 %     Body mass index is 33.78 kg/m².    Intake/Output Summary (Last 24 hours) at 2021 0948  Last data filed at 2021 0734  Gross per 24 hour   Intake 540 ml   Output 2000 ml   Net -1460 ml         Exam:  GEN:  No distress, appears stated age  EYES:   PERRL, anicteric sclera  ENT:    External ears/nose normal, OP clear  NECK:  No adenopathy, midline trachea  LUNGS: Normal chest on inspectionpalpation and diminished breath sounds on auscultation  CV:  Normal S1S2, without murmur  ABD:  Non tender, non distended, no hepatosplenomegaly, +BS  EXT:  No edema, cyanosis or clubbing    Assessment     Scheduled meds:  amoxicillin-clavulanate, 1 tablet, Oral, Q12H  aspirin, 81 mg, Oral, Daily  budesonide-formoterol, 2 puff, Inhalation, BID - RT  digoxin, 250 mcg, Intravenous, Once  docusate sodium, 100 mg, Oral, BID  enoxaparin, 40 mg, Subcutaneous, Nightly  [START ON 2021] furosemide, 40 mg, Oral, Daily  ipratropium-albuterol, 3 mL, Nebulization, TID - RT  iron sucrose, 200 mg, Intravenous, Q24H  lisinopril, 5 mg, Oral, Q24H  metoprolol succinate XL, 100 mg, Oral, Q12H  nicotine, 1 patch, Transdermal, Q24H  potassium chloride, 10 mEq, Intravenous, Once  senna-docusate sodium, 1  tablet, Oral, BID  sodium chloride, 10 mL, Intravenous, Q12H  tiotropium bromide monohydrate, 2 puff, Inhalation, Daily - RT  vancomycin, 1,000 mg, Intravenous, On Call      IV meds:                         Data Review:  Results from last 7 days   Lab Units 04/07/21 0430 04/06/21 0444 04/05/21  0410 04/04/21  0404 04/03/21  0402   SODIUM mmol/L 135* 139 137 137 142   POTASSIUM mmol/L 4.2 3.9 4.5 4.0 3.9   CHLORIDE mmol/L 94* 97* 100 97* 101   CO2 mmol/L 31.5* 29.3* 26.9 28.9 29.4*   BUN mg/dL 22 30* 26* 27* 22   CREATININE mg/dL 0.60* 0.76 0.59* 0.72* 0.88   GLUCOSE mg/dL 105* 104* 99 119* 105*   CALCIUM mg/dL 10.6* 9.9 9.8 9.6 9.3         Estimated Creatinine Clearance: 135.8 mL/min (A) (by C-G formula based on SCr of 0.6 mg/dL (L)).  Results from last 7 days   Lab Units 04/07/21  0430 04/06/21  0444 04/05/21  0410 04/04/21  0404 04/03/21  0402   WBC 10*3/mm3 8.64 8.04 8.29 12.10* 13.13*   HEMOGLOBIN g/dL 12.6* 11.6* 11.6* 11.3* 12.5*   PLATELETS 10*3/mm3 352 289 260 263 257     Results from last 7 days   Lab Units 04/01/21  1212   INR  1.17*     Results from last 7 days   Lab Units 04/07/21  0430 04/06/21  0444 04/05/21  0410 04/04/21  0404 04/02/21  0415   ALT (SGPT) U/L 60* 59* 47* 46* 59*   AST (SGOT) U/L 48* 55* 54* 74* 107*     Results from last 7 days   Lab Units 04/01/21  1531 04/01/21  1222   PH, ARTERIAL pH units 7.331* 7.064*   PO2 ART mm Hg 120.2* 59.6*   PCO2, ARTERIAL mm Hg 46.2* 46.6*   HCO3 ART mmol/L 24.4 13.3*     Results from last 7 days   Lab Units 04/03/21  1203 04/02/21  0415   PROCALCITONIN ng/mL 2.13* 7.44*   LACTATE mmol/L  --  1.6         Chest x-ray/3/21 reviewed        Microbiology reviewed  )        Active Hospital Problems    Diagnosis  POA   • **Cardiac arrest (CMS/HCC) [I46.9]  Yes   • Constipation [K59.00]  Unknown   • Atrial flutter (CMS/HCC) [I48.92]  Unknown   • Tobacco abuse [Z72.0]  Yes   • Azotemia [R79.89]  Unknown   • COPD (chronic obstructive pulmonary disease) (CMS/HCC)  [J44.9]  Yes   • MRSA nasal colonization [Z22.322]  Not Applicable   • Transaminitis [R74.01]  Yes   • Obesity (BMI 30-39.9) [E66.9]  Yes   • Ischemic cardiomyopathy [I25.5]  Yes   • Anemia [D64.9]  Unknown   • Ventricular fibrillation (CMS/HCC) [I49.01]  Unknown      Resolved Hospital Problems   No resolved problems to display.         ASSESSMENT:    Resuscitated V. fib arrest  Sepsis with shock requiring pressors: Improved  Cardiogenic shock  Ischemic cardiomyopathy/CAD  Acute non-ST elevation MI  Acute hypoxemic respiratory failure  Pulmonary edema  COPD and emphysema  Metabolic and respiratory acidosis: Improved  Diverticulosis without diverticulitis  Hypokalemia: Improved  Transaminitis with passive hepatic congestion  Hematuria  BPH          PLAN:    Encourage pulmonary toilet.  Bronchodilators for COPD symptoms as needed.  Would benefit from pulmonary function testing as an outpatient for further evaluation after discharge.  Completing antibiotic course.  Cardiology plans for ICD in the near future.  Diuretics per cardiology orders.  Following peripherally.      Moe Azevedo MD  Pulmonary and Critical Care Medicine  [facility]  4/7/2021    09:48 EDT

## 2021-04-07 NOTE — PROGRESS NOTES
Continued Stay Note  [facility]     Patient Name: Sebastien Tang  MRN: [id number]  Today's Date: 4/7/2021    Admit Date: 4/1/2021    Discharge Plan     Row Name 04/07/21 1441       Plan    Plan  4/7- Home, denies need for Home Health;  Pt request [place] if oxygen needed at d/c.      Plan Comments  CCP spoke with Pt at bedside to address his questions in regards to filing for unemployment and FMLA.  CCP introduced self and role.  CCP gave Pt [place] unemployment contact & web address information.  Pt advised he had FMLA paperwork that needed completed.  Pt is admitted to A service.  CCP gave Pt [facility] office address next door with [address] number.  CCP informed Pt he would need to have his family drop the paperwork off to their office.  Pt has no PCP.  CCP gave Pt the [facility] referral contact information & a [facility] MD directory.  Pt reports he will discharge home with assistance of his roommate, Leighann Garcia.  Pt refuses home health services.  Pt plans to return home at discharge.  Pt denies discharge needs at this time.  CCP will continue to follow…….TOBIAS MCDOWELL/ARACELI        Discharge Codes    No documentation.             Shantelle White RN

## 2021-04-07 NOTE — PROGRESS NOTES
Hospital Follow Up    LOS:  LOS: 6 days   Patient Name: Sebastien Tang  Age/Sex: 61 y.o. male  : 1960  MRN: [id number]    Day of Service: 21   Length of Stay: 6  Encounter Provider: ERIC Higgins  Place of Service: [facility] CARDIOLOGY  Patient Care Team:  Provider, No Known as PCP - General    Subjective:     Chief Complaint: VF arrest, CAD, Afib, CM Ef 30%    Interval History: No complaints except tired. Has not been able to sleep    Objective:     Objective:  Temp:  [97.6 °F (36.4 °C)-98.3 °F (36.8 °C)] 97.7 °F (36.5 °C)  Heart Rate:  [] 100  Resp:  [18-20] 18  BP: ()/(67-86) 143/86     Intake/Output Summary (Last 24 hours) at 2021 0940  Last data filed at 2021 0734  Gross per 24 hour   Intake 540 ml   Output 2000 ml   Net -1460 ml     Body mass index is 33.78 kg/m².      21  0619 21  0642 21  0721   Weight: 96.8 kg (213 lb 4.8 oz) 95.9 kg (211 lb 8 oz) 94.9 kg (209 lb 4.8 oz)     Weight change:     Physical Exam:   General Appearance:    Awake alert and oriented in no acute distress.   Color:  Skin:  Neuro:  HEENT:    Lungs:     Pink  Warm and dry  No focal, motor or sensory deficits  Neck supple, pupils equal, round and reactive. No JVD, No Bruit  Clear to auscultation,respirations regular, even and                  unlabored    Heart:    Irreg/Irreg rate and rhythm, S1 and S2, no murmur, + gallop, no rub. No edema, DP/PT pulses are 2+   Chest Wall:    No abnormalities observed   Abdomen:     Normal bowel sounds, no masses, no organomegaly, soft        non-tender, non-distended, no guarding, no ascites noted   Extremities:   Moves all extremities well, no edema, no cyanosis, no redness       Lab Review:   Results from last 7 days   Lab Units 21  0430 21  0444   SODIUM mmol/L 135* 139   POTASSIUM mmol/L 4.2 3.9   CHLORIDE mmol/L 94* 97*   CO2 mmol/L 31.5* 29.3*   BUN mg/dL 22 30*   CREATININE mg/dL 0.60* 0.76    GLUCOSE mg/dL 105* 104*   CALCIUM mg/dL 10.6* 9.9   AST (SGOT) U/L 48* 55*   ALT (SGPT) U/L 60* 59*     Results from last 7 days   Lab Units 04/02/21  0415 04/01/21  1530 04/01/21  1212   TROPONIN T ng/mL 1.190* 1.050* 0.012     Results from last 7 days   Lab Units 04/07/21  0430 04/06/21  0444   WBC 10*3/mm3 8.64 8.04   HEMOGLOBIN g/dL 12.6* 11.6*   HEMATOCRIT % 38.6 34.7*   PLATELETS 10*3/mm3 352 289     Results from last 7 days   Lab Units 04/01/21  1212   INR  1.17*   APTT seconds 39.7*     Results from last 7 days   Lab Units 04/02/21  0415   MAGNESIUM mg/dL 2.4     Results from last 7 days   Lab Units 04/02/21  0415   CHOLESTEROL mg/dL 182   TRIGLYCERIDES mg/dL 293*   HDL CHOL mg/dL 28*     Results from last 7 days   Lab Units 04/02/21  0415   PROBNP pg/mL 922.1*     Results from last 7 days   Lab Units 04/02/21  0415   TSH uIU/mL 1.690     I reviewed the patient's new clinical results.  I personally viewed and interpreted the patient's EKG  Current Medications:   Scheduled Meds:amoxicillin-clavulanate, 1 tablet, Oral, Q12H  aspirin, 81 mg, Oral, Daily  budesonide-formoterol, 2 puff, Inhalation, BID - RT  digoxin, 250 mcg, Intravenous, Once  docusate sodium, 100 mg, Oral, BID  enoxaparin, 40 mg, Subcutaneous, Nightly  furosemide, 20 mg, Oral, Daily  ipratropium-albuterol, 3 mL, Nebulization, TID - RT  iron sucrose, 200 mg, Intravenous, Q24H  lisinopril, 5 mg, Oral, Q24H  metoprolol succinate XL, 100 mg, Oral, Q12H  nicotine, 1 patch, Transdermal, Q24H  potassium chloride, 10 mEq, Intravenous, Once  senna-docusate sodium, 1 tablet, Oral, BID  sodium chloride, 10 mL, Intravenous, Q12H  tiotropium bromide monohydrate, 2 puff, Inhalation, Daily - RT  vancomycin, 1,000 mg, Intravenous, On Call      Continuous Infusions:     Allergies:  No Known Allergies    Assessment:       Cardiac arrest (CMS/HCC)    Ventricular fibrillation (CMS/HCC)    Atrial flutter (CMS/HCC)    Tobacco abuse    Azotemia    COPD (chronic  obstructive pulmonary disease) (CMS/HCC)    MRSA nasal colonization    Transaminitis    Obesity (BMI 30-39.9)    Ischemic cardiomyopathy    Anemia    Constipation    1. VF Arrest  2. Coronary Artery disease-  of the RCA with left to right collaterals (unsuccesfful PCI attempt) and OM occluded  3. Ischemic CM EF 30%  4. Acute Systolic CHF  5. New onset afib with previous atrial tachycardia       Plan:       HR still up. Will give another dose of Dig today. May need PO for rate control. Concerned about coughing up of brown sputum. Culture was negative a few days ago- Pulmonary following and getting antibiotics. Will probably need Lasix 40mg daily.     ERIC Higgins  04/07/21  09:40 EDT  Electronically signed by ERIC Higgins, 04/07/21, 9:40 AM EDT.

## 2021-04-07 NOTE — PLAN OF CARE
Problem: Adult Inpatient Plan of Care  Goal: Plan of Care Review  Outcome: Ongoing, Progressing  Goal: Patient-Specific Goal (Individualized)  Outcome: Ongoing, Progressing  Goal: Absence of Hospital-Acquired Illness or Injury  Outcome: Ongoing, Progressing  Intervention: Identify and Manage Fall Risk  Recent Flowsheet Documentation  Taken 4/7/2021 0800 by Lottie Gordon RN  Safety Promotion/Fall Prevention:   activity supervised   safety round/check completed   nonskid shoes/slippers when out of bed   fall prevention program maintained   assistive device/personal items within reach  Intervention: Prevent Skin Injury  Recent Flowsheet Documentation  Taken 4/7/2021 0800 by Lottie Gordon RN  Body Position: position changed independently  Goal: Optimal Comfort and Wellbeing  Outcome: Ongoing, Progressing  Goal: Readiness for Transition of Care  Outcome: Ongoing, Progressing     Problem: Restraint, Nonbehavioral (Nonviolent)  Goal: Discontinuation Criteria Achieved  Outcome: Ongoing, Progressing  Goal: Personal Dignity and Safety Maintained  Outcome: Ongoing, Progressing  Intervention: Protect Skin and Joint Integrity  Recent Flowsheet Documentation  Taken 4/7/2021 0800 by Lottie Gordon RN  Body Position: position changed independently     Problem: Skin Injury Risk Increased  Goal: Skin Health and Integrity  Outcome: Ongoing, Progressing  Intervention: Optimize Skin Protection  Recent Flowsheet Documentation  Taken 4/7/2021 0800 by Lottie Gordon RN  Head of Bed (HOB): HOB at 30-45 degrees     Problem: Fall Injury Risk  Goal: Absence of Fall and Fall-Related Injury  Outcome: Ongoing, Progressing  Intervention: Identify and Manage Contributors to Fall Injury Risk  Recent Flowsheet Documentation  Taken 4/7/2021 0800 by Lottie Gordon RN  Medication Review/Management: medications reviewed  Intervention: Promote Injury-Free Environment  Recent Flowsheet Documentation  Taken 4/7/2021 0800 by Lottie Gordon RN  Safety  Promotion/Fall Prevention:   activity supervised   safety round/check completed   nonskid shoes/slippers when out of bed   fall prevention program maintained   assistive device/personal items within reach     Problem: Pain Acute  Goal: Optimal Pain Control  Outcome: Ongoing, Progressing     Problem: Adjustment to Illness (Acute Coronary Syndrome)  Goal: Optimal Adaptation to Illness  Outcome: Ongoing, Progressing     Problem: Arrhythmia/Dysrhythmia (Acute Coronary Syndrome)  Goal: Normalized Cardiac Rhythm  Outcome: Ongoing, Progressing     Problem: Cardiac-Related Pain (Acute Coronary Syndrome)  Goal: Absence of Cardiac-Related Pain  Outcome: Ongoing, Progressing     Problem: Hemodynamic Instability (Acute Coronary Syndrome)  Goal: Effective Cardiac Pump Function  Outcome: Ongoing, Progressing     Problem: Tissue Perfusion (Acute Coronary Syndrome)  Goal: Adequate Tissue Perfusion  Outcome: Ongoing, Progressing  Intervention: Optimize Cardiac Tissue Perfusion  Recent Flowsheet Documentation  Taken 4/7/2021 0800 by Lottie Gordon RN  Activity Management: activity adjusted per tolerance     Problem: Communication Impairment (Mechanical Ventilation, Invasive)  Goal: Effective Communication  Outcome: Ongoing, Progressing     Problem: Device-Related Complication Risk (Mechanical Ventilation, Invasive)  Goal: Optimal Device Function  Outcome: Ongoing, Progressing  Intervention: Optimize Device Care and Function  Recent Flowsheet Documentation  Taken 4/7/2021 0800 by Lottie Gordon, RN  Aspiration Precautions: awake/alert before oral intake  Airway Safety Measures: suction at bedside     Problem: Inability to Wean (Mechanical Ventilation, Invasive)  Goal: Mechanical Ventilation Liberation  Outcome: Ongoing, Progressing  Intervention: Promote Extubation and Mechanical Ventilation Liberation  Recent Flowsheet Documentation  Taken 4/7/2021 0800 by Lottie Gordon, RN  Medication Review/Management: medications reviewed      Problem: Nutrition Impairment (Mechanical Ventilation, Invasive)  Goal: Optimal Nutrition Delivery  Outcome: Ongoing, Progressing     Problem: Skin and Tissue Injury (Mechanical Ventilation, Invasive)  Goal: Absence of Device-Related Skin and Tissue Injury  Outcome: Ongoing, Progressing     Problem: Ventilator-Induced Lung Injury (Mechanical Ventilation, Invasive)  Goal: Absence of Ventilator-Induced Lung Injury  Outcome: Ongoing, Progressing  Intervention: Prevent Ventilator-Associated Pneumonia  Recent Flowsheet Documentation  Taken 4/7/2021 0800 by Lottie Gordon RN  Head of Bed (HOB): HOB at 30-45 degrees     Problem: Adjustment to Illness (Heart Failure)  Goal: Optimal Coping  Outcome: Ongoing, Progressing     Problem: Arrhythmia/Dysrhythmia (Heart Failure)  Goal: Stable Heart Rate and Rhythm  Outcome: Ongoing, Progressing     Problem: Cardiac Output Decreased (Heart Failure)  Goal: Optimal Cardiac Output  Outcome: Ongoing, Progressing     Problem: Fluid Imbalance (Heart Failure)  Goal: Fluid Balance  Outcome: Ongoing, Progressing     Problem: Functional Ability Impaired (Heart Failure)  Goal: Optimal Functional Ability  Outcome: Ongoing, Progressing  Intervention: Optimize Functional Ability  Recent Flowsheet Documentation  Taken 4/7/2021 0800 by Lottie Gordon RN  Activity Management: activity adjusted per tolerance     Problem: Oral Intake Inadequate (Heart Failure)  Goal: Optimal Nutrition Intake  Outcome: Ongoing, Progressing     Problem: Respiratory Compromise (Heart Failure)  Goal: Effective Oxygenation and Ventilation  Outcome: Ongoing, Progressing     Problem: Sleep Disordered Breathing (Heart Failure)  Goal: Effective Breathing Pattern During Sleep  Outcome: Ongoing, Progressing   Goal Outcome Evaluation:

## 2021-04-07 NOTE — THERAPY TREATMENT NOTE
Acute Care - Occupational Therapy Treatment Note  [facility]     Patient Name: Sebastien Tang  : 1960  MRN: [id number]  Today's Date: 2021  Onset of Illness/Injury or Date of Surgery: 21     Referring Physician: [place]    Admit Date: 2021       ICD-10-CM ICD-9-CM   1. Cardiac arrest (CMS/HCC)  I46.9 427.5   2. Abnormal EKG  R94.31 794.31   3. Abnormal CXR  R93.89 793.2   4. Hypokalemia  E87.6 276.8   5. Hyperglycemia  R73.9 790.29   6. Ventricular fibrillation (CMS/HCC)  I49.01 427.41   7. Decreased mobility and endurance  Z74.09 780.99     Patient Active Problem List   Diagnosis   • Cardiac arrest (CMS/HCC)   • Ventricular fibrillation (CMS/HCC)   • Atrial flutter (CMS/HCC)   • Tobacco abuse   • Azotemia   • COPD (chronic obstructive pulmonary disease) (CMS/[facility])   • MRSA nasal colonization   • Transaminitis   • Obesity (BMI 30-39.9)   • Ischemic cardiomyopathy   • Anemia   • Constipation     No past medical history on file.  Past Surgical History:   Procedure Laterality Date   • CARDIAC CATHETERIZATION Left 2021    Procedure: Coronary Angiography;  Surgeon: Anna Puga MD;  Location: [facility] CATH INVASIVE LOCATION;  Service: Cardiology;  Laterality: Left;   • CARDIAC CATHETERIZATION N/A 2021    Procedure: Left ventriculography;  Surgeon: Anna Puga MD;  Location: [facility] INVASIVE LOCATION;  Service: Cardiology;  Laterality: N/A;   • CARDIAC CATHETERIZATION N/A 2021    Procedure: Left Heart Cath;  Surgeon: Anna Puga MD;  Location: [facility] CATH INVASIVE LOCATION;  Service: Cardiology;  Laterality: N/A;            OT ASSESSMENT FLOWSHEET (last 12 hours)      OT Evaluation and Treatment     Row Name 21 1230                   OT Time and Intention    Subjective Information  no complaints  -SG        Document Type  therapy note (daily note)  -SG        Mode of Treatment  individual therapy;occupational therapy  -SG        Patient Effort  good  -SG        Symptoms Noted  During/After Treatment  none  -SG           General Information    General Observations of Patient  Pt sitting up in chair  -SG        Existing Precautions/Restrictions  cardiac  -SG           Cognition    Affect/Mental Status (Cognitive)  -- Pt very hyper  -SG        Orientation Status (Cognition)  oriented to;person;place;situation  -SG        Follows Commands (Cognition)  follows one-step commands  -SG        Cognitive Function (Cognitive)  memory deficit  -SG           Pain Scale: FACES Pre/Post-Treatment    Pain: FACES Scale, Pretreatment  0-->no hurt  -SG           Bed Mobility    Comment (Bed Mobility)  Sitting up in chair  -SG           Functional Mobility    Functional Mobility- Ind. Level  supervision required  -SG        Functional Mobility- Comment  Walks to bathroom, back to chair  -SG           Transfer Assessment/Treatment    Transfers  sit-stand transfer;stand-sit transfer;toilet transfer  -SG           Transfers    Sit-Stand [place] (Transfers)  supervision  -SG        Stand-Sit [place] (Transfers)  supervision  -SG        [place] Level (Toilet Transfer)  supervision  -SG           Toilet Transfer    Type (Toilet Transfer)  stand pivot/stand step  -SG           Activities of Daily Living    BADL Assessment/Intervention  grooming;toileting  -SG           Grooming Assessment/Training    [place] Level (Grooming)  grooming skills;oral care regimen;wash face, hands;supervision  -SG        Position (Grooming)  sink side;unsupported standing  -SG           Toileting Assessment/Training    [place] Level (Toileting)  toileting skills;adjust/manage clothing;perform perineal hygiene;supervision  -SG           Plan of Care Review    Plan of Care Reviewed With  patient  -SG        Progress  improving  -SG        Outcome Summary  Pt at a supervision level with ADLs this am, planned to go for defibrilator today. Pt expresses concern for d/c back to work and finances. Will follow following  sx for any further OT needs.  -SG           Vital Signs    Intra SpO2 (%)  87  -SG        O2 Delivery Intra Treatment  room air  -SG        Post SpO2 (%)  92  -SG        O2 Delivery Post Treatment  supplemental O2  -SG           Positioning and Restraints    Pre-Treatment Position  sitting in chair/recliner  -SG        Post Treatment Position  chair  -SG        In Chair  sitting;call light within reach;encouraged to call for assist;notified nsg  -SG          User Key  (r) = Recorded By, (t) = Taken By, (c) = Cosigned By    Initials Name Effective Dates    SG Camila Godoy OTR 12/26/18 -          Occupational Therapy Education                 Title: PT OT SLP Therapies (In Progress)     Topic: Occupational Therapy (In Progress)     Point: ADL training (Done)     Description:   Instruct learner(s) on proper safety adaptation and remediation techniques during self care or transfers.   Instruct in proper use of assistive devices.              Learning Progress Summary           Patient Acceptance, E,TB, VU,NR by MR at 4/6/2021 1216    Comment: OT provided education on role of occupational therapy in acute care, difference between occupational and physical therapy, as pt with questions regarding this.    Acceptance, E, NR by VS at 4/2/2021 1506    Comment: OT disussed POC and Goals with the pt.                   Point: Precautions (In Progress)     Description:   Instruct learner(s) on prescribed precautions during self-care and functional transfers.              Learning Progress Summary           Patient Acceptance, E, NR by VS at 4/2/2021 1506    Comment: OT disussed POC and Goals with the pt.                               User Key     Initials Effective Dates Name Provider Type Discipline    VS 03/02/20 -  Violeta Orantes OTR Occupational Therapist OT    MR 06/22/16 -  Gely De Santiago, OT Occupational Therapist OT                  OT Recommendation and Plan     Plan of Care Review  Plan of Care Reviewed  With: patient  Progress: improving  Outcome Summary: Pt at a supervision level with ADLs this am, planned to go for defibrilator today. Pt expresses concern for d/c back to work and finances. Will follow following sx for any further OT needs.  Plan of Care Reviewed With: patient  Outcome Summary: Pt at a supervision level with ADLs this am, planned to go for defibrilator today. Pt expresses concern for d/c back to work and finances. Will follow following sx for any further OT needs.    Outcome Measures     Row Name 04/07/21 1200 04/06/21 1200          How much help from another is currently needed...    Putting on and taking off regular lower body clothing?  3  -SG  3  -MR     Bathing (including washing, rinsing, and drying)  3  -SG  3  -MR     Toileting (which includes using toilet bed pan or urinal)  3  -SG  3  -MR     Putting on and taking off regular upper body clothing  3  -SG  3  -MR     Taking care of personal grooming (such as brushing teeth)  4  -SG  3  -MR     Eating meals  4  -SG  4  -MR     AM-PAC 6 Clicks Score (OT)  20  -SG  19  -MR        Functional Assessment    Outcome Measure Options  AM-PAC 6 Clicks Daily Activity (OT)  -SG  --       User Key  (r) = Recorded By, (t) = Taken By, (c) = Cosigned By    Initials Name Provider Type    Camila Danielle OTR Occupational Therapist     Gely De SantiagoJennifer, OT Occupational Therapist          Time Calculation:   Time Calculation- OT     Row Name 04/07/21 1233             Time Calculation- OT    OT Start Time  1010  -SG      OT Stop Time  1036  -      OT Time Calculation (min)  26 min  -      Total Timed Code Minutes- OT  26 minute(s)  -      OT Received On  04/07/21  -      OT - Next Appointment  04/08/21  -        User Key  (r) = Recorded By, (t) = Taken By, (c) = Cosigned By    Initials Name Provider Type    Camila Danielle OTR Occupational Therapist        Therapy Charges for Today     Code Description Service Date Service Provider  Modifiers Qty    32345674666 HC OT SELF CARE/MGMT/TRAIN EA 15 MIN 4/7/2021 Camila Godoy, OTR GO 2               HARITHA Adame  4/7/2021

## 2021-04-07 NOTE — PROGRESS NOTES
Name: Sebastien Tang ADMIT: 2021   : 1960  PCP: Provider, No Known    MRN: [id number] LOS: 6 days   AGE/SEX: 61 y.o. male  ROOM: /     Subjective   Subjective   Patient appears stated age, unkempt, obese, relatively comfortable, and in no apparent distress.  Answering questions appropriately today.  Questions about when to return to work.  RN at bedside.  Tolerating P.O.      Review of Systems   Constitutional: Negative for chills and fever.   Respiratory: Negative for cough and shortness of breath.    Cardiovascular: Negative for chest pain and leg swelling.   Gastrointestinal: Negative for abdominal pain, constipation, diarrhea, nausea and vomiting.   Genitourinary: Negative for difficulty urinating and dysuria.   Neurological: Negative for dizziness, facial asymmetry, weakness, light-headedness, numbness and headaches.   Psychiatric/Behavioral: Negative for confusion and hallucinations.   All other systems reviewed and are negative.       Objective   Objective   Vital Signs  Temp:  [97.6 °F (36.4 °C)-98.3 °F (36.8 °C)] 97.7 °F (36.5 °C)  Heart Rate:  [] 100  Resp:  [18-20] 18  BP: ()/(67-86) 143/86  SpO2:  [90 %-96 %] 96 %  on  Flow (L/min):  [3] 3;   Device (Oxygen Therapy): nasal cannula  Body mass index is 33.78 kg/m².     Physical Exam  Constitutional:       Appearance: He is obese.      Comments: Unkempt   HENT:      Head: Normocephalic and atraumatic.   Eyes:      Extraocular Movements: Extraocular movements intact.      Conjunctiva/sclera: Conjunctivae normal.   Cardiovascular:      Rate and Rhythm: Tachycardia present. Rhythm irregular.      Heart sounds: Normal heart sounds.   Pulmonary:      Effort: Pulmonary effort is normal.      Comments: Diminished on expiration throughout all lung fields  Abdominal:      General: Bowel sounds are normal. There is no distension.      Palpations: Abdomen is soft.      Tenderness: There is no abdominal tenderness.   Musculoskeletal:          General: No tenderness.      Cervical back: Normal range of motion and neck supple.      Right lower leg: Edema (1+ pitting edema BLE) present.      Left lower leg: Edema present.   Skin:     General: Skin is warm and dry.   Neurological:      Mental Status: He is alert and oriented to person, place, and time.      Cranial Nerves: No cranial nerve deficit.   Psychiatric:         Thought Content: Thought content normal.      Comments: Somewhat odd behavior prefers to joke         Results Review     I reviewed the patient's new clinical results.  Results from last 7 days   Lab Units 04/07/21 0430 04/06/21 0444 04/05/21 0410 04/04/21  0404   WBC 10*3/mm3 8.64 8.04 8.29 12.10*   HEMOGLOBIN g/dL 12.6* 11.6* 11.6* 11.3*   PLATELETS 10*3/mm3 352 289 260 263     Results from last 7 days   Lab Units 04/07/21 0430 04/06/21 0444 04/05/21 0410 04/04/21  0404   SODIUM mmol/L 135* 139 137 137   POTASSIUM mmol/L 4.2 3.9 4.5 4.0   CHLORIDE mmol/L 94* 97* 100 97*   CO2 mmol/L 31.5* 29.3* 26.9 28.9   BUN mg/dL 22 30* 26* 27*   CREATININE mg/dL 0.60* 0.76 0.59* 0.72*   GLUCOSE mg/dL 105* 104* 99 119*   Estimated Creatinine Clearance: 135.8 mL/min (A) (by C-G formula based on SCr of 0.6 mg/dL (L)).  Results from last 7 days   Lab Units 04/07/21 0430 04/06/21 0444 04/05/21 0410 04/04/21  0404   ALBUMIN g/dL 4.10 3.70 3.70 3.90   BILIRUBIN mg/dL 0.6 0.4 0.5 0.9   ALK PHOS U/L 78 70 62 61   AST (SGOT) U/L 48* 55* 54* 74*   ALT (SGPT) U/L 60* 59* 47* 46*     Results from last 7 days   Lab Units 04/07/21 0430 04/06/21 0444 04/05/21 0410 04/04/21 0404 04/02/21  0415   CALCIUM mg/dL 10.6* 9.9 9.8 9.6 8.8   ALBUMIN g/dL 4.10 3.70 3.70 3.90 3.90   MAGNESIUM mg/dL  --   --   --   --  2.4   PHOSPHORUS mg/dL  --   --   --   --  2.9     Results from last 7 days   Lab Units 04/03/21  1203 04/02/21  0415   PROCALCITONIN ng/mL 2.13* 7.44*   LACTATE mmol/L  --  1.6     COVID19   Date Value Ref Range Status   04/01/2021 Not Detected Not  Detected - Ref. Range Final     Glucose   Date/Time Value Ref Range Status   04/04/2021 1537 136 (H) 70 - 130 mg/dL Final       XR Chest 1 View  Narrative: SINGLE VIEW OF THE CHEST     HISTORY: Respiratory failure     COMPARISON: 04/02/2021     FINDINGS:  Cardiomegaly is present. Endotracheal tube has been removed. There are  bilateral alveolar and interstitial infiltrates. These do not appear  significantly changed when compared to prior exam. No pneumothorax or  pleural effusion is seen.     Impression: No significant interval change in bilateral alveolar and interstitial  infiltrates.     This report was finalized on 4/3/2021 3:37 AM by Dr. Judi Chow M.D.       Scheduled Medications  amoxicillin-clavulanate, 1 tablet, Oral, Q12H  aspirin, 81 mg, Oral, Daily  budesonide-formoterol, 2 puff, Inhalation, BID - RT  docusate sodium, 100 mg, Oral, BID  enoxaparin, 40 mg, Subcutaneous, Nightly  [START ON 4/8/2021] furosemide, 40 mg, Oral, Daily  ipratropium-albuterol, 3 mL, Nebulization, TID - RT  iron sucrose, 200 mg, Intravenous, Q24H  lisinopril, 5 mg, Oral, Q24H  metoprolol succinate XL, 100 mg, Oral, Q12H  nicotine, 1 patch, Transdermal, Q24H  potassium chloride, 10 mEq, Intravenous, Once  senna-docusate sodium, 1 tablet, Oral, BID  sodium chloride, 10 mL, Intravenous, Q12H  tiotropium bromide monohydrate, 2 puff, Inhalation, Daily - RT  vancomycin, 1,000 mg, Intravenous, On Call    Infusions   Diet  NPO Diet       Assessment/Plan     Active Hospital Problems    Diagnosis  POA   • **Cardiac arrest (CMS/HCC) [I46.9]  Yes   • Constipation [K59.00]  Unknown   • Atrial flutter (CMS/HCC) [I48.92]  Unknown   • Tobacco abuse [Z72.0]  Yes   • Azotemia [R79.89]  Unknown   • COPD (chronic obstructive pulmonary disease) (CMS/HCC) [J44.9]  Yes   • MRSA nasal colonization [Z22.322]  Not Applicable   • Transaminitis [R74.01]  Yes   • Obesity (BMI 30-39.9) [E66.9]  Yes   • Ischemic cardiomyopathy [I25.5]  Yes   • Anemia  [D64.9]  Unknown   • Ventricular fibrillation (CMS/HCC) [I49.01]  Unknown      Resolved Hospital Problems   No resolved problems to display.       61 y.o. male admitted with Cardiac arrest (CMS/HCC).      Cardiac arrest (CMS/HCC) / Ventricular fibrillation (CMS/HCC) / Atrial flutter (CMS/HCC) / Ischemic cardiomyopathy.  Cardiology following--persistent tachycardia, afib--intermittent dosing digoxin.  Furosemide 40 mg daily.  Metoprolol  mg BID with lisinopril 5 mg PO daily, ASA 81 mg, & ICD tentative plan on 4/7/2021.    Tobacco abuse.  Recommend smoking cessation per hospital protocol. Nicoderm patch.    Azotemia.  Resolved.    COPD (chronic obstructive pulmonary disease) (CMS/HCC) / MRSA nasal colonization.  Pulmonary following--plan continue antibiotics (procal 2.13 on 4/3/2021, ordering procal in a.m.): IV vancomycin, 5 days course Augmentin BID.  Bronchodilators PRN, Spiriva, Symbicort.  PFTs in OP setting.  Flutter valve, IS.  2 Liters NC with oxygen saturation 96%--may need oxygen at [place].     Transaminitis.  Stable elevated LFTs.  Likely hepatic congestion 2/2 LVEF 38% / CHF vs other.  Afebrile.     Constipation.  Tolerating P.O.  Denies abdominal pain.  Pericolace BID.    Anemia.  Serum Hgb stable with trend.  No overt signs of active bleeding.  Noted iron profile deficiency, negative ferritin 103.  IV Venofer 200 mg daily x3 days for now.    Obesity (BMI 30-39.9).  Complicating all problems.     · enoxaparin for DVT prophylaxis.  · CPR  · Discussed with Dr. Tatum.  · Anticipate discharge pending clinical course, plan AICD on 4/7/2021 per cards / CCP following for anticipated needs at [place].      ERIC Miramontes  [facility]  04/07/21  11:57 EDT       Stable

## 2021-04-07 NOTE — PLAN OF CARE
Goal Outcome Evaluation:  Plan of Care Reviewed With: patient  Progress: improving  Outcome Summary: Pt at a supervision level with ADLs this am, planned to go for defibrilator today. Pt expresses concern for d/c back to work and finances. Will follow following sx for any further OT needs.    Patient was wearing a face mask during this therapy encounter. Therapist used appropriate personal protective equipment including mask, gloves, and eye protection.  Mask used was standard procedure mask. Appropriate PPE was worn during the entire therapy session. Hand hygiene was completed before and after therapy session. Patient is not in enhanced droplet precautions.

## 2021-04-08 VITALS
RESPIRATION RATE: 18 BRPM | OXYGEN SATURATION: 93 % | HEIGHT: 66 IN | SYSTOLIC BLOOD PRESSURE: 117 MMHG | BODY MASS INDEX: 33.91 KG/M2 | TEMPERATURE: 98.2 F | HEART RATE: 107 BPM | DIASTOLIC BLOOD PRESSURE: 77 MMHG | WEIGHT: 211 LBS

## 2021-04-08 PROBLEM — I48.0 PAROXYSMAL ATRIAL FIBRILLATION: Status: ACTIVE | Noted: 2021-04-08

## 2021-04-08 LAB
ALBUMIN SERPL-MCNC: 3.6 G/DL (ref 3.5–5.2)
ALBUMIN/GLOB SERPL: 1.2 G/DL
ALP SERPL-CCNC: 75 U/L (ref 39–117)
ALT SERPL W P-5'-P-CCNC: 44 U/L (ref 1–41)
ANION GAP SERPL CALCULATED.3IONS-SCNC: 7.2 MMOL/L (ref 5–15)
AST SERPL-CCNC: 27 U/L (ref 1–40)
BASOPHILS # BLD AUTO: 0.07 10*3/MM3 (ref 0–0.2)
BASOPHILS NFR BLD AUTO: 0.6 % (ref 0–1.5)
BILIRUB SERPL-MCNC: 0.6 MG/DL (ref 0–1.2)
BUN SERPL-MCNC: 22 MG/DL (ref 8–23)
BUN/CREAT SERPL: 26.2 (ref 7–25)
CALCIUM SPEC-SCNC: 9.6 MG/DL (ref 8.6–10.5)
CHLORIDE SERPL-SCNC: 100 MMOL/L (ref 98–107)
CO2 SERPL-SCNC: 27.8 MMOL/L (ref 22–29)
CREAT SERPL-MCNC: 0.84 MG/DL (ref 0.76–1.27)
DEPRECATED RDW RBC AUTO: 43.5 FL (ref 37–54)
EOSINOPHIL # BLD AUTO: 0.57 10*3/MM3 (ref 0–0.4)
EOSINOPHIL NFR BLD AUTO: 5.1 % (ref 0.3–6.2)
ERYTHROCYTE [DISTWIDTH] IN BLOOD BY AUTOMATED COUNT: 13.3 % (ref 12.3–15.4)
GFR SERPL CREATININE-BSD FRML MDRD: 92 ML/MIN/1.73
GLOBULIN UR ELPH-MCNC: 3 GM/DL
GLUCOSE SERPL-MCNC: 100 MG/DL (ref 65–99)
HCT VFR BLD AUTO: 37.4 % (ref 37.5–51)
HGB BLD-MCNC: 12.4 G/DL (ref 13–17.7)
IMM GRANULOCYTES # BLD AUTO: 0.08 10*3/MM3 (ref 0–0.05)
IMM GRANULOCYTES NFR BLD AUTO: 0.7 % (ref 0–0.5)
LYMPHOCYTES # BLD AUTO: 1.31 10*3/MM3 (ref 0.7–3.1)
LYMPHOCYTES NFR BLD AUTO: 11.6 % (ref 19.6–45.3)
MCH RBC QN AUTO: 29.8 PG (ref 26.6–33)
MCHC RBC AUTO-ENTMCNC: 33.2 G/DL (ref 31.5–35.7)
MCV RBC AUTO: 89.9 FL (ref 79–97)
MONOCYTES # BLD AUTO: 1.46 10*3/MM3 (ref 0.1–0.9)
MONOCYTES NFR BLD AUTO: 12.9 % (ref 5–12)
NEUTROPHILS NFR BLD AUTO: 69.1 % (ref 42.7–76)
NEUTROPHILS NFR BLD AUTO: 7.79 10*3/MM3 (ref 1.7–7)
NRBC BLD AUTO-RTO: 0 /100 WBC (ref 0–0.2)
PLATELET # BLD AUTO: 326 10*3/MM3 (ref 140–450)
PMV BLD AUTO: 10.3 FL (ref 6–12)
POTASSIUM SERPL-SCNC: 4.3 MMOL/L (ref 3.5–5.2)
PROCALCITONIN SERPL-MCNC: 0.2 NG/ML (ref 0–0.25)
PROT SERPL-MCNC: 6.6 G/DL (ref 6–8.5)
RBC # BLD AUTO: 4.16 10*6/MM3 (ref 4.14–5.8)
SODIUM SERPL-SCNC: 135 MMOL/L (ref 136–145)
WBC # BLD AUTO: 11.28 10*3/MM3 (ref 3.4–10.8)

## 2021-04-08 PROCEDURE — 80053 COMPREHEN METABOLIC PANEL: CPT | Performed by: INTERNAL MEDICINE

## 2021-04-08 PROCEDURE — 94799 UNLISTED PULMONARY SVC/PX: CPT

## 2021-04-08 PROCEDURE — 99232 SBSQ HOSP IP/OBS MODERATE 35: CPT | Performed by: NURSE PRACTITIONER

## 2021-04-08 PROCEDURE — 84145 PROCALCITONIN (PCT): CPT | Performed by: INTERNAL MEDICINE

## 2021-04-08 PROCEDURE — 99024 POSTOP FOLLOW-UP VISIT: CPT | Performed by: NURSE PRACTITIONER

## 2021-04-08 PROCEDURE — 85025 COMPLETE CBC W/AUTO DIFF WBC: CPT | Performed by: INTERNAL MEDICINE

## 2021-04-08 PROCEDURE — 97110 THERAPEUTIC EXERCISES: CPT

## 2021-04-08 RX ORDER — AMOXICILLIN AND CLAVULANATE POTASSIUM 875; 125 MG/1; MG/1
1 TABLET, FILM COATED ORAL EVERY 12 HOURS SCHEDULED
Qty: 2 TABLET | Refills: 0 | Status: SHIPPED | OUTPATIENT
Start: 2021-04-08 | End: 2021-04-09

## 2021-04-08 RX ORDER — BUDESONIDE AND FORMOTEROL FUMARATE DIHYDRATE 160; 4.5 UG/1; UG/1
2 AEROSOL RESPIRATORY (INHALATION)
Qty: 10.2 G | Refills: 0 | Status: SHIPPED | OUTPATIENT
Start: 2021-04-08 | End: 2021-05-11 | Stop reason: SDUPTHER

## 2021-04-08 RX ORDER — DIGOXIN 250 MCG
250 TABLET ORAL
Qty: 30 TABLET | Refills: 0 | Status: SHIPPED | OUTPATIENT
Start: 2021-04-09 | End: 2021-05-11

## 2021-04-08 RX ORDER — LISINOPRIL 5 MG/1
5 TABLET ORAL
Qty: 30 TABLET | Refills: 0 | Status: SHIPPED | OUTPATIENT
Start: 2021-04-09 | End: 2021-05-10 | Stop reason: SDUPTHER

## 2021-04-08 RX ORDER — POTASSIUM CHLORIDE 20 MEQ/1
20 TABLET, EXTENDED RELEASE ORAL DAILY
Qty: 30 TABLET | Refills: 0 | Status: SHIPPED | OUTPATIENT
Start: 2021-04-08 | End: 2021-05-06 | Stop reason: SDUPTHER

## 2021-04-08 RX ORDER — ASPIRIN 81 MG/1
81 TABLET ORAL DAILY
Qty: 30 TABLET | Refills: 0 | Status: SHIPPED | OUTPATIENT
Start: 2021-04-09

## 2021-04-08 RX ORDER — HYDROCODONE BITARTRATE AND ACETAMINOPHEN 5; 325 MG/1; MG/1
1 TABLET ORAL EVERY 8 HOURS PRN
Qty: 9 TABLET | Refills: 0 | Status: SHIPPED | OUTPATIENT
Start: 2021-04-08 | End: 2021-04-11

## 2021-04-08 RX ORDER — METOPROLOL SUCCINATE 100 MG/1
100 TABLET, EXTENDED RELEASE ORAL EVERY 12 HOURS SCHEDULED
Qty: 60 TABLET | Refills: 0 | Status: SHIPPED | OUTPATIENT
Start: 2021-04-08 | End: 2021-05-10 | Stop reason: SDUPTHER

## 2021-04-08 RX ORDER — DIGOXIN 250 MCG
250 TABLET ORAL
Status: DISCONTINUED | OUTPATIENT
Start: 2021-04-08 | End: 2021-04-08 | Stop reason: HOSPADM

## 2021-04-08 RX ORDER — FUROSEMIDE 40 MG/1
40 TABLET ORAL DAILY
Qty: 30 TABLET | Refills: 0 | Status: SHIPPED | OUTPATIENT
Start: 2021-04-09 | End: 2021-04-25 | Stop reason: SDUPTHER

## 2021-04-08 RX ADMIN — BUDESONIDE AND FORMOTEROL FUMARATE DIHYDRATE 2 PUFF: 160; 4.5 AEROSOL RESPIRATORY (INHALATION) at 07:03

## 2021-04-08 RX ADMIN — SODIUM CHLORIDE, PRESERVATIVE FREE 10 ML: 5 INJECTION INTRAVENOUS at 11:09

## 2021-04-08 RX ADMIN — DOCUSATE SODIUM 100 MG: 100 CAPSULE, LIQUID FILLED ORAL at 09:22

## 2021-04-08 RX ADMIN — DIGOXIN 250 MCG: 250 TABLET ORAL at 11:33

## 2021-04-08 RX ADMIN — FUROSEMIDE 40 MG: 40 TABLET ORAL at 09:21

## 2021-04-08 RX ADMIN — IPRATROPIUM BROMIDE AND ALBUTEROL SULFATE 3 ML: 2.5; .5 SOLUTION RESPIRATORY (INHALATION) at 11:30

## 2021-04-08 RX ADMIN — AMOXICILLIN AND CLAVULANATE POTASSIUM 1 TABLET: 875; 125 TABLET, FILM COATED ORAL at 09:21

## 2021-04-08 RX ADMIN — LISINOPRIL 5 MG: 5 TABLET ORAL at 09:21

## 2021-04-08 RX ADMIN — DOCUSATE SODIUM 50MG AND SENNOSIDES 8.6MG 1 TABLET: 8.6; 5 TABLET, FILM COATED ORAL at 09:22

## 2021-04-08 RX ADMIN — HYDROCODONE BITARTRATE AND ACETAMINOPHEN 2 TABLET: 5; 325 TABLET ORAL at 06:28

## 2021-04-08 RX ADMIN — Medication 1 PATCH: at 09:24

## 2021-04-08 RX ADMIN — Medication 3 ML: at 11:10

## 2021-04-08 RX ADMIN — TIOTROPIUM BROMIDE INHALATION SPRAY 2 PUFF: 3.12 SPRAY, METERED RESPIRATORY (INHALATION) at 07:03

## 2021-04-08 RX ADMIN — METOPROLOL SUCCINATE 100 MG: 100 TABLET, EXTENDED RELEASE ORAL at 09:21

## 2021-04-08 RX ADMIN — ASPIRIN 81 MG: 81 TABLET, COATED ORAL at 09:21

## 2021-04-08 NOTE — DISCHARGE SUMMARY
Patient Name: Sebastien Tang  : 1960  MRN: [id number]    Date of Admission: 2021  Date of Discharge:  2021  Primary Care Physician: Provider, No Known      Chief Complaint:   Cardiac Arrest      Discharge Diagnoses     Active Hospital Problems    Diagnosis  POA   • **Cardiac arrest (CMS/HCC) [I46.9]  Yes   • Paroxysmal atrial fibrillation (CMS/HCC) [I48.0]  Unknown   • Constipation [K59.00]  Unknown   • Atrial flutter (CMS/HCC) [I48.92]  Unknown   • Tobacco abuse [Z72.0]  Yes   • Azotemia [R79.89]  Unknown   • COPD (chronic obstructive pulmonary disease) (CMS/HCC) [J44.9]  Yes   • MRSA nasal colonization [Z22.322]  Not Applicable   • Transaminitis [R74.01]  Yes   • Obesity (BMI 30-39.9) [E66.9]  Yes   • Ischemic cardiomyopathy [I25.5]  Yes   • Anemia [D64.9]  Unknown   • Ventricular fibrillation (CMS/HCC) [I49.01]  Unknown      Resolved Hospital Problems   No resolved problems to display.        Hospital Course     Mr. Tang is a 61 y.o. male without significant medical history; however, chronic tobacco dependence syndrome noted who presented to [facility] initially complaining of cardiac arrest.  Please see the admitting history and physical for further details.  He was found to have cardiac arrest secondary to ischemic cardiomyopathy and ventricular fibrillation and was admitted to the hospital for further evaluation and treatment.      Patient admitted to ICU on 2021 for cardiac arrest secondary to ventricular fibrillation with successful ROSC.  Found with multivessel coronary artery disease noted  of the RCA with left to right collaterals and unsuccessful PCI attempt and OM occlusion.  Diuresed well during hospital stay.  LVEF less than 40%.  Metoprolol succinate  mg p.o. twice daily and digoxin provided for atrial flutter.  Patient received 5-days course Augmentin for presumed aspiration pneumonia.  Pulmonary evaluated patient and noted recommendation for  outpatient pulmonary function test following discharge and continue bronchodilators.  Recommend smoking cessation moving forward.  Cardiology recommend no driving or return to work for approximately 4 weeks and follow-up in 1 week for incision check.    Case management following for assistance with primary care provider with [facility] physician referral contact information provided.  Patient denied needs for home health & continued adequate oxygen saturation on room air prior to discharge.    Day of Discharge     Subjective:  Patient appears relatively comfortable and in no apparent distress following uncomplicated ICD placement on 4/7/2021.  Tolerating p.o. and room air.  Voices no new concerns.  Eager to discharge home today on 4/8/2021 agrees with plan for follow-up electrophysiologist, PCP, & cardiologist as previously discussed.    Physical Exam:  Temp:  [98 °F (36.7 °C)-98.8 °F (37.1 °C)] 98.2 °F (36.8 °C)  Heart Rate:  [106-118] 107  Resp:  [18-20] 18  BP: (109-162)/() 117/77  Body mass index is 34.06 kg/m².     Physical Exam  Constitutional:       General: He is not in acute distress.     Appearance: He is not ill-appearing.   HENT:      Head: Normocephalic and atraumatic.   Eyes:      Extraocular Movements: Extraocular movements intact.      Conjunctiva/sclera: Conjunctivae normal.   Cardiovascular:      Rate and Rhythm: Tachycardia present.      Heart sounds: Normal heart sounds.   Pulmonary:      Effort: Pulmonary effort is normal.      Breath sounds: Normal breath sounds.   Abdominal:      General: Bowel sounds are normal. There is no distension.      Palpations: Abdomen is soft.      Tenderness: There is no abdominal tenderness.   Musculoskeletal:         General: No tenderness.      Cervical back: Normal range of motion and neck supple.      Right lower leg: No edema.      Left lower leg: No edema.   Skin:     General: Skin is warm and dry.   Neurological:      Mental Status: He is alert and  oriented to person, place, and time.      Cranial Nerves: No cranial nerve deficit.   Psychiatric:         Behavior: Behavior normal.         Thought Content: Thought content normal.         Consultants     Consult Orders (all) (From admission, onward)     Start     Ordered    04/05/21 1446  Inpatient Case Management  Consult  Once     Provider:  (Not yet assigned)    04/05/21 1445    04/03/21 1228  Inpatient Internal Medicine Consult  Once     Specialty:  Internal Medicine  Provider:  Tato Esteves MD    04/03/21 1228    04/03/21 1136  Inpatient Vascular Surgery Consult  Once     Specialty:  Vascular Surgery  Provider:  Garry Huff MD    04/03/21 1135    04/01/21 1950  Inpatient Case Management  Consult  Once     Provider:  (Not yet assigned)    04/01/21 1949 04/01/21 1251  Pulmonology (on-call MD unless specified)  Once     Specialty:  Pulmonary Disease  Provider:  (Not yet assigned)    04/01/21 1250              Procedures     IMPLANTABLE CARDIOVERTER DEFIBRILLATOR- SC BIOTRONIK      Imaging Results (All)     Procedure Component Value Units Date/Time    XR Chest 1 View [[id number]] Collected: 04/07/21 1949     Updated: 04/07/21 1954    Narrative:      XR CHEST 1 VW-     HISTORY: Male who is 61 years-old,  pacemaker placement.     TECHNIQUE: Frontal view of the chest     COMPARISON: 04/03/2021     FINDINGS: Left-sided pacemaker, cardiac lead noted. The heart is mildly  enlarged. Aorta is tortuous. Mild prominence of vascular and  interstitial markings, with persistent bilateral infiltrates. No pleural  effusion or pneumothorax. No acute osseous process.       Impression:      Pacemaker. No pneumothorax.     This report was finalized on 4/7/2021 7:51 PM by Dr. Librado Orantes M.D.       XR Chest 1 View [[id number]] Collected: 04/03/21 0335     Updated: 04/03/21 0340    Narrative:      SINGLE VIEW OF THE CHEST     HISTORY: Respiratory failure     COMPARISON: 04/02/2021      FINDINGS:  Cardiomegaly is present. Endotracheal tube has been removed. There are  bilateral alveolar and interstitial infiltrates. These do not appear  significantly changed when compared to prior exam. No pneumothorax or  pleural effusion is seen.       Impression:      No significant interval change in bilateral alveolar and interstitial  infiltrates.     This report was finalized on 4/3/2021 3:37 AM by Dr. Judi Chow M.D.       XR Chest 1 View [[id number]] Collected: 04/02/21 0445     Updated: 04/02/21 0445    Narrative:        Patient: GUMARO PRABHAKAR  Time Out: 04:44  Exam(s): FILM CXR 1 VIEW     EXAM:    XR Chest, 1 View    CLINICAL HISTORY:     Respiratory failure  Reason for exam: Respiratory failure.    TECHNIQUE:    Frontal view of the chest.    COMPARISON:  4 1 2021     FINDINGS:    Lungs: Bilateral hazy opacities, left greater than right slightly   increased in the left upper lobe compared to prior study.    Pleural space:  Unremarkable.  No pneumothorax.    Heart: Moderate cardiomegaly.    Mediastinum:  Unremarkable.    Bones joints:  Unremarkable.    Lines and tubes: Endotracheal tube terminates 2.4 cm above the level of   the mitesh.    IMPRESSION:       1. Interval intubation.  Endotracheal tube is in appropriate position.    2. Bilateral hazy opacities, left greater than right slightly increased   in the left upper lobe compared to prior study which may be due to   worsening edema or infection.    3. No pleural effusions or pneumothorax.    Impression:          Electronically signed by Tyler Kulkarni M.D. on 04-02-21 at 0444    CT Angiogram Chest [[id number]] Collected: 04/01/21 1357     Updated: 04/01/21 1450    Narrative:      CT ANGIOGRAM CHEST, ABDOMEN AND PELVIS WITH IV CONTRAST     HISTORY: Cardiac arrest. Resuscitated. Rule out dissection of the  thoracic aorta.     TECHNIQUE: CT angiogram chest, abdomen and pelvis in the aortic phase  was performed to evaluate for dissection and  includes imaging from the  thoracic inlet through the trochanters. Data reconstructed in coronal  and sagittal planes. 3-D volume rendering performed. As part of the  technique for this CT exam, radiation dose reduction techniques were  utilized, including automated exposure control and exposure modulation  based on body size.     COMPARISON: CT abdomen and pelvis without contrast 03/08/2021.     FINDINGS: Mild atherosclerotic disease and calcified and noncalcified  plaque is present involving the aortic arch, particularly at the level  of the top of the aortic arch anteriorly. There is no evidence for  thoracic or abdominal aortic dissection. The great vessel origins are  patent. There is mild enlargement of the infrarenal abdominal aorta  measuring 2.8 cm AP dimension.  Atherosclerotic disease is present  involving the abdominal aorta and iliac vasculature. On the right, there  is moderate stenosis of the proximal right common iliac artery  associated with partially calcified plaque. Right internal/external  iliac arteries are patent. There is a moderate stenosis of the proximal  right external iliac artery. The right common femoral artery is patent.  The right proximal superficial and deep femoral arteries are patent. On  the left, there is mild enlargement of the left proximal common iliac  artery measuring 1.6 cm diameter. 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 This is just  proximal to its bifurcation. The left internal and external iliac  arteries are patent. There is partially calcified plaque involving the  left common femoral artery with mild narrowing. The left proximal  superficial and deep femoral arteries are patent.     NONANGIOGRAPHIC FINDINGS: The heart size is enlarged and there is  pulmonary arterial enlargement. The exam does not evaluate for pulmonary  embolus due to the  phase of contrast. There is no pleural or pericardial  effusion.     There is diffuse pulmonary emphysema with superimposed thickening of the  septal lines and hazy pulmonary opacities suspected to represent  bilateral pulmonary edema. There are acute fractures of bilateral  anterior 3rd and 4th ribs. Liver, spleen, adrenal glands, and pancreas  exhibit normal early arterial phase of contrast appearance. There is a 3  mm left lower pole nonobstructing renal stone. There is no  hydronephrosis. There is no bowel dilatation or evidence for bowel  obstruction. Sigmoid diverticulosis is present without evidence for  diverticulitis. There is prostate gland enlargement and the prostate  gland measures approximately 6.5 x 7.5 cm axial dimension with median  lobe hypertrophy.       Impression:      1. Diffuse pulmonary emphysema with hydrostatic and borderline alveolar  pulmonary edema.  2. Acute bilateral anterior 3rd and 4th rib fractures.  3. No evidence for thoracic aortic dissection. Diffuse, multifocal  atherosclerotic disease with mild aneurysmal dilatation of the  infrarenal abdominal aorta measuring 2.8 cm in diameter. 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 Moderate stenosis of right proximal common iliac  artery and distal left common iliac artery at the site of the distal  aneurysm.  4. 3 mm nonobstructing left lower pole renal stone.  5. Sigmoid diverticulosis without evidence for diverticulitis.  6. Prostate gland enlargement.     Discussed with Dr. Roque in the emergency department on 04/01/2021 at  1:05 PM.     This report was finalized on 4/1/2021 2:47 PM by Dr. Frandy Skelton M.D.       CT Angiogram Abdomen Pelvis [[id number]] Collected: 04/01/21 1357     Updated: 04/01/21 1450    Narrative:      CT ANGIOGRAM CHEST, ABDOMEN AND PELVIS WITH IV CONTRAST     HISTORY: Cardiac arrest. Resuscitated. Rule out dissection of the  thoracic aorta.      TECHNIQUE: CT angiogram chest, abdomen and pelvis in the aortic phase  was performed to evaluate for dissection and includes imaging from the  thoracic inlet through the trochanters. Data reconstructed in coronal  and sagittal planes. 3-D volume rendering performed. As part of the  technique for this CT exam, radiation dose reduction techniques were  utilized, including automated exposure control and exposure modulation  based on body size.     COMPARISON: CT abdomen and pelvis without contrast 03/08/2021.     FINDINGS: Mild atherosclerotic disease and calcified and noncalcified  plaque is present involving the aortic arch, particularly at the level  of the top of the aortic arch anteriorly. There is no evidence for  thoracic or abdominal aortic dissection. The great vessel origins are  patent. There is mild enlargement of the infrarenal abdominal aorta  measuring 2.8 cm AP dimension.  Atherosclerotic disease is present  involving the abdominal aorta and iliac vasculature. On the right, there  is moderate stenosis of the proximal right common iliac artery  associated with partially calcified plaque. Right internal/external  iliac arteries are patent. There is a moderate stenosis of the proximal  right external iliac artery. The right common femoral artery is patent.  The right proximal superficial and deep femoral arteries are patent. On  the left, there is mild enlargement of the left proximal common iliac  artery measuring 1.6 cm diameter. 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 This is just  proximal to its bifurcation. The left internal and external iliac  arteries are patent. There is partially calcified plaque involving the  left common femoral artery with mild narrowing. The left proximal  superficial and deep femoral arteries are patent.     NONANGIOGRAPHIC FINDINGS: The heart size is  enlarged and there is  pulmonary arterial enlargement. The exam does not evaluate for pulmonary  embolus due to the phase of contrast. There is no pleural or pericardial  effusion.     There is diffuse pulmonary emphysema with superimposed thickening of the  septal lines and hazy pulmonary opacities suspected to represent  bilateral pulmonary edema. There are acute fractures of bilateral  anterior 3rd and 4th ribs. Liver, spleen, adrenal glands, and pancreas  exhibit normal early arterial phase of contrast appearance. There is a 3  mm left lower pole nonobstructing renal stone. There is no  hydronephrosis. There is no bowel dilatation or evidence for bowel  obstruction. Sigmoid diverticulosis is present without evidence for  diverticulitis. There is prostate gland enlargement and the prostate  gland measures approximately 6.5 x 7.5 cm axial dimension with median  lobe hypertrophy.       Impression:      1. Diffuse pulmonary emphysema with hydrostatic and borderline alveolar  pulmonary edema.  2. Acute bilateral anterior 3rd and 4th rib fractures.  3. No evidence for thoracic aortic dissection. Diffuse, multifocal  atherosclerotic disease with mild aneurysmal dilatation of the  infrarenal abdominal aorta measuring 2.8 cm in diameter. 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 Moderate stenosis of right proximal common iliac  artery and distal left common iliac artery at the site of the distal  aneurysm.  4. 3 mm nonobstructing left lower pole renal stone.  5. Sigmoid diverticulosis without evidence for diverticulitis.  6. Prostate gland enlargement.     Discussed with Dr. Roque in the emergency department on 04/01/2021 at  1:05 PM.     This report was finalized on 4/1/2021 2:47 PM by Dr. Frandy Skelton M.D.       XR Chest 1 View [[id number]] Collected: 04/01/21 1235     Updated: 04/01/21 1240    Narrative:      ONE VIEW PORTABLE CHEST AT 12:02 PM      HISTORY: Chest pain and respiratory distress.     FINDINGS: There are no prior chest x-rays for comparison. There is  cardiomegaly with what appears to be prominent vascular congestion and  some vague haziness and peribronchial thickening and a component of this  can also be appreciated on an abdominal CT scan dated 03/08/2021. I  suspect much of this relates to congestive heart failure but there may  also be a chronic component as well. I cannot completely exclude  somewhat diffuse changes of pneumonia and further clinical correlation  is required.     This report was finalized on 4/1/2021 12:37 PM by Dr. Romero Crespo M.D.             Results for orders placed during the hospital encounter of 04/01/21    Adult Transthoracic Echo Complete W/ Cont if Necessary Per Protocol    Interpretation Summary  · Calculated left ventricular EF = 38.9% Estimated left ventricular EF was in agreement with the calculated left ventricular EF. Left ventricular systolic function is moderately decreased. Normal left ventricular cavity size noted. Left ventricular wall thickness is consistent with mild concentric hypertrophy. Left ventricular diastolic function was normal.  · See wall scoring diagram.    Pertinent Labs     Results from last 7 days   Lab Units 04/08/21 0404 04/07/21  0430 04/06/21 0444 04/05/21  0410   WBC 10*3/mm3 11.28* 8.64 8.04 8.29   HEMOGLOBIN g/dL 12.4* 12.6* 11.6* 11.6*   PLATELETS 10*3/mm3 326 352 289 260     Results from last 7 days   Lab Units 04/08/21 0404 04/07/21  0430 04/06/21 0444 04/05/21  0410   SODIUM mmol/L 135* 135* 139 137   POTASSIUM mmol/L 4.3 4.2 3.9 4.5   CHLORIDE mmol/L 100 94* 97* 100   CO2 mmol/L 27.8 31.5* 29.3* 26.9   BUN mg/dL 22 22 30* 26*   CREATININE mg/dL 0.84 0.60* 0.76 0.59*   GLUCOSE mg/dL 100* 105* 104* 99   Estimated Creatinine Clearance: 97.5 mL/min (by C-G formula based on SCr of 0.84 mg/dL).  Results from last 7 days   Lab Units 04/08/21 0404 04/07/21 0430  04/06/21 0444 04/05/21  0410   ALBUMIN g/dL 3.60 4.10 3.70 3.70   BILIRUBIN mg/dL 0.6 0.6 0.4 0.5   ALK PHOS U/L 75 78 70 62   AST (SGOT) U/L 27 48* 55* 54*   ALT (SGPT) U/L 44* 60* 59* 47*     Results from last 7 days   Lab Units 04/08/21  0404 04/07/21  0430 04/06/21 0444 04/05/21  0410 04/03/21  0402 04/02/21  0415   CALCIUM mg/dL 9.6 10.6* 9.9 9.8  --  8.8   ALBUMIN g/dL 3.60 4.10 3.70 3.70   < > 3.90   MAGNESIUM mg/dL  --   --   --   --   --  2.4   PHOSPHORUS mg/dL  --   --   --   --   --  2.9    < > = values in this interval not displayed.       Results from last 7 days   Lab Units 04/02/21  0415   TROPONIN T ng/mL 1.190*   PROBNP pg/mL 922.1*       Results from last 7 days   Lab Units 04/02/21  0415   CHOLESTEROL mg/dL 182   TRIGLYCERIDES mg/dL 293*   HDL CHOL mg/dL 28*   LDL CHOL mg/dL 104*     Results from last 7 days   Lab Units 04/03/21  1648   RESPCX  Rare Normal Respiratory Keke: NO S.aureus/MRSA or Pseudomonas aeruginosa         Test Results Pending at Discharge       Discharge Details        Discharge Medications      New Medications      Instructions Start Date   amoxicillin-clavulanate 875-125 MG per tablet  Commonly known as: AUGMENTIN   1 tablet, Oral, Every 12 Hours Scheduled      Aspirin Low Dose 81 MG EC tablet  Generic drug: aspirin   81 mg, Oral, Daily   Start Date: April 9, 2021     budesonide-formoterol 160-4.5 MCG/ACT inhaler  Commonly known as: SYMBICORT   2 puffs, Inhalation, 2 Times Daily - RT      digoxin 250 MCG tablet  Commonly known as: LANOXIN   250 mcg, Oral, Daily Digoxin   Start Date: April 9, 2021     Eliquis 5 MG tablet tablet  Generic drug: apixaban   5 mg, Oral, Every 12 Hours Scheduled   Start Date: April 12, 2021     furosemide 40 MG tablet  Commonly known as: LASIX   40 mg, Oral, Daily   Start Date: April 9, 2021     HYDROcodone-acetaminophen 5-325 MG per tablet  Commonly known as: NORCO   1 tablet, Oral, Every 8 Hours PRN      lisinopril 5 MG tablet  Commonly known  as: PRINIVIL,ZESTRIL   5 mg, Oral, Every 24 Hours Scheduled   Start Date: April 9, 2021     metoprolol succinate  MG 24 hr tablet  Commonly known as: TOPROL-XL   100 mg, Oral, Every 12 Hours Scheduled      potassium chloride 20 MEQ CR tablet  Commonly known as: K-DUR,KLOR-CON   20 mEq, Oral, Daily      tiotropium bromide monohydrate 2.5 MCG/ACT aerosol solution inhaler  Commonly known as: SPIRIVA RESPIMAT   2 puffs, Inhalation, Daily             No Known Allergies      Discharge Disposition:  Home or Self Care    Discharge Diet:  Diet Order   Procedures   • Diet Regular       Discharge Activity:   Activity Instructions     Driving Restrictions      Type of Restriction: Driving    Driving Restrictions: No Driving    No driving for 4 weeks    Work Restrictions      Type of Restriction: Work    May Return to Work: Other    Return to Work Instructions: No work for 4 weeks   Additional Activity Instructions:         Rest and relax  No strenuous activities  Do not lift arms above head for at least 2 weeks  Do not lift anything over 5 lbs until doctor approves            CODE STATUS:    Code Status and Medical Interventions:   Ordered at: 04/01/21 1548     Code Status:    CPR     Medical Interventions (Level of Support Prior to Arrest):    Full       Future Appointments   Date Time Provider Department Center   4/15/2021 11:00 AM [facility] IN OFFICE, [facility] None   5/11/2021  8:30 AM [facility] IN OFFICE, [facility] None   5/11/2021  9:00 AM Magda Luevano APRN [facility] None     Additional Instructions for the Follow-ups that You Need to Schedule     Ambulatory Referral to Cardiac Rehab   As directed      Discharge Follow-up with PCP   As directed       Currently Documented PCP:    Provider, No Known    PCP Phone Number:    [phone]     Follow Up Details: Please call to schedule a one week or earliest available follow-up with PCP as previously discussed; BP, CBC, BMP         Discharge  Follow-up with Specialty: Electro-physiology cardiologist; 1 Week   As directed      Specialty: Electro-physiology cardiologist    Follow Up: 1 Week    Follow Up Details: Please call to schedule a one week follow-up appointment with electrophysiologist as previously discussed           Follow-up Information     Garry Huff MD Follow up in 6 month(s).    Specialty: Vascular Surgery  Why: Aortoiliac duplex for AAA surveillance.  Contact information:  [address] [facility] [address]  [facility] [address]  [phone]             [facility] Follow up in 1 week(s).    Why: Incision check   Contact information:  [phone]           Magda Luevano APRN Follow up in 1 month(s).    Specialty: Cardiology  Why: with in clinic device check   Contact information:  [address] [facility] [address]  [facility] [address]  [phone]             Meghan Phelps APRN Follow up in 1 week(s).    Specialties: Nurse Practitioner, Cardiology  Contact information:  [address] [facility] [address]  [facility] [place] [address]  [phone]             Anna Puag MD Follow up in 1 month(s).    Specialty: Cardiology  Contact information:  [address] [facility] [address]  [place] [address]  [phone]             Provider, No Known .    Why: Please call to schedule a one week or earliest available follow-up with PCP as previously discussed; BP, CBC, BMP  Contact information:  [facility] [address]  [phone]                   Additional Instructions for the Follow-ups that You Need to Schedule     Ambulatory Referral to Cardiac Rehab   As directed      Discharge Follow-up with PCP   As directed       Currently Documented PCP:    Provider, No Known    PCP Phone Number:    [phone]     Follow Up Details: Please call to schedule a one week or earliest available follow-up with PCP as previously discussed; BP, CBC, BMP         Discharge Follow-up with Specialty: Electro-physiology cardiologist; 1 Week    As directed      Specialty: Electro-physiology cardiologist    Follow Up: 1 Week    Follow Up Details: Please call to schedule a one week follow-up appointment with electrophysiologist as previously discussed           Time Spent on Discharge:  Greater than 30 minutes      ERIC Miramontes  [facility]  04/08/21  12:54 EDT

## 2021-04-08 NOTE — PROGRESS NOTES
Hospital Follow Up    LOS:  LOS: 7 days   Patient Name: Sebastien Tang  Age/Sex: 61 y.o. male  : 1960  MRN: [id number]    Day of Service: 21   Length of Stay: 7  Encounter Provider: ERIC Higgins  Place of Service: [facility] CARDIOLOGY  Patient Care Team:  Provider, No Known as PCP - General    Subjective:     Chief Complaint: VF arrest, CAD, Afib, CM Ef 30%     Interval History: No shortness of breath or chest pain. Complains of chest wall soreness with inspiration most likely from resuscitation    Objective:     Objective:  Temp:  [98 °F (36.7 °C)-98.8 °F (37.1 °C)] 98.2 °F (36.8 °C)  Heart Rate:  [106-118] 107  Resp:  [18-20] 18  BP: (108-162)/() 117/77     Intake/Output Summary (Last 24 hours) at 2021 0927  Last data filed at 2021 0654  Gross per 24 hour   Intake 780 ml   Output 1000 ml   Net -220 ml     Body mass index is 34.06 kg/m².      21  0642 21  0721 21  0654   Weight: 95.9 kg (211 lb 8 oz) 94.9 kg (209 lb 4.8 oz) 95.7 kg (211 lb)     Weight change:     Physical Exam:   General Appearance:    Awake alert and oriented in no acute distress.   Color:  Skin:    Neuro:  HEENT:    Lungs:     Pink  Warm and dry. Left upper chest incision with some swelling but stable appearance.  No focal, motor or sensory deficits  Neck supple, pupils equal, round and reactive. No JVD, No Bruit  Clear to auscultation,respirations regular, even and                  unlabored    Heart:   irreg/irreg rate and rhythm, S1 and S2, no murmur, + gallop, no rub. No edema, DP/PT pulses are 2+   Chest Wall:    No abnormalities observed   Abdomen:     Normal bowel sounds, no masses, no organomegaly, soft        non-tender, non-distended, no guarding, no ascites noted   Extremities:   Moves all extremities well, no edema, no cyanosis, no redness       Lab Review:   Results from last 7 days   Lab Units 21  0404 21  0430   SODIUM mmol/L 135*  135*   POTASSIUM mmol/L 4.3 4.2   CHLORIDE mmol/L 100 94*   CO2 mmol/L 27.8 31.5*   BUN mg/dL 22 22   CREATININE mg/dL 0.84 0.60*   GLUCOSE mg/dL 100* 105*   CALCIUM mg/dL 9.6 10.6*   AST (SGOT) U/L 27 48*   ALT (SGPT) U/L 44* 60*     Results from last 7 days   Lab Units 04/02/21  0415 04/01/21  1530 04/01/21  1212   TROPONIN T ng/mL 1.190* 1.050* 0.012     Results from last 7 days   Lab Units 04/08/21  0404 04/07/21  0430   WBC 10*3/mm3 11.28* 8.64   HEMOGLOBIN g/dL 12.4* 12.6*   HEMATOCRIT % 37.4* 38.6   PLATELETS 10*3/mm3 326 352     Results from last 7 days   Lab Units 04/01/21  1212   INR  1.17*   APTT seconds 39.7*     Results from last 7 days   Lab Units 04/02/21  0415   MAGNESIUM mg/dL 2.4     Results from last 7 days   Lab Units 04/02/21  0415   CHOLESTEROL mg/dL 182   TRIGLYCERIDES mg/dL 293*   HDL CHOL mg/dL 28*     Results from last 7 days   Lab Units 04/02/21  0415   PROBNP pg/mL 922.1*     Results from last 7 days   Lab Units 04/02/21  0415   TSH uIU/mL 1.690     I reviewed the patient's new clinical results.  I personally viewed and interpreted the patient's EKG  Current Medications:   Scheduled Meds:amoxicillin-clavulanate, 1 tablet, Oral, Q12H  [START ON 4/12/2021] apixaban, 5 mg, Oral, Q12H  aspirin, 81 mg, Oral, Daily  budesonide-formoterol, 2 puff, Inhalation, BID - RT  digoxin, 250 mcg, Oral, Daily  docusate sodium, 100 mg, Oral, BID  furosemide, 40 mg, Oral, Daily  ipratropium-albuterol, 3 mL, Nebulization, TID - RT  iron sucrose, 200 mg, Intravenous, Q24H  lisinopril, 5 mg, Oral, Q24H  metoprolol succinate XL, 100 mg, Oral, Q12H  nicotine, 1 patch, Transdermal, Q24H  potassium chloride, 10 mEq, Intravenous, Once  senna-docusate sodium, 1 tablet, Oral, BID  sodium chloride, 10 mL, Intravenous, Q12H  sodium chloride, 3 mL, Intravenous, Q12H  tiotropium bromide monohydrate, 2 puff, Inhalation, Daily - RT      Continuous Infusions:     Allergies:  No Known Allergies    Assessment:       Cardiac  arrest (CMS/HCC)    Ventricular fibrillation (CMS/HCC)    Atrial flutter (CMS/HCC)    Tobacco abuse    Azotemia    COPD (chronic obstructive pulmonary disease) (CMS/HCC)    MRSA nasal colonization    Transaminitis    Obesity (BMI 30-39.9)    Ischemic cardiomyopathy    Anemia    Constipation  1. VF Arrest- s/p ICD  2. Coronary Artery disease-  of the RCA with left to right collaterals (unsuccesfful PCI attempt) and OM occluded  3. Ischemic CM EF 30%  4. Acute Systolic CHF  5. New onset afib with previous atrial tachycardia       Plan:       Had ICD yesterday. Incision stable. Afib with RVR still today. Place on oral dig at 250mcg. Also started on Eliquis today for anticoagulation. On aspirin for CAD. No antiplatelet med due to no stenting and needing other AC with Eliquis. Discussed with him at length his recovery time and need for lifestyle modification.  ERIC Higgins  04/08/21  09:27 EDT  Electronically signed by ERIC Higgins, 04/08/21, 9:27 AM EDT.

## 2021-04-08 NOTE — THERAPY TREATMENT NOTE
Patient Name: Sebastien Tang  : 1960    MRN: [id number]                              Today's Date: 2021       Admit Date: 2021    Visit Dx:     ICD-10-CM ICD-9-CM   1. Cardiac arrest (CMS/HCC)  I46.9 427.5   2. Abnormal EKG  R94.31 794.31   3. Abnormal CXR  R93.89 793.2   4. Hypokalemia  E87.6 276.8   5. Hyperglycemia  R73.9 790.29   6. Ventricular fibrillation (CMS/HCC)  I49.01 427.41   7. Decreased mobility and endurance  Z74.09 780.99     Patient Active Problem List   Diagnosis   • Cardiac arrest (CMS/HCC)   • Ventricular fibrillation (CMS/HCC)   • Atrial flutter (CMS/HCC)   • Tobacco abuse   • Azotemia   • COPD (chronic obstructive pulmonary disease) (CMS/HCC)   • MRSA nasal colonization   • Transaminitis   • Obesity (BMI 30-39.9)   • Ischemic cardiomyopathy   • Anemia   • Constipation     No past medical history on file.  Past Surgical History:   Procedure Laterality Date   • CARDIAC CATHETERIZATION Left 2021    Procedure: Coronary Angiography;  Surgeon: Anna Puga MD;  Location: [facility] CATH INVASIVE LOCATION;  Service: Cardiology;  Laterality: Left;   • CARDIAC CATHETERIZATION N/A 2021    Procedure: Left ventriculography;  Surgeon: Anna Puga MD;  [facility];  Service: Cardiology;  Laterality: N/A;   • CARDIAC CATHETERIZATION N/A 2021    Procedure: Left Heart Cath;  Surgeon: Anna Puga MD;  Location: [facility] CATH INVASIVE LOCATION;  Service: Cardiology;  Laterality: N/A;     General Information     Row Name 21 1058          Physical Therapy Time and Intention    Document Type  therapy note (daily note)  -CB     Mode of Treatment  individual therapy;physical therapy  -CB     Row Name 21 4555          General Information    Patient Profile Reviewed  yes  -CB     Existing Precautions/Restrictions  cardiac  -CB     Barriers to Rehab  cognitive status  -CB     Row Name 21 1673          Cognition    Orientation Status (Cognition)   oriented to;person;place;situation  -CB     Row Name 04/08/21 1058          Safety Issues, Functional Mobility    Impairments Affecting Function (Mobility)  cognition;endurance/activity tolerance  -CB       User Key  (r) = Recorded By, (t) = Taken By, (c) = Cosigned By    Initials Name Provider Type    Marilyn Cardenas Physical Therapist        Mobility     Row Name 04/08/21 1058          Bed Mobility    Bed Mobility  supine-sit  -CB     Supine-Sit [place] (Bed Mobility)  modified independence  -CB     Assistive Device (Bed Mobility)  head of bed elevated  -CB     Row Name 04/08/21 1058          Sit-Stand Transfer    Sit-Stand [place] (Transfers)  supervision  -CB     Row Name 04/08/21 1058          Gait/Stairs (Locomotion)    [place] Level (Gait)  supervision  -CB     Distance in Feet (Gait)  150ft  -CB     Deviations/Abnormal Patterns (Gait)  stride length decreased  -CB     Comment (Gait/Stairs)  no LOB, normal gait pattern.  -CB       User Key  (r) = Recorded By, (t) = Taken By, (c) = Cosigned By    Initials Name Provider Type    Marilyn Cardenas Physical Therapist        Obj/Interventions     Row Name 04/08/21 1059          Balance    Balance Assessment  sitting dynamic balance  -CB     Static Sitting Balance  WFL  -CB     Dynamic Sitting Balance  WFL  -CB     Static Standing Balance  WFL  -CB     Dynamic Standing Balance  WFL  -CB     Balance Interventions  sitting;standing;sit to stand;supported;static;dynamic;minimal challenge  -CB     Comment, Balance  static/dynamic sitting EOB with S >5min  -CB       User Key  (r) = Recorded By, (t) = Taken By, (c) = Cosigned By    Initials Name Provider Type    Marilyn Cardenas Physical Alessandro        Goals/Plan    No documentation.       Clinical Impression     Row Name 04/08/21 1100          Pain Scale: Numbers Pre/Post-Treatment    Pretreatment Pain Rating  0/10 - no pain  -CB     Posttreatment Pain Rating  0/10 - no pain  -CB     Row Name 04/08/21  1100          Plan of Care Review    Plan of Care Reviewed With  patient  -CB     Progress  improving  -CB     Outcome Summary  Patient is agreeable to PT today. The patient completed bed mobility with Marquita, STS wtih S, and ambulated 150ft with S. No LOB with ambulation and normal gait pattern but patient does state he felt mildly unsteady during further ambulation. The patient will continue to benefit from skilled PT to increase level of independence.  -CB     Row Name 04/08/21 1100          Positioning and Restraints    Pre-Treatment Position  in bed  -CB     Post Treatment Position  bed  -CB     In Bed  encouraged to call for assist;call light within reach;sitting;notified nsg  -CB       User Key  (r) = Recorded By, (t) = Taken By, (c) = Cosigned By    Initials Name Provider Type    Marilyn Cardenas Physical Therapist        Outcome Measures     Row Name 04/08/21 1103          How much help from another person do you currently need...    Turning from your back to your side while in flat bed without using bedrails?  4  -CB     Moving from lying on back to sitting on the side of a flat bed without bedrails?  4  -CB     Moving to and from a bed to a chair (including a wheelchair)?  4  -CB     Standing up from a chair using your arms (e.g., wheelchair, bedside chair)?  4  -CB     Climbing 3-5 steps with a railing?  3  -CB     To walk in hospital room?  4  -CB     AM-PAC 6 Clicks Score (PT)  23  -CB     Row Name 04/08/21 1103          Functional Assessment    Outcome Measure Options  AM-PAC 6 Clicks Basic Mobility (PT)  -CB       User Key  (r) = Recorded By, (t) = Taken By, (c) = Cosigned By    Initials Name Provider Type    Marilyn Cardenas Physical Therapist        Physical Therapy Education                 Title: PT OT SLP Therapies (In Progress)     Topic: Physical Therapy (Done)     Point: Mobility training (Done)     Learning Progress Summary           Patient Acceptance, E,TB, VU,NR by CB at 4/8/2021 1104     Acceptance, E,D, DU by PC at 4/6/2021 1140    Acceptance, E, VU by ME at 4/5/2021 1417    Acceptance, E,D, NR by JR at 4/3/2021 1455                   Point: Home exercise program (Done)     Learning Progress Summary           Patient Acceptance, E,D, DU by PC at 4/6/2021 1140    Acceptance, E,D, NR by JR at 4/3/2021 1455                   Point: Body mechanics (Done)     Learning Progress Summary           Patient Acceptance, E,TB, VU,NR by CB at 4/8/2021 1104    Acceptance, E,D, DU by PC at 4/6/2021 1140    Acceptance, E,D, NR by JR at 4/3/2021 1455                   Point: Precautions (Done)     Learning Progress Summary           Patient Acceptance, E,TB, VU,NR by CB at 4/8/2021 1104    Acceptance, E,D, DU by  at 4/6/2021 1140    Acceptance, E,D, NR by  at 4/3/2021 1455                               User Key     Initials Effective Dates Name Provider Type Discipline     04/03/18 -  Sophie Zaragoza, PT Physical Therapist PT    JR 04/03/18 -  Abdoulaye Luna, PT Physical Therapist PT    CB 12/30/20 -  Marilyn Mcmillan Physical Therapist PT    ME 03/12/21 -  Sang Chinchilla, RITA Student PT Student PT              PT Recommendation and Plan  Planned Therapy Interventions (PT): balance training, gait training, bed mobility training, home exercise program, patient/family education, transfer training, stretching, strengthening, ROM (range of motion)  Plan of Care Reviewed With: patient  Progress: improving  Outcome Summary: Patient is agreeable to PT today. The patient completed bed mobility with Marquita, STS wtih S, and ambulated 150ft with S. No LOB with ambulation and normal gait pattern but patient does state he felt mildly unsteady during further ambulation. The patient will continue to benefit from skilled PT to increase level of independence.     Time Calculation:   PT Charges     Row Name 04/08/21 1107             Time Calculation    Start Time  0956  -CB      Stop Time  1012  -CB      Time Calculation  (min)  16 min  -CB      PT Received On  04/08/21  -CB      PT - Next Appointment  04/09/21  -CB         Time Calculation- PT    Total Timed Code Minutes- PT  16 minute(s)  -CB        User Key  (r) = Recorded By, (t) = Taken By, (c) = Cosigned By    Initials Name Provider Type    Marilyn Cardenas Physical Therapist        Therapy Charges for Today     Code Description Service Date Service Provider Modifiers Qty    94692431544 HC PT THER PROC EA 15 MIN 4/8/2021 Marilyn Mcmillan GP 1          PT G-Codes  Outcome Measure Options: AM-PAC 6 Clicks Basic Mobility (PT)  AM-PAC 6 Clicks Score (PT): 23  AM-PAC 6 Clicks Score (OT): 20    Marilyn Mcmillan  4/8/2021

## 2021-04-08 NOTE — PROGRESS NOTES
Pharmacy Services- Discharge counseling    Sebastien Tang was counseled on eliquis, aspirin, and digoxin s/p ICD placement. Counseling points included the followin) Explained indication of each new medication, and patient's need for these medications.  2) Went over dosing and frequency of each new medication.  3) Discussed any special administration  4) Discussed all important drug interactions, including over-the-counter medications and supplements.  5) Explained possible side effects for each new medication.  6) Instructed the patient not to begin or discontinue any medications without informing his/her physician/pharmacist.    Patient expressed understanding and had no further questions.      Breanna Gaitan  Pharmacy Resident    done

## 2021-04-08 NOTE — PROGRESS NOTES
Dr. Rosas and I saw patient this morning. Checked device, normal testing and function. Incision site with no hematoma or drainage.     Discussed activity limitations--no heavy lifting, excessive pushing, pulling or lifting with left arm for 3-4 weeks.     Remains in afib, added daily digoxin. Can consider CV as outpatient down the road.     He will have incision check in 1 week and see me in 1 month with device check .     Will start apixaban for AC on Monday.     He can go home from EP standpoint.

## 2021-04-08 NOTE — PLAN OF CARE
Goal Outcome Evaluation:  Plan of Care Reviewed With: patient  Progress: improving  Outcome Summary: Patient is agreeable to PT today. The patient completed bed mobility with Marquita, STS wtih S, and ambulated 150ft with S. No LOB with ambulation and normal gait pattern but patient does state he felt mildly unsteady during further ambulation. The patient will continue to benefit from skilled PT to increase level of independence.Patient was intermittently wearing a face mask during this therapy encounter. Therapist used appropriate personal protective equipment including eye protection, mask, and gloves.  Mask used was standard procedure mask. Appropriate PPE was worn during the entire therapy session. Hand hygiene was completed before and after therapy session. Patient is not in enhanced droplet precautions.

## 2021-04-08 NOTE — PROGRESS NOTES
[facility] INPATIENT PROGRESS NOTE         [address] CVI    2021      PATIENT IDENTIFICATION:  Name: Sebastien Tang ADMIT: 2021   : 1960  PCP: Provider, No Known    MRN: [id number] LOS: 7 days   AGE/SEX: 61 y.o. male  ROOM: [facility]/                     LOS 7    Reason for visit: Resuscitated V. fib arrest with septic shock      SUBJECTIVE:      No new complaints.      Objective   OBJECTIVE:    Vital Sign Min/Max for last 24 hours  Temp  Min: 98 °F (36.7 °C)  Max: 98.8 °F (37.1 °C)   BP  Min: 108/72  Max: 162/129   Pulse  Min: 106  Max: 118   Resp  Min: 18  Max: 20   SpO2  Min: 93 %  Max: 96 %   No data recorded   Weight  Min: 95.7 kg (211 lb)  Max: 95.7 kg (211 lb)                   FiO2 (%): 36 %     Body mass index is 34.06 kg/m².    Intake/Output Summary (Last 24 hours) at 2021 0917  Last data filed at 2021 0654  Gross per 24 hour   Intake 780 ml   Output 1000 ml   Net -220 ml         Exam:  GEN:  No distress, appears stated age  LUNGS: Normal chest on inspectionpalpation and diminished breath sounds on auscultation  CV:  Normal S1S2, without murmur      Assessment     Scheduled meds:  amoxicillin-clavulanate, 1 tablet, Oral, Q12H  [START ON 2021] apixaban, 5 mg, Oral, Q12H  aspirin, 81 mg, Oral, Daily  budesonide-formoterol, 2 puff, Inhalation, BID - RT  digoxin, 250 mcg, Oral, Daily  docusate sodium, 100 mg, Oral, BID  furosemide, 40 mg, Oral, Daily  ipratropium-albuterol, 3 mL, Nebulization, TID - RT  iron sucrose, 200 mg, Intravenous, Q24H  lisinopril, 5 mg, Oral, Q24H  metoprolol succinate XL, 100 mg, Oral, Q12H  nicotine, 1 patch, Transdermal, Q24H  potassium chloride, 10 mEq, Intravenous, Once  senna-docusate sodium, 1 tablet, Oral, BID  sodium chloride, 10 mL, Intravenous, Q12H  sodium chloride, 3 mL, Intravenous, Q12H  tiotropium bromide monohydrate, 2 puff, Inhalation, Daily - RT      IV meds:                         Data Review:  Results from last 7 days    Lab Units 04/08/21  0404 04/07/21  0430 04/06/21  0444 04/05/21  0410 04/04/21  0404   SODIUM mmol/L 135* 135* 139 137 137   POTASSIUM mmol/L 4.3 4.2 3.9 4.5 4.0   CHLORIDE mmol/L 100 94* 97* 100 97*   CO2 mmol/L 27.8 31.5* 29.3* 26.9 28.9   BUN mg/dL 22 22 30* 26* 27*   CREATININE mg/dL 0.84 0.60* 0.76 0.59* 0.72*   GLUCOSE mg/dL 100* 105* 104* 99 119*   CALCIUM mg/dL 9.6 10.6* 9.9 9.8 9.6         Estimated Creatinine Clearance: 97.5 mL/min (by C-G formula based on SCr of 0.84 mg/dL).  Results from last 7 days   Lab Units 04/08/21  0404 04/07/21  0430 04/06/21  0444 04/05/21  0410 04/04/21  0404   WBC 10*3/mm3 11.28* 8.64 8.04 8.29 12.10*   HEMOGLOBIN g/dL 12.4* 12.6* 11.6* 11.6* 11.3*   PLATELETS 10*3/mm3 326 352 289 260 263     Results from last 7 days   Lab Units 04/01/21  1212   INR  1.17*     Results from last 7 days   Lab Units 04/08/21  0404 04/07/21  0430 04/06/21  0444 04/05/21  0410 04/04/21  0404   ALT (SGPT) U/L 44* 60* 59* 47* 46*   AST (SGOT) U/L 27 48* 55* 54* 74*     Results from last 7 days   Lab Units 04/01/21  1531 04/01/21  1222   PH, ARTERIAL pH units 7.331* 7.064*   PO2 ART mm Hg 120.2* 59.6*   PCO2, ARTERIAL mm Hg 46.2* 46.6*   HCO3 ART mmol/L 24.4 13.3*     Results from last 7 days   Lab Units 04/08/21  0404 04/03/21  1203 04/02/21  0415   PROCALCITONIN ng/mL 0.20 2.13* 7.44*   LACTATE mmol/L  --   --  1.6         Chest x-ray/3/21 reviewed        Microbiology reviewed  )        Active Hospital Problems    Diagnosis  POA   • **Cardiac arrest (CMS/HCC) [I46.9]  Yes   • Constipation [K59.00]  Unknown   • Atrial flutter (CMS/HCC) [I48.92]  Unknown   • Tobacco abuse [Z72.0]  Yes   • Azotemia [R79.89]  Unknown   • COPD (chronic obstructive pulmonary disease) (CMS/HCC) [J44.9]  Yes   • MRSA nasal colonization [Z22.322]  Not Applicable   • Transaminitis [R74.01]  Yes   • Obesity (BMI 30-39.9) [E66.9]  Yes   • Ischemic cardiomyopathy [I25.5]  Yes   • Anemia [D64.9]  Unknown   • Ventricular  fibrillation (CMS/HCC) [I49.01]  Unknown      Resolved Hospital Problems   No resolved problems to display.         ASSESSMENT:    Resuscitated V. fib arrest  Sepsis with shock requiring pressors: Improved  Cardiogenic shock  Ischemic cardiomyopathy/CAD  Acute non-ST elevation MI  Acute hypoxemic respiratory failure  Pulmonary edema  COPD and emphysema  Metabolic and respiratory acidosis: Improved  Diverticulosis without diverticulitis  Hypokalemia: Improved  Transaminitis with passive hepatic congestion  Hematuria  BPH          PLAN:    Encourage pulmonary toilet.  Bronchodilators for COPD symptoms as needed.  Would benefit from pulmonary function testing as an outpatient for further evaluation after discharge.  Completing antibiotic course.  Status post AICD 4/7/21.  Diuretics per cardiology orders.  Following peripherally.      Moe Azevedo MD  Pulmonary and Critical Care Medicine  [facility], [facility]  4/8/2021    09:17 EDT

## 2021-04-08 NOTE — DISCHARGE INSTR - ACTIVITY
Rest and relax  No strenuous activities  Do not lift arms above head for at least 2 weeks  Do not lift anything over 5 lbs until doctor approves

## 2021-04-09 ENCOUNTER — READMISSION MANAGEMENT (OUTPATIENT)
Dept: CALL CENTER | Facility: HOSPITAL | Age: 61
End: 2021-04-09

## 2021-04-09 NOTE — OUTREACH NOTE
Prep Survey      Responses   [facility] facility patient discharged from?  [place]   Is LACE score < 7 ?  No   Emergency Room discharge w/ pulse ox?  No   Eligibility  Readm Mgmt   Discharge diagnosis  Cardiac Arrest, paroxysmal atrial fibrillation, COPD [s/p inplabtable cardioverter defibrillator-SC Biotronik]   Does the patient have one of the following disease processes/diagnoses(primary or secondary)?  General Surgery   Does the patient have Home health ordered?  No   Is there a DME ordered?  No   Comments regarding appointments  Needs f/u scheduled   Medication alerts for this patient  Start Aspirin and Eliquis.  Meds per AVS.   Prep survey completed?  Yes          Abida Dixon RN

## 2021-04-09 NOTE — PROGRESS NOTES
Case Management Discharge Note      Final Note: Pt d/c home 4/8/2021 with [facility] indegent fund paying for his d/c medications (excluding his symbicort & spiriva).  Per TOBIAS Sierra, Pt was given the hospital supply of spiriva and symbicort that would last him over 30 days.  Pt denied need for home health at d/c.......TOBIAS MCDOWELL/ARACELI         Selected Continued Care - Discharged on 4/8/2021 Admission date: 4/1/2021 - Discharge disposition: Home or Self Care    Destination    No services have been selected for the patient.              Durable Medical Equipment    No services have been selected for the patient.              Dialysis/Infusion    No services have been selected for the patient.              Home Medical Care    No services have been selected for the patient.              Therapy    No services have been selected for the patient.              Community Resources    No services have been selected for the patient.                       Final Discharge Disposition Code: 01 - home or self-care

## 2021-04-12 ENCOUNTER — READMISSION MANAGEMENT (OUTPATIENT)
Dept: CALL CENTER | Facility: HOSPITAL | Age: 61
End: 2021-04-12

## 2021-04-12 NOTE — OUTREACH NOTE
General Surgery Week 1 Survey      Responses   [facility] patient discharged from?  [facility]   Does the patient have one of the following disease processes/diagnoses(primary or secondary)?  General Surgery   Week 1 attempt successful?  Yes   Call start time  1408   Call end time  1427   Discharge diagnosis  Cardiac Arrest, paroxysmal atrial fibrillation, COPD   Meds reviewed with patient/caregiver?  Yes   Is the patient having any side effects they believe may be caused by any medication additions or changes?  No   Does the patient have all medications related to this admission filled (includes all antibiotics, pain medications, etc.)  Yes   Is the patient taking all medications as directed (includes completed medication regime)?  Yes   Does the patient have a follow up appointment scheduled with their surgeon?  Yes   Has the patient kept scheduled appointments due by today?  Yes   Psychosocial issues?  No   Did the patient receive a copy of their discharge instructions?  Yes   Nursing interventions  Reviewed instructions with patient   What is the patient's perception of their health status since discharge?  Improving   Is the patient/caregiver able to teach back steps to recovery at home?  Set small, achievable goals for return to baseline health, Rest and rebuild strength, gradually increase activity   If the patient is a current smoker, are they able to teach back resources for cessation?  Not a smoker [Patient reports he has not been smoking since discharge.]   Is the patient/caregiver able to teach back the hierarchy of who to call/visit for symptoms/problems? PCP, Specialist, Home health nurse, Urgent Care, ED, 911  Yes   Week 1 call completed?  Yes   Revoked  No further contact(revokes)-requires comment   Is the patient interested in additional calls from an ambulatory ?  NOTE:  applies to high risk patients requiring additional follow-up.  No   Graduated/Revoked comments  Patient is  displeased with hospital stay.          Parisa Conde RN

## 2021-04-13 ENCOUNTER — NURSE TRIAGE (OUTPATIENT)
Dept: CALL CENTER | Facility: HOSPITAL | Age: 61
End: 2021-04-13

## 2021-04-13 NOTE — TELEPHONE ENCOUNTER
"EMELY Macario - He was discharged on 04/08/2021- He is calling because his chest is sore from chest compression during CPR. Explained this is common and it will take time to heal.     Reason for Disposition  • Health Information question, no triage required and triager able to answer question    Additional Information  • Negative: [1] Caller is not with the adult (patient) AND [2] reporting urgent symptoms  • Negative: Lab result questions  • Negative: Medication questions  • Negative: Caller can't be reached by phone  • Negative: Caller has already spoken to PCP or another triager  • Negative: RN needs further essential information from caller in order to complete triage  • Negative: Requesting regular office appointment  • Negative: [1] Caller requesting NON-URGENT health information AND [2] PCP's office is the best resource  • Negative: General information question, no triage required and triager able to answer question  • Negative: Question about upcoming scheduled test, no triage required and triager able to answer question  • Negative: [1] Caller is not with the adult (patient) AND [2] probable NON-URGENT symptoms    Answer Assessment - Initial Assessment Questions  1. REASON FOR CALL or QUESTION: \"What is your reason for calling today?\" or \"How can I best help you?\" or \"What question do you have that I can help answer?\"      See note    Protocols used: INFORMATION ONLY CALL - NO TRIAGE-ADULT-      "

## 2021-04-15 ENCOUNTER — TELEPHONE (OUTPATIENT)
Dept: CARDIOLOGY | Facility: CLINIC | Age: 61
End: 2021-04-15

## 2021-04-15 ENCOUNTER — CLINICAL SUPPORT NO REQUIREMENTS (OUTPATIENT)
Dept: CARDIOLOGY | Facility: CLINIC | Age: 61
End: 2021-04-15

## 2021-04-15 DIAGNOSIS — I49.01 VENTRICULAR FIBRILLATION (HCC): ICD-10-CM

## 2021-04-15 DIAGNOSIS — I46.9 CARDIAC ARREST (HCC): Primary | ICD-10-CM

## 2021-04-15 PROCEDURE — 93282 PRGRMG EVAL IMPLANTABLE DFB: CPT | Performed by: INTERNAL MEDICINE

## 2021-04-15 NOTE — TELEPHONE ENCOUNTER
Patient was here for incision check after implant of single chamber ICD - [employer]ronik Caticor 7 VR-T DX.  Device function normally and incision is clean, dry and in tact with minor bruising.  Patient is very anxious so we answered a lot of questions for him as he does not really remember what happened to him.  He did have a cardiac arrest and had to have CPR so he is having some severe chest soreness and we advised him he could take Tylenol but he asked if there is anything stronger he can take.  Also, when can he return to work and when can he drive?  His next appointment is on 5/11/21 with Magda.  He was informed to wait until that time but would like to know if he can drive earlier.

## 2021-04-16 NOTE — TELEPHONE ENCOUNTER
"Attempted to call patient to let him know he can drive now but no answer and \"mail box not currently accepting messages\".  Will attempt to call again later.  "

## 2021-04-19 ENCOUNTER — TELEPHONE (OUTPATIENT)
Dept: CARDIAC REHAB | Facility: HOSPITAL | Age: 61
End: 2021-04-19

## 2021-04-19 NOTE — TELEPHONE ENCOUNTER
Coverable diagnosis for Cardiac are as follows:  MI (stemi or nstemi); PCI, stent, stable angina, chronic stable systolic HF with EF less than 35% and NYHA Class II-IV symptoms, CAB, valve replacement or repair. Sorry, I know this is frustrating!

## 2021-04-19 NOTE — TELEPHONE ENCOUNTER
"Cardiac Rehab referral received. Unfortunately, it does not appear that he has a coverable diagnosis.  Called pt to discuss cardiac rehab referral and determine his interest level.   The patient was anxious, upset, and very talkative.  He doesn't understand how he \"didn't have a heart attack.\"  He is also upset that he was in the hospital so long and currently at home for 6 weeks unable to work and he \"didn't have a heart attack.\"   He is also c/o feeling like he's drowning when he lays down to sleep.  He is taking his medications, including Lasix, as prescribed. I instructed him to call [facility] [place] [place] office as soon as we hang up and report his symptoms.  He called me back almost immediately upset that he had to leave a message instead of talking to you. I explained how the process works and did repeat to him several times that if he feels like it is an emergency, he does need to call 911.  I did follow this note up with a call to your office and talked to Rhonda in triage.  Thank you so much!  "

## 2021-04-19 NOTE — TELEPHONE ENCOUNTER
I spoke with Mr. Tang and reviewed recommendations and cardiac rehab info.    Thank you,  Saige Craig RN  [facility]  Triage

## 2021-04-19 NOTE — TELEPHONE ENCOUNTER
He needs a PCP---sounds like allergy/drainage possibly     Also let him know that so far do not have diagnosis to qualify him for cardiac rehab---not our guidelines that is insurance. Will continue to look to see if we can get him qualified but if not will manage without it

## 2021-04-19 NOTE — TELEPHONE ENCOUNTER
"I got hold of Mr. Tang. He describes this as phlegm in the back of his throat. He can lay down at night to go to sleep and be fine but, as soon as he closes his eyes and drifts off, he feels like he is going to \"choke to death.\"  He doesn't have any coughing, he just needs to swallow for relief.     This has been going on for 3-4 nights now and he states he hasn't gotten any sleep since it started. I suggested he try elevating his head or sleeping in a recliner. He states he has but it didn't make a difference in his symptoms.    He denies any SOA, dyspnea except when this happens. Denies any new CP, edema. He is afraid he is \"going to die.\"    Thank you,  Saige Craig RN  [facility]  Triage    "

## 2021-04-19 NOTE — TELEPHONE ENCOUNTER
I attempted to call Mr. Tang but his phone number went to a business that didn't know him.  I called Ms. Adams at Cardiac Rehab to compare phone numbers and update ours as necessary.      Message left requesting a return call for correct phone number. Will await return call.    Thank you,  Saige Craig RN  [facility]  Triage

## 2021-04-23 ENCOUNTER — TELEPHONE (OUTPATIENT)
Dept: CARDIOLOGY | Facility: CLINIC | Age: 61
End: 2021-04-23
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
## 2021-04-25 ENCOUNTER — HOSPITAL ENCOUNTER (EMERGENCY)
Facility: HOSPITAL | Age: 61
Discharge: HOME | End: 2021-04-25
Attending: EMERGENCY MEDICINE | Admitting: EMERGENCY MEDICINE

## 2021-04-25 ENCOUNTER — APPOINTMENT (OUTPATIENT)
Dept: GENERAL RADIOLOGY | Facility: HOSPITAL | Age: 61
End: 2021-04-25

## 2021-04-25 VITALS
WEIGHT: 215 LBS | SYSTOLIC BLOOD PRESSURE: 107 MMHG | BODY MASS INDEX: 34.55 KG/M2 | OXYGEN SATURATION: 93 % | HEIGHT: 66 IN | DIASTOLIC BLOOD PRESSURE: 78 MMHG | TEMPERATURE: 97.7 F | RESPIRATION RATE: 20 BRPM | HEART RATE: 94 BPM

## 2021-04-25 DIAGNOSIS — R06.01 ORTHOPNEA: ICD-10-CM

## 2021-04-25 DIAGNOSIS — I50.9 ACUTE CONGESTIVE HEART FAILURE, UNSPECIFIED HEART FAILURE TYPE (HCC): Primary | ICD-10-CM

## 2021-04-25 DIAGNOSIS — G47.9 TROUBLE IN SLEEPING: ICD-10-CM

## 2021-04-25 LAB
ALBUMIN SERPL-MCNC: 4.2 G/DL (ref 3.5–5.2)
ALBUMIN/GLOB SERPL: 1.6 G/DL
ALP SERPL-CCNC: 111 U/L (ref 39–117)
ALT SERPL W P-5'-P-CCNC: 30 U/L (ref 1–41)
ANION GAP SERPL CALCULATED.3IONS-SCNC: 9.7 MMOL/L (ref 5–15)
AST SERPL-CCNC: 21 U/L (ref 1–40)
BASOPHILS # BLD AUTO: 0.09 10*3/MM3 (ref 0–0.2)
BASOPHILS NFR BLD AUTO: 1.2 % (ref 0–1.5)
BILIRUB SERPL-MCNC: 0.4 MG/DL (ref 0–1.2)
BUN SERPL-MCNC: 16 MG/DL (ref 8–23)
BUN/CREAT SERPL: 22.2 (ref 7–25)
CALCIUM SPEC-SCNC: 10 MG/DL (ref 8.6–10.5)
CHLORIDE SERPL-SCNC: 104 MMOL/L (ref 98–107)
CO2 SERPL-SCNC: 26.3 MMOL/L (ref 22–29)
CREAT SERPL-MCNC: 0.72 MG/DL (ref 0.76–1.27)
DEPRECATED RDW RBC AUTO: 42.3 FL (ref 37–54)
DIGOXIN SERPL-MCNC: 0.5 NG/ML (ref 0.6–1.2)
EOSINOPHIL # BLD AUTO: 0.35 10*3/MM3 (ref 0–0.4)
EOSINOPHIL NFR BLD AUTO: 4.5 % (ref 0.3–6.2)
ERYTHROCYTE [DISTWIDTH] IN BLOOD BY AUTOMATED COUNT: 13.2 % (ref 12.3–15.4)
GFR SERPL CREATININE-BSD FRML MDRD: 110 ML/MIN/1.73
GLOBULIN UR ELPH-MCNC: 2.6 GM/DL
GLUCOSE SERPL-MCNC: 102 MG/DL (ref 65–99)
HCT VFR BLD AUTO: 39.1 % (ref 37.5–51)
HGB BLD-MCNC: 12.7 G/DL (ref 13–17.7)
IMM GRANULOCYTES # BLD AUTO: 0.04 10*3/MM3 (ref 0–0.05)
IMM GRANULOCYTES NFR BLD AUTO: 0.5 % (ref 0–0.5)
LYMPHOCYTES # BLD AUTO: 1.84 10*3/MM3 (ref 0.7–3.1)
LYMPHOCYTES NFR BLD AUTO: 23.7 % (ref 19.6–45.3)
MCH RBC QN AUTO: 28.5 PG (ref 26.6–33)
MCHC RBC AUTO-ENTMCNC: 32.5 G/DL (ref 31.5–35.7)
MCV RBC AUTO: 87.9 FL (ref 79–97)
MONOCYTES # BLD AUTO: 0.7 10*3/MM3 (ref 0.1–0.9)
MONOCYTES NFR BLD AUTO: 9 % (ref 5–12)
NEUTROPHILS NFR BLD AUTO: 4.76 10*3/MM3 (ref 1.7–7)
NEUTROPHILS NFR BLD AUTO: 61.1 % (ref 42.7–76)
NRBC BLD AUTO-RTO: 0 /100 WBC (ref 0–0.2)
NT-PROBNP SERPL-MCNC: 955.6 PG/ML (ref 0–900)
PLATELET # BLD AUTO: 346 10*3/MM3 (ref 140–450)
PMV BLD AUTO: 9.5 FL (ref 6–12)
POTASSIUM SERPL-SCNC: 4.4 MMOL/L (ref 3.5–5.2)
PROT SERPL-MCNC: 6.8 G/DL (ref 6–8.5)
QT INTERVAL: 341 MS
RBC # BLD AUTO: 4.45 10*6/MM3 (ref 4.14–5.8)
SODIUM SERPL-SCNC: 140 MMOL/L (ref 136–145)
TROPONIN T SERPL-MCNC: <0.01 NG/ML (ref 0–0.03)
WBC # BLD AUTO: 7.78 10*3/MM3 (ref 3.4–10.8)

## 2021-04-25 PROCEDURE — 96374 THER/PROPH/DIAG INJ IV PUSH: CPT

## 2021-04-25 PROCEDURE — 83880 ASSAY OF NATRIURETIC PEPTIDE: CPT | Performed by: EMERGENCY MEDICINE

## 2021-04-25 PROCEDURE — 85025 COMPLETE CBC W/AUTO DIFF WBC: CPT | Performed by: EMERGENCY MEDICINE

## 2021-04-25 PROCEDURE — 93010 ELECTROCARDIOGRAM REPORT: CPT | Performed by: INTERNAL MEDICINE

## 2021-04-25 PROCEDURE — 80162 ASSAY OF DIGOXIN TOTAL: CPT | Performed by: EMERGENCY MEDICINE

## 2021-04-25 PROCEDURE — 84484 ASSAY OF TROPONIN QUANT: CPT | Performed by: EMERGENCY MEDICINE

## 2021-04-25 PROCEDURE — 25010000002 FUROSEMIDE PER 20 MG: Performed by: EMERGENCY MEDICINE

## 2021-04-25 PROCEDURE — 93005 ELECTROCARDIOGRAM TRACING: CPT | Performed by: EMERGENCY MEDICINE

## 2021-04-25 PROCEDURE — 80053 COMPREHEN METABOLIC PANEL: CPT | Performed by: EMERGENCY MEDICINE

## 2021-04-25 PROCEDURE — 71045 X-RAY EXAM CHEST 1 VIEW: CPT

## 2021-04-25 PROCEDURE — 99284 EMERGENCY DEPT VISIT MOD MDM: CPT

## 2021-04-25 RX ORDER — FUROSEMIDE 40 MG/1
40 TABLET ORAL 2 TIMES DAILY
Qty: 30 TABLET | Refills: 0 | Status: SHIPPED | OUTPATIENT
Start: 2021-04-25 | End: 2021-05-17 | Stop reason: SDUPTHER

## 2021-04-25 RX ORDER — SODIUM CHLORIDE 0.9 % (FLUSH) 0.9 %
10 SYRINGE (ML) INJECTION AS NEEDED
Status: DISCONTINUED | OUTPATIENT
Start: 2021-04-25 | End: 2021-04-25 | Stop reason: HOSPADM

## 2021-04-25 RX ORDER — FUROSEMIDE 10 MG/ML
80 INJECTION INTRAMUSCULAR; INTRAVENOUS ONCE
Status: COMPLETED | OUTPATIENT
Start: 2021-04-25 | End: 2021-04-25

## 2021-04-25 RX ORDER — FUROSEMIDE 40 MG/1
40 TABLET ORAL 2 TIMES DAILY
Qty: 30 TABLET | Refills: 0 | Status: CANCELLED | OUTPATIENT
Start: 2021-04-25

## 2021-04-25 RX ORDER — ZOLPIDEM TARTRATE 5 MG/1
5 TABLET ORAL NIGHTLY PRN
Qty: 7 TABLET | Refills: 0 | Status: SHIPPED | OUTPATIENT
Start: 2021-04-25 | End: 2021-04-26 | Stop reason: SDUPTHER

## 2021-04-25 RX ADMIN — FUROSEMIDE 80 MG: 10 INJECTION, SOLUTION INTRAMUSCULAR; INTRAVENOUS at 12:41

## 2021-04-25 NOTE — DISCHARGE INSTRUCTIONS
Take medications as prescribed.  Increase your Lasix dose to 40 mg twice a day.  Follow-up with your primary care doctor tomorrow as scheduled.  Follow-up with your cardiologist in 2 weeks as scheduled.  Return to emergency department for worsening shortness of breath, chest pain, leg swelling, weight gain, or other concern.

## 2021-04-25 NOTE — ED PROVIDER NOTES
" EMERGENCY DEPARTMENT ENCOUNTER    Room Number:  34/34  Date of encounter:  4/25/2021  PCP: Provider, No Known  Historian: Patient     I used full protective equipment while examining this patient.  This includes face mask, gloves and protective eyewear.  I washed my hands before entering the room and immediately upon leaving the room.  Patient was wearing a surgical mask.      HPI:  Chief Complaint: Shortness of breath  A complete HPI/ROS/PMH/PSH/SH/FH are unobtainable due to: None    Context: Sebastien Tang is a 61 y.o. male who presents to the ED c/o intermittent shortness of breath.  Symptoms began approximately 1 week ago.  Symptoms only occur when the patient is lying flat.  He states when he goes to sleep, he wakes up feeling like he cannot breathe and like \"there is a plunger in my throat and I am choking\".  He feels better if he is sitting up.  Denies dyspnea on exertion.  He reports not sleeping well since being discharged from the hospital earlier this month.  He has tried taking Benadryl without relief.  Reports a mild nonproductive cough, and intermittent chest soreness..  Also reports feeling shaky and lightheaded off and on for the past several days.  Denies fever, chills, sore throat, trouble swallowing, abdominal pain, nausea, vomiting, recent weight gain, leg swelling.  Patient was admitted here earlier this month status post cardiac arrest.  He was started on multiple new medications and had a defibrillator placed.  He has his first appointment with his new PCP tomorrow.      PAST MEDICAL HISTORY  Active Ambulatory Problems     Diagnosis Date Noted   • Cardiac arrest (CMS/[facility]) 04/01/2021   • Ventricular fibrillation (CMS/[facility]) 04/01/2021   • Atrial flutter (CMS/[facility]) 04/05/2021   • Tobacco abuse 04/05/2021   • Azotemia 04/05/2021   • COPD (chronic obstructive pulmonary disease) (CMS/[facility]) 04/05/2021   • MRSA nasal colonization 04/05/2021   • Transaminitis 04/05/2021   • Obesity (BMI 30-39.9) 04/05/2021 "   • Ischemic cardiomyopathy 04/05/2021   • Anemia 04/05/2021   • Constipation 04/06/2021   • Paroxysmal atrial fibrillation (CMS/HCC) 04/08/2021     Resolved Ambulatory Problems     Diagnosis Date Noted   • No Resolved Ambulatory Problems     Past Medical History:   Diagnosis Date   • Hypertension          PAST SURGICAL HISTORY  Past Surgical History:   Procedure Laterality Date   • CARDIAC CATHETERIZATION Left 4/1/2021    Procedure: Coronary Angiography;  Surgeon: Anna Puga MD;  Location:  [facility];  Service: Cardiology;  Laterality: Left;   • CARDIAC CATHETERIZATION N/A 4/1/2021    Procedure: Left ventriculography;  Surgeon: Anna Puga MD;  Location:  [facility];  Service: Cardiology;  Laterality: N/A;   • CARDIAC CATHETERIZATION N/A 4/1/2021    Procedure: Left Heart Cath;  Surgeon: Anna Puga MD;  Location:  [facility];  Service: Cardiology;  Laterality: N/A;   • CARDIAC ELECTROPHYSIOLOGY PROCEDURE N/A 4/7/2021    Procedure: IMPLANTABLE CARDIOVERTER DEFIBRILLATOR- SC BIOTRONIK;  Surgeon: Nik Rosas MD;  Location:  [facility];  Service: Cardiovascular;  Laterality: N/A;         FAMILY HISTORY  History reviewed. No pertinent family history.      SOCIAL HISTORY  Social History     Socioeconomic History   • Marital status: Single     Spouse name: Not on file   • Number of children: Not on file   • Years of education: Not on file   • Highest education level: Not on file   Tobacco Use   • Smoking status: Former Smoker     Packs/day: 1.50     Years: 10.00     Pack years: 15.00     Types: Cigarettes   • Smokeless tobacco: Never Used   • Tobacco comment: quit smoking this month-4/21   Substance and Sexual Activity   • Drug use: Not Currently   • Sexual activity: Not Currently         ALLERGIES  Patient has no known allergies.       REVIEW OF SYSTEMS  Review of Systems      All systems have been reviewed and are negative  except as as discussed in the HPI    PHYSICAL EXAM    I have reviewed the triage vital signs and nursing notes.    ED Triage Vitals   Temp Heart Rate Resp BP SpO2   04/25/21 1043 04/25/21 1036 04/25/21 1036 04/25/21 1043 04/25/21 1036   97.7 °F (36.5 °C) 101 20 137/94 98 %      Temp src Heart Rate Source Patient Position BP Location FiO2 (%)   04/25/21 1043 -- -- -- --   Oral           Physical Exam  GENERAL: Awake, alert oriented x3  HENT: NCAT, nares patent, moist mucous membranes, oropharynx is benign  NECK: supple  EYES: Extraocular muscles intact, no scleral icterus  CV: Irregularly irregular rhythm, regular rate  RESPIRATORY: normal effort, faint rales in the left base, right lung is clear  ABDOMEN: soft, nontender  MUSCULOSKELETAL: Extremities are nontender and without obvious deformity.  There is normal range of motion in all extremities.  There is no calf tenderness or pedal edema  NEURO: Strength, sensation, and coordination are grossly intact.  Speech and mentation are unremarkable.  No facial droop.  SKIN: warm, dry, no rash; healing incision without signs of infection in the left upper chest wall  PSYCH: Normal mood and affect      LAB RESULTS  Recent Results (from the past 24 hour(s))   Comprehensive Metabolic Panel    Collection Time: 04/25/21 11:04 AM    Specimen: Blood   Result Value Ref Range    Glucose 102 (H) 65 - 99 mg/dL    BUN 16 8 - 23 mg/dL    Creatinine 0.72 (L) 0.76 - 1.27 mg/dL    Sodium 140 136 - 145 mmol/L    Potassium 4.4 3.5 - 5.2 mmol/L    Chloride 104 98 - 107 mmol/L    CO2 26.3 22.0 - 29.0 mmol/L    Calcium 10.0 8.6 - 10.5 mg/dL    Total Protein 6.8 6.0 - 8.5 g/dL    Albumin 4.20 3.50 - 5.20 g/dL    ALT (SGPT) 30 1 - 41 U/L    AST (SGOT) 21 1 - 40 U/L    Alkaline Phosphatase 111 39 - 117 U/L    Total Bilirubin 0.4 0.0 - 1.2 mg/dL    eGFR Non African Amer 110 >60 mL/min/1.73    Globulin 2.6 gm/dL    A/G Ratio 1.6 g/dL    BUN/Creatinine Ratio 22.2 7.0 - 25.0    Anion Gap 9.7 5.0 -  15.0 mmol/L   BNP    Collection Time: 04/25/21 11:04 AM    Specimen: Blood   Result Value Ref Range    proBNP 955.6 (H) 0.0 - 900.0 pg/mL   Troponin    Collection Time: 04/25/21 11:04 AM    Specimen: Blood   Result Value Ref Range    Troponin T <0.010 0.000 - 0.030 ng/mL   CBC Auto Differential    Collection Time: 04/25/21 11:04 AM    Specimen: Blood   Result Value Ref Range    WBC 7.78 3.40 - 10.80 10*3/mm3    RBC 4.45 4.14 - 5.80 10*6/mm3    Hemoglobin 12.7 (L) 13.0 - 17.7 g/dL    Hematocrit 39.1 37.5 - 51.0 %    MCV 87.9 79.0 - 97.0 fL    MCH 28.5 26.6 - 33.0 pg    MCHC 32.5 31.5 - 35.7 g/dL    RDW 13.2 12.3 - 15.4 %    RDW-SD 42.3 37.0 - 54.0 fl    MPV 9.5 6.0 - 12.0 fL    Platelets 346 140 - 450 10*3/mm3    Neutrophil % 61.1 42.7 - 76.0 %    Lymphocyte % 23.7 19.6 - 45.3 %    Monocyte % 9.0 5.0 - 12.0 %    Eosinophil % 4.5 0.3 - 6.2 %    Basophil % 1.2 0.0 - 1.5 %    Immature Grans % 0.5 0.0 - 0.5 %    Neutrophils, Absolute 4.76 1.70 - 7.00 10*3/mm3    Lymphocytes, Absolute 1.84 0.70 - 3.10 10*3/mm3    Monocytes, Absolute 0.70 0.10 - 0.90 10*3/mm3    Eosinophils, Absolute 0.35 0.00 - 0.40 10*3/mm3    Basophils, Absolute 0.09 0.00 - 0.20 10*3/mm3    Immature Grans, Absolute 0.04 0.00 - 0.05 10*3/mm3    nRBC 0.0 0.0 - 0.2 /100 WBC   Digoxin Level    Collection Time: 04/25/21 11:04 AM    Specimen: Blood   Result Value Ref Range    Digoxin 0.50 (L) 0.60 - 1.20 ng/mL   ECG 12 Lead    Collection Time: 04/25/21 11:17 AM   Result Value Ref Range    QT Interval 341 ms       Ordered the above labs and independently reviewed the results.      RADIOLOGY  XR Chest 1 View    Result Date: 4/25/2021  XR CHEST 1 VW-  HISTORY: Male who is 61 years-old,  short of breath  TECHNIQUE: Frontal view of the chest  COMPARISON: 04/07/2021  FINDINGS: The heart size is borderline. Left-sided generator device and cardiac lead are noted. Aorta is tortuous. Prominence of interstitial markings and hazy opacities at the mid to lower lungs  suggest edema and/or pneumonia, clinical correlation and follow-up recommended. No pleural effusion, or pneumothorax. No acute osseous process.      Hazy opacities at the mid to lower lungs suggest edema and/or pneumonia, clinical correlation and follow-up recommended. Borderline heart size. Tortuous aorta.  This report was finalized on 4/25/2021 11:50 AM by Dr. Librado Orantes M.D.        I ordered the above noted radiological studies. Reviewed by me and discussed with radiologist.  See dictation for official radiology interpretation.      PROCEDURES  Procedures      MEDICATIONS GIVEN IN ER    Medications   sodium chloride 0.9 % flush 10 mL (has no administration in time range)   furosemide (LASIX) injection 80 mg (80 mg Intravenous Given 4/25/21 1241)         PROGRESS, DATA ANALYSIS, CONSULTS, AND MEDICAL DECISION MAKING    All labs have been independently reviewed by me.  All radiology studies have been reviewed by me and discussed with radiologist dictating the report.   EKG's independently viewed and interpreted by me.  I have reviewed the nurse's notes, vital signs, past medical history, and medication list.  Discussion below represents my analysis of pertinent findings related to patient's condition, differential diagnosis, treatment plan and final disposition.    Differential diagnosis includes but is not limited to CHF, acute coronary syndrome, pulmonary embolism, pneumothorax, pneumonia, asthma/COPD, deconditioning, anemia, anxiety.           ED Course as of Apr 25 1458   Sun Apr 25, 2021   1051 Old records reviewed.  Patient underwent placement of a single-chamber ICD on 4/7/2021 by Dr. Rosas.  Patient was admitted here 4/1 through 4/8/2021 status post cardiac arrest.  Patient was found to have multivessel CAD.  PCI was unsuccessful.  He also had paroxysmal atrial fibrillation. Echocardiogram showed an LVEF 38.9%.  There was mild concentric LVH. Patient was started on metoprolol, digoxin, Eliquis,  Lasix, and lisinopril.  Patient called the cardiology office 2 days ago complaining of difficulty sleeping and feeling anxious and short of breath.    [WH]   1051 BP: 137/94 [WH]   1051 Temp: 97.7 °F (36.5 °C) [WH]   1051 Heart Rate: 101 [WH]   1051 Resp: 20 [WH]   1051 SpO2: 98 % [WH]   1121 EKG          EKG time: 11:17 AM  Rhythm/Rate: Atrial fibrillation, rate 93  P waves and WV: Irregular, varying   QRS, axis: LAD  ST and T waves: Nonspecific T wave changes inferiorly    Interpreted Contemporaneously by me at 11:20 AM, independently viewed  EKG is not significantly changed compared to prior EKG done 4/6/2021      [WH]   1202 Stable   proBNP(!): 955.6 [WH]   1202 Chest x-ray interpreted by radiologist and independently viewed by me. There are hazy opacities in both lung bases suggesting edema and/or pneumonia.    [WH]   1202 Stable   Hemoglobin(!): 12.7 [WH]   1211 O2 sat dropped to 91% on room air while ambulating. Call will be placed to [facility].    [WH]   1234 Test results discussed with the patient.  Patient states he was smoking a pack of cigarettes daily up until his recent admission.  He has not smoked since being discharged.  Patient ganesh in A. fib.  Heart rate is in the 90s.    [WH]   1243 Discussed with Dr. Lopes.  He agrees with the plan to give the patient IV Lasix.  If the patient starts diuresing, then he can be discharged to follow-up with his PCP tomorrow his Lasix dose should be increased to 40 mg twice daily..    [WH]   1402 Urine output approximately 1.2 L since getting IV Lasix.  Patient is resting and breathing comfortably.  O2 sats 93% on room air.  Heart rate in the 90s.  Informed the patient of my discussion with Dr. Lopes with the plan for discharge.  Patient was advised to increase his Lasix dose.  He has an appointment with his PCP tomorrow and has an appointment with cardiology on 5/11.  I will give the patient a prescription for a small number of Ambien.  Precautions  were discussed.    [WH]   2823 Patient presented the ED complaining of orthopnea and difficulty sleeping.  BNP was mildly elevated but stable.  Chest x-ray showed increased vascular congestion.  His symptoms are consistent with CHF.  Case was discussed with cardiology.  Patient was given IV Lasix and began diuresing quite well in the ED.  Patient's O2 sat was 91% with exertion.  However, patient smokes a pack of cigarettes daily until few weeks ago.  O2 sats were normal at rest.    [WH]      ED Course User Index  [WH] He Hernandez MD       AS OF 14:58 EDT VITALS:    BP - 107/78  HR - 94  TEMP - 97.7 °F (36.5 °C) (Oral)  O2 SATS - 93%      DIAGNOSIS  Final diagnoses:   Acute congestive heart failure, unspecified heart failure type (CMS/HCC)   Orthopnea   Trouble in sleeping         DISPOSITION  Discharge    DISCHARGE    Patient discharged in stable condition.    Reviewed implications of results, diagnosis, meds, responsibility to follow up, warning signs and symptoms of possible worsening, potential complications and reasons to return to ER, including worsening symptoms, chest pain, leg swelling, weight gain, or other concern..    Patient/Family voiced understanding of above instructions.    Discussed plan for discharge, as there is no emergent indication for admission. Patient referred to primary care provider for BP management due to today's BP. Pt/family is agreeable and understands need for follow up and repeat testing.  Pt is aware that discharge does not mean that nothing is wrong but it indicates no emergency is present that requires admission and they must continue care with follow-up as given below or physician of their choice.     FOLLOW-UP  [facility]  [address] [place] [id number] [phone]  Go in 2 weeks  As scheduled    Your primary care provider    Go in 1 day  As scheduled         Medication List      New Prescriptions    zolpidem 5 MG  tablet  Commonly known as: AMBIEN  Take 1 tablet by mouth At Night As Needed for Sleep.        Changed    furosemide 40 MG tablet  Commonly known as: LASIX  Take 1 tablet by mouth 2 (Two) Times a Day.  What changed: when to take this           Where to Get Your Medications      These medications were sent to [id number] - [place], [place] - [address]  AT [address] - [phone]  - [phone] [address] , [facility] [address]    Phone: [phone]   · furosemide 40 MG tablet  · zolpidem 5 MG tablet           Dictated utilizing Dragon dictation:  Much of this encounter note is an electronic transcription/translation of spoken language to printed text. The electronic translation of spoken language may permit erroneous, or at times, nonsensical words or phrases to be inadvertently transcribed; Although I have reviewed the note for such errors, some may still exist.     He Hernandez MD  04/25/21 2847

## 2021-04-25 NOTE — ED TRIAGE NOTES
Pt comes to the ER for dizziness and SOA x5 days. Pt also states that the SOA wakes him up at night. Pt had pacemaker put in about 5 days ago. Pt and RN wearing mask upon triage.

## 2021-04-26 ENCOUNTER — OFFICE VISIT (OUTPATIENT)
Dept: FAMILY MEDICINE CLINIC | Facility: CLINIC | Age: 61
End: 2021-04-26

## 2021-04-26 VITALS
WEIGHT: 208.8 LBS | TEMPERATURE: 98 F | OXYGEN SATURATION: 96 % | BODY MASS INDEX: 33.56 KG/M2 | SYSTOLIC BLOOD PRESSURE: 100 MMHG | HEART RATE: 69 BPM | HEIGHT: 66 IN | DIASTOLIC BLOOD PRESSURE: 60 MMHG

## 2021-04-26 DIAGNOSIS — I72.3 ILIAC ANEURYSM (HCC): ICD-10-CM

## 2021-04-26 DIAGNOSIS — G47.9 TROUBLE IN SLEEPING: ICD-10-CM

## 2021-04-26 DIAGNOSIS — R05.9 COUGH: ICD-10-CM

## 2021-04-26 DIAGNOSIS — I46.9 CARDIAC ARREST (HCC): ICD-10-CM

## 2021-04-26 DIAGNOSIS — I71.40 ABDOMINAL ANEURYSM (HCC): ICD-10-CM

## 2021-04-26 DIAGNOSIS — R93.89 ABNORMAL CXR: ICD-10-CM

## 2021-04-26 DIAGNOSIS — I50.9 ACUTE CONGESTIVE HEART FAILURE, UNSPECIFIED HEART FAILURE TYPE (HCC): Primary | ICD-10-CM

## 2021-04-26 DIAGNOSIS — Z00.00 WELLNESS EXAMINATION: ICD-10-CM

## 2021-04-26 DIAGNOSIS — R97.20 PSA ELEVATION: ICD-10-CM

## 2021-04-26 DIAGNOSIS — Z79.899 HIGH RISK MEDICATION USE: ICD-10-CM

## 2021-04-26 PROCEDURE — 99214 OFFICE O/P EST MOD 30 MIN: CPT | Performed by: FAMILY MEDICINE

## 2021-04-26 RX ORDER — ZOLPIDEM TARTRATE 5 MG/1
5 TABLET ORAL NIGHTLY PRN
Qty: 30 TABLET | Refills: 0 | Status: SHIPPED | OUTPATIENT
Start: 2021-04-26 | End: 2021-06-01

## 2021-04-26 RX ORDER — BENZONATATE 100 MG/1
100 CAPSULE ORAL 3 TIMES DAILY PRN
Qty: 30 CAPSULE | Refills: 1 | Status: SHIPPED | OUTPATIENT
Start: 2021-04-26 | End: 2021-05-19

## 2021-04-26 RX ORDER — DOXYCYCLINE HYCLATE 100 MG
100 TABLET ORAL 2 TIMES DAILY
Qty: 20 TABLET | Refills: 0 | Status: SHIPPED | OUTPATIENT
Start: 2021-04-26 | End: 2021-05-06

## 2021-04-26 NOTE — PROGRESS NOTES
Subjective   Sebastien Tang is a 61 y.o. male.     Chief Complaint   Patient presents with   • Establish Care   • Annual Exam       History of Present Illness     Patient was admitted to [facility]  ALL records were obtained and reviewed and /or discussed with admitting physician  Date of admission 4/26/21  Date of discharge 4/26/21  Diagnosis CHF  Medications upon discharge Zolpidem and lasix  Disposition stable  Follow up today  Currently c/o today  Condition stable  Quit smoking recently since age 15, 1 ppd and 1/2, Mom with hole in heart   to see Cardio soon, cough periodically, gray phlegm and no fever, cough x a day, no meds has inhaler wheezing , no colonoscopy, no colon cancer in family  The following portions of the patient's history were reviewed and updated as appropriate: allergies, current medications, past family history, past medical history, past social history, past surgical history and problem list.  Patient does have chest pain on the right side sometimes already seen by cardiologist, yesterday    Past Medical History:   Diagnosis Date   • COPD (chronic obstructive pulmonary disease) (CMS/[facility])    • Hypertension        Past Surgical History:   Procedure Laterality Date   • CARDIAC CATHETERIZATION Left 4/1/2021    Procedure: Coronary Angiography;  Surgeon: Anna Puga MD;  [facility] C[facility]AS[facility] LOC[facility];  Service: Cardiology;  Laterality: Left;   • CARDIAC CATHETERIZATION N/A 4/1/2021    Procedure: Left ventriculography;  Surgeon: Anna Puga MD;  Location:  [facility] CATH INVASIVE LOCATION;  Service: Cardiology;  Laterality: N/A;   • CARDIAC CATHETERIZATION N/A 4/1/2021    Procedure: Left Heart Cath;  Surgeon: Anna Puga MD;  Location:  [facility] CATH INVASIVE LOCATION;  Service: Cardiology;  Laterality: N/A;   • CARDIAC ELECTROPHYSIOLOGY PROCEDURE N/A 4/7/2021    Procedure: IMPLANTABLE CARDIOVERTER DEFIBRILLATOR- SC BIOTRONIK;  Surgeon: Nik Rosas MD;  [facility] C[facility]  [facility]VAS[facility] LOC[facility];  Service: Cardiovascular;  Laterality: N/A;       History reviewed. No pertinent family history.    Social History     Socioeconomic History   • Marital status: Single     Spouse name: Not on file   • Number of children: Not on file   • Years of education: Not on file   • Highest education level: Not on file   Tobacco Use   • Smoking status: Former Smoker     Packs/day: 1.50     Years: 10.00     Pack years: 15.00     Types: Cigarettes   • Smokeless tobacco: Never Used   • Tobacco comment: quit smoking this month-4/21   Substance and Sexual Activity   • Alcohol use: Never   • Drug use: Not Currently   • Sexual activity: Not Currently       Review of Systems   Constitutional: Negative.  Negative for fatigue.   HENT: Negative.    Eyes: Negative.  Negative for blurred vision.   Respiratory: Positive for cough and shortness of breath. Negative for chest tightness.    Cardiovascular: Positive for chest pain. Negative for palpitations and leg swelling.   Gastrointestinal: Negative.    Endocrine: Negative.    Genitourinary: Negative.    Musculoskeletal: Negative.    Skin: Negative.    Allergic/Immunologic: Negative.    Neurological: Negative.  Negative for dizziness and light-headedness.   Hematological: Negative.    Psychiatric/Behavioral: Negative.    All other systems reviewed and are negative.      Objective   Vitals:    04/26/21 1334   BP: 100/60   Pulse: 69   Temp: 98 °F (36.7 °C)   SpO2: 96%     Body mass index is 33.72 kg/m².  Physical Exam  Vitals and nursing note reviewed.   Constitutional:       Appearance: He is well-developed.   HENT:      Head: Normocephalic and atraumatic.      Right Ear: Tympanic membrane, ear canal and external ear normal.      Left Ear: Tympanic membrane, ear canal and external ear normal.      Nose: Nose normal.   Eyes:      General: No scleral icterus.        Right eye: No discharge.         Left eye: No discharge.      Conjunctiva/sclera: Conjunctivae normal.       Pupils: Pupils are equal, round, and reactive to light.   Neck:      Thyroid: No thyromegaly.      Vascular: No JVD.      Trachea: No tracheal deviation.   Cardiovascular:      Rate and Rhythm: Normal rate and regular rhythm.      Heart sounds: Normal heart sounds. No murmur heard.   No friction rub. No gallop.       Comments: No bilateral lower extremity edema  Pulmonary:      Effort: Pulmonary effort is normal. No respiratory distress.      Breath sounds: Normal breath sounds. No wheezing or rales.   Chest:      Chest wall: No tenderness.   Abdominal:      General: Bowel sounds are normal. There is no distension.      Palpations: Abdomen is soft. There is no mass.      Tenderness: There is no abdominal tenderness. There is no guarding or rebound.      Hernia: No hernia is present.   Musculoskeletal:         General: No tenderness. Normal range of motion.      Cervical back: Normal range of motion and neck supple.   Lymphadenopathy:      Cervical: No cervical adenopathy.   Skin:     General: Skin is warm and dry.   Neurological:      Mental Status: He is alert.      Cranial Nerves: No cranial nerve deficit.      Sensory: No sensory deficit.      Motor: No abnormal muscle tone.      Coordination: Coordination normal.      Deep Tendon Reflexes: Reflexes normal.   Psychiatric:         Mood and Affect: Mood normal.         Behavior: Behavior normal.         Thought Content: Thought content normal.         Judgment: Judgment normal.           Assessment/Plan   Diagnoses and all orders for this visit:    1. Acute congestive heart failure, unspecified heart failure type (CMS/HCC) (Primary)  Comments:  Continue current therapy    2. Cardiac arrest (CMS/HCC)    3. PSA elevation  -     Ambulatory Referral to Urology    4. Wellness examination  -     Cologuard - Stool, Per Rectum; Future    5. High risk medication use  -     Compliance Drug Analysis, Ur - Urine, Clean Catch    6. Cough  -     benzonatate (Tessalon Perles) 100  MG capsule; Take 1 capsule by mouth 3 (Three) Times a Day As Needed for Cough.  Dispense: 30 capsule; Refill: 1    7. Abnormal CXR  -     doxycycline (VIBRAMYICN) 100 MG tablet; Take 1 tablet by mouth 2 (Two) Times a Day for 10 days.  Dispense: 20 tablet; Refill: 0    8. Trouble in sleeping  -     zolpidem (AMBIEN) 5 MG tablet; Take 1 tablet by mouth At Night As Needed for Sleep.  Dispense: 30 tablet; Refill: 0    9. Abdominal aneurysm (CMS/HCC)  -     Ambulatory Referral to Vascular Surgery    10. Iliac aneurysm (CMS/HCC)  -     Ambulatory Referral to Vascular Surgery    Side effects discussed with patient in detail, all including but not limited to every single topic discussed   Patient agreed to go to the ER if no improvement  Recheck level of digoxin in 3 weeks, discussed with patient possibility of digoxin ongoing with antibiotic, if any worse go to ER, no working until seen by cardio  Side effects discussed with patient in detail, all including but not limited to every single topic discussed

## 2021-04-26 NOTE — ADDENDUM NOTE
Addended by: PAULINE HOUSER on: 4/26/2021 02:08 PM     Modules accepted: Orders, Level of Service

## 2021-04-30 LAB — DRUGS UR: NORMAL

## 2021-05-06 DIAGNOSIS — Z79.899 HIGH RISK MEDICATION USE: Primary | ICD-10-CM

## 2021-05-06 DIAGNOSIS — I48.91 ATRIAL FIBRILLATION, UNSPECIFIED TYPE (HCC): ICD-10-CM

## 2021-05-06 RX ORDER — POTASSIUM CHLORIDE 20 MEQ/1
20 TABLET, EXTENDED RELEASE ORAL DAILY
Qty: 90 TABLET | Refills: 1 | Status: SHIPPED | OUTPATIENT
Start: 2021-05-06 | End: 2021-10-26

## 2021-05-10 ENCOUNTER — TELEPHONE (OUTPATIENT)
Dept: FAMILY MEDICINE CLINIC | Facility: CLINIC | Age: 61
End: 2021-05-10

## 2021-05-10 RX ORDER — LISINOPRIL 5 MG/1
5 TABLET ORAL
Qty: 90 TABLET | Refills: 3 | Status: SHIPPED | OUTPATIENT
Start: 2021-05-10 | End: 2021-12-21 | Stop reason: SDUPTHER

## 2021-05-10 RX ORDER — METOPROLOL SUCCINATE 100 MG/1
100 TABLET, EXTENDED RELEASE ORAL EVERY 12 HOURS SCHEDULED
Qty: 180 TABLET | Refills: 3 | Status: SHIPPED | OUTPATIENT
Start: 2021-05-10 | End: 2022-04-30 | Stop reason: SDUPTHER

## 2021-05-10 NOTE — TELEPHONE ENCOUNTER
Caller: Clyde Sebastien    Relationship: Self    Best call back number: [phone]     What is the best time to reach you: ANY    Who are you requesting to speak with (clinical staff, provider,  specific staff member): ANY    Do you know the name of the person who called:PATIENT    What was the call regarding: PATIENT CALLED ASKING IF HE SHOULD HAVE HIS MEDS REFILLED, PLEASE CALL BACK AND ADVISE.  THANK YOU.      Do you require a callback: YES

## 2021-05-11 ENCOUNTER — TRANSCRIBE ORDERS (OUTPATIENT)
Dept: SLEEP MEDICINE | Facility: HOSPITAL | Age: 61
End: 2021-05-11

## 2021-05-11 ENCOUNTER — CLINICAL SUPPORT NO REQUIREMENTS (OUTPATIENT)
Dept: CARDIOLOGY | Facility: CLINIC | Age: 61
End: 2021-05-11

## 2021-05-11 ENCOUNTER — OFFICE VISIT (OUTPATIENT)
Dept: CARDIOLOGY | Facility: CLINIC | Age: 61
End: 2021-05-11

## 2021-05-11 ENCOUNTER — TRANSCRIBE ORDERS (OUTPATIENT)
Dept: CARDIOLOGY | Facility: CLINIC | Age: 61
End: 2021-05-11

## 2021-05-11 VITALS
OXYGEN SATURATION: 98 % | WEIGHT: 217 LBS | RESPIRATION RATE: 16 BRPM | BODY MASS INDEX: 34.87 KG/M2 | DIASTOLIC BLOOD PRESSURE: 66 MMHG | HEART RATE: 66 BPM | SYSTOLIC BLOOD PRESSURE: 106 MMHG | HEIGHT: 66 IN

## 2021-05-11 DIAGNOSIS — Z01.818 OTHER SPECIFIED PRE-OPERATIVE EXAMINATION: Primary | ICD-10-CM

## 2021-05-11 DIAGNOSIS — Z01.810 PREPROCEDURAL CARDIOVASCULAR EXAMINATION: ICD-10-CM

## 2021-05-11 DIAGNOSIS — I49.01 VENTRICULAR FIBRILLATION (HCC): ICD-10-CM

## 2021-05-11 DIAGNOSIS — Z13.6 SCREENING FOR CARDIOVASCULAR CONDITION: Primary | ICD-10-CM

## 2021-05-11 DIAGNOSIS — I46.9 CARDIAC ARREST (HCC): ICD-10-CM

## 2021-05-11 DIAGNOSIS — I25.5 ISCHEMIC CARDIOMYOPATHY: ICD-10-CM

## 2021-05-11 DIAGNOSIS — I48.19 ATRIAL FIBRILLATION, PERSISTENT (HCC): ICD-10-CM

## 2021-05-11 DIAGNOSIS — Z95.810 ICD (IMPLANTABLE CARDIOVERTER-DEFIBRILLATOR) IN PLACE: ICD-10-CM

## 2021-05-11 DIAGNOSIS — I25.810 CORONARY ARTERY DISEASE INVOLVING CORONARY BYPASS GRAFT OF NATIVE HEART WITHOUT ANGINA PECTORIS: ICD-10-CM

## 2021-05-11 DIAGNOSIS — I46.9 CARDIAC ARREST (HCC): Primary | ICD-10-CM

## 2021-05-11 PROCEDURE — 93000 ELECTROCARDIOGRAM COMPLETE: CPT | Performed by: NURSE PRACTITIONER

## 2021-05-11 PROCEDURE — 99024 POSTOP FOLLOW-UP VISIT: CPT | Performed by: NURSE PRACTITIONER

## 2021-05-11 RX ORDER — BUDESONIDE AND FORMOTEROL FUMARATE DIHYDRATE 160; 4.5 UG/1; UG/1
2 AEROSOL RESPIRATORY (INHALATION)
Qty: 10.2 G | Refills: 0 | Status: SHIPPED | OUTPATIENT
Start: 2021-05-11 | End: 2021-06-08

## 2021-05-11 NOTE — TELEPHONE ENCOUNTER
I called and spoke with patient he spoke with Dr. Agustin 05/10/2021 and had f/u with cardiology. Medications were refilled.

## 2021-05-11 NOTE — PROGRESS NOTES
Date of Office Visit: 2021  Encounter Provider: ERIC Anderson  Place of Service: [facility] CARDIOLOGY  Patient Name: Sebastien Tang  :1960    Chief Complaint   Patient presents with   • Cardiac Arrest   • Cardiomyopathy     ICD check    :     HPI: Sebastien Tang is a 61 y.o. male who had no prior cardiac history. Presented to hospital . He was at work, walked out and arrested. He was resuscitated. He was taken to the cath lab found to have severe multivessel CAD,  of RCA and what appeared to be OM2, moderately reduced LV function---not revascularization>>medical treatment.     Echo that admission showed EF 38.9%, mild concentric hypertrophy and no significant valvular abnormalities.  He underwent implantation of a single-chamber ICD by  and was started on guideline directed medical therapy and discharged home on .    He did go into afib during hospitalization and was started on apixban and we added digoxin for rate control.     He did call with complaints of feeling like he had a strangling sensation and stuff caught in the back of his throat only whenever he tried to lay down at night, he did end up going to the ER on , they told him he had some fluid overload, his proBNP was mildly elevated at 955 (normal is 0-900 )and they thought his chest x-ray looked a little wet, they increased his diuretic and he said this seems to have cured his problem.    Presents today for routine post hospital, post ICD implantation follow-up.    He says that overall he thinks he is doing pretty good.  He has this chest soreness that right midsternal, it is pinpoint, this is been there since hospitalization and is improving but not completely resolved, his CT of the chest and chest x-rays did not show any broken ribs from receiving CPR but suspect that this is probably some post CPR inflammation since it continues to get better.    He is not really done much by the way  of activity except some light housework and some light walking but has done okay with that.  He denies any significant dyspnea, PND, orthopnea or palpitations.    He has had some occasional feeling of off balance --he has not had any falls or near syncope.  May be secondary to allergies as he says his ears feel stopped up.    Device interrogation shows normal testing and function with no arrhythmia episodes.     Past Medical History:   Diagnosis Date   • Acidosis     mixed resp/metabolic acidosis   • Acute congestive heart failure (CMS/HCC)    • Acute respiratory failure (CMS/HCC)     hypoxic   • Anemia    • Atrial flutter (CMS/HCC)    • Azotemia    • Cardiac arrest (CMS/HCC) 04/2021   • Chronic coronary artery disease    • Constipation    • COPD (chronic obstructive pulmonary disease) (CMS/HCC)    • Enlarged prostate    • Hypertension    • Hypokalemia     severe   • Ischemic cardiomyopathy    • MRSA nasal colonization    • Obesity (BMI 30-39.9)    • Orthopnea    • PAF (paroxysmal atrial fibrillation) (CMS/[facility])    • Pulmonary edema    • Tobacco abuse    • Transaminitis    • Trouble in sleeping    • Ventricular fibrillation (CMS/HCC)        Past Surgical History:   Procedure Laterality Date   • CARDIAC CATHETERIZATION Left 4/1/2021    Procedure: Coronary Angiography;  Surgeon: Anna Puga MD;  Location: [facility] INVASIVE LOCATION;  Service: Cardiology;  Laterality: Left;   • CARDIAC CATHETERIZATION N/A 4/1/2021    Procedure: Left ventriculography;  Surgeon: Anna Puga MD;  Location: [facility] CATH INVASIVE LOCATION;  Service: Cardiology;  Laterality: N/A;   • CARDIAC CATHETERIZATION N/A 4/1/2021    Procedure: Left Heart Cath;  Surgeon: Anna Puga MD;  Location: [facility] CATH INVASIVE LOCATION;  Service: Cardiology;  Laterality: N/A;   • CARDIAC ELECTROPHYSIOLOGY PROCEDURE N/A 4/7/2021    Procedure: IMPLANTABLE CARDIOVERTER DEFIBRILLATOR- SC BIOTRONIK;  Surgeon: Nik Rosas MD;  Location: [facility]  CATH INVASIVE LOCATION;  Service: Cardiovascular;  Laterality: N/A;       Social History     Socioeconomic History   • Marital status: Single     Spouse name: Not on file   • Number of children: Not on file   • Years of education: Not on file   • Highest education level: Not on file   Tobacco Use   • Smoking status: Former Smoker     Packs/day: 1.50     Years: 10.00     Pack years: 15.00     Types: Cigarettes   • Smokeless tobacco: Never Used   • Tobacco comment: quit smoking this month-4/21   Substance and Sexual Activity   • Alcohol use: Never   • Drug use: Not Currently   • Sexual activity: Not Currently       History reviewed. No pertinent family history.    Review of Systems   Constitutional: Negative for chills, fever and malaise/fatigue.   Cardiovascular: Negative for chest pain, dyspnea on exertion, leg swelling, near-syncope, orthopnea, palpitations, paroxysmal nocturnal dyspnea and syncope.        Chest soreness right midsternal area---has been there since hospitalization   Respiratory: Negative for cough and shortness of breath.    Hematologic/Lymphatic: Negative.    Musculoskeletal: Negative for joint pain, joint swelling and myalgias.   Gastrointestinal: Negative for abdominal pain, diarrhea, melena, nausea and vomiting.   Genitourinary: Negative for frequency and hematuria.   Neurological: Negative for light-headedness, numbness, paresthesias and seizures.   Allergic/Immunologic: Negative.    All other systems reviewed and are negative.      No Known Allergies      Current Outpatient Medications:   •  apixaban (ELIQUIS) 5 MG tablet tablet, Take 1 tablet by mouth Every 12 (Twelve) Hours. Indications: Atrial Fibrillation, Disp: 180 tablet, Rfl: 0  •  aspirin 81 MG EC tablet, Take 1 tablet by mouth Daily., Disp: 30 tablet, Rfl: 0  •  benzonatate (Tessalon Perles) 100 MG capsule, Take 1 capsule by mouth 3 (Three) Times a Day As Needed for Cough., Disp: 30 capsule, Rfl: 1  •  budesonide-formoterol  "(SYMBICORT) 160-4.5 MCG/ACT inhaler, Inhale 2 puffs 2 (Two) Times a Day., Disp: 10.2 g, Rfl: 0  •  furosemide (LASIX) 40 MG tablet, Take 1 tablet by mouth 2 (Two) Times a Day., Disp: 30 tablet, Rfl: 0  •  lisinopril (PRINIVIL,ZESTRIL) 5 MG tablet, Take 1 tablet by mouth Daily., Disp: 90 tablet, Rfl: 3  •  metoprolol succinate XL (TOPROL-XL) 100 MG 24 hr tablet, Take 1 tablet by mouth Every 12 (Twelve) Hours., Disp: 180 tablet, Rfl: 3  •  potassium chloride (K-DUR,KLOR-CON) 20 MEQ CR tablet, Take 1 tablet by mouth Daily., Disp: 90 tablet, Rfl: 1  •  zolpidem (AMBIEN) 5 MG tablet, Take 1 tablet by mouth At Night As Needed for Sleep., Disp: 30 tablet, Rfl: 0      Objective:     Vitals:    05/11/21 0906   BP: 106/66   BP Location: Right arm   Patient Position: Lying   Cuff Size: Adult   Pulse: 66   Resp: 16   SpO2: 98%   Weight: 98.4 kg (217 lb)   Height: 167.6 cm (66\")     Body mass index is 35.02 kg/m².    PHYSICAL EXAM:    Vitals Reviewed.   General Appearance: No acute distress, well developed and well nourished.   Eyes: Conjunctiva and lids: No erythema, swelling, or discharge. Sclera non-icteric.   HENT: Atraumatic, normocephalic. External eyes, ears, and nose normal.   Respiratory: No signs of respiratory distress. Respiration rhythm and depth normal.   Clear to auscultation. No rales, crackles, rhonchi, or wheezing auscultated.   Cardiovascular:  Heart Rate and Rhythm: Irregularly, irregular. Heart Sounds: S1 and S2.   Murmurs: No murmurs noted. No rubs, thrills, or gallops.   Arterial Pulses:  Posterior tibialis and dorsalis pedis pulses normal.   Lower Extremities: No edema noted.  Gastrointestinal:  Abdomen soft, non-distended, non-tender.   Musculoskeletal: Normal movement of extremities  Skin and Nails: General appearance normal. No pallor, cyanosis, diaphoresis. Skin temperature normal. No clubbing of fingernails.   Psychiatric: Patient alert and oriented to person, place, and time. Speech and behavior " appropriate. Normal mood and affect.       ECG 12 Lead    Date/Time: 5/11/2021 9:42 AM  Performed by: Magda Luevano APRN  Authorized by: Magda Luevano APRN   Comparison: compared with previous ECG   Similar to previous ECG  Rhythm: atrial fibrillation  Ectopy: unifocal PVCs  BPM: 95                Assessment:       Diagnosis Plan   1. Cardiac arrest (CMS/HCC)     2. Ischemic cardiomyopathy     3. Ventricular fibrillation (CMS/HCC)     4. ICD (implantable cardioverter-defibrillator) in place     5. Coronary artery disease involving coronary bypass graft of native heart without angina pectoris     6. Atrial fibrillation, persistent (CMS/HCC)  ECG 12 Lead    Cardioversion External in Cardiology Department          Plan:       1.-5. Cardiac arrest, VF, ICM, CAD---medical management----on GDMT, b/p on the low side so will not titrate medications at time time but continue to evaluate.     6. Persistent afib---thought he might spontaneously convert after discharge but has not. He is unaware of the irregular heart beat but discussed with Dr. Rosas and think we should try and get him back in SR with cardioversion. He ran out of digoxin today---since are going to do cardioversion next week, will not renew since we would likely stop at that time. Instructed him to call if he noted his HR being elevated prior to CV next Wednesday---we can always have him take some extra doses of metoprolol until then.     Plan for CV next week---he is on apixaban and says he has not missed any doses and is taking every 12 hours. Will go ahead and schedule him for follow up with in clinic device check with Dr. Rosas in 3 months.     As always, it has been a pleasure to participate in your patient's care.      Sincerely,         ERIC Hughes

## 2021-05-17 ENCOUNTER — LAB (OUTPATIENT)
Dept: LAB | Facility: HOSPITAL | Age: 61
End: 2021-05-17

## 2021-05-17 DIAGNOSIS — Z13.6 SCREENING FOR CARDIOVASCULAR CONDITION: ICD-10-CM

## 2021-05-17 DIAGNOSIS — Z01.810 PREPROCEDURAL CARDIOVASCULAR EXAMINATION: ICD-10-CM

## 2021-05-17 DIAGNOSIS — Z01.818 OTHER SPECIFIED PRE-OPERATIVE EXAMINATION: ICD-10-CM

## 2021-05-17 LAB
ANION GAP SERPL CALCULATED.3IONS-SCNC: 14.6 MMOL/L (ref 5–15)
BUN SERPL-MCNC: 11 MG/DL (ref 8–23)
BUN/CREAT SERPL: 12.4 (ref 7–25)
CALCIUM SPEC-SCNC: 10.1 MG/DL (ref 8.6–10.5)
CHLORIDE SERPL-SCNC: 103 MMOL/L (ref 98–107)
CO2 SERPL-SCNC: 23.4 MMOL/L (ref 22–29)
CREAT SERPL-MCNC: 0.89 MG/DL (ref 0.76–1.27)
GFR SERPL CREATININE-BSD FRML MDRD: 86 ML/MIN/1.73
GLUCOSE SERPL-MCNC: 106 MG/DL (ref 65–99)
POTASSIUM SERPL-SCNC: 4.1 MMOL/L (ref 3.5–5.2)
SODIUM SERPL-SCNC: 141 MMOL/L (ref 136–145)

## 2021-05-17 PROCEDURE — C9803 HOPD COVID-19 SPEC COLLECT: HCPCS

## 2021-05-17 PROCEDURE — U0004 COV-19 TEST NON-CDC HGH THRU: HCPCS

## 2021-05-17 PROCEDURE — 80048 BASIC METABOLIC PNL TOTAL CA: CPT

## 2021-05-17 PROCEDURE — 36415 COLL VENOUS BLD VENIPUNCTURE: CPT

## 2021-05-17 RX ORDER — FUROSEMIDE 40 MG/1
40 TABLET ORAL 2 TIMES DAILY
Qty: 60 TABLET | Refills: 3 | Status: SHIPPED | OUTPATIENT
Start: 2021-05-17 | End: 2021-08-30

## 2021-05-17 NOTE — TELEPHONE ENCOUNTER
Caller: Sebastien Tang    Relationship: Self    Best call back number: [phone]    Medication needed:   Requested Prescriptions     Pending Prescriptions Disp Refills   • furosemide (LASIX) 40 MG tablet 30 tablet 0     Sig: Take 1 tablet by mouth 2 (Two) Times a Day.       When do you need the refill by: ASAP    What additional details did the patient provide when requesting the medication: PATIENT HAS 2 TABLETS REMAINING    Does the patient have less than a 3 day supply:  [x] Yes  [] No    What is the patient's preferred pharmacy:  [id number] [address] AT [facility] - [phone] [facility] [phone]   [phone]

## 2021-05-18 LAB — SARS-COV-2 RNA RESP QL NAA+PROBE: NOT DETECTED

## 2021-05-19 ENCOUNTER — HOSPITAL ENCOUNTER (OUTPATIENT)
Dept: CARDIOLOGY | Facility: HOSPITAL | Age: 61
Discharge: HOME | End: 2021-05-19
Admitting: INTERNAL MEDICINE

## 2021-05-19 VITALS
SYSTOLIC BLOOD PRESSURE: 106 MMHG | RESPIRATION RATE: 16 BRPM | HEIGHT: 65 IN | HEART RATE: 77 BPM | TEMPERATURE: 97.1 F | BODY MASS INDEX: 35.82 KG/M2 | WEIGHT: 215 LBS | DIASTOLIC BLOOD PRESSURE: 74 MMHG | OXYGEN SATURATION: 94 %

## 2021-05-19 DIAGNOSIS — I48.19 ATRIAL FIBRILLATION, PERSISTENT (HCC): ICD-10-CM

## 2021-05-19 LAB — QT INTERVAL: 340 MS

## 2021-05-19 PROCEDURE — 92960 CARDIOVERSION ELECTRIC EXT: CPT | Performed by: INTERNAL MEDICINE

## 2021-05-19 PROCEDURE — 93010 ELECTROCARDIOGRAM REPORT: CPT | Performed by: INTERNAL MEDICINE

## 2021-05-19 PROCEDURE — 92960 CARDIOVERSION ELECTRIC EXT: CPT

## 2021-05-19 PROCEDURE — 93005 ELECTROCARDIOGRAM TRACING: CPT | Performed by: INTERNAL MEDICINE

## 2021-05-19 RX ORDER — SODIUM CHLORIDE 9 MG/ML
75 INJECTION, SOLUTION INTRAVENOUS CONTINUOUS
Status: DISCONTINUED | OUTPATIENT
Start: 2021-05-19 | End: 2021-05-20 | Stop reason: HOSPADM

## 2021-05-19 RX ORDER — SODIUM CHLORIDE 0.9 % (FLUSH) 0.9 %
3 SYRINGE (ML) INJECTION EVERY 12 HOURS SCHEDULED
Status: DISCONTINUED | OUTPATIENT
Start: 2021-05-19 | End: 2021-05-19 | Stop reason: HOSPADM

## 2021-05-19 RX ORDER — SODIUM CHLORIDE 0.9 % (FLUSH) 0.9 %
10 SYRINGE (ML) INJECTION AS NEEDED
Status: DISCONTINUED | OUTPATIENT
Start: 2021-05-19 | End: 2021-05-19 | Stop reason: HOSPADM

## 2021-05-19 RX ADMIN — METHOHEXITAL SODIUM 20 MG: 500 INJECTION, POWDER, LYOPHILIZED, FOR SOLUTION INTRAMUSCULAR; INTRAVENOUS; RECTAL at 12:58

## 2021-05-19 RX ADMIN — METHOHEXITAL SODIUM 50 MG: 500 INJECTION, POWDER, LYOPHILIZED, FOR SOLUTION INTRAMUSCULAR; INTRAVENOUS; RECTAL at 12:57

## 2021-06-01 DIAGNOSIS — G47.9 TROUBLE IN SLEEPING: ICD-10-CM

## 2021-06-01 RX ORDER — ZOLPIDEM TARTRATE 5 MG/1
5 TABLET ORAL NIGHTLY PRN
Qty: 30 TABLET | Refills: 0 | Status: SHIPPED | OUTPATIENT
Start: 2021-06-01 | End: 2021-06-14 | Stop reason: SDUPTHER

## 2021-06-01 RX ORDER — ZOLPIDEM TARTRATE 5 MG/1
5 TABLET ORAL NIGHTLY PRN
Qty: 30 TABLET | Status: CANCELLED | OUTPATIENT
Start: 2021-06-01

## 2021-06-03 ENCOUNTER — TELEPHONE (OUTPATIENT)
Dept: FAMILY MEDICINE CLINIC | Facility: CLINIC | Age: 61
End: 2021-06-03

## 2021-06-03 NOTE — TELEPHONE ENCOUNTER
Caller: Sebastien Tang    Relationship: Self    Best call back number: [phone]    What is the best time to reach you: BEFORE 10:30 AM    Who are you requesting to speak with (clinical staff, provider,  specific staff member): CLINICAL        What was the call regarding: TROUBLE SLEEPING,CONGESTION. REQUESTED MEDICATION    Do you require a callback: YES

## 2021-06-04 NOTE — TELEPHONE ENCOUNTER
Tried to return patient's call, no VM to leave message     Okay for HUB to read     Patient needs appointment to be prescribed medication for insomnia and congestion

## 2021-06-08 ENCOUNTER — TELEPHONE (OUTPATIENT)
Dept: FAMILY MEDICINE CLINIC | Facility: CLINIC | Age: 61
End: 2021-06-08

## 2021-06-08 RX ORDER — BUDESONIDE AND FORMOTEROL FUMARATE DIHYDRATE 160; 4.5 UG/1; UG/1
AEROSOL RESPIRATORY (INHALATION)
Qty: 10.2 G | Refills: 0 | Status: SHIPPED | OUTPATIENT
Start: 2021-06-08 | End: 2021-07-06

## 2021-06-08 NOTE — TELEPHONE ENCOUNTER
LOV 04/26/2021  Due for f/u  Last refill 05/11/2021 qty 10.2 w/ 0 refills     Noted on script, patient needs appointment for further refills

## 2021-06-14 ENCOUNTER — OFFICE VISIT (OUTPATIENT)
Dept: FAMILY MEDICINE CLINIC | Facility: CLINIC | Age: 61
End: 2021-06-14

## 2021-06-14 ENCOUNTER — TELEPHONE (OUTPATIENT)
Dept: CARDIOLOGY | Facility: CLINIC | Age: 61
End: 2021-06-14

## 2021-06-14 VITALS
DIASTOLIC BLOOD PRESSURE: 70 MMHG | HEART RATE: 88 BPM | HEIGHT: 65 IN | SYSTOLIC BLOOD PRESSURE: 140 MMHG | WEIGHT: 220.2 LBS | TEMPERATURE: 97.5 F | BODY MASS INDEX: 36.69 KG/M2 | OXYGEN SATURATION: 97 %

## 2021-06-14 DIAGNOSIS — J06.9 UPPER RESPIRATORY TRACT INFECTION, UNSPECIFIED TYPE: ICD-10-CM

## 2021-06-14 DIAGNOSIS — F51.01 PRIMARY INSOMNIA: ICD-10-CM

## 2021-06-14 DIAGNOSIS — F32.A ANXIETY AND DEPRESSION: ICD-10-CM

## 2021-06-14 DIAGNOSIS — R06.02 SHORTNESS OF BREATH: Primary | ICD-10-CM

## 2021-06-14 DIAGNOSIS — F41.9 ANXIETY AND DEPRESSION: ICD-10-CM

## 2021-06-14 DIAGNOSIS — G47.9 TROUBLE IN SLEEPING: ICD-10-CM

## 2021-06-14 DIAGNOSIS — I48.91 ATRIAL FIBRILLATION, UNSPECIFIED TYPE (HCC): ICD-10-CM

## 2021-06-14 PROCEDURE — 93000 ELECTROCARDIOGRAM COMPLETE: CPT | Performed by: FAMILY MEDICINE

## 2021-06-14 PROCEDURE — 99214 OFFICE O/P EST MOD 30 MIN: CPT | Performed by: FAMILY MEDICINE

## 2021-06-14 PROCEDURE — 71046 X-RAY EXAM CHEST 2 VIEWS: CPT | Performed by: FAMILY MEDICINE

## 2021-06-14 RX ORDER — AMOXICILLIN AND CLAVULANATE POTASSIUM 875; 125 MG/1; MG/1
1 TABLET, FILM COATED ORAL 2 TIMES DAILY
Qty: 20 TABLET | Refills: 0 | Status: SHIPPED | OUTPATIENT
Start: 2021-06-14 | End: 2021-07-26

## 2021-06-14 RX ORDER — ZOLPIDEM TARTRATE 10 MG/1
10 TABLET ORAL NIGHTLY PRN
Qty: 30 TABLET | Refills: 0 | Status: SHIPPED | OUTPATIENT
Start: 2021-06-14 | End: 2021-07-20

## 2021-06-14 RX ORDER — ESCITALOPRAM OXALATE 10 MG/1
10 TABLET ORAL EVERY MORNING
Qty: 30 TABLET | Refills: 1 | Status: SHIPPED | OUTPATIENT
Start: 2021-06-14 | End: 2021-06-22

## 2021-06-14 RX ORDER — DIGOXIN 125 MCG
125 TABLET ORAL
Qty: 30 TABLET | Refills: 1 | Status: SHIPPED | OUTPATIENT
Start: 2021-06-14 | End: 2021-06-19 | Stop reason: SDUPTHER

## 2021-06-14 NOTE — TELEPHONE ENCOUNTER
You can add him on Thursday to my schedule at 11:45 am    Call and let him know what time to be here please

## 2021-06-14 NOTE — PROGRESS NOTES
"AChief Complaint  Anxiety, Insomnia, Congestive Heart Failure (patient is having difficulty breathing when trying to sleep ), and PTSD    Subjective          Sebastien Tang presents to [facility]  History of Present Illness  BACK TO SINUS RHYTHM BY cARDIO, Anxiety since problems with heart, non smoker, Anxiety since April, depression also, difficulty breathing  x months, no cough, snoring unknown, feels congestion on chest, no cough but expectorates sometimes  seen by Cardio has a defribillator and was shocked and went back to normal sinus rhythm, no chest pain, he does have shortness of breath at rest and when he goes to sleep,    ECG 12 Lead    Date/Time: 6/14/2021 11:37 AM  Performed by: Florin Chaparro MD  Authorized by: Florin Chaparro MD           ECG 12 Lead    Date/Time: 6/14/2021 11:37 AM  Performed by: Florin Chaparro MD  Authorized by: Florin Chaparro MD   Comparison: compared with previous ECG from 5/19/2021  Similar to previous ECG  Rhythm: atrial fibrillation  Rate: tachycardic  Conduction: conduction normal  ST Segments: ST segments normal  T Waves: T waves normal  QRS axis: normal  Other: no other findings    Clinical impression: abnormal EKG            Objective   Vital Signs:   /70 (BP Location: Left arm, Patient Position: Sitting)   Pulse 88   Temp 97.5 °F (36.4 °C) (Temporal)   Ht 165.1 cm (65\")   Wt 99.9 kg (220 lb 3.2 oz)   SpO2 97%   BMI 36.64 kg/m²     Physical Exam  Vitals and nursing note reviewed.   Constitutional:       Appearance: He is well-developed.   HENT:      Head: Normocephalic and atraumatic.      Right Ear: Tympanic membrane, ear canal and external ear normal.      Left Ear: Tympanic membrane, ear canal and external ear normal.      Nose: Nose normal.   Eyes:      General: No scleral icterus.        Right eye: No discharge.         Left eye: No discharge.      Conjunctiva/sclera: Conjunctivae normal. "      Pupils: Pupils are equal, round, and reactive to light.   Neck:      Thyroid: No thyromegaly.      Vascular: No JVD.      Trachea: No tracheal deviation.   Cardiovascular:      Rate and Rhythm: Normal rate and regular rhythm.      Heart sounds: Normal heart sounds. No murmur heard.   No friction rub. No gallop.       Comments: No edema  Pulmonary:      Effort: Pulmonary effort is normal. No respiratory distress.      Breath sounds: Normal breath sounds. No wheezing or rales.   Chest:      Chest wall: No tenderness.   Abdominal:      General: Bowel sounds are normal. There is no distension.      Palpations: Abdomen is soft. There is no mass.      Tenderness: There is no abdominal tenderness. There is no guarding or rebound.      Hernia: No hernia is present.   Musculoskeletal:         General: No tenderness. Normal range of motion.      Cervical back: Normal range of motion and neck supple.      Right lower leg: No edema.      Left lower leg: No edema.   Lymphadenopathy:      Cervical: No cervical adenopathy.   Skin:     General: Skin is warm and dry.   Neurological:      General: No focal deficit present.      Mental Status: He is alert.      Cranial Nerves: No cranial nerve deficit.      Sensory: No sensory deficit.      Motor: No abnormal muscle tone.      Coordination: Coordination normal.      Deep Tendon Reflexes: Reflexes normal.   Psychiatric:         Mood and Affect: Mood normal.         Behavior: Behavior normal.         Thought Content: Thought content normal.         Judgment: Judgment normal.        Result Review :            ECG 12 Lead    Date/Time: 6/14/2021 11:37 AM  Performed by: Florin Chaparro MD  Authorized by: Florin Chaparro MD   Comparison: compared with previous ECG from 5/19/2021  Similar to previous ECG  Rhythm: atrial fibrillation  Rate: tachycardic  Conduction: conduction normal  ST Segments: ST segments normal  T Waves: T waves normal  QRS axis: normal  Other: no  other findings    Clinical impression: abnormal EKG              Assessment and Plan    Diagnoses and all orders for this visit:    1. Shortness of breath (Primary)  -     Comprehensive Metabolic Panel  -     BNP  -     CBC & Differential  -     TSH  -     XR Chest 2 View  -     ECG 12 Lead  -     Cancel: ECG 12 Lead  -     digoxin (Lanoxin) 125 MCG tablet; Take 1 tablet by mouth Daily.  Dispense: 30 tablet; Refill: 1    2. Anxiety and depression  -     escitalopram (Lexapro) 10 MG tablet; Take 1 tablet by mouth Every Morning.  Dispense: 30 tablet; Refill: 1  -     Ambulatory Referral to Psychiatry    3. Trouble in sleeping  -     zolpidem (AMBIEN) 10 MG tablet; Take 1 tablet by mouth At Night As Needed for Sleep.  Dispense: 30 tablet; Refill: 0    4. Primary insomnia  -     zolpidem (AMBIEN) 10 MG tablet; Take 1 tablet by mouth At Night As Needed for Sleep.  Dispense: 30 tablet; Refill: 0    5. Atrial fibrillation, unspecified type (CMS/HCC)  -     digoxin (Lanoxin) 125 MCG tablet; Take 1 tablet by mouth Daily.  Dispense: 30 tablet; Refill: 1    6. Upper respiratory tract infection, unspecified type  -     amoxicillin-clavulanate (Augmentin) 875-125 MG per tablet; Take 1 tablet by mouth 2 (Two) Times a Day.  Dispense: 20 tablet; Refill: 0        Follow Up   Return in 4 weeks (on 7/12/2021), or if symptoms worsen or fail to improve.  Patient was given instructions and counseling regarding his condition or for health maintenance advice. Please see specific information pulled into the AVS if appropriate.   I contacted his cardiologist Dr. Nik Armstrong, he recommended patient to start digoxin 125 mg orally, and he will see the patient soon, he recommended to treat  outpatient  Pt to call Cardio to get a sooner appt  Side effects discussed with patient in detail, all including but not limited to every single topic discussed   Patient agreed to go to the ER if no improvement  I did call patient later on, he still worry  about fever, wondering about going to ER, I told him he is free to go to ER if he wants to, but he was already scheduled to see cardiologist on Thursday and started new meds , patient told me he cannot go on Thursday because he is working, they will try to switch his appointment to a sooner date, he is awaiting a phone call back from the cardiologist, I  told him again that if he wants to go to the ER he is free to go there is no problem going to ER

## 2021-06-15 ENCOUNTER — HOSPITAL ENCOUNTER (EMERGENCY)
Facility: HOSPITAL | Age: 61
Discharge: HOME | End: 2021-06-15
Attending: EMERGENCY MEDICINE | Admitting: EMERGENCY MEDICINE

## 2021-06-15 ENCOUNTER — TELEPHONE (OUTPATIENT)
Dept: FAMILY MEDICINE CLINIC | Facility: CLINIC | Age: 61
End: 2021-06-15

## 2021-06-15 VITALS
HEART RATE: 108 BPM | OXYGEN SATURATION: 93 % | RESPIRATION RATE: 20 BRPM | TEMPERATURE: 97.5 F | DIASTOLIC BLOOD PRESSURE: 100 MMHG | SYSTOLIC BLOOD PRESSURE: 148 MMHG

## 2021-06-15 DIAGNOSIS — I50.9 MILD CONGESTIVE HEART FAILURE (HCC): ICD-10-CM

## 2021-06-15 DIAGNOSIS — I48.91 ATRIAL FIBRILLATION, UNSPECIFIED TYPE (HCC): Primary | ICD-10-CM

## 2021-06-15 LAB
ALBUMIN SERPL-MCNC: 4.7 G/DL (ref 3.5–5.2)
ALBUMIN/GLOB SERPL: 2 G/DL
ALP SERPL-CCNC: 97 U/L (ref 39–117)
ALT SERPL-CCNC: 34 U/L (ref 1–41)
AST SERPL-CCNC: 28 U/L (ref 1–40)
BASOPHILS # BLD AUTO: 0.06 10*3/MM3 (ref 0–0.2)
BASOPHILS NFR BLD AUTO: 0.7 % (ref 0–1.5)
BILIRUB SERPL-MCNC: 0.3 MG/DL (ref 0–1.2)
BNP SERPL-MCNC: 200.7 PG/ML (ref 0–100)
BUN SERPL-MCNC: 14 MG/DL (ref 8–23)
BUN/CREAT SERPL: 17.1 (ref 7–25)
CALCIUM SERPL-MCNC: 10.1 MG/DL (ref 8.6–10.5)
CHLORIDE SERPL-SCNC: 104 MMOL/L (ref 98–107)
CO2 SERPL-SCNC: 23.5 MMOL/L (ref 22–29)
CREAT SERPL-MCNC: 0.82 MG/DL (ref 0.76–1.27)
EOSINOPHIL # BLD AUTO: 0.37 10*3/MM3 (ref 0–0.4)
EOSINOPHIL NFR BLD AUTO: 4.3 % (ref 0.3–6.2)
ERYTHROCYTE [DISTWIDTH] IN BLOOD BY AUTOMATED COUNT: 14.2 % (ref 12.3–15.4)
GLOBULIN SER CALC-MCNC: 2.3 GM/DL
GLUCOSE SERPL-MCNC: 119 MG/DL (ref 65–99)
HCT VFR BLD AUTO: 39.6 % (ref 37.5–51)
HGB BLD-MCNC: 13 G/DL (ref 13–17.7)
IMM GRANULOCYTES # BLD AUTO: 0.04 10*3/MM3 (ref 0–0.05)
IMM GRANULOCYTES NFR BLD AUTO: 0.5 % (ref 0–0.5)
LYMPHOCYTES # BLD AUTO: 1.72 10*3/MM3 (ref 0.7–3.1)
LYMPHOCYTES NFR BLD AUTO: 20.1 % (ref 19.6–45.3)
MCH RBC QN AUTO: 28.6 PG (ref 26.6–33)
MCHC RBC AUTO-ENTMCNC: 32.8 G/DL (ref 31.5–35.7)
MCV RBC AUTO: 87 FL (ref 79–97)
MONOCYTES # BLD AUTO: 0.87 10*3/MM3 (ref 0.1–0.9)
MONOCYTES NFR BLD AUTO: 10.2 % (ref 5–12)
NEUTROPHILS # BLD AUTO: 5.51 10*3/MM3 (ref 1.7–7)
NEUTROPHILS NFR BLD AUTO: 64.2 % (ref 42.7–76)
NRBC BLD AUTO-RTO: 0 /100 WBC (ref 0–0.2)
PLATELET # BLD AUTO: 324 10*3/MM3 (ref 140–450)
POTASSIUM SERPL-SCNC: 4.7 MMOL/L (ref 3.5–5.2)
PROT SERPL-MCNC: 7 G/DL (ref 6–8.5)
RBC # BLD AUTO: 4.55 10*6/MM3 (ref 4.14–5.8)
SODIUM SERPL-SCNC: 141 MMOL/L (ref 136–145)
TSH SERPL DL<=0.005 MIU/L-ACNC: 1.31 UIU/ML (ref 0.27–4.2)
WBC # BLD AUTO: 8.57 10*3/MM3 (ref 3.4–10.8)

## 2021-06-15 PROCEDURE — 99282 EMERGENCY DEPT VISIT SF MDM: CPT

## 2021-06-15 PROCEDURE — 99283 EMERGENCY DEPT VISIT LOW MDM: CPT

## 2021-06-15 NOTE — TELEPHONE ENCOUNTER
I called pt and explained results pt told me he does not feel well, I told him about elevated BNP, is a test x CHF, and advised  pt to go to ER if not feeling better, told me he needs to go to work, I explained that I can give him a note for work, pt to go to ER in agreement, since Afib with RVR after sinus conversion, elevated BNP and symptomatic better to go to ER

## 2021-06-15 NOTE — TELEPHONE ENCOUNTER
I contacted pt regarding elevated BNP and told him I can raise his Lasix dose until seen by Cardio pt told me he does not feel well, chest congestion no edema, advised then to go to ER if feeling bad, pt told me he can loose his job, I offered him a note x work, if we add yesterday Afib with RVR and now abnormal labs is recommneded to go to ER specially based on the fact that he is not feeling better

## 2021-06-15 NOTE — TELEPHONE ENCOUNTER
I talked to the patient, he is back home, happy, has a note x work and was told has mild CHF to double his water pills dose

## 2021-06-15 NOTE — DISCHARGE INSTRUCTIONS
Double your Lasix for the next few days until you follow-up with Dr. Rosas.  Continue digoxin as previously prescribed..

## 2021-06-15 NOTE — ED PROVIDER NOTES
EMERGENCY DEPARTMENT ENCOUNTER    Room Number:  30/30  Date of encounter:  6/15/2021  PCP: Florin Chaparro MD  Historian: Patient      HPI:  Chief Complaint: Dyspnea  A complete HPI/ROS/PMH/PSH/SH/FH are unobtainable due to: N/A    Context: Sebastien Tang is a 61 y.o. male who presents to the ED at the direction of his primary care physician for an elevated BNP.  He was seen by his primary care doctor yesterday for dyspnea for the past 2 to 3 days.  He states the dyspnea seems to be worse with exertion and he does have some coughing at night.  His cough is nonproductive.  He has had no fevers or chills.  He has been vaccinated for COVID-19.  He denies chest pain.  He has had mild lower extremity edema.  He states he has been compliant with all of his medications, and started digoxin yesterday as instructed by his primary care physician.  It seems that his primary care physician was concerned about a BNP of 200.      The patient was placed in a mask in triage, hand hygiene was performed before and after my interaction with the patient.  I wore a mask, safety glasses and gloves during my entire interaction with the patient.    PAST MEDICAL HISTORY  Active Ambulatory Problems     Diagnosis Date Noted   • Cardiac arrest (CMS/[facility]) 04/01/2021   • Ventricular fibrillation (CMS/HCC) 04/01/2021   • Atrial flutter (CMS/HCC) 04/05/2021   • Tobacco abuse 04/05/2021   • Azotemia 04/05/2021   • COPD (chronic obstructive pulmonary disease) (CMS/[facility]) 04/05/2021   • MRSA nasal colonization 04/05/2021   • Transaminitis 04/05/2021   • Obesity (BMI 30-39.9) 04/05/2021   • Ischemic cardiomyopathy 04/05/2021   • Anemia 04/05/2021   • Constipation 04/06/2021   • Paroxysmal atrial fibrillation (CMS/HCC) 04/08/2021   • Acute congestive heart failure (CMS/HCC) 04/26/2021   • PSA elevation 04/26/2021   • Wellness examination 04/26/2021   • High risk medication use 04/26/2021   • Cough 04/26/2021   • Abnormal CXR 04/26/2021   •  Trouble in sleeping 04/26/2021   • Abdominal aneurysm (CMS/[facility]) 04/26/2021   • Iliac aneurysm (CMS/[facility]) 04/26/2021   • Coronary artery disease involving coronary bypass graft of native heart without angina pectoris 05/11/2021   • Atrial fibrillation, persistent (CMS/HCC) 05/11/2021   • ICD (implantable cardioverter-defibrillator) in place 05/11/2021   • Anxiety and depression 06/14/2021   • Shortness of breath 06/14/2021   • Primary insomnia 06/14/2021   • Atrial fibrillation (CMS/HCC) 06/14/2021   • Upper respiratory tract infection 06/14/2021     Resolved Ambulatory Problems     Diagnosis Date Noted   • No Resolved Ambulatory Problems     Past Medical History:   Diagnosis Date   • Acidosis    • Acute respiratory failure (CMS/[facility])    • Chronic coronary artery disease    • Enlarged prostate    • Hypertension    • Hypokalemia    • Orthopnea    • PAF (paroxysmal atrial fibrillation) (CMS/[facility])    • Pulmonary edema          PAST SURGICAL HISTORY  Past Surgical History:   Procedure Laterality Date   • CARDIAC CATHETERIZATION Left 4/1/2021    Procedure: Coronary Angiography;  Surgeon: Anna Puga MD;  Location: [facility] CATH INVASIVE LOCATION;  Service: Cardiology;  Laterality: Left;   • CARDIAC CATHETERIZATION N/A 4/1/2021    Procedure: Left ventriculography;  Surgeon: Anna Puga MD;  Location: [facility] CATH INVASIVE LOCATION;  Service: Cardiology;  Laterality: N/A;   • CARDIAC CATHETERIZATION N/A 4/1/2021    Procedure: Left Heart Cath;  Surgeon: Anna Puga MD;  Location: [facility] CATH INVASIVE LOCATION;  Service: Cardiology;  Laterality: N/A;   • CARDIAC ELECTROPHYSIOLOGY PROCEDURE N/A 4/7/2021    Procedure: IMPLANTABLE CARDIOVERTER DEFIBRILLATOR- SC BIOTRONIK;  Surgeon: Nik Rosas MD;  Location:  [facility] CATH INVASIVE LOCATION;  Service: Cardiovascular;  Laterality: N/A;         FAMILY HISTORY  No family history on file.      SOCIAL HISTORY  Social History     Socioeconomic History   • Marital status:  Single     Spouse name: Not on file   • Number of children: Not on file   • Years of education: Not on file   • Highest education level: Not on file   Tobacco Use   • Smoking status: Former Smoker     Packs/day: 1.50     Years: 10.00     Pack years: 15.00     Types: Cigarettes   • Smokeless tobacco: Never Used   • Tobacco comment: quit smoking this month-4/21   Substance and Sexual Activity   • Alcohol use: Never   • Drug use: Not Currently   • Sexual activity: Not Currently         ALLERGIES  Patient has no known allergies.        REVIEW OF SYSTEMS  Review of Systems   Respiratory: Positive for cough and shortness of breath.    Cardiovascular: Negative for chest pain and leg swelling.   Gastrointestinal: Negative for abdominal pain, blood in stool, diarrhea, nausea and vomiting.   Psychiatric/Behavioral: The patient is nervous/anxious.         All systems reviewed and negative except for those discussed in HPI.       PHYSICAL EXAM    I have reviewed the triage vital signs and nursing notes.    ED Triage Vitals   Temp Heart Rate Resp BP SpO2   06/15/21 1318 06/15/21 1318 06/15/21 1318 06/15/21 1441 06/15/21 1318   97.5 °F (36.4 °C) 60 20 148/100 93 %      Temp src Heart Rate Source Patient Position BP Location FiO2 (%)   06/15/21 1318 06/15/21 1318 06/15/21 1441 06/15/21 1441 --   Tympanic Monitor Lying Right arm        Physical Exam   Constitutional: Pt. is oriented to person, place, and time and well-developed, well-nourished, and in no distress.    HENT: Normocephalic and atraumatic,  EOM are normal. Pupils are equal, round, and reactive to light. Oropharynx moist/nonerythematous.  Neck: Normal range of motion. Neck supple. No JVD present. No tracheal deviation present. No thyromegaly present.   Cardiovascular: Irregularly irregular rhythm.  Rate is normal.    Pulmonary/Chest: Effort normal and breath sounds normal. No stridor. No respiratory distress. No wheezes, no rales.   Abdominal: Soft. Bowel sounds are  normal. No distension. There is no tenderness. There is no rebound and no guarding.   Musculoskeletal: Normal range of motion. No edema, tenderness or deformity.   Neurological: Pt. is alert and oriented to person, place, and time.  He has no focal deficits.  Skin: Skin is warm and dry. No rash noted. Pt. is not diaphoretic. No erythema.   Psychiatric: Mood anxious, affect and judgment normal.  He is pleasant and cooperative.  Nursing note and vitals reviewed.        LAB RESULTS  No results found for this or any previous visit (from the past 24 hour(s)).    Ordered the above labs and independently reviewed the results.        RADIOLOGY  No Radiology Exams Resulted Within Past 24 Hours    I ordered the above noted radiological studies. Reviewed by me and discussed with radiologist.  See dictation for official radiology interpretation.      PROCEDURES    Procedures      MEDICATIONS GIVEN IN ER    Medications - No data to display      PROGRESS, DATA ANALYSIS, CONSULTS, AND MEDICAL DECISION MAKING    Any/all labs have been independently reviewed by me.  Any/all radiology studies have been reviewed by me and discussed with radiologist dictating the report.   EKG's independently viewed and interpreted by me.  Discussion below represents my analysis of pertinent findings related to patient's condition, differential diagnosis, treatment plan and final disposition.      ED Course as of Kurt 15 1456   Tue Kurt 15, 2021   1434 Prior record review: The patient was seen by his primary care physician yesterday (Dr. Rush).  For that office note, Dr. Rush spoke with Dr.  He recommended starting the patient on digoxin 0.125 mg daily.  It appears that his primary care doctor was very concerned about his BNP level of 200 and advised for the patient to go to the emergency department.    [WC]   1444 Prior record review-the patient sustained a ventricular fibrillation cardiac arrest in April 1 of this year with successful resuscitation.   He was found to have multivessel coronary artery disease.  He also developed atrial flutter-he was started on metoprolol and digoxin.  He is on Lasix daily as well.    [WC]   3082 Case discussed with Dr. Rosas (electrophysiology)-he and I are in agreement that the patient does not need inpatient treatment this time.  The patient does have some underlying anxiety given his recent cardiac events.  His Lasix will be doubled.  He will continue digoxin and follow-up with Dr. Rosas on Thursday as scheduled.    [WC]      ED Course User Index  [WC] He Zhao MD       AS OF 21:19 EDT VITALS:    BP - 148/100  HR - 108  TEMP - 97.5 °F (36.4 °C) (Tympanic)  02 SATS - 93%        DIAGNOSIS  Final diagnoses:   Atrial fibrillation, unspecified type (CMS/HCC)   Mild congestive heart failure (CMS/HCC)         DISPOSITION  Discharged           He Zhao MD  06/15/21 [id number]

## 2021-06-15 NOTE — TELEPHONE ENCOUNTER
----- Message from Florin Rush MD sent at 6/15/2021 12:29 PM EDT -----  Please call the patient regarding his abnormal result. I called pt no answer, and voice mail not activated, sugar slightly elevated, and BNP elevated, indicates CHF, needs to see his Cardiologist I can raise the dose on his lasix on the meantime if pt is in agreement

## 2021-06-15 NOTE — TELEPHONE ENCOUNTER
Tried to call patient, unable to leave VM     Okay for HUB to read     sugar slightly elevated, and BNP elevated, indicates CHF, needs to see his Cardiologist I can raise the dose on his lasix on the meantime if pt is in agreement

## 2021-06-15 NOTE — ED TRIAGE NOTES
Pt reports he had bloodwork yest and was called to tell him he had an abnormal lab but the pt is sure what lab it is    Patient was placed in face mask during first look triage.  Patient was wearing a face mask throughout encounter.  I wore personal protective equipment throughout the encounter.  Hand hygiene was performed before and after patient encounter.

## 2021-06-19 ENCOUNTER — APPOINTMENT (OUTPATIENT)
Dept: GENERAL RADIOLOGY | Facility: HOSPITAL | Age: 61
End: 2021-06-19

## 2021-06-19 ENCOUNTER — HOSPITAL ENCOUNTER (EMERGENCY)
Facility: HOSPITAL | Age: 61
Discharge: HOME | End: 2021-06-19
Attending: EMERGENCY MEDICINE | Admitting: EMERGENCY MEDICINE

## 2021-06-19 VITALS
WEIGHT: 215 LBS | OXYGEN SATURATION: 93 % | SYSTOLIC BLOOD PRESSURE: 98 MMHG | BODY MASS INDEX: 34.55 KG/M2 | RESPIRATION RATE: 16 BRPM | DIASTOLIC BLOOD PRESSURE: 66 MMHG | HEART RATE: 95 BPM | HEIGHT: 66 IN | TEMPERATURE: 97.9 F

## 2021-06-19 DIAGNOSIS — I48.91 ATRIAL FIBRILLATION WITH TACHYCARDIC VENTRICULAR RATE (HCC): Primary | ICD-10-CM

## 2021-06-19 DIAGNOSIS — R06.02 SHORTNESS OF BREATH: ICD-10-CM

## 2021-06-19 DIAGNOSIS — I48.91 ATRIAL FIBRILLATION, UNSPECIFIED TYPE (HCC): ICD-10-CM

## 2021-06-19 LAB
ANION GAP SERPL CALCULATED.3IONS-SCNC: 10.9 MMOL/L (ref 5–15)
BASOPHILS # BLD AUTO: 0.03 10*3/MM3 (ref 0–0.2)
BASOPHILS NFR BLD AUTO: 0.4 % (ref 0–1.5)
BUN SERPL-MCNC: 13 MG/DL (ref 8–23)
BUN/CREAT SERPL: 17.6 (ref 7–25)
CALCIUM SPEC-SCNC: 9.8 MG/DL (ref 8.6–10.5)
CHLORIDE SERPL-SCNC: 98 MMOL/L (ref 98–107)
CO2 SERPL-SCNC: 25.1 MMOL/L (ref 22–29)
CREAT SERPL-MCNC: 0.74 MG/DL (ref 0.76–1.27)
DEPRECATED RDW RBC AUTO: 44.7 FL (ref 37–54)
DIGOXIN SERPL-MCNC: 0.4 NG/ML (ref 0.6–1.2)
EOSINOPHIL # BLD AUTO: 0.6 10*3/MM3 (ref 0–0.4)
EOSINOPHIL NFR BLD AUTO: 7.1 % (ref 0.3–6.2)
ERYTHROCYTE [DISTWIDTH] IN BLOOD BY AUTOMATED COUNT: 14.2 % (ref 12.3–15.4)
GFR SERPL CREATININE-BSD FRML MDRD: 107 ML/MIN/1.73
GLUCOSE BLDC GLUCOMTR-MCNC: 114 MG/DL (ref 70–130)
GLUCOSE SERPL-MCNC: 103 MG/DL (ref 65–99)
HCT VFR BLD AUTO: 41.2 % (ref 37.5–51)
HGB BLD-MCNC: 13.6 G/DL (ref 13–17.7)
IMM GRANULOCYTES # BLD AUTO: 0.03 10*3/MM3 (ref 0–0.05)
IMM GRANULOCYTES NFR BLD AUTO: 0.4 % (ref 0–0.5)
LYMPHOCYTES # BLD AUTO: 1.34 10*3/MM3 (ref 0.7–3.1)
LYMPHOCYTES NFR BLD AUTO: 15.9 % (ref 19.6–45.3)
MAGNESIUM SERPL-MCNC: 2.2 MG/DL (ref 1.6–2.4)
MCH RBC QN AUTO: 28.6 PG (ref 26.6–33)
MCHC RBC AUTO-ENTMCNC: 33 G/DL (ref 31.5–35.7)
MCV RBC AUTO: 86.7 FL (ref 79–97)
MONOCYTES # BLD AUTO: 0.97 10*3/MM3 (ref 0.1–0.9)
MONOCYTES NFR BLD AUTO: 11.5 % (ref 5–12)
NEUTROPHILS NFR BLD AUTO: 5.47 10*3/MM3 (ref 1.7–7)
NEUTROPHILS NFR BLD AUTO: 64.7 % (ref 42.7–76)
NRBC BLD AUTO-RTO: 0 /100 WBC (ref 0–0.2)
NT-PROBNP SERPL-MCNC: 766.9 PG/ML (ref 0–900)
PLATELET # BLD AUTO: 298 10*3/MM3 (ref 140–450)
PMV BLD AUTO: 9.3 FL (ref 6–12)
POTASSIUM SERPL-SCNC: 4.3 MMOL/L (ref 3.5–5.2)
RBC # BLD AUTO: 4.75 10*6/MM3 (ref 4.14–5.8)
SODIUM SERPL-SCNC: 134 MMOL/L (ref 136–145)
TROPONIN T SERPL-MCNC: <0.01 NG/ML (ref 0–0.03)
WBC # BLD AUTO: 8.44 10*3/MM3 (ref 3.4–10.8)

## 2021-06-19 PROCEDURE — 99283 EMERGENCY DEPT VISIT LOW MDM: CPT

## 2021-06-19 PROCEDURE — 83735 ASSAY OF MAGNESIUM: CPT | Performed by: EMERGENCY MEDICINE

## 2021-06-19 PROCEDURE — 80162 ASSAY OF DIGOXIN TOTAL: CPT | Performed by: EMERGENCY MEDICINE

## 2021-06-19 PROCEDURE — 80048 BASIC METABOLIC PNL TOTAL CA: CPT | Performed by: EMERGENCY MEDICINE

## 2021-06-19 PROCEDURE — 93005 ELECTROCARDIOGRAM TRACING: CPT | Performed by: EMERGENCY MEDICINE

## 2021-06-19 PROCEDURE — 93010 ELECTROCARDIOGRAM REPORT: CPT | Performed by: INTERNAL MEDICINE

## 2021-06-19 PROCEDURE — 83880 ASSAY OF NATRIURETIC PEPTIDE: CPT | Performed by: EMERGENCY MEDICINE

## 2021-06-19 PROCEDURE — 82962 GLUCOSE BLOOD TEST: CPT

## 2021-06-19 PROCEDURE — 85025 COMPLETE CBC W/AUTO DIFF WBC: CPT | Performed by: EMERGENCY MEDICINE

## 2021-06-19 PROCEDURE — 71045 X-RAY EXAM CHEST 1 VIEW: CPT

## 2021-06-19 PROCEDURE — 84484 ASSAY OF TROPONIN QUANT: CPT | Performed by: EMERGENCY MEDICINE

## 2021-06-19 RX ORDER — DIGOXIN 125 MCG
250 TABLET ORAL
Qty: 30 TABLET | Refills: 0 | Status: SHIPPED | OUTPATIENT
Start: 2021-06-19 | End: 2021-07-26 | Stop reason: HOSPADM

## 2021-06-19 NOTE — ED PROVIDER NOTES
EMERGENCY DEPARTMENT ENCOUNTER    Room Number:  18/18  Date of encounter:  6/19/2021  PCP: Florin Chaparro MD  Historian: Patient      HPI:  Chief Complaint: Multiple concerns   A complete HPI/ROS/PMH/PSH/SH/FH are unobtainable due to: N/A    Context: Sebastien Tang is a 61 y.o. male who presents to the ED c/o feeling dizzy, short of breath, having cold sweats and fatigue which started yesterday.  He was recently started on Lexapro by his primary care physician.  He has a history of ischemic cardiomyopathy, status post cardiac arrest, A. Fib/flutter.  His symptoms are intermittent and have now aggravating or alleviating factors.      The patient was placed in a mask in triage, hand hygiene was performed before and after my interaction with the patient.  I wore a mask, safety glasses and gloves during my entire interaction with the patient.    PAST MEDICAL HISTORY  Active Ambulatory Problems     Diagnosis Date Noted   • Cardiac arrest (CMS/[facility]) 04/01/2021   • Ventricular fibrillation (CMS/[facility]) 04/01/2021   • Atrial flutter (CMS/[facility]) 04/05/2021   • Tobacco abuse 04/05/2021   • Azotemia 04/05/2021   • COPD (chronic obstructive pulmonary disease) (CMS/[facility]) 04/05/2021   • MRSA nasal colonization 04/05/2021   • Transaminitis 04/05/2021   • Obesity (BMI 30-39.9) 04/05/2021   • Ischemic cardiomyopathy 04/05/2021   • Anemia 04/05/2021   • Constipation 04/06/2021   • Paroxysmal atrial fibrillation (CMS/HCC) 04/08/2021   • Acute congestive heart failure (CMS/[facility]) 04/26/2021   • PSA elevation 04/26/2021   • Wellness examination 04/26/2021   • High risk medication use 04/26/2021   • Cough 04/26/2021   • Abnormal CXR 04/26/2021   • Trouble in sleeping 04/26/2021   • Abdominal aneurysm (CMS/[facility]) 04/26/2021   • Iliac aneurysm (CMS/[facility]) 04/26/2021   • Coronary artery disease involving coronary bypass graft of native heart without angina pectoris 05/11/2021   • Atrial fibrillation, persistent (CMS/[facility]) 05/11/2021   • ICD  (implantable cardioverter-defibrillator) in place 05/11/2021   • Anxiety and depression 06/14/2021   • Shortness of breath 06/14/2021   • Primary insomnia 06/14/2021   • Atrial fibrillation (CMS/HCC) 06/14/2021   • Upper respiratory tract infection 06/14/2021     Resolved Ambulatory Problems     Diagnosis Date Noted   • No Resolved Ambulatory Problems     Past Medical History:   Diagnosis Date   • Acidosis    • Acute respiratory failure (CMS/HCC)    • Chronic coronary artery disease    • Enlarged prostate    • Hypertension    • Hypokalemia    • Orthopnea    • PAF (paroxysmal atrial fibrillation) (CMS/[facility])    • Pulmonary edema          PAST SURGICAL HISTORY  Past Surgical History:   Procedure Laterality Date   • CARDIAC CATHETERIZATION Left 4/1/2021    Procedure: Coronary Angiography;  Surgeon: Anna Puga MD;  Location:  [facility] INVASIVE LOCATION;  Service: Cardiology;  Laterality: Left;   • CARDIAC CATHETERIZATION N/A 4/1/2021    Procedure: Left ventriculography;  Surgeon: Anna Puga MD;  Location:  [facility] INVASIVE LOCATION;  Service: Cardiology;  Laterality: N/A;   • CARDIAC CATHETERIZATION N/A 4/1/2021    Procedure: Left Heart Cath;  Surgeon: Anna Puga MD;  Location:  [facility] INVASIVE LOCATION;  Service: Cardiology;  Laterality: N/A;   • CARDIAC ELECTROPHYSIOLOGY PROCEDURE N/A 4/7/2021    Procedure: IMPLANTABLE CARDIOVERTER DEFIBRILLATOR- SC BIOTRONIK;  Surgeon: Nik Rosas MD;  Location:  [facility] INVASIVE LOCATION;  Service: Cardiovascular;  Laterality: N/A;         FAMILY HISTORY  No family history on file.      SOCIAL HISTORY  Social History     Socioeconomic History   • Marital status: Single     Spouse name: Not on file   • Number of children: Not on file   • Years of education: Not on file   • Highest education level: Not on file   Tobacco Use   • Smoking status: Former Smoker     Packs/day: 1.50     Years: 10.00     Pack years: 15.00     Types: Cigarettes   • Smokeless  tobacco: Never Used   • Tobacco comment: quit smoking this month-4/21   Substance and Sexual Activity   • Alcohol use: Never   • Drug use: Not Currently   • Sexual activity: Not Currently         ALLERGIES  Patient has no known allergies.        REVIEW OF SYSTEMS  Review of Systems   Constitutional: Positive for chills and diaphoresis.   Eyes: Positive for visual disturbance.   Respiratory: Positive for shortness of breath.    Cardiovascular: Positive for palpitations.   Gastrointestinal: Negative for blood in stool, diarrhea and vomiting.   Neurological: Positive for dizziness and light-headedness.        All systems reviewed and negative except for those discussed in HPI.       PHYSICAL EXAM    I have reviewed the triage vital signs and nursing notes.    ED Triage Vitals   Temp Heart Rate Resp BP SpO2   06/19/21 1516 06/19/21 1516 06/19/21 1516 06/19/21 1516 06/19/21 1516   97.9 °F (36.6 °C) 113 16 106/92 94 %      Temp src Heart Rate Source Patient Position BP Location FiO2 (%)   06/19/21 1516 06/19/21 1516 06/19/21 1614 06/19/21 1614 --   Tympanic Monitor Lying Right arm        Physical Exam   Constitutional: Pt. is oriented to person, place, and time and well-developed, well-nourished, and in no distress.    HENT: Normocephalic and atraumatic,  EOM are normal.  Neck: Normal range of motion. Neck supple. No JVD present. No tracheal deviation present. No thyromegaly present.   Cardiovascular: Slightly tachycardic rate, irregularly irregular rhythm.   No murmur heard.  Pulmonary/Chest: Effort normal and breath sounds normal. No stridor. No respiratory distress. No wheezes, no rales.   Abdominal: Soft. Bowel sounds are normal. No distension. There is no tenderness. There is no rebound and no guarding.   Musculoskeletal: Normal range of motion. No edema, tenderness or deformity.   Neurological: Pt. is alert and oriented to person, place, and time. Pt. has normal sensation and normal strength. No cranial nerve  deficit. GCS score is 15.   Skin: Skin is warm and dry. No rash noted. Pt. is not diaphoretic. No erythema.   Psychiatric: Mood is slightly anxious, affect and judgment normal.   Nursing note and vitals reviewed.        LAB RESULTS  Recent Results (from the past 24 hour(s))   POC Glucose Once    Collection Time: 06/19/21  3:15 PM    Specimen: Blood   Result Value Ref Range    Glucose 114 70 - 130 mg/dL   ECG 12 Lead    Collection Time: 06/19/21  4:47 PM   Result Value Ref Range    QT Interval 347 ms   Basic Metabolic Panel    Collection Time: 06/19/21  4:52 PM    Specimen: Blood   Result Value Ref Range    Glucose 103 (H) 65 - 99 mg/dL    BUN 13 8 - 23 mg/dL    Creatinine 0.74 (L) 0.76 - 1.27 mg/dL    Sodium 134 (L) 136 - 145 mmol/L    Potassium 4.3 3.5 - 5.2 mmol/L    Chloride 98 98 - 107 mmol/L    CO2 25.1 22.0 - 29.0 mmol/L    Calcium 9.8 8.6 - 10.5 mg/dL    eGFR Non African Amer 107 >60 mL/min/1.73    BUN/Creatinine Ratio 17.6 7.0 - 25.0    Anion Gap 10.9 5.0 - 15.0 mmol/L   BNP    Collection Time: 06/19/21  4:52 PM    Specimen: Blood   Result Value Ref Range    proBNP 766.9 0.0 - 900.0 pg/mL   Troponin    Collection Time: 06/19/21  4:52 PM    Specimen: Blood   Result Value Ref Range    Troponin T <0.010 0.000 - 0.030 ng/mL   Magnesium    Collection Time: 06/19/21  4:52 PM    Specimen: Blood   Result Value Ref Range    Magnesium 2.2 1.6 - 2.4 mg/dL   CBC Auto Differential    Collection Time: 06/19/21  4:52 PM    Specimen: Blood   Result Value Ref Range    WBC 8.44 3.40 - 10.80 10*3/mm3    RBC 4.75 4.14 - 5.80 10*6/mm3    Hemoglobin 13.6 13.0 - 17.7 g/dL    Hematocrit 41.2 37.5 - 51.0 %    MCV 86.7 79.0 - 97.0 fL    MCH 28.6 26.6 - 33.0 pg    MCHC 33.0 31.5 - 35.7 g/dL    RDW 14.2 12.3 - 15.4 %    RDW-SD 44.7 37.0 - 54.0 fl    MPV 9.3 6.0 - 12.0 fL    Platelets 298 140 - 450 10*3/mm3    Neutrophil % 64.7 42.7 - 76.0 %    Lymphocyte % 15.9 (L) 19.6 - 45.3 %    Monocyte % 11.5 5.0 - 12.0 %    Eosinophil % 7.1 (H)  0.3 - 6.2 %    Basophil % 0.4 0.0 - 1.5 %    Immature Grans % 0.4 0.0 - 0.5 %    Neutrophils, Absolute 5.47 1.70 - 7.00 10*3/mm3    Lymphocytes, Absolute 1.34 0.70 - 3.10 10*3/mm3    Monocytes, Absolute 0.97 (H) 0.10 - 0.90 10*3/mm3    Eosinophils, Absolute 0.60 (H) 0.00 - 0.40 10*3/mm3    Basophils, Absolute 0.03 0.00 - 0.20 10*3/mm3    Immature Grans, Absolute 0.03 0.00 - 0.05 10*3/mm3    nRBC 0.0 0.0 - 0.2 /100 WBC   Digoxin Level    Collection Time: 06/19/21  4:52 PM    Specimen: Blood   Result Value Ref Range    Digoxin 0.40 (L) 0.60 - 1.20 ng/mL       Ordered the above labs and independently reviewed the results.        RADIOLOGY  XR Chest 1 View    Result Date: 6/19/2021  CHEST SINGLE VIEW  HISTORY: Chest pain and shortness of air  COMPARISON: Portable chest 4/25/2021  FINDINGS: Heart size is enlarged. There are increased interstitial markings that appear similar to the prior exam 4/25/2021. The patient has known peripheral prominent pulmonary interstitial disease. No perihilar edema is evident there is no definite infiltrate. Single lead left subclavian cardiac pacer/defibrillator is present.      Cardiomegaly. Peripheral prominent pulmonary interstitial disease appears similar to previous imaging without definite superimposed infiltrate or evidence for perihilar edema.  This report was finalized on 6/19/2021 5:24 PM by Dr. Frandy Skelton M.D.        I ordered the above noted radiological studies. Reviewed by me and discussed with radiologist.  See dictation for official radiology interpretation.      PROCEDURES    Procedures      MEDICATIONS GIVEN IN ER    Medications - No data to display      PROGRESS, DATA ANALYSIS, CONSULTS, AND MEDICAL DECISION MAKING    Any/all labs have been independently reviewed by me.  Any/all radiology studies have been reviewed by me and discussed with radiologist dictating the report.   EKG's independently viewed and interpreted by me.  Discussion below represents my analysis  of pertinent findings related to patient's condition, differential diagnosis, treatment plan and final disposition.      ED Course as of Jun 19 1828   Sat Jun 19, 2021   1633 Prior record review-the patient was seen here 5 days ago for evaluation of possible CHF.  Lasix was doubled.  BUN, creatinine and potassium were normal at that time.    [WC]   1809 Chest x-ray shows cardiomegaly and stable changes.    [WC]   1809 Digoxin(!): 0.40 [WC]   1809 Magnesium: 2.2 [WC]   1809 Troponin T: <0.010 [WC]   1809 CBC and BMP are unremarkable.    [WC]   1817 Case discussed with Dr. Landin (cardiology)-he is comfortable with having the patient double his digoxin 2.250 mg/day.  He is safe for discharge with outpatient follow-up.  He has an appointment his cardiologist on Tuesday.    [WC]      ED Course User Index  [WC] He Zhao MD       AS OF 18:28 EDT VITALS:    BP - 98/66  HR - 95  TEMP - 97.9 °F (36.6 °C) (Tympanic)  02 SATS - 93%        DIAGNOSIS  Final diagnoses:   Atrial fibrillation with tachycardic ventricular rate (CMS/HCC)         DISPOSITION  Discharged           He Zhao MD  06/19/21 2095

## 2021-06-19 NOTE — ED TRIAGE NOTES
Pt reports feeling lightheaded with blurred vision that started yesterday. Pt denies pain     Pt was wearing a mask during assessment.  This RN wore appropriate PPE

## 2021-06-19 NOTE — DISCHARGE INSTRUCTIONS
Decrease Lasix as discussed.  Keep your follow-up appointment with Dr. Rosas on Tuesday as scheduled.  Increase digoxin to 250 mcg/day (you may take 2 of your current tablets, I have written you a prescription for additional digoxin as needed.)

## 2021-06-20 LAB — QT INTERVAL: 347 MS

## 2021-06-22 ENCOUNTER — OFFICE VISIT (OUTPATIENT)
Dept: CARDIOLOGY | Facility: CLINIC | Age: 61
End: 2021-06-22

## 2021-06-22 VITALS
HEART RATE: 96 BPM | HEIGHT: 65 IN | SYSTOLIC BLOOD PRESSURE: 134 MMHG | WEIGHT: 215 LBS | DIASTOLIC BLOOD PRESSURE: 86 MMHG | BODY MASS INDEX: 35.82 KG/M2

## 2021-06-22 DIAGNOSIS — I49.01 VENTRICULAR FIBRILLATION (HCC): ICD-10-CM

## 2021-06-22 DIAGNOSIS — Z95.810 ICD (IMPLANTABLE CARDIOVERTER-DEFIBRILLATOR) IN PLACE: ICD-10-CM

## 2021-06-22 DIAGNOSIS — I48.19 ATRIAL FIBRILLATION, PERSISTENT (HCC): Primary | ICD-10-CM

## 2021-06-22 PROCEDURE — 99214 OFFICE O/P EST MOD 30 MIN: CPT | Performed by: INTERNAL MEDICINE

## 2021-06-22 PROCEDURE — 93000 ELECTROCARDIOGRAM COMPLETE: CPT | Performed by: INTERNAL MEDICINE

## 2021-06-22 RX ORDER — AMIODARONE HYDROCHLORIDE 200 MG/1
200 TABLET ORAL 2 TIMES DAILY
Qty: 90 TABLET | Refills: 1 | Status: SHIPPED | OUTPATIENT
Start: 2021-06-22 | End: 2021-07-26 | Stop reason: SDUPTHER

## 2021-06-22 NOTE — PROGRESS NOTES
Date of Office Visit: 2021  Encounter Provider: Nik Rosas MD  Place of Service: [facility] CARDIOLOGY  Patient Name: Sebastien Tang  :1960    Chief Complaint   Patient presents with   • persistent AFIB     s/p cardioversion    :     HPI: Sebastien Tang is a 61 y.o. male who presents today for persistent atrial fibrillation, ischemic cardiomyopathy, ventricular tachycardia.    Patient is relapsed into atrial fibrillation.  He was not really aware that he was back in atrial fibrillation.  He was noted at a PCP visit.  Initially, his rate was a little fast, but is improved with digoxin.  He has actually been to the ER twice in the past week.    He seems to felt okay, but his BNP was just so slightly elevated, they put him on an increased dose of his Lasix.    He went again over the weekend.  Not clear why, he just that he did not feel well.  They stopped the Lasix and doubled his digoxin.    He presents today for follow-up.  He has no specific complaints.     Past Medical History:   Diagnosis Date   • Acidosis     mixed resp/metabolic acidosis   • Acute congestive heart failure (CMS/HCC)    • Acute respiratory failure (CMS/HCC)     hypoxic   • Anemia    • Atrial flutter (CMS/HCC)    • Azotemia    • Cardiac arrest (CMS/HCC) 2021   • Chronic coronary artery disease    • Constipation    • COPD (chronic obstructive pulmonary disease) (CMS/HCC)    • Enlarged prostate    • Hypertension    • Hypokalemia     severe   • Ischemic cardiomyopathy    • MRSA nasal colonization    • Obesity (BMI 30-39.9)    • Orthopnea    • PAF (paroxysmal atrial fibrillation) (CMS/HCC)    • Pulmonary edema    • Tobacco abuse    • Transaminitis    • Trouble in sleeping    • Ventricular fibrillation (CMS/HCC)        Past Surgical History:   Procedure Laterality Date   • CARDIAC CATHETERIZATION Left 2021    Procedure: Coronary Angiography;  Surgeon: Anna Puga MD;  Location: [facility]  INVASIVE LOCATION;  Service: Cardiology;  Laterality: Left;   • CARDIAC CATHETERIZATION N/A 2021    Procedure: Left ventriculography;  Surgeon: Anna Puga MD;  Location:  [facility] CATH INVASIVE LOCATION;  Service: Cardiology;  Laterality: N/A;   • CARDIAC CATHETERIZATION N/A 2021    Procedure: Left Heart Cath;  Surgeon: Anna Puga MD;  Location:  [facility] CATH INVASIVE LOCATION;  Service: Cardiology;  Laterality: N/A;   • CARDIAC ELECTROPHYSIOLOGY PROCEDURE N/A 2021    Procedure: IMPLANTABLE CARDIOVERTER DEFIBRILLATOR- SC BIOTRONIK;  Surgeon: Nik Rosas MD;  Location:  [facility] CATH INVASIVE LOCATION;  Service: Cardiovascular;  Laterality: N/A;       Social History     Socioeconomic History   • Marital status: Single     Spouse name: Not on file   • Number of children: Not on file   • Years of education: Not on file   • Highest education level: Not on file   Tobacco Use   • Smoking status: Former Smoker     Packs/day: 1.50     Years: 10.00     Pack years: 15.00     Types: Cigarettes     Quit date: 2021     Years since quittin.1   • Smokeless tobacco: Never Used   • Tobacco comment: quit smoking this month-   Vaping Use   • Vaping Use: Never used   Substance and Sexual Activity   • Alcohol use: Never   • Drug use: Not Currently   • Sexual activity: Not Currently       History reviewed. No pertinent family history.    Review of Systems   Constitutional: Negative.   Cardiovascular: Negative.    Respiratory: Negative.    Gastrointestinal: Negative.        No Known Allergies      Current Outpatient Medications:   •  amoxicillin-clavulanate (Augmentin) 875-125 MG per tablet, Take 1 tablet by mouth 2 (Two) Times a Day., Disp: 20 tablet, Rfl: 0  •  apixaban (ELIQUIS) 5 MG tablet tablet, Take 1 tablet by mouth Every 12 (Twelve) Hours. Indications: Atrial Fibrillation, Disp: 180 tablet, Rfl: 0  •  aspirin 81 MG EC tablet, Take 1 tablet by mouth Daily., Disp: 30 tablet, Rfl: 0  •  digoxin  "(Lanoxin) 125 MCG tablet, Take 2 tablets by mouth Daily., Disp: 30 tablet, Rfl: 0  •  furosemide (LASIX) 40 MG tablet, Take 1 tablet by mouth 2 (Two) Times a Day., Disp: 60 tablet, Rfl: 3  •  lisinopril (PRINIVIL,ZESTRIL) 5 MG tablet, Take 1 tablet by mouth Daily., Disp: 90 tablet, Rfl: 3  •  metoprolol succinate XL (TOPROL-XL) 100 MG 24 hr tablet, Take 1 tablet by mouth Every 12 (Twelve) Hours., Disp: 180 tablet, Rfl: 3  •  potassium chloride (K-DUR,KLOR-CON) 20 MEQ CR tablet, Take 1 tablet by mouth Daily., Disp: 90 tablet, Rfl: 1  •  Symbicort 160-4.5 MCG/ACT inhaler, INHALE 2 PUFFS BY MOUTH TWICE DAILY, Disp: 10.2 g, Rfl: 0  •  zolpidem (AMBIEN) 10 MG tablet, Take 1 tablet by mouth At Night As Needed for Sleep., Disp: 30 tablet, Rfl: 0  •  amiodarone (PACERONE) 200 MG tablet, Take 1 tablet by mouth 2 (Two) Times a Day., Disp: 90 tablet, Rfl: 1  •  escitalopram (Lexapro) 10 MG tablet, Take 1 tablet by mouth Every Morning., Disp: 30 tablet, Rfl: 1      Objective:     Vitals:    06/22/21 1403   BP: 134/86   Pulse: 96   Weight: 97.5 kg (215 lb)   Height: 165.1 cm (65\")     Body mass index is 35.78 kg/m².    PHYSICAL EXAM:    Vitals and nursing note reviewed.   Constitutional:       Appearance: Healthy appearance. Obese.   Pulmonary:      Effort: Pulmonary effort is normal.      Breath sounds: Normal breath sounds.   Cardiovascular:      Normal rate. Irregular rhythm.   Edema:     Peripheral edema absent.   Neurological:      Mental Status: Alert and oriented to person, place and time.   Psychiatric:         Attention and Perception: Attention and perception normal.         Mood and Affect: Affect normal. Mood is anxious.             ECG 12 Lead    Date/Time: 6/22/2021 3:38 PM  Performed by: Nik Rosas MD  Authorized by: Nik Rsoas MD   Comparison: compared with previous ECG from 6/14/2021  Similar to previous ECG  Rhythm: atrial fibrillation              Assessment:       Diagnosis Plan   1. " Atrial fibrillation, persistent (CMS/HCC)  Cardioversion External in Cardiology Department   2. Ventricular fibrillation (CMS/HCC)     3. ICD (implantable cardioverter-defibrillator) in place            Plan:       He is relapsed into atrial fibrillation.  We thought his atrial fibrillation was relatively new onset, but I think it may have been pre-existing.  He was probably cardioverted during treatment for ventricular fibrillation.  He had not really regularly followed with a physician prior to this.  He is already relapsed back into atrial fibrillation.  He has fairly generalized symptoms of fatigue.  We will put him on amiodarone 200 mg daily for 2 weeks, and repeat cardioversion.  I do not think he is a good candidate for Tikosyn or sotalol.  If he notes improvement on amiodarone, we can consider ablation.    As always, it has been a pleasure to participate in your patient's care.      Sincerely,         Nik Rossa MD

## 2021-06-30 ENCOUNTER — TRANSCRIBE ORDERS (OUTPATIENT)
Dept: CARDIOLOGY | Facility: CLINIC | Age: 61
End: 2021-06-30

## 2021-06-30 DIAGNOSIS — Z01.810 PREPROCEDURAL CARDIOVASCULAR EXAMINATION: ICD-10-CM

## 2021-06-30 DIAGNOSIS — Z13.6 SCREENING FOR CARDIOVASCULAR CONDITION: Primary | ICD-10-CM

## 2021-07-06 ENCOUNTER — TELEPHONE (OUTPATIENT)
Dept: FAMILY MEDICINE CLINIC | Facility: CLINIC | Age: 61
End: 2021-07-06

## 2021-07-06 RX ORDER — BUDESONIDE AND FORMOTEROL FUMARATE DIHYDRATE 160; 4.5 UG/1; UG/1
AEROSOL RESPIRATORY (INHALATION)
Qty: 10.2 G | Refills: 0 | Status: SHIPPED | OUTPATIENT
Start: 2021-07-06 | End: 2021-08-03

## 2021-07-06 NOTE — TELEPHONE ENCOUNTER
Unable to leave VM due to no  set up     Okay for HUB to read     Calling patient in regards to his Cologuard kit. Per exact sciences the patient has not yet sent his sample in to be tested. I am reaching out to the patient to see if he still wants to participate and send his sample, if not, to let us know so that we can cancel the order

## 2021-07-14 PROCEDURE — 93295 DEV INTERROG REMOTE 1/2/MLT: CPT | Performed by: INTERNAL MEDICINE

## 2021-07-14 PROCEDURE — 93296 REM INTERROG EVL PM/IDS: CPT | Performed by: INTERNAL MEDICINE

## 2021-07-19 DIAGNOSIS — G47.9 TROUBLE IN SLEEPING: ICD-10-CM

## 2021-07-19 DIAGNOSIS — F51.01 PRIMARY INSOMNIA: ICD-10-CM

## 2021-07-20 ENCOUNTER — LAB (OUTPATIENT)
Dept: LAB | Facility: HOSPITAL | Age: 61
End: 2021-07-20

## 2021-07-20 DIAGNOSIS — Z01.810 PREPROCEDURAL CARDIOVASCULAR EXAMINATION: ICD-10-CM

## 2021-07-20 DIAGNOSIS — Z13.6 SCREENING FOR CARDIOVASCULAR CONDITION: ICD-10-CM

## 2021-07-20 LAB
ANION GAP SERPL CALCULATED.3IONS-SCNC: 10.3 MMOL/L (ref 5–15)
BUN SERPL-MCNC: 12 MG/DL (ref 8–23)
BUN/CREAT SERPL: 14.1 (ref 7–25)
CALCIUM SPEC-SCNC: 9.5 MG/DL (ref 8.6–10.5)
CHLORIDE SERPL-SCNC: 102 MMOL/L (ref 98–107)
CO2 SERPL-SCNC: 23.7 MMOL/L (ref 22–29)
CREAT SERPL-MCNC: 0.85 MG/DL (ref 0.76–1.27)
GFR SERPL CREATININE-BSD FRML MDRD: 91 ML/MIN/1.73
GLUCOSE SERPL-MCNC: 139 MG/DL (ref 65–99)
POTASSIUM SERPL-SCNC: 4.2 MMOL/L (ref 3.5–5.2)
SODIUM SERPL-SCNC: 136 MMOL/L (ref 136–145)

## 2021-07-20 PROCEDURE — 36415 COLL VENOUS BLD VENIPUNCTURE: CPT

## 2021-07-20 PROCEDURE — 80048 BASIC METABOLIC PNL TOTAL CA: CPT

## 2021-07-20 RX ORDER — ZOLPIDEM TARTRATE 10 MG/1
10 TABLET ORAL NIGHTLY PRN
Qty: 30 TABLET | Refills: 0 | Status: SHIPPED | OUTPATIENT
Start: 2021-07-20 | End: 2021-08-18 | Stop reason: SDUPTHER

## 2021-07-20 NOTE — TELEPHONE ENCOUNTER
LOV 06/14/2021  Due for f/u - Noted on script for pharmacy to relay to patient   Last refill 06/14/2021 qty 30 w/ 0 refills

## 2021-07-26 ENCOUNTER — HOSPITAL ENCOUNTER (OUTPATIENT)
Dept: CARDIOLOGY | Facility: HOSPITAL | Age: 61
Discharge: HOME | End: 2021-07-26
Admitting: INTERNAL MEDICINE

## 2021-07-26 VITALS
OXYGEN SATURATION: 93 % | DIASTOLIC BLOOD PRESSURE: 68 MMHG | HEIGHT: 66 IN | WEIGHT: 215 LBS | HEART RATE: 53 BPM | RESPIRATION RATE: 16 BRPM | BODY MASS INDEX: 34.55 KG/M2 | SYSTOLIC BLOOD PRESSURE: 94 MMHG | TEMPERATURE: 98.1 F

## 2021-07-26 DIAGNOSIS — I48.19 ATRIAL FIBRILLATION, PERSISTENT (HCC): ICD-10-CM

## 2021-07-26 LAB — QT INTERVAL: 380 MS

## 2021-07-26 PROCEDURE — 92960 CARDIOVERSION ELECTRIC EXT: CPT | Performed by: INTERNAL MEDICINE

## 2021-07-26 PROCEDURE — 93005 ELECTROCARDIOGRAM TRACING: CPT | Performed by: INTERNAL MEDICINE

## 2021-07-26 PROCEDURE — 93010 ELECTROCARDIOGRAM REPORT: CPT | Performed by: INTERNAL MEDICINE

## 2021-07-26 PROCEDURE — 92960 CARDIOVERSION ELECTRIC EXT: CPT

## 2021-07-26 RX ORDER — SODIUM CHLORIDE 0.9 % (FLUSH) 0.9 %
10 SYRINGE (ML) INJECTION AS NEEDED
Status: DISCONTINUED | OUTPATIENT
Start: 2021-07-26 | End: 2021-07-26 | Stop reason: HOSPADM

## 2021-07-26 RX ORDER — SODIUM CHLORIDE 0.9 % (FLUSH) 0.9 %
3 SYRINGE (ML) INJECTION EVERY 12 HOURS SCHEDULED
Status: DISCONTINUED | OUTPATIENT
Start: 2021-07-26 | End: 2021-07-26 | Stop reason: HOSPADM

## 2021-07-26 RX ORDER — AMIODARONE HYDROCHLORIDE 200 MG/1
200 TABLET ORAL DAILY
Qty: 90 TABLET | Refills: 1 | Status: SHIPPED | OUTPATIENT
Start: 2021-07-26 | End: 2021-08-19

## 2021-07-26 RX ORDER — SODIUM CHLORIDE 9 MG/ML
75 INJECTION, SOLUTION INTRAVENOUS CONTINUOUS
Status: DISCONTINUED | OUTPATIENT
Start: 2021-07-26 | End: 2021-07-27 | Stop reason: HOSPADM

## 2021-07-26 RX ADMIN — SODIUM CHLORIDE 75 ML/HR: 9 INJECTION, SOLUTION INTRAVENOUS at 10:41

## 2021-07-26 RX ADMIN — METHOHEXITAL SODIUM 50 MG: 500 INJECTION, POWDER, LYOPHILIZED, FOR SOLUTION INTRAMUSCULAR; INTRAVENOUS; RECTAL at 11:12

## 2021-07-26 RX ADMIN — METHOHEXITAL SODIUM 20 MG: 500 INJECTION, POWDER, LYOPHILIZED, FOR SOLUTION INTRAMUSCULAR; INTRAVENOUS; RECTAL at 11:12

## 2021-07-29 ENCOUNTER — TELEPHONE (OUTPATIENT)
Dept: CARDIOLOGY | Facility: CLINIC | Age: 61
End: 2021-07-29

## 2021-07-29 NOTE — TELEPHONE ENCOUNTER
LENI, remote transmission received yesterday for AT/AF. 9 new ATR events since cardioversion on 7/26 , EGMs show true AF that's still ongoing. He takes apixaban for AC. I spoke to him. He doesn't have any AF symptom but I told him I would let you know. He said to call his work number at [phone] if you need to reach him.

## 2021-08-02 DIAGNOSIS — I48.91 ATRIAL FIBRILLATION, UNSPECIFIED TYPE (HCC): ICD-10-CM

## 2021-08-03 RX ORDER — BUDESONIDE AND FORMOTEROL FUMARATE DIHYDRATE 160; 4.5 UG/1; UG/1
AEROSOL RESPIRATORY (INHALATION)
Qty: 10.2 G | Refills: 3 | Status: SHIPPED | OUTPATIENT
Start: 2021-08-03 | End: 2021-11-02

## 2021-08-03 NOTE — TELEPHONE ENCOUNTER
LOV 6/14/21  NOV Past due 7/12/21  Labs 7/20/21  Last RF 5/6/21    Tried to call, mailbox is not set up.  Sent a message though [facility].     Needs to be seen for recheck on shortness of breath.    HUB MAY RELAY MESSAGE TO PATIENT AND RESPOND.    PLEASE REPLY THAT PT HAS BEEN NOTIFIED.

## 2021-08-13 ENCOUNTER — TELEPHONE (OUTPATIENT)
Dept: CARDIOLOGY | Facility: CLINIC | Age: 61
End: 2021-08-13

## 2021-08-13 NOTE — TELEPHONE ENCOUNTER
Pt has had recurrent AF since 7/26 cardioversion as reported 7/29. Strips show Aflutter. PAF cont's with 1149 reported events and [place] of 88.4% since 7/27 remote. Avg Vrate during that time is 105bpm. No RVR recorded. Pt is on Eliquis. Regarding pt's AF, what do you want to be notified about? Do we still need AF alerts on? There have been 6 AF alert remotes since 7/29. Please advise.  NS

## 2021-08-17 ENCOUNTER — TELEPHONE (OUTPATIENT)
Dept: CARDIOLOGY | Facility: CLINIC | Age: 61
End: 2021-08-17

## 2021-08-17 ENCOUNTER — PATIENT MESSAGE (OUTPATIENT)
Dept: CARDIOLOGY | Facility: CLINIC | Age: 61
End: 2021-08-17

## 2021-08-17 NOTE — TELEPHONE ENCOUNTER
Tried to call him, back in afib, wanted to adjust meds    No answer and unable to leave message, no other number to call    Can we try to call him tomorrow see if we can get in touch with him---need to be sure meds in chart are what he is taking and he also has no follow up appt so we need to get that set up also

## 2021-08-18 ENCOUNTER — TELEPHONE (OUTPATIENT)
Dept: CARDIOLOGY | Facility: CLINIC | Age: 61
End: 2021-08-18

## 2021-08-18 DIAGNOSIS — G47.9 TROUBLE IN SLEEPING: ICD-10-CM

## 2021-08-18 DIAGNOSIS — F51.01 PRIMARY INSOMNIA: ICD-10-CM

## 2021-08-18 RX ORDER — ZOLPIDEM TARTRATE 10 MG/1
10 TABLET ORAL NIGHTLY PRN
Qty: 30 TABLET | Refills: 0 | Status: SHIPPED | OUTPATIENT
Start: 2021-08-18 | End: 2021-09-16

## 2021-08-18 NOTE — TELEPHONE ENCOUNTER
Yes---I sent a my chart message---can you see if he still has digoxin at home and if so what milligrams?     We had him on it in the past but we stopped it, not sure if he threw it away or not?---once I have that info will be able to give him instructions on what we need to do with is meds

## 2021-08-18 NOTE — TELEPHONE ENCOUNTER
Caller: Sebastien Tang    Relationship: Self    Best call back number: [phone] (H)  Medication needed:   Requested Prescriptions     Pending Prescriptions Disp Refills   • zolpidem (AMBIEN) 10 MG tablet 30 tablet 0     Sig: Take 1 tablet by mouth At Night As Needed for Sleep.       When do you need the refill by: 08/18/21    What additional details did the patient provide when requesting the medication:  PATIENT ALSO STATES WAS EXPOSED TO COVID AND WOULD LIKE TO KNOW IF THERE IS SOMETHING HE SHOULD BE TAKING      Does the patient have less than a 3 day supply:  [x] Yes  [] No    What is the patient's preferred pharmacy: [id number] [address] AT [facility] [phone] [facility] [phone]

## 2021-08-18 NOTE — TELEPHONE ENCOUNTER
Pt was returning your call, looks like you were needing a review of his meds    Cardiac meds reviewed  Apixaban 5mg BID  Amiodarone 200mg BID- stated he just picked up this prescription  Furosemide 40mg BID  Lisinopril 5mg daily  Metoprolol succinate 100mg BID  Potassium 20meq daily  Thanks  Lilly Bernal RN  Triage nurse

## 2021-08-18 NOTE — TELEPHONE ENCOUNTER
He still has Digoxin in the home 125mcg, he has not been taking.  The order was for 2 tabs a day  Thanks  Lilly Bernal RN  Triage nurse

## 2021-08-18 NOTE — TELEPHONE ENCOUNTER
From: ERIC Anderson  To: Sebastien Tang  Sent: 8/17/2021 4:40 PM EDT  Subject: Medications    Mr Tang,    Tried to call you but no answer and unable to leave a message---can you call the office tomorrow, want to be sure we have accurate list of your meds and your device alerted that you are having some more afib which is fine---we can make some medication adjustments and evaluated. You also need a follow up appt.     Thanks  ERIC Hughes

## 2021-08-19 ENCOUNTER — OFFICE VISIT (OUTPATIENT)
Dept: FAMILY MEDICINE CLINIC | Facility: CLINIC | Age: 61
End: 2021-08-19

## 2021-08-19 DIAGNOSIS — Z20.822 CLOSE EXPOSURE TO COVID-19 VIRUS: Primary | ICD-10-CM

## 2021-08-19 PROCEDURE — 99213 OFFICE O/P EST LOW 20 MIN: CPT | Performed by: FAMILY MEDICINE

## 2021-08-19 RX ORDER — DIGOXIN 125 MCG
250 TABLET ORAL DAILY
Qty: 60 TABLET | Refills: 11 | Status: SHIPPED | OUTPATIENT
Start: 2021-08-19 | End: 2021-10-12

## 2021-08-19 NOTE — TELEPHONE ENCOUNTER
I have spoken to the pt and passed on your recommendations.  I have sent a new rx to his pharmacy for the digoxin.    Scheduling pt is quarantined through 8/27- can we schedule fu and device check in 1 month  Thanks  Lilly Bernal RN  Triage nurse

## 2021-08-19 NOTE — TELEPHONE ENCOUNTER
Tried to call him, no answer and not able to leave a message---can you try to get in touch with him    We want him to stop his amiodarone and start back on digoxin at his previous dose of 2 tabs daily of the 0.125 mg and he needs a follow up appt with me (on day KP in office) or Dr. Rosas in about a month with device check that day.

## 2021-08-19 NOTE — PROGRESS NOTES
Chief Complaint  No chief complaint on file.  Covid exposure  Subjective        Covid exposure  Sebastien Tang presents to [facility]  History of Present Illness  Covid exposure, patient agreed to be contacted by phone  Roommate on Saturday had COVID , pt on quarantine, unable to work, white sputum, cough, fever, chills, no sore throat, no wheezing, mild SOA, on Digoxin today, got vaccinated in June against COVID  Objective   Vital Signs:   There were no vitals taken for this visit.      Result Review :                 Assessment and Plan    Diagnoses and all orders for this visit:    1. Close exposure to COVID-19 virus (Primary)  Comments:  Patient on quarantine  per order from the health department, patient declined to come for Covid test        Follow Up   No follow-ups on file.  Patient was given instructions and counseling regarding his condition or for health maintenance advice. Please see specific information pulled into the AVS if appropriate.   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

## 2021-08-30 RX ORDER — FUROSEMIDE 40 MG/1
TABLET ORAL
Qty: 60 TABLET | Refills: 0 | Status: SHIPPED | OUTPATIENT
Start: 2021-08-30 | End: 2021-09-28

## 2021-09-13 RX ORDER — AMIODARONE HYDROCHLORIDE 200 MG/1
TABLET ORAL
Qty: 90 TABLET | Refills: 1 | OUTPATIENT
Start: 2021-09-13

## 2021-09-15 DIAGNOSIS — F51.01 PRIMARY INSOMNIA: ICD-10-CM

## 2021-09-15 DIAGNOSIS — G47.9 TROUBLE IN SLEEPING: ICD-10-CM

## 2021-09-16 RX ORDER — ZOLPIDEM TARTRATE 10 MG/1
10 TABLET ORAL NIGHTLY PRN
Qty: 30 TABLET | Refills: 0 | Status: SHIPPED | OUTPATIENT
Start: 2021-09-16 | End: 2021-10-11 | Stop reason: SDUPTHER

## 2021-09-16 NOTE — TELEPHONE ENCOUNTER
Rx Refill Note  Requested Prescriptions     Pending Prescriptions Disp Refills   • zolpidem (AMBIEN) 10 MG tablet [Pharmacy Med Name: ZOLPIDEM 10MG TABLETS] 30 tablet      Sig: TAKE 1 TABLET BY MOUTH AT NIGHT AS NEEDED FOR SLEEP      Last office visit with prescribing clinician: 8/19/2021      Next office visit with prescribing clinician: Visit date not found            González Avalos MA  09/16/21, 10:39 EDT

## 2021-09-28 RX ORDER — FUROSEMIDE 40 MG/1
40 TABLET ORAL 2 TIMES DAILY
Qty: 60 TABLET | Refills: 2 | Status: SHIPPED | OUTPATIENT
Start: 2021-09-28 | End: 2021-10-12

## 2021-10-10 DIAGNOSIS — G47.9 TROUBLE IN SLEEPING: ICD-10-CM

## 2021-10-10 DIAGNOSIS — F51.01 PRIMARY INSOMNIA: ICD-10-CM

## 2021-10-11 DIAGNOSIS — G47.9 TROUBLE IN SLEEPING: ICD-10-CM

## 2021-10-11 DIAGNOSIS — F51.01 PRIMARY INSOMNIA: ICD-10-CM

## 2021-10-12 ENCOUNTER — CLINICAL SUPPORT NO REQUIREMENTS (OUTPATIENT)
Dept: CARDIOLOGY | Facility: CLINIC | Age: 61
End: 2021-10-12

## 2021-10-12 ENCOUNTER — OFFICE VISIT (OUTPATIENT)
Dept: CARDIOLOGY | Facility: CLINIC | Age: 61
End: 2021-10-12

## 2021-10-12 VITALS
HEART RATE: 72 BPM | DIASTOLIC BLOOD PRESSURE: 84 MMHG | HEIGHT: 66 IN | BODY MASS INDEX: 34.55 KG/M2 | SYSTOLIC BLOOD PRESSURE: 146 MMHG | WEIGHT: 215 LBS

## 2021-10-12 DIAGNOSIS — R03.0 ELEVATED BLOOD PRESSURE READING: ICD-10-CM

## 2021-10-12 DIAGNOSIS — I25.810 CORONARY ARTERY DISEASE INVOLVING CORONARY BYPASS GRAFT OF NATIVE HEART WITHOUT ANGINA PECTORIS: ICD-10-CM

## 2021-10-12 DIAGNOSIS — I25.5 ISCHEMIC CARDIOMYOPATHY: Primary | ICD-10-CM

## 2021-10-12 DIAGNOSIS — I46.9 CARDIAC ARREST (HCC): Primary | ICD-10-CM

## 2021-10-12 DIAGNOSIS — I48.0 PAROXYSMAL ATRIAL FIBRILLATION (HCC): ICD-10-CM

## 2021-10-12 DIAGNOSIS — Z95.810 ICD (IMPLANTABLE CARDIOVERTER-DEFIBRILLATOR) IN PLACE: ICD-10-CM

## 2021-10-12 DIAGNOSIS — I25.5 ISCHEMIC CARDIOMYOPATHY: ICD-10-CM

## 2021-10-12 PROCEDURE — 93283 PRGRMG EVAL IMPLANTABLE DFB: CPT | Performed by: INTERNAL MEDICINE

## 2021-10-12 PROCEDURE — 99213 OFFICE O/P EST LOW 20 MIN: CPT | Performed by: NURSE PRACTITIONER

## 2021-10-12 PROCEDURE — 93000 ELECTROCARDIOGRAM COMPLETE: CPT | Performed by: NURSE PRACTITIONER

## 2021-10-12 RX ORDER — ZOLPIDEM TARTRATE 10 MG/1
10 TABLET ORAL NIGHTLY PRN
Qty: 30 TABLET | OUTPATIENT
Start: 2021-10-12

## 2021-10-12 RX ORDER — ZOLPIDEM TARTRATE 10 MG/1
10 TABLET ORAL NIGHTLY PRN
Qty: 30 TABLET | Refills: 0 | Status: SHIPPED | OUTPATIENT
Start: 2021-10-12 | End: 2021-11-10

## 2021-10-12 RX ORDER — FUROSEMIDE 40 MG/1
40 TABLET ORAL DAILY
Qty: 60 TABLET | Refills: 2
Start: 2021-10-12 | End: 2021-12-26

## 2021-10-12 NOTE — TELEPHONE ENCOUNTER
Rx Refill Note  Requested Prescriptions     Pending Prescriptions Disp Refills   • zolpidem (AMBIEN) 10 MG tablet [Pharmacy Med Name: ZOLPIDEM 10MG TABLETS] 30 tablet      Sig: TAKE 1 TABLET BY MOUTH AT NIGHT AS NEEDED FOR SLEEP      Last office visit with prescribing clinician: 8/19/2021      Next office visit with prescribing clinician: 10/11/2021            González Avalos MA  10/12/21, 07:53 EDT

## 2021-10-12 NOTE — PROGRESS NOTES
Date of Office Visit: 10/12/2021  Encounter Provider: ERIC Anderson  Place of Service: [facility] CARDIOLOGY  Patient Name: Sebastien Tang  :1960    Chief Complaint   Patient presents with   • 1 month hospital f/u   • Pacemaker Check   :     HPI: Sebastien Tang is a 61 y.o. male who is a patient followed by Dr. Rosas.  He presented to the hospital in April as a resuscitated out of hospital arrest.  Was taken to the Cath Lab and found to have severe multivessel CAD,  of the RCA and what appeared to be OM 2, moderately reduced LV function, not revascularizable so he was medically treated.    Echo during that admission showed an EF of 38.9% with mild concentric hypertrophy and no significant ovular abnormalities, he had a single-chamber ICD implanted prior to discharge.    He initially was going in and out of A. fib, he then had persistent A. fib, we attempted cardioversion and tried him on amiodarone however he remained persistent but was rate controlled easily so the plan was to just pursue rate control.    He said a couple of ER visits for dyspnea, some of it seems to be anxiety other times he was treated with IV diuretics.    He did recently have Covid, he was able to remain at home and did not require hospitalization.    Presents today for routine follow-up and device check.    He is not having any chest pain, he does have some intermittent shortness of breath, he is unsure whether this is because of excess volume at times or due to his anxiety.  He does not have any PND, orthopnea or palpitations.    Complains of lower extremity edema after he has been standing on his feet all day.    He also complains of constipation.    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    He remains on apixaban for anticoagulation.  He has not had any bleeding issues.    His device interrogation today shows normal testing and  function, amazingly he apparently has been in sinus rhythm since 9/8.  He has an AF burden of 24% however his counters were not cleared when he was last here in the office we did clear them today.        Past Medical History:   Diagnosis Date   • Acidosis     mixed resp/metabolic acidosis   • Acute congestive heart failure (HCC)    • Acute respiratory failure (HCC)     hypoxic   • Anemia    • Atrial flutter (HCC)    • Azotemia    • Cardiac arrest (HCC) 04/2021   • Chronic coronary artery disease    • Constipation    • COPD (chronic obstructive pulmonary disease) (HCC)    • Enlarged prostate    • Hypertension    • Hypokalemia     severe   • ICD (implantable cardioverter-defibrillator) in place    • Ischemic cardiomyopathy    • MRSA nasal colonization    • Obesity (BMI 30-39.9)    • Orthopnea    • PAF (paroxysmal atrial fibrillation) (HCC)    • Persistent atrial fibrillation (HCC)    • Pulmonary edema    • Tobacco abuse    • Transaminitis    • Trouble in sleeping    • Ventricular fibrillation (HCC)        Past Surgical History:   Procedure Laterality Date   • CARDIAC CATHETERIZATION Left 4/1/2021    Procedure: Coronary Angiography;  Surgeon: Anna Puga MD;  Location: [facility] CATH INVASIVE LOCATION;  Service: Cardiology;  Laterality: Left;   • CARDIAC CATHETERIZATION N/A 4/1/2021    Procedure: Left ventriculography;  Surgeon: Anna Puga MD;  Location: [facility] CATH INVASIVE LOCATION;  Service: Cardiology;  Laterality: N/A;   • CARDIAC CATHETERIZATION N/A 4/1/2021    Procedure: Left Heart Cath;  Surgeon: Anna Puga MD;  Location: [facility] CATH INVASIVE LOCATION;  Service: Cardiology;  Laterality: N/A;   • CARDIAC ELECTROPHYSIOLOGY PROCEDURE N/A 4/7/2021    Procedure: IMPLANTABLE CARDIOVERTER DEFIBRILLATOR- SC BIOTRONIK;  Surgeon: Nik Rosas MD;  Location: [facility] CATH INVASIVE LOCATION;  Service: Cardiovascular;  Laterality: N/A;   • CARDIOVERSION  05/19/2021    Dr. Rosas   • CARDIOVERSION  07/26/2021     Dr. Rosas   • TONSILLECTOMY         Social History     Socioeconomic History   • Marital status: Single   Tobacco Use   • Smoking status: Former Smoker     Packs/day: 1.50     Years: 10.00     Pack years: 15.00     Types: Cigarettes     Quit date: 2021     Years since quittin.4   • Smokeless tobacco: Never Used   • Tobacco comment: 2021   Vaping Use   • Vaping Use: Never used   Substance and Sexual Activity   • Alcohol use: Yes     Comment: 1 BEER DAILY   • Drug use: Not Currently   • Sexual activity: Not Currently       History reviewed. No pertinent family history.    Review of Systems   Constitutional: Negative for chills, fever and malaise/fatigue.   Cardiovascular: Positive for dyspnea on exertion and leg swelling. Negative for chest pain, near-syncope, orthopnea, palpitations, paroxysmal nocturnal dyspnea and syncope.   Respiratory: Negative for cough and shortness of breath.    Musculoskeletal: Negative for joint pain, joint swelling and myalgias.   Gastrointestinal: Negative for abdominal pain, diarrhea, melena, nausea and vomiting.   Genitourinary: Negative for frequency and hematuria.   Neurological: Negative for light-headedness, numbness, paresthesias and seizures.   Psychiatric/Behavioral: Positive for depression. The patient is nervous/anxious.    Allergic/Immunologic: Negative.    All other systems reviewed and are negative.      No Known Allergies      Current Outpatient Medications:   •  apixaban (Eliquis) 5 MG tablet tablet, Take 1 tablet by mouth Every 12 (Twelve) Hours., Disp: 180 tablet, Rfl: 0  •  aspirin 81 MG EC tablet, Take 1 tablet by mouth Daily., Disp: 30 tablet, Rfl: 0  •  furosemide (LASIX) 40 MG tablet, Take 1 tablet by mouth Daily., Disp: 60 tablet, Rfl: 2  •  lisinopril (PRINIVIL,ZESTRIL) 5 MG tablet, Take 1 tablet by mouth Daily., Disp: 90 tablet, Rfl: 3  •  metoprolol succinate XL (TOPROL-XL) 100 MG 24 hr tablet, Take 1 tablet by mouth Every 12 (Twelve) Hours.,  "Disp: 180 tablet, Rfl: 3  •  potassium chloride (K-DUR,KLOR-CON) 20 MEQ CR tablet, Take 1 tablet by mouth Daily., Disp: 90 tablet, Rfl: 1  •  Symbicort 160-4.5 MCG/ACT inhaler, INHALE 2 PUFFS BY MOUTH TWICE DAILY, Disp: 10.2 g, Rfl: 3  •  zolpidem (AMBIEN) 10 MG tablet, Take 1 tablet by mouth At Night As Needed for Sleep., Disp: 30 tablet, Rfl: 0      Objective:     Vitals:    10/12/21 1031   BP: 146/84   Pulse: 72   Weight: 97.5 kg (215 lb)   Height: 167.6 cm (66\")     Body mass index is 34.7 kg/m².    PHYSICAL EXAM:    Vitals Reviewed.   General Appearance: No acute distress, well developed and well nourished.   Eyes: Conjunctiva and lids: No erythema, swelling, or discharge. Sclera non-icteric.   HENT: Atraumatic, normocephalic. External eyes, ears, and nose normal.   Respiratory: No signs of respiratory distress. Respiration rhythm and depth normal.   Clear to auscultation. No rales, crackles, rhonchi, or wheezing auscultated.   Cardiovascular:  Jugular Venous Pressure: Normal  Heart Rate and Rhythm: Normal, Heart Sounds: Normal S1 and S2. No S3 or S4 noted.  Murmurs: No murmurs noted. No rubs, thrills, or gallops.   Arterial Pulses:  Posterior tibialis and dorsalis pedis pulses normal.   Lower Extremities: No edema noted.  Gastrointestinal:  Abdomen soft, non-distended, non-tender.   Musculoskeletal: Normal movement of extremities  Skin and Nails: General appearance normal. No pallor, cyanosis, diaphoresis. Skin temperature normal. No clubbing of fingernails.   Psychiatric: Patient alert and oriented to person, place, and time. Speech and behavior appropriate. Normal mood and affect.       ECG 12 Lead    Date/Time: 10/12/2021 12:25 PM  Performed by: Magda Luevano APRN  Authorized by: Magda Luevano APRN   Comparison: compared with previous ECG   Comparison to previous ECG: Sinus rhythm replaces A. fib  Rhythm: sinus arrhythmia  BPM: 73                Assessment:       Diagnosis Plan   1. Cardiac arrest (HCC) "     2. Ischemic cardiomyopathy     3. ICD (implantable cardioverter-defibrillator) in place     4. Coronary artery disease involving coronary bypass graft of native heart without angina pectoris     5. Paroxysmal atrial fibrillation (HCC)     6. Elevated blood pressure reading            Plan:       1.-3.  Out-of-hospital cardiac arrest, ischemic cardiomyopathy, status post ICD.  He is on guideline directed medical therapy.  He is not volume overloaded by exam and actually complains of constipation which may be because he is on the dry side, I am going to have him decrease his Lasix to once daily and see how he does.    4.  Coronary artery disease with chronic occlusion of the RCA and OM 2, he was not revascularizable, medically treated.  No angina on guideline directed medical therapy.    5.  Atrial fib which was initially paroxysmal then became persistent but now has spontaneously gone back into sinus rhythm for about a month now.  I am going to stop his digoxin, told him not to throw it away as we may need to resume this in the future.    6.  Elevated blood pressure reading, his blood pressure is mildly elevated today, he does not check this at home, I have asked him to get a blood pressure cuff and monitor this as with his cardiac history he does need well-controlled blood pressure.  I am going to see him back in 6 weeks and will reassess at that time.    He continues to have significant issues with anxiety and I have asked him to discuss this with his primary care.  I did offer to refer him to psych but he deferred.    As always, it has been a pleasure to participate in your patient's care.      Sincerely,         ERIC Hughes

## 2021-10-12 NOTE — TELEPHONE ENCOUNTER
Rx Refill Note  Requested Prescriptions     Pending Prescriptions Disp Refills   • zolpidem (AMBIEN) 10 MG tablet 30 tablet 0     Sig: Take 1 tablet by mouth At Night As Needed for Sleep.      Last office visit with prescribing clinician: 8/19/2021      Next office visit with prescribing clinician: Visit date not found            Sri Loaiza MA  10/12/21, 09:29 EDT

## 2021-10-13 PROCEDURE — 93295 DEV INTERROG REMOTE 1/2/MLT: CPT | Performed by: INTERNAL MEDICINE

## 2021-10-13 PROCEDURE — 93296 REM INTERROG EVL PM/IDS: CPT | Performed by: INTERNAL MEDICINE

## 2021-10-22 ENCOUNTER — OFFICE VISIT (OUTPATIENT)
Dept: FAMILY MEDICINE CLINIC | Facility: CLINIC | Age: 61
End: 2021-10-22

## 2021-10-22 VITALS
BODY MASS INDEX: 34.55 KG/M2 | SYSTOLIC BLOOD PRESSURE: 122 MMHG | RESPIRATION RATE: 18 BRPM | HEART RATE: 76 BPM | DIASTOLIC BLOOD PRESSURE: 66 MMHG | WEIGHT: 215 LBS | OXYGEN SATURATION: 98 % | TEMPERATURE: 98.1 F | HEIGHT: 66 IN

## 2021-10-22 DIAGNOSIS — I71.40 ABDOMINAL ANEURYSM (HCC): ICD-10-CM

## 2021-10-22 DIAGNOSIS — R31.0 GROSS HEMATURIA: Primary | ICD-10-CM

## 2021-10-22 DIAGNOSIS — R30.0 DYSURIA: ICD-10-CM

## 2021-10-22 DIAGNOSIS — R97.20 PSA ELEVATION: ICD-10-CM

## 2021-10-22 DIAGNOSIS — I77.1 ILIAC ARTERY STENOSIS, RIGHT (HCC): ICD-10-CM

## 2021-10-22 LAB
BILIRUB BLD-MCNC: NEGATIVE MG/DL
CLARITY, POC: CLEAR
COLOR UR: YELLOW
EXPIRATION DATE: NORMAL
GLUCOSE UR STRIP-MCNC: NEGATIVE MG/DL
KETONES UR QL: NEGATIVE
LEUKOCYTE EST, POC: NEGATIVE
Lab: NORMAL
NITRITE UR-MCNC: NEGATIVE MG/ML
PH UR: 7.5 [PH] (ref 5–8)
PROT UR STRIP-MCNC: NEGATIVE MG/DL
RBC # UR STRIP: NEGATIVE /UL
SP GR UR: 1.01 (ref 1–1.03)
UROBILINOGEN UR QL: NORMAL

## 2021-10-22 PROCEDURE — 99213 OFFICE O/P EST LOW 20 MIN: CPT | Performed by: FAMILY MEDICINE

## 2021-10-22 PROCEDURE — 81003 URINALYSIS AUTO W/O SCOPE: CPT | Performed by: FAMILY MEDICINE

## 2021-10-22 RX ORDER — ACETAMINOPHEN, ASPIRIN AND CAFFEINE 250; 250; 65 MG/1; MG/1; MG/1
1 TABLET, FILM COATED ORAL EVERY 6 HOURS PRN
COMMUNITY
End: 2022-01-25

## 2021-10-22 NOTE — PROGRESS NOTES
"Chief Complaint  Urinary Urgency and Flank Pain (R back pain Sx per 3 days)    Subjective          Sebastien Tang presents to [facility]  History of Present Illness  burning with urination x 3 days, had  Stones before , normal po intake,no fever R flank pain, rated 0/10 worst with movement, saw blood on urine red, 6 months ago patient had a CAT scan of the abdomen, that showed enlarged prostate, he also had a elevated PSA, I did refer him  to urologist , patient never went, new referral placed, he also got  An  abdominal aneurysm, with Right  iliac artery narrowing, I did put a referral for vascular specialist 6 months ago, patient also did not see him, open a new referral again, he had a history of kidney stones as well,  Objective   Vital Signs:   /66   Pulse 76   Temp 98.1 °F (36.7 °C)   Resp 18   Ht 167.6 cm (66\")   Wt 97.5 kg (215 lb)   SpO2 98%   BMI 34.70 kg/m²     Physical Exam  Vitals and nursing note reviewed.   Constitutional:       Appearance: He is well-developed.   HENT:      Head: Normocephalic and atraumatic.      Right Ear: External ear normal.      Left Ear: External ear normal.      Nose: Nose normal.   Eyes:      General: No scleral icterus.        Right eye: No discharge.         Left eye: No discharge.      Conjunctiva/sclera: Conjunctivae normal.      Pupils: Pupils are equal, round, and reactive to light.   Neck:      Thyroid: No thyromegaly.      Vascular: No JVD.      Trachea: No tracheal deviation.   Cardiovascular:      Rate and Rhythm: Normal rate and regular rhythm.      Heart sounds: Normal heart sounds. No murmur heard.  No friction rub. No gallop.    Pulmonary:      Effort: Pulmonary effort is normal. No respiratory distress.      Breath sounds: Normal breath sounds. No wheezing or rales.   Chest:      Chest wall: No tenderness.   Abdominal:      General: Bowel sounds are normal. There is no distension.      Palpations: Abdomen is soft. " There is no mass.      Tenderness: There is no abdominal tenderness. There is no guarding or rebound.      Hernia: No hernia is present.   Genitourinary:     Testes: Normal.      Comments: + blood coming out of urethra  Musculoskeletal:         General: No tenderness. Normal range of motion.      Cervical back: Normal range of motion and neck supple.   Lymphadenopathy:      Cervical: No cervical adenopathy.   Skin:     General: Skin is warm and dry.   Neurological:      Mental Status: He is alert.      Cranial Nerves: No cranial nerve deficit.      Sensory: No sensory deficit.      Motor: No abnormal muscle tone.      Coordination: Coordination normal.      Deep Tendon Reflexes: Reflexes normal.   Psychiatric:         Mood and Affect: Mood normal.         Behavior: Behavior normal.         Thought Content: Thought content normal.         Judgment: Judgment normal.        Result Review :                 Assessment and Plan    Diagnoses and all orders for this visit:    1. Gross hematuria (Primary)  -     Ambulatory Referral to Urology    2. Dysuria  -     POC Urinalysis Dipstick, Automated  -     Cancel: Ambulatory Referral to Urology  -     Ambulatory Referral to Urology    3. PSA elevation  -     Cancel: Ambulatory Referral to Urology  -     Ambulatory Referral to Urology    4. Abdominal aneurysm (HCC)  -     Cancel: Ambulatory Referral to Vascular Surgery  -     Ambulatory Referral to Vascular Surgery    5. Iliac artery stenosis, right (HCC)  -     Cancel: Ambulatory Referral to Vascular Surgery  -     Ambulatory Referral to Vascular Surgery      Plenty of  fluids, strain urine,  Follow Up   No follow-ups on file.  Patient was given instructions and counseling regarding his condition or for health maintenance advice. Please see specific information pulled into the AVS if appropriate.   Tylenol only x pain  Patient agreed to go to the ER if no improvement  Patient declined to repeat the CAT scan  Also advised  patient to contact his cardiologist about his Eliquis, and let him know that he is passing blood in the urine, and see if he need to stop it or not, from my  standpoint at this moment is too risky to stop Eliquis, as long patient is passing a small amount of blood, but we need to refer him to urology soon as possible to find the source of the bleeding , patient told me he cannot see Urologist Monday  Because he is  he is working, he cannot miss work

## 2021-10-25 ENCOUNTER — TELEPHONE (OUTPATIENT)
Dept: FAMILY MEDICINE CLINIC | Facility: CLINIC | Age: 61
End: 2021-10-25

## 2021-10-25 NOTE — TELEPHONE ENCOUNTER
Called patient to give appt details for urology and mailbox is not taking messages    [facility]  [phone]  Dr Rader  October 27, 2021 at 8:00am

## 2021-10-26 DIAGNOSIS — I48.91 ATRIAL FIBRILLATION, UNSPECIFIED TYPE (HCC): ICD-10-CM

## 2021-10-26 DIAGNOSIS — Z79.899 HIGH RISK MEDICATION USE: ICD-10-CM

## 2021-10-26 RX ORDER — POTASSIUM CHLORIDE 20 MEQ/1
20 TABLET, EXTENDED RELEASE ORAL DAILY
Qty: 90 TABLET | Refills: 1 | Status: SHIPPED | OUTPATIENT
Start: 2021-10-26 | End: 2022-01-15 | Stop reason: SDUPTHER

## 2021-10-26 RX ORDER — APIXABAN 5 MG/1
TABLET, FILM COATED ORAL
Qty: 180 TABLET | Refills: 0 | Status: SHIPPED | OUTPATIENT
Start: 2021-10-26 | End: 2022-01-25

## 2021-10-26 NOTE — TELEPHONE ENCOUNTER
Rx Refill Note  Requested Prescriptions     Pending Prescriptions Disp Refills   • Eliquis 5 MG tablet tablet [Pharmacy Med Name: ELIQUIS 5MG TABLETS] 180 tablet 0     Sig: TAKE 1 TABLET BY MOUTH EVERY 12 HOURS   • potassium chloride (K-DUR,KLOR-CON) 20 MEQ CR tablet [Pharmacy Med Name: POTASSIUM CL 20MEQ ER TABLETS] 90 tablet 1     Sig: TAKE 1 TABLET BY MOUTH DAILY      Last office visit with prescribing clinician: 10/22/2021      Next office visit with prescribing clinician: Visit date not found            Aimee Ott MA  10/26/21, 16:21 EDT

## 2021-11-02 RX ORDER — BUDESONIDE AND FORMOTEROL FUMARATE DIHYDRATE 160; 4.5 UG/1; UG/1
2 AEROSOL RESPIRATORY (INHALATION) 2 TIMES DAILY
Qty: 10.2 G | Refills: 3 | Status: SHIPPED | OUTPATIENT
Start: 2021-11-02 | End: 2021-11-26 | Stop reason: SDUPTHER

## 2021-11-02 NOTE — TELEPHONE ENCOUNTER
Rx Refill Note  Requested Prescriptions     Pending Prescriptions Disp Refills   • budesonide-formoterol (SYMBICORT) 160-4.5 MCG/ACT inhaler [Pharmacy Med Name: BUDESONIDE/FORM 160/4.5MCG(120 INH)] 10.2 g 3     Sig: INHALE 2 PUFFS BY MOUTH TWICE DAILY      Last office visit with prescribing clinician: 10/22/2021      Next office visit with prescribing clinician: Due 1/22/22  23}  Renetta Garcia MA  11/02/21, 11:30 EDT

## 2021-11-07 ENCOUNTER — PATIENT MESSAGE (OUTPATIENT)
Dept: CARDIOLOGY | Facility: CLINIC | Age: 61
End: 2021-11-07

## 2021-11-08 ENCOUNTER — TELEPHONE (OUTPATIENT)
Dept: CARDIOLOGY | Facility: CLINIC | Age: 61
End: 2021-11-08

## 2021-11-08 NOTE — TELEPHONE ENCOUNTER
Regarding: Eliquis  ----- Message from Tonya Barrios MA sent at 11/5/2021 11:49 AM EDT -----       ----- Message from Sebastien Tang to Magda Luevano APRN sent at 11/5/2021 11:45 AM -----   big drops pain on left side  lower back lower flank  noctice blood when urinating and not urinating      ----- Message -----       From:MINISTERIO RODRIGUEZ       Sent:11/5/2021  7:23 AM EDT         To:Sebastien Tang    Subject:Shell    Mr. Tang, is the blood in your urine? Or do you notice when you're not urinating?  Also, how much blood?    Thanks!      ----- Message -----       From:Sebastien Tang       Sent:11/4/2021  7:04 PM EDT         To:ERIC Anderson    Subject:Eliquis    Do still need to take  eliquis urology doctor want to know blood coming from penis

## 2021-11-08 NOTE — TELEPHONE ENCOUNTER
Can you all call him and find out what is going on---yes he has paroxysmal afib and the plan is for him to remain on Eliquis but if the urologist needs to stop it until they find out why he has blood in his urine/coming from his penis then that is what we will have to do

## 2021-11-10 ENCOUNTER — TELEPHONE (OUTPATIENT)
Dept: FAMILY MEDICINE CLINIC | Facility: CLINIC | Age: 61
End: 2021-11-10

## 2021-11-10 DIAGNOSIS — G47.9 TROUBLE IN SLEEPING: ICD-10-CM

## 2021-11-10 DIAGNOSIS — F51.01 PRIMARY INSOMNIA: ICD-10-CM

## 2021-11-10 RX ORDER — ZOLPIDEM TARTRATE 10 MG/1
10 TABLET ORAL NIGHTLY PRN
Qty: 30 TABLET | Refills: 0 | Status: SHIPPED | OUTPATIENT
Start: 2021-11-10 | End: 2021-12-06 | Stop reason: SDUPTHER

## 2021-11-10 NOTE — TELEPHONE ENCOUNTER
From: Sebasiten Tang  To: ERIC Anderson  Sent: 11/7/2021 10:38 PM EST  Subject: Eliquis    Do I need to take eliquis because utopist want to know because blood is coming out of my penis

## 2021-11-11 NOTE — TELEPHONE ENCOUNTER
Spoke with Dr. Davalos's office.  They will be faxing clearance for instructions on holding blood thinner.

## 2021-11-11 NOTE — TELEPHONE ENCOUNTER
Spoke with Yolanda at Dr. Davalos's office and they are doing cystoscopy on 11/22 and per Dr. Davalos, if Mr. Tang is still bleeding he would like for him to hold eliquis until after cystoscopy.

## 2021-11-23 ENCOUNTER — OFFICE VISIT (OUTPATIENT)
Dept: CARDIOLOGY | Facility: CLINIC | Age: 61
End: 2021-11-23

## 2021-11-23 ENCOUNTER — CLINICAL SUPPORT NO REQUIREMENTS (OUTPATIENT)
Dept: CARDIOLOGY | Facility: CLINIC | Age: 61
End: 2021-11-23

## 2021-11-23 VITALS
DIASTOLIC BLOOD PRESSURE: 80 MMHG | HEIGHT: 66 IN | BODY MASS INDEX: 34.07 KG/M2 | SYSTOLIC BLOOD PRESSURE: 136 MMHG | WEIGHT: 212 LBS | HEART RATE: 64 BPM

## 2021-11-23 DIAGNOSIS — I46.9 CARDIAC ARREST (HCC): Primary | ICD-10-CM

## 2021-11-23 DIAGNOSIS — I25.810 CORONARY ARTERY DISEASE INVOLVING CORONARY BYPASS GRAFT OF NATIVE HEART WITHOUT ANGINA PECTORIS: ICD-10-CM

## 2021-11-23 DIAGNOSIS — I25.5 ISCHEMIC CARDIOMYOPATHY: ICD-10-CM

## 2021-11-23 DIAGNOSIS — I10 ESSENTIAL HYPERTENSION: ICD-10-CM

## 2021-11-23 DIAGNOSIS — I48.19 ATRIAL FIBRILLATION, PERSISTENT (HCC): Primary | ICD-10-CM

## 2021-11-23 DIAGNOSIS — Z95.810 ICD (IMPLANTABLE CARDIOVERTER-DEFIBRILLATOR) IN PLACE: ICD-10-CM

## 2021-11-23 DIAGNOSIS — I48.0 PAROXYSMAL ATRIAL FIBRILLATION (HCC): ICD-10-CM

## 2021-11-23 PROBLEM — R03.0 ELEVATED BLOOD PRESSURE READING: Status: RESOLVED | Noted: 2021-10-12 | Resolved: 2021-11-23

## 2021-11-23 PROCEDURE — 93000 ELECTROCARDIOGRAM COMPLETE: CPT | Performed by: NURSE PRACTITIONER

## 2021-11-23 PROCEDURE — 99213 OFFICE O/P EST LOW 20 MIN: CPT | Performed by: NURSE PRACTITIONER

## 2021-11-23 RX ORDER — FINASTERIDE 5 MG/1
5 TABLET, FILM COATED ORAL DAILY
COMMUNITY
Start: 2021-11-22 | End: 2022-05-02 | Stop reason: SDUPTHER

## 2021-11-23 NOTE — PROGRESS NOTES
Date of Office Visit: 2021  Encounter Provider: ERIC Anderson  Place of Service: [facility] CARDIOLOGY  Patient Name: Sebastien Tang  :1960    Chief Complaint   Patient presents with   • Cardiac Arrest     6 wk f/u    • Cardiomyopathy   • paroxysmal AFIB   • Coronary Artery Disease   • ICD check   :     HPI: Sebastien Tang is a 61 y.o. male who is a patient of Dr. Younger. He had out of hospital cardiac arrest in 2021---cath showed severe multivessel CAD, RCA and what appeared to be OM 2 with a moderately reduced LV function, he was not revascularizable and treated medically.    Echo during that admission showed an EF of 38.9%, he underwent implantation of an ICD.    He initially was going in and out of A. fib then he had persistent A. fib we attempted a cardioversion and tried him on amiodarone however persisted but now he has spontaneously converted back to sinus rhythm and has maintained so the amiodarone has been stopped.    I saw him in the office on 10/12, he has significant anxiety over what he has gone through and he worries a lot about everything.  His blood pressure was running elevated on that day so we have been monitoring things and he returns today for follow-up.    Since seeing me in October he has had problems with hematuria and has followed up with urology, they actually did a scope in the office yesterday and found enlarged prostate and have started him on finasteride, he said the hematuria resolved and he has remained on apixaban with no recent issues.    He denies chest pain, significant dyspnea although he says that he does still intermittently have wheezing, he has quit smoking.  He denies PND or orthopnea.  He has some intermittent lower extremity edema particularly after standing on his feet all day, this is improved because he has been off for the last couple of days dealing with his urological issues.    Device interrogation shows normal  testing and function. No AT/AF episodes or ventricular arrhythmia episodes.        Past Medical History:   Diagnosis Date   • Acidosis     mixed resp/metabolic acidosis   • Acute congestive heart failure (HCC)    • Acute respiratory failure (HCC)     hypoxic   • Anemia    • Asthma    • Atrial flutter (HCC)    • Azotemia    • Cardiac arrest (HCC) 04/2021   • Chronic coronary artery disease    • Constipation    • COPD (chronic obstructive pulmonary disease) (HCC)    • Enlarged prostate    • Hypertension    • Hypokalemia     severe   • ICD (implantable cardioverter-defibrillator) in place    • Ischemic cardiomyopathy    • MRSA nasal colonization    • Obesity (BMI 30-39.9)    • Orthopnea    • PAF (paroxysmal atrial fibrillation) (HCC)    • Persistent atrial fibrillation (HCC)    • Pulmonary edema    • Tobacco abuse    • Transaminitis    • Trouble in sleeping    • Ventricular fibrillation (HCC)        Past Surgical History:   Procedure Laterality Date   • CARDIAC CATHETERIZATION Left 4/1/2021    Procedure: Coronary Angiography;  Surgeon: Anna Puga MD;  Location:  [facility] INVAS[facility];  Service: Cardiology;  Laterality: Left;   • CARDIAC CATHETERIZATION N/A 4/1/2021    Procedure: Left ventriculography;  Surgeon: Anna Puga MD;  Location:  [facility] INV[facility]ATION;  Service: Cardiology;  Laterality: N/A;   • CARDIAC CATHETERIZATION N/A 4/1/2021    Procedure: Left Heart Cath;  Surgeon: Anna Puga MD;  Location:  [facility] INVASIVE LOCATION;  Service: Cardiology;  Laterality: N/A;   • CARDIAC ELECTROPHYSIOLOGY PROCEDURE N/A 4/7/2021    Procedure: IMPLANTABLE CARDIOVERTER DEFIBRILLATOR- SC BIOTRONIK;  Surgeon: Nik Rosas MD;  Location:  [facility] CATH INVASIVE LOCATION;  Service: Cardiovascular;  Laterality: N/A;   • CARDIOVERSION  05/19/2021    Dr. Rosas   • CARDIOVERSION  07/26/2021    Dr. Rosas   • TONSILLECTOMY         Social History     Socioeconomic History   • Marital status: Single    Tobacco Use   • Smoking status: Former Smoker     Packs/day: 1.50     Years: 10.00     Pack years: 15.00     Types: Cigarettes     Quit date: 2021     Years since quittin.5   • Smokeless tobacco: Never Used   • Tobacco comment: 2021   Vaping Use   • Vaping Use: Never used   Substance and Sexual Activity   • Alcohol use: Yes     Alcohol/week: 0.0 standard drinks     Comment: 1 BEER DAILY   • Drug use: Not Currently   • Sexual activity: Not Currently       History reviewed. No pertinent family history.    Review of Systems   Constitutional: Negative for chills, fever and malaise/fatigue.   Cardiovascular: Positive for leg swelling (intermittent after standing for periods of time). Negative for chest pain, dyspnea on exertion, near-syncope, orthopnea, palpitations, paroxysmal nocturnal dyspnea and syncope.   Respiratory: Positive for cough (sometimes productive --white in color) and wheezing. Negative for shortness of breath.    Hematologic/Lymphatic: Negative.    Musculoskeletal: Negative for joint pain, joint swelling and myalgias.   Gastrointestinal: Negative for abdominal pain, diarrhea, melena, nausea and vomiting.   Genitourinary: Negative for frequency and hematuria.   Neurological: Negative for light-headedness, numbness, paresthesias and seizures.   Allergic/Immunologic: Negative.    All other systems reviewed and are negative.      No Known Allergies      Current Outpatient Medications:   •  aspirin 81 MG EC tablet, Take 1 tablet by mouth Daily., Disp: 30 tablet, Rfl: 0  •  aspirin-acetaminophen-caffeine (EXCEDRIN MIGRAINE) 250-250-65 MG per tablet, Take 1 tablet by mouth Every 6 (Six) Hours As Needed for Headache., Disp: , Rfl:   •  budesonide-formoterol (SYMBICORT) 160-4.5 MCG/ACT inhaler, Inhale 2 puffs 2 (Two) Times a Day., Disp: 10.2 g, Rfl: 3  •  Eliquis 5 MG tablet tablet, TAKE 1 TABLET BY MOUTH EVERY 12 HOURS, Disp: 180 tablet, Rfl: 0  •  finasteride (PROSCAR) 5 MG tablet, Take 5 mg by  "mouth Daily., Disp: , Rfl:   •  furosemide (LASIX) 40 MG tablet, Take 1 tablet by mouth Daily., Disp: 60 tablet, Rfl: 2  •  lisinopril (PRINIVIL,ZESTRIL) 5 MG tablet, Take 1 tablet by mouth Daily., Disp: 90 tablet, Rfl: 3  •  metoprolol succinate XL (TOPROL-XL) 100 MG 24 hr tablet, Take 1 tablet by mouth Every 12 (Twelve) Hours., Disp: 180 tablet, Rfl: 3  •  potassium chloride (K-DUR,KLOR-CON) 20 MEQ CR tablet, TAKE 1 TABLET BY MOUTH DAILY, Disp: 90 tablet, Rfl: 1  •  zolpidem (AMBIEN) 10 MG tablet, TAKE 1 TABLET BY MOUTH AT NIGHT AS NEEDED FOR SLEEP, Disp: 30 tablet, Rfl: 0      Objective:     Vitals:    11/23/21 1130   BP: 136/80   Pulse: 64   Weight: 96.2 kg (212 lb)   Height: 167.6 cm (66\")     Body mass index is 34.22 kg/m².    PHYSICAL EXAM:    Vitals Reviewed.   General Appearance: No acute distress, well developed and well nourished.   Eyes: Conjunctiva and lids: No erythema, swelling, or discharge. Sclera non-icteric.   HENT: Atraumatic, normocephalic. External eyes, ears, and nose normal.   Respiratory: No signs of respiratory distress. Respiration rhythm and depth normal.   Clear to auscultation. No rales, crackles, rhonchi, or wheezing auscultated.   Cardiovascular:  Jugular Venous Pressure: Normal  Heart Rate and Rhythm: Normal, Heart Sounds: Normal S1 and S2. No S3 or S4 noted.  Murmurs: No murmurs noted. No rubs, thrills, or gallops.   Arterial Pulses:  Posterior tibialis and dorsalis pedis pulses normal.   Lower Extremities: No edema noted.  Gastrointestinal:  Abdomen soft, non-distended, non-tender.   Musculoskeletal: Normal movement of extremities  Skin and Nails: General appearance normal. No pallor, cyanosis, diaphoresis. Skin temperature normal. No clubbing of fingernails.   Psychiatric: Patient alert and oriented to person, place, and time. Speech and behavior appropriate. Normal mood and affect.       ECG 12 Lead    Date/Time: 11/23/2021 12:04 PM  Performed by: Magda Luevano APRN  Authorized " by: Magda Luevano APRN   Comparison: compared with previous ECG   Similar to previous ECG  BPM: 64                Assessment:       Diagnosis Plan   1. Cardiac arrest (HCC)     2. ICD (implantable cardioverter-defibrillator) in place     3. Essential hypertension     4. Coronary artery disease involving coronary bypass graft of native heart without angina pectoris     5. Paroxysmal atrial fibrillation (HCC)     6. Ischemic cardiomyopathy            Plan:       1.-2.  Out-of-hospital cardiac arrest, status post ICD.  Normal testing and function no ventricular arrhythmia events.    3.  Hypertension, his blood pressure was good today he says it is not really been any higher than 140 systolic, he was just started on finasteride by the urologist which may affect his blood pressure so I am hesitant to adjust anything at this time and he will continue to monitor.    4.  Multivessel CAD that was not revascularizable.  He is on medical therapy.    5.  PAF, device shows no recent episodes.  He currently is on apixaban for anticoagulation.    6. ICM, euvolemic by exam. On metoprolol succinate, lisinopril and daily diuretics. EF 38% by last echo.     Follow-up with Dr. Rosas with a device check in 3 months.    As always, it has been a pleasure to participate in your patient's care.      Sincerely,         ERIC Hughes

## 2021-11-26 RX ORDER — BUDESONIDE AND FORMOTEROL FUMARATE DIHYDRATE 160; 4.5 UG/1; UG/1
2 AEROSOL RESPIRATORY (INHALATION) 2 TIMES DAILY
Qty: 10.2 G | Refills: 3 | Status: SHIPPED | OUTPATIENT
Start: 2021-11-26 | End: 2021-12-21 | Stop reason: SDUPTHER

## 2021-12-06 DIAGNOSIS — G47.9 TROUBLE IN SLEEPING: ICD-10-CM

## 2021-12-06 DIAGNOSIS — F51.01 PRIMARY INSOMNIA: ICD-10-CM

## 2021-12-07 DIAGNOSIS — G47.9 TROUBLE IN SLEEPING: ICD-10-CM

## 2021-12-07 DIAGNOSIS — F51.01 PRIMARY INSOMNIA: ICD-10-CM

## 2021-12-08 RX ORDER — ZOLPIDEM TARTRATE 10 MG/1
10 TABLET ORAL NIGHTLY PRN
Qty: 30 TABLET | OUTPATIENT
Start: 2021-12-08

## 2021-12-08 RX ORDER — ZOLPIDEM TARTRATE 10 MG/1
10 TABLET ORAL NIGHTLY PRN
Qty: 30 TABLET | Refills: 0 | Status: SHIPPED | OUTPATIENT
Start: 2021-12-08 | End: 2022-01-06 | Stop reason: SDUPTHER

## 2021-12-08 RX ORDER — ZOLPIDEM TARTRATE 10 MG/1
10 TABLET ORAL NIGHTLY PRN
Qty: 30 TABLET | Refills: 0 | OUTPATIENT
Start: 2021-12-08

## 2021-12-08 NOTE — TELEPHONE ENCOUNTER
Rx Refill Note  Requested Prescriptions     Pending Prescriptions Disp Refills   • zolpidem (AMBIEN) 10 MG tablet 30 tablet 0     Sig: Take 1 tablet by mouth At Night As Needed for Sleep.      Last office visit with prescribing clinician: 10/22/2021      Next office visit with prescribing clinician: no visit found, return in 3 months (around 1/22/22)           Bert Hopkins  12/08/21, 11:49 EST

## 2021-12-22 RX ORDER — BUDESONIDE AND FORMOTEROL FUMARATE DIHYDRATE 160; 4.5 UG/1; UG/1
2 AEROSOL RESPIRATORY (INHALATION) 2 TIMES DAILY
Qty: 10.2 G | Refills: 3 | Status: SHIPPED | OUTPATIENT
Start: 2021-12-22 | End: 2022-04-25

## 2021-12-22 RX ORDER — LISINOPRIL 5 MG/1
5 TABLET ORAL
Qty: 30 TABLET | Refills: 0 | Status: SHIPPED | OUTPATIENT
Start: 2021-12-22 | End: 2022-01-15 | Stop reason: SDUPTHER

## 2021-12-22 NOTE — TELEPHONE ENCOUNTER
Rx Refill Note  Requested Prescriptions     Pending Prescriptions Disp Refills   • budesonide-formoterol (SYMBICORT) 160-4.5 MCG/ACT inhaler 10.2 g 3     Sig: Inhale 2 puffs 2 (Two) Times a Day.      Last office visit with prescribing clinician: 10/22/2021      Next office visit with prescribing clinician:             González Avalos MA  12/22/21, 07:20 EST

## 2021-12-22 NOTE — TELEPHONE ENCOUNTER
Rx Refill Note  Requested Prescriptions     Pending Prescriptions Disp Refills   • lisinopril (PRINIVIL,ZESTRIL) 5 MG tablet 90 tablet 3     Sig: Take 1 tablet by mouth Daily.      Last office visit with prescribing clinician: 10/22/2021      Next office visit with prescribing clinician: Visit date not found            González Avalos MA  12/22/21, 07:21 EST

## 2021-12-24 DIAGNOSIS — G47.9 TROUBLE IN SLEEPING: ICD-10-CM

## 2021-12-24 DIAGNOSIS — F51.01 PRIMARY INSOMNIA: ICD-10-CM

## 2021-12-26 RX ORDER — ZOLPIDEM TARTRATE 10 MG/1
10 TABLET ORAL NIGHTLY PRN
Qty: 30 TABLET | Refills: 0 | OUTPATIENT
Start: 2021-12-26

## 2021-12-26 RX ORDER — FUROSEMIDE 40 MG/1
TABLET ORAL
Qty: 60 TABLET | Refills: 2 | Status: SHIPPED | OUTPATIENT
Start: 2021-12-26 | End: 2022-03-31

## 2021-12-30 ENCOUNTER — TELEPHONE (OUTPATIENT)
Dept: FAMILY MEDICINE CLINIC | Facility: CLINIC | Age: 61
End: 2021-12-30

## 2021-12-30 DIAGNOSIS — G47.9 TROUBLE IN SLEEPING: ICD-10-CM

## 2021-12-30 DIAGNOSIS — F51.01 PRIMARY INSOMNIA: ICD-10-CM

## 2021-12-30 RX ORDER — ZOLPIDEM TARTRATE 10 MG/1
10 TABLET ORAL NIGHTLY PRN
Qty: 30 TABLET | OUTPATIENT
Start: 2021-12-30

## 2021-12-30 RX ORDER — ZOLPIDEM TARTRATE 10 MG/1
10 TABLET ORAL NIGHTLY PRN
Qty: 30 TABLET | Refills: 0 | OUTPATIENT
Start: 2021-12-30

## 2021-12-30 NOTE — TELEPHONE ENCOUNTER
Pt is requesting a refill on the following medication,   zolpidem (AMBIEN) 10 MG tablet    Pt ask that he is called with status of his request.

## 2021-12-30 NOTE — TELEPHONE ENCOUNTER
Spoke to pt his request is too early.  He said the pharmacy must have cut him short 12 pills.  Told him he needs to discuss that to the pharmacy, he can call for refills  Next week

## 2022-01-06 DIAGNOSIS — G47.9 TROUBLE IN SLEEPING: ICD-10-CM

## 2022-01-06 DIAGNOSIS — F51.01 PRIMARY INSOMNIA: ICD-10-CM

## 2022-01-06 RX ORDER — ZOLPIDEM TARTRATE 10 MG/1
10 TABLET ORAL NIGHTLY PRN
Qty: 30 TABLET | Refills: 0 | Status: SHIPPED | OUTPATIENT
Start: 2022-01-06 | End: 2022-02-07 | Stop reason: SDUPTHER

## 2022-01-06 NOTE — TELEPHONE ENCOUNTER
Rx Refill Note  Requested Prescriptions     Pending Prescriptions Disp Refills   • zolpidem (AMBIEN) 10 MG tablet 30 tablet 0     Sig: Take 1 tablet by mouth At Night As Needed for Sleep.      Last office visit with prescribing clinician: 10/22/2021      Next office visit with prescribing clinician: due 1/22/22    Renetta Garcia MA  01/06/22, 13:51 EST

## 2022-01-07 RX ORDER — ZOLPIDEM TARTRATE 10 MG/1
10 TABLET ORAL NIGHTLY PRN
Qty: 30 TABLET | OUTPATIENT
Start: 2022-01-07

## 2022-01-07 NOTE — TELEPHONE ENCOUNTER
Rx Refill Note  Requested Prescriptions     Pending Prescriptions Disp Refills   • zolpidem (AMBIEN) 10 MG tablet [Pharmacy Med Name: ZOLPIDEM 10MG TABLETS] 30 tablet      Sig: TAKE 1 TABLET BY MOUTH AT NIGHT AS NEEDED FOR SLEEP      Last office visit with prescribing clinician: 10/22/2021      Next office visit with prescribing clinician: Visit date not found            Bert Hopkins  01/07/22, 09:45 EST       Duplicate message

## 2022-01-12 PROCEDURE — 93296 REM INTERROG EVL PM/IDS: CPT | Performed by: INTERNAL MEDICINE

## 2022-01-12 PROCEDURE — 93295 DEV INTERROG REMOTE 1/2/MLT: CPT | Performed by: INTERNAL MEDICINE

## 2022-01-15 DIAGNOSIS — Z79.899 HIGH RISK MEDICATION USE: ICD-10-CM

## 2022-01-18 RX ORDER — LISINOPRIL 5 MG/1
5 TABLET ORAL
Qty: 30 TABLET | Refills: 0 | Status: SHIPPED | OUTPATIENT
Start: 2022-01-18 | End: 2022-03-01

## 2022-01-18 RX ORDER — POTASSIUM CHLORIDE 20 MEQ/1
20 TABLET, EXTENDED RELEASE ORAL DAILY
Qty: 90 TABLET | Refills: 1 | Status: SHIPPED | OUTPATIENT
Start: 2022-01-18 | End: 2022-07-26 | Stop reason: SDUPTHER

## 2022-01-18 NOTE — TELEPHONE ENCOUNTER
Rx Refill Note  Requested Prescriptions     Pending Prescriptions Disp Refills   • potassium chloride (K-DUR,KLOR-CON) 20 MEQ CR tablet 90 tablet 1     Sig: Take 1 tablet by mouth Daily.      Last office visit with prescribing clinician: 10/22/2021      Next office visit with prescribing clinician: Visit date not found            Herson Farooq MA  01/18/22, 07:15 EST

## 2022-01-18 NOTE — TELEPHONE ENCOUNTER
Rx Refill Note  Requested Prescriptions     Pending Prescriptions Disp Refills   • lisinopril (PRINIVIL,ZESTRIL) 5 MG tablet 30 tablet 0     Sig: Take 1 tablet by mouth Daily.      Last office visit with prescribing clinician: 10/22/2021      Next office visit with prescribing clinician: 1/15/2022            Herson Farooq MA  01/18/22, 07:16 EST

## 2022-01-25 DIAGNOSIS — I48.91 ATRIAL FIBRILLATION, UNSPECIFIED TYPE: ICD-10-CM

## 2022-01-25 NOTE — TELEPHONE ENCOUNTER
Rx Refill Note  Requested Prescriptions     Pending Prescriptions Disp Refills   • Eliquis 5 MG tablet tablet [Pharmacy Med Name: ELIQUIS 5MG TABLETS] 180 tablet 0     Sig: TAKE 1 TABLET BY MOUTH EVERY 12 HOURS      Last office visit with prescribing clinician: 10/22/2021      Next office visit with prescribing clinician:due now    Renetta Garcia MA  01/25/22, 16:49 EST

## 2022-02-04 DIAGNOSIS — F51.01 PRIMARY INSOMNIA: ICD-10-CM

## 2022-02-04 DIAGNOSIS — G47.9 TROUBLE IN SLEEPING: ICD-10-CM

## 2022-02-04 RX ORDER — ZOLPIDEM TARTRATE 10 MG/1
10 TABLET ORAL NIGHTLY PRN
Qty: 30 TABLET | Refills: 0 | OUTPATIENT
Start: 2022-02-04

## 2022-02-04 NOTE — TELEPHONE ENCOUNTER
Rx Refill Note  Requested Prescriptions     Pending Prescriptions Disp Refills   • zolpidem (AMBIEN) 10 MG tablet 30 tablet 0     Sig: Take 1 tablet by mouth At Night As Needed for Sleep.      Last office visit with prescribing clinician: Visit date not found      Next office visit with prescribing clinician: Visit date not found            González Avalos MA  02/04/22, 10:32 EST

## 2022-02-05 DIAGNOSIS — F51.01 PRIMARY INSOMNIA: ICD-10-CM

## 2022-02-05 DIAGNOSIS — G47.9 TROUBLE IN SLEEPING: ICD-10-CM

## 2022-02-07 ENCOUNTER — OFFICE VISIT (OUTPATIENT)
Dept: FAMILY MEDICINE CLINIC | Facility: CLINIC | Age: 62
End: 2022-02-07

## 2022-02-07 VITALS
OXYGEN SATURATION: 99 % | WEIGHT: 210 LBS | TEMPERATURE: 97.8 F | BODY MASS INDEX: 33.75 KG/M2 | DIASTOLIC BLOOD PRESSURE: 70 MMHG | HEIGHT: 66 IN | SYSTOLIC BLOOD PRESSURE: 136 MMHG | HEART RATE: 71 BPM

## 2022-02-07 DIAGNOSIS — G47.9 TROUBLE IN SLEEPING: ICD-10-CM

## 2022-02-07 DIAGNOSIS — F51.01 PRIMARY INSOMNIA: Primary | ICD-10-CM

## 2022-02-07 DIAGNOSIS — E78.00 HIGH CHOLESTEROL: ICD-10-CM

## 2022-02-07 PROCEDURE — 99213 OFFICE O/P EST LOW 20 MIN: CPT | Performed by: FAMILY MEDICINE

## 2022-02-07 RX ORDER — ZOLPIDEM TARTRATE 10 MG/1
10 TABLET ORAL NIGHTLY PRN
Qty: 90 TABLET | Refills: 0 | Status: SHIPPED | OUTPATIENT
Start: 2022-02-07 | End: 2022-04-18 | Stop reason: SDUPTHER

## 2022-03-01 ENCOUNTER — OFFICE VISIT (OUTPATIENT)
Dept: CARDIOLOGY | Facility: CLINIC | Age: 62
End: 2022-03-01

## 2022-03-01 ENCOUNTER — CLINICAL SUPPORT NO REQUIREMENTS (OUTPATIENT)
Dept: CARDIOLOGY | Facility: CLINIC | Age: 62
End: 2022-03-01

## 2022-03-01 VITALS
DIASTOLIC BLOOD PRESSURE: 76 MMHG | SYSTOLIC BLOOD PRESSURE: 130 MMHG | HEART RATE: 63 BPM | HEIGHT: 66 IN | BODY MASS INDEX: 33.91 KG/M2 | WEIGHT: 211 LBS

## 2022-03-01 DIAGNOSIS — I46.9 CARDIAC ARREST: ICD-10-CM

## 2022-03-01 DIAGNOSIS — I48.0 PAROXYSMAL ATRIAL FIBRILLATION: Primary | ICD-10-CM

## 2022-03-01 DIAGNOSIS — I25.5 ISCHEMIC CARDIOMYOPATHY: ICD-10-CM

## 2022-03-01 PROCEDURE — 93282 PRGRMG EVAL IMPLANTABLE DFB: CPT | Performed by: INTERNAL MEDICINE

## 2022-03-01 PROCEDURE — 93000 ELECTROCARDIOGRAM COMPLETE: CPT | Performed by: INTERNAL MEDICINE

## 2022-03-01 PROCEDURE — 99214 OFFICE O/P EST MOD 30 MIN: CPT | Performed by: INTERNAL MEDICINE

## 2022-03-01 RX ORDER — LISINOPRIL 5 MG/1
10 TABLET ORAL
Qty: 30 TABLET | Refills: 0 | Status: SHIPPED | OUTPATIENT
Start: 2022-03-01 | End: 2022-03-31 | Stop reason: SDUPTHER

## 2022-03-01 NOTE — PROGRESS NOTES
Date of Office Visit: 2022  Encounter Provider: Nik Rosas MD  Place of Service: [facility] CARDIOLOGY  Patient Name: Sebastien Tang  :1960    Chief Complaint   Patient presents with   • Cardiac Arrest     3 month f/u   • Cardiomyopathy   • paroxysmal AFIB   • Coronary Artery Disease   :     HPI: Sebastien Tang is a 62 y.o. male who presents today for sudden cardiac death, persistent atrial fibrillation.    He has been stable.  He has NYHA class II heart failure symptoms.  Current treatment consist of metoprolol succinate, lisinopril 5 mg daily, and Lasix    He has not had any tachycardia or palpitations.  Device interrogation shows no episodes of atrial fibrillation.  He is anticoagulated on Eliquis.    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          Past Medical History:   Diagnosis Date   • Acidosis     mixed resp/metabolic acidosis   • Acute congestive heart failure (HCC)    • Acute respiratory failure (HCC)     hypoxic   • Anemia    • Asthma    • Atrial flutter ([facility])    • Azotemia    • Cardiac arrest ([facility]) 2021   • Chronic coronary artery disease    • Constipation    • COPD (chronic obstructive pulmonary disease) ([facility])    • Enlarged prostate    • Hypertension    • Hypokalemia     severe   • ICD (implantable cardioverter-defibrillator) in place    • Ischemic cardiomyopathy    • MRSA nasal colonization    • Obesity (BMI 30-39.9)    • Orthopnea    • PAF (paroxysmal atrial fibrillation) ([facility])    • Persistent atrial fibrillation (HCC)    • Pulmonary edema    • Tobacco abuse    • Transaminitis    • Trouble in sleeping    • Ventricular fibrillation ([facility])        Past Surgical History:   Procedure Laterality Date   • CARDIAC CATHETERIZATION Left 2021    Procedure: Coronary Angiography;  Surgeon: Anna Puga MD;  Location: [facility] INVASIVE LOCATION;  Service: Cardiology;  Laterality: Left;   • CARDIAC CATHETERIZATION N/A 2021     Procedure: Left ventriculography;  Surgeon: Anna Puga MD;  Location:  [facility]ATH INVASIVE LOCATION;  Service: Cardiology;  Laterality: N/A;   • CARDIAC CATHETERIZATION N/A 2021    Procedure: Left Heart Cath;  Surgeon: Anna Puga MD;  Location:  [facility]ATH INVASIVE LOCATION;  Service: Cardiology;  Laterality: N/A;   • CARDIAC ELECTROPHYSIOLOGY PROCEDURE N/A 2021    Procedure: IMPLANTABLE CARDIOVERTER DEFIBRILLATOR- SC BIOTRONIK;  Surgeon: Nik Rosas MD;  Location:  [facility] CATH INVASIVE LOCATION;  Service: Cardiovascular;  Laterality: N/A;   • CARDIOVERSION  2021    Dr. Rosas   • CARDIOVERSION  2021    Dr. Rosas   • TONSILLECTOMY         Social History     Socioeconomic History   • Marital status: Single   Tobacco Use   • Smoking status: Former Smoker     Packs/day: 1.50     Years: 10.00     Pack years: 15.00     Types: Cigarettes     Quit date: 2021     Years since quittin.8   • Smokeless tobacco: Never Used   • Tobacco comment: 2021   Vaping Use   • Vaping Use: Never used   Substance and Sexual Activity   • Alcohol use: Yes     Alcohol/week: 0.0 standard drinks     Comment: 1 BEER DAILY   • Drug use: Not Currently   • Sexual activity: Not Currently       History reviewed. No pertinent family history.    Review of Systems   Constitutional: Negative.   Cardiovascular: Positive for dyspnea on exertion.   Respiratory: Negative.    Gastrointestinal: Negative.        No Known Allergies      Current Outpatient Medications:   •  apixaban (Eliquis) 5 MG tablet tablet, Take 1 tablet by mouth Every 12 (Twelve) Hours., Disp: 180 tablet, Rfl: 0  •  aspirin 81 MG EC tablet, Take 1 tablet by mouth Daily., Disp: 30 tablet, Rfl: 0  •  budesonide-formoterol (SYMBICORT) 160-4.5 MCG/ACT inhaler, Inhale 2 puffs 2 (Two) Times a Day., Disp: 10.2 g, Rfl: 3  •  finasteride (PROSCAR) 5 MG tablet, Take 5 mg by mouth Daily., Disp: , Rfl:   •  furosemide (LASIX) 40 MG tablet, TAKE 1 TABLET  "BY MOUTH TWICE DAILY (Patient taking differently: Daily.), Disp: 60 tablet, Rfl: 2  •  lisinopril (PRINIVIL,ZESTRIL) 5 MG tablet, Take 2 tablets by mouth Daily., Disp: 30 tablet, Rfl: 0  •  metoprolol succinate XL (TOPROL-XL) 100 MG 24 hr tablet, Take 1 tablet by mouth Every 12 (Twelve) Hours., Disp: 180 tablet, Rfl: 3  •  potassium chloride (K-DUR,KLOR-CON) 20 MEQ CR tablet, Take 1 tablet by mouth Daily., Disp: 90 tablet, Rfl: 1  •  zolpidem (AMBIEN) 10 MG tablet, Take 1 tablet by mouth At Night As Needed for Sleep., Disp: 90 tablet, Rfl: 0      Objective:     Vitals:    03/01/22 1415   BP: 130/76   Pulse: 63   Weight: 95.7 kg (211 lb)   Height: 167.6 cm (66\")     Body mass index is 34.06 kg/m².    PHYSICAL EXAM:    Vitals and nursing note reviewed.   Constitutional:       Appearance: Healthy appearance. Obese.   HENT:      Head: Normocephalic and atraumatic.    Mouth/Throat:      Pharynx: Oropharynx is clear.   Pulmonary:      Effort: Pulmonary effort is normal.   Chest:      Comments: ICD well healed on left chest  Cardiovascular:      PMI at left midclavicular line. Normal rate. Regular rhythm.   Edema:     Peripheral edema absent.   Skin:     General: Skin is warm.   Neurological:      General: No focal deficit present.      Mental Status: Alert, oriented to person, place, and time and oriented to person, place and time.   Psychiatric:         Attention and Perception: Attention and perception normal.         Mood and Affect: Mood and affect normal.         Behavior: Behavior is cooperative.             ECG 12 Lead    Date/Time: 3/1/2022 3:25 PM  Performed by: Nik Rosas MD  Authorized by: Nik Rosas MD   Comparison: compared with previous ECG from 11/23/2021  Rhythm: sinus rhythm              Assessment:       Diagnosis Plan   1. Paroxysmal atrial fibrillation (HCC)     2. Cardiac arrest (HCC)            Plan:       Interestingly, Mr. Tang is maintaining sinus rhythm now.  He was in " persistent atrial fibrillation, and actually had early recurrence after his cardioversion.  He has not noticed a significant change despite the change in rhythm.  He is appropriately anticoagulated on Eliquis.  His [id number] score is 2.    In regards to his heart failure.  He is only on lisinopril 5 mg daily and metoprolol succinate.  I would increase lisinopril to 10 mg daily.    Coronary artery disease, he is stable.  He is not on a statin, statin intolerance ? will address next visit.    As always, it has been a pleasure to participate in your patient's care.      Sincerely,         Nik Rosas MD

## 2022-03-07 RX ORDER — ZOLPIDEM TARTRATE 10 MG/1
10 TABLET ORAL NIGHTLY PRN
Qty: 30 TABLET | Refills: 0 | OUTPATIENT
Start: 2022-03-07

## 2022-03-07 NOTE — TELEPHONE ENCOUNTER
Rx Refill Note  Requested Prescriptions     Pending Prescriptions Disp Refills   • zolpidem (AMBIEN) 10 MG tablet 30 tablet 0     Sig: Take 1 tablet by mouth At Night As Needed for Sleep.      Last office visit with prescribing clinician: 2/7/22     Next office visit with prescribing clinician: due 5/7/22  Renetta Garcia MA  03/07/22, 16:49 EST     Last refilled 2/7/22  #90 RF x0

## 2022-03-31 RX ORDER — FUROSEMIDE 40 MG/1
40 TABLET ORAL DAILY
Qty: 90 TABLET | Refills: 1 | Status: SHIPPED | OUTPATIENT
Start: 2022-03-31

## 2022-03-31 NOTE — TELEPHONE ENCOUNTER
Rx Refill Note  Requested Prescriptions     Pending Prescriptions Disp Refills   • furosemide (LASIX) 40 MG tablet [Pharmacy Med Name: FUROSEMIDE 40MG TABLETS] 60 tablet 2     Sig: TAKE 1 TABLET BY MOUTH TWICE DAILY      Last office visit with prescribing clinician:  2/7/22  Next office visit with prescribing clinician:  Jerome @ 5/7/22  Renetta Garcia MA  03/31/22, 16:43 EDT     PT TAKING ONLY 1 DAILY

## 2022-04-01 RX ORDER — LISINOPRIL 5 MG/1
10 TABLET ORAL
Qty: 30 TABLET | Refills: 0 | Status: SHIPPED | OUTPATIENT
Start: 2022-04-01 | End: 2022-04-18 | Stop reason: SDUPTHER

## 2022-04-13 PROCEDURE — 93295 DEV INTERROG REMOTE 1/2/MLT: CPT | Performed by: INTERNAL MEDICINE

## 2022-04-13 PROCEDURE — 93296 REM INTERROG EVL PM/IDS: CPT | Performed by: INTERNAL MEDICINE

## 2022-04-18 DIAGNOSIS — G47.9 TROUBLE IN SLEEPING: ICD-10-CM

## 2022-04-18 DIAGNOSIS — F51.01 PRIMARY INSOMNIA: ICD-10-CM

## 2022-04-18 RX ORDER — ZOLPIDEM TARTRATE 10 MG/1
10 TABLET ORAL NIGHTLY PRN
Qty: 90 TABLET | Refills: 0 | Status: SHIPPED | OUTPATIENT
Start: 2022-04-18 | End: 2022-06-06

## 2022-04-18 RX ORDER — LISINOPRIL 5 MG/1
10 TABLET ORAL
Qty: 30 TABLET | Refills: 0 | Status: SHIPPED | OUTPATIENT
Start: 2022-04-18 | End: 2022-05-07 | Stop reason: SDUPTHER

## 2022-04-18 NOTE — TELEPHONE ENCOUNTER
Rx Refill Note  Requested Prescriptions     Pending Prescriptions Disp Refills   • lisinopril (PRINIVIL,ZESTRIL) 5 MG tablet 30 tablet 0     Sig: Take 2 tablets by mouth Daily.      Last office visit with prescribing clinician: 2/7/2022      Next office visit with prescribing clinician: 4/18/2022       Last prescribed on 04/01/2022.         Antwan Russell MA/LMR  04/18/22, 15:08 EDT

## 2022-04-18 NOTE — TELEPHONE ENCOUNTER
Caller: Sebastien Tang KRYSTAL    Relationship: Self    Best call back number: [phone]     Requested Prescriptions:   Requested Prescriptions     Pending Prescriptions Disp Refills   • lisinopril (PRINIVIL,ZESTRIL) 5 MG tablet 30 tablet 0     Sig: Take 2 tablets by mouth Daily.        Pharmacy where request should be sent:  [id number] [address] [place] [address]  AT [facility] [phone] [facility] [phone] FX    Additional details provided by patient:  PATIENT STATES HE HAS 5 DAYS LEFT.   PATIENT STATES WHEN HE PICKED UP THIS PRESCRIPTION LAST MONTH, HE WAS TO TAKE 2 PILLS PER DAY, BUT WAS ONLY GIVEN A QUANTITY OF 30, SO THIS MEDICATION ONLY WILL LAST HIM 2 WEEKS.   PATIENT STATES HE WOULD LIKE A 90 DAY SUPPLY.       Does the patient have less than a 3 day supply:  [] Yes  [x] No    Kelli KNAPP Rep   04/18/22 13:30 EDT

## 2022-04-25 RX ORDER — BUDESONIDE AND FORMOTEROL FUMARATE DIHYDRATE 160; 4.5 UG/1; UG/1
AEROSOL RESPIRATORY (INHALATION)
Qty: 10.2 G | Refills: 3 | Status: SHIPPED | OUTPATIENT
Start: 2022-04-25 | End: 2022-07-31 | Stop reason: SDUPTHER

## 2022-04-25 NOTE — TELEPHONE ENCOUNTER
Rx Refill Note  Requested Prescriptions     Pending Prescriptions Disp Refills   • Symbicort 160-4.5 MCG/ACT inhaler [Pharmacy Med Name: SYMBICORT 160/4.5MCG (120 ORAL INH)] 10.2 g 3     Sig: INHALE 2 PUFFS BY MOUTH INTO THE LUNGS TWICE DAILY      Last office visit with prescribing clinician: 2/7/2022      Next office visit with prescribing clinician: 5/11/2022       Last prescribed on 12/22/2021.         Antwan Russell MA/LMR  04/25/22, 08:00 EDT

## 2022-04-27 DIAGNOSIS — I48.91 ATRIAL FIBRILLATION, UNSPECIFIED TYPE: ICD-10-CM

## 2022-04-27 RX ORDER — APIXABAN 5 MG/1
TABLET, FILM COATED ORAL
Qty: 180 TABLET | Refills: 0 | Status: SHIPPED | OUTPATIENT
Start: 2022-04-27 | End: 2022-07-26

## 2022-04-27 NOTE — TELEPHONE ENCOUNTER
Rx Refill Note  Requested Prescriptions     Pending Prescriptions Disp Refills   • Eliquis 5 MG tablet tablet [Pharmacy Med Name: ELIQUIS 5MG TABLETS] 180 tablet 0     Sig: TAKE 1 TABLET BY MOUTH EVERY 12 HOURS      Last office visit with prescribing clinician: 2/7/2022      Next office visit with prescribing clinician: 5/11/2022       Last prescribed on 01/25/2022.         Antwan Russell MA/LMR  04/27/22, 07:29 EDT

## 2022-05-02 RX ORDER — FINASTERIDE 5 MG/1
5 TABLET, FILM COATED ORAL DAILY
Qty: 90 TABLET | Refills: 3 | Status: SHIPPED | OUTPATIENT
Start: 2022-05-02 | End: 2022-08-09 | Stop reason: SDUPTHER

## 2022-05-02 RX ORDER — METOPROLOL SUCCINATE 100 MG/1
100 TABLET, EXTENDED RELEASE ORAL EVERY 12 HOURS SCHEDULED
Qty: 30 TABLET | Refills: 0 | Status: SHIPPED | OUTPATIENT
Start: 2022-05-02 | End: 2022-05-17 | Stop reason: SDUPTHER

## 2022-05-02 NOTE — TELEPHONE ENCOUNTER
Rx Refill Note  Requested Prescriptions     Pending Prescriptions Disp Refills   • metoprolol succinate XL (TOPROL-XL) 100 MG 24 hr tablet 180 tablet 3     Sig: Take 1 tablet by mouth Every 12 (Twelve) Hours.      Last office visit with prescribing clinician: 2/7/2022      Next office visit with prescribing clinician: 5/11/2022       Last prescribed on 05/10/2021.         Antwan Russell MA/LMR  05/02/22, 08:00 EDT

## 2022-05-09 RX ORDER — LISINOPRIL 5 MG/1
10 TABLET ORAL
Qty: 30 TABLET | Refills: 0 | Status: SHIPPED | OUTPATIENT
Start: 2022-05-09 | End: 2022-05-23 | Stop reason: SDUPTHER

## 2022-05-09 NOTE — TELEPHONE ENCOUNTER
Rx Refill Note  Requested Prescriptions     Pending Prescriptions Disp Refills   • lisinopril (PRINIVIL,ZESTRIL) 5 MG tablet 30 tablet 0     Sig: Take 2 tablets by mouth Daily.      Last office visit with prescribing clinician: 2/7/2022      Next office visit with prescribing clinician: Visit date not found       Last prescribed on 04/18/2022.         Antwan Russell MA/POLA  05/09/22, 07:20 EDT

## 2022-05-12 ENCOUNTER — OFFICE VISIT (OUTPATIENT)
Dept: FAMILY MEDICINE CLINIC | Facility: CLINIC | Age: 62
End: 2022-05-12

## 2022-05-12 VITALS
DIASTOLIC BLOOD PRESSURE: 72 MMHG | TEMPERATURE: 98 F | BODY MASS INDEX: 32.59 KG/M2 | OXYGEN SATURATION: 98 % | HEART RATE: 65 BPM | HEIGHT: 66 IN | SYSTOLIC BLOOD PRESSURE: 123 MMHG | RESPIRATION RATE: 16 BRPM | WEIGHT: 202.8 LBS

## 2022-05-12 DIAGNOSIS — E78.00 HIGH CHOLESTEROL: Primary | ICD-10-CM

## 2022-05-12 PROCEDURE — 99213 OFFICE O/P EST LOW 20 MIN: CPT | Performed by: FAMILY MEDICINE

## 2022-05-12 NOTE — PROGRESS NOTES
"Chief Complaint  Insomnia (3 month follow up)    Subjective          Sebastien Tang presents to [facility]  History of Present Illness  Insomnia med working well, due x labs not fasting, PSA per Urologist Eliquis is very expensive, patient is pain over $100 for it  Objective   Vital Signs:  /72 (BP Location: Right arm, Patient Position: Sitting, Cuff Size: Adult)   Pulse 65   Temp 98 °F (36.7 °C) (Infrared)   Resp 16   Ht 167.6 cm (66\")   Wt 92 kg (202 lb 12.8 oz)   SpO2 98%   BMI 32.73 kg/m²           Physical Exam  Vitals and nursing note reviewed.   Constitutional:       General: He is not in acute distress.     Appearance: Normal appearance. He is well-developed. He is not ill-appearing, toxic-appearing or diaphoretic.   HENT:      Head: Normocephalic and atraumatic.      Right Ear: Tympanic membrane, ear canal and external ear normal. There is no impacted cerumen.      Left Ear: Tympanic membrane, ear canal and external ear normal. There is no impacted cerumen.      Nose: Nose normal. No congestion or rhinorrhea.      Mouth/Throat:      Mouth: Mucous membranes are moist.      Pharynx: Oropharynx is clear. No oropharyngeal exudate or posterior oropharyngeal erythema.   Eyes:      General: No scleral icterus.        Right eye: No discharge.         Left eye: No discharge.      Extraocular Movements: Extraocular movements intact.      Conjunctiva/sclera: Conjunctivae normal.      Pupils: Pupils are equal, round, and reactive to light.   Neck:      Thyroid: No thyromegaly.      Vascular: No carotid bruit or JVD.      Trachea: No tracheal deviation.   Cardiovascular:      Rate and Rhythm: Normal rate and regular rhythm.      Heart sounds: Normal heart sounds. No murmur heard.    No friction rub. No gallop.   Pulmonary:      Effort: Pulmonary effort is normal. No respiratory distress.      Breath sounds: Normal breath sounds. No stridor. No wheezing, rhonchi or rales. "   Chest:      Chest wall: No tenderness.   Abdominal:      General: Bowel sounds are normal. There is no distension.      Palpations: Abdomen is soft. There is no mass.      Tenderness: There is no abdominal tenderness. There is no right CVA tenderness, left CVA tenderness, guarding or rebound.      Hernia: No hernia is present.   Musculoskeletal:         General: Normal range of motion.      Cervical back: Normal range of motion and neck supple. No rigidity or tenderness.      Right lower leg: No edema.      Left lower leg: No edema.   Lymphadenopathy:      Cervical: No cervical adenopathy.   Skin:     General: Skin is warm and dry.      Coloration: Skin is not jaundiced.   Neurological:      General: No focal deficit present.      Mental Status: He is alert and oriented to person, place, and time. Mental status is at baseline.      Sensory: No sensory deficit.      Motor: No weakness or abnormal muscle tone.      Coordination: Coordination normal.      Gait: Gait normal.      Deep Tendon Reflexes: Reflexes normal.   Psychiatric:         Mood and Affect: Mood normal.         Behavior: Behavior normal.         Thought Content: Thought content normal.         Judgment: Judgment normal.        Result Review :                 Assessment and Plan    Diagnoses and all orders for this visit:    1. High cholesterol (Primary)  -     CBC & Differential; Future  -     Comprehensive Metabolic Panel; Future  -     Lipid Panel; Future  -     TSH; Future  -     POC Urinalysis Dipstick, Automated; Future             Follow Up   No follow-ups on file.  Patient was given instructions and counseling regarding his condition or for health maintenance advice. Please see specific information pulled into the AVS if appropriate.     Free samples of Eliquis given that would last him at least 2 months

## 2022-05-17 RX ORDER — METOPROLOL SUCCINATE 100 MG/1
100 TABLET, EXTENDED RELEASE ORAL EVERY 12 HOURS SCHEDULED
Qty: 30 TABLET | Refills: 0 | Status: SHIPPED | OUTPATIENT
Start: 2022-05-17 | End: 2022-05-17 | Stop reason: SDUPTHER

## 2022-05-17 RX ORDER — METOPROLOL SUCCINATE 100 MG/1
100 TABLET, EXTENDED RELEASE ORAL EVERY 12 HOURS SCHEDULED
Qty: 60 TABLET | Refills: 0 | Status: SHIPPED | OUTPATIENT
Start: 2022-05-17 | End: 2022-05-18 | Stop reason: SDUPTHER

## 2022-05-17 NOTE — TELEPHONE ENCOUNTER
Rx Refill Note  Requested Prescriptions     Pending Prescriptions Disp Refills   • metoprolol succinate XL (TOPROL-XL) 100 MG 24 hr tablet 30 tablet 0     Sig: Take 1 tablet by mouth Every 12 (Twelve) Hours.      Last office visit with prescribing clinician: 5/12/2022      Next office visit with prescribing clinician: Visit date not found       Last prescribed on 05/02/2022.         Antwan Russell MA/POLA  05/17/22, 07:34 EDT

## 2022-05-18 RX ORDER — METOPROLOL SUCCINATE 100 MG/1
100 TABLET, EXTENDED RELEASE ORAL EVERY 12 HOURS SCHEDULED
Qty: 120 TABLET | Refills: 5 | Status: SHIPPED | OUTPATIENT
Start: 2022-05-18 | End: 2022-08-04 | Stop reason: SDUPTHER

## 2022-05-21 DIAGNOSIS — I10 ESSENTIAL HYPERTENSION: Primary | ICD-10-CM

## 2022-05-23 RX ORDER — LISINOPRIL 5 MG/1
10 TABLET ORAL
Qty: 30 TABLET | Refills: 0 | OUTPATIENT
Start: 2022-05-23

## 2022-05-23 RX ORDER — LISINOPRIL 10 MG/1
10 TABLET ORAL DAILY
Qty: 90 TABLET | Refills: 3 | Status: SHIPPED | OUTPATIENT
Start: 2022-05-23 | End: 2022-08-18 | Stop reason: SDUPTHER

## 2022-05-23 NOTE — TELEPHONE ENCOUNTER
Rx Refill Note  Requested Prescriptions     Pending Prescriptions Disp Refills   • lisinopril (PRINIVIL,ZESTRIL) 5 MG tablet 90 tablet 3     Sig: Take 2 tablets by mouth Daily.      Last office visit with prescribing clinician: 5/12/2022      Next office visit with prescribing clinician: Visit date not found       Last prescribed on 05/18/2022.         Antwan Russell MA/POLA  05/23/22, 07:21 EDT

## 2022-05-25 NOTE — TELEPHONE ENCOUNTER
Patient lives in house with someone who tested positive.  Apt made for tomorrow    lov 6/14/2021  Last filled 7/20/2021     negative

## 2022-06-06 DIAGNOSIS — F51.01 PRIMARY INSOMNIA: Primary | ICD-10-CM

## 2022-06-06 RX ORDER — ESZOPICLONE 1 MG/1
1 TABLET, FILM COATED ORAL NIGHTLY PRN
Qty: 30 TABLET | Refills: 0 | Status: SHIPPED | OUTPATIENT
Start: 2022-06-06 | End: 2022-06-09

## 2022-06-07 ENCOUNTER — TELEPHONE (OUTPATIENT)
Dept: FAMILY MEDICINE CLINIC | Facility: CLINIC | Age: 62
End: 2022-06-07

## 2022-06-07 NOTE — TELEPHONE ENCOUNTER
Caller: Sebastien Tang    Relationship to patient: Self    Best call back number: [phone]    Patient is needing: PATIENT STATES THE SLEEP MEDICATION DID NOT WORK, HE REPORTS BEING AWAKE FOR 24 HOURS AND WANTS TO KNOW WHAT HE SHOULD DO. PLEASE ADVISE.

## 2022-06-09 ENCOUNTER — TELEPHONE (OUTPATIENT)
Dept: FAMILY MEDICINE CLINIC | Facility: CLINIC | Age: 62
End: 2022-06-09

## 2022-06-09 ENCOUNTER — OFFICE VISIT (OUTPATIENT)
Dept: FAMILY MEDICINE CLINIC | Facility: CLINIC | Age: 62
End: 2022-06-09

## 2022-06-09 VITALS
SYSTOLIC BLOOD PRESSURE: 126 MMHG | BODY MASS INDEX: 31.79 KG/M2 | WEIGHT: 197.8 LBS | RESPIRATION RATE: 18 BRPM | OXYGEN SATURATION: 96 % | HEART RATE: 62 BPM | TEMPERATURE: 98.4 F | HEIGHT: 66 IN | DIASTOLIC BLOOD PRESSURE: 77 MMHG

## 2022-06-09 DIAGNOSIS — F51.01 PRIMARY INSOMNIA: Primary | ICD-10-CM

## 2022-06-09 DIAGNOSIS — F51.01 PRIMARY INSOMNIA: ICD-10-CM

## 2022-06-09 PROCEDURE — 99213 OFFICE O/P EST LOW 20 MIN: CPT | Performed by: FAMILY MEDICINE

## 2022-06-09 RX ORDER — TEMAZEPAM 7.5 MG/1
7.5 CAPSULE ORAL NIGHTLY PRN
Qty: 30 CAPSULE | Refills: 0 | Status: SHIPPED | OUTPATIENT
Start: 2022-06-09 | End: 2022-06-09 | Stop reason: SDUPTHER

## 2022-06-09 RX ORDER — TEMAZEPAM 7.5 MG/1
7.5 CAPSULE ORAL NIGHTLY PRN
Qty: 30 CAPSULE | Refills: 0 | Status: SHIPPED | OUTPATIENT
Start: 2022-06-09 | End: 2022-06-13

## 2022-06-09 NOTE — TELEPHONE ENCOUNTER
Patient went to  his restoril and the [facility] on  [address] doesn't have it.  Please send it to [facility] [address] [phone].

## 2022-06-09 NOTE — TELEPHONE ENCOUNTER
Patient stated [facility] pharmacy didn't have Restoril. He wants it sent to [facility] [address]. Please advise. Thank you.

## 2022-06-09 NOTE — PROGRESS NOTES
"Chief Complaint  Insomnia (Sleep medication-not working)    Subjective        Sebastien Tang presents to [facility]  History of Present Illness  Insomnia, difficulty falling sleep and stay asleep, goes to sleep 1 AM, no caffeine, did try Lunesta up to 3 mg did not work, before that he tried Zolpidem,  also did not work  Objective   Vital Signs:  /77 (BP Location: Right arm, Patient Position: Sitting, Cuff Size: Large Adult)   Pulse 62   Temp 98.4 °F (36.9 °C) (Infrared)   Resp 18   Ht 167.6 cm (66\")   Wt 89.7 kg (197 lb 12.8 oz)   SpO2 96%   BMI 31.93 kg/m²   Estimated body mass index is 31.93 kg/m² as calculated from the following:    Height as of this encounter: 167.6 cm (66\").    Weight as of this encounter: 89.7 kg (197 lb 12.8 oz).          Physical Exam  Vitals and nursing note reviewed.   Constitutional:       General: He is not in acute distress.     Appearance: Normal appearance. He is well-developed. He is not ill-appearing, toxic-appearing or diaphoretic.   HENT:      Head: Normocephalic and atraumatic.      Right Ear: Tympanic membrane, ear canal and external ear normal. There is no impacted cerumen.      Left Ear: Tympanic membrane, ear canal and external ear normal. There is no impacted cerumen.      Nose: Nose normal. No congestion or rhinorrhea.      Mouth/Throat:      Mouth: Mucous membranes are moist.      Pharynx: Oropharynx is clear. No oropharyngeal exudate or posterior oropharyngeal erythema.   Eyes:      General: No scleral icterus.        Right eye: No discharge.         Left eye: No discharge.      Extraocular Movements: Extraocular movements intact.      Conjunctiva/sclera: Conjunctivae normal.      Pupils: Pupils are equal, round, and reactive to light.   Neck:      Thyroid: No thyromegaly.      Vascular: No carotid bruit or JVD.      Trachea: No tracheal deviation.   Cardiovascular:      Rate and Rhythm: Normal rate and regular rhythm.      Heart " sounds: Normal heart sounds. No murmur heard.    No friction rub. No gallop.   Pulmonary:      Effort: Pulmonary effort is normal. No respiratory distress.      Breath sounds: Normal breath sounds. No stridor. No wheezing, rhonchi or rales.   Chest:      Chest wall: No tenderness.   Abdominal:      General: Bowel sounds are normal. There is no distension.      Palpations: Abdomen is soft. There is no mass.      Tenderness: There is no abdominal tenderness. There is no right CVA tenderness, left CVA tenderness, guarding or rebound.      Hernia: No hernia is present.   Musculoskeletal:         General: Normal range of motion.      Cervical back: Normal range of motion and neck supple. No rigidity or tenderness.      Right lower leg: No edema.      Left lower leg: No edema.   Lymphadenopathy:      Cervical: No cervical adenopathy.   Skin:     General: Skin is warm and dry.      Coloration: Skin is not jaundiced.   Neurological:      General: No focal deficit present.      Mental Status: He is alert and oriented to person, place, and time. Mental status is at baseline.      Sensory: No sensory deficit.      Motor: No weakness or abnormal muscle tone.      Coordination: Coordination normal.      Gait: Gait normal.      Deep Tendon Reflexes: Reflexes normal.   Psychiatric:         Mood and Affect: Mood normal.         Behavior: Behavior normal.         Thought Content: Thought content normal.         Judgment: Judgment normal.        Result Review :                Assessment and Plan   Diagnoses and all orders for this visit:    1. Primary insomnia (Primary)  -     temazepam (Restoril) 7.5 MG capsule; Take 1 capsule by mouth At Night As Needed for Sleep.  Dispense: 30 capsule; Refill: 0      Side effects discussed with patient in detail, all including but not limited to every single topic discussed   Long discussion with patient regarding side effect of this medication including but not limited to shortness of breath,  somnolence, etc., also  risk of addiction, patient does not drink alcohol       Follow Up   Return in about 3 months (around 9/9/2022).  Patient was given instructions and counseling regarding his condition or for health maintenance advice. Please see specific information pulled into the AVS if appropriate.

## 2022-06-13 DIAGNOSIS — F51.01 PRIMARY INSOMNIA: Primary | ICD-10-CM

## 2022-06-13 RX ORDER — ZOLPIDEM TARTRATE 6.25 MG/1
6.25 TABLET, FILM COATED, EXTENDED RELEASE ORAL NIGHTLY PRN
Qty: 90 TABLET | Refills: 0 | Status: SHIPPED | OUTPATIENT
Start: 2022-06-13 | End: 2022-07-05 | Stop reason: SDUPTHER

## 2022-07-02 DIAGNOSIS — G47.9 TROUBLE IN SLEEPING: ICD-10-CM

## 2022-07-02 DIAGNOSIS — F51.01 PRIMARY INSOMNIA: ICD-10-CM

## 2022-07-05 DIAGNOSIS — F51.01 PRIMARY INSOMNIA: ICD-10-CM

## 2022-07-05 RX ORDER — ZOLPIDEM TARTRATE 6.25 MG/1
6.25 TABLET, FILM COATED, EXTENDED RELEASE ORAL NIGHTLY PRN
Qty: 90 TABLET | Refills: 0 | Status: SHIPPED | OUTPATIENT
Start: 2022-07-05 | End: 2022-07-06 | Stop reason: SDUPTHER

## 2022-07-05 NOTE — TELEPHONE ENCOUNTER
Is zolpidem okay to refill? Please advise.    Rx Refill Note  Requested Prescriptions     Pending Prescriptions Disp Refills   • zolpidem (AMBIEN) 10 MG tablet [Pharmacy Med Name: ZOLPIDEM 10MG TABLETS] 30 tablet      Sig: TAKE 1 TABLET BY MOUTH AT NIGHT AS NEEDED FOR SLEEP      Last office visit with prescribing clinician: 6/9/2022      Next office visit with prescribing clinician: Visit date not found       Last prescribed on 04/18/2022.         Antwan Russell MA/LMR  07/05/22, 07:53 EDT

## 2022-07-06 DIAGNOSIS — F51.01 PRIMARY INSOMNIA: ICD-10-CM

## 2022-07-06 RX ORDER — ZOLPIDEM TARTRATE 12.5 MG/1
12.5 TABLET, FILM COATED, EXTENDED RELEASE ORAL NIGHTLY PRN
Qty: 90 TABLET | Refills: 0 | Status: SHIPPED | OUTPATIENT
Start: 2022-07-06 | End: 2022-08-09

## 2022-07-06 NOTE — TELEPHONE ENCOUNTER
Sent message via [employer] to clarify which ambien patient is taking either ambien or ambien cr.

## 2022-07-11 RX ORDER — ZOLPIDEM TARTRATE 10 MG/1
10 TABLET ORAL NIGHTLY PRN
Qty: 30 TABLET | Refills: 0 | OUTPATIENT
Start: 2022-07-11

## 2022-07-26 ENCOUNTER — TELEPHONE (OUTPATIENT)
Dept: FAMILY MEDICINE CLINIC | Facility: CLINIC | Age: 62
End: 2022-07-26

## 2022-07-26 DIAGNOSIS — I48.91 ATRIAL FIBRILLATION, UNSPECIFIED TYPE: ICD-10-CM

## 2022-07-26 DIAGNOSIS — Z79.899 HIGH RISK MEDICATION USE: ICD-10-CM

## 2022-07-26 RX ORDER — APIXABAN 5 MG/1
TABLET, FILM COATED ORAL
Qty: 180 TABLET | Refills: 0 | Status: SHIPPED | OUTPATIENT
Start: 2022-07-26 | End: 2022-09-01 | Stop reason: SDUPTHER

## 2022-07-26 NOTE — TELEPHONE ENCOUNTER
Is eliquis okay to refill? Please advise.    Rx Refill Note  Requested Prescriptions     Pending Prescriptions Disp Refills   • Eliquis 5 MG tablet tablet [Pharmacy Med Name: ELIQUIS 5MG TABLETS] 180 tablet 0     Sig: TAKE 1 TABLET BY MOUTH EVERY 12 HOURS      Last office visit with prescribing clinician: 6/9/2022      Next office visit with prescribing clinician: Visit date not found            Antwan Russell MA/LMR  07/26/22, 07:43 EDT

## 2022-07-26 NOTE — TELEPHONE ENCOUNTER
Provider: DR DUYEN MULLEN    Caller: GUMARO PRABHAKAR    Relationship to Patient:  SELF      Phone Number: [phone]     Reason for Call: CHANGING PHARMACY

## 2022-07-27 RX ORDER — POTASSIUM CHLORIDE 20 MEQ/1
20 TABLET, EXTENDED RELEASE ORAL DAILY
Qty: 14 TABLET | Refills: 0 | Status: SHIPPED | OUTPATIENT
Start: 2022-07-27 | End: 2022-08-14 | Stop reason: SDUPTHER

## 2022-07-27 NOTE — TELEPHONE ENCOUNTER
Is potassium chloride okay to refill? Please advise.    Rx Refill Note  Requested Prescriptions     Pending Prescriptions Disp Refills   • potassium chloride (K-DUR,KLOR-CON) 20 MEQ CR tablet 90 tablet 1     Sig: Take 1 tablet by mouth Daily.      Last office visit with prescribing clinician: 6/9/2022      Next office visit with prescribing clinician: Visit date not found            Antwan Russell MA/LMR  07/27/22, 07:02 EDT

## 2022-07-27 NOTE — TELEPHONE ENCOUNTER
Tried reaching out to patient; no answer. Unable to leave voicemail.    OK for HUB to read.    Called patient to change pharmacy. Which pharmacy are you needing to change to?

## 2022-07-28 NOTE — TELEPHONE ENCOUNTER
Tried to reach out to patient; no answer.    OK for HUB to read.    Called patient to change pharmacy. What pharmacy are you needing  to change to?

## 2022-08-01 RX ORDER — BUDESONIDE AND FORMOTEROL FUMARATE DIHYDRATE 160; 4.5 UG/1; UG/1
2 AEROSOL RESPIRATORY (INHALATION)
Qty: 10.2 G | Refills: 3 | Status: SHIPPED | OUTPATIENT
Start: 2022-08-01 | End: 2022-09-01 | Stop reason: SDUPTHER

## 2022-08-01 NOTE — TELEPHONE ENCOUNTER
Is symbicort okay to refill? Please advise.    Rx Refill Note  Requested Prescriptions     Pending Prescriptions Disp Refills   • Symbicort 160-4.5 MCG/ACT inhaler 10.2 g 3      Last office visit with prescribing clinician: 6/9/2022      Next office visit with prescribing clinician: Visit date not found            Antwan Russell MA/LMR  08/01/22, 07:37 EDT

## 2022-08-04 RX ORDER — METOPROLOL SUCCINATE 100 MG/1
100 TABLET, EXTENDED RELEASE ORAL EVERY 12 HOURS SCHEDULED
Qty: 120 TABLET | Refills: 5 | Status: SHIPPED | OUTPATIENT
Start: 2022-08-04

## 2022-08-04 NOTE — TELEPHONE ENCOUNTER
Rx Refill Note  Requested Prescriptions     Pending Prescriptions Disp Refills   • metoprolol succinate XL (TOPROL-XL) 100 MG 24 hr tablet 120 tablet 5     Sig: Take 1 tablet by mouth Every 12 (Twelve) Hours.      Last office visit with prescribing clinician: 6/9/2022      Next office visit with prescribing clinician: Visit date not found            Antwan Russell MA/LMR  08/04/22, 06:57 EDT

## 2022-08-09 DIAGNOSIS — F51.01 PRIMARY INSOMNIA: Primary | ICD-10-CM

## 2022-08-09 RX ORDER — FINASTERIDE 5 MG/1
5 TABLET, FILM COATED ORAL DAILY
Qty: 90 TABLET | Refills: 3 | Status: SHIPPED | OUTPATIENT
Start: 2022-08-09

## 2022-08-09 RX ORDER — DARIDOREXANT 25 MG/1
25 TABLET, FILM COATED ORAL NIGHTLY PRN
Qty: 30 TABLET | Refills: 0 | Status: SHIPPED | OUTPATIENT
Start: 2022-08-09 | End: 2022-08-12

## 2022-08-11 DIAGNOSIS — R06.02 SHORTNESS OF BREATH: Primary | ICD-10-CM

## 2022-08-11 RX ORDER — ALBUTEROL SULFATE 90 UG/1
2 AEROSOL, METERED RESPIRATORY (INHALATION) EVERY 6 HOURS PRN
Qty: 18 G | Refills: 3 | Status: SHIPPED | OUTPATIENT
Start: 2022-08-11 | End: 2022-12-12 | Stop reason: SDUPTHER

## 2022-08-12 DIAGNOSIS — F51.01 PRIMARY INSOMNIA: Primary | ICD-10-CM

## 2022-08-12 RX ORDER — ESZOPICLONE 1 MG/1
1 TABLET, FILM COATED ORAL NIGHTLY PRN
Qty: 30 TABLET | Refills: 0 | Status: SHIPPED | OUTPATIENT
Start: 2022-08-12 | End: 2022-08-23

## 2022-08-14 DIAGNOSIS — Z79.899 HIGH RISK MEDICATION USE: ICD-10-CM

## 2022-08-15 RX ORDER — POTASSIUM CHLORIDE 20 MEQ/1
20 TABLET, EXTENDED RELEASE ORAL DAILY
Qty: 14 TABLET | Refills: 0 | Status: SHIPPED | OUTPATIENT
Start: 2022-08-15 | End: 2022-09-06 | Stop reason: SDUPTHER

## 2022-08-15 NOTE — TELEPHONE ENCOUNTER
Is potassium chloride okay to refill? please advise     Rx Refill Note  Requested Prescriptions     Pending Prescriptions Disp Refills   • potassium chloride (K-DUR,KLOR-CON) 20 MEQ CR tablet 14 tablet 0     Sig: Take 1 tablet by mouth Daily.      Last office visit with prescribing clinician: 6/9/2022      Next office visit with prescribing clinician: Visit date not found            Antwan Russell MA/LMR  08/15/22, 14:50 EDT

## 2022-08-17 ENCOUNTER — TELEPHONE (OUTPATIENT)
Dept: FAMILY MEDICINE CLINIC | Facility: CLINIC | Age: 62
End: 2022-08-17

## 2022-08-17 NOTE — TELEPHONE ENCOUNTER
DELETE AFTER REVIEWING: Telephone encounter to be sent to the clinical pool     Caller: [facility]    Relationship:     Best call back number: [phone]    What medication are you requesting: QUVIVI  25 MG    What are your current symptoms: SLEEP MEDICATION    If a prescription is needed, what is your preferred pharmacy and phone number:    [facility]  [address] [place] [address] [phone]  FAX

## 2022-08-18 NOTE — TELEPHONE ENCOUNTER
Pt requested Lunesta just a few days ago, can not send another one until next month     Called, no answer and no voice mail--will call him again      **HUB/** may relay message should patient call back.

## 2022-08-18 NOTE — TELEPHONE ENCOUNTER
PATIENT CALLED STATING THAT HE WASN'T SURE WHY [facility] IS CONTACTING THE OFFICE, AS HE HAS NOT CONTACTED THEM TO REQUEST A MEDICATION - HE DID VIEW THEIR WEBSITE, BUT DOESN'T RECALL MAKING ANY REQUESTS.    PLEASE DISREGARD THE REQUEST.    THE PATIENT DID NOTE THAT THE LUNESTA IS NOT HELPING HIM SLEEP, AND WOULD LIKE TO SEE IF THERE IS ANY OTHER MEDICATION THAT DR. GELLER WOULD BE WILLING TO SEND FOR HIM, OR POSSIBLY A DOSAGE INCREASE.    PLEASE ADVISE  [phone]

## 2022-08-19 RX ORDER — LISINOPRIL 10 MG/1
10 TABLET ORAL DAILY
Qty: 90 TABLET | Refills: 3 | Status: SHIPPED | OUTPATIENT
Start: 2022-08-19 | End: 2022-11-13 | Stop reason: SDUPTHER

## 2022-08-19 NOTE — TELEPHONE ENCOUNTER
Is lisinopril okay to refill? Please advise    Rx Refill Note  Requested Prescriptions     Pending Prescriptions Disp Refills   • lisinopril (PRINIVIL,ZESTRIL) 10 MG tablet 90 tablet 3     Sig: Take 1 tablet by mouth Daily.      Last office visit with prescribing clinician: 6/9/2022      Next office visit with prescribing clinician: Visit date not found            Antwan Russell MA/POLA  08/19/22, 06:57 EDT

## 2022-09-01 ENCOUNTER — OFFICE VISIT (OUTPATIENT)
Dept: FAMILY MEDICINE CLINIC | Facility: CLINIC | Age: 62
End: 2022-09-01

## 2022-09-01 VITALS
HEART RATE: 64 BPM | RESPIRATION RATE: 17 BRPM | DIASTOLIC BLOOD PRESSURE: 69 MMHG | OXYGEN SATURATION: 97 % | TEMPERATURE: 97.7 F | WEIGHT: 197 LBS | SYSTOLIC BLOOD PRESSURE: 122 MMHG | HEIGHT: 66 IN | BODY MASS INDEX: 31.66 KG/M2

## 2022-09-01 DIAGNOSIS — Z12.5 SCREENING PSA (PROSTATE SPECIFIC ANTIGEN): ICD-10-CM

## 2022-09-01 DIAGNOSIS — E78.00 HIGH CHOLESTEROL: Primary | ICD-10-CM

## 2022-09-01 DIAGNOSIS — I48.91 ATRIAL FIBRILLATION, UNSPECIFIED TYPE: ICD-10-CM

## 2022-09-01 DIAGNOSIS — J44.1 COPD WITH EXACERBATION: ICD-10-CM

## 2022-09-01 DIAGNOSIS — I10 ESSENTIAL HYPERTENSION: ICD-10-CM

## 2022-09-01 LAB
BILIRUB BLD-MCNC: NEGATIVE MG/DL
CLARITY, POC: CLEAR
COLOR UR: YELLOW
EXPIRATION DATE: ABNORMAL
GLUCOSE UR STRIP-MCNC: NEGATIVE MG/DL
KETONES UR QL: NEGATIVE
LEUKOCYTE EST, POC: NEGATIVE
Lab: ABNORMAL
NITRITE UR-MCNC: NEGATIVE MG/ML
PH UR: 6 [PH] (ref 5–8)
PROT UR STRIP-MCNC: ABNORMAL MG/DL
RBC # UR STRIP: NEGATIVE /UL
SP GR UR: 1.03 (ref 1–1.03)
UROBILINOGEN UR QL: NORMAL

## 2022-09-01 PROCEDURE — 81003 URINALYSIS AUTO W/O SCOPE: CPT | Performed by: FAMILY MEDICINE

## 2022-09-01 PROCEDURE — 99214 OFFICE O/P EST MOD 30 MIN: CPT | Performed by: FAMILY MEDICINE

## 2022-09-01 RX ORDER — BUDESONIDE AND FORMOTEROL FUMARATE DIHYDRATE 160; 4.5 UG/1; UG/1
2 AEROSOL RESPIRATORY (INHALATION)
Qty: 10.2 G | Refills: 3 | Status: SHIPPED | OUTPATIENT
Start: 2022-09-01

## 2022-09-01 NOTE — PROGRESS NOTES
"Chief Complaint  Labs Only and Consult    Subjective        Sebastien Tang presents to [facility]  History of Present Illness  Fasting, non smoker, no ETOH, no drugs, no fxhx of DM, no cancer, pt had MI in the past seen by cardio, no fxhx, declined Cologuard or Colonoscopy, no CP/SOA  Objective   Vital Signs:  /69 (BP Location: Left arm, Patient Position: Sitting, Cuff Size: Large Adult)   Pulse 64   Temp 97.7 °F (36.5 °C) (Infrared)   Resp 17   Ht 167.6 cm (66\")   Wt 89.4 kg (197 lb)   SpO2 97%   BMI 31.80 kg/m²   Estimated body mass index is 31.8 kg/m² as calculated from the following:    Height as of this encounter: 167.6 cm (66\").    Weight as of this encounter: 89.4 kg (197 lb).          Physical Exam  Vitals and nursing note reviewed.   Constitutional:       General: He is not in acute distress.     Appearance: Normal appearance. He is well-developed. He is not ill-appearing, toxic-appearing or diaphoretic.   HENT:      Head: Normocephalic and atraumatic.      Right Ear: Tympanic membrane, ear canal and external ear normal. There is no impacted cerumen.      Left Ear: Tympanic membrane, ear canal and external ear normal. There is no impacted cerumen.      Nose: Nose normal. No congestion or rhinorrhea.      Mouth/Throat:      Mouth: Mucous membranes are moist.      Pharynx: Oropharynx is clear. No oropharyngeal exudate or posterior oropharyngeal erythema.   Eyes:      General: No scleral icterus.        Right eye: No discharge.         Left eye: No discharge.      Extraocular Movements: Extraocular movements intact.      Conjunctiva/sclera: Conjunctivae normal.      Pupils: Pupils are equal, round, and reactive to light.   Neck:      Thyroid: No thyromegaly.      Vascular: No carotid bruit or JVD.      Trachea: No tracheal deviation.   Cardiovascular:      Rate and Rhythm: Normal rate and regular rhythm.      Heart sounds: Normal heart sounds. No murmur heard.    No " friction rub. No gallop.   Pulmonary:      Effort: Pulmonary effort is normal. No respiratory distress.      Breath sounds: Normal breath sounds. No stridor. No wheezing, rhonchi or rales.   Chest:      Chest wall: No tenderness.   Abdominal:      General: Bowel sounds are normal. There is no distension.      Palpations: Abdomen is soft. There is no mass.      Tenderness: There is no abdominal tenderness. There is no right CVA tenderness, left CVA tenderness, guarding or rebound.      Hernia: No hernia is present.   Musculoskeletal:         General: Normal range of motion.      Cervical back: Normal range of motion and neck supple. No rigidity or tenderness.      Right lower leg: No edema.      Left lower leg: No edema.   Lymphadenopathy:      Cervical: No cervical adenopathy.   Skin:     General: Skin is warm and dry.      Coloration: Skin is not jaundiced.   Neurological:      General: No focal deficit present.      Mental Status: He is alert and oriented to person, place, and time. Mental status is at baseline.      Sensory: No sensory deficit.      Motor: No weakness or abnormal muscle tone.      Coordination: Coordination normal.      Gait: Gait normal.      Deep Tendon Reflexes: Reflexes normal.   Psychiatric:         Mood and Affect: Mood normal.         Behavior: Behavior normal.         Thought Content: Thought content normal.         Judgment: Judgment normal.        Result Review :                Assessment and Plan   Diagnoses and all orders for this visit:    1. High cholesterol (Primary)  -     CBC & Differential  -     Comprehensive Metabolic Panel  -     Lipid Panel  -     TSH  -     PSA Screen  -     POC Urinalysis Dipstick, Automated    2. Essential hypertension  -     CBC & Differential  -     Comprehensive Metabolic Panel  -     Lipid Panel  -     TSH  -     PSA Screen  -     POC Urinalysis Dipstick, Automated    3. Screening PSA (prostate specific antigen)  -     PSA Screen             Follow Up    Return in about 3 months (around 12/1/2022).  Patient was given instructions and counseling regarding his condition or for health maintenance advice. Please see specific information pulled into the AVS if appropriate.

## 2022-09-02 DIAGNOSIS — D64.9 ANEMIA, UNSPECIFIED TYPE: Primary | ICD-10-CM

## 2022-09-02 DIAGNOSIS — E83.52 HYPERCALCEMIA: ICD-10-CM

## 2022-09-02 LAB
ALBUMIN SERPL-MCNC: 4.4 G/DL (ref 3.8–4.8)
ALBUMIN/GLOB SERPL: 1.7 {RATIO} (ref 1.2–2.2)
ALP SERPL-CCNC: 83 IU/L (ref 44–121)
ALT SERPL-CCNC: 26 IU/L (ref 0–44)
AST SERPL-CCNC: 24 IU/L (ref 0–40)
BASOPHILS # BLD AUTO: 0.1 X10E3/UL (ref 0–0.2)
BASOPHILS NFR BLD AUTO: 1 %
BILIRUB SERPL-MCNC: 0.4 MG/DL (ref 0–1.2)
BUN SERPL-MCNC: 20 MG/DL (ref 8–27)
BUN/CREAT SERPL: 19 (ref 10–24)
CALCIUM SERPL-MCNC: 10.4 MG/DL (ref 8.6–10.2)
CHLORIDE SERPL-SCNC: 101 MMOL/L (ref 96–106)
CHOLEST SERPL-MCNC: 203 MG/DL (ref 100–199)
CO2 SERPL-SCNC: 22 MMOL/L (ref 20–29)
CREAT SERPL-MCNC: 1.03 MG/DL (ref 0.76–1.27)
EGFRCR-CYS SERPLBLD CKD-EPI 2021: 81 ML/MIN/1.73
EOSINOPHIL # BLD AUTO: 0.2 X10E3/UL (ref 0–0.4)
EOSINOPHIL NFR BLD AUTO: 2 %
ERYTHROCYTE [DISTWIDTH] IN BLOOD BY AUTOMATED COUNT: 13.8 % (ref 11.6–15.4)
GLOBULIN SER CALC-MCNC: 2.6 G/DL (ref 1.5–4.5)
GLUCOSE SERPL-MCNC: 104 MG/DL (ref 65–99)
HCT VFR BLD AUTO: 37.2 % (ref 37.5–51)
HDLC SERPL-MCNC: 40 MG/DL
HGB BLD-MCNC: 11.8 G/DL (ref 13–17.7)
IMM GRANULOCYTES # BLD AUTO: 0.1 X10E3/UL (ref 0–0.1)
IMM GRANULOCYTES NFR BLD AUTO: 1 %
LDLC SERPL CALC-MCNC: 139 MG/DL (ref 0–99)
LYMPHOCYTES # BLD AUTO: 1.3 X10E3/UL (ref 0.7–3.1)
LYMPHOCYTES NFR BLD AUTO: 13 %
MCH RBC QN AUTO: 27.8 PG (ref 26.6–33)
MCHC RBC AUTO-ENTMCNC: 31.7 G/DL (ref 31.5–35.7)
MCV RBC AUTO: 88 FL (ref 79–97)
MONOCYTES # BLD AUTO: 0.8 X10E3/UL (ref 0.1–0.9)
MONOCYTES NFR BLD AUTO: 9 %
NEUTROPHILS # BLD AUTO: 7.1 X10E3/UL (ref 1.4–7)
NEUTROPHILS NFR BLD AUTO: 74 %
PLATELET # BLD AUTO: 426 X10E3/UL (ref 150–450)
POTASSIUM SERPL-SCNC: 4.5 MMOL/L (ref 3.5–5.2)
PROT SERPL-MCNC: 7 G/DL (ref 6–8.5)
PSA SERPL-MCNC: 3.1 NG/ML (ref 0–4)
RBC # BLD AUTO: 4.24 X10E6/UL (ref 4.14–5.8)
SODIUM SERPL-SCNC: 139 MMOL/L (ref 134–144)
TRIGL SERPL-MCNC: 133 MG/DL (ref 0–149)
TSH SERPL DL<=0.005 MIU/L-ACNC: 1.63 UIU/ML (ref 0.45–4.5)
VLDLC SERPL CALC-MCNC: 24 MG/DL (ref 5–40)
WBC # BLD AUTO: 9.5 X10E3/UL (ref 3.4–10.8)

## 2022-09-06 ENCOUNTER — PATIENT MESSAGE (OUTPATIENT)
Dept: FAMILY MEDICINE CLINIC | Facility: CLINIC | Age: 62
End: 2022-09-06

## 2022-09-06 ENCOUNTER — TELEPHONE (OUTPATIENT)
Dept: FAMILY MEDICINE CLINIC | Facility: CLINIC | Age: 62
End: 2022-09-06

## 2022-09-06 DIAGNOSIS — Z79.899 HIGH RISK MEDICATION USE: ICD-10-CM

## 2022-09-06 DIAGNOSIS — F51.01 PRIMARY INSOMNIA: Primary | ICD-10-CM

## 2022-09-06 DIAGNOSIS — Z00.00 WELLNESS EXAMINATION: Primary | ICD-10-CM

## 2022-09-06 RX ORDER — ZOLPIDEM TARTRATE 10 MG/1
10 TABLET ORAL NIGHTLY PRN
Qty: 30 TABLET | Refills: 0 | Status: SHIPPED | OUTPATIENT
Start: 2022-09-06 | End: 2022-09-22

## 2022-09-06 NOTE — TELEPHONE ENCOUNTER
Caller: Sebastien Tang    Relationship: Self    Best call back number: [phone], OR CAN LEAVE A MESSAGE ON HIS [employer].     Who are you requesting to speak with (clinical staff, provider,  specific staff member): CLINICAL    What was the call regarding: PATIENT STATES THAT THE ZOLPIDEM CR IS NOT WORKING FOR HIM, AND HE IS NOT PLEASED WITH THE TYPE OF SLEEP HE IS GETTING.  PATIENT STATES THAT HE WOULD LIKE TO TRY JUST REGULAR ZOLPIDEM 10 MG AND IF THAT DOES NOT WORK FOR HIM, HE WOULD THEN LIKE A REFERRAL TO A SLEEP SPECIALIST.  PLEASE CONTACT PATIENT TO ADVISE IF DR. MULLEN WILL PRESCRIBE REGULAR ZOLPIDEM.        PATIENT STATES THAT HE WAS TOLD THAT HIS LAB RESULTS SHOWED THAT HE IS ANEMIC, AND PATIENT WOULD LIKE TO KNOW MORE ABOUT WHAT THAT MEANS FOR HIM, AND WHAT HE NEEDS TO DO ABOUT IT.       Do you require a callback: YES

## 2022-09-07 RX ORDER — POTASSIUM CHLORIDE 20 MEQ/1
20 TABLET, EXTENDED RELEASE ORAL DAILY
Qty: 14 TABLET | Refills: 0 | Status: SHIPPED | OUTPATIENT
Start: 2022-09-07 | End: 2022-10-03 | Stop reason: SDUPTHER

## 2022-09-07 NOTE — TELEPHONE ENCOUNTER
Rx Refill Note  Requested Prescriptions     Pending Prescriptions Disp Refills   • potassium chloride (K-DUR,KLOR-CON) 20 MEQ CR tablet 14 tablet 0     Sig: Take 1 tablet by mouth Daily.      Last office visit with prescribing clinician: 9/1/2022      Next office visit with prescribing clinician: Visit date not found

## 2022-09-13 ENCOUNTER — TELEPHONE (OUTPATIENT)
Dept: FAMILY MEDICINE CLINIC | Facility: CLINIC | Age: 62
End: 2022-09-13

## 2022-09-13 NOTE — TELEPHONE ENCOUNTER
I did call [pt regarding wanting Ambien CR again after failed and tried many controlled meds, no answer, no voice mail

## 2022-09-22 ENCOUNTER — OFFICE VISIT (OUTPATIENT)
Dept: FAMILY MEDICINE CLINIC | Facility: CLINIC | Age: 62
End: 2022-09-22

## 2022-09-22 VITALS
TEMPERATURE: 97.8 F | HEART RATE: 57 BPM | BODY MASS INDEX: 31.08 KG/M2 | OXYGEN SATURATION: 97 % | WEIGHT: 193.4 LBS | HEIGHT: 66 IN | DIASTOLIC BLOOD PRESSURE: 80 MMHG | SYSTOLIC BLOOD PRESSURE: 130 MMHG

## 2022-09-22 DIAGNOSIS — G47.00 INSOMNIA, UNSPECIFIED TYPE: ICD-10-CM

## 2022-09-22 DIAGNOSIS — E83.52 HYPERCALCEMIA: ICD-10-CM

## 2022-09-22 DIAGNOSIS — F41.9 ANXIETY: Primary | ICD-10-CM

## 2022-09-22 DIAGNOSIS — D64.9 ANEMIA, UNSPECIFIED TYPE: ICD-10-CM

## 2022-09-22 PROCEDURE — 99214 OFFICE O/P EST MOD 30 MIN: CPT | Performed by: FAMILY MEDICINE

## 2022-09-22 RX ORDER — BUSPIRONE HYDROCHLORIDE 15 MG/1
7.5 TABLET ORAL 2 TIMES DAILY
Qty: 30 TABLET | Refills: 1 | Status: SHIPPED | OUTPATIENT
Start: 2022-09-22 | End: 2022-10-17 | Stop reason: SDUPTHER

## 2022-09-22 RX ORDER — ZOLPIDEM TARTRATE 12.5 MG/1
12.5 TABLET, FILM COATED, EXTENDED RELEASE ORAL NIGHTLY PRN
Qty: 30 TABLET | Refills: 0 | Status: SHIPPED | OUTPATIENT
Start: 2022-09-22 | End: 2022-10-04 | Stop reason: SDUPTHER

## 2022-09-22 NOTE — PROGRESS NOTES
"Chief Complaint  Anxiety and Insomnia (Sleep medicine isn't helping )    Subjective        Sebastien Tang presents to [facility] PRIMARY CARE  History of Present Illness  People at work dying from COVID recently, had x vaccines already, Anxiety and Depression x 2 weeks, 12 people , no meds before , no fxhx of anxiety/depression, also panic attacks, uneasy fast and not sleeping, tried lexapro and ativan, wants to wait on Booster on COVID vaccine  Objective   Vital Signs:  /80 (BP Location: Right arm, Patient Position: Sitting)   Pulse 57   Temp 97.8 °F (36.6 °C)   Ht 167.6 cm (65.98\")   Wt 87.7 kg (193 lb 6.4 oz)   SpO2 97%   BMI 31.23 kg/m²   Estimated body mass index is 31.23 kg/m² as calculated from the following:    Height as of this encounter: 167.6 cm (65.98\").    Weight as of this encounter: 87.7 kg (193 lb 6.4 oz).          Physical Exam  Vitals and nursing note reviewed.   Constitutional:       General: He is not in acute distress.     Appearance: Normal appearance. He is well-developed. He is not ill-appearing, toxic-appearing or diaphoretic.   HENT:      Head: Normocephalic and atraumatic.      Right Ear: Tympanic membrane, ear canal and external ear normal. There is no impacted cerumen.      Left Ear: Tympanic membrane, ear canal and external ear normal. There is no impacted cerumen.      Nose: Nose normal. No congestion or rhinorrhea.      Mouth/Throat:      Mouth: Mucous membranes are moist.      Pharynx: Oropharynx is clear. No oropharyngeal exudate or posterior oropharyngeal erythema.   Eyes:      General: No scleral icterus.        Right eye: No discharge.         Left eye: No discharge.      Extraocular Movements: Extraocular movements intact.      Conjunctiva/sclera: Conjunctivae normal.      Pupils: Pupils are equal, round, and reactive to light.   Neck:      Thyroid: No thyromegaly.      Vascular: No carotid bruit or JVD.      Trachea: No tracheal deviation. "   Cardiovascular:      Rate and Rhythm: Normal rate and regular rhythm.      Heart sounds: Normal heart sounds. No murmur heard.    No friction rub. No gallop.   Pulmonary:      Effort: Pulmonary effort is normal. No respiratory distress.      Breath sounds: Normal breath sounds. No stridor. No wheezing, rhonchi or rales.   Chest:      Chest wall: No tenderness.   Abdominal:      General: Bowel sounds are normal. There is no distension.      Palpations: Abdomen is soft. There is no mass.      Tenderness: There is no abdominal tenderness. There is no right CVA tenderness, left CVA tenderness, guarding or rebound.      Hernia: No hernia is present.   Musculoskeletal:         General: Normal range of motion.      Cervical back: Normal range of motion and neck supple. No rigidity or tenderness.      Right lower leg: No edema.      Left lower leg: No edema.   Lymphadenopathy:      Cervical: No cervical adenopathy.   Skin:     General: Skin is warm and dry.      Coloration: Skin is not jaundiced.   Neurological:      General: No focal deficit present.      Mental Status: He is alert and oriented to person, place, and time. Mental status is at baseline.      Sensory: No sensory deficit.      Motor: No weakness or abnormal muscle tone.      Coordination: Coordination normal.      Gait: Gait normal.      Deep Tendon Reflexes: Reflexes normal.   Psychiatric:         Mood and Affect: Mood normal.         Behavior: Behavior normal.         Thought Content: Thought content normal.         Judgment: Judgment normal.        Result Review :                Assessment and Plan   Diagnoses and all orders for this visit:    1. Anxiety (Primary)  -     busPIRone (BUSPAR) 15 MG tablet; Take 0.5 tablets by mouth 2 (Two) Times a Day for 180 days.  Dispense: 30 tablet; Refill: 1    2. Insomnia, unspecified type  -     zolpidem CR (Ambien CR) 12.5 MG CR tablet; Take 1 tablet by mouth At Night As Needed for Sleep.  Dispense: 30 tablet;  Refill: 0    3. Anemia, unspecified type  -     CBC & Differential  -     Vitamin B12  -     Folate RBC  -     Iron and TIBC  -     Ferritin  -     PTH, Intact & Calcium    4. Hypercalcemia  -     CBC & Differential  -     Vitamin B12  -     Folate RBC  -     Iron and TIBC  -     Ferritin  -     PTH, Intact & Calcium    declined Psych, does not want SSRI         Follow Up   Return in about 4 weeks (around 10/20/2022).  Patient was given instructions and counseling regarding his condition or for health maintenance advice. Please see specific information pulled into the AVS if appropriate.

## 2022-09-26 DIAGNOSIS — D50.9 IRON DEFICIENCY ANEMIA, UNSPECIFIED IRON DEFICIENCY ANEMIA TYPE: Primary | ICD-10-CM

## 2022-09-26 LAB
BASOPHILS # BLD AUTO: 0.03 10*3/MM3 (ref 0–0.2)
BASOPHILS NFR BLD AUTO: 0.4 % (ref 0–1.5)
CALCIUM SERPL-MCNC: 10.1 MG/DL (ref 8.6–10.2)
EOSINOPHIL # BLD AUTO: 0.18 10*3/MM3 (ref 0–0.4)
EOSINOPHIL NFR BLD AUTO: 2.5 % (ref 0.3–6.2)
ERYTHROCYTE [DISTWIDTH] IN BLOOD BY AUTOMATED COUNT: 13.5 % (ref 12.3–15.4)
FERRITIN SERPL-MCNC: 31.7 NG/ML (ref 30–400)
FOLATE BLD-MCNC: 421 NG/ML
FOLATE RBC-MCNC: 1141 NG/ML
HCT VFR BLD AUTO: 36.9 % (ref 37.5–51)
HGB BLD-MCNC: 11.7 G/DL (ref 13–17.7)
IMM GRANULOCYTES # BLD AUTO: 0.03 10*3/MM3 (ref 0–0.05)
IMM GRANULOCYTES NFR BLD AUTO: 0.4 % (ref 0–0.5)
INTACT PTH: NORMAL
IRON SATN MFR SERPL: 7 % (ref 20–50)
IRON SERPL-MCNC: 38 MCG/DL (ref 59–158)
LYMPHOCYTES # BLD AUTO: 1.16 10*3/MM3 (ref 0.7–3.1)
LYMPHOCYTES NFR BLD AUTO: 16.3 % (ref 19.6–45.3)
MCH RBC QN AUTO: 27.4 PG (ref 26.6–33)
MCHC RBC AUTO-ENTMCNC: 31.7 G/DL (ref 31.5–35.7)
MCV RBC AUTO: 86.4 FL (ref 79–97)
MONOCYTES # BLD AUTO: 0.66 10*3/MM3 (ref 0.1–0.9)
MONOCYTES NFR BLD AUTO: 9.3 % (ref 5–12)
NEUTROPHILS # BLD AUTO: 5.06 10*3/MM3 (ref 1.7–7)
NEUTROPHILS NFR BLD AUTO: 71.1 % (ref 42.7–76)
NRBC BLD AUTO-RTO: 0 /100 WBC (ref 0–0.2)
PLATELET # BLD AUTO: 440 10*3/MM3 (ref 140–450)
PTH-INTACT SERPL-MCNC: 35 PG/ML (ref 15–65)
RBC # BLD AUTO: 4.27 10*6/MM3 (ref 4.14–5.8)
TIBC SERPL-MCNC: 569 MCG/DL
UIBC SERPL-MCNC: 531 MCG/DL (ref 112–346)
VIT B12 SERPL-MCNC: 482 PG/ML (ref 211–946)
WBC # BLD AUTO: 7.12 10*3/MM3 (ref 3.4–10.8)

## 2022-09-26 RX ORDER — FERROUS SULFATE 325(65) MG
325 TABLET ORAL 2 TIMES DAILY
Qty: 60 TABLET | Refills: 1 | Status: SHIPPED | OUTPATIENT
Start: 2022-09-26 | End: 2022-10-04 | Stop reason: SDUPTHER

## 2022-10-03 DIAGNOSIS — Z79.899 HIGH RISK MEDICATION USE: ICD-10-CM

## 2022-10-03 RX ORDER — POTASSIUM CHLORIDE 20 MEQ/1
20 TABLET, EXTENDED RELEASE ORAL DAILY
Qty: 14 TABLET | Refills: 0 | Status: SHIPPED | OUTPATIENT
Start: 2022-10-03 | End: 2022-10-31

## 2022-10-03 NOTE — TELEPHONE ENCOUNTER
Rx Refill Note  Requested Prescriptions     Pending Prescriptions Disp Refills   • potassium chloride (K-DUR,KLOR-CON) 20 MEQ CR tablet 14 tablet 0     Sig: Take 1 tablet by mouth Daily.      Last office visit with prescribing clinician: 9/22/2022      Next office visit with prescribing clinician: Visit date not found

## 2022-10-04 DIAGNOSIS — D50.9 IRON DEFICIENCY ANEMIA, UNSPECIFIED IRON DEFICIENCY ANEMIA TYPE: ICD-10-CM

## 2022-10-04 DIAGNOSIS — G47.00 INSOMNIA, UNSPECIFIED TYPE: ICD-10-CM

## 2022-10-04 RX ORDER — ZOLPIDEM TARTRATE 12.5 MG/1
12.5 TABLET, FILM COATED, EXTENDED RELEASE ORAL NIGHTLY PRN
Qty: 30 TABLET | Refills: 0 | Status: SHIPPED | OUTPATIENT
Start: 2022-10-04 | End: 2022-10-31 | Stop reason: SDUPTHER

## 2022-10-04 RX ORDER — FERROUS SULFATE 325(65) MG
325 TABLET ORAL 2 TIMES DAILY
Qty: 60 TABLET | Refills: 1 | Status: SHIPPED | OUTPATIENT
Start: 2022-10-04 | End: 2023-01-19

## 2022-10-04 NOTE — TELEPHONE ENCOUNTER
Caller: Clyde Sebastien KRYSTAL    Relationship: Self    Best call back number: [phone]    Requested Prescriptions:   Requested Prescriptions     Pending Prescriptions Disp Refills   • zolpidem CR (Ambien CR) 12.5 MG CR tablet 30 tablet 0     Sig: Take 1 tablet by mouth At Night As Needed for Sleep.        Pharmacy where request should be sent: [facility] #[id number] [place] [address] [address] AT [facility] - [phone] [facility] [phone]      Additional details provided by patient: PATIENT DROPPED PILL BOTTLE IN SINK, MOST OF THE PILLS WENT DOWN THE SINK. ASKING FOR REFILL, ONLY HAS 5 OR 6 PILLS LEFT     Does the patient have less than a 3 day supply:  [] Yes  [] No    Kelli Gaspar Rep   10/04/22 11:22 EDT

## 2022-10-05 ENCOUNTER — TELEPHONE (OUTPATIENT)
Dept: FAMILY MEDICINE CLINIC | Facility: CLINIC | Age: 62
End: 2022-10-05

## 2022-10-06 ENCOUNTER — OFFICE VISIT (OUTPATIENT)
Dept: FAMILY MEDICINE CLINIC | Facility: CLINIC | Age: 62
End: 2022-10-06

## 2022-10-06 VITALS
TEMPERATURE: 98.2 F | HEART RATE: 66 BPM | DIASTOLIC BLOOD PRESSURE: 62 MMHG | SYSTOLIC BLOOD PRESSURE: 126 MMHG | WEIGHT: 196.4 LBS | OXYGEN SATURATION: 96 % | HEIGHT: 66 IN | BODY MASS INDEX: 31.57 KG/M2

## 2022-10-06 DIAGNOSIS — M25.50 ARTHRALGIA, UNSPECIFIED JOINT: Primary | ICD-10-CM

## 2022-10-06 PROCEDURE — 99213 OFFICE O/P EST LOW 20 MIN: CPT | Performed by: FAMILY MEDICINE

## 2022-10-06 PROCEDURE — 96372 THER/PROPH/DIAG INJ SC/IM: CPT | Performed by: FAMILY MEDICINE

## 2022-10-06 RX ORDER — METHYLPREDNISOLONE SODIUM SUCCINATE 40 MG/ML
40 INJECTION, POWDER, LYOPHILIZED, FOR SOLUTION INTRAMUSCULAR; INTRAVENOUS ONCE
Status: COMPLETED | OUTPATIENT
Start: 2022-10-06 | End: 2022-10-06

## 2022-10-06 RX ADMIN — METHYLPREDNISOLONE SODIUM SUCCINATE 40 MG: 40 INJECTION, POWDER, LYOPHILIZED, FOR SOLUTION INTRAMUSCULAR; INTRAVENOUS at 14:08

## 2022-10-06 NOTE — PROGRESS NOTES
"Chief Complaint  Pain (From knee both to feet and then shoulders and back )    Subjective        Sebastien Tang presents to [facility] PRIMARY [facility]  History of Present Illness  Bilateral knee pain goes down to both ankle and left shoulder pain, on/off x a while,  Swollen sometimes, no arthritis in family worst with rain, patient can no use anti-inflammatory medication due to blood thinner, based on the history and presentation appears to be arthritis, but he also has varicose veins on both lower extremities where he has the pain  Objective   Vital Signs:  /62 (BP Location: Left arm, Patient Position: Sitting)   Pulse 66   Temp 98.2 °F (36.8 °C)   Ht 167.6 cm (65.98\")   Wt 89.1 kg (196 lb 6.4 oz)   SpO2 96%   BMI 31.72 kg/m²   Estimated body mass index is 31.72 kg/m² as calculated from the following:    Height as of this encounter: 167.6 cm (65.98\").    Weight as of this encounter: 89.1 kg (196 lb 6.4 oz).          Physical Exam  Vitals and nursing note reviewed.   Constitutional:       General: He is not in acute distress.     Appearance: Normal appearance. He is well-developed. He is not ill-appearing, toxic-appearing or diaphoretic.   HENT:      Head: Normocephalic and atraumatic.      Right Ear: Tympanic membrane, ear canal and external ear normal. There is no impacted cerumen.      Left Ear: Tympanic membrane, ear canal and external ear normal. There is no impacted cerumen.      Nose: Nose normal. No congestion or rhinorrhea.      Mouth/Throat:      Mouth: Mucous membranes are moist.      Pharynx: Oropharynx is clear. No oropharyngeal exudate or posterior oropharyngeal erythema.   Eyes:      General: No scleral icterus.        Right eye: No discharge.         Left eye: No discharge.      Extraocular Movements: Extraocular movements intact.      Conjunctiva/sclera: Conjunctivae normal.      Pupils: Pupils are equal, round, and reactive to light.   Neck:      Thyroid: No thyromegaly. "      Vascular: No carotid bruit or JVD.      Trachea: No tracheal deviation.   Cardiovascular:      Rate and Rhythm: Normal rate and regular rhythm.      Heart sounds: Normal heart sounds. No murmur heard.    No friction rub. No gallop.      Comments: Positive varicose veins on both lower extremities  Pulmonary:      Effort: Pulmonary effort is normal. No respiratory distress.      Breath sounds: Normal breath sounds. No stridor. No wheezing, rhonchi or rales.   Chest:      Chest wall: No tenderness.   Abdominal:      General: Bowel sounds are normal. There is no distension.      Palpations: Abdomen is soft. There is no mass.      Tenderness: There is no abdominal tenderness. There is no right CVA tenderness, left CVA tenderness, guarding or rebound.      Hernia: No hernia is present.   Musculoskeletal:         General: Tenderness present. No signs of injury. Normal range of motion.      Cervical back: Normal range of motion and neck supple. No rigidity or tenderness.      Right lower leg: No edema.      Left lower leg: No edema.      Comments: Tenderness on the right knee medially especially with flexion/ extension of the right knee, examination of the left knee is benign, left shoulder is slightly tender on the AC joint, normal range of motion of left upper extremity, normal spine exam   Lymphadenopathy:      Cervical: No cervical adenopathy.   Skin:     General: Skin is warm and dry.      Coloration: Skin is not jaundiced.   Neurological:      General: No focal deficit present.      Mental Status: He is alert and oriented to person, place, and time. Mental status is at baseline.      Sensory: No sensory deficit.      Motor: No weakness or abnormal muscle tone.      Coordination: Coordination normal.      Gait: Gait normal.      Deep Tendon Reflexes: Reflexes normal.      Comments: Left first toe is numb   Psychiatric:         Mood and Affect: Mood normal.         Behavior: Behavior normal.         Thought  Content: Thought content normal.         Judgment: Judgment normal.        Result Review :                Assessment and Plan   Diagnoses and all orders for this visit:    1. Arthralgia, unspecified joint (Primary)  -     DEAN  -     Rheumatoid Factor  -     Uric Acid  -     Sedimentation Rate  -     methylPREDNISolone sodium succinate (SOLU-Medrol) injection 40 mg    Side effects discussed with patient in detail, all including but not limited to every single topic discussed   Discussed patient risk of taking steroids include but not limited to weight gain, upset stomach, etc.         Follow Up   No follow-ups on file.  Patient was given instructions and counseling regarding his condition or for health maintenance advice. Please see specific information pulled into the AVS if appropriate.       Answers for HPI/ROS submitted by the patient on 10/4/2022  What is the primary reason for your visit?: Other  Please describe your symptoms.: Pain on left and right  shoulder s and pain and aching  on both  knees  along  with  left foot  aching  Have you had these symptoms before?: Yes  How long have you been having these symptoms?: Greater than 2 weeks  Please list any medications you are currently taking for this condition.: None  Please describe any probable cause for these symptoms. : Arthritis?

## 2022-10-07 LAB
ANA SER QL: NEGATIVE
ERYTHROCYTE [SEDIMENTATION RATE] IN BLOOD BY WESTERGREN METHOD: 41 MM/HR (ref 0–20)
RHEUMATOID FACT SERPL-ACNC: <10 IU/ML
URATE SERPL-MCNC: 6.1 MG/DL (ref 3.4–7)

## 2022-10-12 PROCEDURE — 93296 REM INTERROG EVL PM/IDS: CPT | Performed by: INTERNAL MEDICINE

## 2022-10-12 PROCEDURE — 93295 DEV INTERROG REMOTE 1/2/MLT: CPT | Performed by: INTERNAL MEDICINE

## 2022-10-13 DIAGNOSIS — I48.91 ATRIAL FIBRILLATION, UNSPECIFIED TYPE: ICD-10-CM

## 2022-10-17 DIAGNOSIS — F41.9 ANXIETY: ICD-10-CM

## 2022-10-18 RX ORDER — BUSPIRONE HYDROCHLORIDE 15 MG/1
7.5 TABLET ORAL 2 TIMES DAILY
Qty: 30 TABLET | Refills: 1 | Status: SHIPPED | OUTPATIENT
Start: 2022-10-18 | End: 2022-10-24

## 2022-10-20 ENCOUNTER — OFFICE VISIT (OUTPATIENT)
Dept: FAMILY MEDICINE CLINIC | Facility: CLINIC | Age: 62
End: 2022-10-20

## 2022-10-20 ENCOUNTER — TELEPHONE (OUTPATIENT)
Dept: FAMILY MEDICINE CLINIC | Facility: CLINIC | Age: 62
End: 2022-10-20

## 2022-10-20 VITALS
DIASTOLIC BLOOD PRESSURE: 78 MMHG | SYSTOLIC BLOOD PRESSURE: 130 MMHG | OXYGEN SATURATION: 97 % | WEIGHT: 194 LBS | TEMPERATURE: 97.5 F | HEART RATE: 75 BPM | BODY MASS INDEX: 31.18 KG/M2 | HEIGHT: 66 IN

## 2022-10-20 DIAGNOSIS — G47.00 INSOMNIA, UNSPECIFIED TYPE: Primary | ICD-10-CM

## 2022-10-20 DIAGNOSIS — D50.9 IRON DEFICIENCY ANEMIA, UNSPECIFIED IRON DEFICIENCY ANEMIA TYPE: ICD-10-CM

## 2022-10-20 DIAGNOSIS — G47.00 INSOMNIA, UNSPECIFIED TYPE: ICD-10-CM

## 2022-10-20 PROCEDURE — 99213 OFFICE O/P EST LOW 20 MIN: CPT | Performed by: FAMILY MEDICINE

## 2022-10-20 RX ORDER — TRAZODONE HYDROCHLORIDE 50 MG/1
25 TABLET ORAL NIGHTLY
Qty: 90 TABLET | Refills: 3 | Status: SHIPPED | OUTPATIENT
Start: 2022-10-20 | End: 2022-10-20

## 2022-10-20 NOTE — PROGRESS NOTES
"Chief Complaint  Insomnia (Due to medication)    Subjective        Sebastien Tang presents to [facility] PRIMARY [facility]  History of Present Illness  Sleeps x 4 hours goes to sleep at midnight, no caffeine, tried many meds, no Depression, not suicidal/homicidal ideation, no ETOH, no smoking  Objective   Vital Signs:  /78 (BP Location: Right arm, Patient Position: Sitting)   Pulse 75   Temp 97.5 °F (36.4 °C) (Temporal)   Ht 167.6 cm (65.98\")   Wt 88 kg (194 lb)   SpO2 97%   BMI 31.33 kg/m²   Estimated body mass index is 31.33 kg/m² as calculated from the following:    Height as of this encounter: 167.6 cm (65.98\").    Weight as of this encounter: 88 kg (194 lb).          Physical Exam  Vitals and nursing note reviewed.   Constitutional:       General: He is not in acute distress.     Appearance: Normal appearance. He is well-developed. He is not ill-appearing, toxic-appearing or diaphoretic.   HENT:      Head: Normocephalic and atraumatic.      Right Ear: Tympanic membrane, ear canal and external ear normal. There is no impacted cerumen.      Left Ear: Tympanic membrane, ear canal and external ear normal. There is no impacted cerumen.      Nose: Nose normal. No congestion or rhinorrhea.      Mouth/Throat:      Mouth: Mucous membranes are moist.      Pharynx: Oropharynx is clear. No oropharyngeal exudate or posterior oropharyngeal erythema.   Eyes:      General: No scleral icterus.        Right eye: No discharge.         Left eye: No discharge.      Extraocular Movements: Extraocular movements intact.      Conjunctiva/sclera: Conjunctivae normal.      Pupils: Pupils are equal, round, and reactive to light.   Neck:      Thyroid: No thyromegaly.      Vascular: No carotid bruit or JVD.      Trachea: No tracheal deviation.   Cardiovascular:      Rate and Rhythm: Normal rate and regular rhythm.      Heart sounds: Normal heart sounds. No murmur heard.    No friction rub. No gallop.   Pulmonary:     "  Effort: Pulmonary effort is normal. No respiratory distress.      Breath sounds: Normal breath sounds. No stridor. No wheezing, rhonchi or rales.   Chest:      Chest wall: No tenderness.   Abdominal:      General: Bowel sounds are normal. There is no distension.      Palpations: Abdomen is soft. There is no mass.      Tenderness: There is no abdominal tenderness. There is no right CVA tenderness, left CVA tenderness, guarding or rebound.      Hernia: No hernia is present.   Musculoskeletal:         General: Normal range of motion.      Cervical back: Normal range of motion and neck supple. No rigidity or tenderness.      Right lower leg: No edema.      Left lower leg: No edema.   Lymphadenopathy:      Cervical: No cervical adenopathy.   Skin:     General: Skin is warm and dry.      Coloration: Skin is not jaundiced.   Neurological:      General: No focal deficit present.      Mental Status: He is alert and oriented to person, place, and time. Mental status is at baseline.      Sensory: No sensory deficit.      Motor: No weakness or abnormal muscle tone.      Coordination: Coordination normal.      Gait: Gait normal.      Deep Tendon Reflexes: Reflexes normal.   Psychiatric:         Mood and Affect: Mood normal.         Behavior: Behavior normal.         Thought Content: Thought content normal.         Judgment: Judgment normal.        Result Review :                Assessment and Plan   Diagnoses and all orders for this visit:    1. Insomnia, unspecified type (Primary)  -     Ambulatory Referral to Sleep Medicine  -     Discontinue: traZODone (DESYREL) 50 MG tablet; Take 0.5 tablets by mouth Every Night for 180 days.  Dispense: 90 tablet; Refill: 3    2. Iron deficiency anemia, unspecified iron deficiency anemia type  -     CBC & Differential  -     Iron and TIBC  -     Ferritin      Discussed with patient risk of serotonin syndrome with BuSpar and trazodone, patient aware about possible side effects, also  discussed with patient risk of bleeding with Eliquis and trazodone  Later on I did  call pharmacy to cancel trazodone, I did call patient also but no answer, no voicemail, I was concerned about trazodone in Eliquis interaction       Follow Up   Return in about 3 months (around 1/20/2023).  Patient was given instructions and counseling regarding his condition or for health maintenance advice. Please see specific information pulled into the AVS if appropriate.

## 2022-10-21 LAB
BASOPHILS # BLD AUTO: 0.04 10*3/MM3 (ref 0–0.2)
BASOPHILS NFR BLD AUTO: 0.4 % (ref 0–1.5)
EOSINOPHIL # BLD AUTO: 0.1 10*3/MM3 (ref 0–0.4)
EOSINOPHIL NFR BLD AUTO: 1.1 % (ref 0.3–6.2)
ERYTHROCYTE [DISTWIDTH] IN BLOOD BY AUTOMATED COUNT: 14.6 % (ref 12.3–15.4)
FERRITIN SERPL-MCNC: 39.9 NG/ML (ref 30–400)
HCT VFR BLD AUTO: 37.7 % (ref 37.5–51)
HGB BLD-MCNC: 12.3 G/DL (ref 13–17.7)
IMM GRANULOCYTES # BLD AUTO: 0.05 10*3/MM3 (ref 0–0.05)
IMM GRANULOCYTES NFR BLD AUTO: 0.5 % (ref 0–0.5)
IRON SATN MFR SERPL: 9 % (ref 20–50)
IRON SERPL-MCNC: 56 MCG/DL (ref 59–158)
LYMPHOCYTES # BLD AUTO: 1.15 10*3/MM3 (ref 0.7–3.1)
LYMPHOCYTES NFR BLD AUTO: 12.4 % (ref 19.6–45.3)
MCH RBC QN AUTO: 28 PG (ref 26.6–33)
MCHC RBC AUTO-ENTMCNC: 32.6 G/DL (ref 31.5–35.7)
MCV RBC AUTO: 85.7 FL (ref 79–97)
MONOCYTES # BLD AUTO: 0.66 10*3/MM3 (ref 0.1–0.9)
MONOCYTES NFR BLD AUTO: 7.1 % (ref 5–12)
NEUTROPHILS # BLD AUTO: 7.24 10*3/MM3 (ref 1.7–7)
NEUTROPHILS NFR BLD AUTO: 78.5 % (ref 42.7–76)
NRBC BLD AUTO-RTO: 0 /100 WBC (ref 0–0.2)
PLATELET # BLD AUTO: 416 10*3/MM3 (ref 140–450)
RBC # BLD AUTO: 4.4 10*6/MM3 (ref 4.14–5.8)
TIBC SERPL-MCNC: 645 MCG/DL
UIBC SERPL-MCNC: 589 MCG/DL (ref 112–346)
WBC # BLD AUTO: 9.24 10*3/MM3 (ref 3.4–10.8)

## 2022-10-21 NOTE — PROGRESS NOTES
I did call patient again today to let him know that his trazodone has been cancelled ,  to try a different medication, no answer, no voicemail

## 2022-10-24 ENCOUNTER — TELEPHONE (OUTPATIENT)
Dept: FAMILY MEDICINE CLINIC | Facility: CLINIC | Age: 62
End: 2022-10-24

## 2022-10-24 DIAGNOSIS — F41.9 ANXIETY: Primary | ICD-10-CM

## 2022-10-24 DIAGNOSIS — F51.01 PRIMARY INSOMNIA: ICD-10-CM

## 2022-10-24 DIAGNOSIS — F41.9 ANXIETY: ICD-10-CM

## 2022-10-24 RX ORDER — DOXEPIN HYDROCHLORIDE 6 MG/1
6 TABLET ORAL
Qty: 30 TABLET | Refills: 2 | Status: SHIPPED | OUTPATIENT
Start: 2022-10-24 | End: 2022-12-13

## 2022-10-24 RX ORDER — DOXEPIN HYDROCHLORIDE 6 MG/1
6 TABLET ORAL
Qty: 30 TABLET | Refills: 2 | Status: SHIPPED | OUTPATIENT
Start: 2022-10-24 | End: 2022-10-24 | Stop reason: SDUPTHER

## 2022-10-24 RX ORDER — DOXEPIN HYDROCHLORIDE 150 MG/1
150 CAPSULE ORAL NIGHTLY
Qty: 30 CAPSULE | Refills: 1 | Status: SHIPPED | OUTPATIENT
Start: 2022-10-24 | End: 2022-10-24

## 2022-10-25 ENCOUNTER — TELEPHONE (OUTPATIENT)
Dept: FAMILY MEDICINE CLINIC | Facility: CLINIC | Age: 62
End: 2022-10-25

## 2022-10-28 NOTE — TELEPHONE ENCOUNTER
PATIENT STATES DR. MULLEN PRESCRIBED HIM   Doxepin HCl 6 MG tablet     HOWEVER HIS [facility] ON [facility] DOESN'T HAVE ANY IN STOCK AND RARELY WILL. HE FOUND ONE OTHER [facility] ON [facility] THAT HAS THE 2MG,4MG,AND 8MG BUT THEY CAN NOT TRANSFER IT SINCE THE DOSAGE NEEDS CHANGED     SO PLEASE HAVE DR. MULLEN SEND A NEW SCRIPT FOR EITHER 4MG OR 8MG TO NEW [facility] LISTED BELOW..      THEY ARE CHARGING $141 DOLLARS FOR IT, SO IF THERE IS A COUPON OR SOMETHING PLEASE GIVE IT TO HIM. HE WILL PAY FOR IT IF HE HAS TO JUST WANTED TO CHECK.     [facility] #[id number] - [place], [place] - [address] AT [facility] [address] [facility] - [phone] [facility] [phone]   [phone]     MESSAGE HIM ON MYCHART IF NEEDED DOESN'T PHONE REALLY.   
Sent [employer] message per patient request since he didn't answer the phone  
Normal rate, regular rhythm, normal S1, S2 heart sounds heard.
Olegario Weir)  NeonatalPerinatal Medicine; Pediatrics  [address] [place] [address]  Phone: [phone]  Fax: [phone]    Kalyn Pierson)  EEGEpilepsy; Pediatric Neurology  [address] [place] [address], [address]  [place] [address]  Phone: [phone]  Fax: [phone]

## 2022-10-31 DIAGNOSIS — Z79.899 HIGH RISK MEDICATION USE: ICD-10-CM

## 2022-10-31 DIAGNOSIS — G47.00 INSOMNIA, UNSPECIFIED TYPE: ICD-10-CM

## 2022-10-31 RX ORDER — ZOLPIDEM TARTRATE 12.5 MG/1
12.5 TABLET, FILM COATED, EXTENDED RELEASE ORAL NIGHTLY PRN
Qty: 30 TABLET | Refills: 0 | Status: SHIPPED | OUTPATIENT
Start: 2022-10-31 | End: 2022-11-28 | Stop reason: SDUPTHER

## 2022-10-31 RX ORDER — POTASSIUM CHLORIDE 20 MEQ/1
20 TABLET, EXTENDED RELEASE ORAL DAILY
Qty: 14 TABLET | Refills: 0 | Status: SHIPPED | OUTPATIENT
Start: 2022-10-31 | End: 2022-12-06

## 2022-10-31 NOTE — TELEPHONE ENCOUNTER
Rx Refill Note  Requested Prescriptions     Pending Prescriptions Disp Refills   • zolpidem CR (Ambien CR) 12.5 MG CR tablet 30 tablet 0     Sig: Take 1 tablet by mouth At Night As Needed for Sleep.      Last office visit with prescribing clinician: 10/20/2022      Next office visit with prescribing clinician: Visit date not found

## 2022-10-31 NOTE — TELEPHONE ENCOUNTER
Rx Refill Note  Requested Prescriptions     Pending Prescriptions Disp Refills   • potassium chloride (K-DUR,KLOR-CON) 20 MEQ CR tablet [Pharmacy Med Name: POTASSIUM CL 20MEQ ER TABLETS] 14 tablet 0     Sig: TAKE 1 TABLET BY MOUTH DAILY      Last office visit with prescribing clinician: 10/20/2022      Next office visit with prescribing clinician: 10/31/2022            Aimee Ott MA  10/31/22, 15:32 EDT

## 2022-11-13 DIAGNOSIS — I48.91 ATRIAL FIBRILLATION, UNSPECIFIED TYPE: ICD-10-CM

## 2022-11-14 RX ORDER — LISINOPRIL 10 MG/1
10 TABLET ORAL DAILY
Qty: 90 TABLET | Refills: 3 | Status: SHIPPED | OUTPATIENT
Start: 2022-11-14

## 2022-11-25 ENCOUNTER — APPOINTMENT (OUTPATIENT)
Dept: CARDIOLOGY | Facility: HOSPITAL | Age: 62
End: 2022-11-25

## 2022-11-25 ENCOUNTER — APPOINTMENT (OUTPATIENT)
Dept: GENERAL RADIOLOGY | Facility: HOSPITAL | Age: 62
End: 2022-11-25

## 2022-11-25 ENCOUNTER — HOSPITAL ENCOUNTER (EMERGENCY)
Facility: HOSPITAL | Age: 62
Discharge: HOME | End: 2022-11-25
Attending: EMERGENCY MEDICINE | Admitting: EMERGENCY MEDICINE

## 2022-11-25 VITALS
HEART RATE: 77 BPM | WEIGHT: 193 LBS | HEIGHT: 66 IN | BODY MASS INDEX: 31.02 KG/M2 | SYSTOLIC BLOOD PRESSURE: 116 MMHG | DIASTOLIC BLOOD PRESSURE: 73 MMHG | RESPIRATION RATE: 18 BRPM | TEMPERATURE: 97.6 F | OXYGEN SATURATION: 98 %

## 2022-11-25 DIAGNOSIS — R68.89 FLU-LIKE SYMPTOMS: Primary | ICD-10-CM

## 2022-11-25 DIAGNOSIS — Z79.01 CHRONIC ANTICOAGULATION: ICD-10-CM

## 2022-11-25 DIAGNOSIS — R04.0 EPISTAXIS: ICD-10-CM

## 2022-11-25 DIAGNOSIS — M79.10 MYALGIA: ICD-10-CM

## 2022-11-25 DIAGNOSIS — B35.4 TINEA CORPORIS: ICD-10-CM

## 2022-11-25 LAB
ALBUMIN SERPL-MCNC: 4.3 G/DL (ref 3.5–5.2)
ALBUMIN/GLOB SERPL: 1.7 G/DL
ALP SERPL-CCNC: 66 U/L (ref 39–117)
ALT SERPL W P-5'-P-CCNC: 29 U/L (ref 1–41)
ANION GAP SERPL CALCULATED.3IONS-SCNC: 10.9 MMOL/L (ref 5–15)
AST SERPL-CCNC: 26 U/L (ref 1–40)
BASOPHILS # BLD AUTO: 0.06 10*3/MM3 (ref 0–0.2)
BASOPHILS NFR BLD AUTO: 0.8 % (ref 0–1.5)
BH CV LOWER VASCULAR LEFT COMMON FEMORAL AUGMENT: NORMAL
BH CV LOWER VASCULAR LEFT COMMON FEMORAL COMPETENT: NORMAL
BH CV LOWER VASCULAR LEFT COMMON FEMORAL COMPRESS: NORMAL
BH CV LOWER VASCULAR LEFT COMMON FEMORAL PHASIC: NORMAL
BH CV LOWER VASCULAR LEFT COMMON FEMORAL SPONT: NORMAL
BH CV LOWER VASCULAR LEFT DISTAL FEMORAL COMPRESS: NORMAL
BH CV LOWER VASCULAR LEFT GASTRONEMIUS COMPRESS: NORMAL
BH CV LOWER VASCULAR LEFT GREATER SAPH AK COMPRESS: NORMAL
BH CV LOWER VASCULAR LEFT GREATER SAPH BK COMPRESS: NORMAL
BH CV LOWER VASCULAR LEFT LESSER SAPH COMPRESS: NORMAL
BH CV LOWER VASCULAR LEFT MID FEMORAL AUGMENT: NORMAL
BH CV LOWER VASCULAR LEFT MID FEMORAL COMPETENT: NORMAL
BH CV LOWER VASCULAR LEFT MID FEMORAL COMPRESS: NORMAL
BH CV LOWER VASCULAR LEFT MID FEMORAL PHASIC: NORMAL
BH CV LOWER VASCULAR LEFT MID FEMORAL SPONT: NORMAL
BH CV LOWER VASCULAR LEFT PERONEAL COMPRESS: NORMAL
BH CV LOWER VASCULAR LEFT POPLITEAL AUGMENT: NORMAL
BH CV LOWER VASCULAR LEFT POPLITEAL COMPETENT: NORMAL
BH CV LOWER VASCULAR LEFT POPLITEAL COMPRESS: NORMAL
BH CV LOWER VASCULAR LEFT POPLITEAL PHASIC: NORMAL
BH CV LOWER VASCULAR LEFT POPLITEAL SPONT: NORMAL
BH CV LOWER VASCULAR LEFT POSTERIOR TIBIAL COMPRESS: NORMAL
BH CV LOWER VASCULAR LEFT PROFUNDA FEMORAL COMPRESS: NORMAL
BH CV LOWER VASCULAR LEFT PROXIMAL FEMORAL COMPRESS: NORMAL
BH CV LOWER VASCULAR LEFT SAPHENOFEMORAL JUNCTION COMPRESS: NORMAL
BH CV LOWER VASCULAR RIGHT COMMON FEMORAL AUGMENT: NORMAL
BH CV LOWER VASCULAR RIGHT COMMON FEMORAL COMPETENT: NORMAL
BH CV LOWER VASCULAR RIGHT COMMON FEMORAL COMPRESS: NORMAL
BH CV LOWER VASCULAR RIGHT COMMON FEMORAL PHASIC: NORMAL
BH CV LOWER VASCULAR RIGHT COMMON FEMORAL SPONT: NORMAL
BILIRUB SERPL-MCNC: 0.3 MG/DL (ref 0–1.2)
BILIRUB UR QL STRIP: NEGATIVE
BUN SERPL-MCNC: 26 MG/DL (ref 8–23)
BUN/CREAT SERPL: 27.4 (ref 7–25)
CALCIUM SPEC-SCNC: 10.4 MG/DL (ref 8.6–10.5)
CHLORIDE SERPL-SCNC: 105 MMOL/L (ref 98–107)
CLARITY UR: CLEAR
CO2 SERPL-SCNC: 24.1 MMOL/L (ref 22–29)
COLOR UR: YELLOW
CREAT SERPL-MCNC: 0.95 MG/DL (ref 0.76–1.27)
DEPRECATED RDW RBC AUTO: 48.7 FL (ref 37–54)
EGFRCR SERPLBLD CKD-EPI 2021: 89.4 ML/MIN/1.73
EOSINOPHIL # BLD AUTO: 0.38 10*3/MM3 (ref 0–0.4)
EOSINOPHIL NFR BLD AUTO: 4.9 % (ref 0.3–6.2)
ERYTHROCYTE [DISTWIDTH] IN BLOOD BY AUTOMATED COUNT: 15.3 % (ref 12.3–15.4)
FLUAV RNA RESP QL NAA+PROBE: NOT DETECTED
FLUBV RNA RESP QL NAA+PROBE: NOT DETECTED
GLOBULIN UR ELPH-MCNC: 2.6 GM/DL
GLUCOSE SERPL-MCNC: 121 MG/DL (ref 65–99)
GLUCOSE UR STRIP-MCNC: NEGATIVE MG/DL
HCT VFR BLD AUTO: 39.3 % (ref 37.5–51)
HGB BLD-MCNC: 12.6 G/DL (ref 13–17.7)
HGB UR QL STRIP.AUTO: NEGATIVE
HOLD SPECIMEN: NORMAL
HOLD SPECIMEN: NORMAL
IMM GRANULOCYTES # BLD AUTO: 0.05 10*3/MM3 (ref 0–0.05)
IMM GRANULOCYTES NFR BLD AUTO: 0.6 % (ref 0–0.5)
KETONES UR QL STRIP: NEGATIVE
LEUKOCYTE ESTERASE UR QL STRIP.AUTO: NEGATIVE
LYMPHOCYTES # BLD AUTO: 1.6 10*3/MM3 (ref 0.7–3.1)
LYMPHOCYTES NFR BLD AUTO: 20.8 % (ref 19.6–45.3)
MAGNESIUM SERPL-MCNC: 2.3 MG/DL (ref 1.6–2.4)
MAXIMAL PREDICTED HEART RATE: 156 BPM
MCH RBC QN AUTO: 28.3 PG (ref 26.6–33)
MCHC RBC AUTO-ENTMCNC: 32.1 G/DL (ref 31.5–35.7)
MCV RBC AUTO: 88.3 FL (ref 79–97)
MONOCYTES # BLD AUTO: 0.85 10*3/MM3 (ref 0.1–0.9)
MONOCYTES NFR BLD AUTO: 11 % (ref 5–12)
NEUTROPHILS NFR BLD AUTO: 4.76 10*3/MM3 (ref 1.7–7)
NEUTROPHILS NFR BLD AUTO: 61.9 % (ref 42.7–76)
NITRITE UR QL STRIP: NEGATIVE
NRBC BLD AUTO-RTO: 0 /100 WBC (ref 0–0.2)
PH UR STRIP.AUTO: 6 [PH] (ref 5–8)
PLATELET # BLD AUTO: 323 10*3/MM3 (ref 140–450)
PMV BLD AUTO: 9.2 FL (ref 6–12)
POTASSIUM SERPL-SCNC: 5.1 MMOL/L (ref 3.5–5.2)
PROT SERPL-MCNC: 6.9 G/DL (ref 6–8.5)
PROT UR QL STRIP: NEGATIVE
QT INTERVAL: 391 MS
RBC # BLD AUTO: 4.45 10*6/MM3 (ref 4.14–5.8)
RSV RNA NPH QL NAA+NON-PROBE: NOT DETECTED
SARS-COV-2 RNA RESP QL NAA+PROBE: NOT DETECTED
SODIUM SERPL-SCNC: 140 MMOL/L (ref 136–145)
SP GR UR STRIP: 1.02 (ref 1–1.03)
STRESS TARGET HR: 133 BPM
TROPONIN T SERPL-MCNC: <0.01 NG/ML (ref 0–0.03)
UROBILINOGEN UR QL STRIP: NORMAL
WBC NRBC COR # BLD: 7.7 10*3/MM3 (ref 3.4–10.8)
WHOLE BLOOD HOLD COAG: NORMAL
WHOLE BLOOD HOLD SPECIMEN: NORMAL

## 2022-11-25 PROCEDURE — 80053 COMPREHEN METABOLIC PANEL: CPT

## 2022-11-25 PROCEDURE — 93005 ELECTROCARDIOGRAM TRACING: CPT | Performed by: EMERGENCY MEDICINE

## 2022-11-25 PROCEDURE — 83735 ASSAY OF MAGNESIUM: CPT

## 2022-11-25 PROCEDURE — 99283 EMERGENCY DEPT VISIT LOW MDM: CPT

## 2022-11-25 PROCEDURE — 85025 COMPLETE CBC W/AUTO DIFF WBC: CPT

## 2022-11-25 PROCEDURE — 84484 ASSAY OF TROPONIN QUANT: CPT

## 2022-11-25 PROCEDURE — 93010 ELECTROCARDIOGRAM REPORT: CPT | Performed by: STUDENT IN AN ORGANIZED HEALTH CARE EDUCATION/TRAINING PROGRAM

## 2022-11-25 PROCEDURE — 36415 COLL VENOUS BLD VENIPUNCTURE: CPT

## 2022-11-25 PROCEDURE — 93971 EXTREMITY STUDY: CPT

## 2022-11-25 PROCEDURE — 71045 X-RAY EXAM CHEST 1 VIEW: CPT

## 2022-11-25 PROCEDURE — 81003 URINALYSIS AUTO W/O SCOPE: CPT

## 2022-11-25 PROCEDURE — 87637 SARSCOV2&INF A&B&RSV AMP PRB: CPT | Performed by: EMERGENCY MEDICINE

## 2022-11-25 RX ORDER — CLOTRIMAZOLE 1 G/ML
SOLUTION TOPICAL 2 TIMES DAILY
Qty: 60 ML | Refills: 0 | Status: SHIPPED | OUTPATIENT
Start: 2022-11-25 | End: 2022-12-23

## 2022-11-25 RX ORDER — SODIUM CHLORIDE 0.9 % (FLUSH) 0.9 %
10 SYRINGE (ML) INJECTION AS NEEDED
Status: DISCONTINUED | OUTPATIENT
Start: 2022-11-25 | End: 2022-11-25 | Stop reason: HOSPADM

## 2022-11-25 RX ADMIN — DICLOFENAC SODIUM 2 G: 10 GEL TOPICAL at 15:07

## 2022-11-28 DIAGNOSIS — G47.00 INSOMNIA, UNSPECIFIED TYPE: ICD-10-CM

## 2022-11-28 RX ORDER — ZOLPIDEM TARTRATE 12.5 MG/1
12.5 TABLET, FILM COATED, EXTENDED RELEASE ORAL NIGHTLY PRN
Qty: 30 TABLET | Refills: 0 | Status: SHIPPED | OUTPATIENT
Start: 2022-11-28 | End: 2022-12-01 | Stop reason: SDUPTHER

## 2022-12-01 ENCOUNTER — TELEPHONE (OUTPATIENT)
Dept: FAMILY MEDICINE CLINIC | Facility: CLINIC | Age: 62
End: 2022-12-01

## 2022-12-01 DIAGNOSIS — G47.00 INSOMNIA, UNSPECIFIED TYPE: ICD-10-CM

## 2022-12-01 RX ORDER — ZOLPIDEM TARTRATE 6.25 MG/1
6.25 TABLET, FILM COATED, EXTENDED RELEASE ORAL NIGHTLY PRN
Qty: 30 TABLET | Refills: 0 | Status: SHIPPED | OUTPATIENT
Start: 2022-12-01 | End: 2022-12-13 | Stop reason: SDUPTHER

## 2022-12-01 NOTE — TELEPHONE ENCOUNTER
Caller: Sebastien Tang    Relationship: Self    Best call back number: [phone]    What medications are you currently taking:   Current Outpatient Medications on File Prior to Visit   Medication Sig Dispense Refill   • albuterol sulfate  (90 Base) MCG/ACT inhaler Inhale 2 puffs Every 6 (Six) Hours As Needed for Wheezing. 18 g 3   • apixaban (Eliquis) 5 MG tablet tablet Take 1 tablet by mouth Every 12 (Twelve) Hours. 60 tablet 3   • aspirin 81 MG EC tablet Take 1 tablet by mouth Daily. 30 tablet 0   • clotrimazole (LOTRIMIN) 1 % external solution Apply  topically to the appropriate area as directed 2 (Two) Times a Day for 28 days. 60 mL 0   • Diclofenac Sodium (VOLTAREN) 1 % gel gel Apply 4 g topically to the appropriate area as directed 2 (Two) Times a Day. 100 g 0   • Doxepin HCl 6 MG tablet Take 1 tablet by mouth every night at bedtime. Stop Buspar 30 tablet 2   • ferrous sulfate (FerrouSul) 325 (65 FE) MG tablet Take 1 tablet by mouth 2 (Two) Times a Day. 60 tablet 1   • finasteride (PROSCAR) 5 MG tablet Take 1 tablet by mouth Daily. 90 tablet 3   • furosemide (LASIX) 40 MG tablet Take 1 tablet by mouth Daily. 90 tablet 1   • lisinopril (PRINIVIL,ZESTRIL) 10 MG tablet Take 1 tablet by mouth Daily. 90 tablet 3   • metoprolol succinate XL (TOPROL-XL) 100 MG 24 hr tablet Take 1 tablet by mouth Every 12 (Twelve) Hours. 120 tablet 5   • potassium chloride (K-DUR,KLOR-CON) 20 MEQ CR tablet TAKE 1 TABLET BY MOUTH DAILY 14 tablet 0   • Symbicort 160-4.5 MCG/ACT inhaler Inhale 2 puffs 2 (Two) Times a Day. 10.2 g 3   • zolpidem CR (Ambien CR) 12.5 MG CR tablet Take 1 tablet by mouth At Night As Needed for Sleep. 30 tablet 0     No current facility-administered medications on file prior to visit.        Which medication are you concerned about: ZOLPIDEM    Who prescribed you this medication: DR GELLER    What are your concerns: PATIENT STARTED THIS MEDICATION LAST NIGHT, IT DID NOT HELP HIM SLEEP AT ALL. HE  TOOK ONE AND WOKE UP AFTER AN HOUR. HE TOOK ANOTHER ONE BUT WOKE BACK UP AGAIN AFTER ANOTHER HOUR.     PLEASE ADVISE ON NEXT STEPS.

## 2022-12-05 DIAGNOSIS — Z79.899 HIGH RISK MEDICATION USE: ICD-10-CM

## 2022-12-06 ENCOUNTER — TELEPHONE (OUTPATIENT)
Dept: FAMILY MEDICINE CLINIC | Facility: CLINIC | Age: 62
End: 2022-12-06

## 2022-12-06 RX ORDER — POTASSIUM CHLORIDE 20 MEQ/1
20 TABLET, EXTENDED RELEASE ORAL DAILY
Qty: 14 TABLET | Refills: 0 | Status: SHIPPED | OUTPATIENT
Start: 2022-12-06 | End: 2023-01-19 | Stop reason: SDUPTHER

## 2022-12-06 NOTE — TELEPHONE ENCOUNTER
Pharmacy Name:  [facility]     Pharmacy representative phone number: [phone]    What medication are you calling in regards to: ZOLPIDEM 6.25 MG    What question does the pharmacy have: CONFIRMING THAT DR. MULLEN APPROVES FILLING THE 6.25 MG CONSIDERING THAT THE PATIENT HAD 12.5 MG PREVIOUSLY THIS MONTH.     Who is the provider that prescribed the medication: DR. DUYEN MULLEN

## 2022-12-06 NOTE — TELEPHONE ENCOUNTER
Rx Refill Note  Requested Prescriptions     Pending Prescriptions Disp Refills   • potassium chloride (K-DUR,KLOR-CON) 20 MEQ CR tablet [Pharmacy Med Name: POTASSIUM CL 20MEQ ER TABLETS] 14 tablet 0     Sig: TAKE 1 TABLET BY MOUTH DAILY      Last office visit with prescribing clinician: 10/20/2022   Last telemedicine visit with prescribing clinician: Visit date not found   Next office visit with prescribing clinician: Visit date not found                         Would you like a call back once the refill request has been completed: [] Yes [] No    If the office needs to give you a call back, can they leave a voicemail: [] Yes [] No    Evelyn Blount MA  12/06/22, 06:46 EST

## 2022-12-06 NOTE — TELEPHONE ENCOUNTER
After 30 minutes I reached pharmacy and was told he did receive Zolpidem CR 12.5 mg but from a different , and pt told her that does not work, he wanted the same one he  got before, but Pharmacy only carries 6.25 mg, so med sent

## 2022-12-06 NOTE — TELEPHONE ENCOUNTER
I called pt because on his previous message says he wanted Zolpidem 6.25 bc pharmacy did not have it, so I sent it so today I get a message saying pharmacy did give him 12.5 this month, already, no answer, no voice mail, I will get a ramesh

## 2022-12-06 NOTE — TELEPHONE ENCOUNTER
I called Pharmacy as requested , on hold x 20 minutes then told Pharmacist stepped out, left my ph number to call me

## 2022-12-12 ENCOUNTER — APPOINTMENT (OUTPATIENT)
Dept: GENERAL RADIOLOGY | Facility: HOSPITAL | Age: 62
End: 2022-12-12

## 2022-12-12 ENCOUNTER — HOSPITAL ENCOUNTER (EMERGENCY)
Facility: HOSPITAL | Age: 62
Discharge: HOME | End: 2022-12-12
Attending: EMERGENCY MEDICINE | Admitting: EMERGENCY MEDICINE

## 2022-12-12 VITALS
HEART RATE: 84 BPM | HEIGHT: 66 IN | BODY MASS INDEX: 31.02 KG/M2 | WEIGHT: 193 LBS | SYSTOLIC BLOOD PRESSURE: 137 MMHG | TEMPERATURE: 97.4 F | DIASTOLIC BLOOD PRESSURE: 83 MMHG | RESPIRATION RATE: 16 BRPM | OXYGEN SATURATION: 98 %

## 2022-12-12 DIAGNOSIS — G47.00 INSOMNIA, UNSPECIFIED TYPE: ICD-10-CM

## 2022-12-12 DIAGNOSIS — R06.02 SHORTNESS OF BREATH: ICD-10-CM

## 2022-12-12 DIAGNOSIS — G47.00 INSOMNIA, UNSPECIFIED TYPE: Primary | ICD-10-CM

## 2022-12-12 DIAGNOSIS — F41.9 ANXIETY: ICD-10-CM

## 2022-12-12 LAB
ALBUMIN SERPL-MCNC: 4.3 G/DL (ref 3.5–5.2)
ALBUMIN/GLOB SERPL: 1.5 G/DL
ALP SERPL-CCNC: 66 U/L (ref 39–117)
ALT SERPL W P-5'-P-CCNC: 25 U/L (ref 1–41)
ANION GAP SERPL CALCULATED.3IONS-SCNC: 9.8 MMOL/L (ref 5–15)
APTT PPP: 34.9 SECONDS (ref 22.7–35.4)
AST SERPL-CCNC: 21 U/L (ref 1–40)
BASOPHILS # BLD AUTO: 0.06 10*3/MM3 (ref 0–0.2)
BASOPHILS NFR BLD AUTO: 0.7 % (ref 0–1.5)
BILIRUB SERPL-MCNC: 0.3 MG/DL (ref 0–1.2)
BUN SERPL-MCNC: 26 MG/DL (ref 8–23)
BUN/CREAT SERPL: 27.4 (ref 7–25)
CALCIUM SPEC-SCNC: 10 MG/DL (ref 8.6–10.5)
CHLORIDE SERPL-SCNC: 105 MMOL/L (ref 98–107)
CO2 SERPL-SCNC: 22.2 MMOL/L (ref 22–29)
CREAT SERPL-MCNC: 0.95 MG/DL (ref 0.76–1.27)
DEPRECATED RDW RBC AUTO: 47.3 FL (ref 37–54)
EGFRCR SERPLBLD CKD-EPI 2021: 89.4 ML/MIN/1.73
EOSINOPHIL # BLD AUTO: 0.27 10*3/MM3 (ref 0–0.4)
EOSINOPHIL NFR BLD AUTO: 3.2 % (ref 0.3–6.2)
ERYTHROCYTE [DISTWIDTH] IN BLOOD BY AUTOMATED COUNT: 15.2 % (ref 12.3–15.4)
GLOBULIN UR ELPH-MCNC: 2.9 GM/DL
GLUCOSE SERPL-MCNC: 99 MG/DL (ref 65–99)
HCT VFR BLD AUTO: 37.6 % (ref 37.5–51)
HGB BLD-MCNC: 12.2 G/DL (ref 13–17.7)
IMM GRANULOCYTES # BLD AUTO: 0.04 10*3/MM3 (ref 0–0.05)
IMM GRANULOCYTES NFR BLD AUTO: 0.5 % (ref 0–0.5)
INR PPP: 1.04 (ref 0.9–1.1)
LYMPHOCYTES # BLD AUTO: 1.48 10*3/MM3 (ref 0.7–3.1)
LYMPHOCYTES NFR BLD AUTO: 17.5 % (ref 19.6–45.3)
MCH RBC QN AUTO: 27.9 PG (ref 26.6–33)
MCHC RBC AUTO-ENTMCNC: 32.4 G/DL (ref 31.5–35.7)
MCV RBC AUTO: 85.8 FL (ref 79–97)
MONOCYTES # BLD AUTO: 0.76 10*3/MM3 (ref 0.1–0.9)
MONOCYTES NFR BLD AUTO: 9 % (ref 5–12)
NEUTROPHILS NFR BLD AUTO: 5.84 10*3/MM3 (ref 1.7–7)
NEUTROPHILS NFR BLD AUTO: 69.1 % (ref 42.7–76)
NRBC BLD AUTO-RTO: 0 /100 WBC (ref 0–0.2)
PLATELET # BLD AUTO: 395 10*3/MM3 (ref 140–450)
PMV BLD AUTO: 9.2 FL (ref 6–12)
POTASSIUM SERPL-SCNC: 4.3 MMOL/L (ref 3.5–5.2)
PROCALCITONIN SERPL-MCNC: 0.07 NG/ML (ref 0–0.25)
PROT SERPL-MCNC: 7.2 G/DL (ref 6–8.5)
PROTHROMBIN TIME: 13.8 SECONDS (ref 11.7–14.2)
RBC # BLD AUTO: 4.38 10*6/MM3 (ref 4.14–5.8)
SODIUM SERPL-SCNC: 137 MMOL/L (ref 136–145)
T4 FREE SERPL-MCNC: 0.91 NG/DL (ref 0.93–1.7)
TROPONIN T SERPL-MCNC: <0.01 NG/ML (ref 0–0.03)
TSH SERPL DL<=0.05 MIU/L-ACNC: 1.15 UIU/ML (ref 0.27–4.2)
WBC NRBC COR # BLD: 8.45 10*3/MM3 (ref 3.4–10.8)

## 2022-12-12 PROCEDURE — 84439 ASSAY OF FREE THYROXINE: CPT | Performed by: EMERGENCY MEDICINE

## 2022-12-12 PROCEDURE — 84145 PROCALCITONIN (PCT): CPT | Performed by: EMERGENCY MEDICINE

## 2022-12-12 PROCEDURE — 93005 ELECTROCARDIOGRAM TRACING: CPT | Performed by: EMERGENCY MEDICINE

## 2022-12-12 PROCEDURE — 84443 ASSAY THYROID STIM HORMONE: CPT | Performed by: EMERGENCY MEDICINE

## 2022-12-12 PROCEDURE — 99283 EMERGENCY DEPT VISIT LOW MDM: CPT

## 2022-12-12 PROCEDURE — 85025 COMPLETE CBC W/AUTO DIFF WBC: CPT | Performed by: EMERGENCY MEDICINE

## 2022-12-12 PROCEDURE — 80053 COMPREHEN METABOLIC PANEL: CPT | Performed by: EMERGENCY MEDICINE

## 2022-12-12 PROCEDURE — 84484 ASSAY OF TROPONIN QUANT: CPT | Performed by: EMERGENCY MEDICINE

## 2022-12-12 PROCEDURE — 85610 PROTHROMBIN TIME: CPT | Performed by: EMERGENCY MEDICINE

## 2022-12-12 PROCEDURE — 71045 X-RAY EXAM CHEST 1 VIEW: CPT

## 2022-12-12 PROCEDURE — 93010 ELECTROCARDIOGRAM REPORT: CPT | Performed by: INTERNAL MEDICINE

## 2022-12-12 PROCEDURE — 85730 THROMBOPLASTIN TIME PARTIAL: CPT | Performed by: EMERGENCY MEDICINE

## 2022-12-12 RX ORDER — ALBUTEROL SULFATE 90 UG/1
2 AEROSOL, METERED RESPIRATORY (INHALATION) EVERY 6 HOURS PRN
Qty: 18 G | Refills: 3 | Status: SHIPPED | OUTPATIENT
Start: 2022-12-12

## 2022-12-12 RX ORDER — ZOLPIDEM TARTRATE 6.25 MG/1
6.25 TABLET, FILM COATED, EXTENDED RELEASE ORAL NIGHTLY PRN
Qty: 30 TABLET | Refills: 0 | OUTPATIENT
Start: 2022-12-12

## 2022-12-12 RX ORDER — HYDROXYZINE HYDROCHLORIDE 25 MG/1
25 TABLET, FILM COATED ORAL EVERY 8 HOURS PRN
Qty: 30 TABLET | Refills: 0 | Status: SHIPPED | OUTPATIENT
Start: 2022-12-12 | End: 2022-12-13 | Stop reason: SDUPTHER

## 2022-12-12 RX ORDER — HYDROXYZINE HYDROCHLORIDE 25 MG/1
25 TABLET, FILM COATED ORAL ONCE
Status: COMPLETED | OUTPATIENT
Start: 2022-12-12 | End: 2022-12-12

## 2022-12-12 RX ADMIN — HYDROXYZINE HYDROCHLORIDE 25 MG: 25 TABLET ORAL at 22:53

## 2022-12-12 NOTE — ED NOTES
"Pt comes to ER for increased anxiety/insomnia and generalized body aches x2 week. Pt states he ran out of his ambien on Friday and has not had a good nights sleep since then. Pt extremely anxious in triage stating he \"hurts all over from my ears to my toes\" and has been genital rash, drainage from his ears, and left leg discomfort. Pt recently seen here for the same.     Pt and RN wearing mask upon triage.     "

## 2022-12-12 NOTE — TELEPHONE ENCOUNTER
Caller: Sebastien Tang    Relationship to patient: Self    Best call back number: [phone]    Patient is needing: PATIENT WANTED TO LET DR. MULLEN KNOW THAT THE PRESCRIPTION HE WAS TAKING IS BUSPIRONE 15MG. HE ALSO ADVISED THAT HIS PHARMACY CLOSES AT 6PM.

## 2022-12-13 ENCOUNTER — TELEPHONE (OUTPATIENT)
Dept: FAMILY MEDICINE CLINIC | Facility: CLINIC | Age: 62
End: 2022-12-13

## 2022-12-13 DIAGNOSIS — G47.00 INSOMNIA, UNSPECIFIED TYPE: ICD-10-CM

## 2022-12-13 DIAGNOSIS — F51.01 PRIMARY INSOMNIA: Primary | ICD-10-CM

## 2022-12-13 DIAGNOSIS — F41.9 ANXIETY: ICD-10-CM

## 2022-12-13 LAB — QT INTERVAL: 407 MS

## 2022-12-13 RX ORDER — HYDROXYZINE 50 MG/1
50 TABLET, FILM COATED ORAL NIGHTLY PRN
Qty: 90 TABLET | Refills: 1 | Status: SHIPPED | OUTPATIENT
Start: 2022-12-13 | End: 2023-01-19

## 2022-12-13 RX ORDER — BUSPIRONE HYDROCHLORIDE 15 MG/1
7.5 TABLET ORAL 2 TIMES DAILY
Qty: 90 TABLET | Refills: 1 | Status: SHIPPED | OUTPATIENT
Start: 2022-12-13 | End: 2023-01-19

## 2022-12-13 RX ORDER — ZOLPIDEM TARTRATE 12.5 MG/1
12.5 TABLET, FILM COATED, EXTENDED RELEASE ORAL NIGHTLY PRN
Qty: 30 TABLET | Refills: 0 | Status: SHIPPED | OUTPATIENT
Start: 2022-12-13 | End: 2022-12-13

## 2022-12-13 NOTE — ED PROVIDER NOTES
EMERGENCY DEPARTMENT ENCOUNTER  I wore full protective equipment throughout this patient encounter including a N95 mask, eye shield, gown and gloves. Hand hygiene was performed before donning protective equipment and after removal when leaving the room.    Room Number:  36/36  Date of encounter:  12/12/2022  PCP: Florin Chaparro MD    HPI:  Context: Sebastien Tang is a 62 y.o. male who presents to the ED c/o chief complaint of anxiety and insomnia.  Patient reports history of insomnia, normally on Ambien, out of Ambien since Friday.  Patient reports he has not been sleeping since.  Patient also reports that he has been feeling extremely anxious.  Patient reports history of anxiety, managed by primary care, on buspirone.  Patient reports he has been taking without relief.      MEDICAL HISTORY REVIEW  Reviewed in EPIC    PAST MEDICAL HISTORY  Active Ambulatory Problems     Diagnosis Date Noted   • Cardiac arrest (HCC) 04/01/2021   • Ventricular fibrillation ([facility]) 04/01/2021   • Atrial flutter ([facility]) 04/05/2021   • Tobacco abuse 04/05/2021   • Azotemia 04/05/2021   • COPD (chronic obstructive pulmonary disease) ([facility]) 04/05/2021   • MRSA nasal colonization 04/05/2021   • Transaminitis 04/05/2021   • Obesity (BMI 30-39.9) 04/05/2021   • Ischemic cardiomyopathy 04/05/2021   • Anemia 04/05/2021   • Constipation 04/06/2021   • Acute congestive heart failure ([facility]) 04/26/2021   • PSA elevation 04/26/2021   • Wellness examination 04/26/2021   • High risk medication use 04/26/2021   • Cough 04/26/2021   • Abnormal CXR 04/26/2021   • Trouble in sleeping 04/26/2021   • Abdominal aneurysm 04/26/2021   • Iliac aneurysm ([facility]) 04/26/2021   • Coronary artery disease involving coronary bypass graft of native heart without angina pectoris 05/11/2021   • Atrial fibrillation, persistent ([facility]) 05/11/2021   • ICD (implantable cardioverter-defibrillator) in place 05/11/2021   • Anxiety and depression 06/14/2021   • Shortness of  breath 06/14/2021   • Primary insomnia 06/14/2021   • Atrial fibrillation (HCC) 06/14/2021   • Upper respiratory tract infection 06/14/2021   • Close exposure to COVID-19 virus 08/19/2021   • Iliac artery stenosis, right (HCC) 10/22/2021   • Dysuria 10/22/2021   • Gross hematuria 10/22/2021   • Essential hypertension 11/23/2021   • High cholesterol 02/07/2022   • Screening PSA (prostate specific antigen) 09/01/2022   • COPD with exacerbation (HCC) 09/01/2022   • Anxiety 09/22/2022   • Insomnia 09/22/2022     Resolved Ambulatory Problems     Diagnosis Date Noted   • Paroxysmal atrial fibrillation (HCC) 04/08/2021   • Elevated blood pressure reading 10/12/2021   • Chronic coronary artery disease      Past Medical History:   Diagnosis Date   • Acidosis    • Acute respiratory failure (HCC)    • Asthma    • Enlarged prostate    • Hypertension    • Hypokalemia    • Orthopnea    • PAF (paroxysmal atrial fibrillation) (HCC)    • Persistent atrial fibrillation (HCC)    • Pulmonary edema        PAST SURGICAL HISTORY  Past Surgical History:   Procedure Laterality Date   • CARDIAC CATHETERIZATION Left 4/1/2021    Procedure: Coronary Angiography;  Surgeon: Anna Puga MD;  Location: [facility] CATH INVASIVE LOCATION;  Service: Cardiology;  Laterality: Left;   • CARDIAC CATHETERIZATION N/A 4/1/2021    Procedure: Left ventriculography;  Surgeon: Anna Puga MD;  Location: [facility] CATH INVASIVE LOCATION;  Service: Cardiology;  Laterality: N/A;   • CARDIAC CATHETERIZATION N/A 4/1/2021    Procedure: Left Heart Cath;  Surgeon: Anna Puga MD;  Location: [facility] CATH INVASIVE LOCATION;  Service: Cardiology;  Laterality: N/A;   • CARDIAC ELECTROPHYSIOLOGY PROCEDURE N/A 4/7/2021    Procedure: IMPLANTABLE CARDIOVERTER DEFIBRILLATOR- SC BIOTRONIK;  Surgeon: Nik Rosas MD;  Location: [facility] CATH INVASIVE LOCATION;  Service: Cardiovascular;  Laterality: N/A;   • CARDIOVERSION  05/19/2021    Dr. Rosas   • CARDIOVERSION   2021    Dr. Rosas   • TONSILLECTOMY         FAMILY HISTORY  No family history on file.    SOCIAL HISTORY  Social History     Socioeconomic History   • Marital status: Single   Tobacco Use   • Smoking status: Former     Packs/day: 1.50     Years: 10.00     Pack years: 15.00     Types: Cigarettes     Quit date: 2021     Years since quittin.6   • Smokeless tobacco: Never   • Tobacco comments:     2021   Vaping Use   • Vaping Use: Never used   Substance and Sexual Activity   • Alcohol use: Yes     Comment: 1 BEER DAILY   • Drug use: Not Currently   • Sexual activity: Not Currently       ALLERGIES  Patient has no known allergies.    The patient's allergies have been reviewed    REVIEW OF SYSTEMS  All systems reviewed and negative except for those discussed in HPI.     PHYSICAL EXAM  I have reviewed the triage vital signs and nursing notes.  ED Triage Vitals   Temp Heart Rate Resp BP SpO2   22 1741 22 17422 17422 1758 22 174   97.4 °F (36.3 °C) 84 16 137/83 98 %      Temp src Heart Rate Source Patient Position BP Location FiO2 (%)   22 1741 22 174 -- -- --   Tympanic Monitor          General: No acute distress.  HENT: NCAT, PERRL, Nares patent.  Eyes: no scleral icterus.  Neck: trachea midline, no ROM limitations.  CV: regular rhythm, regular rate.  Respiratory: normal effort, CTAB.  Abdomen: soft, nondistended, NTTP, no rebound tenderness, no guarding or rigidity.  Musculoskeletal: no deformity.  Neuro: alert, moves all extremities, follows commands.  Skin: warm, dry.    LAB RESULTS  Recent Results (from the past 24 hour(s))   ECG 12 Lead Other; anxiety    Collection Time: 22  9:08 PM   Result Value Ref Range    QT Interval 407 ms   Comprehensive Metabolic Panel    Collection Time: 22  9:23 PM    Specimen: Arm, Left; Blood   Result Value Ref Range    Glucose 99 65 - 99 mg/dL    BUN 26 (H) 8 - 23 mg/dL    Creatinine 0.95 0.76 - 1.27 mg/dL     Sodium 137 136 - 145 mmol/L    Potassium 4.3 3.5 - 5.2 mmol/L    Chloride 105 98 - 107 mmol/L    CO2 22.2 22.0 - 29.0 mmol/L    Calcium 10.0 8.6 - 10.5 mg/dL    Total Protein 7.2 6.0 - 8.5 g/dL    Albumin 4.30 3.50 - 5.20 g/dL    ALT (SGPT) 25 1 - 41 U/L    AST (SGOT) 21 1 - 40 U/L    Alkaline Phosphatase 66 39 - 117 U/L    Total Bilirubin 0.3 0.0 - 1.2 mg/dL    Globulin 2.9 gm/dL    A/G Ratio 1.5 g/dL    BUN/Creatinine Ratio 27.4 (H) 7.0 - 25.0    Anion Gap 9.8 5.0 - 15.0 mmol/L    eGFR 89.4 >60.0 mL/min/1.73   Procalcitonin    Collection Time: 12/12/22  9:23 PM    Specimen: Arm, Left; Blood   Result Value Ref Range    Procalcitonin 0.07 0.00 - 0.25 ng/mL   Troponin    Collection Time: 12/12/22  9:23 PM    Specimen: Arm, Left; Blood   Result Value Ref Range    Troponin T <0.010 0.000 - 0.030 ng/mL   Protime-INR    Collection Time: 12/12/22  9:23 PM    Specimen: Arm, Left; Blood   Result Value Ref Range    Protime 13.8 11.7 - 14.2 Seconds    INR 1.04 0.90 - 1.10   aPTT    Collection Time: 12/12/22  9:23 PM    Specimen: Arm, Left; Blood   Result Value Ref Range    PTT 34.9 22.7 - 35.4 seconds   CBC Auto Differential    Collection Time: 12/12/22  9:23 PM    Specimen: Arm, Left; Blood   Result Value Ref Range    WBC 8.45 3.40 - 10.80 10*3/mm3    RBC 4.38 4.14 - 5.80 10*6/mm3    Hemoglobin 12.2 (L) 13.0 - 17.7 g/dL    Hematocrit 37.6 37.5 - 51.0 %    MCV 85.8 79.0 - 97.0 fL    MCH 27.9 26.6 - 33.0 pg    MCHC 32.4 31.5 - 35.7 g/dL    RDW 15.2 12.3 - 15.4 %    RDW-SD 47.3 37.0 - 54.0 fl    MPV 9.2 6.0 - 12.0 fL    Platelets 395 140 - 450 10*3/mm3    Neutrophil % 69.1 42.7 - 76.0 %    Lymphocyte % 17.5 (L) 19.6 - 45.3 %    Monocyte % 9.0 5.0 - 12.0 %    Eosinophil % 3.2 0.3 - 6.2 %    Basophil % 0.7 0.0 - 1.5 %    Immature Grans % 0.5 0.0 - 0.5 %    Neutrophils, Absolute 5.84 1.70 - 7.00 10*3/mm3    Lymphocytes, Absolute 1.48 0.70 - 3.10 10*3/mm3    Monocytes, Absolute 0.76 0.10 - 0.90 10*3/mm3    Eosinophils, Absolute  0.27 0.00 - 0.40 10*3/mm3    Basophils, Absolute 0.06 0.00 - 0.20 10*3/mm3    Immature Grans, Absolute 0.04 0.00 - 0.05 10*3/mm3    nRBC 0.0 0.0 - 0.2 /100 WBC   T4, Free    Collection Time: 12/12/22  9:23 PM    Specimen: Arm, Left; Blood   Result Value Ref Range    Free T4 0.91 (L) 0.93 - 1.70 ng/dL   TSH    Collection Time: 12/12/22  9:23 PM    Specimen: Arm, Left; Blood   Result Value Ref Range    TSH 1.150 0.270 - 4.200 uIU/mL       I ordered the above labs and reviewed the results.    RADIOLOGY  XR Chest 1 View    Result Date: 12/12/2022  SINGLE VIEW OF THE CHEST  HISTORY: Body aches  COMPARISON: 11/25/2022  FINDINGS: Left-sided pacemaker is noted. Cardiomegaly is present. No pneumothorax or pleural effusion is seen. Background changes of COPD are noted, the and patient also appears to be chronic fibrotic changes. No definite acute infiltrates are identified.      No acute findings.  This report was finalized on 12/12/2022 9:29 PM by Dr. Jdui Chow M.D.        I ordered the above noted radiological studies. I reviewed the images and results. I agree with the radiologist interpretation.    PROCEDURES  Procedures    MEDICATIONS GIVEN IN ER  Medications   hydrOXYzine (ATARAX) tablet 25 mg (has no administration in time range)       PROGRESS, DATA ANALYSIS, CONSULTS, AND MEDICAL DECISION MAKING  A complete history and physical exam have been performed.  All available laboratory and imaging results have been reviewed by myself prior to disposition.    MDM  After the initial H&P, I discussed pertinent information from history and physical exam with patient/family.  Discussed differential diagnosis.  Discussed plan for ED evaluation/workup/treatment.  All questions answered.  Patient/family is agreeable with plan.  ED Course as of 12/12/22 2229   Mon Dec 12, 2022   2101 Medical history reviewed and significant for: Patient was seen in the emergency department on 25 November.  Patient was complaining of  generalized fatigue as well as body aches rash and growing, bilateral ear drainage.  Patient is also complaining of pain in his left ankle and foot.  ED work-up was unremarkable.  Patient was discharged home with primary care follow-up. [JG]   2114 EKG independently viewed and contemporaneously interpreted by ED physician. Time: 9:08 PM.  Rate 63.  Interpretation: Normal sinus rhythm, left axis deviation, early R wave transition, no acute ST changes. [JG]   2115 When compared with prior EKG on 11/25/2022, no acute changes are present. [JG]   2150 Patient presents with insomnia, on Ambien, currently out of his medication.  I discussed with him that this is a controlled substance and that he would need to follow-up with his prescribing physician.  Patient had a multitude of minor complaints but his primary complaint he was worried about was his anxiety.  Obtaining cardiac work-up.  Treating anxiety with hydroxyzine.  Patient has no alarm signs or symptoms, not currently a danger to himself or others.  If ED work-up is unremarkable, plan for discharge with primary care and psychiatry follow-up with short course of hydroxyzine for anxiety. [JG]   2226 Patient afebrile, vital signs stable.  Patient has no leukocytosis or left shift, chest x-ray shows no sign of pneumonia.  EKG shows no acute findings, troponin negative.Patient has chronic anemia, mild, unchanged from baseline.  Patient is not dehydrated, no electrolyte disturbances, T4 slightly decreased just under normal but TSH is normal.  ED work-up overall is unremarkable.  Patient is well-appearing appears appropriate for discharge with outpatient follow-up.   [JG]      ED Course User Index  [JG] Benjamín Roque MD       AS OF 22:29 EST VITALS:    BP - 137/83  HR - 84  TEMP - 97.4 °F (36.3 °C) (Tympanic)  O2 SATS - 98%    DIAGNOSIS  Final diagnoses:   Insomnia, unspecified type   Anxiety         DISPOSITION  DISCHARGE    Patient discharged in stable  condition.    Reviewed implications of results, diagnosis, meds, responsibility to follow up, warning signs and symptoms of possible worsening, potential complications and reasons to return to ER.    Patient/Family voiced understanding of above instructions.    Discussed plan for discharge, as there is no emergent indication for admission. Patient referred to primary care provider for BP management due to today's BP. Pt/family is agreeable and understands need for follow up and repeat testing.  Pt is aware that discharge does not mean that nothing is wrong but it indicates no emergency is present that requires admission and they must continue care with follow-up as given below or physician of their choice.     FOLLOW-UP  Florin Chaparro MD  [address] [facility]  [address]  [facility] [id number] [phone]    Schedule an appointment as soon as possible for a visit in 2 days  even if well, for further treatment of your anxiety or for referral to psychiatrist for further treatment    [facility]  [address] [place] [id number] [phone]  Schedule an appointment as soon as possible for a visit in 2 days  for further management of your anxiety         Medication List      New Prescriptions    hydrOXYzine 25 MG tablet  Commonly known as: ATARAX  Take 1 tablet by mouth Every 8 (Eight) Hours As Needed for Anxiety.           Where to Get Your Medications      These medications were sent to "[id number] - [place], [place] - [address] [facility] - [phone]  - [phone]   [address], [facility] [address]    Phone: [phone]   · hydrOXYzine 25 MG tablet          Benjamín Roque MD  12/12/22 4284

## 2022-12-13 NOTE — TELEPHONE ENCOUNTER
I did call Pharmacy as they requested and they are absolutely right, by [place] rules, can not be given any more sleeping meds
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
## 2022-12-13 NOTE — TELEPHONE ENCOUNTER
I did call pt regarding Zolpidem refill denial and Pharmacy is right, no more refills will be given, but I can increase Hydroxyzine to 50 mg and refill his Buspar, pt in agreement, meds sent, told me no longer using Doxepin
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
## 2022-12-15 ENCOUNTER — OFFICE VISIT (OUTPATIENT)
Dept: FAMILY MEDICINE CLINIC | Facility: CLINIC | Age: 62
End: 2022-12-15

## 2022-12-15 VITALS
DIASTOLIC BLOOD PRESSURE: 84 MMHG | TEMPERATURE: 98.6 F | WEIGHT: 196 LBS | HEART RATE: 80 BPM | OXYGEN SATURATION: 98 % | HEIGHT: 66 IN | BODY MASS INDEX: 31.5 KG/M2 | SYSTOLIC BLOOD PRESSURE: 134 MMHG

## 2022-12-15 DIAGNOSIS — G62.9 NEUROPATHY: Primary | ICD-10-CM

## 2022-12-15 DIAGNOSIS — F41.9 ANXIETY: ICD-10-CM

## 2022-12-15 DIAGNOSIS — G47.00 INSOMNIA, UNSPECIFIED TYPE: ICD-10-CM

## 2022-12-15 PROCEDURE — 99213 OFFICE O/P EST LOW 20 MIN: CPT | Performed by: FAMILY MEDICINE

## 2022-12-15 NOTE — PROGRESS NOTES
"Chief Complaint  Anxiety, Lower Extremity Issue, and Insomnia    Subjective        Sebastien Tang presents to [facility]  Anxiety  Symptoms include insomnia.       Lower Extremity Issue    Insomnia      Did not sleep still, to see Sleep Specialist  to 1/16/22, has appt with Psych also, did not start Hydroxyzine 50 or Buspar , not Depressed, was told by the pharmacy that he has some insurance issues that he need to contact his provider, we did not receive anything from the pharmacy about what is going on with hydroxyzine 50 mg or the BuSpar  Objective   Vital Signs:  /84 (BP Location: Left arm, Patient Position: Sitting, Cuff Size: Large Adult)   Pulse 80   Temp 98.6 °F (37 °C) (Temporal)   Ht 167.6 cm (65.98\")   Wt 88.9 kg (196 lb)   SpO2 98%   BMI 31.65 kg/m²   Estimated body mass index is 31.65 kg/m² as calculated from the following:    Height as of this encounter: 167.6 cm (65.98\").    Weight as of this encounter: 88.9 kg (196 lb).          Physical Exam  Vitals and nursing note reviewed.   Constitutional:       General: He is not in acute distress.     Appearance: Normal appearance. He is well-developed. He is not ill-appearing, toxic-appearing or diaphoretic.   HENT:      Head: Normocephalic and atraumatic.      Right Ear: Tympanic membrane, ear canal and external ear normal. There is no impacted cerumen.      Left Ear: Tympanic membrane, ear canal and external ear normal. There is no impacted cerumen.      Nose: Nose normal. No congestion or rhinorrhea.      Mouth/Throat:      Mouth: Mucous membranes are moist.      Pharynx: Oropharynx is clear. No oropharyngeal exudate or posterior oropharyngeal erythema.   Eyes:      General: No scleral icterus.        Right eye: No discharge.         Left eye: No discharge.      Extraocular Movements: Extraocular movements intact.      Conjunctiva/sclera: Conjunctivae normal.      Pupils: Pupils are equal, round, and reactive to " light.   Neck:      Thyroid: No thyromegaly.      Vascular: No carotid bruit or JVD.      Trachea: No tracheal deviation.   Cardiovascular:      Rate and Rhythm: Normal rate and regular rhythm.      Heart sounds: Normal heart sounds. No murmur heard.    No friction rub. No gallop.   Pulmonary:      Effort: Pulmonary effort is normal. No respiratory distress.      Breath sounds: Normal breath sounds. No stridor. No wheezing, rhonchi or rales.   Chest:      Chest wall: No tenderness.   Abdominal:      General: Bowel sounds are normal. There is no distension.      Palpations: Abdomen is soft. There is no mass.      Tenderness: There is no abdominal tenderness. There is no right CVA tenderness, left CVA tenderness, guarding or rebound.      Hernia: No hernia is present.   Musculoskeletal:         General: Tenderness present. Normal range of motion.      Cervical back: Normal range of motion and neck supple. No rigidity or tenderness.      Right lower leg: No edema.      Left lower leg: No edema.      Comments: Tenderness on the left medial malleolus   Lymphadenopathy:      Cervical: No cervical adenopathy.   Skin:     General: Skin is warm and dry.      Coloration: Skin is not jaundiced.   Neurological:      General: No focal deficit present.      Mental Status: He is alert and oriented to person, place, and time. Mental status is at baseline.      Sensory: No sensory deficit.      Motor: No weakness or abnormal muscle tone.      Coordination: Coordination normal.      Gait: Gait normal.      Deep Tendon Reflexes: Reflexes normal.      Comments: Numbness affecting left foot, from the big toe to the dorsum of the left foot medially   Psychiatric:         Mood and Affect: Mood normal.         Behavior: Behavior normal.         Thought Content: Thought content normal.         Judgment: Judgment normal.        Result Review :                Assessment and Plan   Diagnoses and all orders for this visit:    1. Neuropathy  (Primary)  Comments:  Declined Neurologist  Orders:  -     Hemoglobin A1c  -     Vitamin B12    2. Anxiety  Comments:  to see Psych    3. Insomnia, unspecified type  Comments:  to call Pharmacy to see what happened      We will contact pharmacy to know what slightly insurance issues are going on, news, were able to talk to the pharmacy, hydroxyzine 50-minute has been approved, but for some unknown reason Insurance  will not approve the BuSpar until the 22nd, patient will wait until then, to have this medication refilled,       Follow Up   No follow-ups on file.  Patient was given instructions and counseling regarding his condition or for health maintenance advice. Please see specific information pulled into the AVS if appropriate.

## 2022-12-16 LAB
HBA1C MFR BLD: 6.2 % (ref 4.8–5.6)
VIT B12 SERPL-MCNC: 533 PG/ML (ref 211–946)

## 2023-01-11 PROCEDURE — 93295 DEV INTERROG REMOTE 1/2/MLT: CPT | Performed by: INTERNAL MEDICINE

## 2023-01-11 PROCEDURE — 93296 REM INTERROG EVL PM/IDS: CPT | Performed by: INTERNAL MEDICINE

## 2023-01-19 ENCOUNTER — OFFICE VISIT (OUTPATIENT)
Dept: FAMILY MEDICINE CLINIC | Facility: CLINIC | Age: 63
End: 2023-01-19
Payer: MEDICARE

## 2023-01-19 VITALS
TEMPERATURE: 97.8 F | SYSTOLIC BLOOD PRESSURE: 133 MMHG | OXYGEN SATURATION: 94 % | BODY MASS INDEX: 33.04 KG/M2 | WEIGHT: 204.6 LBS | DIASTOLIC BLOOD PRESSURE: 70 MMHG | HEART RATE: 66 BPM

## 2023-01-19 DIAGNOSIS — Z95.810 ICD (IMPLANTABLE CARDIOVERTER-DEFIBRILLATOR) IN PLACE: ICD-10-CM

## 2023-01-19 DIAGNOSIS — Z79.899 HIGH RISK MEDICATION USE: ICD-10-CM

## 2023-01-19 DIAGNOSIS — G47.00 INSOMNIA, UNSPECIFIED TYPE: ICD-10-CM

## 2023-01-19 DIAGNOSIS — I46.9 CARDIAC ARREST: ICD-10-CM

## 2023-01-19 DIAGNOSIS — M19.90 ARTHRITIS: ICD-10-CM

## 2023-01-19 DIAGNOSIS — F51.01 PRIMARY INSOMNIA: ICD-10-CM

## 2023-01-19 DIAGNOSIS — F32.A ANXIETY AND DEPRESSION: ICD-10-CM

## 2023-01-19 DIAGNOSIS — F41.9 ANXIETY AND DEPRESSION: ICD-10-CM

## 2023-01-19 DIAGNOSIS — I50.9 CONGESTIVE HEART FAILURE, UNSPECIFIED HF CHRONICITY, UNSPECIFIED HEART FAILURE TYPE: ICD-10-CM

## 2023-01-19 DIAGNOSIS — I10 ESSENTIAL HYPERTENSION: Primary | ICD-10-CM

## 2023-01-19 DIAGNOSIS — I48.19 ATRIAL FIBRILLATION, PERSISTENT: ICD-10-CM

## 2023-01-19 DIAGNOSIS — F41.9 ANXIETY: ICD-10-CM

## 2023-01-19 DIAGNOSIS — E78.00 HIGH CHOLESTEROL: ICD-10-CM

## 2023-01-19 DIAGNOSIS — F43.10 PTSD (POST-TRAUMATIC STRESS DISORDER): ICD-10-CM

## 2023-01-19 PROBLEM — I49.01 VENTRICULAR FIBRILLATION: Status: RESOLVED | Noted: 2021-04-01 | Resolved: 2023-01-19

## 2023-01-19 PROBLEM — G47.9 TROUBLE IN SLEEPING: Status: RESOLVED | Noted: 2021-04-26 | Resolved: 2023-01-19

## 2023-01-19 PROBLEM — R05.9 COUGH: Status: RESOLVED | Noted: 2021-04-26 | Resolved: 2023-01-19

## 2023-01-19 PROBLEM — J06.9 UPPER RESPIRATORY TRACT INFECTION: Status: RESOLVED | Noted: 2021-06-14 | Resolved: 2023-01-19

## 2023-01-19 PROBLEM — J44.1 COPD WITH EXACERBATION: Status: RESOLVED | Noted: 2022-09-01 | Resolved: 2023-01-19

## 2023-01-19 PROBLEM — I48.91 ATRIAL FIBRILLATION: Status: RESOLVED | Noted: 2021-06-14 | Resolved: 2023-01-19

## 2023-01-19 PROCEDURE — 99214 OFFICE O/P EST MOD 30 MIN: CPT | Performed by: STUDENT IN AN ORGANIZED HEALTH CARE EDUCATION/TRAINING PROGRAM

## 2023-01-19 RX ORDER — ZOLPIDEM TARTRATE 12.5 MG/1
12.5 TABLET, FILM COATED, EXTENDED RELEASE ORAL NIGHTLY PRN
COMMUNITY
Start: 2023-01-03 | End: 2023-01-19

## 2023-01-19 RX ORDER — PAROXETINE HYDROCHLORIDE 20 MG/1
20 TABLET, FILM COATED ORAL EVERY MORNING
Qty: 30 TABLET | Refills: 0 | Status: SHIPPED | OUTPATIENT
Start: 2023-01-19

## 2023-01-19 RX ORDER — HYDROXYZINE 50 MG/1
50 TABLET, FILM COATED ORAL NIGHTLY PRN
Qty: 90 TABLET | Refills: 1 | Status: SHIPPED | OUTPATIENT
Start: 2023-01-19

## 2023-01-19 RX ORDER — POTASSIUM CHLORIDE 20 MEQ/1
20 TABLET, EXTENDED RELEASE ORAL DAILY
Qty: 14 TABLET | Refills: 0 | Status: SHIPPED | OUTPATIENT
Start: 2023-01-19

## 2023-01-19 NOTE — PROGRESS NOTES
"Chief Complaint  Establish Care (insomnia)    Subjective        Sebastien Tang presents to [facility] PRIMARY CARE  History of Present Illness     Answers for HPI/ROS submitted by the patient on 2023  What is the primary reason for your visit?: Other  Please describe your symptoms.: Sleeping  problems  in the past  and getting to  know doctor Gee  Have you had these symptoms before?: Yes  How long have you been having these symptoms?: Greater than 2 weeks  Please list any medications you are currently taking for this condition.: Zolpidem er  Please describe any probable cause for these symptoms. : I don't know    Had to quit his job after 15 years. He quit due to concerns with COVID. Has a defibrillator and his job was stressing him out due to people dying and leaving the job. He was struggling physically and mentally. His job was keeping him awake at night. He  in the parking lot at work, was resuscitated. Happened on 2021. Has had sleeping issues since. Has not seen a psychiatrist. Money seems to be the issue. Has been having panic attacks as well.     Was suppose to go to sleep study but lost his health insurance when he lost his job. So has not had sleep study.     Tried buspar but did not see a benefit. Has not been on any other medications for anxiety or depression.     Objective   Vital Signs:  /70 (BP Location: Left arm, Patient Position: Sitting, Cuff Size: Adult)   Pulse 66   Temp 97.8 °F (36.6 °C) (Temporal)   Wt 92.8 kg (204 lb 9.6 oz)   SpO2 94%   BMI 33.04 kg/m²   Estimated body mass index is 33.04 kg/m² as calculated from the following:    Height as of 12/15/22: 167.6 cm (65.98\").    Weight as of this encounter: 92.8 kg (204 lb 9.6 oz).             Physical Exam  Vitals and nursing note reviewed.   Constitutional:       General: He is not in acute distress.     Appearance: Normal appearance. He is not ill-appearing.   HENT:      Head: Normocephalic and " atraumatic.      Nose: Nose normal.      Mouth/Throat:      Mouth: Mucous membranes are moist.   Eyes:      Extraocular Movements: Extraocular movements intact.      Conjunctiva/sclera: Conjunctivae normal.   Cardiovascular:      Rate and Rhythm: Normal rate.      Heart sounds: Normal heart sounds. No murmur heard.    No gallop.   Pulmonary:      Effort: Pulmonary effort is normal. No respiratory distress.      Breath sounds: Normal breath sounds. No stridor. No wheezing, rhonchi or rales.   Chest:      Chest wall: No tenderness.   Skin:     General: Skin is warm and dry.   Neurological:      General: No focal deficit present.      Mental Status: He is alert and oriented to person, place, and time. Mental status is at baseline.   Psychiatric:         Mood and Affect: Mood normal.         Behavior: Behavior normal.        Result Review :                   Assessment and Plan   Diagnoses and all orders for this visit:    1. Essential hypertension (Primary)  Comments:  BP at goal. No medication changes.     2. Atrial fibrillation, persistent (HCC)  Comments:  on eliquis. follows with cardiology    3. Cardiac arrest (HCC)  Comments:  hx of 2 years ago    4. Congestive heart failure, unspecified HF chronicity, unspecified heart failure type (HCC)    5. ICD (implantable cardioverter-defibrillator) in place    6. High cholesterol    7. PTSD (post-traumatic stress disorder)  -     Ambulatory Referral to Psychiatry    8. Insomnia, unspecified type  -     Ambulatory Referral to Psychiatry  -     PARoxetine (PAXIL) 20 MG tablet; Take 1 tablet by mouth Every Morning.  Dispense: 30 tablet; Refill: 0  -     Ambulatory Referral to Sleep Medicine    9. Anxiety  -     Ambulatory Referral to Psychiatry  -     PARoxetine (PAXIL) 20 MG tablet; Take 1 tablet by mouth Every Morning.  Dispense: 30 tablet; Refill: 0    10. Anxiety and depression  -     Ambulatory Referral to Psychiatry  -     PARoxetine (PAXIL) 20 MG tablet; Take 1 tablet  by mouth Every Morning.  Dispense: 30 tablet; Refill: 0    11. Arthritis  -     Diclofenac Sodium (VOLTAREN) 1 % gel gel; Apply 4 g topically to the appropriate area as directed 2 (Two) Times a Day.  Dispense: 100 g; Refill: 0    12. Primary insomnia  -     hydrOXYzine (ATARAX) 50 MG tablet; Take 1 tablet by mouth At Night As Needed for Anxiety.  Dispense: 90 tablet; Refill: 1  -     Urine Drug Screen - Urine, Clean Catch    13. High risk medication use  -     potassium chloride (K-DUR,KLOR-CON) 20 MEQ CR tablet; Take 1 tablet by mouth Daily.  Dispense: 14 tablet; Refill: 0  -     Urine Drug Screen - Urine, Clean Catch      Sending to psychiatry. Has appt with sleep medicine as well. Discussed starting paxil as is not on a medication for anxiety or depression. Also has some PTSD features related to his previous cardiac arrest. Will do trial of paxil and use hydroxyzine prn for sleep. Will stop ambien.          Follow Up   Return in about 4 weeks (around 2/16/2023) for paxil follow up .  Patient was given instructions and counseling regarding his condition or for health maintenance advice. Please see specific information pulled into the AVS if appropriate.

## 2023-01-21 LAB
AMPHETAMINES UR QL SCN: NEGATIVE NG/ML
BARBITURATES UR QL SCN: NEGATIVE NG/ML
BENZODIAZ UR QL SCN: NEGATIVE NG/ML
BZE UR QL SCN: NEGATIVE NG/ML
CANNABINOIDS UR QL SCN: NEGATIVE NG/ML
CREAT UR-MCNC: 165.2 MG/DL (ref 20–300)
LABORATORY COMMENT REPORT: NORMAL
METHADONE UR QL SCN: NEGATIVE NG/ML
OPIATES UR QL SCN: NEGATIVE NG/ML
OXYCODONE+OXYMORPHONE UR QL SCN: NEGATIVE NG/ML
PCP UR QL: NEGATIVE NG/ML
PH UR: 5.4 [PH] (ref 4.5–8.9)
PROPOXYPH UR QL SCN: NEGATIVE NG/ML

## 2023-01-24 DIAGNOSIS — G47.00 INSOMNIA, UNSPECIFIED TYPE: Primary | ICD-10-CM

## 2023-01-24 RX ORDER — ZOLPIDEM TARTRATE 5 MG/1
5 TABLET ORAL NIGHTLY PRN
Qty: 30 TABLET | Refills: 0 | Status: SHIPPED | OUTPATIENT
Start: 2023-01-24 | End: 2023-02-14

## 2023-02-06 ENCOUNTER — TELEPHONE (OUTPATIENT)
Dept: FAMILY MEDICINE CLINIC | Facility: CLINIC | Age: 63
End: 2023-02-06
Payer: MEDICARE

## 2023-02-06 DIAGNOSIS — F32.A ANXIETY AND DEPRESSION: Primary | ICD-10-CM

## 2023-02-06 DIAGNOSIS — F41.9 ANXIETY AND DEPRESSION: Primary | ICD-10-CM

## 2023-02-06 NOTE — TELEPHONE ENCOUNTER
Dr Glynn Ackerman [facility] pyschiatrist's office called regarding the referral sent over for Mr Tang.  He says they are at full capacity right now and he has been put on a wait list to get an appointment.  He says the list is long so it's going to be a long while before he is able to get in.  He did call the patient to let him know and talked to him for a bit.  He said just talking to him for a bit over the phone, he doesn't think he is doing well and given the symptoms he is having he would recommend him taking Remeron 15 mg (or possibly starting him at 7.5 and increasing him to 15 mg) if you wanted to try and help him until he can get an appointment at [facility].  Also, patricia, the patient told him he decided to stop taking Paxel.  If you have any questions or would like to discuss further, his cell phone number is [phone].  The clinic number is [phone] but he says that would not be a good number to call.

## 2023-02-08 ENCOUNTER — TELEPHONE (OUTPATIENT)
Dept: FAMILY MEDICINE CLINIC | Facility: CLINIC | Age: 63
End: 2023-02-08

## 2023-04-12 PROCEDURE — 93295 DEV INTERROG REMOTE 1/2/MLT: CPT | Performed by: INTERNAL MEDICINE

## 2023-04-12 PROCEDURE — 93296 REM INTERROG EVL PM/IDS: CPT | Performed by: INTERNAL MEDICINE

## 2023-04-17 NOTE — TELEPHONE ENCOUNTER
Last visit  01/19/2023  Upcoming visit  N/A  Last refill   02/14/2023    Protocols not passed routing to

## 2023-04-18 RX ORDER — MIRTAZAPINE 15 MG/1
TABLET, FILM COATED ORAL
Qty: 90 TABLET | Refills: 0 | Status: SHIPPED | OUTPATIENT
Start: 2023-04-18

## 2023-05-25 ENCOUNTER — TELEPHONE (OUTPATIENT)
Dept: FAMILY MEDICINE CLINIC | Facility: CLINIC | Age: 63
End: 2023-05-25
Payer: MEDICARE

## 2023-05-25 NOTE — TELEPHONE ENCOUNTER
OK FOR HUB AND FD    NO ANSWER NO VOICEMAIL    Pt needs a follow up appointment for Metoprolol refills.

## 2023-05-26 NOTE — PROGRESS NOTES
"    Patient Name: Sebastien Tang  :1960  61 y.o.      Patient Care Team:  Provider, No Known as PCP - General    Chief Complaint: VF arrest    Interval History: Extubated, short-term memory loss, denies any prior angina or coronary history that which is known       Objective   Vital Signs  Temp:  [95.5 °F (35.3 °C)-99.7 °F (37.6 °C)] 99.6 °F (37.6 °C)  Heart Rate:  [61-91] 80  Resp:  [9-25] 24  BP: ()/() 133/76  FiO2 (%):  [35 %-100 %] 36 %    Intake/Output Summary (Last 24 hours) at 2021 1030  Last data filed at 2021 0400  Gross per 24 hour   Intake 3882 ml   Output 1825 ml   Net 2057 ml     Flowsheet Rows      First Filed Value   Admission Height  167.6 cm (66\") Documented at 2021 1956   Admission Weight  96.9 kg (213 lb 10 oz) Documented at 2021 1437          Physical Exam:   General Appearance:    Alert, cooperative, in no acute distress   Lungs:    Rales lipase is    Heart:    Regular rhythm and normal rate, normal S1 and S2, no murmurs, gallops or rubs.     Chest Wall:    No abnormalities observed   Abdomen:     Soft, nontender, positive bowel sounds.     Extremities:   no cyanosis, clubbing or edema.  No marked joint deformities.  Adequate musculoskeletal strength.       Results Review:    Results from last 7 days   Lab Units 21  0415   SODIUM mmol/L 137   POTASSIUM mmol/L 4.3   CHLORIDE mmol/L 102   CO2 mmol/L 22.8   BUN mg/dL 24*   CREATININE mg/dL 1.25   GLUCOSE mg/dL 134*   CALCIUM mg/dL 8.8     Results from last 7 days   Lab Units 21  0415 21  1530 21  1212   TROPONIN T ng/mL 1.190* 1.050* 0.012     Results from last 7 days   Lab Units 21  0415   WBC 10*3/mm3 13.87*   HEMOGLOBIN g/dL 12.9*   HEMATOCRIT % 38.2   PLATELETS 10*3/mm3 367     Results from last 7 days   Lab Units 21  1212   INR  1.17*   APTT seconds 39.7*     Results from last 7 days   Lab Units 21  0415   MAGNESIUM mg/dL 2.4     Results from last 7 days   Lab Units " Patient was seen and evaluated.  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  Also has history of atrial fibrillation cardiac disease.  He is on Xarelto and Plavix.  He is noticing pain in the right axilla for the past 7-10 days.  Increasing redness, pain.  He has had no drainage.  Attempted needle aspiration in the ED yielded no purulent fluid.  There have been no fevers or chills.  He is also been on prednisone taper for the autoimmune encephalitis.  Also has history of thrombocytopenia.        Allergies: ALLERGIES:  Sulfa antibiotics    Past Medical History:   Diagnosis Date   • A-fib (CMD)    • Chronic pain    • COPD (chronic obstructive pulmonary disease) (CMD)    • Coronary artery disease    • Essential (primary) hypertension    • Gastroesophageal reflux disease    • Headache    • High cholesterol    • History of heart artery stent    • Hx of CABG    • Sleep apnea     cpap        Past Surgical History:   Procedure Laterality Date   • Appendectomy      childhood    • Back surgery      back and neck surgery    • Cardiac catherization  2018    stent      • Cardiac surgery      CABG x3 , twice (2002, 1987)   • Cervical fusion     • Eye surgery  2017    cataracts   • Hernia repair      2010       Current Facility-Administered Medications   Medication   • predniSONE (DELTASONE) tablet 40 mg    Followed by   • [START ON 5/28/2023] predniSONE (DELTASONE) tablet 20 mg    Followed by   • [START ON 6/4/2023] predniSONE (DELTASONE) tablet 10 mg   • [START ON 5/27/2023] dilTIAZem (TIAZAC,CARDIZEM CD) 24 hr capsule 240 mg   • clindamycin (CLEOCIN) in dextrose 5% premix IVPB Solution 900 mg   • VANCOMYCIN - PHARMACIST MONITORED Misc   • Potassium Standard Replacement Protocol (Levels 3.5 and lower)   • Potassium Replacement (Levels 3.6 - 4)   • Magnesium Standard Replacement Protocol   • ondansetron (ZOFRAN) injection 4 mg   • acetaminophen (TYLENOL) tablet 650 mg   • HYDROcodone-acetaminophen  (NORCO) 5-325 MG per tablet 1 tablet   • dextrose 50 % injection 25 g   • dextrose 50 % injection 12.5 g   • glucagon (GLUCAGEN) injection 1 mg   • dextrose (GLUTOSE) 40 % gel 15 g   • dextrose (GLUTOSE) 40 % gel 30 g   • insulin lispro (ADMELOG,HumaLOG) - Correction Dose   • insulin lispro (ADMELOG,HumaLOG) - Correction Dose   • sodium chloride 0.9 % flush bag 25 mL   • sodium chloride (PF) 0.9 % injection 2 mL   • sodium chloride (NORMAL SALINE) 0.9 % bolus 500 mL   • sodium chloride 0.9% infusion   • vancomycin (VANCOCIN) 750 mg in sodium chloride 0.9 % 250 mL IVPB   • atorvastatin (LIPITOR) tablet 80 mg   • cholecalciferol (VITAMIN D) tablet 25 mcg   • clopidogrel (PLAVIX) tablet 75 mg   • cyanocobalamin (Vitamin B-12) tablet 1,000 mcg   • digoxin (LANOXIN) tablet 125 mcg   • dofetilide (TIKOSYN) capsule 125 mcg   • [Held by provider] furosemide (LASIX) tablet 20 mg   • isosorbide dinitrate (ISORDIL) tablet 20 mg   • magnesium oxide (MAG-OX) tablet 400 mg   • pantoprazole (PROTONIX) EC tablet 40 mg   • [Held by provider] rivaroxaban (XARELTO) tablet 20 mg   • prazosin (MINIPRESS) capsule 1 mg   • metoPROLOL succinate (TOPROL-XL) ER tablet 150 mg   • melatonin tablet 3 mg       Social History     Tobacco Use   • Smoking status: Former     Current packs/day: 0.00     Types: Cigarettes   • Smokeless tobacco: Never   Substance Use Topics   • Alcohol use: Not Currently     Alcohol/week: 1.7 standard drinks of alcohol     Types: 2 Standard drinks or equivalent per week     Comment: hardly ever       Family History   Problem Relation Age of Onset   • Cancer Mother         lymphoma    • Diabetes Father         niddm   • Heart disease Father        Exam:  Large area of erythema with central induration.  Centrally there may be some developing superficial skin necrosis.  No purulent drainage.  Is very tender to the touch.        A/P:  Right axillary soft tissue infection.  Suspect abscess.  Ultrasound today did not  04/02/21  0415   CHOLESTEROL mg/dL 182   TRIGLYCERIDES mg/dL 293*   HDL CHOL mg/dL 28*   LDL CHOL mg/dL 104*               Medication Review:   aspirin, 325 mg, Oral, Daily  enoxaparin, 40 mg, Subcutaneous, Q24H  furosemide, 40 mg, Intravenous, Q8H  insulin regular, 0-7 Units, Subcutaneous, Q6H  ipratropium-albuterol, 3 mL, Nebulization, 4x Daily - RT  piperacillin-tazobactam, 3.375 g, Intravenous, Q8H  potassium chloride, 10 mEq, Intravenous, Once  sodium chloride, 10 mL, Intravenous, Q12H         amiodarone, 0.5 mg/min, Last Rate: 0.5 mg/min (04/01/21 2156)  norepinephrine, 0.02-0.3 mcg/kg/min, Last Rate: 0.24 mcg/kg/min (04/02/21 0611)  Pharmacy to Dose Zosyn,   propofol, 5-50 mcg/kg/min, Last Rate: 45 mcg/kg/min (04/02/21 0610)        Assessment/Plan   1.  VF arrest  2.  Ischemic cardiomyopathy  3.  Coronary artery disease multivessel    VF arrest likely related to ischemic cardiomyopathy.  Coronary disease is severe with a very chronic appearing  of the RCA and lower branch of OM.  Neither of these were able to be intervened upon.  I will increase his beta-blockade as blood pressure tolerates.  The EP team will evaluate him for ICD prior to discharge. Will stop IV amio.   Rales on exam, continue diuresis lasix 40 q8 today.       Anna Puga MD  [facility]  04/02/21  10:30 EDT     demonstrate an abscess however still have a strong suspicion for abscess.  Blood cultures showing MRSA.  Patient has thrombocytopenia, on Plavix.  Last took his Xarelto yesterday.  High-risk for bleeding.  I do believe he would likely swelling seen incision and drainage.  Would like to hold Xarelto if at all possible 48 hours prior to her intervention.  Wound edges were marked today.  If worsening likely would need surgery tomorrow.  Plan will be perform incision and drainage.  Risks were explained to him including bleeding, infection, open wound.  He does understand there is increased risk for bleeding problems perioperatively.  Also explained to him that sometimes more than once surgery is needed to treat this problem.  Indications potential benefits were explained as well as questions were answered he agrees with the plan.  We will keep him NPO after midnight in case surgery is needed tomorrow.

## 2023-06-02 RX ORDER — FUROSEMIDE 40 MG/1
40 TABLET ORAL DAILY
Qty: 90 TABLET | Refills: 1 | OUTPATIENT
Start: 2023-06-02

## 2023-06-05 RX ORDER — METOPROLOL SUCCINATE 100 MG/1
100 TABLET, EXTENDED RELEASE ORAL EVERY 12 HOURS SCHEDULED
Qty: 120 TABLET | Refills: 5 | Status: SHIPPED | OUTPATIENT
Start: 2023-06-05

## 2023-06-05 RX ORDER — FUROSEMIDE 40 MG/1
40 TABLET ORAL DAILY
Qty: 90 TABLET | Refills: 1 | Status: SHIPPED | OUTPATIENT
Start: 2023-06-05

## 2023-06-05 NOTE — TELEPHONE ENCOUNTER
Last visit  01/19/2023  Upcoming visit  N/A  Last refill   08/04/2022 03/31/2022  Protocols:  No protocols seen

## 2023-07-18 ENCOUNTER — TELEPHONE (OUTPATIENT)
Dept: CASE MANAGEMENT | Facility: OTHER | Age: 63
End: 2023-07-18

## 2023-07-24 ENCOUNTER — TELEPHONE (OUTPATIENT)
Dept: CASE MANAGEMENT | Facility: OTHER | Age: 63
End: 2023-07-24
Payer: MEDICARE

## 2023-07-24 NOTE — TELEPHONE ENCOUNTER
[facility]. Eldon confirmed he is able to assist patient with PAP application. Per Eldon, he has made 2 attempts to reach patient but has not gotten a hold of him.

## 2023-07-25 ENCOUNTER — OFFICE VISIT (OUTPATIENT)
Dept: FAMILY MEDICINE CLINIC | Facility: CLINIC | Age: 63
End: 2023-07-25
Payer: MEDICARE

## 2023-07-25 ENCOUNTER — TELEPHONE (OUTPATIENT)
Dept: CASE MANAGEMENT | Facility: OTHER | Age: 63
End: 2023-07-25
Payer: MEDICARE

## 2023-07-25 VITALS
WEIGHT: 236 LBS | HEART RATE: 82 BPM | OXYGEN SATURATION: 94 % | BODY MASS INDEX: 38.11 KG/M2 | DIASTOLIC BLOOD PRESSURE: 75 MMHG | TEMPERATURE: 98 F | SYSTOLIC BLOOD PRESSURE: 169 MMHG

## 2023-07-25 DIAGNOSIS — I48.19 ATRIAL FIBRILLATION, PERSISTENT: ICD-10-CM

## 2023-07-25 DIAGNOSIS — R73.03 PREDIABETES: ICD-10-CM

## 2023-07-25 DIAGNOSIS — I50.9 ACUTE CONGESTIVE HEART FAILURE, UNSPECIFIED HEART FAILURE TYPE: ICD-10-CM

## 2023-07-25 DIAGNOSIS — I10 ESSENTIAL HYPERTENSION: Primary | ICD-10-CM

## 2023-07-25 DIAGNOSIS — I46.9 CARDIAC ARREST: ICD-10-CM

## 2023-07-25 DIAGNOSIS — Z95.810 ICD (IMPLANTABLE CARDIOVERTER-DEFIBRILLATOR) IN PLACE: ICD-10-CM

## 2023-07-25 PROCEDURE — 3077F SYST BP >= 140 MM HG: CPT | Performed by: STUDENT IN AN ORGANIZED HEALTH CARE EDUCATION/TRAINING PROGRAM

## 2023-07-25 PROCEDURE — 99214 OFFICE O/P EST MOD 30 MIN: CPT | Performed by: STUDENT IN AN ORGANIZED HEALTH CARE EDUCATION/TRAINING PROGRAM

## 2023-07-25 PROCEDURE — 3078F DIAST BP <80 MM HG: CPT | Performed by: STUDENT IN AN ORGANIZED HEALTH CARE EDUCATION/TRAINING PROGRAM

## 2023-07-25 RX ORDER — LISINOPRIL 40 MG/1
40 TABLET ORAL DAILY
Qty: 90 TABLET | Refills: 0 | Status: SHIPPED | OUTPATIENT
Start: 2023-07-25

## 2023-07-25 NOTE — PROGRESS NOTES
"Chief Complaint  Hypertension    Subjective        Sebastien Tang presents to [facility] PRIMARY CARE  History of Present Illness    Says blood pressure is high because he is here.  Retired a few months ago and is struggling to adjust.   Has gained some weight. Feels it is harder to stay active.  Taking mirtazapine to help with sleep. Has gained 32 lbs. Says he would prefer to cut back on eating than switching the medication.   Has been having some issues in his brain about things he says. Him and roommate retired at the same time.   Feels he has lost his drive and doesn't have a purpose since quitting work.   His eliquis is now very expensive. Has been taking it but not as he should. Has only been taking it once per day for cost.   Has some mild ringing in his ears but if he does TMJ exercises it improves.     Objective   Vital Signs:  /75 (BP Location: Right arm, Patient Position: Sitting, Cuff Size: Large Adult)   Pulse 82   Temp 98 °F (36.7 °C)   Wt 107 kg (236 lb)   SpO2 94%   BMI 38.11 kg/m²   Estimated body mass index is 38.11 kg/m² as calculated from the following:    Height as of 12/15/22: 167.6 cm (65.98\").    Weight as of this encounter: 107 kg (236 lb).             Physical Exam  Vitals and nursing note reviewed.   Constitutional:       General: He is not in acute distress.     Appearance: Normal appearance. He is not ill-appearing.   HENT:      Head: Normocephalic and atraumatic.      Nose: Nose normal.      Mouth/Throat:      Mouth: Mucous membranes are moist.   Eyes:      Extraocular Movements: Extraocular movements intact.      Conjunctiva/sclera: Conjunctivae normal.   Cardiovascular:      Rate and Rhythm: Normal rate and regular rhythm.      Heart sounds: Normal heart sounds. No murmur heard.    No gallop.   Pulmonary:      Effort: Pulmonary effort is normal. No respiratory distress.      Breath sounds: Normal breath sounds. No stridor. No wheezing, rhonchi or rales. "   Chest:      Chest wall: No tenderness.   Skin:     General: Skin is warm and dry.   Neurological:      General: No focal deficit present.      Mental Status: He is alert and oriented to person, place, and time. Mental status is at baseline.   Psychiatric:         Mood and Affect: Mood normal.         Behavior: Behavior normal.      Result Review :                   Assessment and Plan   Diagnoses and all orders for this visit:    1. Essential hypertension (Primary)  -     lisinopril (PRINIVIL,ZESTRIL) 40 MG tablet; Take 1 tablet by mouth Daily.  Dispense: 90 tablet; Refill: 0  -     Comprehensive metabolic panel  -     Ambulatory Referral to Cardiology  -     Lipid panel    2. Acute congestive heart failure, unspecified heart failure type  -     Ambulatory Referral to Cardiology  -     Lipid panel    3. Cardiac arrest  -     Ambulatory Referral to Cardiology  -     Lipid panel    4. ICD (implantable cardioverter-defibrillator) in place  -     Ambulatory Referral to Cardiology  -     Lipid panel    5. Prediabetes  -     Hemoglobin A1c  -     Lipid panel    6. Atrial fibrillation, persistent  -     TSH Rfx On Abnormal To Free T4      Eliquis samples provided.   Increased lisinopril as BP uncontrolled.  Other labs and orders as above.        Follow Up   Return in about 3 months (around 10/25/2023) for Chronic care.  Patient was given instructions and counseling regarding his condition or for health maintenance advice. Please see specific information pulled into the AVS if appropriate.

## 2023-07-31 RX ORDER — FINASTERIDE 5 MG/1
5 TABLET, FILM COATED ORAL DAILY
Qty: 90 TABLET | Refills: 1 | Status: SHIPPED | OUTPATIENT
Start: 2023-07-31

## 2023-08-03 ENCOUNTER — TELEPHONE (OUTPATIENT)
Dept: CASE MANAGEMENT | Facility: OTHER | Age: 63
End: 2023-08-03
Payer: MEDICARE

## 2023-08-04 ENCOUNTER — TELEPHONE (OUTPATIENT)
Dept: CASE MANAGEMENT | Facility: OTHER | Age: 63
End: 2023-08-04
Payer: MEDICARE

## 2023-08-15 NOTE — PROGRESS NOTES
"    Name: Sebastien Tang ADMIT: 2021   : 1960  PCP: Provider, No Known    MRN: [id number] LOS: 4 days   AGE/SEX: 61 y.o. male  ROOM: /     Subjective   Subjective   Patient appears stated age, unkempt, obese, relatively comfortable, and in no apparent distress.  Patient prefers to joke laughing \"they [hospital staff] gave me crack cocaine & a shot of whiskey\" and states chest pain \"feels like car rolled over it\"  spoke with RN.  Tolerating P.O.      Review of Systems   Constitutional: Negative for chills and fever.   Respiratory: Positive for shortness of breath. Negative for cough.    Cardiovascular: Positive for chest pain ('pressure'). Negative for leg swelling.   Gastrointestinal: Negative for abdominal pain, constipation, diarrhea, nausea and vomiting.   Genitourinary: Negative for difficulty urinating and dysuria.   Neurological: Negative for dizziness, facial asymmetry, weakness, light-headedness, numbness and headaches.   Psychiatric/Behavioral: Negative for confusion and hallucinations.   All other systems reviewed and are negative.       Objective   Objective   Vital Signs  Temp:  [97.5 °F (36.4 °C)-98.9 °F (37.2 °C)] 97.8 °F (36.6 °C)  Heart Rate:  [] 107  Resp:  [18-20] 18  BP: (115-143)/(62-95) 127/72  SpO2:  [93 %-96 %] 96 %  on  Flow (L/min):  [3-4] 4;   Device (Oxygen Therapy): nasal cannula  Body mass index is 34.43 kg/m².     Physical Exam  Constitutional:       Appearance: He is obese.      Comments: Unkempt   HENT:      Head: Normocephalic and atraumatic.   Eyes:      Extraocular Movements: Extraocular movements intact.      Conjunctiva/sclera: Conjunctivae normal.   Cardiovascular:      Rate and Rhythm: Tachycardia present. Rhythm irregular.      Heart sounds: Normal heart sounds.   Pulmonary:      Effort: Pulmonary effort is normal.      Comments: Diminished on expiration throughout all lung fields  Abdominal:      General: Bowel sounds are normal. There is no distension.    "   Palpations: Abdomen is soft.      Tenderness: There is no abdominal tenderness.   Musculoskeletal:         General: No tenderness.      Cervical back: Normal range of motion and neck supple.      Right lower leg: Edema (1+ pitting edema BLE) present.      Left lower leg: Edema present.   Skin:     General: Skin is warm and dry.   Neurological:      Mental Status: He is alert and oriented to person, place, and time.      Cranial Nerves: No cranial nerve deficit.   Psychiatric:         Thought Content: Thought content normal.      Comments: Somewhat odd behavior prefers to joke         Results Review     I reviewed the patient's new clinical results.  Results from last 7 days   Lab Units 04/05/21 0410 04/04/21 0404 04/03/21 0402 04/02/21  0415   WBC 10*3/mm3 8.29 12.10* 13.13* 13.87*   HEMOGLOBIN g/dL 11.6* 11.3* 12.5* 12.9*   PLATELETS 10*3/mm3 260 263 257 367     Results from last 7 days   Lab Units 04/05/21 0410 04/04/21 0404 04/03/21 0402 04/02/21  0415   SODIUM mmol/L 137 137 142 137   POTASSIUM mmol/L 4.5 4.0 3.9 4.3   CHLORIDE mmol/L 100 97* 101 102   CO2 mmol/L 26.9 28.9 29.4* 22.8   BUN mg/dL 26* 27* 22 24*   CREATININE mg/dL 0.59* 0.72* 0.88 1.25   GLUCOSE mg/dL 99 119* 105* 134*   Estimated Creatinine Clearance: 139.6 mL/min (A) (by C-G formula based on SCr of 0.59 mg/dL (L)).  Results from last 7 days   Lab Units 04/05/21 0410 04/04/21 0404 04/02/21 0415 04/01/21  1212   ALBUMIN g/dL 3.70 3.90 3.90 4.20   BILIRUBIN mg/dL 0.5 0.9 0.3 0.2   ALK PHOS U/L 62 61 67 81   AST (SGOT) U/L 54* 74* 107* 53*   ALT (SGPT) U/L 47* 46* 59* 43*     Results from last 7 days   Lab Units 04/05/21 0410 04/04/21 0404 04/03/21  0402 04/02/21  0415 04/01/21  1212   CALCIUM mg/dL 9.8 9.6 9.3 8.8 9.7   ALBUMIN g/dL 3.70 3.90  --  3.90 4.20   MAGNESIUM mg/dL  --   --   --  2.4  --    PHOSPHORUS mg/dL  --   --   --  2.9  --      Results from last 7 days   Lab Units 04/03/21  1203 04/02/21  0415   PROCALCITONIN ng/mL  69M with PMHx DM2, HTN, HLD A-fib (on Eliquis), TBI (s/p SDH with EVAC on 9/2021), and traumatic subdural hemorrhage in 10/2022, BIBEMS with hypoxia, nausea and vomiting. Admitted to ICU for sepsis pneumonia, bin, and metabolic acidosis. Cardiologist: Dr. Mirta Goldsmith    A fib, HTN, HLD  - EKG showed Afib, no acute changes on EKG compared to previous.  - No clear evidence of acute ischemia, trops negative x 2.   - Continue Lipitor     - Pt currently in AFib, was initially tachy, now rate controlled on tele   - Patient previously on Eliquis, held at this time due to acute anemia.   - In the past when AC was held due to subdural hemorrhage, patient developed DVT.  - Hold AC for now, but resume as soon as it's safe.  - Restart patient's home diltiazem for rate control at a lower dose 30 Q6H given recent use of pressors. Up titrate as tolerated     - Previous TTE on 6/11/23 showed LVEF 60-65% with trace mitral regurgitation.  - No meaningful evidence of volume overload.    - Patient initially required pressors during this admission for hypotension. Patient was weaned off of pressors this morning.  - BP stable and controlled with SBP 90-130s     - Monitor and replete lytes, keep K>4, Mg>2.  - Will continue to follow.    Erwin Edmondson, MS FNP, AGAP  Nurse Practitioner- Cardiology   Call teams (preferred)  Spectra #[phone] /[phone] 2.13* 7.44*   LACTATE mmol/L  --  1.6     COVID19   Date Value Ref Range Status   04/01/2021 Not Detected Not Detected - Ref. Range Final     Glucose   Date/Time Value Ref Range Status   04/04/2021 1537 136 (H) 70 - 130 mg/dL Final   04/04/2021 1031 120 70 - 130 mg/dL Final   04/04/2021 0558 121 70 - 130 mg/dL Final   04/03/2021 2318 122 70 - 130 mg/dL Final   04/03/2021 1932 145 (H) 70 - 130 mg/dL Final   04/03/2021 1800 146 (H) 70 - 130 mg/dL Final   04/03/2021 1140 125 70 - 130 mg/dL Final       XR Chest 1 View  Narrative: SINGLE VIEW OF THE CHEST     HISTORY: Respiratory failure     COMPARISON: 04/02/2021     FINDINGS:  Cardiomegaly is present. Endotracheal tube has been removed. There are  bilateral alveolar and interstitial infiltrates. These do not appear  significantly changed when compared to prior exam. No pneumothorax or  pleural effusion is seen.     Impression: No significant interval change in bilateral alveolar and interstitial  infiltrates.     This report was finalized on 4/3/2021 3:37 AM by Dr. Judi Chow M.D.       Scheduled Medications  amoxicillin-clavulanate, 1 tablet, Oral, Q12H  aspirin, 81 mg, Oral, Daily  budesonide-formoterol, 2 puff, Inhalation, BID - RT  enoxaparin, 40 mg, Subcutaneous, Nightly  furosemide, 40 mg, Intravenous, Q12H  lisinopril, 5 mg, Oral, Q24H  metoprolol succinate XL, 50 mg, Oral, Q12H  nicotine, 1 patch, Transdermal, Q24H  potassium chloride, 10 mEq, Intravenous, Once  sodium chloride, 10 mL, Intravenous, Q12H  tiotropium bromide monohydrate, 2 puff, Inhalation, Daily - RT    Infusions   Diet  Diet Regular; Cardiac       Assessment/Plan     Active Hospital Problems    Diagnosis  POA   • **Cardiac arrest (CMS/HCC) [I46.9]  Yes   • Atrial flutter (CMS/HCC) [I48.92]  Unknown   • Tobacco abuse [Z72.0]  Yes   • Azotemia [R79.89]  Unknown   • COPD (chronic obstructive pulmonary disease) (CMS/HCC) [J44.9]  Yes   • MRSA nasal colonization [Z22.322]  Not Applicable   •  Transaminitis [R74.01]  Yes   • Obesity (BMI 30-39.9) [E66.9]  Yes   • Ischemic cardiomyopathy [I25.5]  Yes   • Anemia [D64.9]  Unknown   • Ventricular fibrillation (CMS/HCC) [I49.01]  Unknown      Resolved Hospital Problems   No resolved problems to display.       61 y.o. male admitted with Cardiac arrest (CMS/HCC).      Cardiac arrest (CMS/HCC) / Ventricular fibrillation (CMS/HCC) / Atrial flutter (CMS/HCC) / Ischemic cardiomyopathy.  Cardiology following--plan discontinue diltiazem gtt started overnight as tachycardia believed to be related to hypoxia, CHF.  IV furosemide 40 mg BID for now.  BB with ACE for CHF, ASA 81 mg for now, & ICD this week when mentation improved.    Tobacco abuse.  Recommend smoking cessation per hospital protocol.     Azotemia.  Stable with trend.    COPD (chronic obstructive pulmonary disease) (CMS/HCC) / MRSA nasal colonization.  Pulmonary following--plan continue antibiotics (procal 2.13 on 4/3/2021): IV vancomycin, 5 days course Augmentin BID.  Bronchodilators PRN.  PFTs in OP setting.    Transaminitis.  LFTs stable with trend.  Likely hepatic congestion 2/2 LVEF 38% / CHF.    Anemia.  Serum Hgb decreasing with trend.  No overt signs of active bleeding.  Ordering iron profile, ferritin to eval further.     Obesity (BMI 30-39.9).  Complicating all problems.     · enoxaparin for DVT prophylaxis.  · CPR  · Discussed with Dr. Tatum.  · Anticipate discharge pending clinical course, plan AICD this week per cards / CCP consulted for anticipated needs at [facility].      ERIC Miramontes  [facility]  04/05/21  15:08 EDT

## 2023-08-28 ENCOUNTER — PATIENT MESSAGE (OUTPATIENT)
Dept: CARDIOLOGY | Facility: CLINIC | Age: 63
End: 2023-08-28
Payer: MEDICARE

## 2023-09-13 ENCOUNTER — PATIENT MESSAGE (OUTPATIENT)
Dept: CARDIOLOGY | Facility: CLINIC | Age: 63
End: 2023-09-13
Payer: MEDICARE

## 2023-10-04 ENCOUNTER — TELEPHONE (OUTPATIENT)
Dept: FAMILY MEDICINE CLINIC | Facility: CLINIC | Age: 63
End: 2023-10-04

## 2023-10-04 NOTE — TELEPHONE ENCOUNTER
Caller: Sebastien Tang    Relationship: Self    Best call back number: [phone]    What is the best time to reach you: ANYTIME     Who are you requesting to speak with (clinical staff, provider,  specific staff member): CLINICAL STAFF     Do you know the name of the person who called: SELF     What was the call regarding: PATIENT IS RETURNING CALL. PLEASE CALL    Is it okay if the provider responds through MyChart: YES

## 2023-10-09 RX ORDER — MIRTAZAPINE 15 MG/1
15 TABLET, FILM COATED ORAL NIGHTLY
Qty: 90 TABLET | Refills: 0 | Status: SHIPPED | OUTPATIENT
Start: 2023-10-09

## 2023-10-26 DIAGNOSIS — I10 ESSENTIAL HYPERTENSION: ICD-10-CM

## 2023-10-27 DIAGNOSIS — R06.02 SHORTNESS OF BREATH: ICD-10-CM

## 2023-10-27 RX ORDER — LISINOPRIL 40 MG/1
40 TABLET ORAL DAILY
Qty: 90 TABLET | Refills: 0 | Status: SHIPPED | OUTPATIENT
Start: 2023-10-27

## 2023-10-27 RX ORDER — ALBUTEROL SULFATE 90 UG/1
2 AEROSOL, METERED RESPIRATORY (INHALATION) EVERY 6 HOURS PRN
Qty: 18 G | Refills: 3 | Status: SHIPPED | OUTPATIENT
Start: 2023-10-27

## 2023-11-09 ENCOUNTER — OFFICE VISIT (OUTPATIENT)
Age: 63
End: 2023-11-09
Payer: MEDICARE

## 2023-11-09 ENCOUNTER — CLINICAL SUPPORT NO REQUIREMENTS (OUTPATIENT)
Age: 63
End: 2023-11-09
Payer: MEDICARE

## 2023-11-09 VITALS
BODY MASS INDEX: 38.89 KG/M2 | HEIGHT: 66 IN | HEART RATE: 71 BPM | DIASTOLIC BLOOD PRESSURE: 82 MMHG | WEIGHT: 242 LBS | SYSTOLIC BLOOD PRESSURE: 146 MMHG

## 2023-11-09 DIAGNOSIS — I25.5 ISCHEMIC CARDIOMYOPATHY: ICD-10-CM

## 2023-11-09 DIAGNOSIS — I46.9 CARDIAC ARREST: Primary | ICD-10-CM

## 2023-11-09 DIAGNOSIS — I48.19 ATRIAL FIBRILLATION, PERSISTENT: ICD-10-CM

## 2023-11-09 DIAGNOSIS — I48.0 PAROXYSMAL ATRIAL FIBRILLATION: Primary | ICD-10-CM

## 2023-11-09 DIAGNOSIS — I50.41 ACUTE COMBINED SYSTOLIC AND DIASTOLIC CONGESTIVE HEART FAILURE: ICD-10-CM

## 2023-11-09 DIAGNOSIS — I25.810 CORONARY ARTERY DISEASE INVOLVING CORONARY BYPASS GRAFT OF NATIVE HEART WITHOUT ANGINA PECTORIS: ICD-10-CM

## 2023-11-09 NOTE — PROGRESS NOTES
Date of Office Visit: 2023  Encounter Provider: ERIC Walter  Place of Service: [facility] CARDIOLOGY  Patient Name: Sebastien Tang  :1960    Chief Complaint   Patient presents with    Cardiomyopathy    Cardiac Arrest    paroxysmal AFIB    Pacemaker Check   :     HPI: Sebastien Tang is a 63 y.o. male who follows with Dr. Rosas for persistent AF and SCD--s/p SC ICD ().     He had SCD and underwent SC ICD in .     Saw Dr. Rosas last year. He was doing well. No AF on device. Class II NYHA symptoms. No AF symptoms. Was advised to follow up in one year.               He presents today for follow up appt.     Since last visit he has been doing well.    He has stable class II NYHA symptoms.    He is on GDMT with metoprolol, lisinopril, and Lasix.    He is little upset about being charged for remote monitoring for his device.  He says he was not told about this when it was originally placed.  Offered have him come in every 3 months but he declined this.    He has only been taking apixaban 1 day.  He currently is unemployed.  Says it is causing him $150 a month.    He had persistent atrial fibrillation underwent cardioversion years ago.  Since then he has had very few episodes of PAF.    Device testing and function in office today.  0% RV pacing.  2% burden of A-fib.  He had 1 episode lasting about an hour and a half.    On apixaban but only taking once a day          Past Medical History:   Diagnosis Date    Acidosis     mixed resp/metabolic acidosis    Acute congestive heart failure     Acute respiratory failure     hypoxic    Anemia     Asthma     Atrial flutter     Azotemia     Cardiac arrest 2021    Chronic coronary artery disease     Constipation     COPD (chronic obstructive pulmonary disease)     Enlarged prostate     Hypertension     Hypokalemia     severe    ICD (implantable cardioverter-defibrillator) in place     Ischemic cardiomyopathy     MRSA nasal  colonization     Obesity (BMI 30-39.9)     Orthopnea     PAF (paroxysmal atrial fibrillation)     Persistent atrial fibrillation     Pulmonary edema     Tobacco abuse     Transaminitis     Trouble in sleeping     Ventricular fibrillation        Past Surgical History:   Procedure Laterality Date    CARDIAC CATHETERIZATION Left 2021    Procedure: Coronary Angiography;  Surgeon: Anna Puga MD;  Location:  [facility];  Service: Cardiology;  Laterality: Left;    CARDIAC CATHETERIZATION N/A 2021    Procedure: Left ventriculography;  Surgeon: Anna Puga MD;  Location:  [facility];  Service: Cardiology;  Laterality: N/A;    CARDIAC CATHETERIZATION N/A 2021    Procedure: Left Heart Cath;  Surgeon: Anna Puga MD;  Location:  [facility];  Service: Cardiology;  Laterality: N/A;    CARDIAC ELECTROPHYSIOLOGY PROCEDURE N/A 2021    Procedure: IMPLANTABLE CARDIOVERTER DEFIBRILLATOR- SC BIOTRONIK;  Surgeon: Nik Rosas MD;  Location:  [facility];  Service: Cardiovascular;  Laterality: N/A;    CARDIOVERSION  2021    Dr. Rosas    CARDIOVERSION  2021    Dr. Rosas    TONSILLECTOMY         Social History     Socioeconomic History    Marital status: Single   Tobacco Use    Smoking status: Former     Packs/day: 1.50     Years: 10.00     Additional pack years: 0.00     Total pack years: 15.00     Types: Cigarettes     Quit date: 2021     Years since quittin.5    Smokeless tobacco: Never    Tobacco comments:     2021   Vaping Use    Vaping Use: Never used   Substance and Sexual Activity    Alcohol use: Yes     Comment: 1 BEER DAILY    Drug use: Not Currently    Sexual activity: Not Currently       No family history on file.    Review of Systems   Constitutional: Negative for chills, fever and malaise/fatigue.   Cardiovascular:  Positive for dyspnea on exertion. Negative for chest pain, leg swelling,  near-syncope, orthopnea, palpitations, paroxysmal nocturnal dyspnea and syncope.   Respiratory:  Positive for shortness of breath. Negative for cough.    Hematologic/Lymphatic: Negative.    Musculoskeletal:  Negative for joint pain, joint swelling and myalgias.   Gastrointestinal:  Negative for abdominal pain, diarrhea, melena, nausea and vomiting.   Genitourinary:  Negative for frequency and hematuria.   Neurological:  Negative for light-headedness, numbness, paresthesias and seizures.   Allergic/Immunologic: Negative.    All other systems reviewed and are negative.      No Known Allergies      Current Outpatient Medications:     albuterol sulfate  (90 Base) MCG/ACT inhaler, Inhale 2 puffs Every 6 (Six) Hours As Needed for Wheezing., Disp: 18 g, Rfl: 3    apixaban (Eliquis) 5 MG tablet tablet, Take 1 tablet by mouth Every 12 (Twelve) Hours., Disp: 60 tablet, Rfl: 3    aspirin 81 MG EC tablet, Take 1 tablet by mouth Daily., Disp: 30 tablet, Rfl: 0    Diclofenac Sodium (VOLTAREN) 1 % gel gel, Apply 4 g topically to the appropriate area as directed 2 (Two) Times a Day., Disp: 100 g, Rfl: 0    finasteride (PROSCAR) 5 MG tablet, Take 1 tablet by mouth Daily., Disp: 90 tablet, Rfl: 1    furosemide (LASIX) 40 MG tablet, Take 1 tablet by mouth Daily., Disp: 90 tablet, Rfl: 1    lisinopril (PRINIVIL,ZESTRIL) 40 MG tablet, TAKE 1 TABLET BY MOUTH DAILY, Disp: 90 tablet, Rfl: 0    metoprolol succinate XL (TOPROL-XL) 100 MG 24 hr tablet, Take 1 tablet by mouth Every 12 (Twelve) Hours., Disp: 120 tablet, Rfl: 5    mirtazapine (REMERON) 15 MG tablet, Take 1 tablet by mouth Every Night., Disp: 90 tablet, Rfl: 0    potassium chloride (K-DUR,KLOR-CON) 20 MEQ CR tablet, Take 1 tablet by mouth Daily., Disp: 14 tablet, Rfl: 0    Symbicort 160-4.5 MCG/ACT inhaler, Inhale 2 puffs 2 (Two) Times a Day., Disp: 10.2 g, Rfl: 3      Objective:     Vitals:    11/09/23 1417   BP: 146/82   Pulse: 71   Weight: 110 kg (242 lb)   Height:  "167.6 cm (66\")     Body mass index is 39.06 kg/m².    PHYSICAL EXAM:    Vitals Reviewed.   General Appearance: No acute distress, well developed and well nourished.   Eyes: Conjunctiva and lids: No erythema, swelling, or discharge. Sclera non-icteric.   HENT: Atraumatic, normocephalic. External eyes, ears, and nose normal.   Respiratory: No signs of respiratory distress. Respiration rhythm and depth normal.   Clear to auscultation. No rales, crackles, rhonchi, or wheezing auscultated.   Cardiovascular:  Heart Rate and Rhythm: Normal, Heart Sounds: Normal S1 and S2. No S3 or S4 noted.  Gastrointestinal:  Abdomen soft, non-distended, non-tender.   Musculoskeletal: Normal movement of extremities  Skin: Warm and dry.   Psychiatric: Patient alert and oriented to person, place, and time. Speech and behavior appropriate. Normal mood and affect.       ECG 12 Lead    Date/Time: 11/9/2023 4:59 PM  Performed by: Ron Jara APRN    Authorized by: Ron Jara APRN  Comparison: compared with previous ECG   Similar to previous ECG  Rhythm: sinus rhythm            Assessment:       Diagnosis Plan   1. Cardiac arrest        2. Coronary artery disease involving coronary bypass graft of native heart without angina pectoris        3. Acute combined systolic and diastolic congestive heart failure        4. Ischemic cardiomyopathy        5. Atrial fibrillation, persistent               Plan:   1-4. SCD, ICM, CAD--s/p SC ICD (2021)--- he has stable class II NYHA symptoms.  He is on GDMT with ACE, BB, loop.  Normal device testing and function in office today.  RV pacing 0%.  He has 2% A-fib burden with longest episode about an hour.    5. Persistent AF--- he is now actually paroxysmal.  He had a couple episodes on his device, overall 2% burden.  Longest episode was about 1 hour.  He was only taking his apixaban once a day due to cost.    A long discussion about taking anticoagulation as prescribed for efficacy.  He is having some " issues with cost.  We discussed possibly enrolling him in our AF study and he wants more information on this.        Follow up in one year       As always, it has been a pleasure to participate in your patient's care.      Sincerely,         ERIC Walter

## 2023-12-01 ENCOUNTER — TELEPHONE (OUTPATIENT)
Dept: FAMILY MEDICINE CLINIC | Facility: CLINIC | Age: 63
End: 2023-12-01
Payer: MEDICARE

## 2023-12-01 NOTE — TELEPHONE ENCOUNTER
OK for Hub to relay    I called patient to schedule his Medicare Wellness Visit before the end of this year with Dr. Flynn. I could not leave a message because the call went to a busy tone after ringing for a couple minutes. [facility] if the patient agrees.

## 2023-12-05 ENCOUNTER — TELEPHONE (OUTPATIENT)
Dept: FAMILY MEDICINE CLINIC | Facility: CLINIC | Age: 63
End: 2023-12-05
Payer: MEDICARE

## 2023-12-05 NOTE — TELEPHONE ENCOUNTER
OK for Hub to relay     I called patient to schedule his Medicare Wellness Visit before the end of this year with Dr. Flynn. I could not leave a message because the call went to a busy tone after ringing for a couple minutes. [facility] if the patient agrees.

## 2023-12-27 ENCOUNTER — TELEPHONE (OUTPATIENT)
Dept: FAMILY MEDICINE CLINIC | Facility: CLINIC | Age: 63
End: 2023-12-27

## 2023-12-27 DIAGNOSIS — I48.91 ATRIAL FIBRILLATION, UNSPECIFIED TYPE: ICD-10-CM

## 2023-12-27 RX ORDER — MIRTAZAPINE 15 MG/1
15 TABLET, FILM COATED ORAL NIGHTLY
Qty: 90 TABLET | Refills: 0 | Status: SHIPPED | OUTPATIENT
Start: 2023-12-27

## 2023-12-27 NOTE — TELEPHONE ENCOUNTER
Caller: Clyde Sebastien KRYSTAL    Relationship: Self    Best call back number: [phone]     Requested Prescriptions:     apixaban (Eliquis) 5 MG tablet tablet     Requested Prescriptions      No prescriptions requested or ordered in this encounter        Pharmacy where request should be sent:    [id number] [address] AT [facility] - [phone]  - [phone]  [phone]   Last office visit with prescribing clinician: 7/25/2023   Last telemedicine visit with prescribing clinician: Visit date not found   Next office visit with prescribing clinician: Visit date not found     Additional details provided by patient: PATIENT IS CALLING TO REQUEST A NEW PRESCRIPTION FOR THE ABOVE MEDICATION.  THIS WILL BE A FIRST TIME FOR DR MENDENHALL TO FILL.    Does the patient have less than a 3 day supply:  [x] Yes  [] No    Would you like a call back once the refill request has been completed: [] Yes [] No    If the office needs to give you a call back, can they leave a voicemail: [] Yes [] No    Barbra Wild, Kelli Rep   12/27/23 09:23 EST     PLEASE ADVISE.

## 2024-01-29 RX ORDER — FINASTERIDE 5 MG/1
5 TABLET, FILM COATED ORAL DAILY
Qty: 90 TABLET | Refills: 0 | Status: SHIPPED | OUTPATIENT
Start: 2024-01-29

## 2024-01-30 DIAGNOSIS — J44.1 COPD WITH EXACERBATION: ICD-10-CM

## 2024-01-31 RX ORDER — BUDESONIDE AND FORMOTEROL FUMARATE DIHYDRATE 160; 4.5 UG/1; UG/1
2 AEROSOL RESPIRATORY (INHALATION) 2 TIMES DAILY
Qty: 10.2 G | Refills: 3 | Status: SHIPPED | OUTPATIENT
Start: 2024-01-31

## 2024-02-07 DIAGNOSIS — I10 ESSENTIAL HYPERTENSION: ICD-10-CM

## 2024-02-07 RX ORDER — LISINOPRIL 40 MG/1
40 TABLET ORAL DAILY
Qty: 90 TABLET | Refills: 0 | Status: SHIPPED | OUTPATIENT
Start: 2024-02-07

## 2024-02-09 ENCOUNTER — TELEPHONE (OUTPATIENT)
Age: 64
End: 2024-02-09

## 2024-02-09 PROCEDURE — 93295 DEV INTERROG REMOTE 1/2/MLT: CPT | Performed by: INTERNAL MEDICINE

## 2024-02-09 PROCEDURE — 93296 REM INTERROG EVL PM/IDS: CPT | Performed by: INTERNAL MEDICINE

## 2024-02-09 NOTE — TELEPHONE ENCOUNTER
The pt had a remote from his Biotronik ICD device come through today that shows increased heart rates with AF. He has a hx of PAF and is on AC. The device alerts stating on 2/9 HR's during AT/AF above limit (> 130 bpm for > 30% of day). Since 2/2 report he has had 273 new atrial monitoring episodes and 18 mode switches - during transmission he was currently in an atrial event with high V rates in the 130s-140s bpm (see strip below). AT/AF burden is 9.2%. The longest ATR seen on the events list since last remote on 2/5 for 12h 20min with rates also in the 130s bpm. I tried to call the pt x 2 to see how he was doing but the number rang and never took me to a voicemail.     Kindly,   Mariela Chappell Device RN     Current event:

## 2024-02-12 NOTE — TELEPHONE ENCOUNTER
Yes, I tried again and it rings but goes nowhere. I left a voicemail for his contact number listed to call us back, I also sent him a [employer] message. Hopefully, he will contact us.   Kindly,   Mariela

## 2024-03-13 NOTE — TELEPHONE ENCOUNTER
Remote received 3/13/24 and update below     1 HVR labeled event listed as ongoing- EGM shows AF w/ high V rates in the 120s  99 AF events on logbook- longest lasting 2/29/24 for 5 hr 2 min avg V rate 120s.    [place] per day is listed as 88.1%    Per HR graphs appears pt has been in AF since last remote and HRs have remained elevated 100-120s  Pt does have known hx of AF and is on apixaban but HR continues to stay high with AF.     I tried calling pt listed # but no answer and no VM available- I also tried alternative contact listed and LVM with callback to see how pt is doing appears   Pt was sent certified letter per epic msg 2/9/24    It does not appear sooner appt was ever made, still scheduled for device check and Dr. Rosas appt 11/12/24

## 2024-03-15 ENCOUNTER — HOSPITAL ENCOUNTER (INPATIENT)
Facility: HOSPITAL | Age: 64
LOS: 7 days | Discharge: HOME | DRG: 291 | End: 2024-03-23
Attending: EMERGENCY MEDICINE | Admitting: HOSPITALIST
Payer: MEDICARE

## 2024-03-15 ENCOUNTER — APPOINTMENT (OUTPATIENT)
Dept: GENERAL RADIOLOGY | Facility: HOSPITAL | Age: 64
DRG: 291 | End: 2024-03-15
Payer: MEDICARE

## 2024-03-15 ENCOUNTER — APPOINTMENT (OUTPATIENT)
Dept: CT IMAGING | Facility: HOSPITAL | Age: 64
DRG: 291 | End: 2024-03-15
Payer: MEDICARE

## 2024-03-15 DIAGNOSIS — R00.0 TACHYCARDIA: ICD-10-CM

## 2024-03-15 DIAGNOSIS — J44.9 CHRONIC OBSTRUCTIVE PULMONARY DISEASE, UNSPECIFIED COPD TYPE: ICD-10-CM

## 2024-03-15 DIAGNOSIS — I50.9 ACUTE CONGESTIVE HEART FAILURE, UNSPECIFIED HEART FAILURE TYPE: ICD-10-CM

## 2024-03-15 DIAGNOSIS — J44.1 COPD WITH EXACERBATION: ICD-10-CM

## 2024-03-15 DIAGNOSIS — J96.01 ACUTE RESPIRATORY FAILURE WITH HYPOXIA: Primary | ICD-10-CM

## 2024-03-15 DIAGNOSIS — G47.33 OSA (OBSTRUCTIVE SLEEP APNEA): ICD-10-CM

## 2024-03-15 DIAGNOSIS — I48.19 ATRIAL FIBRILLATION, PERSISTENT: ICD-10-CM

## 2024-03-15 DIAGNOSIS — I50.23 ACUTE ON CHRONIC SYSTOLIC CHF (CONGESTIVE HEART FAILURE): ICD-10-CM

## 2024-03-15 LAB
ALBUMIN SERPL-MCNC: 3.9 G/DL (ref 3.5–5.2)
ALBUMIN/GLOB SERPL: 1.5 G/DL
ALP SERPL-CCNC: 73 U/L (ref 39–117)
ALT SERPL W P-5'-P-CCNC: 30 U/L (ref 1–41)
ANION GAP SERPL CALCULATED.3IONS-SCNC: 13.8 MMOL/L (ref 5–15)
AST SERPL-CCNC: 37 U/L (ref 1–40)
B PARAPERT DNA SPEC QL NAA+PROBE: NOT DETECTED
B PERT DNA SPEC QL NAA+PROBE: NOT DETECTED
BASOPHILS # BLD AUTO: 0.08 10*3/MM3 (ref 0–0.2)
BASOPHILS NFR BLD AUTO: 1.1 % (ref 0–1.5)
BILIRUB SERPL-MCNC: 0.5 MG/DL (ref 0–1.2)
BUN SERPL-MCNC: 16 MG/DL (ref 8–23)
BUN/CREAT SERPL: 15.8 (ref 7–25)
C PNEUM DNA NPH QL NAA+NON-PROBE: NOT DETECTED
CALCIUM SPEC-SCNC: 9.4 MG/DL (ref 8.6–10.5)
CHLORIDE SERPL-SCNC: 104 MMOL/L (ref 98–107)
CO2 SERPL-SCNC: 23.2 MMOL/L (ref 22–29)
CREAT SERPL-MCNC: 1.01 MG/DL (ref 0.76–1.27)
D DIMER PPP FEU-MCNC: 0.54 MCGFEU/ML (ref 0–0.66)
DEPRECATED RDW RBC AUTO: 41.9 FL (ref 37–54)
EGFRCR SERPLBLD CKD-EPI 2021: 82 ML/MIN/1.73
EOSINOPHIL # BLD AUTO: 0.09 10*3/MM3 (ref 0–0.4)
EOSINOPHIL NFR BLD AUTO: 1.2 % (ref 0.3–6.2)
ERYTHROCYTE [DISTWIDTH] IN BLOOD BY AUTOMATED COUNT: 15.7 % (ref 12.3–15.4)
FLUAV SUBTYP SPEC NAA+PROBE: NOT DETECTED
FLUBV RNA ISLT QL NAA+PROBE: NOT DETECTED
GEN 5 2HR TROPONIN T REFLEX: 33 NG/L
GLOBULIN UR ELPH-MCNC: 2.6 GM/DL
GLUCOSE SERPL-MCNC: 119 MG/DL (ref 65–99)
HADV DNA SPEC NAA+PROBE: NOT DETECTED
HCOV 229E RNA SPEC QL NAA+PROBE: NOT DETECTED
HCOV HKU1 RNA SPEC QL NAA+PROBE: NOT DETECTED
HCOV NL63 RNA SPEC QL NAA+PROBE: NOT DETECTED
HCOV OC43 RNA SPEC QL NAA+PROBE: NOT DETECTED
HCT VFR BLD AUTO: 34.5 % (ref 37.5–51)
HGB BLD-MCNC: 10.1 G/DL (ref 13–17.7)
HMPV RNA NPH QL NAA+NON-PROBE: NOT DETECTED
HPIV1 RNA ISLT QL NAA+PROBE: NOT DETECTED
HPIV2 RNA SPEC QL NAA+PROBE: NOT DETECTED
HPIV3 RNA NPH QL NAA+PROBE: NOT DETECTED
HPIV4 P GENE NPH QL NAA+PROBE: NOT DETECTED
IMM GRANULOCYTES # BLD AUTO: 0.04 10*3/MM3 (ref 0–0.05)
IMM GRANULOCYTES NFR BLD AUTO: 0.6 % (ref 0–0.5)
LYMPHOCYTES # BLD AUTO: 0.59 10*3/MM3 (ref 0.7–3.1)
LYMPHOCYTES NFR BLD AUTO: 8.1 % (ref 19.6–45.3)
M PNEUMO IGG SER IA-ACNC: NOT DETECTED
MAGNESIUM SERPL-MCNC: 2.2 MG/DL (ref 1.6–2.4)
MCH RBC QN AUTO: 22.2 PG (ref 26.6–33)
MCHC RBC AUTO-ENTMCNC: 29.3 G/DL (ref 31.5–35.7)
MCV RBC AUTO: 76 FL (ref 79–97)
MONOCYTES # BLD AUTO: 0.25 10*3/MM3 (ref 0.1–0.9)
MONOCYTES NFR BLD AUTO: 3.4 % (ref 5–12)
NEUTROPHILS NFR BLD AUTO: 6.2 10*3/MM3 (ref 1.7–7)
NEUTROPHILS NFR BLD AUTO: 85.6 % (ref 42.7–76)
NRBC BLD AUTO-RTO: 0.3 /100 WBC (ref 0–0.2)
NT-PROBNP SERPL-MCNC: 2284 PG/ML (ref 0–900)
PLATELET # BLD AUTO: 284 10*3/MM3 (ref 140–450)
PMV BLD AUTO: 9.2 FL (ref 6–12)
POTASSIUM SERPL-SCNC: 3.7 MMOL/L (ref 3.5–5.2)
PROCALCITONIN SERPL-MCNC: 0.11 NG/ML (ref 0–0.25)
PROT SERPL-MCNC: 6.5 G/DL (ref 6–8.5)
QT INTERVAL: 375 MS
QT INTERVAL: 386 MS
QTC INTERVAL: 530 MS
QTC INTERVAL: 553 MS
RBC # BLD AUTO: 4.54 10*6/MM3 (ref 4.14–5.8)
RHINOVIRUS RNA SPEC NAA+PROBE: NOT DETECTED
RSV RNA NPH QL NAA+NON-PROBE: NOT DETECTED
SARS-COV-2 RNA NPH QL NAA+NON-PROBE: NOT DETECTED
SODIUM SERPL-SCNC: 141 MMOL/L (ref 136–145)
TROPONIN T DELTA: -1 NG/L
TROPONIN T SERPL HS-MCNC: 34 NG/L
WBC NRBC COR # BLD AUTO: 7.25 10*3/MM3 (ref 3.4–10.8)

## 2024-03-15 PROCEDURE — G0378 HOSPITAL OBSERVATION PER HR: HCPCS

## 2024-03-15 PROCEDURE — 36415 COLL VENOUS BLD VENIPUNCTURE: CPT

## 2024-03-15 PROCEDURE — 80053 COMPREHEN METABOLIC PANEL: CPT | Performed by: EMERGENCY MEDICINE

## 2024-03-15 PROCEDURE — 94761 N-INVAS EAR/PLS OXIMETRY MLT: CPT

## 2024-03-15 PROCEDURE — 93010 ELECTROCARDIOGRAM REPORT: CPT | Performed by: INTERNAL MEDICINE

## 2024-03-15 PROCEDURE — 25010000002 FUROSEMIDE PER 20 MG: Performed by: EMERGENCY MEDICINE

## 2024-03-15 PROCEDURE — 25010000002 DIGOXIN PER 500 MCG: Performed by: INTERNAL MEDICINE

## 2024-03-15 PROCEDURE — 90791 PSYCH DIAGNOSTIC EVALUATION: CPT

## 2024-03-15 PROCEDURE — 25010000002 THIAMINE HCL 200 MG/2ML SOLUTION: Performed by: HOSPITALIST

## 2024-03-15 PROCEDURE — 93005 ELECTROCARDIOGRAM TRACING: CPT | Performed by: EMERGENCY MEDICINE

## 2024-03-15 PROCEDURE — 93005 ELECTROCARDIOGRAM TRACING: CPT

## 2024-03-15 PROCEDURE — 83735 ASSAY OF MAGNESIUM: CPT | Performed by: EMERGENCY MEDICINE

## 2024-03-15 PROCEDURE — 84145 PROCALCITONIN (PCT): CPT | Performed by: EMERGENCY MEDICINE

## 2024-03-15 PROCEDURE — 83880 ASSAY OF NATRIURETIC PEPTIDE: CPT | Performed by: EMERGENCY MEDICINE

## 2024-03-15 PROCEDURE — 94664 DEMO&/EVAL PT USE INHALER: CPT

## 2024-03-15 PROCEDURE — 84484 ASSAY OF TROPONIN QUANT: CPT | Performed by: EMERGENCY MEDICINE

## 2024-03-15 PROCEDURE — 94640 AIRWAY INHALATION TREATMENT: CPT

## 2024-03-15 PROCEDURE — 94799 UNLISTED PULMONARY SVC/PX: CPT

## 2024-03-15 PROCEDURE — 99285 EMERGENCY DEPT VISIT HI MDM: CPT

## 2024-03-15 PROCEDURE — 0202U NFCT DS 22 TRGT SARS-COV-2: CPT | Performed by: EMERGENCY MEDICINE

## 2024-03-15 PROCEDURE — 85025 COMPLETE CBC W/AUTO DIFF WBC: CPT | Performed by: EMERGENCY MEDICINE

## 2024-03-15 PROCEDURE — 71045 X-RAY EXAM CHEST 1 VIEW: CPT

## 2024-03-15 PROCEDURE — 25010000002 METHYLPREDNISOLONE PER 125 MG: Performed by: EMERGENCY MEDICINE

## 2024-03-15 PROCEDURE — 85379 FIBRIN DEGRADATION QUANT: CPT | Performed by: EMERGENCY MEDICINE

## 2024-03-15 RX ORDER — LORAZEPAM 1 MG/1
2 TABLET ORAL
Status: ACTIVE | OUTPATIENT
Start: 2024-03-15 | End: 2024-03-20

## 2024-03-15 RX ORDER — ONDANSETRON 4 MG/1
4 TABLET, ORALLY DISINTEGRATING ORAL EVERY 6 HOURS PRN
Status: DISCONTINUED | OUTPATIENT
Start: 2024-03-15 | End: 2024-03-23 | Stop reason: HOSPADM

## 2024-03-15 RX ORDER — AMOXICILLIN 250 MG
2 CAPSULE ORAL 2 TIMES DAILY
Status: DISCONTINUED | OUTPATIENT
Start: 2024-03-15 | End: 2024-03-23 | Stop reason: HOSPADM

## 2024-03-15 RX ORDER — METOPROLOL SUCCINATE 100 MG/1
100 TABLET, EXTENDED RELEASE ORAL EVERY 12 HOURS SCHEDULED
Status: DISCONTINUED | OUTPATIENT
Start: 2024-03-15 | End: 2024-03-20

## 2024-03-15 RX ORDER — IPRATROPIUM BROMIDE AND ALBUTEROL SULFATE 2.5; .5 MG/3ML; MG/3ML
3 SOLUTION RESPIRATORY (INHALATION) ONCE
Status: COMPLETED | OUTPATIENT
Start: 2024-03-15 | End: 2024-03-15

## 2024-03-15 RX ORDER — BISACODYL 5 MG/1
5 TABLET, DELAYED RELEASE ORAL DAILY PRN
Status: DISCONTINUED | OUTPATIENT
Start: 2024-03-15 | End: 2024-03-23 | Stop reason: HOSPADM

## 2024-03-15 RX ORDER — DIGOXIN 0.25 MG/ML
500 INJECTION INTRAMUSCULAR; INTRAVENOUS ONCE
Status: COMPLETED | OUTPATIENT
Start: 2024-03-15 | End: 2024-03-15

## 2024-03-15 RX ORDER — ASPIRIN 81 MG/1
81 TABLET ORAL DAILY
Status: DISCONTINUED | OUTPATIENT
Start: 2024-03-15 | End: 2024-03-16

## 2024-03-15 RX ORDER — BISACODYL 10 MG
10 SUPPOSITORY, RECTAL RECTAL DAILY PRN
Status: DISCONTINUED | OUTPATIENT
Start: 2024-03-15 | End: 2024-03-23 | Stop reason: HOSPADM

## 2024-03-15 RX ORDER — SODIUM CHLORIDE 9 MG/ML
40 INJECTION, SOLUTION INTRAVENOUS AS NEEDED
Status: DISCONTINUED | OUTPATIENT
Start: 2024-03-15 | End: 2024-03-23 | Stop reason: HOSPADM

## 2024-03-15 RX ORDER — LORAZEPAM 2 MG/ML
2 INJECTION INTRAMUSCULAR
Status: ACTIVE | OUTPATIENT
Start: 2024-03-15 | End: 2024-03-20

## 2024-03-15 RX ORDER — FUROSEMIDE 10 MG/ML
40 INJECTION INTRAMUSCULAR; INTRAVENOUS ONCE
Status: COMPLETED | OUTPATIENT
Start: 2024-03-15 | End: 2024-03-15

## 2024-03-15 RX ORDER — SODIUM CHLORIDE 0.9 % (FLUSH) 0.9 %
10 SYRINGE (ML) INJECTION AS NEEDED
Status: DISCONTINUED | OUTPATIENT
Start: 2024-03-15 | End: 2024-03-23 | Stop reason: HOSPADM

## 2024-03-15 RX ORDER — LISINOPRIL 40 MG/1
40 TABLET ORAL DAILY
Status: DISCONTINUED | OUTPATIENT
Start: 2024-03-15 | End: 2024-03-22

## 2024-03-15 RX ORDER — ACETAMINOPHEN 325 MG/1
650 TABLET ORAL EVERY 4 HOURS PRN
Status: DISCONTINUED | OUTPATIENT
Start: 2024-03-15 | End: 2024-03-23 | Stop reason: HOSPADM

## 2024-03-15 RX ORDER — LORAZEPAM 2 MG/ML
1 INJECTION INTRAMUSCULAR
Status: ACTIVE | OUTPATIENT
Start: 2024-03-15 | End: 2024-03-20

## 2024-03-15 RX ORDER — ACETAMINOPHEN 160 MG/5ML
650 SOLUTION ORAL EVERY 4 HOURS PRN
Status: DISCONTINUED | OUTPATIENT
Start: 2024-03-15 | End: 2024-03-23 | Stop reason: HOSPADM

## 2024-03-15 RX ORDER — LORAZEPAM 1 MG/1
1 TABLET ORAL
Status: ACTIVE | OUTPATIENT
Start: 2024-03-15 | End: 2024-03-20

## 2024-03-15 RX ORDER — LORAZEPAM 1 MG/1
2 TABLET ORAL EVERY 6 HOURS
Status: DISCONTINUED | OUTPATIENT
Start: 2024-03-15 | End: 2024-03-15

## 2024-03-15 RX ORDER — FINASTERIDE 5 MG/1
5 TABLET, FILM COATED ORAL DAILY
Status: DISCONTINUED | OUTPATIENT
Start: 2024-03-15 | End: 2024-03-23 | Stop reason: HOSPADM

## 2024-03-15 RX ORDER — MIRTAZAPINE 15 MG/1
15 TABLET, FILM COATED ORAL NIGHTLY
Status: DISCONTINUED | OUTPATIENT
Start: 2024-03-15 | End: 2024-03-16

## 2024-03-15 RX ORDER — LORAZEPAM 1 MG/1
1 TABLET ORAL EVERY 6 HOURS
Status: DISCONTINUED | OUTPATIENT
Start: 2024-03-16 | End: 2024-03-15

## 2024-03-15 RX ORDER — METOPROLOL SUCCINATE 25 MG/1
100 TABLET, EXTENDED RELEASE ORAL ONCE
Status: COMPLETED | OUTPATIENT
Start: 2024-03-15 | End: 2024-03-15

## 2024-03-15 RX ORDER — BUDESONIDE AND FORMOTEROL FUMARATE DIHYDRATE 160; 4.5 UG/1; UG/1
2 AEROSOL RESPIRATORY (INHALATION) 2 TIMES DAILY
Status: DISCONTINUED | OUTPATIENT
Start: 2024-03-15 | End: 2024-03-23 | Stop reason: HOSPADM

## 2024-03-15 RX ORDER — POLYETHYLENE GLYCOL 3350 17 G/17G
17 POWDER, FOR SOLUTION ORAL DAILY PRN
Status: DISCONTINUED | OUTPATIENT
Start: 2024-03-15 | End: 2024-03-23 | Stop reason: HOSPADM

## 2024-03-15 RX ORDER — NITROGLYCERIN 0.4 MG/1
0.4 TABLET SUBLINGUAL
Status: DISCONTINUED | OUTPATIENT
Start: 2024-03-15 | End: 2024-03-23 | Stop reason: HOSPADM

## 2024-03-15 RX ORDER — ACETAMINOPHEN 650 MG/1
650 SUPPOSITORY RECTAL EVERY 4 HOURS PRN
Status: DISCONTINUED | OUTPATIENT
Start: 2024-03-15 | End: 2024-03-23 | Stop reason: HOSPADM

## 2024-03-15 RX ORDER — METHYLPREDNISOLONE SODIUM SUCCINATE 125 MG/2ML
125 INJECTION, POWDER, LYOPHILIZED, FOR SOLUTION INTRAMUSCULAR; INTRAVENOUS ONCE
Status: COMPLETED | OUTPATIENT
Start: 2024-03-15 | End: 2024-03-15

## 2024-03-15 RX ORDER — ALBUTEROL SULFATE 2.5 MG/3ML
2.5 SOLUTION RESPIRATORY (INHALATION) EVERY 6 HOURS PRN
Status: DISCONTINUED | OUTPATIENT
Start: 2024-03-15 | End: 2024-03-23 | Stop reason: HOSPADM

## 2024-03-15 RX ORDER — ONDANSETRON 2 MG/ML
4 INJECTION INTRAMUSCULAR; INTRAVENOUS EVERY 6 HOURS PRN
Status: DISCONTINUED | OUTPATIENT
Start: 2024-03-15 | End: 2024-03-23 | Stop reason: HOSPADM

## 2024-03-15 RX ORDER — THIAMINE HYDROCHLORIDE 100 MG/ML
200 INJECTION, SOLUTION INTRAMUSCULAR; INTRAVENOUS EVERY 8 HOURS SCHEDULED
Status: COMPLETED | OUTPATIENT
Start: 2024-03-15 | End: 2024-03-20

## 2024-03-15 RX ORDER — FOLIC ACID 1 MG/1
1 TABLET ORAL DAILY
Status: DISCONTINUED | OUTPATIENT
Start: 2024-03-16 | End: 2024-03-23 | Stop reason: HOSPADM

## 2024-03-15 RX ORDER — SODIUM CHLORIDE 0.9 % (FLUSH) 0.9 %
10 SYRINGE (ML) INJECTION EVERY 12 HOURS SCHEDULED
Status: DISCONTINUED | OUTPATIENT
Start: 2024-03-15 | End: 2024-03-23 | Stop reason: HOSPADM

## 2024-03-15 RX ADMIN — Medication 10 ML: at 22:16

## 2024-03-15 RX ADMIN — METOPROLOL TARTRATE 5 MG: 5 INJECTION INTRAVENOUS at 14:09

## 2024-03-15 RX ADMIN — METHYLPREDNISOLONE SODIUM SUCCINATE 125 MG: 125 INJECTION, POWDER, FOR SOLUTION INTRAMUSCULAR; INTRAVENOUS at 11:03

## 2024-03-15 RX ADMIN — METOPROLOL SUCCINATE 100 MG: 25 TABLET, EXTENDED RELEASE ORAL at 12:01

## 2024-03-15 RX ADMIN — METOPROLOL TARTRATE 5 MG: 1 INJECTION, SOLUTION INTRAVENOUS at 12:03

## 2024-03-15 RX ADMIN — BUDESONIDE AND FORMOTEROL FUMARATE DIHYDRATE 2 PUFF: 160; 4.5 AEROSOL RESPIRATORY (INHALATION) at 20:05

## 2024-03-15 RX ADMIN — FINASTERIDE 5 MG: 5 TABLET, FILM COATED ORAL at 17:51

## 2024-03-15 RX ADMIN — ASPIRIN 81 MG: 81 TABLET, COATED ORAL at 17:46

## 2024-03-15 RX ADMIN — METOPROLOL SUCCINATE 100 MG: 100 TABLET, EXTENDED RELEASE ORAL at 22:16

## 2024-03-15 RX ADMIN — THIAMINE HYDROCHLORIDE 200 MG: 100 INJECTION, SOLUTION INTRAMUSCULAR; INTRAVENOUS at 22:16

## 2024-03-15 RX ADMIN — IPRATROPIUM BROMIDE AND ALBUTEROL SULFATE 3 ML: .5; 3 SOLUTION RESPIRATORY (INHALATION) at 11:10

## 2024-03-15 RX ADMIN — DIGOXIN 500 MCG: 0.25 INJECTION INTRAMUSCULAR; INTRAVENOUS at 17:46

## 2024-03-15 RX ADMIN — ACETAMINOPHEN 325MG 650 MG: 325 TABLET ORAL at 22:15

## 2024-03-15 RX ADMIN — FUROSEMIDE 40 MG: 10 INJECTION, SOLUTION INTRAMUSCULAR; INTRAVENOUS at 11:59

## 2024-03-15 RX ADMIN — APIXABAN 5 MG: 5 TABLET, FILM COATED ORAL at 22:16

## 2024-03-15 NOTE — ED NOTES
Nursing report ED to floor  Sebastien Tang  64 y.o.  male    HPI :  HPI (Adult)  Stated Reason for Visit: from [facility] with cough since yesterday.  initial spo2 86% at [facility].  received neb x 2 and solumedrol 125mg PTA    Chief Complaint  Chief Complaint   Patient presents with    Shortness of Breath       Admitting doctor:   Tato Kerr MD    Admitting diagnosis:   The primary encounter diagnosis was Acute respiratory failure with hypoxia. Diagnoses of Acute congestive heart failure, unspecified heart failure type, Chronic obstructive pulmonary disease, unspecified COPD type, and Tachycardia were also pertinent to this visit.    Code status:   Current Code Status       Date Active Code Status Order ID Comments User Context       3/15/2024 1447 CPR (Attempt to Resuscitate) [id number]  Tato Kerr MD ED        Question Answer    Code Status (Patient has no pulse and is not breathing) CPR (Attempt to Resuscitate)    Medical Interventions (Patient has pulse or is breathing) Full Support                    Allergies:   Patient has no known allergies.    Isolation:   Enhanced Droplet/Contact     Intake and Output  No intake or output data in the 24 hours ending 03/15/24 1459    Weight:       03/15/24  1026   Weight: 110 kg (242 lb 8.1 oz)       Most recent vitals:   Vitals:    03/15/24 1230 03/15/24 1256 03/15/24 1356 03/15/24 1426   BP:  (!) 168/106 154/92 (!) 137/109   Pulse: (!) 125 (!) 128 (!) 127 (!) 123   Resp:       Temp:       TempSrc:       SpO2: (!) 87% 91% 92% 93%   Weight:       Height:           Active LDAs/IV Access:   Lines, Drains & Airways       Active LDAs       Name Placement date Placement time Site Days    Peripheral IV 03/15/24 Left Antecubital 03/15/24  --  Antecubital  less than 1                    Labs (abnormal labs have a star):   Labs Reviewed   TROPONIN - Abnormal; Notable for the following components:       Result Value    HS Troponin T 34 (*)     All other components within normal  limits    Narrative:     High Sensitive Troponin T Reference Range:  <14.0 ng/L- Negative Female for AMI  <22.0 ng/L- Negative Male for AMI  >=14 - Abnormal Female indicating possible myocardial injury.  >=22 - Abnormal Male indicating possible myocardial injury.   Clinicians would have to utilize clinical acumen, EKG, Troponin, and serial changes to determine if it is an Acute Myocardial Infarction or myocardial injury due to an underlying chronic condition.        COMPREHENSIVE METABOLIC PANEL - Abnormal; Notable for the following components:    Glucose 119 (*)     All other components within normal limits    Narrative:     GFR Normal >60  Chronic Kidney Disease <60  Kidney Failure <15     BNP (IN-HOUSE) - Abnormal; Notable for the following components:    proBNP 2,284.0 (*)     All other components within normal limits    Narrative:     This assay is used as an aid in the diagnosis of individuals suspected of having heart failure. It can be used as an aid in the diagnosis of acute decompensated heart failure (ADHF) in patients presenting with signs and symptoms of ADHF to the emergency department (ED). In addition, NT-proBNP of <300 pg/mL indicates ADHF is not likely.    Age Range Result Interpretation  NT-proBNP Concentration (pg/mL:      <50             Positive            >450                   Gray                 300-450                    Negative             <300    50-75           Positive            >900                  Gray                300-900                  Negative            <300      >75             Positive            >1800                  Gray                300-1800                  Negative            <300   CBC WITH AUTO DIFFERENTIAL - Abnormal; Notable for the following components:    Hemoglobin 10.1 (*)     Hematocrit 34.5 (*)     MCV 76.0 (*)     MCH 22.2 (*)     MCHC 29.3 (*)     RDW 15.7 (*)     Neutrophil % 85.6 (*)     Lymphocyte % 8.1 (*)     Monocyte % 3.4 (*)     Immature Grans  % 0.6 (*)     Lymphocytes, Absolute 0.59 (*)     nRBC 0.3 (*)     All other components within normal limits   HIGH SENSITIVITIY TROPONIN T 2HR - Abnormal; Notable for the following components:    HS Troponin T 33 (*)     All other components within normal limits    Narrative:     High Sensitive Troponin T Reference Range:  <14.0 ng/L- Negative Female for AMI  <22.0 ng/L- Negative Male for AMI  >=14 - Abnormal Female indicating possible myocardial injury.  >=22 - Abnormal Male indicating possible myocardial injury.   Clinicians would have to utilize clinical acumen, EKG, Troponin, and serial changes to determine if it is an Acute Myocardial Infarction or myocardial injury due to an underlying chronic condition.        RESPIRATORY PANEL PCR W/ COVID-19 (SARS-COV-2), NP SWAB IN UTM/VTP, 2 HR TAT - Normal    Narrative:     In the setting of a positive respiratory panel with a viral infection PLUS a negative procalcitonin without other underlying concern for bacterial infection, consider observing off antibiotics or discontinuation of antibiotics and continue supportive care. If the respiratory panel is positive for atypical bacterial infection (Bordetella pertussis, Chlamydophila pneumoniae, or Mycoplasma pneumoniae), consider antibiotic de-escalation to target atypical bacterial infection.   D-DIMER, QUANTITATIVE - Normal    Narrative:     According to the assay 's published package insert, a normal (<0.50 MCGFEU/mL) D-dimer result in conjunction with a non-high clinical probability assessment, excludes deep vein thrombosis (DVT) and pulmonary embolism (PE) with high sensitivity.    D-dimer values increase with age and this can make VTE exclusion of an older population difficult. To address this, the American College [employer], based on best available evidence and recent guidelines, recommends that clinicians use age-adjusted D-dimer thresholds in patients greater than 50 years of age with: a) a low  "probability of PE who do not meet all Pulmonary Embolism Rule Out Criteria, or b) in those with intermediate probability of PE.   The formula for an age-adjusted D-dimer cut-off is \"age/100\".  For example, a 60 year old patient would have an age-adjusted cut-off of 0.60 MCGFEU/mL and an 80 year old 0.80 MCGFEU/mL.   PROCALCITONIN - Normal    Narrative:     As a Marker for Sepsis (Non-Neonates):    1. <0.5 ng/mL represents a low risk of severe sepsis and/or septic shock.  2. >2 ng/mL represents a high risk of severe sepsis and/or septic shock.    As a Marker for Lower Respiratory Tract Infections that require antibiotic therapy:    PCT on Admission    Antibiotic Therapy       6-12 Hrs later    >0.5                Strongly Recommended  >0.25 - <0.5        Recommended   0.1 - 0.25          Discouraged              Remeasure/reassess PCT  <0.1                Strongly Discouraged     Remeasure/reassess PCT    As 28 day mortality risk marker: \"Change in Procalcitonin Result\" (>80% or <=80%) if Day 0 (or Day 1) and Day 4 values are available. Refer to http://www.[facility]-pct-calculator.com    Change in PCT <=80%  A decrease of PCT levels below or equal to 80% defines a positive change in PCT test result representing a higher risk for 28-day all-cause mortality of patients diagnosed with severe sepsis for septic shock.    Change in PCT >80%  A decrease of PCT levels of more than 80% defines a negative change in PCT result representing a lower risk for 28-day all-cause mortality of patients diagnosed with severe sepsis or septic shock.      MAGNESIUM - Normal   CBC AND DIFFERENTIAL    Narrative:     The following orders were created for panel order CBC & Differential.  Procedure                               Abnormality         Status                     ---------                               -----------         ------                     CBC Auto Differential[[id number]]        Abnormal            Final result             "     Please view results for these tests on the individual orders.       EKG:   ECG 12 Lead Tachycardia   Preliminary Result   HEART RATE= 123  bpm   RR Interval= 488  ms   NJ Interval= 133  ms   P Horizontal Axis= 216  deg   P Front Axis= 163  deg   QRSD Interval= 127  ms   QT Interval= 386  ms   QTcB= 553  ms   QRS Axis= -24  deg   T Wave Axis= 0  deg   - ABNORMAL ECG -   Sinus or ectopic atrial tachycardia   Ventricular premature complex   Left bundle branch block   Electronically Signed By:    Date and Time of Study: 2024-03-15 14:45:11      ECG 12 Lead Dyspnea   Preliminary Result   HEART RATE= 120  bpm   RR Interval= 500  ms   NJ Interval= 153  ms   P Horizontal Axis=   deg   P Front Axis= 162  deg   QRSD Interval= 100  ms   QT Interval= 375  ms   QTcB= 530  ms   QRS Axis= -27  deg   T Wave Axis= -8  deg   - ABNORMAL ECG -   Sinus tachycardia with irregular rate   Abnormal R-wave progression, early transition   Inferior infarct, old   Prolonged QT interval   Electronically Signed By:    Date and Time of Study: 2024-03-15 09:56:04          Meds given in ED:   Medications   sodium chloride 0.9 % flush 10 mL (has no administration in time range)   methylPREDNISolone sodium succinate (SOLU-Medrol) injection 125 mg (125 mg Intravenous Given 3/15/24 1103)   ipratropium-albuterol (DUO-NEB) nebulizer solution 3 mL (3 mL Nebulization Given 3/15/24 1110)   metoprolol tartrate (LOPRESSOR) injection 5 mg (5 mg Intravenous Given 3/15/24 1203)   metoprolol succinate XL (TOPROL-XL) 24 hr tablet 100 mg (100 mg Oral Given 3/15/24 1201)   furosemide (LASIX) injection 40 mg (40 mg Intravenous Given 3/15/24 1159)   metoprolol tartrate (LOPRESSOR) injection 5 mg (5 mg Intravenous Given 3/15/24 1409)       Imaging results:  XR Chest 1 View    Result Date: 3/15/2024  As described.  This report was finalized on 3/15/2024 11:33 AM by Dr. Librado Orantes M.D on Workstation: [id number]       Ambulatory status:   - x2 assist/ stand by d/t  tubes/lines/o2      Social issues:   Social History     Socioeconomic History    Marital status: Single   Tobacco Use    Smoking status: Former     Current packs/day: 0.00     Average packs/day: 1.5 packs/day for 10.0 years (15.0 ttl pk-yrs)     Types: Cigarettes     Start date: 2011     Quit date: 2021     Years since quittin.8    Smokeless tobacco: Never    Tobacco comments:     2021   Vaping Use    Vaping status: Never Used   Substance and Sexual Activity    Alcohol use: Yes     Comment: 1 BEER DAILY    Drug use: Not Currently    Sexual activity: Not Currently       Peripheral Neurovascular  Peripheral Neurovascular (Adult)  Peripheral Neurovascular WDL: WDL    Neuro Cognitive  Neuro Cognitive (Adult)  Cognitive/Neuro/Behavioral WDL: WDL    Learning  Learning Assessment (Adult)  Learning Readiness and Ability: no barriers identified  Education Provided  Person Taught: patient    Respiratory  Respiratory WDL  Respiratory WDL: .WDL except, all, cough  Rhythm/Pattern, Respiratory: shortness of breath  Breath Sounds  Breath Sounds: All Fields  All Lung Fields Breath Sounds: Anterior:, diminished  Breath Sounds Post-Respiratory Treatment  Breath Sounds Posttreatment All Fields: All Fields  Breath Sounds Posttreatment All Fields: Anterior:, no change    Abdominal Pain       Pain Assessments  Pain (Adult)  (0-10) Pain Rating: Rest: 0    NIH Stroke Scale       Fior Jack RN  03/15/24 14:59 EDT

## 2024-03-15 NOTE — H&P
"    Name: Sebastien Tang ADMIT: 3/15/2024   : 1960  PCP: Yuliana Flynn DO    MRN: [id number] LOS: 0 days   AGE/SEX: 64 y.o. male  ROOM: 36/36     Chief Complaint   Patient presents with    Shortness of Breath       Subjective   Patient is a 64 y.o. male who presents to [facility] with the above chief complaint.  He has been \"sick\" for quite some time.  Things have been going on for a couple days and have been progressively worse over that time.  He describes body aches being short of breath and overall not feeling well.  Has had a cough with some white sputum and has felt sick like this before.  He has been under a great deal of emotional stress at home.  He feels like he is going to die at some point.  He has noticed that his pants have gotten a little bit tight but denies significant swelling in his legs.  He admits that his abdomen has become more distended as well.  He lives alone and does not have much family that checks on him.  He has noticed that has been increasingly short of breath especially with minimal exertion.  He is also noted that he has had some significant feeling like he cannot breathe if he lies flat as well as some paroxysmal nocturnal dyspnea.  He does have reflux and heartburn but has not on any medications for this.  His past medical history is pertinent for previous cardiac arrest with V-fib.  He has an AICD in place.  He is followed in the outpatient setting by cardiology.  He is supposed to be on anticoagulation for A-fib but is not compliant with his medication.  Other medications he is intermittently compliant with when he can afford them.  He is under a great deal of financial stress at home as well.  In the ER he was noted to be hypoxic with increased work of breathing and was found to have pulmonary edema and elevated BNP and was admitted to our service for stabilization evaluation    History of Present Illness    Past Medical History:   Diagnosis Date    " Acidosis     mixed resp/metabolic acidosis    Acute congestive heart failure     Acute respiratory failure     hypoxic    Anemia     Asthma     Atrial flutter     Azotemia     Cardiac arrest 04/2021    Chronic coronary artery disease     Constipation     COPD (chronic obstructive pulmonary disease)     Enlarged prostate     Hypertension     Hypokalemia     severe    ICD (implantable cardioverter-defibrillator) in place     Ischemic cardiomyopathy     MRSA nasal colonization     Obesity (BMI 30-39.9)     Orthopnea     PAF (paroxysmal atrial fibrillation)     Persistent atrial fibrillation     Pulmonary edema     Tobacco abuse     Transaminitis     Trouble in sleeping     Ventricular fibrillation      Past Surgical History:   Procedure Laterality Date    CARDIAC CATHETERIZATION Left 4/1/2021    Procedure: Coronary Angiography;  Surgeon: Anna Puga MD;  Location:  [facility];  Service: Cardiology;  Laterality: Left;    CARDIAC CATHETERIZATION N/A 4/1/2021    Procedure: Left ventriculography;  Surgeon: Anna Puga MD;  Location:  [facility];  Service: Cardiology;  Laterality: N/A;    CARDIAC CATHETERIZATION N/A 4/1/2021    Procedure: Left Heart Cath;  Surgeon: Anna Puga MD;  Location:  [facility];  Service: Cardiology;  Laterality: N/A;    CARDIAC ELECTROPHYSIOLOGY PROCEDURE N/A 4/7/2021    Procedure: IMPLANTABLE CARDIOVERTER DEFIBRILLATOR- SC BIOTRONIK;  Surgeon: Nik Rosas MD;  Location:  [facility];  Service: Cardiovascular;  Laterality: N/A;    CARDIOVERSION  05/19/2021    Dr. Rosas    CARDIOVERSION  07/26/2021    Dr. Rosas    TONSILLECTOMY       No family history on file.  Social History     Tobacco Use    Smoking status: Former     Current packs/day: 0.00     Average packs/day: 1.5 packs/day for 10.0 years (15.0 ttl pk-yrs)     Types: Cigarettes     Start date: 4/22/2011     Quit date: 4/22/2021     Years since  "quittin.8    Smokeless tobacco: Never    Tobacco comments:     2021   Vaping Use    Vaping status: Never Used   Substance Use Topics    Alcohol use: Yes     Comment: 1 BEER DAILY    Drug use: Not Currently     (Not in a hospital admission)    Allergies:  Patient has no known allergies.    Review of Systems     Objective    Vital Signs  Temp:  [98.2 °F (36.8 °C)-98.3 °F (36.8 °C)] 98.3 °F (36.8 °C)  Heart Rate:  [121-128] 127  Resp:  [18-20] 18  BP: (137-168)/() 154/92  SpO2:  [87 %-97 %] 92 %  on  Flow (L/min):  [3] 3;   Device (Oxygen Therapy): nasal cannula  Body mass index is 39.16 kg/m².    Physical Exam  Vitals reviewed.   Constitutional:       General: He is not in acute distress.     Appearance: He is ill-appearing.   Cardiovascular:      Rate and Rhythm: Tachycardia present. Rhythm irregular.   Pulmonary:      Effort: No respiratory distress.      Breath sounds: Normal breath sounds.   Abdominal:      General: Bowel sounds are normal. There is no distension.      Palpations: Abdomen is soft.   Skin:     General: Skin is warm and dry.   Neurological:      General: No focal deficit present.      Mental Status: He is alert. Mental status is at baseline.         Results Review:   I reviewed the patient's new clinical results.  Results from last 7 days   Lab Units 03/15/24  1101   WBC 10*3/mm3 7.25   HEMOGLOBIN g/dL 10.1*   PLATELETS 10*3/mm3 284     Results from last 7 days   Lab Units 03/15/24  1011   SODIUM mmol/L 141   POTASSIUM mmol/L 3.7   CHLORIDE mmol/L 104   CO2 mmol/L 23.2   BUN mg/dL 16   CREATININE mg/dL 1.01   GLUCOSE mg/dL 119*   ALBUMIN g/dL 3.9   BILIRUBIN mg/dL 0.5   ALK PHOS U/L 73   AST (SGOT) U/L 37   ALT (SGPT) U/L 30   Estimated Creatinine Clearance: 83.7 mL/min (by C-G formula based on SCr of 1.01 mg/dL).  Results from last 7 days   Lab Units 03/15/24  1231 03/15/24  1011   HSTROP T ng/L 33* 34*   PROBNP pg/mL  --  2,284.0*         Invalid input(s): \"LDLCALC\"    XR Chest 1 View "   Final Result   As described.       This report was finalized on 3/15/2024 11:33 AM by Dr. Librado Orantes M.D on Workstation: [id number]            Assessment & Plan       Acute CHF    Heart failure      Assessment & Plan  This is a 64-year-old gentleman with a history of chronic diastolic heart failure, previous V-fib arrest, paroxysmal A-fib on anticoagulation, hypertension, obesity, hyperlipidemia and poor medical compliance who presents to the hospital with increasing shortness of breath and is found to have decompensated heart failure and pulmonary edema  -The last echocardiogram I can see was back in 2021.  I am sure he probably had 1 in the cardiologist office is just not available for me to review.  At that time he was noted to have both systolic and diastolic dysfunction  -Given the hypoxia tachycardia lower extremity edema will need to get a CTA of his chest to evaluate for pulmonary embolus.  I think I mean, this is probably just heart failure but given his noncompliance with medications he would certainly be at risk of blood clots given his sedentary lifestyle.  -Otherwise we will plan to aggressively diurese monitor his renal function and electrolytes.  Will check an A1c as well as TSH and T4, fasting lipid panel  -Resume Eliquis which will cover for his DVT prophylaxis  -Continue metoprolol for now, it is unclear if he took this this morning but he takes it every 12 hours.  If remains tachycardic may need additional medication.  -Full code    I discussed the patients findings and my recommendations with patient, family, and nursing staff.    Tato Kerr MD  [facility]  03/15/24  14:47 EDT

## 2024-03-15 NOTE — Clinical Note
Level of Care: Telemetry [5]   Diagnosis: Acute CHF [989216]   Admitting Physician: SHERI MALDONADO [[id number]]   Attending Physician: SHERI MALDONADO [[id number]]
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
## 2024-03-15 NOTE — PROGRESS NOTES
Clinical Pharmacy Services: Medication History    Sebastien Tang is a 64 y.o. male presenting to [facility] for   Chief Complaint   Patient presents with    Shortness of Breath       He  has a past medical history of Acidosis, Acute congestive heart failure, Acute respiratory failure, Anemia, Asthma, Atrial flutter, Azotemia, Cardiac arrest (04/2021), Chronic coronary artery disease, Constipation, COPD (chronic obstructive pulmonary disease), Enlarged prostate, Hypertension, Hypokalemia, ICD (implantable cardioverter-defibrillator) in place, Ischemic cardiomyopathy, MRSA nasal colonization, Obesity (BMI 30-39.9), Orthopnea, PAF (paroxysmal atrial fibrillation), Persistent atrial fibrillation, Pulmonary edema, Tobacco abuse, Transaminitis, Trouble in sleeping, and Ventricular fibrillation.    Allergies as of 03/15/2024    (No Known Allergies)       Medication information was obtained from: Patient   Pharmacy and Phone Number:     Prior to Admission Medications       Prescriptions Last Dose Informant Patient Reported? Taking?    albuterol sulfate  (90 Base) MCG/ACT inhaler 3/15/2024 Pharmacy, Self No Yes    Inhale 2 puffs Every 6 (Six) Hours As Needed for Wheezing.    apixaban (Eliquis) 5 MG tablet tablet 3/14/2024 Pharmacy, Self No Yes    Take 1 tablet by mouth Every 12 (Twelve) Hours.    aspirin 81 MG EC tablet 3/14/2024 Self No Yes    Take 1 tablet by mouth Daily.    finasteride (PROSCAR) 5 MG tablet 3/14/2024 Pharmacy, Self No Yes    Take 1 tablet by mouth Daily.    furosemide (LASIX) 40 MG tablet 3/14/2024 Self No Yes    Take 1 tablet by mouth Daily.    lisinopril (PRINIVIL,ZESTRIL) 40 MG tablet 3/14/2024 Pharmacy, Self No Yes    TAKE 1 TABLET BY MOUTH DAILY    metoprolol succinate XL (TOPROL-XL) 100 MG 24 hr tablet 3/14/2024 Pharmacy, Self No Yes    Take 1 tablet by mouth Every 12 (Twelve) Hours.    mirtazapine (REMERON) 15 MG tablet 3/14/2024 Pharmacy, Self No Yes    Take 1 tablet by mouth  Every Night.              Medication notes:     This medication list is complete to the best of my knowledge as of 3/15/2024    Please call if questions.    Shelton Wheeler  Medication History Technician  [phone]    3/15/2024 15:48 EDT

## 2024-03-15 NOTE — LETTER
[facility]  [phone]    ACCESS CENTER STATEMENT OF DISPOSITION    I, Sebastien Tang, was assessed in the [facility] at [facility] on 3/15/2024.  I understand the recommendations below and what follow-up action is expected of me.    Sudden Cardiac Arrest Foundation  https://www.sca-aware.org/    The Heart [place] Project  https://heartwarriorpro[employer].com/support-groups-for-cardiac-arrest-survivors/    American Heart Association Support Network  https://supportnetwork.heart.org/s/    Anxiety & Depression Association of [place]  adaa.org    [employer] [place]  [phone]    [facility]   [phone]    FACEBOOK GROUPS:   Sudden Cardiac Arrest Survivors, Living with an ICD      988 Suicide and Crisis Lifeline  Call 988 or  827-776-PARG (3489)    The 988 Suicide and Crisis Lifeline is open 24 hours a day, every day    _  3/15/2024  18:50 EDT

## 2024-03-15 NOTE — ED PROVIDER NOTES
EMERGENCY DEPARTMENT ENCOUNTER  Room Number:  36/36  PCP: Yuliana Flynn DO  Independent Historians: Patient      HPI:  Chief Complaint: had concerns including Shortness of Breath.     A complete HPI/ROS/PMH/PSH/SH/FH are unobtainable due to: Nothing      Context: Sebastien Tang is a 64 y.o. male with a medical history of COPD, former smoker, CAD who presents to the ED c/o acute cough and shortness of breath for the last couple of days.  He has been feeling hot and cold but has not taken his temperature at home.  He does have a history of COPD.  He does not wear home oxygen.  He went to urgent care today and was told that his oxygen level was slightly low around 90.  He was sent here for further evaluation.  He had a negative flu and COVID test there.  He has had some associated nausea and vomiting as well as some diarrhea.  He denies abdominal pain other than with coughing.  He denies chest pain other than with coughing.  He does have a history of heart attack.  He is a former smoker, stopped smoking 2 and half years ago.  He denies leg pain or leg swelling.  No personal history of DVT or PE.      Review of prior external notes (non-ED) -and- Review of prior external test results outside of this encounter: I reviewed cardiology office visit from 11/9/2023 where patient was seen in follow-up for history of cardiomyopathy and atrial fibrillation.  I reviewed prior echo from 4/1/2021 that showed EF 38%.  I reviewed cardiac catheterization from 4/2/2021.  Patient had suffered a ventricular fibrillation arrest out of hospital.  Cath demonstrated severe multivessel coronary disease including chronic total occlusion RCA.        PAST MEDICAL HISTORY  Active Ambulatory Problems     Diagnosis Date Noted    Cardiac arrest 04/01/2021    Atrial flutter 04/05/2021    Tobacco abuse 04/05/2021    Azotemia 04/05/2021    COPD (chronic obstructive pulmonary disease) 04/05/2021    MRSA nasal colonization 04/05/2021    Transaminitis  04/05/2021    Obesity (BMI 30-39.9) 04/05/2021    Ischemic cardiomyopathy 04/05/2021    Anemia 04/05/2021    Constipation 04/06/2021    Acute congestive heart failure 04/26/2021    PSA elevation 04/26/2021    Wellness examination 04/26/2021    High risk medication use 04/26/2021    Abnormal CXR 04/26/2021    Abdominal aneurysm 04/26/2021    Iliac aneurysm 04/26/2021    Coronary artery disease involving coronary bypass graft of native heart without angina pectoris 05/11/2021    Atrial fibrillation, persistent 05/11/2021    ICD (implantable cardioverter-defibrillator) in place 05/11/2021    Anxiety and depression 06/14/2021    Shortness of breath 06/14/2021    Primary insomnia 06/14/2021    Close exposure to COVID-19 virus 08/19/2021    Iliac artery stenosis, right 10/22/2021    Dysuria 10/22/2021    Gross hematuria 10/22/2021    Essential hypertension 11/23/2021    High cholesterol 02/07/2022    Screening PSA (prostate specific antigen) 09/01/2022    Anxiety 09/22/2022    Neuropathy 12/15/2022     Resolved Ambulatory Problems     Diagnosis Date Noted    Ventricular fibrillation 04/01/2021    Paroxysmal atrial fibrillation 04/08/2021    Cough 04/26/2021    Trouble in sleeping 04/26/2021    Atrial fibrillation 06/14/2021    Upper respiratory tract infection 06/14/2021    Elevated blood pressure reading 10/12/2021    Chronic coronary artery disease     COPD with exacerbation 09/01/2022    Insomnia 09/22/2022     Past Medical History:   Diagnosis Date    Acidosis     Acute respiratory failure     Asthma     Enlarged prostate     Hypertension     Hypokalemia     Orthopnea     PAF (paroxysmal atrial fibrillation)     Persistent atrial fibrillation     Pulmonary edema          PAST SURGICAL HISTORY  Past Surgical History:   Procedure Laterality Date    CARDIAC CATHETERIZATION Left 4/1/2021    Procedure: Coronary Angiography;  Surgeon: Anna Puga MD;  Location: [facility] INVASIVE LOCATION;  Service: Cardiology;   Laterality: Left;    CARDIAC CATHETERIZATION N/A 2021    Procedure: Left ventriculography;  Surgeon: Anna Puga MD;  Location:  [facility] CATH INVASIVE LOCATION;  Service: Cardiology;  Laterality: N/A;    CARDIAC CATHETERIZATION N/A 2021    Procedure: Left Heart Cath;  Surgeon: Anna Puga MD;  Location:  [facility]ATH INVASIVE LOCATION;  Service: Cardiology;  Laterality: N/A;    CARDIAC ELECTROPHYSIOLOGY PROCEDURE N/A 2021    Procedure: IMPLANTABLE CARDIOVERTER DEFIBRILLATOR- SC BIOTRONIK;  Surgeon: Nik Rosas MD;  Location:  [facility] C[facility] INVASIVE LOCATION;  Service: Cardiovascular;  Laterality: N/A;    CARDIOVERSION  2021    Dr. Rosas    CARDIOVERSION  2021    Dr. Rosas    TONSILLECTOMY           FAMILY HISTORY  No family history on file.      SOCIAL HISTORY  Social History     Socioeconomic History    Marital status: Single   Tobacco Use    Smoking status: Former     Current packs/day: 0.00     Average packs/day: 1.5 packs/day for 10.0 years (15.0 ttl pk-yrs)     Types: Cigarettes     Start date: 2011     Quit date: 2021     Years since quittin.8    Smokeless tobacco: Never    Tobacco comments:     2021   Vaping Use    Vaping status: Never Used   Substance and Sexual Activity    Alcohol use: Yes     Comment: 1 BEER DAILY    Drug use: Not Currently    Sexual activity: Not Currently         ALLERGIES  Patient has no known allergies.      REVIEW OF SYSTEMS  Review of all 14 systems is negative other than stated in the HPI above.      PHYSICAL EXAM    I have reviewed the triage vital signs and nursing notes.    ED Triage Vitals   Temp Heart Rate Resp BP SpO2   03/15/24 0916 03/15/24 0916 03/15/24 0916 03/15/24 0916 03/15/24 0916   98.2 °F (36.8 °C) (!) 121 20 (!) 152/103 96 %      Temp src Heart Rate Source Patient Position BP Location FiO2 (%)   03/15/24 1012 -- -- -- --   Oral             GENERAL: awake and alert, no acute distress  HENT: Normocephalic,  atraumatic  EYES: no scleral icterus, EOMI  CV: regular rhythm, tachycardic  RESPIRATORY: normal effort, mild rhonchi and wheezing particular in the right lung field  ABDOMEN: soft, nondistended, nontender throughout  MUSCULOSKELETAL: no deformity, no calf tenderness or lower extremity edema bilaterally  NEURO: alert, moves all extremities, follows commands, cranial nerves II through XII are grossly intact, speech fluent and clear, GCS 15  PSYCH: calm, cooperative  SKIN: Warm, dry, no rash          LAB RESULTS  Recent Results (from the past 24 hour(s))   ECG 12 Lead Dyspnea    Collection Time: 03/15/24  9:56 AM   Result Value Ref Range    QT Interval 375 ms    QTC Interval 530 ms   High Sensitivity Troponin T    Collection Time: 03/15/24 10:11 AM    Specimen: Blood   Result Value Ref Range    HS Troponin T 34 (H) <22 ng/L   Comprehensive Metabolic Panel    Collection Time: 03/15/24 10:11 AM    Specimen: Blood   Result Value Ref Range    Glucose 119 (H) 65 - 99 mg/dL    BUN 16 8 - 23 mg/dL    Creatinine 1.01 0.76 - 1.27 mg/dL    Sodium 141 136 - 145 mmol/L    Potassium 3.7 3.5 - 5.2 mmol/L    Chloride 104 98 - 107 mmol/L    CO2 23.2 22.0 - 29.0 mmol/L    Calcium 9.4 8.6 - 10.5 mg/dL    Total Protein 6.5 6.0 - 8.5 g/dL    Albumin 3.9 3.5 - 5.2 g/dL    ALT (SGPT) 30 1 - 41 U/L    AST (SGOT) 37 1 - 40 U/L    Alkaline Phosphatase 73 39 - 117 U/L    Total Bilirubin 0.5 0.0 - 1.2 mg/dL    Globulin 2.6 gm/dL    A/G Ratio 1.5 g/dL    BUN/Creatinine Ratio 15.8 7.0 - 25.0    Anion Gap 13.8 5.0 - 15.0 mmol/L    eGFR 82.0 >60.0 mL/min/1.73   BNP    Collection Time: 03/15/24 10:11 AM    Specimen: Blood   Result Value Ref Range    proBNP 2,284.0 (H) 0.0 - 900.0 pg/mL   Procalcitonin    Collection Time: 03/15/24 10:11 AM    Specimen: Blood   Result Value Ref Range    Procalcitonin 0.11 0.00 - 0.25 ng/mL   Magnesium    Collection Time: 03/15/24 10:11 AM    Specimen: Blood   Result Value Ref Range    Magnesium 2.2 1.6 - 2.4 mg/dL    Respiratory Panel PCR w/COVID-19(SARS-CoV-2) [facility] In-House, NP Swab in UTM/VTM, 2 HR TAT - Swab, Nasopharynx    Collection Time: 03/15/24 11:01 AM    Specimen: Nasopharynx; Swab   Result Value Ref Range    ADENOVIRUS, PCR Not Detected Not Detected    Coronavirus 229E Not Detected Not Detected    Coronavirus HKU1 Not Detected Not Detected    Coronavirus NL63 Not Detected Not Detected    Coronavirus OC43 Not Detected Not Detected    COVID19 Not Detected Not Detected - Ref. Range    Human Metapneumovirus Not Detected Not Detected    Human Rhinovirus/Enterovirus Not Detected Not Detected    Influenza A PCR Not Detected Not Detected    Influenza B PCR Not Detected Not Detected    Parainfluenza Virus 1 Not Detected Not Detected    Parainfluenza Virus 2 Not Detected Not Detected    Parainfluenza Virus 3 Not Detected Not Detected    Parainfluenza Virus 4 Not Detected Not Detected    RSV, PCR Not Detected Not Detected    Bordetella pertussis pcr Not Detected Not Detected    Bordetella parapertussis PCR Not Detected Not Detected    Chlamydophila pneumoniae PCR Not Detected Not Detected    Mycoplasma pneumo by PCR Not Detected Not Detected   D-dimer, Quantitative    Collection Time: 03/15/24 11:01 AM    Specimen: Blood   Result Value Ref Range    D-Dimer, Quantitative 0.54 0.00 - 0.66 MCGFEU/mL   CBC Auto Differential    Collection Time: 03/15/24 11:01 AM    Specimen: Blood   Result Value Ref Range    WBC 7.25 3.40 - 10.80 10*3/mm3    RBC 4.54 4.14 - 5.80 10*6/mm3    Hemoglobin 10.1 (L) 13.0 - 17.7 g/dL    Hematocrit 34.5 (L) 37.5 - 51.0 %    MCV 76.0 (L) 79.0 - 97.0 fL    MCH 22.2 (L) 26.6 - 33.0 pg    MCHC 29.3 (L) 31.5 - 35.7 g/dL    RDW 15.7 (H) 12.3 - 15.4 %    RDW-SD 41.9 37.0 - 54.0 fl    MPV 9.2 6.0 - 12.0 fL    Platelets 284 140 - 450 10*3/mm3    Neutrophil % 85.6 (H) 42.7 - 76.0 %    Lymphocyte % 8.1 (L) 19.6 - 45.3 %    Monocyte % 3.4 (L) 5.0 - 12.0 %    Eosinophil % 1.2 0.3 - 6.2 %    Basophil  % 1.1 0.0 - 1.5 %    Immature Grans % 0.6 (H) 0.0 - 0.5 %    Neutrophils, Absolute 6.20 1.70 - 7.00 10*3/mm3    Lymphocytes, Absolute 0.59 (L) 0.70 - 3.10 10*3/mm3    Monocytes, Absolute 0.25 0.10 - 0.90 10*3/mm3    Eosinophils, Absolute 0.09 0.00 - 0.40 10*3/mm3    Basophils, Absolute 0.08 0.00 - 0.20 10*3/mm3    Immature Grans, Absolute 0.04 0.00 - 0.05 10*3/mm3    nRBC 0.3 (H) 0.0 - 0.2 /100 WBC   High Sensitivity Troponin T 2Hr    Collection Time: 03/15/24 12:31 PM    Specimen: Blood   Result Value Ref Range    HS Troponin T 33 (H) <22 ng/L    Troponin T Delta -1 >=-4 - <+4 ng/L   ECG 12 Lead Tachycardia    Collection Time: 03/15/24  2:45 PM   Result Value Ref Range    QT Interval 386 ms    QTC Interval 553 ms       The above labs were ordered by me and independently reviewed by me.     RADIOLOGY  XR Chest 1 View    Result Date: 3/15/2024  XR CHEST 1 VW-  HISTORY: Male who is 64 years-old, cough, short of breath  TECHNIQUE: Frontal view of the chest  COMPARISON: 12/12/2022  FINDINGS: The heart is enlarged. Pulmonary vasculature is congested. Left-sided generator device and cardiac lead are noted. Aorta appears ectatic. Patchy densities predominantly at the mid to lower lungs may represent edema and/or pneumonia, follow-up suggested. Minimal right pleural effusion is apparent. No pneumothorax. No acute osseous process.      As described.  This report was finalized on 3/15/2024 11:33 AM by Dr. Librado Orantes M.D on Workstation: [id number]       The above radiology studies were ordered by me.  See ED course for independent interpretations.     MEDICATIONS GIVEN IN ER  Medications   sodium chloride 0.9 % flush 10 mL (has no administration in time range)   methylPREDNISolone sodium succinate (SOLU-Medrol) injection 125 mg (125 mg Intravenous Given 3/15/24 1103)   ipratropium-albuterol (DUO-NEB) nebulizer solution 3 mL (3 mL Nebulization Given 3/15/24 1110)   metoprolol tartrate (LOPRESSOR) injection 5 mg (5 mg  Intravenous Given 3/15/24 1203)   metoprolol succinate XL (TOPROL-XL) 24 hr tablet 100 mg (100 mg Oral Given 3/15/24 1201)   furosemide (LASIX) injection 40 mg (40 mg Intravenous Given 3/15/24 1159)   metoprolol tartrate (LOPRESSOR) injection 5 mg (5 mg Intravenous Given 3/15/24 1409)         ORDERS PLACED DURING THIS VISIT:  Orders Placed This Encounter   Procedures    Respiratory Panel PCR w/COVID-19(SARS-CoV-2) [facility] In-House, NP Swab in UTM/VTM, 2 HR TAT - Swab, Nasopharynx    XR Chest 1 View    CT Angiogram Chest    High Sensitivity Troponin T    Comprehensive Metabolic Panel    BNP    D-dimer, Quantitative    Procalcitonin    Magnesium    CBC Auto Differential    High Sensitivity Troponin T 2Hr    Pulse Oximetry, Continuous    Monitor Blood Pressure    Code Status and Medical Interventions:    [facility] (on-call MD unless specified) Details    Inpatient Cardiology Consult    Inpatient Case Management  Consult    Inpatient Psychiatrist Consult    Inpatient Access Center Consult    ECG 12 Lead Dyspnea    ECG 12 Lead Tachycardia    Adult Transthoracic Echo Complete W/ Cont if Necessary Per Protocol    Insert Peripheral IV    Initiate Observation Status    Initiate Observation Status    CBC & Differential         OUTPATIENT MEDICATION MANAGEMENT:  Current Facility-Administered Medications Ordered in Epic   Medication Dose Route Frequency Provider Last Rate Last Admin    sodium chloride 0.9 % flush 10 mL  10 mL Intravenous PRN Jorge Luis Rodriguez MD         Current Outpatient Medications Ordered in Epic   Medication Sig Dispense Refill    albuterol sulfate  (90 Base) MCG/ACT inhaler Inhale 2 puffs Every 6 (Six) Hours As Needed for Wheezing. 18 g 3    apixaban (Eliquis) 5 MG tablet tablet Take 1 tablet by mouth Every 12 (Twelve) Hours. 60 tablet 3    aspirin 81 MG EC tablet Take 1 tablet by mouth Daily. 30 tablet 0    finasteride (PROSCAR) 5 MG tablet Take 1 tablet by mouth  Daily. 90 tablet 0    furosemide (LASIX) 40 MG tablet Take 1 tablet by mouth Daily. 90 tablet 1    lisinopril (PRINIVIL,ZESTRIL) 40 MG tablet TAKE 1 TABLET BY MOUTH DAILY 90 tablet 0    metoprolol succinate XL (TOPROL-XL) 100 MG 24 hr tablet Take 1 tablet by mouth Every 12 (Twelve) Hours. 120 tablet 5    mirtazapine (REMERON) 15 MG tablet Take 1 tablet by mouth Every Night. 90 tablet 0         PROCEDURES  Procedures            PROGRESS, DATA ANALYSIS, CONSULTS, AND MEDICAL DECISION MAKING  All labs have been independently interpreted by me.  All radiology studies have been reviewed by me. All EKG's have been independently viewed and interpreted by me.  Discussion below represents my analysis of pertinent findings related to patient's condition, differential diagnosis, treatment plan and final disposition.    Differential diagnosis includes but is not limited to:  Acute CHF  Pneumonia  COPD exacerbation  Pulmonary embolus      Clinical Scores:                  ED Course as of 03/15/24 1616   Fri Mar 15, 2024   1052 EKG          EKG time: 9:56 AM  Rhythm/Rate: Sinus tach, 120  P waves and ND: Normal  QRS, axis: Borderline left axis  ST and T waves: Nonspecific T wave changes inferiorly    Interpreted Contemporaneously by me, independently viewed  Similar compared to prior 12/12/2022, rate faster today       [JR]   1117 Creatinine: 1.01 [JR]   1117 Chest x-ray independently interpreted in PACS.  There is cardiomegaly with cardiac pacemaker present.  There is mild vascular congestion. [JR]   1145 Hemoglobin(!): 10.1 [JR]   1145 proBNP(!): 2,284.0 [JR]   1145 Procalcitonin: 0.11 [JR]   1145 D-Dimer, Quant: 0.54  Below age adjusted threshold of 0.6 [JR]   1300 Influenza A PCR: Not Detected [JR]   1301 COVID19: Not Detected [JR]   1328 HS Troponin T(!): 33 [JR]   1328 Troponin T Delta: -1 [JR]   1409 SpO2(!): 87 % [JR]   1431 Patient has had some mild hypoxia here down to 87%.  He is also persistently tachycardic with what  appears to be narrow complex regular rhythm.  He does have a history of A-fib.  This may be an atypical flutter.  I have ordered additional IV Lopressor in hopes of reducing his rate.  His respiratory viral panel is negative.  I have ordered Lasix for diuresis.  His prior echo demonstrated reduced EF however that was immediately following his prior V-fib arrest.  He will be admitted for further management.  I discussed with Dr. Guerrero Kerr, [facility] hospitalist, who agrees to admit. [JR]   1457 EKG          EKG time: 1445  Rhythm/Rate: Sinus tach versus atrial tachycardia, narrow complex and regular  P waves and OH: P waves are not readily identified  QRS, axis: Low voltage, left axis, occasional PVCs  ST and T waves: No acute ischemic changes    Interpreted Contemporaneously by me, independently viewed  Similar compared to prior earlier today       [JR]      ED Course User Index  [JR] Jorge Luis Rodriguez MD             AS OF 16:16 EDT VITALS:    BP - (!) 144/113  HR - (!) 125  TEMP - 98.3 °F (36.8 °C) (Oral)  O2 SATS - 92%    COMPLEXITY OF CARE  The patient requires admission.      Chronic or social conditions impacting patient care (Social Determinants of Health):     DIAGNOSIS  Final diagnoses:   Acute respiratory failure with hypoxia   Acute congestive heart failure, unspecified heart failure type   Chronic obstructive pulmonary disease, unspecified COPD type   Tachycardia           DISPOSITION  Admit      Prescription drug monitoring program review:           Please note that portions of this document were completed with a voice recognition program.    Note Disclaimer: At [facility], we believe that sharing information builds trust and better relationships. You are receiving this note because you recently visited [facility]. It is possible you will see health information before a provider has talked with you about it. This kind of information can be easy to misunderstand. To help you fully understand  what it means for your health, we urge you to discuss this note with your provider.         Jorge Luis Rodriguez MD  03/15/24 [id number]

## 2024-03-16 ENCOUNTER — APPOINTMENT (OUTPATIENT)
Dept: CARDIOLOGY | Facility: HOSPITAL | Age: 64
DRG: 291 | End: 2024-03-16
Payer: MEDICARE

## 2024-03-16 ENCOUNTER — APPOINTMENT (OUTPATIENT)
Dept: CT IMAGING | Facility: HOSPITAL | Age: 64
DRG: 291 | End: 2024-03-16
Payer: MEDICARE

## 2024-03-16 PROBLEM — E11.9 DIABETES MELLITUS, TYPE 2: Status: ACTIVE | Noted: 2024-03-16

## 2024-03-16 PROBLEM — D50.9 IRON DEFICIENCY ANEMIA: Status: ACTIVE | Noted: 2024-03-16

## 2024-03-16 LAB
ANION GAP SERPL CALCULATED.3IONS-SCNC: 14 MMOL/L (ref 5–15)
ASCENDING AORTA: 3.5 CM
BH CV ECHO MEAS - ACS: 2.12 CM
BH CV ECHO MEAS - AO MAX PG: 8.5 MMHG
BH CV ECHO MEAS - AO MEAN PG: 5 MMHG
BH CV ECHO MEAS - AO ROOT DIAM: 3.5 CM
BH CV ECHO MEAS - AO V2 MAX: 145.8 CM/SEC
BH CV ECHO MEAS - AO V2 VTI: 25 CM
BH CV ECHO MEAS - AVA(I,D): 2.7 CM2
BH CV ECHO MEAS - EDV(CUBED): 139.6 ML
BH CV ECHO MEAS - EDV(MOD-SP2): 88 ML
BH CV ECHO MEAS - EDV(MOD-SP4): 88 ML
BH CV ECHO MEAS - EF(MOD-BP): 38.5 %
BH CV ECHO MEAS - EF(MOD-SP2): 33 %
BH CV ECHO MEAS - EF(MOD-SP4): 46.6 %
BH CV ECHO MEAS - ESV(CUBED): 50.4 ML
BH CV ECHO MEAS - ESV(MOD-SP2): 59 ML
BH CV ECHO MEAS - ESV(MOD-SP4): 47 ML
BH CV ECHO MEAS - FS: 28.8 %
BH CV ECHO MEAS - IVS/LVPW: 1.03 CM
BH CV ECHO MEAS - IVSD: 1.06 CM
BH CV ECHO MEAS - LAT PEAK E' VEL: 15.3 CM/SEC
BH CV ECHO MEAS - LV DIASTOLIC VOL/BSA (35-75): 41 CM2
BH CV ECHO MEAS - LV MASS(C)D: 204.3 GRAMS
BH CV ECHO MEAS - LV MAX PG: 4.9 MMHG
BH CV ECHO MEAS - LV MEAN PG: 2.35 MMHG
BH CV ECHO MEAS - LV SYSTOLIC VOL/BSA (12-30): 21.9 CM2
BH CV ECHO MEAS - LV V1 MAX: 111.1 CM/SEC
BH CV ECHO MEAS - LV V1 VTI: 16 CM
BH CV ECHO MEAS - LVIDD: 5.2 CM
BH CV ECHO MEAS - LVIDS: 3.7 CM
BH CV ECHO MEAS - LVOT AREA: 4.3 CM2
BH CV ECHO MEAS - LVOT DIAM: 2.34 CM
BH CV ECHO MEAS - LVPWD: 1.03 CM
BH CV ECHO MEAS - MED PEAK E' VEL: 15.7 CM/SEC
BH CV ECHO MEAS - MV DEC TIME: 0.13 SEC
BH CV ECHO MEAS - MV E MAX VEL: 100 CM/SEC
BH CV ECHO MEAS - PA ACC TIME: 0.1 SEC
BH CV ECHO MEAS - PA V2 MAX: 111.6 CM/SEC
BH CV ECHO MEAS - RAP SYSTOLE: 8 MMHG
BH CV ECHO MEAS - RV MAX PG: 2.44 MMHG
BH CV ECHO MEAS - RV V1 MAX: 78.1 CM/SEC
BH CV ECHO MEAS - RV V1 VTI: 13.1 CM
BH CV ECHO MEAS - RVSP: 37 MMHG
BH CV ECHO MEAS - SI(MOD-SP2): 13.5 ML/M2
BH CV ECHO MEAS - SI(MOD-SP4): 19.1 ML/M2
BH CV ECHO MEAS - SV(LVOT): 68.6 ML
BH CV ECHO MEAS - SV(MOD-SP2): 29 ML
BH CV ECHO MEAS - SV(MOD-SP4): 41 ML
BH CV ECHO MEAS - TAPSE (>1.6): 1.78 CM
BH CV ECHO MEAS - TR MAX PG: 29.6 MMHG
BH CV ECHO MEAS - TR MAX VEL: 272.1 CM/SEC
BH CV ECHO MEASUREMENTS AVERAGE E/E' RATIO: 6.45
BH CV XLRA - RV BASE: 3.6 CM
BH CV XLRA - RV LENGTH: 8.6 CM
BH CV XLRA - RV MID: 3.5 CM
BH CV XLRA - TDI S': 12.4 CM/SEC
BUN SERPL-MCNC: 21 MG/DL (ref 8–23)
BUN/CREAT SERPL: 19.4 (ref 7–25)
CALCIUM SPEC-SCNC: 9.6 MG/DL (ref 8.6–10.5)
CHLORIDE SERPL-SCNC: 99 MMOL/L (ref 98–107)
CO2 SERPL-SCNC: 26 MMOL/L (ref 22–29)
CREAT SERPL-MCNC: 1.08 MG/DL (ref 0.76–1.27)
DEPRECATED RDW RBC AUTO: 43.4 FL (ref 37–54)
EGFRCR SERPLBLD CKD-EPI 2021: 75.7 ML/MIN/1.73
ERYTHROCYTE [DISTWIDTH] IN BLOOD BY AUTOMATED COUNT: 15.7 % (ref 12.3–15.4)
FERRITIN SERPL-MCNC: 13.6 NG/ML (ref 30–400)
GLUCOSE BLDC GLUCOMTR-MCNC: 127 MG/DL (ref 70–130)
GLUCOSE BLDC GLUCOMTR-MCNC: 154 MG/DL (ref 70–130)
GLUCOSE BLDC GLUCOMTR-MCNC: 164 MG/DL (ref 70–130)
GLUCOSE SERPL-MCNC: 195 MG/DL (ref 65–99)
HBA1C MFR BLD: 6.6 % (ref 4.8–5.6)
HCT VFR BLD AUTO: 33.7 % (ref 37.5–51)
HCT VFR BLD AUTO: 34.8 % (ref 37.5–51)
HCT VFR BLD AUTO: 35.3 % (ref 37.5–51)
HGB BLD-MCNC: 10.2 G/DL (ref 13–17.7)
HGB BLD-MCNC: 10.3 G/DL (ref 13–17.7)
HGB BLD-MCNC: 9.8 G/DL (ref 13–17.7)
IRON 24H UR-MRATE: 30 MCG/DL (ref 59–158)
IRON SATN MFR SERPL: 4 % (ref 20–50)
LEFT ATRIUM VOLUME INDEX: 28.8 ML/M2
LV EF 2D ECHO EST: 45 %
MAGNESIUM SERPL-MCNC: 2 MG/DL (ref 1.6–2.4)
MCH RBC QN AUTO: 22.6 PG (ref 26.6–33)
MCHC RBC AUTO-ENTMCNC: 29.2 G/DL (ref 31.5–35.7)
MCV RBC AUTO: 77.6 FL (ref 79–97)
PHOSPHATE SERPL-MCNC: 3.3 MG/DL (ref 2.5–4.5)
PLATELET # BLD AUTO: 287 10*3/MM3 (ref 140–450)
PMV BLD AUTO: 9.3 FL (ref 6–12)
POTASSIUM SERPL-SCNC: 4.1 MMOL/L (ref 3.5–5.2)
RBC # BLD AUTO: 4.55 10*6/MM3 (ref 4.14–5.8)
SINUS: 3.4 CM
SODIUM SERPL-SCNC: 139 MMOL/L (ref 136–145)
STJ: 3.1 CM
TIBC SERPL-MCNC: 678 MCG/DL (ref 298–536)
TRANSFERRIN SERPL-MCNC: 455 MG/DL (ref 200–360)
WBC NRBC COR # BLD AUTO: 6.44 10*3/MM3 (ref 3.4–10.8)

## 2024-03-16 PROCEDURE — 94799 UNLISTED PULMONARY SVC/PX: CPT

## 2024-03-16 PROCEDURE — 84100 ASSAY OF PHOSPHORUS: CPT | Performed by: HOSPITALIST

## 2024-03-16 PROCEDURE — 80048 BASIC METABOLIC PNL TOTAL CA: CPT | Performed by: HOSPITALIST

## 2024-03-16 PROCEDURE — 25510000001 IOPAMIDOL PER 1 ML: Performed by: HOSPITALIST

## 2024-03-16 PROCEDURE — 82728 ASSAY OF FERRITIN: CPT | Performed by: STUDENT IN AN ORGANIZED HEALTH CARE EDUCATION/TRAINING PROGRAM

## 2024-03-16 PROCEDURE — 85018 HEMOGLOBIN: CPT | Performed by: STUDENT IN AN ORGANIZED HEALTH CARE EDUCATION/TRAINING PROGRAM

## 2024-03-16 PROCEDURE — 82948 REAGENT STRIP/BLOOD GLUCOSE: CPT

## 2024-03-16 PROCEDURE — 94664 DEMO&/EVAL PT USE INHALER: CPT

## 2024-03-16 PROCEDURE — 93306 TTE W/DOPPLER COMPLETE: CPT | Performed by: INTERNAL MEDICINE

## 2024-03-16 PROCEDURE — 84466 ASSAY OF TRANSFERRIN: CPT | Performed by: STUDENT IN AN ORGANIZED HEALTH CARE EDUCATION/TRAINING PROGRAM

## 2024-03-16 PROCEDURE — 83540 ASSAY OF IRON: CPT | Performed by: STUDENT IN AN ORGANIZED HEALTH CARE EDUCATION/TRAINING PROGRAM

## 2024-03-16 PROCEDURE — 85014 HEMATOCRIT: CPT | Performed by: STUDENT IN AN ORGANIZED HEALTH CARE EDUCATION/TRAINING PROGRAM

## 2024-03-16 PROCEDURE — 71275 CT ANGIOGRAPHY CHEST: CPT

## 2024-03-16 PROCEDURE — 83036 HEMOGLOBIN GLYCOSYLATED A1C: CPT | Performed by: STUDENT IN AN ORGANIZED HEALTH CARE EDUCATION/TRAINING PROGRAM

## 2024-03-16 PROCEDURE — 93306 TTE W/DOPPLER COMPLETE: CPT

## 2024-03-16 PROCEDURE — 63710000001 DIPHENHYDRAMINE PER 50 MG: Performed by: STUDENT IN AN ORGANIZED HEALTH CARE EDUCATION/TRAINING PROGRAM

## 2024-03-16 PROCEDURE — 94761 N-INVAS EAR/PLS OXIMETRY MLT: CPT

## 2024-03-16 PROCEDURE — 25010000002 THIAMINE HCL 200 MG/2ML SOLUTION: Performed by: HOSPITALIST

## 2024-03-16 PROCEDURE — 63710000001 INSULIN LISPRO (HUMAN) PER 5 UNITS: Performed by: STUDENT IN AN ORGANIZED HEALTH CARE EDUCATION/TRAINING PROGRAM

## 2024-03-16 PROCEDURE — 25010000002 NA FERRIC GLUC CPLX PER 12.5 MG: Performed by: STUDENT IN AN ORGANIZED HEALTH CARE EDUCATION/TRAINING PROGRAM

## 2024-03-16 PROCEDURE — 85027 COMPLETE CBC AUTOMATED: CPT | Performed by: HOSPITALIST

## 2024-03-16 PROCEDURE — 83735 ASSAY OF MAGNESIUM: CPT | Performed by: HOSPITALIST

## 2024-03-16 PROCEDURE — 25010000002 FUROSEMIDE PER 20 MG: Performed by: STUDENT IN AN ORGANIZED HEALTH CARE EDUCATION/TRAINING PROGRAM

## 2024-03-16 PROCEDURE — 99222 1ST HOSP IP/OBS MODERATE 55: CPT | Performed by: INTERNAL MEDICINE

## 2024-03-16 RX ORDER — PANTOPRAZOLE SODIUM 40 MG/1
40 TABLET, DELAYED RELEASE ORAL
Status: DISCONTINUED | OUTPATIENT
Start: 2024-03-16 | End: 2024-03-16

## 2024-03-16 RX ORDER — DEXTROSE MONOHYDRATE 25 G/50ML
25 INJECTION, SOLUTION INTRAVENOUS
Status: DISCONTINUED | OUTPATIENT
Start: 2024-03-16 | End: 2024-03-23 | Stop reason: HOSPADM

## 2024-03-16 RX ORDER — DIPHENHYDRAMINE HCL 25 MG
25 CAPSULE ORAL DAILY
Status: COMPLETED | OUTPATIENT
Start: 2024-03-16 | End: 2024-03-18

## 2024-03-16 RX ORDER — IBUPROFEN 600 MG/1
1 TABLET ORAL
Status: DISCONTINUED | OUTPATIENT
Start: 2024-03-16 | End: 2024-03-23 | Stop reason: HOSPADM

## 2024-03-16 RX ORDER — ACETAMINOPHEN 325 MG/1
650 TABLET ORAL DAILY
Qty: 6 TABLET | Refills: 0 | Status: COMPLETED | OUTPATIENT
Start: 2024-03-16 | End: 2024-03-18

## 2024-03-16 RX ORDER — INSULIN LISPRO 100 [IU]/ML
2-7 INJECTION, SOLUTION INTRAVENOUS; SUBCUTANEOUS
Status: DISCONTINUED | OUTPATIENT
Start: 2024-03-16 | End: 2024-03-23 | Stop reason: HOSPADM

## 2024-03-16 RX ORDER — IPRATROPIUM BROMIDE AND ALBUTEROL SULFATE 2.5; .5 MG/3ML; MG/3ML
3 SOLUTION RESPIRATORY (INHALATION)
Status: DISCONTINUED | OUTPATIENT
Start: 2024-03-16 | End: 2024-03-23 | Stop reason: HOSPADM

## 2024-03-16 RX ORDER — PANTOPRAZOLE SODIUM 40 MG/10ML
40 INJECTION, POWDER, LYOPHILIZED, FOR SOLUTION INTRAVENOUS
Status: DISCONTINUED | OUTPATIENT
Start: 2024-03-16 | End: 2024-03-18

## 2024-03-16 RX ORDER — NICOTINE POLACRILEX 4 MG
15 LOZENGE BUCCAL
Status: DISCONTINUED | OUTPATIENT
Start: 2024-03-16 | End: 2024-03-23 | Stop reason: HOSPADM

## 2024-03-16 RX ORDER — MIRTAZAPINE 30 MG/1
30 TABLET, FILM COATED ORAL NIGHTLY
Status: DISCONTINUED | OUTPATIENT
Start: 2024-03-16 | End: 2024-03-23 | Stop reason: HOSPADM

## 2024-03-16 RX ORDER — DIGOXIN 0.25 MG/ML
500 INJECTION INTRAMUSCULAR; INTRAVENOUS ONCE
Status: COMPLETED | OUTPATIENT
Start: 2024-03-16 | End: 2024-03-17

## 2024-03-16 RX ORDER — FUROSEMIDE 10 MG/ML
20 INJECTION INTRAMUSCULAR; INTRAVENOUS
Status: DISCONTINUED | OUTPATIENT
Start: 2024-03-16 | End: 2024-03-17

## 2024-03-16 RX ORDER — HYDROXYZINE PAMOATE 50 MG/1
50 CAPSULE ORAL EVERY 4 HOURS PRN
Status: DISCONTINUED | OUTPATIENT
Start: 2024-03-16 | End: 2024-03-23 | Stop reason: HOSPADM

## 2024-03-16 RX ORDER — BUSPIRONE HYDROCHLORIDE 10 MG/1
10 TABLET ORAL 3 TIMES DAILY
Status: DISCONTINUED | OUTPATIENT
Start: 2024-03-16 | End: 2024-03-23 | Stop reason: HOSPADM

## 2024-03-16 RX ADMIN — MIRTAZAPINE 30 MG: 30 TABLET, FILM COATED ORAL at 22:10

## 2024-03-16 RX ADMIN — ASPIRIN 81 MG: 81 TABLET, COATED ORAL at 08:40

## 2024-03-16 RX ADMIN — INSULIN LISPRO 2 UNITS: 100 INJECTION, SOLUTION INTRAVENOUS; SUBCUTANEOUS at 12:59

## 2024-03-16 RX ADMIN — IPRATROPIUM BROMIDE AND ALBUTEROL SULFATE 3 ML: 2.5; .5 SOLUTION RESPIRATORY (INHALATION) at 14:25

## 2024-03-16 RX ADMIN — METOPROLOL SUCCINATE 100 MG: 100 TABLET, EXTENDED RELEASE ORAL at 22:10

## 2024-03-16 RX ADMIN — MIRTAZAPINE 15 MG: 15 TABLET, FILM COATED ORAL at 01:11

## 2024-03-16 RX ADMIN — SODIUM CHLORIDE 125 MG: 9 INJECTION, SOLUTION INTRAVENOUS at 13:08

## 2024-03-16 RX ADMIN — BUDESONIDE AND FORMOTEROL FUMARATE DIHYDRATE 2 PUFF: 160; 4.5 AEROSOL RESPIRATORY (INHALATION) at 20:16

## 2024-03-16 RX ADMIN — DIPHENHYDRAMINE HYDROCHLORIDE 25 MG: 25 CAPSULE ORAL at 13:02

## 2024-03-16 RX ADMIN — HYDROXYZINE PAMOATE 50 MG: 50 CAPSULE ORAL at 18:24

## 2024-03-16 RX ADMIN — FUROSEMIDE 20 MG: 10 INJECTION, SOLUTION INTRAMUSCULAR; INTRAVENOUS at 13:00

## 2024-03-16 RX ADMIN — IPRATROPIUM BROMIDE AND ALBUTEROL SULFATE 3 ML: 2.5; .5 SOLUTION RESPIRATORY (INHALATION) at 20:15

## 2024-03-16 RX ADMIN — FINASTERIDE 5 MG: 5 TABLET, FILM COATED ORAL at 08:40

## 2024-03-16 RX ADMIN — DOCUSATE SODIUM 50MG AND SENNOSIDES 8.6MG 2 TABLET: 8.6; 5 TABLET, FILM COATED ORAL at 22:09

## 2024-03-16 RX ADMIN — PANTOPRAZOLE SODIUM 40 MG: 40 INJECTION, POWDER, LYOPHILIZED, FOR SOLUTION INTRAVENOUS at 13:04

## 2024-03-16 RX ADMIN — THIAMINE HYDROCHLORIDE 200 MG: 100 INJECTION, SOLUTION INTRAMUSCULAR; INTRAVENOUS at 22:11

## 2024-03-16 RX ADMIN — FOLIC ACID 1 MG: 1 TABLET ORAL at 08:40

## 2024-03-16 RX ADMIN — THIAMINE HYDROCHLORIDE 200 MG: 100 INJECTION, SOLUTION INTRAMUSCULAR; INTRAVENOUS at 15:57

## 2024-03-16 RX ADMIN — INSULIN LISPRO 2 UNITS: 100 INJECTION, SOLUTION INTRAVENOUS; SUBCUTANEOUS at 08:39

## 2024-03-16 RX ADMIN — IPRATROPIUM BROMIDE AND ALBUTEROL SULFATE 3 ML: 2.5; .5 SOLUTION RESPIRATORY (INHALATION) at 11:41

## 2024-03-16 RX ADMIN — Medication 10 ML: at 08:42

## 2024-03-16 RX ADMIN — BUDESONIDE AND FORMOTEROL FUMARATE DIHYDRATE 2 PUFF: 160; 4.5 AEROSOL RESPIRATORY (INHALATION) at 07:39

## 2024-03-16 RX ADMIN — Medication 10 ML: at 22:11

## 2024-03-16 RX ADMIN — PANTOPRAZOLE SODIUM 40 MG: 40 INJECTION, POWDER, LYOPHILIZED, FOR SOLUTION INTRAVENOUS at 18:21

## 2024-03-16 RX ADMIN — APIXABAN 5 MG: 5 TABLET, FILM COATED ORAL at 08:40

## 2024-03-16 RX ADMIN — INSULIN LISPRO 2 UNITS: 100 INJECTION, SOLUTION INTRAVENOUS; SUBCUTANEOUS at 22:10

## 2024-03-16 RX ADMIN — FUROSEMIDE 20 MG: 10 INJECTION, SOLUTION INTRAMUSCULAR; INTRAVENOUS at 18:20

## 2024-03-16 RX ADMIN — METOPROLOL SUCCINATE 100 MG: 100 TABLET, EXTENDED RELEASE ORAL at 08:40

## 2024-03-16 RX ADMIN — BUSPIRONE HYDROCHLORIDE 10 MG: 10 TABLET ORAL at 18:17

## 2024-03-16 RX ADMIN — IOPAMIDOL 85 ML: 755 INJECTION, SOLUTION INTRAVENOUS at 01:04

## 2024-03-16 RX ADMIN — ACETAMINOPHEN 325MG 650 MG: 325 TABLET ORAL at 13:02

## 2024-03-16 RX ADMIN — THIAMINE HYDROCHLORIDE 200 MG: 100 INJECTION, SOLUTION INTRAMUSCULAR; INTRAVENOUS at 06:31

## 2024-03-16 NOTE — CONSULTS
"IDENTIFYING INFORMATION: The patient is a 64-year-old retired white male admitted with congestive heart failure.  He is seen by psychiatry related to her history of depression and anxiety.    CHIEF COMPLAINT: None given    INFORMANT: Patient, staff and chart    RELIABILITY: Good    HISTORY OF PRESENT ILLNESS: The patient is a 64-year-old white male admitted for congestive heart failure on 3/15/2024.  He is seen by psychiatry related to complaints of depression and anxiety.  The patient reports that he has been on Remeron prescribed by his primary care physician for the past 9 months.  He reports that he has mainly been on this medication to help him sleep.  He reports that he recently lost his female  of 16 years and is grieving this.  He also laments his own health issues.  The patient is retired cook.  He denies prior suicide attempts or gestures and denies current suicidal or homicidal ideation.  He reports occasional cannabis use.  He does report constant feelings of anxiety, and believes that he may have posttraumatic stress disorder related to several \"near-death\" experiences\".    PAST PSYCHIATRIC HISTORY: The patient has been on Remeron for 9 months but has no other history of psychiatric treatment field    PAST MEDICAL HISTORY: Significant for obesity, congestive heart failure, acute respiratory failure, atrial flutter, asthma, anemia, history of coronary artery disease, history of cardiac arrest, COPD, enlarged prostate, hypertension, hypokalemia, ICD in place, ischemic cardiomyopathy, orthopnea, pulmonary edema,     MEDICATIONS:   Current Facility-Administered Medications   Medication Dose Route Frequency Provider Last Rate Last Admin    acetaminophen (TYLENOL) tablet 650 mg  650 mg Oral Q4H PRN Tato Kerr MD   650 mg at 03/15/24 2215    Or    acetaminophen (TYLENOL) 160 MG/5ML oral solution 650 mg  650 mg Oral Q4H PRN Tato Kerr MD        Or    acetaminophen (TYLENOL) " suppository 650 mg  650 mg Rectal Q4H PRN Tato Kerr MD        acetaminophen (TYLENOL) tablet 650 mg  650 mg Oral Daily Jimmy Marcum MD   650 mg at 03/16/24 1302    albuterol (PROVENTIL) nebulizer solution 0.083% 2.5 mg/3mL  2.5 mg Nebulization Q6H PRN Tato Kerr MD        sennosides-docusate (PERICOLACE) 8.6-50 MG per tablet 2 tablet  2 tablet Oral BID Tato Kerr MD        And    polyethylene glycol (MIRALAX) packet 17 g  17 g Oral Daily PRN Tato Kerr MD        And    bisacodyl (DULCOLAX) EC tablet 5 mg  5 mg Oral Daily PRN Tato Kerr MD        And    bisacodyl (DULCOLAX) suppository 10 mg  10 mg Rectal Daily PRN Tato Kerr MD        budesonide-formoterol (SYMBICORT) 160-4.5 MCG/ACT inhaler 2 puff  2 puff Inhalation BID Tato Kerr MD   2 puff at 03/16/24 0739    busPIRone (BUSPAR) tablet 10 mg  10 mg Oral TID Julio Cesar Neves III, MD        Calcium Replacement - Follow Nurse / BPA Driven Protocol   Does not apply PRN Tato Kerr MD        dextrose (D50W) (25 g/50 mL) IV injection 25 g  25 g Intravenous Q15 Min PRN Jimmy Marcum MD        dextrose (GLUTOSE) oral gel 15 g  15 g Oral Q15 Min PRN Jimmy Marcum MD        diphenhydrAMINE (BENADRYL) capsule 25 mg  25 mg Oral Daily Jimmy Marcum MD   25 mg at 03/16/24 1302    ferric gluconate (FERRLECIT)125 MG in sodium chloride 0.9 % 100 mL IVPB  125 mg Intravenous Daily Jimmy Marcum  mL/hr at 03/16/24 1308 125 mg at 03/16/24 1308    finasteride (PROSCAR) tablet 5 mg  5 mg Oral Daily Tato Kerr MD   5 mg at 03/16/24 0840    folic acid (FOLVITE) tablet 1 mg  1 mg Oral Daily Tato Kerr MD   1 mg at 03/16/24 0840    furosemide (LASIX) injection 20 mg  20 mg Intravenous BID Jimmy Marcum MD   20 mg at 03/16/24 1300    glucagon (GLUCAGEN) injection 1 mg  1 mg Intramuscular Q15 Min PRN Jimmy Marcum MD         hydrOXYzine pamoate (VISTARIL) capsule 50 mg  50 mg Oral Q4H PRN Julio Cesar Neves III, MD        insulin lispro (HUMALOG/ADMELOG) injection 2-7 Units  2-7 Units Subcutaneous 4x Daily AC & at Bedtime Jimmy Marcum MD   2 Units at 03/16/24 1259    ipratropium-albuterol (DUO-NEB) nebulizer solution 3 mL  3 mL Nebulization 4x Daily - RT Jimmy Marcum MD   3 mL at 03/16/24 1425    [Held by provider] lisinopril (PRINIVIL,ZESTRIL) tablet 40 mg  40 mg Oral Daily Tato Kerr MD        LORazepam (ATIVAN) tablet 1 mg  1 mg Oral Q1H PRN Tato Kerr MD        Or    LORazepam (ATIVAN) injection 1 mg  1 mg Intravenous Q1H PRN Tato Kerr MD        Or    LORazepam (ATIVAN) tablet 2 mg  2 mg Oral Q1H PRN Tato Kerr MD        Or    LORazepam (ATIVAN) injection 2 mg  2 mg Intravenous Q1H PRN Tato Kerr MD        Or    LORazepam (ATIVAN) injection 2 mg  2 mg Intravenous Q15 Min PRN Tato Kerr MD        Or    LORazepam (ATIVAN) injection 2 mg  2 mg Intramuscular Q15 Min PRN Tato Kerr MD        Magnesium Standard Dose Replacement - Follow Nurse / BPA Driven Protocol   Does not apply PRN Tato Kerr MD        metoprolol succinate XL (TOPROL-XL) 24 hr tablet 100 mg  100 mg Oral Q12H Tato Kerr MD   100 mg at 03/16/24 0840    mirtazapine (REMERON) tablet 30 mg  30 mg Oral Nightly Julio Cesar Neves III, MD        nitroglycerin (NITROSTAT) SL tablet 0.4 mg  0.4 mg Sublingual Q5 Min PRN Tato Kerr MD        ondansetron ODT (ZOFRAN-ODT) disintegrating tablet 4 mg  4 mg Oral Q6H PRN Tato Kerr MD        Or    ondansetron (ZOFRAN) injection 4 mg  4 mg Intravenous Q6H PRN Tato Kerr MD        pantoprazole (PROTONIX) injection 40 mg  40 mg Intravenous BID AC Jimmy Marcum MD   40 mg at 03/16/24 1304    Phosphorus Replacement - Follow Nurse / BPA Driven Protocol   Does not apply MARCOS Kerr  Tato FITZPATRICK MD        Potassium Replacement - Follow Nurse / BPA Driven Protocol   Does not apply PRN Tato Kerr MD        sodium chloride 0.9 % flush 10 mL  10 mL Intravenous PRN Tato Kerr MD        sodium chloride 0.9 % flush 10 mL  10 mL Intravenous Q12H Tato Kerr MD   10 mL at 03/16/24 0842    sodium chloride 0.9 % flush 10 mL  10 mL Intravenous PRN Tato Kerr MD        sodium chloride 0.9 % infusion 40 mL  40 mL Intravenous PRN Tato Kerr MD        thiamine (B-1) injection 200 mg  200 mg Intravenous Q8H Tato Kerr MD   200 mg at 03/16/24 1557    Followed by    [START ON 3/21/2024] thiamine (VITAMIN B-1) tablet 100 mg  100 mg Oral Daily Tato Kerr MD             ALLERGIES: None    FAMILY HISTORY: Noncontributory    SOCIAL HISTORY: The patient is a retired .  He is a former smoker and reports that he has no use of cannabis.    MENTAL STATUS EXAM: The patient is an obese white male appearing stated age.  He is in no apparent physical distress at the time of examination.  He is awake alert and oriented in all spheres.  His mood is euthymic his affect congruent.  Speech is generally relevant and coherent.  There are no gross deficits in memory or cognition noted.  Intelligence is judged to be in the average range based on fund of knowledge, the patient is currently off interview.  He is currently denying suicidal homicidal or psychotic features.  Judgement and insight are intact.    ASSETS/LIABILITIES: To be assessed    DIAGNOSTIC IMPRESSION: Major depressive disorder single episode moderate, posttraumatic stress disorder, medical problems as described previously    PLAN: I will increase the patient's mirtazapine dose to 30 mg nightly and will add BuSpar for anxiety as well as as needed Vistaril.  I note that the patient is also prescribed Benadryl but that this is part of his iron infusion protocol and will not discontinue that medication.   I will continue to follow and will ask the access center to assist the patient with arrangements for post discharge psychiatric treatment.

## 2024-03-16 NOTE — PROGRESS NOTES
Name: Sebastien Tang ADMIT: 3/15/2024   : 1960  PCP: Yuliana Flynn DO    MRN: [id number] LOS: 0 days   AGE/SEX: 64 y.o. male  ROOM: [facility]     Subjective   Subjective   Patient seen this morning, no acute events overnight.  Shortness of breath improved, coughing, nonproductive.  Was able to ambulate without significant shortness of breath per patient.  Denies fevers or chills.  No chest pain or palpitations.    Review of Systems   As above  Objective   Objective   Vital Signs  Temp:  [97.7 °F (36.5 °C)-99.3 °F (37.4 °C)] 97.7 °F (36.5 °C)  Heart Rate:  [112-128] 112  Resp:  [16-18] 16  BP: (135-168)/() 142/99  SpO2:  [87 %-97 %] 95 %  on  Flow (L/min):  [3] 3;   Device (Oxygen Therapy): nasal cannula  Body mass index is 39.16 kg/m².  Physical Exam    General: Alert and oriented x3, no acute distress, ill-appearing  HEENT: Normocephalic, atraumatic  CV: Tachycardic, irregular rhythm  Lungs: Diminished, no wheezing, nonlabored breathing,  Abdomen: Soft, nontender, nondistended  Extremities: No significant peripheral edema , no cyanosis     Results Review     I reviewed the patient's new clinical results.  Results from last 7 days   Lab Units 24  0235 03/15/24  1101   WBC 10*3/mm3 6.44 7.25   HEMOGLOBIN g/dL 10.3* 10.1*   PLATELETS 10*3/mm3 287 284     Results from last 7 days   Lab Units 24  0235 03/15/24  1011   SODIUM mmol/L 139 141   POTASSIUM mmol/L 4.1 3.7   CHLORIDE mmol/L 99 104   CO2 mmol/L 26.0 23.2   BUN mg/dL 21 16   CREATININE mg/dL 1.08 1.01   GLUCOSE mg/dL 195* 119*   Estimated Creatinine Clearance: 78.3 mL/min (by C-G formula based on SCr of 1.08 mg/dL).  Results from last 7 days   Lab Units 03/15/24  1011   ALBUMIN g/dL 3.9   BILIRUBIN mg/dL 0.5   ALK PHOS U/L 73   AST (SGOT) U/L 37   ALT (SGPT) U/L 30     Results from last 7 days   Lab Units 24  0235 03/15/24  1011   CALCIUM mg/dL 9.6 9.4   ALBUMIN g/dL  --  3.9   MAGNESIUM mg/dL 2.0 2.2   PHOSPHORUS mg/dL 3.3   --      Results from last 7 days   Lab Units 03/15/24  1011   PROCALCITONIN ng/mL 0.11     COVID19   Date Value Ref Range Status   03/15/2024 Not Detected Not Detected - Ref. Range Final   11/25/2022 Not Detected Not Detected - Ref. Range Final     Hemoglobin A1C   Date/Time Value Ref Range Status   03/16/2024 0235 6.60 (H) 4.80 - 5.60 % Final           CT Angiogram Chest  Narrative: CT ANGIOGRAM OF THE CHEST     HISTORY: Hypoxia     COMPARISON: April 1, 2021     TECHNIQUE: Axial CT imaging was obtained through the thorax. IV contrast  was administered. Three-D reformatted images were obtained.     FINDINGS:  No acute pulmonary thromboembolus is seen. There is dilatation of the  aortic root, measuring up to 4 cm. Remainder of the aorta measures  within normal size limits. There is some atherosclerotic involvement of  the thoracic aorta. There are coronary artery calcifications. There is  enlargement of the main pulmonary artery, which can be seen in setting  of pulmonary arterial hypertension. There are advanced background  emphysematous changes. There do appear to be some minimal tree-in-bud  infiltrates within the lingula and right upper lobe. These are likely  infectious or inflammatory. There is a separate origin of the left  vertebral artery from the aortic arch. Mediastinal lymph nodes do not  appear pathologically enlarged, although there are some prominent hilar  nodes. For example, a left hilar node measures up to 1.4 cm in short  axis dimensions. This is larger than in April 2021, when it measured 1.2  cm. A right hilar node measures 1.3 cm. Previously, it measured 1 cm.  Thyroid gland, trachea, and esophagus appear unremarkable. Images  through the upper abdomen do not demonstrate any acute abnormalities. No  acute osseous abnormalities are seen.     Impression:    1. The patient has some scattered tree-in-bud infiltrates which are  noted within the right upper lobe and lingula. These are  likely  infectious or inflammatory.  2. Prominent hilar lymph nodes may be reactive. Consider short-term CT  follow-up in 3 to 6 months, given advanced background emphysematous  changes.     Radiation dose reduction techniques were utilized, including automated  exposure control and exposure modulation based on body size.        This report was finalized on 3/16/2024 4:29 AM by Dr. Judi Chow M.D on Workstation: [id number]       Scheduled Medications  acetaminophen, 650 mg, Oral, Daily  budesonide-formoterol, 2 puff, Inhalation, BID  diphenhydrAMINE, 25 mg, Oral, Daily  ferric gluconate, 125 mg, Intravenous, Daily  finasteride, 5 mg, Oral, Daily  folic acid, 1 mg, Oral, Daily  furosemide, 20 mg, Intravenous, BID  insulin lispro, 2-7 Units, Subcutaneous, 4x Daily AC & at Bedtime  ipratropium-albuterol, 3 mL, Nebulization, 4x Daily - RT  [Held by provider] lisinopril, 40 mg, Oral, Daily  metoprolol succinate XL, 100 mg, Oral, Q12H  mirtazapine, 15 mg, Oral, Nightly  pantoprazole, 40 mg, Intravenous, BID AC  senna-docusate sodium, 2 tablet, Oral, BID  sodium chloride, 10 mL, Intravenous, Q12H  thiamine (B-1) IV, 200 mg, Intravenous, Q8H   Followed by  [START ON 3/21/2024] thiamine, 100 mg, Oral, Daily    Infusions   Diet  Diet: Cardiac, Diabetic; Healthy Heart (2-3 Na+); Consistent Carbohydrate; Fluid Consistency: Thin (IDDSI 0)    I have personally reviewed     [x]  Laboratory   [x]  Microbiology   [x]  Radiology   [x]  EKG/Telemetry  []  Cardiology/Vascular   []  Pathology    []  Records       Assessment/Plan     Active Hospital Problems    Diagnosis  POA    **Acute CHF [I50.9]  Yes    Iron deficiency anemia [D50.9]  Unknown    Diabetes mellitus, type 2 [E11.9]  Unknown    Heart failure [I50.9]  Yes      Resolved Hospital Problems   No resolved problems to display.     This is a 64-year-old gentleman with a history of chronic systolic  heart failure,  COPD, previous V-fib arrest, paroxysmal A-fib on  anticoagulation, hypertension, obesity, hyperlipidemia and poor medical compliance who presents to the hospital with increasing shortness of breath and is found to have decompensated heart failure and pulmonary edema       Acute on chronic systolic heart failure  -Echocardiogram on 4/2021 showed EF of 48%.  -IV Lasix 20 mg twice daily, echocardiogram ordered.  -Cardiology consulted      CAD  -Continue statin, metoprolol  -Holding aspirin secondary to iron deficiency anemia and concern for GI bleed.    Iron deficiency anemia/GI bleed  -Hemoglobin 10.3, hemoglobin in 12/2022 was 12.2  -Patient reports intermittent bright red blood per rectum with bowel movements  -Iron panel consistent with severe iron deficiency  -Ordered IV iron  -IV PPI  -Monitor hemoglobin every 8 hours  -GI consulted    COPD/hypoxemia  -Continue Symbicort  -DuoNebs  -Incentive spirometer  -Wean off O2 as able    Type II DM, new diagnosis  -Hemoglobin A1c 6.6, previously was 6.2  -Initiate SSI, diabetic diet  -Diabetic educator consult    Alcohol use disorder  -Continue CIWA protocol, multivitamins      Paroxysmal atrial fibrillation  -Continue metoprolol  mg twice daily, hold Eliquis secondary to iron deficiency anemia/GI bleed      Prominent hilar lymph nodes seen on CT   -Follow-up short-term CT  in 3 to 6 months given advanced background emphysematous changes recommended      Aortic root dilation.    -CT scan showed there is dilatation of the aortic root, measuring up to 4 cm.   -Will need outpatient surveillance        SCDs for DVT prophylaxis.  Full code.  Discussed with patient.        Copied text in this note has been reviewed and is accurate as of 03/16/24.         Dictated utilizing Dragon dictation        Jimmy Marcum MD  [facility]  03/16/24  09:46 EDT

## 2024-03-16 NOTE — NURSING NOTE
AC follow up regarding anxiety: chart reviewed. Pt talkative with daughter at bedside when seen during the evening. Delivered resources for ETOH and PTSD support groups to patient. Pt in bright spirits when seen but forthcoming about anxiety/PTSD/and alcohol use as a coping mechanism. Provided support and encouragement. Encouraged to reach out to Access center with additional needs. Order in place for psychiatrist to see today regarding anxiety medication. Will follow.

## 2024-03-16 NOTE — PLAN OF CARE
Problem: Adult Inpatient Plan of Care  Goal: Plan of Care Review  Outcome: Ongoing, Progressing  Goal: Patient-Specific Goal (Individualized)  Outcome: Ongoing, Progressing  Goal: Absence of Hospital-Acquired Illness or Injury  Outcome: Ongoing, Progressing  Intervention: Identify and Manage Fall Risk  Recent Flowsheet Documentation  Taken 3/16/2024 1607 by Bhavin Durand RN  Safety Promotion/Fall Prevention:   safety round/check completed   room organization consistent   activity supervised   assistive device/personal items within reach   clutter free environment maintained   fall prevention program maintained  Taken 3/16/2024 1410 by Bhavin Durand, RN  Safety Promotion/Fall Prevention:   safety round/check completed   room organization consistent   activity supervised   assistive device/personal items within reach   clutter free environment maintained   fall prevention program maintained  Taken 3/16/2024 1200 by Bhavin Durand RN  Safety Promotion/Fall Prevention:   safety round/check completed   room organization consistent   activity supervised   assistive device/personal items within reach   clutter free environment maintained   fall prevention program maintained  Taken 3/16/2024 1045 by Bhavin Durand, RN  Safety Promotion/Fall Prevention:   safety round/check completed   room organization consistent   activity supervised   assistive device/personal items within reach   clutter free environment maintained   fall prevention program maintained  Taken 3/16/2024 0810 by Bhavin Durand, RN  Safety Promotion/Fall Prevention:   safety round/check completed   room organization consistent   activity supervised   assistive device/personal items within reach   clutter free environment maintained   fall prevention program maintained  Intervention: Prevent Skin Injury  Recent Flowsheet Documentation  Taken 3/16/2024 1607 by Bhavin Durand,  RN  Body Position: position changed independently  Taken 3/16/2024 1410 by Bhavin Durand RN  Body Position: position changed independently  Taken 3/16/2024 1200 by Bhavin Durand RN  Body Position: position changed independently  Taken 3/16/2024 1045 by Bhavin Durand RN  Body Position: position changed independently  Taken 3/16/2024 0810 by Bhavin Durand RN  Body Position: supine, legs elevated  Intervention: Prevent and Manage VTE (Venous Thromboembolism) Risk  Recent Flowsheet Documentation  Taken 3/16/2024 1607 by Bhavin Durand RN  Activity Management: activity encouraged  Taken 3/16/2024 1410 by Bhavin Durand RN  Activity Management: activity encouraged  VTE Prevention/Management: (Eliquis) --  Range of Motion: active ROM (range of motion) encouraged  Taken 3/16/2024 1200 by Bhavin Durand RN  Activity Management: activity encouraged  Taken 3/16/2024 1045 by Bhavin Durand RN  Activity Management: activity encouraged  Taken 3/16/2024 0810 by Bhavin Durand RN  Activity Management: activity encouraged  VTE Prevention/Management:   bilateral   sequential compression devices off  Range of Motion: active ROM (range of motion) encouraged  Intervention: Prevent Infection  Recent Flowsheet Documentation  Taken 3/16/2024 1607 by Bhavin Durand RN  Infection Prevention:   single patient room provided   hand hygiene promoted   equipment surfaces disinfected   environmental surveillance performed  Taken 3/16/2024 1410 by Bhavin Durand RN  Infection Prevention:   single patient room provided   equipment surfaces disinfected   environmental surveillance performed   hand hygiene promoted  Taken 3/16/2024 1200 by Bhavin Durand RN  Infection Prevention:   single patient room provided   hand hygiene promoted   equipment surfaces disinfected   environmental surveillance  performed  Taken 3/16/2024 1045 by Bhavin Durand RN  Infection Prevention:   single patient room provided   hand hygiene promoted   equipment surfaces disinfected   environmental surveillance performed  Taken 3/16/2024 0810 by Bhavin Durand RN  Infection Prevention:   single patient room provided   hand hygiene promoted   equipment surfaces disinfected   environmental surveillance performed  Goal: Optimal Comfort and Wellbeing  Outcome: Ongoing, Progressing  Intervention: Provide Person-Centered Care  Recent Flowsheet Documentation  Taken 3/16/2024 1410 by Bhavin Durand RN  Trust Relationship/Rapport:   care explained   choices provided   questions answered  Taken 3/16/2024 0810 by Bhavin Durand RN  Trust Relationship/Rapport:   care explained   choices provided  Goal: Readiness for Transition of Care  Outcome: Ongoing, Progressing     Problem: Pain Acute  Goal: Acceptable Pain Control and Functional Ability  Outcome: Ongoing, Progressing  Intervention: Prevent or Manage Pain  Recent Flowsheet Documentation  Taken 3/16/2024 1607 by hBavin Durand RN  Medication Review/Management: medications reviewed  Taken 3/16/2024 1410 by Bhavin Durand RN  Medication Review/Management: medications reviewed  Taken 3/16/2024 1200 by Bhavin Durand RN  Medication Review/Management: medications reviewed  Taken 3/16/2024 1045 by Bhavin Durand RN  Medication Review/Management: medications reviewed  Taken 3/16/2024 0810 by Bhavin Durand RN  Medication Review/Management: medications reviewed  Intervention: Optimize Psychosocial Wellbeing  Recent Flowsheet Documentation  Taken 3/16/2024 1410 by Bhavin Durand RN  Diversional Activities: television  Taken 3/16/2024 0810 by Bhavin Durand RN  Diversional Activities: television     Problem: Fall Injury Risk  Goal: Absence of Fall and  Fall-Related Injury  Outcome: Ongoing, Progressing  Intervention: Identify and Manage Contributors  Recent Flowsheet Documentation  Taken 3/16/2024 1607 by Bhavin Durand, RN  Medication Review/Management: medications reviewed  Taken 3/16/2024 1410 by Bhavin Durand, RN  Medication Review/Management: medications reviewed  Taken 3/16/2024 1200 by Bhavin Durand, RN  Medication Review/Management: medications reviewed  Taken 3/16/2024 1045 by Bhavin Durand, RN  Medication Review/Management: medications reviewed  Taken 3/16/2024 0810 by Bhavin Durand RN  Medication Review/Management: medications reviewed  Intervention: Promote Injury-Free Environment  Recent Flowsheet Documentation  Taken 3/16/2024 1607 by Bhavin Durand, RN  Safety Promotion/Fall Prevention:   safety round/check completed   room organization consistent   activity supervised   assistive device/personal items within reach   clutter free environment maintained   fall prevention program maintained  Taken 3/16/2024 1410 by Bhavin Durand, RN  Safety Promotion/Fall Prevention:   safety round/check completed   room organization consistent   activity supervised   assistive device/personal items within reach   clutter free environment maintained   fall prevention program maintained  Taken 3/16/2024 1200 by Bhavin Durand, RN  Safety Promotion/Fall Prevention:   safety round/check completed   room organization consistent   activity supervised   assistive device/personal items within reach   clutter free environment maintained   fall prevention program maintained  Taken 3/16/2024 1045 by Bhavin Durand, RN  Safety Promotion/Fall Prevention:   safety round/check completed   room organization consistent   activity supervised   assistive device/personal items within reach   clutter free environment maintained   fall prevention program maintained  Taken  3/16/2024 0810 by Bhavin Durand, RN  Safety Promotion/Fall Prevention:   safety round/check completed   room organization consistent   activity supervised   assistive device/personal items within reach   clutter free environment maintained   fall prevention program maintained   Goal Outcome Evaluation:

## 2024-03-17 ENCOUNTER — APPOINTMENT (OUTPATIENT)
Dept: GENERAL RADIOLOGY | Facility: HOSPITAL | Age: 64
DRG: 291 | End: 2024-03-17
Payer: MEDICARE

## 2024-03-17 PROBLEM — R09.02 HYPOXEMIA: Status: ACTIVE | Noted: 2024-03-17

## 2024-03-17 LAB
ANION GAP SERPL CALCULATED.3IONS-SCNC: 11 MMOL/L (ref 5–15)
BUN SERPL-MCNC: 26 MG/DL (ref 8–23)
BUN/CREAT SERPL: 28.3 (ref 7–25)
CALCIUM SPEC-SCNC: 9.2 MG/DL (ref 8.6–10.5)
CHLORIDE SERPL-SCNC: 101 MMOL/L (ref 98–107)
CO2 SERPL-SCNC: 27 MMOL/L (ref 22–29)
CREAT SERPL-MCNC: 0.92 MG/DL (ref 0.76–1.27)
DEPRECATED RDW RBC AUTO: 43.1 FL (ref 37–54)
EGFRCR SERPLBLD CKD-EPI 2021: 91.7 ML/MIN/1.73
ERYTHROCYTE [DISTWIDTH] IN BLOOD BY AUTOMATED COUNT: 15.5 % (ref 12.3–15.4)
FOLATE SERPL-MCNC: 8.73 NG/ML (ref 4.78–24.2)
GLUCOSE BLDC GLUCOMTR-MCNC: 104 MG/DL (ref 70–130)
GLUCOSE BLDC GLUCOMTR-MCNC: 111 MG/DL (ref 70–130)
GLUCOSE BLDC GLUCOMTR-MCNC: 114 MG/DL (ref 70–130)
GLUCOSE BLDC GLUCOMTR-MCNC: 118 MG/DL (ref 70–130)
GLUCOSE SERPL-MCNC: 152 MG/DL (ref 65–99)
HCT VFR BLD AUTO: 35.4 % (ref 37.5–51)
HCT VFR BLD AUTO: 36 % (ref 37.5–51)
HCT VFR BLD AUTO: 37.9 % (ref 37.5–51)
HGB BLD-MCNC: 10.3 G/DL (ref 13–17.7)
HGB BLD-MCNC: 10.5 G/DL (ref 13–17.7)
HGB BLD-MCNC: 10.9 G/DL (ref 13–17.7)
MAGNESIUM SERPL-MCNC: 2.6 MG/DL (ref 1.6–2.4)
MCH RBC QN AUTO: 22.3 PG (ref 26.6–33)
MCHC RBC AUTO-ENTMCNC: 28.6 G/DL (ref 31.5–35.7)
MCV RBC AUTO: 78.1 FL (ref 79–97)
PHOSPHATE SERPL-MCNC: 3.1 MG/DL (ref 2.5–4.5)
PLATELET # BLD AUTO: 312 10*3/MM3 (ref 140–450)
PMV BLD AUTO: 9.2 FL (ref 6–12)
POTASSIUM SERPL-SCNC: 4.1 MMOL/L (ref 3.5–5.2)
PROCALCITONIN SERPL-MCNC: 0.08 NG/ML (ref 0–0.25)
QT INTERVAL: 369 MS
QTC INTERVAL: 469 MS
RBC # BLD AUTO: 4.61 10*6/MM3 (ref 4.14–5.8)
SODIUM SERPL-SCNC: 139 MMOL/L (ref 136–145)
TSH SERPL DL<=0.05 MIU/L-ACNC: 0.6 UIU/ML (ref 0.27–4.2)
VIT B12 BLD-MCNC: 558 PG/ML (ref 211–946)
WBC NRBC COR # BLD AUTO: 14.68 10*3/MM3 (ref 3.4–10.8)

## 2024-03-17 PROCEDURE — 85018 HEMOGLOBIN: CPT | Performed by: STUDENT IN AN ORGANIZED HEALTH CARE EDUCATION/TRAINING PROGRAM

## 2024-03-17 PROCEDURE — 25010000002 FUROSEMIDE PER 20 MG: Performed by: STUDENT IN AN ORGANIZED HEALTH CARE EDUCATION/TRAINING PROGRAM

## 2024-03-17 PROCEDURE — 71046 X-RAY EXAM CHEST 2 VIEWS: CPT

## 2024-03-17 PROCEDURE — 94664 DEMO&/EVAL PT USE INHALER: CPT

## 2024-03-17 PROCEDURE — 84145 PROCALCITONIN (PCT): CPT | Performed by: STUDENT IN AN ORGANIZED HEALTH CARE EDUCATION/TRAINING PROGRAM

## 2024-03-17 PROCEDURE — 80048 BASIC METABOLIC PNL TOTAL CA: CPT | Performed by: STUDENT IN AN ORGANIZED HEALTH CARE EDUCATION/TRAINING PROGRAM

## 2024-03-17 PROCEDURE — 99222 1ST HOSP IP/OBS MODERATE 55: CPT | Performed by: INTERNAL MEDICINE

## 2024-03-17 PROCEDURE — 93010 ELECTROCARDIOGRAM REPORT: CPT | Performed by: INTERNAL MEDICINE

## 2024-03-17 PROCEDURE — 85027 COMPLETE CBC AUTOMATED: CPT | Performed by: STUDENT IN AN ORGANIZED HEALTH CARE EDUCATION/TRAINING PROGRAM

## 2024-03-17 PROCEDURE — 84443 ASSAY THYROID STIM HORMONE: CPT | Performed by: INTERNAL MEDICINE

## 2024-03-17 PROCEDURE — 82746 ASSAY OF FOLIC ACID SERUM: CPT | Performed by: STUDENT IN AN ORGANIZED HEALTH CARE EDUCATION/TRAINING PROGRAM

## 2024-03-17 PROCEDURE — 83735 ASSAY OF MAGNESIUM: CPT | Performed by: STUDENT IN AN ORGANIZED HEALTH CARE EDUCATION/TRAINING PROGRAM

## 2024-03-17 PROCEDURE — 25010000002 THIAMINE HCL 200 MG/2ML SOLUTION: Performed by: HOSPITALIST

## 2024-03-17 PROCEDURE — 93005 ELECTROCARDIOGRAM TRACING: CPT | Performed by: INTERNAL MEDICINE

## 2024-03-17 PROCEDURE — 84100 ASSAY OF PHOSPHORUS: CPT | Performed by: STUDENT IN AN ORGANIZED HEALTH CARE EDUCATION/TRAINING PROGRAM

## 2024-03-17 PROCEDURE — 82607 VITAMIN B-12: CPT | Performed by: STUDENT IN AN ORGANIZED HEALTH CARE EDUCATION/TRAINING PROGRAM

## 2024-03-17 PROCEDURE — 63710000001 DIPHENHYDRAMINE PER 50 MG: Performed by: STUDENT IN AN ORGANIZED HEALTH CARE EDUCATION/TRAINING PROGRAM

## 2024-03-17 PROCEDURE — 25010000002 FUROSEMIDE PER 20 MG: Performed by: NURSE PRACTITIONER

## 2024-03-17 PROCEDURE — 25010000002 DIGOXIN PER 500 MCG: Performed by: INTERNAL MEDICINE

## 2024-03-17 PROCEDURE — 94799 UNLISTED PULMONARY SVC/PX: CPT

## 2024-03-17 PROCEDURE — 94761 N-INVAS EAR/PLS OXIMETRY MLT: CPT

## 2024-03-17 PROCEDURE — 82948 REAGENT STRIP/BLOOD GLUCOSE: CPT

## 2024-03-17 PROCEDURE — 25010000002 CEFTRIAXONE PER 250 MG: Performed by: STUDENT IN AN ORGANIZED HEALTH CARE EDUCATION/TRAINING PROGRAM

## 2024-03-17 PROCEDURE — 99232 SBSQ HOSP IP/OBS MODERATE 35: CPT | Performed by: NURSE PRACTITIONER

## 2024-03-17 PROCEDURE — 85014 HEMATOCRIT: CPT | Performed by: STUDENT IN AN ORGANIZED HEALTH CARE EDUCATION/TRAINING PROGRAM

## 2024-03-17 PROCEDURE — 25010000002 NA FERRIC GLUC CPLX PER 12.5 MG: Performed by: STUDENT IN AN ORGANIZED HEALTH CARE EDUCATION/TRAINING PROGRAM

## 2024-03-17 RX ORDER — FUROSEMIDE 10 MG/ML
20 INJECTION INTRAMUSCULAR; INTRAVENOUS ONCE
Status: COMPLETED | OUTPATIENT
Start: 2024-03-17 | End: 2024-03-17

## 2024-03-17 RX ORDER — FUROSEMIDE 10 MG/ML
40 INJECTION INTRAMUSCULAR; INTRAVENOUS
Status: DISCONTINUED | OUTPATIENT
Start: 2024-03-17 | End: 2024-03-20

## 2024-03-17 RX ADMIN — THIAMINE HYDROCHLORIDE 200 MG: 100 INJECTION, SOLUTION INTRAMUSCULAR; INTRAVENOUS at 23:29

## 2024-03-17 RX ADMIN — DOCUSATE SODIUM 50MG AND SENNOSIDES 8.6MG 2 TABLET: 8.6; 5 TABLET, FILM COATED ORAL at 08:46

## 2024-03-17 RX ADMIN — BUDESONIDE AND FORMOTEROL FUMARATE DIHYDRATE 2 PUFF: 160; 4.5 AEROSOL RESPIRATORY (INHALATION) at 07:48

## 2024-03-17 RX ADMIN — BUSPIRONE HYDROCHLORIDE 10 MG: 10 TABLET ORAL at 08:46

## 2024-03-17 RX ADMIN — ACETAMINOPHEN 325MG 650 MG: 325 TABLET ORAL at 08:45

## 2024-03-17 RX ADMIN — LISINOPRIL 40 MG: 40 TABLET ORAL at 08:46

## 2024-03-17 RX ADMIN — DIGOXIN 500 MCG: 0.25 INJECTION INTRAMUSCULAR; INTRAVENOUS at 00:09

## 2024-03-17 RX ADMIN — METOPROLOL SUCCINATE 100 MG: 100 TABLET, EXTENDED RELEASE ORAL at 20:50

## 2024-03-17 RX ADMIN — METOPROLOL SUCCINATE 100 MG: 100 TABLET, EXTENDED RELEASE ORAL at 08:46

## 2024-03-17 RX ADMIN — PANTOPRAZOLE SODIUM 40 MG: 40 INJECTION, POWDER, LYOPHILIZED, FOR SOLUTION INTRAVENOUS at 17:31

## 2024-03-17 RX ADMIN — FINASTERIDE 5 MG: 5 TABLET, FILM COATED ORAL at 08:46

## 2024-03-17 RX ADMIN — Medication 10 ML: at 08:47

## 2024-03-17 RX ADMIN — THIAMINE HYDROCHLORIDE 200 MG: 100 INJECTION, SOLUTION INTRAMUSCULAR; INTRAVENOUS at 13:16

## 2024-03-17 RX ADMIN — BUSPIRONE HYDROCHLORIDE 10 MG: 10 TABLET ORAL at 20:49

## 2024-03-17 RX ADMIN — FUROSEMIDE 40 MG: 10 INJECTION, SOLUTION INTRAMUSCULAR; INTRAVENOUS at 17:32

## 2024-03-17 RX ADMIN — IPRATROPIUM BROMIDE AND ALBUTEROL SULFATE 3 ML: 2.5; .5 SOLUTION RESPIRATORY (INHALATION) at 07:46

## 2024-03-17 RX ADMIN — BUDESONIDE AND FORMOTEROL FUMARATE DIHYDRATE 2 PUFF: 160; 4.5 AEROSOL RESPIRATORY (INHALATION) at 20:06

## 2024-03-17 RX ADMIN — CEFTRIAXONE 2000 MG: 2 INJECTION, POWDER, FOR SOLUTION INTRAMUSCULAR; INTRAVENOUS at 17:34

## 2024-03-17 RX ADMIN — THIAMINE HYDROCHLORIDE 200 MG: 100 INJECTION, SOLUTION INTRAMUSCULAR; INTRAVENOUS at 06:37

## 2024-03-17 RX ADMIN — FUROSEMIDE 20 MG: 10 INJECTION, SOLUTION INTRAMUSCULAR; INTRAVENOUS at 13:17

## 2024-03-17 RX ADMIN — DIPHENHYDRAMINE HYDROCHLORIDE 25 MG: 25 CAPSULE ORAL at 08:46

## 2024-03-17 RX ADMIN — FUROSEMIDE 20 MG: 10 INJECTION, SOLUTION INTRAMUSCULAR; INTRAVENOUS at 08:49

## 2024-03-17 RX ADMIN — MIRTAZAPINE 30 MG: 30 TABLET, FILM COATED ORAL at 20:50

## 2024-03-17 RX ADMIN — BUSPIRONE HYDROCHLORIDE 10 MG: 10 TABLET ORAL at 17:30

## 2024-03-17 RX ADMIN — IPRATROPIUM BROMIDE AND ALBUTEROL SULFATE 3 ML: 2.5; .5 SOLUTION RESPIRATORY (INHALATION) at 20:06

## 2024-03-17 RX ADMIN — FOLIC ACID 1 MG: 1 TABLET ORAL at 08:46

## 2024-03-17 RX ADMIN — IPRATROPIUM BROMIDE AND ALBUTEROL SULFATE 3 ML: 2.5; .5 SOLUTION RESPIRATORY (INHALATION) at 11:30

## 2024-03-17 RX ADMIN — IPRATROPIUM BROMIDE AND ALBUTEROL SULFATE 3 ML: 2.5; .5 SOLUTION RESPIRATORY (INHALATION) at 14:09

## 2024-03-17 RX ADMIN — Medication 10 ML: at 20:50

## 2024-03-17 RX ADMIN — PANTOPRAZOLE SODIUM 40 MG: 40 INJECTION, POWDER, LYOPHILIZED, FOR SOLUTION INTRAVENOUS at 06:37

## 2024-03-17 RX ADMIN — SODIUM CHLORIDE 125 MG: 9 INJECTION, SOLUTION INTRAVENOUS at 08:50

## 2024-03-17 NOTE — PLAN OF CARE
Goal Outcome Evaluation:  Plan of Care Reviewed With: patient           Outcome Evaluation: A&Ox4. A flutter. 3L NC. Digoxin given x1.

## 2024-03-17 NOTE — CONSULTS
[facility]  Initial Inpatient Consult Note    Referring Provider: Lois Caballero    Reason for Consultation: Rectal bleeding    Subjective     History of present illness:    64 y.o. male admitted with multiple complaints including generalized malaise, shortness of air, emotional distress with anxiety and depression, also endorses rectal bleeding.  Has not been taking his Eliquis.  No previous EGD or colonoscopy his lifetime.  Denies melena or hematemesis.    Past Medical History:  Past Medical History:   Diagnosis Date    Acidosis     mixed resp/metabolic acidosis    Acute congestive heart failure     Acute respiratory failure     hypoxic    Anemia     Asthma     Atrial flutter     Azotemia     Cardiac arrest 04/2021    Chronic coronary artery disease     Constipation     COPD (chronic obstructive pulmonary disease)     Enlarged prostate     Hypertension     Hypokalemia     severe    ICD (implantable cardioverter-defibrillator) in place     Ischemic cardiomyopathy     MRSA nasal colonization     Obesity (BMI 30-39.9)     Orthopnea     PAF (paroxysmal atrial fibrillation)     Persistent atrial fibrillation     Pulmonary edema     Tobacco abuse     Transaminitis     Trouble in sleeping     Ventricular fibrillation      Past Surgical History:  Past Surgical History:   Procedure Laterality Date    CARDIAC CATHETERIZATION Left 4/1/2021    Procedure: Coronary Angiography;  Surgeon: Anna Puga MD;  Location:  [facility];  Service: Cardiology;  Laterality: Left;    CARDIAC CATHETERIZATION N/A 4/1/2021    Procedure: Left ventriculography;  Surgeon: Anna Puga MD;  Location:  [facility];  Service: Cardiology;  Laterality: N/A;    CARDIAC CATHETERIZATION N/A 4/1/2021    Procedure: Left Heart Cath;  Surgeon: Anna Puga MD;  Location:  [facility];  Service: Cardiology;  Laterality: N/A;    CARDIAC ELECTROPHYSIOLOGY PROCEDURE N/A 4/7/2021     Procedure: IMPLANTABLE CARDIOVERTER DEFIBRILLATOR- SC BIOTRONIK;  Surgeon: Nik Rosas MD;  Location: [facility] INVASIVE LOCATION;  Service: Cardiovascular;  Laterality: N/A;    CARDIOVERSION  2021    Dr. Rosas    CARDIOVERSION  2021    Dr. Rosas    TONSILLECTOMY        Social History:   Social History     Tobacco Use    Smoking status: Former     Current packs/day: 0.00     Average packs/day: 1.5 packs/day for 10.0 years (15.0 ttl pk-yrs)     Types: Cigarettes     Start date: 2011     Quit date: 2021     Years since quittin.9    Smokeless tobacco: Never    Tobacco comments:     2021   Substance Use Topics    Alcohol use: Yes     Comment: 1 BEER DAILY      Family History:  History reviewed. No pertinent family history.    Home Meds:  Medications Prior to Admission   Medication Sig Dispense Refill Last Dose    albuterol sulfate  (90 Base) MCG/ACT inhaler Inhale 2 puffs Every 6 (Six) Hours As Needed for Wheezing. 18 g 3 3/15/2024    apixaban (Eliquis) 5 MG tablet tablet Take 1 tablet by mouth Every 12 (Twelve) Hours. 60 tablet 3 3/14/2024    aspirin 81 MG EC tablet Take 1 tablet by mouth Daily. 30 tablet 0 3/14/2024    finasteride (PROSCAR) 5 MG tablet Take 1 tablet by mouth Daily. 90 tablet 0 3/14/2024    furosemide (LASIX) 40 MG tablet Take 1 tablet by mouth Daily. 90 tablet 1 3/14/2024    lisinopril (PRINIVIL,ZESTRIL) 40 MG tablet TAKE 1 TABLET BY MOUTH DAILY 90 tablet 0 3/14/2024    metoprolol succinate XL (TOPROL-XL) 100 MG 24 hr tablet Take 1 tablet by mouth Every 12 (Twelve) Hours. 120 tablet 5 3/14/2024    mirtazapine (REMERON) 15 MG tablet Take 1 tablet by mouth Every Night. 90 tablet 0 3/14/2024     Current Meds:   acetaminophen, 650 mg, Oral, Daily  budesonide-formoterol, 2 puff, Inhalation, BID  busPIRone, 10 mg, Oral, TID  diphenhydrAMINE, 25 mg, Oral, Daily  ferric gluconate, 125 mg, Intravenous, Daily  finasteride, 5 mg, Oral, Daily  folic acid, 1 mg,  Oral, Daily  furosemide, 40 mg, Intravenous, BID  insulin lispro, 2-7 Units, Subcutaneous, 4x Daily AC & at Bedtime  ipratropium-albuterol, 3 mL, Nebulization, 4x Daily - RT  lisinopril, 40 mg, Oral, Daily  metoprolol succinate XL, 100 mg, Oral, Q12H  mirtazapine, 30 mg, Oral, Nightly  pantoprazole, 40 mg, Intravenous, BID AC  senna-docusate sodium, 2 tablet, Oral, BID  sodium chloride, 10 mL, Intravenous, Q12H  thiamine (B-1) IV, 200 mg, Intravenous, Q8H   Followed by  [START ON 3/21/2024] thiamine, 100 mg, Oral, Daily      Allergies:  No Known Allergies  Review of Systems  Is weakness and fatigue all other systems reviewed and negative     Objective     Vital Signs  Temp:  [97.5 °F (36.4 °C)-98.4 °F (36.9 °C)] 97.5 °F (36.4 °C)  Heart Rate:  [] 117  Resp:  [18-20] 18  BP: (124-140)/(84-92) 124/87  Physical Exam:  General Appearance:    Alert, cooperative, in no acute distress   Head:    Normocephalic, without obvious abnormality, atraumatic   Eyes:          conjunctivae and sclerae normal, no   icterus   Throat:   no thrush, oral mucosa moist   Neck:   Supple, no adenopathy   Lungs:     Clear to auscultation bilaterally    Heart:    Regular rhythm and normal rate    Chest Wall:    No abnormalities observed   Abdomen:     Soft, nondistended, nontender; normal bowel sounds   Extremities:   no edema, no redness   Skin:   No bruising or rash   Psychiatric:  normal mood and insight     Results Review:   I reviewed the patient's new clinical results.    Results from last 7 days   Lab Units 03/17/24  0754 03/17/24  0000 03/16/24  1553 03/16/24  1151 03/16/24  0235 03/15/24  1101   WBC 10*3/mm3  --  14.68*  --   --  6.44 7.25   HEMOGLOBIN g/dL 10.5* 10.3* 10.2*   < > 10.3* 10.1*   HEMATOCRIT % 35.4* 36.0* 34.8*   < > 35.3* 34.5*   PLATELETS 10*3/mm3  --  312  --   --  287 284    < > = values in this interval not displayed.     Results from last 7 days   Lab Units 03/17/24  0000 03/16/24  0235 03/15/24  1011   SODIUM  mmol/L 139 139 141   POTASSIUM mmol/L 4.1 4.1 3.7   CHLORIDE mmol/L 101 99 104   CO2 mmol/L 27.0 26.0 23.2   BUN mg/dL 26* 21 16   CREATININE mg/dL 0.92 1.08 1.01   CALCIUM mg/dL 9.2 9.6 9.4   BILIRUBIN mg/dL  --   --  0.5   ALK PHOS U/L  --   --  73   ALT (SGPT) U/L  --   --  30   AST (SGOT) U/L  --   --  37   GLUCOSE mg/dL 152* 195* 119*         Lab Results   Lab Value Date/Time    LIPASE 20 03/08/2021 1108       Radiology:  CT Angiogram Chest   Final Result       1. The patient has some scattered tree-in-bud infiltrates which are   noted within the right upper lobe and lingula. These are likely   infectious or inflammatory.   2. Prominent hilar lymph nodes may be reactive. Consider short-term CT   follow-up in 3 to 6 months, given advanced background emphysematous   changes.       Radiation dose reduction techniques were utilized, including automated   exposure control and exposure modulation based on body size.           This report was finalized on 3/16/2024 4:29 AM by Dr. Judi Chow M.D on Workstation: [id number]          XR Chest 1 View   Final Result   As described.       This report was finalized on 3/15/2024 11:33 AM by Dr. Librado Orantes M.D on Workstation: [id number]              Assessment & Plan   Active Hospital Problems    Diagnosis     **Acute CHF     Hypoxemia     Iron deficiency anemia     Diabetes mellitus, type 2     Heart failure     Essential hypertension     ICD (implantable cardioverter-defibrillator) in place     Atrial flutter     COPD (chronic obstructive pulmonary disease)        Assessment:  Anemia-iron deficiency  Rectal bleeding  CHF  Acute hypoxic respiratory failure  History of ventricular fibrillation already echo rest, ICD in place  A-fib  A flutter  Coronary artery disease  Anxiety  Depression  COPD  Type 2 diabetes    Plan:  I have recommended EGD and colonoscopy for his rectal bleeding and for iron deficiency, he is currently off Eliquis for quite some time, I  have gone through risks and benefits of these procedures and he would like to think about it and let us know tomorrow  Serial hemoglobin  Hold NOAC  Transfusion per primary team      I discussed the patients findings and my recommendations with patient and nursing staff.    Ruddy Briseno MD

## 2024-03-17 NOTE — PROGRESS NOTES
"CC: Follow-up atrial fibrillation/flutter, heart failure mildly reduced ejection fraction, coronary artery disease and anemia    Interval History: He reports intermittent shortness of breath but that is slightly better.  He is concerned he is still holding onto extra fluid.      Vital Signs  Temp:  [97.5 °F (36.4 °C)-98.4 °F (36.9 °C)] 97.5 °F (36.4 °C)  Heart Rate:  [] 117  Resp:  [18-20] 18  BP: (124-140)/(84-92) 124/87    Intake/Output Summary (Last 24 hours) at 3/17/2024 1246  Last data filed at 3/16/2024 2213  Gross per 24 hour   Intake 240 ml   Output --   Net 240 ml     Flowsheet Rows      Flowsheet Row First Filed Value   Admission Height 167.6 cm (65.98\") Documented at 03/15/2024 1026   Admission Weight 110 kg (242 lb 8.1 oz) Documented at 03/15/2024 1026            PHYSICAL EXAM:  General: No acute distress  Resp:NL Rate, unlabored, diminished bases  CV: Tachycardic rate and rhythm, NL PMI, Nl S1 and S2, no Murmur, no gallop, no rub, No JVD. Normal pedal pulses  ABD:Nl sounds, no masses or tenderness, nondistended, no guarding or rebound  Neuro: alert,cooperative and oriented  Extr: No edema or cyanosis, moves all extremities      Results Review:    Results from last 7 days   Lab Units 03/17/24  0000   SODIUM mmol/L 139   POTASSIUM mmol/L 4.1   CHLORIDE mmol/L 101   CO2 mmol/L 27.0   BUN mg/dL 26*   CREATININE mg/dL 0.92   GLUCOSE mg/dL 152*   CALCIUM mg/dL 9.2     Results from last 7 days   Lab Units 03/15/24  1231 03/15/24  1011   HSTROP T ng/L 33* 34*     Results from last 7 days   Lab Units 03/17/24  0754 03/17/24  0000   WBC 10*3/mm3  --  14.68*   HEMOGLOBIN g/dL 10.5* 10.3*   HEMATOCRIT % 35.4* 36.0*   PLATELETS 10*3/mm3  --  312             Results from last 7 days   Lab Units 03/17/24  0000   MAGNESIUM mg/dL 2.6*         I reviewed the patient's new clinical results.  I personally viewed and interpreted the patient's EKG/Telemetry data- a fib         Medication Review:   Meds reviewed     "     Assessment/Plan  1.  Acute on chronic combined CHF  2.  Acute hypoxic respiratory failure  3.  Ischemic cardiomyopathy, EF 45% by echo on 3/16/2024  4.  History of ventricular fibrillation cardiac arrest in April 2021, status post Biotronik single-chamber ICD by Dr. Rosas on 4/7/2021  5.  Paroxysmal atrial fibrillation (history)-he was inconsistently taking Eliquis prior to admission  6.  Typical atrial flutter    7.  Coronary artery disease with proximal  of the RCA and  of OM2 by cath in April 2021 (PCI attempts to OM2 were unsuccessful)  -No angina  8.  Recent anxiety and psychosocial stressors- psychiatry following   9.  Nightly alcohol intake (recently 2 shots of vodka to sleep per night)  10.  Bright red blood per rectum - awaiting GI eval  11.  Iron deficiency anemia-IV Venofer.  X 3 days starting 3/16/2024  12.  COPD without acute exacerbation  13.  Morbid obesity, complicating all aspects of care  14.  Type 2 diabetes, new hemoglobin A1C 6.60  15.  Hypertension- BP better today  16.  Dilated aortic root measuring 4 cm    -I will increase IV diuretic for further diureses and we will monitor to see if this helps dyspnea as well, follow renal function  -Awaiting GI consult/input due to anemia with bright red blood per rectum.  -Eliquis is held due to anemia/bright red blood per rectum  -Heart rate has improved since IV digoxin.  Still a little fast. Call with questions or concerns.  If pulse sustains > 120 bp m  -At this time no need for cardioversion.  He would need LILA due to inconsistently taking Eliquis prior to admission    ERIC Ernst  03/17/24  12:46 EDT

## 2024-03-17 NOTE — PROGRESS NOTES
"Access did follow up on pt. Pt was in street clothes and sitting up in bed. Pt friendly and talkative. Pt worries about being isolated when he moves into his own apartment alone. Pt was encouraged to explore groups within the complex. Pt unable to rate anxiety/depression. Pt just stated \"I'm drained\"[facility] will continue to follow.  "

## 2024-03-17 NOTE — CONSULTS
Date of Consultation: 24    Referral Provider: Dr. Marcum.     Reason for Consultation: CHF exacerbation.    Encounter Provider: Chip Dolan MD    Group of Service: [facility]     Patient Name: Sebastien Tang    :1960    Chief complaint: Shortness of breath.    History of Present Illness:      This is a very pleasant 64 year-old male who is normally followed by Dr. Rosas in our group.  He had an out of hospital cardiac arrest secondary to ventricular fibrillation in 2021.  An echocardiogram at that time showed an ejection fraction of approximately 39%.  A heart catheterization showed a large caliber RCA to be occluded in the proximal segment, although it was filling via bridging collaterals and left-to-right collaterals.  There was also an occluded branch of OM2.  He underwent an attempted PCI of OM 2, although wires were unable to cross the lesion, and it was felt to be chronic.  He did undergo placement of a Biotronik single-chamber ICD by Dr. Rosas on 2021.  He also has a history of persistent atrial fibrillation which was cardioverted and became more paroxysmal.  On his last ICD interrogation on 3/13/2024, he had an atrial fibrillation burden of approximately 32%.    The patient presented on 3/15/2024 with multiple complaints, although he was mainly short of breath.  He had also had generalized malaise, and bodyaches.  He had been under a significant amount of emotional stress in his personal life as well.  Unfortunately, he has been using about 2 shots of vodka per night to help with sleep as he has had significant anxiety.  He appears to be in atrial flutter with rapid rates currently.  He also was hypoxic, and was found to have pulmonary edema consistent with CHF.  He denied any chest pain or pressure.  He is currently being diuresed with IV Lasix.  He also has had some form of blood for him recently, and this Eliquis is being held.        Past Medical  History:   Diagnosis Date    Acidosis     mixed resp/metabolic acidosis    Acute congestive heart failure     Acute respiratory failure     hypoxic    Anemia     Asthma     Atrial flutter     Azotemia     Cardiac arrest 04/2021    Chronic coronary artery disease     Constipation     COPD (chronic obstructive pulmonary disease)     Enlarged prostate     Hypertension     Hypokalemia     severe    ICD (implantable cardioverter-defibrillator) in place     Ischemic cardiomyopathy     MRSA nasal colonization     Obesity (BMI 30-39.9)     Orthopnea     PAF (paroxysmal atrial fibrillation)     Persistent atrial fibrillation     Pulmonary edema     Tobacco abuse     Transaminitis     Trouble in sleeping     Ventricular fibrillation          Past Surgical History:   Procedure Laterality Date    CARDIAC CATHETERIZATION Left 4/1/2021    Procedure: Coronary Angiography;  Surgeon: Anna Puga MD;  Location:  [facility];  Service: Cardiology;  Laterality: Left;    CARDIAC CATHETERIZATION N/A 4/1/2021    Procedure: Left ventriculography;  Surgeon: Anna Puga MD;  Location:  [facility];  Service: Cardiology;  Laterality: N/A;    CARDIAC CATHETERIZATION N/A 4/1/2021    Procedure: Left Heart Cath;  Surgeon: Anna Puga MD;  Location:  [facility];  Service: Cardiology;  Laterality: N/A;    CARDIAC ELECTROPHYSIOLOGY PROCEDURE N/A 4/7/2021    Procedure: IMPLANTABLE CARDIOVERTER DEFIBRILLATOR- SC BIOTRONIK;  Surgeon: Nik Rosas MD;  Location:  [facility];  Service: Cardiovascular;  Laterality: N/A;    CARDIOVERSION  05/19/2021    Dr. Rosas    CARDIOVERSION  07/26/2021    Dr. Rosas    TONSILLECTOMY           No Known Allergies      No current facility-administered medications on file prior to encounter.     Current Outpatient Medications on File Prior to Encounter   Medication Sig Dispense Refill    albuterol sulfate  (90 Base) MCG/ACT  "inhaler Inhale 2 puffs Every 6 (Six) Hours As Needed for Wheezing. 18 g 3    apixaban (Eliquis) 5 MG tablet tablet Take 1 tablet by mouth Every 12 (Twelve) Hours. 60 tablet 3    aspirin 81 MG EC tablet Take 1 tablet by mouth Daily. 30 tablet 0    finasteride (PROSCAR) 5 MG tablet Take 1 tablet by mouth Daily. 90 tablet 0    furosemide (LASIX) 40 MG tablet Take 1 tablet by mouth Daily. 90 tablet 1    lisinopril (PRINIVIL,ZESTRIL) 40 MG tablet TAKE 1 TABLET BY MOUTH DAILY 90 tablet 0    metoprolol succinate XL (TOPROL-XL) 100 MG 24 hr tablet Take 1 tablet by mouth Every 12 (Twelve) Hours. 120 tablet 5    mirtazapine (REMERON) 15 MG tablet Take 1 tablet by mouth Every Night. 90 tablet 0         Social History     Socioeconomic History    Marital status: Single   Tobacco Use    Smoking status: Former     Current packs/day: 0.00     Average packs/day: 1.5 packs/day for 10.0 years (15.0 ttl pk-yrs)     Types: Cigarettes     Start date: 2011     Quit date: 2021     Years since quittin.9    Smokeless tobacco: Never    Tobacco comments:     2021   Vaping Use    Vaping status: Never Used   Substance and Sexual Activity    Alcohol use: Yes     Comment: 1 BEER DAILY    Drug use: Not Currently    Sexual activity: Not Currently         History reviewed. No pertinent family history.    REVIEW OF SYSTEMS:   Pertinent positives are noted in the HPI above.  Otherwise, all other systems were reviewed, and are negative.     Objective:     Vitals:    24 1510 24 1939 24   BP: 130/90 140/84     BP Location: Right arm Right arm     Patient Position: Lying Lying     Pulse: 120 117 114 118   Resp: 20 18 18 20   Temp: 98.2 °F (36.8 °C) 98.4 °F (36.9 °C)     TempSrc: Oral Oral     SpO2: 95% 90% 92% 100%   Weight:       Height:         Body mass index is 40.27 kg/m².  Flowsheet Rows      Flowsheet Row First Filed Value   Admission Height 167.6 cm (65.98\") Documented at 03/15/2024 1026 "   Admission Weight 110 kg (242 lb 8.1 oz) Documented at 03/15/2024 1026             General:    No acute distress, alert and oriented x4, pleasant, morbidly obese.                   Head:    Normocephalic, atraumatic.   Eyes:          Conjunctivae and sclerae normal, no icterus.   Throat:   No oral lesions, no thrush, oral mucosa moist.    Neck:   Supple, trachea midline.   Lungs:     Decreased breath sounds bilaterally.    Heart:    Irregularly irregular rhythm and tachycardic rate. II/VI SM LLSB.   Abdomen:     Soft, non-tender, obese, distended, positive bowel sounds.    Extremities:   Trace edema of the lower extremities bilaterally.   Pulses:   Pulses palpable and equal bilaterally.    Skin:   No bleeding or rash.   Neuro:   Non-focal.  Moves all extremities well.    Psychiatric:   Anxious.           Lab Review:                Results from last 7 days   Lab Units 03/16/24  0235   SODIUM mmol/L 139   POTASSIUM mmol/L 4.1   CHLORIDE mmol/L 99   CO2 mmol/L 26.0   BUN mg/dL 21   CREATININE mg/dL 1.08   GLUCOSE mg/dL 195*   CALCIUM mg/dL 9.6     Results from last 7 days   Lab Units 03/15/24  1231 03/15/24  1011   HSTROP T ng/L 33* 34*     Results from last 7 days   Lab Units 03/16/24  1553 03/16/24  1151 03/16/24  0235   WBC 10*3/mm3  --   --  6.44   HEMOGLOBIN g/dL 10.2*   < > 10.3*   HEMATOCRIT % 34.8*   < > 35.3*   PLATELETS 10*3/mm3  --   --  287    < > = values in this interval not displayed.             Results from last 7 days   Lab Units 03/16/24  0235   MAGNESIUM mg/dL 2.0           EKG (reviewed by me personally): Typical atrial flutter, nonspecific ST and T wave changes.  Low voltage.      Assessment:   1.  Acute on chronic combined CHF  2.  Acute hypoxic respiratory failure  3.  Ischemic cardiomyopathy, EF 45% by echo on 3/16/2024  4.  History of ventricular fibrillation cardiac arrest in April 2021, status post Biotronik single-chamber ICD by Dr. Rosas on 4/7/2021  5.  Paroxysmal atrial fibrillation  (history)  6.  Typical atrial flutter with RVR (current)  7.  Coronary artery disease with proximal  of the RCA and  of OM2 by cath in April 2021 (PCI attempts to OM2 were unsuccessful)  8.  Recent anxiety and psychosocial stressors  9.  Nightly alcohol (recently 2 shots of vodka per sleep per night)  10.  Bright red blood per rectum   11.  Iron deficiency anemia  12.  COPD without acute exacerbation  13.  Morbid obesity, complicating all aspects of care  14.  Type 2 diabetes, new (previously prediabetic)  15.  Hypertension    Plan:       He has multiple issues.  Some of the issues are psychosocial, and he has had significant life events recently which psychiatry is addressing.  He has been drinking 2 shots of vodka per night to help him sleep because of anxiety.  This is obviously not helping his overall cardiac issues.    I suspect that the CHF exacerbation is being triggered by the rapid atrial flutter mainly.  He is on Toprol- mg twice daily, although his rate is not controlled.  I am going to give him a dose of digoxin 500 mcg x 1 now to see if this will help.  His last ICD interrogation several days ago showed an atrial fibrillation burden of approximately 32%.  Ultimately, he may require other medication changes or cardioversion.  However, he would need a LILA if a cardioversion is considered.  He admitted that he has been out of his medications at times, including his Eliquis because he cannot afford them.  He also is currently off of Eliquis because of rectal bleeding.  However, I suspect that this is not going to be a true GI bleed.  GI evaluation is pending.     I would continue diuresis with Lasix 20 mg IV twice daily for now.  Wall motion findings in his inferior wall would correlate to the occluded RCA, which has been known.  His EF is about 45%, which is unchanged from previous.  His lisinopril was held on admission, and I am going to resume this as he has had some elevated blood pressures  as well.    Cardiology will continue to follow.  Thank you very much for this consult.    Duncan Dolan MD

## 2024-03-17 NOTE — PLAN OF CARE
Problem: Adult Inpatient Plan of Care  Goal: Plan of Care Review  Outcome: Ongoing, Progressing  Goal: Patient-Specific Goal (Individualized)  Outcome: Ongoing, Progressing  Goal: Absence of Hospital-Acquired Illness or Injury  Outcome: Ongoing, Progressing  Intervention: Identify and Manage Fall Risk  Recent Flowsheet Documentation  Taken 3/17/2024 1420 by Bhavin Durand RN  Safety Promotion/Fall Prevention:   safety round/check completed   room organization consistent   activity supervised   assistive device/personal items within reach   clutter free environment maintained   fall prevention program maintained  Taken 3/17/2024 1238 by Bhavin Durand RN  Safety Promotion/Fall Prevention:   safety round/check completed   room organization consistent   activity supervised   assistive device/personal items within reach   clutter free environment maintained   fall prevention program maintained  Taken 3/17/2024 1005 by Bhavin Durand RN  Safety Promotion/Fall Prevention:   safety round/check completed   room organization consistent   activity supervised   assistive device/personal items within reach   clutter free environment maintained   fall prevention program maintained  Taken 3/17/2024 0820 by Bhavin Durand RN  Safety Promotion/Fall Prevention:   safety round/check completed   room organization consistent   activity supervised   assistive device/personal items within reach   clutter free environment maintained   fall prevention program maintained  Intervention: Prevent Skin Injury  Recent Flowsheet Documentation  Taken 3/17/2024 1420 by Bhavin Durand RN  Body Position: position changed independently  Taken 3/17/2024 1238 by Bhavin Durand RN  Body Position: position changed independently  Taken 3/17/2024 1005 by Bhavin Durand RN  Body Position: position changed independently  Taken 3/17/2024 0820 by Herber Banegas  TOBIAS Delcid  Body Position: position changed independently  Intervention: Prevent and Manage VTE (Venous Thromboembolism) Risk  Recent Flowsheet Documentation  Taken 3/17/2024 1420 by Bhavin Durand RN  Activity Management: up ad ricky  Range of Motion: active ROM (range of motion) encouraged  Taken 3/17/2024 1238 by Bhavin Durand RN  Activity Management: up ad ricky  Taken 3/17/2024 1005 by Bhavin Durand RN  Activity Management: up ad ricky  Taken 3/17/2024 0820 by Bhavin Durand RN  Activity Management: up ad ricky  VTE Prevention/Management: sequential compression devices off  Range of Motion: active ROM (range of motion) encouraged  Intervention: Prevent Infection  Recent Flowsheet Documentation  Taken 3/17/2024 1420 by Bhavin Durand RN  Infection Prevention:   single patient room provided   hand hygiene promoted   equipment surfaces disinfected   environmental surveillance performed  Taken 3/17/2024 1238 by Bhavin Durand RN  Infection Prevention:   single patient room provided   hand hygiene promoted   equipment surfaces disinfected   environmental surveillance performed  Taken 3/17/2024 1005 by Bhavin Durand RN  Infection Prevention:   single patient room provided   hand hygiene promoted   equipment surfaces disinfected   environmental surveillance performed  Taken 3/17/2024 0820 by Bhavin Durand RN  Infection Prevention:   single patient room provided   hand hygiene promoted   equipment surfaces disinfected   environmental surveillance performed  Goal: Optimal Comfort and Wellbeing  Outcome: Ongoing, Progressing  Intervention: Provide Person-Centered Care  Recent Flowsheet Documentation  Taken 3/17/2024 1420 by Bhavin Durand RN  Trust Relationship/Rapport:   care explained   choices provided   questions answered  Taken 3/17/2024 0820 by Bhavin Durand RN  Trust  Relationship/Rapport:   care explained   choices provided  Goal: Readiness for Transition of Care  Outcome: Ongoing, Progressing     Problem: Pain Acute  Goal: Acceptable Pain Control and Functional Ability  Outcome: Ongoing, Progressing  Intervention: Prevent or Manage Pain  Recent Flowsheet Documentation  Taken 3/17/2024 1420 by Bhavin Durand RN  Medication Review/Management: medications reviewed  Taken 3/17/2024 1238 by Bhavin Durand RN  Medication Review/Management: medications reviewed  Taken 3/17/2024 1005 by Bhavin Durand RN  Medication Review/Management: medications reviewed  Taken 3/17/2024 0820 by Bhavin Durand RN  Medication Review/Management: medications reviewed  Intervention: Optimize Psychosocial Wellbeing  Recent Flowsheet Documentation  Taken 3/17/2024 1420 by Bhavin Durand RN  Diversional Activities: television  Taken 3/17/2024 0820 by Bhavin Durand RN  Diversional Activities: television     Problem: Fall Injury Risk  Goal: Absence of Fall and Fall-Related Injury  Outcome: Ongoing, Progressing  Intervention: Identify and Manage Contributors  Recent Flowsheet Documentation  Taken 3/17/2024 1420 by Bhavin Durand RN  Medication Review/Management: medications reviewed  Taken 3/17/2024 1238 by Bhavin Durand RN  Medication Review/Management: medications reviewed  Taken 3/17/2024 1005 by Bhavin Durand RN  Medication Review/Management: medications reviewed  Taken 3/17/2024 0820 by Bhavin Durand RN  Medication Review/Management: medications reviewed  Intervention: Promote Injury-Free Environment  Recent Flowsheet Documentation  Taken 3/17/2024 1420 by Bhavin Durand RN  Safety Promotion/Fall Prevention:   safety round/check completed   room organization consistent   activity supervised   assistive device/personal items within reach   clutter free  environment maintained   fall prevention program maintained  Taken 3/17/2024 1238 by Bhavin Durand, RN  Safety Promotion/Fall Prevention:   safety round/check completed   room organization consistent   activity supervised   assistive device/personal items within reach   clutter free environment maintained   fall prevention program maintained  Taken 3/17/2024 1005 by Bhavin Durand, RN  Safety Promotion/Fall Prevention:   safety round/check completed   room organization consistent   activity supervised   assistive device/personal items within reach   clutter free environment maintained   fall prevention program maintained  Taken 3/17/2024 0820 by Bhavin Durand, RN  Safety Promotion/Fall Prevention:   safety round/check completed   room organization consistent   activity supervised   assistive device/personal items within reach   clutter free environment maintained   fall prevention program maintained   Goal Outcome Evaluation:            VSS, a-flutter, -115, oxygen at 3L NC, denied pain, no SOA, continues with IV Lasix and Iron infusion. Plan of care ongoing.

## 2024-03-17 NOTE — PROGRESS NOTES
Name: Sebastien Tang ADMIT: 3/15/2024   : 1960  PCP: Yuliana Flynn DO    MRN: [id number] LOS: 1 days   AGE/SEX: 64 y.o. male  ROOM: [facility]     Subjective   Subjective   Patient seen this morning, reports he is feeling better.  Denies any signs of GI bleed.  Denies shortness of breath, chest pain or palpitations.  Does have Intermittent cough.  Heart rate improved.    Review of Systems   As above  Objective   Objective   Vital Signs  Temp:  [97.5 °F (36.4 °C)-98.4 °F (36.9 °C)] 97.5 °F (36.4 °C)  Heart Rate:  [] 98  Resp:  [16-20] 18  BP: (124-142)/(84-99) 124/87  SpO2:  [82 %-100 %] 98 %  on  Flow (L/min):  [2-3] 3;   Device (Oxygen Therapy): nasal cannula  Body mass index is 40.75 kg/m².  Physical Exam    General: Alert and oriented x3, no acute distress, ill-appearing  HEENT: Normocephalic, atraumatic  CV: Irregular rhythm, normal rate  Lungs: Diminished, no wheezing, nonlabored breathing,  Abdomen: Soft, nontender, nondistended  Extremities: No significant peripheral edema , no cyanosis     Results Review     I reviewed the patient's new clinical results.  Results from last 7 days   Lab Units 24  0754 24  0000 24  1553 24  1151 24  0235 03/15/24  1101   WBC 10*3/mm3  --  14.68*  --   --  6.44 7.25   HEMOGLOBIN g/dL 10.5* 10.3* 10.2* 9.8* 10.3* 10.1*   PLATELETS 10*3/mm3  --  312  --   --  287 284     Results from last 7 days   Lab Units 24  0000 24  0235 03/15/24  1011   SODIUM mmol/L 139 139 141   POTASSIUM mmol/L 4.1 4.1 3.7   CHLORIDE mmol/L 101 99 104   CO2 mmol/L 27.0 26.0 23.2   BUN mg/dL 26* 21 16   CREATININE mg/dL 0.92 1.08 1.01   GLUCOSE mg/dL 152* 195* 119*   Estimated Creatinine Clearance: 90.8 mL/min (by C-G formula based on SCr of 0.92 mg/dL).  Results from last 7 days   Lab Units 03/15/24  1011   ALBUMIN g/dL 3.9   BILIRUBIN mg/dL 0.5   ALK PHOS U/L 73   AST (SGOT) U/L 37   ALT (SGPT) U/L 30     Results from last 7 days   Lab Units  03/17/24  0000 03/16/24  0235 03/15/24  1011   CALCIUM mg/dL 9.2 9.6 9.4   ALBUMIN g/dL  --   --  3.9   MAGNESIUM mg/dL 2.6* 2.0 2.2   PHOSPHORUS mg/dL 3.1 3.3  --      Results from last 7 days   Lab Units 03/15/24  1011   PROCALCITONIN ng/mL 0.11     COVID19   Date Value Ref Range Status   03/15/2024 Not Detected Not Detected - Ref. Range Final   11/25/2022 Not Detected Not Detected - Ref. Range Final     Hemoglobin A1C   Date/Time Value Ref Range Status   03/16/2024 0235 6.60 (H) 4.80 - 5.60 % Final     Glucose   Date/Time Value Ref Range Status   03/17/2024 0653 111 70 - 130 mg/dL Final   03/16/2024 2107 154 (H) 70 - 130 mg/dL Final   03/16/2024 1718 127 70 - 130 mg/dL Final   03/16/2024 1139 164 (H) 70 - 130 mg/dL Final           Adult Transthoracic Echo Complete W/ Cont if Necessary Per Protocol    The left ventricular cavity is mildly dilated.    Left ventricular wall thickness is consistent with borderline   concentric hypertrophy.    There is akinesis of the basal to mid inferior wall. The remaining wall   segments are hyperdynamic    Left ventricular systolic function is mildly decreased. Estimated left   ventricular EF = 45%    The right ventricular cavity is mildly dilated. Normal right   ventricular systolic function noted.    The left atrial cavity is mildly dilated.    Mild mitral valve regurgitation is present.    Mild to moderate tricuspid valve regurgitation is present.    Calculated right ventricular systolic pressure from tricuspid   regurgitation is 37 mmHg.    There is no evidence of pericardial effusion.  CT Angiogram Chest  Narrative: CT ANGIOGRAM OF THE CHEST     HISTORY: Hypoxia     COMPARISON: April 1, 2021     TECHNIQUE: Axial CT imaging was obtained through the thorax. IV contrast  was administered. Three-D reformatted images were obtained.     FINDINGS:  No acute pulmonary thromboembolus is seen. There is dilatation of the  aortic root, measuring up to 4 cm. Remainder of the aorta  measures  within normal size limits. There is some atherosclerotic involvement of  the thoracic aorta. There are coronary artery calcifications. There is  enlargement of the main pulmonary artery, which can be seen in setting  of pulmonary arterial hypertension. There are advanced background  emphysematous changes. There do appear to be some minimal tree-in-bud  infiltrates within the lingula and right upper lobe. These are likely  infectious or inflammatory. There is a separate origin of the left  vertebral artery from the aortic arch. Mediastinal lymph nodes do not  appear pathologically enlarged, although there are some prominent hilar  nodes. For example, a left hilar node measures up to 1.4 cm in short  axis dimensions. This is larger than in April 2021, when it measured 1.2  cm. A right hilar node measures 1.3 cm. Previously, it measured 1 cm.  Thyroid gland, trachea, and esophagus appear unremarkable. Images  through the upper abdomen do not demonstrate any acute abnormalities. No  acute osseous abnormalities are seen.     Impression:    1. The patient has some scattered tree-in-bud infiltrates which are  noted within the right upper lobe and lingula. These are likely  infectious or inflammatory.  2. Prominent hilar lymph nodes may be reactive. Consider short-term CT  follow-up in 3 to 6 months, given advanced background emphysematous  changes.     Radiation dose reduction techniques were utilized, including automated  exposure control and exposure modulation based on body size.        This report was finalized on 3/16/2024 4:29 AM by Dr. Judi Chow M.D on Workstation: [id number]       Scheduled Medications  acetaminophen, 650 mg, Oral, Daily  budesonide-formoterol, 2 puff, Inhalation, BID  busPIRone, 10 mg, Oral, TID  diphenhydrAMINE, 25 mg, Oral, Daily  ferric gluconate, 125 mg, Intravenous, Daily  finasteride, 5 mg, Oral, Daily  folic acid, 1 mg, Oral, Daily  furosemide, 20 mg, Intravenous,  BID  insulin lispro, 2-7 Units, Subcutaneous, 4x Daily AC & at Bedtime  ipratropium-albuterol, 3 mL, Nebulization, 4x Daily - RT  lisinopril, 40 mg, Oral, Daily  metoprolol succinate XL, 100 mg, Oral, Q12H  mirtazapine, 30 mg, Oral, Nightly  pantoprazole, 40 mg, Intravenous, BID AC  senna-docusate sodium, 2 tablet, Oral, BID  sodium chloride, 10 mL, Intravenous, Q12H  thiamine (B-1) IV, 200 mg, Intravenous, Q8H   Followed by  [START ON 3/21/2024] thiamine, 100 mg, Oral, Daily    Infusions   Diet  Diet: Cardiac, Diabetic; Healthy Heart (2-3 Na+); Consistent Carbohydrate; Fluid Consistency: Thin (IDDSI 0)    I have personally reviewed     [x]  Laboratory   []  Microbiology   []  Radiology   [x]  EKG/Telemetry  []  Cardiology/Vascular   []  Pathology    []  Records       Assessment/Plan     Active Hospital Problems    Diagnosis  POA    **Acute CHF [I50.9]  Yes    Hypoxemia [R09.02]  Unknown    Iron deficiency anemia [D50.9]  Unknown    Diabetes mellitus, type 2 [E11.9]  Unknown    Heart failure [I50.9]  Yes    Essential hypertension [I10]  Yes    ICD (implantable cardioverter-defibrillator) in place [Z95.810]  Yes    Atrial flutter [I48.92]  Yes    COPD (chronic obstructive pulmonary disease) [J44.9]  Yes      Resolved Hospital Problems   No resolved problems to display.     This is a 64-year-old gentleman with a history of chronic systolic  heart failure,  COPD, previous V-fib arrest, paroxysmal A-fib on anticoagulation, hypertension, obesity, hyperlipidemia and poor medical compliance who presents to the hospital with increasing shortness of breath and is found to have decompensated heart failure and pulmonary edema       Acute on chronic systolic heart failure  -Echocardiogram on 4/2021 showed EF of 48%.  -IV Lasix 20 mg twice daily,   -Repeat echocardiogram 03/16 showed EF of 45%-There is akinesis of the basal to mid inferior wall.   -Cardiology consulted      Typical atrial flutter with RVR/paroxysmal atrial  fibrillation/history of V-fib cardiac arrest 2021-post ICD  -Continue metoprolol  mg twice daily,   -hold Eliquis secondary to iron deficiency anemia/GI bleed (of note has not been taking Eliquis consistently prior to admission as was not able to afford)  -Status post digoxin  -Currently in atrial flutter      Leukocytosis:   -WBC trended up to 14.68 from 6.44 yesterday  -Afebrile  -Initial chest x-ray showed edema versus possible pneumonia  -Repeat chest x-ray ordered  -Procalcitonin ordered  -Will start on IV ceftriaxone for possible pneumonia pending chest x-ray    CAD  -Continue statin, metoprolol  -Holding aspirin secondary to iron deficiency anemia and concern for GI bleed.    Iron deficiency anemia/GI bleed  -Hemoglobin 10.3, hemoglobin in 12/2022 was 12.2  -Patient reports intermittent bright red blood per rectum with bowel movements  -Iron panel consistent with significant iron deficiency with iron saturation of 4, and ferritin of 13.6  -Ordered IV iron for 3 days 03/16  -IV PPI  -Hemoglobin remained stable, monitor twice daily  -GI consulted    Essential hypertension  -Stable on current regimen, continue    COPD/hypoxemia  -Continue Symbicort  -DuoNebs  -Incentive spirometer  -Wean off O2 as able  -O2 saturation goal 88% and above    Type II DM, new diagnosis  -Hemoglobin A1c 6.6, previously was 6.2  -Continue SSI, diabetic diet  -Diabetic educator consult      Alcohol use disorder  -Continue CIWA protocol, multivitamins    Major depressive disorder  -Psychiatry following  -mirtazapine increased to 30 mg nightly  -BuSpar 10 mg 3 times daily added      Paroxysmal atrial fibrillation  -Continue metoprolol  mg twice daily, hold Eliquis secondary to iron deficiency anemia/GI bleed      Prominent hilar lymph nodes seen on CT   -Follow-up short-term CT  in 3 to 6 months given advanced background emphysematous changes recommended      Aortic root dilation.   -CT scan showed there is dilatation of the  aortic root, measuring up to 4 cm.   -Will need outpatient surveillance            SCDs for DVT prophylaxis.  Full code.  Discussed with patient.        Copied text in this note has been reviewed and is accurate as of 03/17/24.         Dictated utilizing Dragon dictation        Jimmy Marcum MD  [facility]  03/17/24  09:04 EDT

## 2024-03-18 ENCOUNTER — TELEPHONE (OUTPATIENT)
Dept: GASTROENTEROLOGY | Facility: CLINIC | Age: 64
End: 2024-03-18

## 2024-03-18 LAB
ANION GAP SERPL CALCULATED.3IONS-SCNC: 10.3 MMOL/L (ref 5–15)
BUN SERPL-MCNC: 21 MG/DL (ref 8–23)
BUN/CREAT SERPL: 22.1 (ref 7–25)
CALCIUM SPEC-SCNC: 9.1 MG/DL (ref 8.6–10.5)
CHLORIDE SERPL-SCNC: 100 MMOL/L (ref 98–107)
CO2 SERPL-SCNC: 30.7 MMOL/L (ref 22–29)
CREAT SERPL-MCNC: 0.95 MG/DL (ref 0.76–1.27)
DEPRECATED RDW RBC AUTO: 44 FL (ref 37–54)
EGFRCR SERPLBLD CKD-EPI 2021: 88.3 ML/MIN/1.73
ERYTHROCYTE [DISTWIDTH] IN BLOOD BY AUTOMATED COUNT: 15.8 % (ref 12.3–15.4)
GLUCOSE BLDC GLUCOMTR-MCNC: 103 MG/DL (ref 70–130)
GLUCOSE BLDC GLUCOMTR-MCNC: 156 MG/DL (ref 70–130)
GLUCOSE BLDC GLUCOMTR-MCNC: 162 MG/DL (ref 70–130)
GLUCOSE BLDC GLUCOMTR-MCNC: 171 MG/DL (ref 70–130)
GLUCOSE SERPL-MCNC: 143 MG/DL (ref 65–99)
HCT VFR BLD AUTO: 38.2 % (ref 37.5–51)
HGB BLD-MCNC: 11.3 G/DL (ref 13–17.7)
MAGNESIUM SERPL-MCNC: 2.5 MG/DL (ref 1.6–2.4)
MCH RBC QN AUTO: 23.1 PG (ref 26.6–33)
MCHC RBC AUTO-ENTMCNC: 29.6 G/DL (ref 31.5–35.7)
MCV RBC AUTO: 78 FL (ref 79–97)
PHOSPHATE SERPL-MCNC: 3.7 MG/DL (ref 2.5–4.5)
PLATELET # BLD AUTO: 330 10*3/MM3 (ref 140–450)
PMV BLD AUTO: 9.5 FL (ref 6–12)
POTASSIUM SERPL-SCNC: 4 MMOL/L (ref 3.5–5.2)
RBC # BLD AUTO: 4.9 10*6/MM3 (ref 4.14–5.8)
SODIUM SERPL-SCNC: 141 MMOL/L (ref 136–145)
WBC NRBC COR # BLD AUTO: 11.7 10*3/MM3 (ref 3.4–10.8)

## 2024-03-18 PROCEDURE — 94664 DEMO&/EVAL PT USE INHALER: CPT

## 2024-03-18 PROCEDURE — 99232 SBSQ HOSP IP/OBS MODERATE 35: CPT | Performed by: NURSE PRACTITIONER

## 2024-03-18 PROCEDURE — 94799 UNLISTED PULMONARY SVC/PX: CPT

## 2024-03-18 PROCEDURE — 84100 ASSAY OF PHOSPHORUS: CPT | Performed by: STUDENT IN AN ORGANIZED HEALTH CARE EDUCATION/TRAINING PROGRAM

## 2024-03-18 PROCEDURE — 80048 BASIC METABOLIC PNL TOTAL CA: CPT | Performed by: STUDENT IN AN ORGANIZED HEALTH CARE EDUCATION/TRAINING PROGRAM

## 2024-03-18 PROCEDURE — 25010000002 CEFTRIAXONE PER 250 MG: Performed by: STUDENT IN AN ORGANIZED HEALTH CARE EDUCATION/TRAINING PROGRAM

## 2024-03-18 PROCEDURE — 63710000001 DIPHENHYDRAMINE PER 50 MG: Performed by: STUDENT IN AN ORGANIZED HEALTH CARE EDUCATION/TRAINING PROGRAM

## 2024-03-18 PROCEDURE — 25010000002 NA FERRIC GLUC CPLX PER 12.5 MG: Performed by: STUDENT IN AN ORGANIZED HEALTH CARE EDUCATION/TRAINING PROGRAM

## 2024-03-18 PROCEDURE — 25010000002 THIAMINE HCL 200 MG/2ML SOLUTION: Performed by: HOSPITALIST

## 2024-03-18 PROCEDURE — 83735 ASSAY OF MAGNESIUM: CPT | Performed by: STUDENT IN AN ORGANIZED HEALTH CARE EDUCATION/TRAINING PROGRAM

## 2024-03-18 PROCEDURE — 85027 COMPLETE CBC AUTOMATED: CPT | Performed by: STUDENT IN AN ORGANIZED HEALTH CARE EDUCATION/TRAINING PROGRAM

## 2024-03-18 PROCEDURE — 94761 N-INVAS EAR/PLS OXIMETRY MLT: CPT

## 2024-03-18 PROCEDURE — 25010000002 FUROSEMIDE PER 20 MG: Performed by: NURSE PRACTITIONER

## 2024-03-18 PROCEDURE — 99232 SBSQ HOSP IP/OBS MODERATE 35: CPT | Performed by: PHYSICIAN ASSISTANT

## 2024-03-18 PROCEDURE — 63710000001 INSULIN LISPRO (HUMAN) PER 5 UNITS: Performed by: STUDENT IN AN ORGANIZED HEALTH CARE EDUCATION/TRAINING PROGRAM

## 2024-03-18 PROCEDURE — 82948 REAGENT STRIP/BLOOD GLUCOSE: CPT

## 2024-03-18 RX ORDER — SPIRONOLACTONE 25 MG/1
25 TABLET ORAL DAILY
Status: DISCONTINUED | OUTPATIENT
Start: 2024-03-18 | End: 2024-03-23 | Stop reason: HOSPADM

## 2024-03-18 RX ORDER — PANTOPRAZOLE SODIUM 40 MG/1
40 TABLET, DELAYED RELEASE ORAL
Status: DISCONTINUED | OUTPATIENT
Start: 2024-03-19 | End: 2024-03-23 | Stop reason: HOSPADM

## 2024-03-18 RX ORDER — DIGOXIN 125 MCG
125 TABLET ORAL
Status: DISCONTINUED | OUTPATIENT
Start: 2024-03-18 | End: 2024-03-23 | Stop reason: HOSPADM

## 2024-03-18 RX ORDER — ASPIRIN 81 MG/1
81 TABLET ORAL DAILY
Status: DISCONTINUED | OUTPATIENT
Start: 2024-03-18 | End: 2024-03-23 | Stop reason: HOSPADM

## 2024-03-18 RX ADMIN — FOLIC ACID 1 MG: 1 TABLET ORAL at 08:49

## 2024-03-18 RX ADMIN — SPIRONOLACTONE 25 MG: 25 TABLET ORAL at 14:46

## 2024-03-18 RX ADMIN — BUDESONIDE AND FORMOTEROL FUMARATE DIHYDRATE 2 PUFF: 160; 4.5 AEROSOL RESPIRATORY (INHALATION) at 06:46

## 2024-03-18 RX ADMIN — HYDROXYZINE PAMOATE 50 MG: 50 CAPSULE ORAL at 22:16

## 2024-03-18 RX ADMIN — THIAMINE HYDROCHLORIDE 200 MG: 100 INJECTION, SOLUTION INTRAMUSCULAR; INTRAVENOUS at 22:10

## 2024-03-18 RX ADMIN — DIGOXIN 125 MCG: 125 TABLET ORAL at 13:43

## 2024-03-18 RX ADMIN — INSULIN LISPRO 2 UNITS: 100 INJECTION, SOLUTION INTRAVENOUS; SUBCUTANEOUS at 12:09

## 2024-03-18 RX ADMIN — Medication 10 ML: at 08:50

## 2024-03-18 RX ADMIN — SODIUM CHLORIDE 125 MG: 9 INJECTION, SOLUTION INTRAVENOUS at 08:49

## 2024-03-18 RX ADMIN — FUROSEMIDE 40 MG: 10 INJECTION, SOLUTION INTRAMUSCULAR; INTRAVENOUS at 17:28

## 2024-03-18 RX ADMIN — ASPIRIN 81 MG: 81 TABLET, COATED ORAL at 13:43

## 2024-03-18 RX ADMIN — IPRATROPIUM BROMIDE AND ALBUTEROL SULFATE 3 ML: 2.5; .5 SOLUTION RESPIRATORY (INHALATION) at 15:53

## 2024-03-18 RX ADMIN — ACETAMINOPHEN 325MG 650 MG: 325 TABLET ORAL at 06:45

## 2024-03-18 RX ADMIN — METOPROLOL SUCCINATE 100 MG: 100 TABLET, EXTENDED RELEASE ORAL at 19:51

## 2024-03-18 RX ADMIN — IPRATROPIUM BROMIDE AND ALBUTEROL SULFATE 3 ML: 2.5; .5 SOLUTION RESPIRATORY (INHALATION) at 19:01

## 2024-03-18 RX ADMIN — MIRTAZAPINE 30 MG: 30 TABLET, FILM COATED ORAL at 19:51

## 2024-03-18 RX ADMIN — THIAMINE HYDROCHLORIDE 200 MG: 100 INJECTION, SOLUTION INTRAMUSCULAR; INTRAVENOUS at 12:09

## 2024-03-18 RX ADMIN — Medication 10 ML: at 20:01

## 2024-03-18 RX ADMIN — FINASTERIDE 5 MG: 5 TABLET, FILM COATED ORAL at 08:49

## 2024-03-18 RX ADMIN — IPRATROPIUM BROMIDE AND ALBUTEROL SULFATE 3 ML: 2.5; .5 SOLUTION RESPIRATORY (INHALATION) at 06:46

## 2024-03-18 RX ADMIN — BUSPIRONE HYDROCHLORIDE 10 MG: 10 TABLET ORAL at 17:27

## 2024-03-18 RX ADMIN — THIAMINE HYDROCHLORIDE 200 MG: 100 INJECTION, SOLUTION INTRAMUSCULAR; INTRAVENOUS at 06:44

## 2024-03-18 RX ADMIN — LISINOPRIL 40 MG: 40 TABLET ORAL at 08:49

## 2024-03-18 RX ADMIN — INSULIN LISPRO 2 UNITS: 100 INJECTION, SOLUTION INTRAVENOUS; SUBCUTANEOUS at 22:10

## 2024-03-18 RX ADMIN — BUSPIRONE HYDROCHLORIDE 10 MG: 10 TABLET ORAL at 19:51

## 2024-03-18 RX ADMIN — BUDESONIDE AND FORMOTEROL FUMARATE DIHYDRATE 2 PUFF: 160; 4.5 AEROSOL RESPIRATORY (INHALATION) at 19:02

## 2024-03-18 RX ADMIN — BUSPIRONE HYDROCHLORIDE 10 MG: 10 TABLET ORAL at 08:49

## 2024-03-18 RX ADMIN — FUROSEMIDE 40 MG: 10 INJECTION, SOLUTION INTRAMUSCULAR; INTRAVENOUS at 08:49

## 2024-03-18 RX ADMIN — INSULIN LISPRO 2 UNITS: 100 INJECTION, SOLUTION INTRAVENOUS; SUBCUTANEOUS at 17:27

## 2024-03-18 RX ADMIN — METOPROLOL SUCCINATE 100 MG: 100 TABLET, EXTENDED RELEASE ORAL at 08:49

## 2024-03-18 RX ADMIN — DIPHENHYDRAMINE HYDROCHLORIDE 25 MG: 25 CAPSULE ORAL at 08:49

## 2024-03-18 RX ADMIN — ACETAMINOPHEN 325MG 650 MG: 325 TABLET ORAL at 08:49

## 2024-03-18 RX ADMIN — PANTOPRAZOLE SODIUM 40 MG: 40 INJECTION, POWDER, LYOPHILIZED, FOR SOLUTION INTRAVENOUS at 06:44

## 2024-03-18 RX ADMIN — CEFTRIAXONE 2000 MG: 2 INJECTION, POWDER, FOR SOLUTION INTRAMUSCULAR; INTRAVENOUS at 17:40

## 2024-03-18 NOTE — PROGRESS NOTES
Name: Sebastien Tang ADMIT: 3/15/2024   : 1960  PCP: Yuliana Flynn DO    MRN: [id number] LOS: 2 days   AGE/SEX: 64 y.o. male  ROOM: [facility]     Subjective   Subjective   Patient seen this morning.  No acute events overnight.  Shortness of breath unchanged, denies fevers or chills.  Denies vaginal bleeding.  12 to normal he can be discharged.  Heart rate remains elevated      Review of Systems   As above  Objective   Objective   Vital Signs  Temp:  [97.2 °F (36.2 °C)-99.4 °F (37.4 °C)] 98.6 °F (37 °C)  Heart Rate:  [103-130] 122  Resp:  [16-18] 16  BP: (128-169)/(80-95) 169/80  SpO2:  [91 %-98 %] 95 %  on  Flow (L/min):  [2-3] 2;   Device (Oxygen Therapy): nasal cannula  Body mass index is 39.92 kg/m².  Physical Exam    General: Alert laying in bed, no acute distress,   HEENT: Normocephalic, atraumatic  CV: Irregular rhythm, tachycardic  Lungs: Diminished, faint rhonchi, no wheezing  Abdomen: Soft, nontender, nondistended  Extremities: No significant peripheral edema , no cyanosis     Results Review     I reviewed the patient's new clinical results.  Results from last 7 days   Lab Units 24  0326 24  1542 24  0754 24  0000 24  1151 24  0235 03/15/24  1101   WBC 10*3/mm3 11.70*  --   --  14.68*  --  6.44 7.25   HEMOGLOBIN g/dL 11.3* 10.9* 10.5* 10.3*   < > 10.3* 10.1*   PLATELETS 10*3/mm3 330  --   --  312  --  287 284    < > = values in this interval not displayed.     Results from last 7 days   Lab Units 24  0326 24  0000 24  0235 03/15/24  1011   SODIUM mmol/L 141 139 139 141   POTASSIUM mmol/L 4.0 4.1 4.1 3.7   CHLORIDE mmol/L 100 101 99 104   CO2 mmol/L 30.7* 27.0 26.0 23.2   BUN mg/dL 21 26* 21 16   CREATININE mg/dL 0.95 0.92 1.08 1.01   GLUCOSE mg/dL 143* 152* 195* 119*   Estimated Creatinine Clearance: 87.1 mL/min (by C-G formula based on SCr of 0.95 mg/dL).  Results from last 7 days   Lab Units 03/15/24  1011   ALBUMIN g/dL 3.9   BILIRUBIN  mg/dL 0.5   ALK PHOS U/L 73   AST (SGOT) U/L 37   ALT (SGPT) U/L 30     Results from last 7 days   Lab Units 03/18/24  0326 03/17/24  0000 03/16/24  0235 03/15/24  1011   CALCIUM mg/dL 9.1 9.2 9.6 9.4   ALBUMIN g/dL  --   --   --  3.9   MAGNESIUM mg/dL 2.5* 2.6* 2.0 2.2   PHOSPHORUS mg/dL 3.7 3.1 3.3  --      Results from last 7 days   Lab Units 03/17/24  0000 03/15/24  1011   PROCALCITONIN ng/mL 0.08 0.11     COVID19   Date Value Ref Range Status   03/15/2024 Not Detected Not Detected - Ref. Range Final   11/25/2022 Not Detected Not Detected - Ref. Range Final     Hemoglobin A1C   Date/Time Value Ref Range Status   03/16/2024 0235 6.60 (H) 4.80 - 5.60 % Final     Glucose   Date/Time Value Ref Range Status   03/18/2024 1138 162 (H) 70 - 130 mg/dL Final   03/18/2024 0605 103 70 - 130 mg/dL Final   03/17/2024 2042 104 70 - 130 mg/dL Final   03/17/2024 1623 114 70 - 130 mg/dL Final   03/17/2024 1101 118 70 - 130 mg/dL Final   03/17/2024 0653 111 70 - 130 mg/dL Final   03/16/2024 2107 154 (H) 70 - 130 mg/dL Final           XR Chest PA & Lateral  Narrative: PA AND LATERAL CHEST RADIOGRAPH     HISTORY: Pulmonary edema     COMPARISON: March 15, 2024     FINDINGS:  There is cardiomegaly. Left-sided pacemaker is noted. No pneumothorax or  pleural effusion is seen. Bilateral alveolar and interstitial  infiltrates are again noted. Appearance is similar to the exam from  March 15, 2024 there are background emphysematous changes.     Impression: No significant interval change when compared to March 15, 2024.     This report was finalized on 3/18/2024 12:51 AM by Dr. Judi Chow M.D on Workstation: [id number]       Scheduled Medications  aspirin, 81 mg, Oral, Daily  budesonide-formoterol, 2 puff, Inhalation, BID  busPIRone, 10 mg, Oral, TID  cefTRIAXone, 2,000 mg, Intravenous, Q24H  digoxin, 125 mcg, Oral, Daily  finasteride, 5 mg, Oral, Daily  folic acid, 1 mg, Oral, Daily  furosemide, 40 mg, Intravenous, BID  insulin  lispro, 2-7 Units, Subcutaneous, 4x Daily AC & at Bedtime  ipratropium-albuterol, 3 mL, Nebulization, 4x Daily - RT  lisinopril, 40 mg, Oral, Daily  metoprolol succinate XL, 100 mg, Oral, Q12H  mirtazapine, 30 mg, Oral, Nightly  [START ON 3/19/2024] pantoprazole, 40 mg, Oral, Q AM  senna-docusate sodium, 2 tablet, Oral, BID  sodium chloride, 10 mL, Intravenous, Q12H  spironolactone, 25 mg, Oral, Daily  thiamine (B-1) IV, 200 mg, Intravenous, Q8H   Followed by  [START ON 3/21/2024] thiamine, 100 mg, Oral, Daily    Infusions   Diet  Diet: Cardiac, Diabetic; Healthy Heart (2-3 Na+); Consistent Carbohydrate; Fluid Consistency: Thin (IDDSI 0)    I have personally reviewed     [x]  Laboratory   [x]  Microbiology   [x]  Radiology   [x]  EKG/Telemetry  []  Cardiology/Vascular   []  Pathology    []  Records       Assessment/Plan     Active Hospital Problems    Diagnosis  POA    **Acute CHF [I50.9]  Yes    Hypoxemia [R09.02]  Unknown    Iron deficiency anemia [D50.9]  Unknown    Diabetes mellitus, type 2 [E11.9]  Unknown    Heart failure [I50.9]  Yes    Essential hypertension [I10]  Yes    ICD (implantable cardioverter-defibrillator) in place [Z95.810]  Yes    Atrial flutter [I48.92]  Yes    COPD (chronic obstructive pulmonary disease) [J44.9]  Yes      Resolved Hospital Problems   No resolved problems to display.     This is a 64-year-old gentleman with a history of chronic systolic  heart failure,  COPD, previous V-fib arrest, paroxysmal A-fib on anticoagulation, hypertension, obesity, hyperlipidemia and poor medical compliance who presents to the hospital with increasing shortness of breath and is found to have decompensated heart failure and pulmonary edema       Acute on chronic systolic heart failure  -Echocardiogram on 4/2021 showed EF of 48%.  -IV Lasix 20 mg twice daily,   -Repeat echocardiogram 03/16 showed EF of 45%-There is akinesis of the basal to mid inferior wall.   -Cardiology following      Typical atrial  flutter with RVR/paroxysmal atrial fibrillation/history of V-fib cardiac arrest 2021-post ICD  -Continue metoprolol  mg twice daily,   -hold Eliquis secondary to iron deficiency anemia/GI bleed (of note has not been taking Eliquis consistently prior to admission as was not able to afford)  -Status post digoxin  -Currently in atrial flutter  -Heart rhythm is elevated  -Cardiology following    Pneumonia/ leukocytosis:   -WBC trended up to 14.68  on 03/17 from 6.44   -Initial chest x-ray showed edema versus possible pneumonia  -Repeat chest x-ray 03/17-lateral alveolar and interstitial  infiltrates are again noted  -Initiated on IV ceftriaxone for pneumonia 03/17  -WBC improving    CAD  -Continue statin, metoprolol  -Resume home aspirin, monitor for signs of recurrent bleeding    Iron deficiency anemia/Rectal bleed  -Hemoglobin 10.3, hemoglobin in 12/2022 was 12.2  -Patient reports intermittent bright red blood per rectum with bowel movements  -Iron panel consistent with significant iron deficiency with iron saturation of 4, and ferritin of 13.6  -Ordered IV iron for 3 days 03/16  -GI evaluated, recommended EGD and colonoscopy however patient declined for now  -On IV PPI, transition to p.o. pantoprazole daily  -Hemoglobin stable/improved  -Aspirin resumed 03/18-monitor for recurrent signs of bleeding    Essential hypertension  -Stable on current regimen, continue    COPD/hypoxemia  -Continue Symbicort  -DuoNebs  -Incentive spirometer  -Wean off O2 as able  -O2 saturation goal 88% and above    Type II DM, new diagnosis  -Hemoglobin A1c 6.6, previously was 6.2  -Continue SSI, diabetic diet  -Diabetic educator consult      Alcohol use disorder  -Continue CIWA protocol, multivitamins  -CIWA scores remain low    Major depressive disorder  -Psychiatry following  -mirtazapine increased to 30 mg nightly  -BuSpar 10 mg 3 times daily added          Prominent hilar lymph nodes seen on CT  -Follow-up short-term CT  in 3 to 6  months given advanced background emphysematous changes recommended  -Discussed with patient    Aortic root dilation.   -CT scan showed there is dilatation of the aortic root, measuring up to 4 cm.   -Will need outpatient surveillance            SCDs for DVT prophylaxis.  Full code.  Discussed with patient.  Discussed multidisciplinary rounds  Disposition to be determined      Copied text in this note has been reviewed and is accurate as of 03/18/24.         Dictated utilizing Dragon dictation        Jimmy Marcum MD  [facility]  03/18/24  13:05 EDT

## 2024-03-18 NOTE — CASE MANAGEMENT/SOCIAL WORK
Discharge Planning Assessment  [facility]     Patient Name: Sebastien Tang  MRN: [id number]  Today's Date: 3/18/2024    Admit Date: 3/15/2024    Plan: Home via sister in law.   Discharge Needs Assessment       Row Name 03/18/24 1518       Living Environment    People in Home alone    Current Living Arrangements apartment    Family Caregiver if Needed sibling(s)    Quality of Family Relationships helpful;involved;supportive    Able to Return to Prior Arrangements yes       Resource/Environmental Concerns    Resource/Environmental Concerns financial    Financial Concerns medicine, unable to afford       Transition Planning    Patient/Family Anticipates Transition to home    Transportation Anticipated family or friend will provide       Discharge Needs Assessment    Readmission Within the Last 30 Days no previous admission in last 30 days    Equipment Currently Used at Home none    Concerns to be Addressed medication    Anticipated Changes Related to Illness none    Equipment Needed After Discharge none                   Discharge Plan       Row Name 03/18/24 1519       Plan    Plan Home via sister in law.    Patient/Family in Agreement with Plan yes    Plan Comments CCP met with pt at bedside, introduced self and role of CCP. Face sheet information and pharmacy verified. Pt lives alone in a single-story apartment with no BRIGHT. Pt still drives, IADL's and denies DME at home. Pt denies having a living will. Pt declines meds to beds and states he often times has trouble affording Eliquis and Spiriva/Symbicort. CCP stated would discuss cheaper options with Pharmacy. CCP also discussed pt applying for [place] Medicaid for secondary insurance to offer pt more assistance. Pt stated he would look into it. Pt denies HH or SNF history. DC plan is to return to apartment, sister in law to transport. Guido RN/CCP                  Continued Care and Services - Admitted Since 3/15/2024    No active coordination exists for this  encounter.       Expected Discharge Date and Time       Expected Discharge Date Expected Discharge Time    Mar 20, 2024            Demographic Summary    No documentation.                  Functional Status       Row Name 03/18/24 1518       Functional Status    Usual Activity Tolerance good    Current Activity Tolerance moderate       Assessment of Health Literacy    How often do you have someone help you read hospital materials? Never    How often do you have problems learning about your medical condition because of difficulty understanding written information? Never    How often do you have a problem understanding what is told to you about your medical condition? Never    How confident are you filling out medical forms by yourself? Quite a bit    Health Literacy Good       Functional Status, IADL    Medications independent    Meal Preparation independent    Housekeeping independent    Laundry independent    Shopping independent                               Dania Rodrigues, RN

## 2024-03-18 NOTE — PROGRESS NOTES
The patient is in brighter spirits and reports improvement in sleep with the increased dose of Remeron and addition of BuSpar and Vistaril.  I will ask the access center to see the patient regarding post discharge psychiatric follow-up.  I will sign off.

## 2024-03-18 NOTE — PAYOR COMM NOTE
"Gumaro Prabhakar (64 y.o. Male)      SEE FOR INPATIENT:  REF# [id number]    PATIENT WAS OBSERVATION AND CONVERTED TO INPATIENT 24    [facility] DEPT: [phone],  [phone]    [facility]: NPI [id number] [facility]# [id number]    KATE BAUM RN,[facility]         Date of Birth   1960    Social Security Number       Address   [address] [place], [place] [address]    Home Phone   [phone]    MRN   [id number]       Alevism   None    Marital Status   Single                            Admission Date   3/15/24    Admission Type   Emergency    Admitting Provider   Tato Kerr MD    Attending Provider   Jimmy Marcmu MD    Department, Room/Bed   [address], [address]/       Discharge Date       Discharge Disposition       Discharge Destination                                 Attending Provider: Jimmy Marcum MD    Allergies: No Known Allergies    Isolation: Enh Drop/Con   Infection: MRSA/History Only (21)   Code Status: CPR    Ht: 165.1 cm (65\")   Wt: 109 kg (239 lb 14.4 oz)    Admission Cmt: None   Principal Problem: Acute CHF [I50.9]                   Active Insurance as of 3/15/2024       Primary Coverage       Payor Plan Insurance Group Employer/Plan Group    AETNA MEDICARE REPLACEMENT AETNA MED ADV HMO [id number]       Payor Plan Address Payor Plan Phone Number Payor Plan Fax Number Effective Dates    [address] [phone]  2024 - None Entered    [facility] [id number]         Subscriber Name Subscriber Birth Date Member ID       GUMARO PRABHAKAR 1960 [id number]                     Emergency Contacts        (Rel.) Home Phone Work Phone Mobile Phone    RadhaZara (Daughter) [phone] -- [phone]    ÁlvarezReyna (Sister) -- -- [phone]                 History & Physical        Tato Kerr MD at 03/15/24 1152              Name: Gumaro Prabhakar ADMIT: 3/15/2024   : 1960  PCP: " "Yuliana Flynn DO    MRN: [id number] LOS: 0 days   AGE/SEX: 64 y.o. male  ROOM: 36/36     Chief Complaint   Patient presents with    Shortness of Breath       Subjective  Patient is a 64 y.o. male who presents to [facility] with the above chief complaint.  He has been \"sick\" for quite some time.  Things have been going on for a couple days and have been progressively worse over that time.  He describes body aches being short of breath and overall not feeling well.  Has had a cough with some white sputum and has felt sick like this before.  He has been under a great deal of emotional stress at home.  He feels like he is going to die at some point.  He has noticed that his pants have gotten a little bit tight but denies significant swelling in his legs.  He admits that his abdomen has become more distended as well.  He lives alone and does not have much family that checks on him.  He has noticed that has been increasingly short of breath especially with minimal exertion.  He is also noted that he has had some significant feeling like he cannot breathe if he lies flat as well as some paroxysmal nocturnal dyspnea.  He does have reflux and heartburn but has not on any medications for this.  His past medical history is pertinent for previous cardiac arrest with V-fib.  He has an AICD in place.  He is followed in the outpatient setting by cardiology.  He is supposed to be on anticoagulation for A-fib but is not compliant with his medication.  Other medications he is intermittently compliant with when he can afford them.  He is under a great deal of financial stress at home as well.  In the ER he was noted to be hypoxic with increased work of breathing and was found to have pulmonary edema and elevated BNP and was admitted to our service for stabilization evaluation    History of Present Illness    Past Medical History:   Diagnosis Date    Acidosis     mixed resp/metabolic acidosis    Acute congestive " heart failure     Acute respiratory failure     hypoxic    Anemia     Asthma     Atrial flutter     Azotemia     Cardiac arrest 2021    Chronic coronary artery disease     Constipation     COPD (chronic obstructive pulmonary disease)     Enlarged prostate     Hypertension     Hypokalemia     severe    ICD (implantable cardioverter-defibrillator) in place     Ischemic cardiomyopathy     MRSA nasal colonization     Obesity (BMI 30-39.9)     Orthopnea     PAF (paroxysmal atrial fibrillation)     Persistent atrial fibrillation     Pulmonary edema     Tobacco abuse     Transaminitis     Trouble in sleeping     Ventricular fibrillation      Past Surgical History:   Procedure Laterality Date    CARDIAC CATHETERIZATION Left 2021    Procedure: Coronary Angiography;  Surgeon: Anna Puga MD;  Location:  [facility] CATH INVASIVE LOCATION;  Service: Cardiology;  Laterality: Left;    CARDIAC CATHETERIZATION N/A 2021    Procedure: Left ventriculography;  Surgeon: Anna Puga MD;  Location:  [facility] C[facility]VASIVE [facility]ATION;  Service: Cardiology;  Laterality: N/A;    CARDIAC CATHETERIZATION N/A 2021    Procedure: Left Heart Cath;  Surgeon: Anna Puga MD;  Location:  [facility] CATH INVASIVE LOCATION;  Service: Cardiology;  Laterality: N/A;    CARDIAC ELECTROPHYSIOLOGY PROCEDURE N/A 2021    Procedure: IMPLANTABLE CARDIOVERTER DEFIBRILLATOR- SC BIOTRONIK;  Surgeon: Nik Rosas MD;  Location:  [facility] CATH INVASIVE LOCATION;  Service: Cardiovascular;  Laterality: N/A;    CARDIOVERSION  2021    Dr. Rosas    CARDIOVERSION  2021    Dr. Rosas    TONSILLECTOMY       No family history on file.  Social History     Tobacco Use    Smoking status: Former     Current packs/day: 0.00     Average packs/day: 1.5 packs/day for 10.0 years (15.0 ttl pk-yrs)     Types: Cigarettes     Start date: 2011     Quit date: 2021     Years since quittin.8    Smokeless tobacco: Never    Tobacco comments:      "4/2021   Vaping Use    Vaping status: Never Used   Substance Use Topics    Alcohol use: Yes     Comment: 1 BEER DAILY    Drug use: Not Currently     (Not in a hospital admission)    Allergies:  Patient has no known allergies.    Review of Systems     Objective   Vital Signs  Temp:  [98.2 °F (36.8 °C)-98.3 °F (36.8 °C)] 98.3 °F (36.8 °C)  Heart Rate:  [121-128] 127  Resp:  [18-20] 18  BP: (137-168)/() 154/92  SpO2:  [87 %-97 %] 92 %  on  Flow (L/min):  [3] 3;   Device (Oxygen Therapy): nasal cannula  Body mass index is 39.16 kg/m².    Physical Exam  Vitals reviewed.   Constitutional:       General: He is not in acute distress.     Appearance: He is ill-appearing.   Cardiovascular:      Rate and Rhythm: Tachycardia present. Rhythm irregular.   Pulmonary:      Effort: No respiratory distress.      Breath sounds: Normal breath sounds.   Abdominal:      General: Bowel sounds are normal. There is no distension.      Palpations: Abdomen is soft.   Skin:     General: Skin is warm and dry.   Neurological:      General: No focal deficit present.      Mental Status: He is alert. Mental status is at baseline.         Results Review:   I reviewed the patient's new clinical results.  Results from last 7 days   Lab Units 03/15/24  1101   WBC 10*3/mm3 7.25   HEMOGLOBIN g/dL 10.1*   PLATELETS 10*3/mm3 284     Results from last 7 days   Lab Units 03/15/24  1011   SODIUM mmol/L 141   POTASSIUM mmol/L 3.7   CHLORIDE mmol/L 104   CO2 mmol/L 23.2   BUN mg/dL 16   CREATININE mg/dL 1.01   GLUCOSE mg/dL 119*   ALBUMIN g/dL 3.9   BILIRUBIN mg/dL 0.5   ALK PHOS U/L 73   AST (SGOT) U/L 37   ALT (SGPT) U/L 30   Estimated Creatinine Clearance: 83.7 mL/min (by C-G formula based on SCr of 1.01 mg/dL).  Results from last 7 days   Lab Units 03/15/24  1231 03/15/24  1011   HSTROP T ng/L 33* 34*   PROBNP pg/mL  --  2,284.0*         Invalid input(s): \"LDLCALC\"    XR Chest 1 View   Final Result   As described.       This report was finalized on " 3/15/2024 11:33 AM by Dr. Librado Orantes M.D on Workstation: [id number]            Assessment & Plan      Acute CHF    Heart failure      Assessment & Plan  This is a 64-year-old gentleman with a history of chronic diastolic heart failure, previous V-fib arrest, paroxysmal A-fib on anticoagulation, hypertension, obesity, hyperlipidemia and poor medical compliance who presents to the hospital with increasing shortness of breath and is found to have decompensated heart failure and pulmonary edema  -The last echocardiogram I can see was back in 2021.  I am sure he probably had 1 in the cardiologist office is just not available for me to review.  At that time he was noted to have both systolic and diastolic dysfunction  -Given the hypoxia tachycardia lower extremity edema will need to get a CTA of his chest to evaluate for pulmonary embolus.  I think I mean, this is probably just heart failure but given his noncompliance with medications he would certainly be at risk of blood clots given his sedentary lifestyle.  -Otherwise we will plan to aggressively diurese monitor his renal function and electrolytes.  Will check an A1c as well as TSH and T4, fasting lipid panel  -Resume Eliquis which will cover for his DVT prophylaxis  -Continue metoprolol for now, it is unclear if he took this this morning but he takes it every 12 hours.  If remains tachycardic may need additional medication.  -Full code    I discussed the patients findings and my recommendations with patient, family, and nursing staff.    Tato Kerr MD  [facility]  03/15/24  14:47 EDT        Electronically signed by Tato Kerr MD at 03/15/24 1542          Emergency Department Notes            Jorge Luis Rodriguez MD at 03/15/24 1053           EMERGENCY DEPARTMENT ENCOUNTER  Room Number:  36/36  PCP: Yuliana Flynn DO  Independent Historians: Patient      HPI:  Chief Complaint: had concerns including Shortness of  Breath.     A complete HPI/ROS/PMH/PSH/SH/FH are unobtainable due to: Nothing      Context: Sebastien Tang is a 64 y.o. male with a medical history of COPD, former smoker, CAD who presents to the ED c/o acute cough and shortness of breath for the last couple of days.  He has been feeling hot and cold but has not taken his temperature at home.  He does have a history of COPD.  He does not wear home oxygen.  He went to urgent care today and was told that his oxygen level was slightly low around 90.  He was sent here for further evaluation.  He had a negative flu and COVID test there.  He has had some associated nausea and vomiting as well as some diarrhea.  He denies abdominal pain other than with coughing.  He denies chest pain other than with coughing.  He does have a history of heart attack.  He is a former smoker, stopped smoking 2 and half years ago.  He denies leg pain or leg swelling.  No personal history of DVT or PE.      Review of prior external notes (non-ED) -and- Review of prior external test results outside of this encounter: I reviewed cardiology office visit from 11/9/2023 where patient was seen in follow-up for history of cardiomyopathy and atrial fibrillation.  I reviewed prior echo from 4/1/2021 that showed EF 38%.  I reviewed cardiac catheterization from 4/2/2021.  Patient had suffered a ventricular fibrillation arrest out of hospital.  Cath demonstrated severe multivessel coronary disease including chronic total occlusion RCA.        PAST MEDICAL HISTORY  Active Ambulatory Problems     Diagnosis Date Noted    Cardiac arrest 04/01/2021    Atrial flutter 04/05/2021    Tobacco abuse 04/05/2021    Azotemia 04/05/2021    COPD (chronic obstructive pulmonary disease) 04/05/2021    MRSA nasal colonization 04/05/2021    Transaminitis 04/05/2021    Obesity (BMI 30-39.9) 04/05/2021    Ischemic cardiomyopathy 04/05/2021    Anemia 04/05/2021    Constipation 04/06/2021    Acute congestive heart failure  04/26/2021    PSA elevation 04/26/2021    Wellness examination 04/26/2021    High risk medication use 04/26/2021    Abnormal CXR 04/26/2021    Abdominal aneurysm 04/26/2021    Iliac aneurysm 04/26/2021    Coronary artery disease involving coronary bypass graft of native heart without angina pectoris 05/11/2021    Atrial fibrillation, persistent 05/11/2021    ICD (implantable cardioverter-defibrillator) in place 05/11/2021    Anxiety and depression 06/14/2021    Shortness of breath 06/14/2021    Primary insomnia 06/14/2021    Close exposure to COVID-19 virus 08/19/2021    Iliac artery stenosis, right 10/22/2021    Dysuria 10/22/2021    Gross hematuria 10/22/2021    Essential hypertension 11/23/2021    High cholesterol 02/07/2022    Screening PSA (prostate specific antigen) 09/01/2022    Anxiety 09/22/2022    Neuropathy 12/15/2022     Resolved Ambulatory Problems     Diagnosis Date Noted    Ventricular fibrillation 04/01/2021    Paroxysmal atrial fibrillation 04/08/2021    Cough 04/26/2021    Trouble in sleeping 04/26/2021    Atrial fibrillation 06/14/2021    Upper respiratory tract infection 06/14/2021    Elevated blood pressure reading 10/12/2021    Chronic coronary artery disease     COPD with exacerbation 09/01/2022    Insomnia 09/22/2022     Past Medical History:   Diagnosis Date    Acidosis     Acute respiratory failure     Asthma     Enlarged prostate     Hypertension     Hypokalemia     Orthopnea     PAF (paroxysmal atrial fibrillation)     Persistent atrial fibrillation     Pulmonary edema          PAST SURGICAL HISTORY  Past Surgical History:   Procedure Laterality Date    CARDIAC CATHETERIZATION Left 4/1/2021    Procedure: Coronary Angiography;  Surgeon: Anna Puga MD;  Location: [facility] CATH INVASIVE LOCATION;  Service: Cardiology;  Laterality: Left;    CARDIAC CATHETERIZATION N/A 4/1/2021    Procedure: Left ventriculography;  Surgeon: Anna Puga MD;  Location: [facility] CATH INVASIVE LOCATION;   Service: Cardiology;  Laterality: N/A;    CARDIAC CATHETERIZATION N/A 2021    Procedure: Left Heart Cath;  Surgeon: Anna Puga MD;  Location:  [facility] CATH INVASIVE LOCATION;  Service: Cardiology;  Laterality: N/A;    CARDIAC ELECTROPHYSIOLOGY PROCEDURE N/A 2021    Procedure: IMPLANTABLE CARDIOVERTER DEFIBRILLATOR- SC BIOTRONIK;  Surgeon: Nik Rosas MD;  Location:  [facility] CATH INVASIVE LOCATION;  Service: Cardiovascular;  Laterality: N/A;    CARDIOVERSION  2021    Dr. Rosas    CARDIOVERSION  2021    Dr. Rosas    TONSILLECTOMY           FAMILY HISTORY  No family history on file.      SOCIAL HISTORY  Social History     Socioeconomic History    Marital status: Single   Tobacco Use    Smoking status: Former     Current packs/day: 0.00     Average packs/day: 1.5 packs/day for 10.0 years (15.0 ttl pk-yrs)     Types: Cigarettes     Start date: 2011     Quit date: 2021     Years since quittin.8    Smokeless tobacco: Never    Tobacco comments:     2021   Vaping Use    Vaping status: Never Used   Substance and Sexual Activity    Alcohol use: Yes     Comment: 1 BEER DAILY    Drug use: Not Currently    Sexual activity: Not Currently         ALLERGIES  Patient has no known allergies.      REVIEW OF SYSTEMS  Review of all 14 systems is negative other than stated in the HPI above.      PHYSICAL EXAM    I have reviewed the triage vital signs and nursing notes.    ED Triage Vitals   Temp Heart Rate Resp BP SpO2   03/15/24 0916 03/15/24 0916 03/15/24 0916 03/15/24 0916 03/15/24 0916   98.2 °F (36.8 °C) (!) 121 20 (!) 152/103 96 %      Temp src Heart Rate Source Patient Position BP Location FiO2 (%)   03/15/24 1012 -- -- -- --   Oral             GENERAL: awake and alert, no acute distress  HENT: Normocephalic, atraumatic  EYES: no scleral icterus, EOMI  CV: regular rhythm, tachycardic  RESPIRATORY: normal effort, mild rhonchi and wheezing particular in the right lung field  ABDOMEN:  soft, nondistended, nontender throughout  MUSCULOSKELETAL: no deformity, no calf tenderness or lower extremity edema bilaterally  NEURO: alert, moves all extremities, follows commands, cranial nerves II through XII are grossly intact, speech fluent and clear, GCS 15  PSYCH: calm, cooperative  SKIN: Warm, dry, no rash          LAB RESULTS  Recent Results (from the past 24 hour(s))   ECG 12 Lead Dyspnea    Collection Time: 03/15/24  9:56 AM   Result Value Ref Range    QT Interval 375 ms    QTC Interval 530 ms   High Sensitivity Troponin T    Collection Time: 03/15/24 10:11 AM    Specimen: Blood   Result Value Ref Range    HS Troponin T 34 (H) <22 ng/L   Comprehensive Metabolic Panel    Collection Time: 03/15/24 10:11 AM    Specimen: Blood   Result Value Ref Range    Glucose 119 (H) 65 - 99 mg/dL    BUN 16 8 - 23 mg/dL    Creatinine 1.01 0.76 - 1.27 mg/dL    Sodium 141 136 - 145 mmol/L    Potassium 3.7 3.5 - 5.2 mmol/L    Chloride 104 98 - 107 mmol/L    CO2 23.2 22.0 - 29.0 mmol/L    Calcium 9.4 8.6 - 10.5 mg/dL    Total Protein 6.5 6.0 - 8.5 g/dL    Albumin 3.9 3.5 - 5.2 g/dL    ALT (SGPT) 30 1 - 41 U/L    AST (SGOT) 37 1 - 40 U/L    Alkaline Phosphatase 73 39 - 117 U/L    Total Bilirubin 0.5 0.0 - 1.2 mg/dL    Globulin 2.6 gm/dL    A/G Ratio 1.5 g/dL    BUN/Creatinine Ratio 15.8 7.0 - 25.0    Anion Gap 13.8 5.0 - 15.0 mmol/L    eGFR 82.0 >60.0 mL/min/1.73   BNP    Collection Time: 03/15/24 10:11 AM    Specimen: Blood   Result Value Ref Range    proBNP 2,284.0 (H) 0.0 - 900.0 pg/mL   Procalcitonin    Collection Time: 03/15/24 10:11 AM    Specimen: Blood   Result Value Ref Range    Procalcitonin 0.11 0.00 - 0.25 ng/mL   Magnesium    Collection Time: 03/15/24 10:11 AM    Specimen: Blood   Result Value Ref Range    Magnesium 2.2 1.6 - 2.4 mg/dL   Respiratory Panel PCR w/COVID-19(SARS-CoV-2) [facility]-House, NP Swab in UTM/VTM, 2 HR TAT - Swab, Nasopharynx    Collection Time: 03/15/24 11:01 AM     Specimen: Nasopharynx; Swab   Result Value Ref Range    ADENOVIRUS, PCR Not Detected Not Detected    Coronavirus 229E Not Detected Not Detected    Coronavirus HKU1 Not Detected Not Detected    Coronavirus NL63 Not Detected Not Detected    Coronavirus OC43 Not Detected Not Detected    COVID19 Not Detected Not Detected - Ref. Range    Human Metapneumovirus Not Detected Not Detected    Human Rhinovirus/Enterovirus Not Detected Not Detected    Influenza A PCR Not Detected Not Detected    Influenza B PCR Not Detected Not Detected    Parainfluenza Virus 1 Not Detected Not Detected    Parainfluenza Virus 2 Not Detected Not Detected    Parainfluenza Virus 3 Not Detected Not Detected    Parainfluenza Virus 4 Not Detected Not Detected    RSV, PCR Not Detected Not Detected    Bordetella pertussis pcr Not Detected Not Detected    Bordetella parapertussis PCR Not Detected Not Detected    Chlamydophila pneumoniae PCR Not Detected Not Detected    Mycoplasma pneumo by PCR Not Detected Not Detected   D-dimer, Quantitative    Collection Time: 03/15/24 11:01 AM    Specimen: Blood   Result Value Ref Range    D-Dimer, Quantitative 0.54 0.00 - 0.66 MCGFEU/mL   CBC Auto Differential    Collection Time: 03/15/24 11:01 AM    Specimen: Blood   Result Value Ref Range    WBC 7.25 3.40 - 10.80 10*3/mm3    RBC 4.54 4.14 - 5.80 10*6/mm3    Hemoglobin 10.1 (L) 13.0 - 17.7 g/dL    Hematocrit 34.5 (L) 37.5 - 51.0 %    MCV 76.0 (L) 79.0 - 97.0 fL    MCH 22.2 (L) 26.6 - 33.0 pg    MCHC 29.3 (L) 31.5 - 35.7 g/dL    RDW 15.7 (H) 12.3 - 15.4 %    RDW-SD 41.9 37.0 - 54.0 fl    MPV 9.2 6.0 - 12.0 fL    Platelets 284 140 - 450 10*3/mm3    Neutrophil % 85.6 (H) 42.7 - 76.0 %    Lymphocyte % 8.1 (L) 19.6 - 45.3 %    Monocyte % 3.4 (L) 5.0 - 12.0 %    Eosinophil % 1.2 0.3 - 6.2 %    Basophil % 1.1 0.0 - 1.5 %    Immature Grans % 0.6 (H) 0.0 - 0.5 %    Neutrophils, Absolute 6.20 1.70 - 7.00 10*3/mm3    Lymphocytes, Absolute 0.59 (L) 0.70 - 3.10 10*3/mm3     Monocytes, Absolute 0.25 0.10 - 0.90 10*3/mm3    Eosinophils, Absolute 0.09 0.00 - 0.40 10*3/mm3    Basophils, Absolute 0.08 0.00 - 0.20 10*3/mm3    Immature Grans, Absolute 0.04 0.00 - 0.05 10*3/mm3    nRBC 0.3 (H) 0.0 - 0.2 /100 WBC   High Sensitivity Troponin T 2Hr    Collection Time: 03/15/24 12:31 PM    Specimen: Blood   Result Value Ref Range    HS Troponin T 33 (H) <22 ng/L    Troponin T Delta -1 >=-4 - <+4 ng/L   ECG 12 Lead Tachycardia    Collection Time: 03/15/24  2:45 PM   Result Value Ref Range    QT Interval 386 ms    QTC Interval 553 ms       The above labs were ordered by me and independently reviewed by me.     RADIOLOGY  XR Chest 1 View    Result Date: 3/15/2024  XR CHEST 1 VW-  HISTORY: Male who is 64 years-old, cough, short of breath  TECHNIQUE: Frontal view of the chest  COMPARISON: 12/12/2022  FINDINGS: The heart is enlarged. Pulmonary vasculature is congested. Left-sided generator device and cardiac lead are noted. Aorta appears ectatic. Patchy densities predominantly at the mid to lower lungs may represent edema and/or pneumonia, follow-up suggested. Minimal right pleural effusion is apparent. No pneumothorax. No acute osseous process.      As described.  This report was finalized on 3/15/2024 11:33 AM by Dr. Librado Orantes M.D on Workstation: [id number]       The above radiology studies were ordered by me.  See ED course for independent interpretations.     MEDICATIONS GIVEN IN ER  Medications   sodium chloride 0.9 % flush 10 mL (has no administration in time range)   methylPREDNISolone sodium succinate (SOLU-Medrol) injection 125 mg (125 mg Intravenous Given 3/15/24 1103)   ipratropium-albuterol (DUO-NEB) nebulizer solution 3 mL (3 mL Nebulization Given 3/15/24 1110)   metoprolol tartrate (LOPRESSOR) injection 5 mg (5 mg Intravenous Given 3/15/24 1203)   metoprolol succinate XL (TOPROL-XL) 24 hr tablet 100 mg (100 mg Oral Given 3/15/24 1201)   furosemide (LASIX) injection 40 mg (40 mg  Intravenous Given 3/15/24 1159)   metoprolol tartrate (LOPRESSOR) injection 5 mg (5 mg Intravenous Given 3/15/24 1409)         ORDERS PLACED DURING THIS VISIT:  Orders Placed This Encounter   Procedures    Respiratory Panel PCR w/COVID-19(SARS-CoV-2) [facility] In-House, NP Swab in UTM/VTM, 2 HR TAT - Swab, Nasopharynx    XR Chest 1 View    CT Angiogram Chest    High Sensitivity Troponin T    Comprehensive Metabolic Panel    BNP    D-dimer, Quantitative    Procalcitonin    Magnesium    CBC Auto Differential    High Sensitivity Troponin T 2Hr    Pulse Oximetry, Continuous    Monitor Blood Pressure    Code Status and Medical Interventions:    [facility] (on-call MD unless specified) Details    Inpatient Cardiology Consult    Inpatient Case Management  Consult    Inpatient Psychiatrist Consult    Inpatient Access Center Consult    ECG 12 Lead Dyspnea    ECG 12 Lead Tachycardia    Adult Transthoracic Echo Complete W/ Cont if Necessary Per Protocol    Insert Peripheral IV    Initiate Observation Status    Initiate Observation Status    CBC & Differential         OUTPATIENT MEDICATION MANAGEMENT:  Current Facility-Administered Medications Ordered in Epic   Medication Dose Route Frequency Provider Last Rate Last Admin    sodium chloride 0.9 % flush 10 mL  10 mL Intravenous PRN Jorge Luis Rodriguez MD         Current Outpatient Medications Ordered in Epic   Medication Sig Dispense Refill    albuterol sulfate  (90 Base) MCG/ACT inhaler Inhale 2 puffs Every 6 (Six) Hours As Needed for Wheezing. 18 g 3    apixaban (Eliquis) 5 MG tablet tablet Take 1 tablet by mouth Every 12 (Twelve) Hours. 60 tablet 3    aspirin 81 MG EC tablet Take 1 tablet by mouth Daily. 30 tablet 0    finasteride (PROSCAR) 5 MG tablet Take 1 tablet by mouth Daily. 90 tablet 0    furosemide (LASIX) 40 MG tablet Take 1 tablet by mouth Daily. 90 tablet 1    lisinopril (PRINIVIL,ZESTRIL) 40 MG tablet TAKE 1 TABLET BY MOUTH DAILY  90 tablet 0    metoprolol succinate XL (TOPROL-XL) 100 MG 24 hr tablet Take 1 tablet by mouth Every 12 (Twelve) Hours. 120 tablet 5    mirtazapine (REMERON) 15 MG tablet Take 1 tablet by mouth Every Night. 90 tablet 0         PROCEDURES  Procedures            PROGRESS, DATA ANALYSIS, CONSULTS, AND MEDICAL DECISION MAKING  All labs have been independently interpreted by me.  All radiology studies have been reviewed by me. All EKG's have been independently viewed and interpreted by me.  Discussion below represents my analysis of pertinent findings related to patient's condition, differential diagnosis, treatment plan and final disposition.    Differential diagnosis includes but is not limited to:  Acute CHF  Pneumonia  COPD exacerbation  Pulmonary embolus      Clinical Scores:                  ED Course as of 03/15/24 1616   Fri Mar 15, 2024   1052 EKG          EKG time: 9:56 AM  Rhythm/Rate: Sinus tach, 120  P waves and IN: Normal  QRS, axis: Borderline left axis  ST and T waves: Nonspecific T wave changes inferiorly    Interpreted Contemporaneously by me, independently viewed  Similar compared to prior 12/12/2022, rate faster today       [JR]   1117 Creatinine: 1.01 [JR]   1117 Chest x-ray independently interpreted in PACS.  There is cardiomegaly with cardiac pacemaker present.  There is mild vascular congestion. [JR]   1145 Hemoglobin(!): 10.1 [JR]   1145 proBNP(!): 2,284.0 [JR]   1145 Procalcitonin: 0.11 [JR]   1145 D-Dimer, Quant: 0.54  Below age adjusted threshold of 0.6 [JR]   1300 Influenza A PCR: Not Detected [JR]   1301 COVID19: Not Detected [JR]   1328 HS Troponin T(!): 33 [JR]   1328 Troponin T Delta: -1 [JR]   1409 SpO2(!): 87 % [JR]   1431 Patient has had some mild hypoxia here down to 87%.  He is also persistently tachycardic with what appears to be narrow complex regular rhythm.  He does have a history of A-fib.  This may be an atypical flutter.  I have ordered additional IV Lopressor in hopes of  reducing his rate.  His respiratory viral panel is negative.  I have ordered Lasix for diuresis.  His prior echo demonstrated reduced EF however that was immediately following his prior V-fib arrest.  He will be admitted for further management.  I discussed with Dr. Guerrero Kerr, [facility] hospitalist, who agrees to admit. [JR]   1457 EKG          EKG time: 1445  Rhythm/Rate: Sinus tach versus atrial tachycardia, narrow complex and regular  P waves and SC: P waves are not readily identified  QRS, axis: Low voltage, left axis, occasional PVCs  ST and T waves: No acute ischemic changes    Interpreted Contemporaneously by me, independently viewed  Similar compared to prior earlier today       [JR]      ED Course User Index  [JR] Jorge Luis Rodriguez MD             AS OF 16:16 EDT VITALS:    BP - (!) 144/113  HR - (!) 125  TEMP - 98.3 °F (36.8 °C) (Oral)  O2 SATS - 92%    COMPLEXITY OF CARE  The patient requires admission.      Chronic or social conditions impacting patient care (Social Determinants of Health):     DIAGNOSIS  Final diagnoses:   Acute respiratory failure with hypoxia   Acute congestive heart failure, unspecified heart failure type   Chronic obstructive pulmonary disease, unspecified COPD type   Tachycardia           DISPOSITION  Admit      Prescription drug monitoring program review:           Please note that portions of this document were completed with a voice recognition program.    Note Disclaimer: At [facility], we believe that sharing information builds trust and better relationships. You are receiving this note because you recently visited [facility]. It is possible you will see health information before a provider has talked with you about it. This kind of information can be easy to misunderstand. To help you fully understand what it means for your health, we urge you to discuss this note with your provider.         Jorge Luis Rodriguez MD  03/15/24 2769      Electronically signed by  Jorge Luis Rodriguez MD at 03/15/24 1616       Vital Signs (last 4 days)       Date/Time Temp Temp src Pulse Resp BP Patient Position SpO2    03/18/24 0843 -- -- 122 -- -- -- 95    03/18/24 0726 98.6 (37) Oral -- 16 169/80 Lying --    03/18/24 0646 -- -- 114 16 -- -- 91    03/18/24 0259 -- -- 107 -- -- -- --    03/18/24 0101 -- -- 115 -- -- -- --    03/17/24 2325 97.9 (36.6) Oral 130 16 135/91 Lying 92    03/17/24 2013 99.4 (37.4) Oral 129 18 138/95 Lying 95    03/17/24 2006 -- -- 128 -- -- -- 98    03/17/24 1410 -- -- 113 18 -- -- 92    03/17/24 1326 97.2 (36.2) Oral 103 18 128/93 Lying 92    03/17/24 1137 -- -- 117 18 -- -- 99    03/17/24 1130 -- -- 113 18 -- -- 95    03/17/24 0754 -- -- 98 18 -- -- --    03/17/24 0748 -- -- 104 18 -- -- 98    03/17/24 0746 -- -- 104 18 -- -- 98    03/17/24 0725 97.5 (36.4) Oral 105 18 124/87 Lying 90    03/17/24 0518 -- -- -- -- -- -- 92    03/17/24 0517 -- -- -- -- -- -- 82    03/17/24 0056 -- -- 98 -- -- -- --    03/17/24 0009 97.7 (36.5) Oral 126 18 127/92 Lying 94    03/16/24 2210 -- -- 126 -- 127/92 -- --    03/16/24 2022 -- -- 118 20 -- -- 100    03/16/24 2015 -- -- 114 18 -- -- 92    03/16/24 1939 98.4 (36.9) Oral 117 18 140/84 Lying 90    03/16/24 1510 98.2 (36.8) Oral 120 20 130/90 Lying 95    03/16/24 1427 -- -- 130 20 -- -- 92    03/16/24 1141 -- -- -- 16 -- -- --    03/16/24 1047 -- -- -- -- 142/99 -- --    03/16/24 0739 -- -- -- 16 -- -- --    03/16/24 0730 97.7 (36.5) Oral 112 16 142/99 Lying --    03/16/24 0159 -- -- 122 -- -- -- 95    03/15/24 2356 -- -- -- -- -- -- 94    03/15/24 2354 98.1 (36.7) Oral 126 18 137/99 Lying 89    03/15/24 2005 99.3 (37.4) Oral 125 16 135/89 Lying 95    03/15/24 1626 -- -- 122 -- 154/108 -- 92    03/15/24 1600 -- -- 125 -- -- -- 92    03/15/24 1556 -- -- 125 -- 144/113 -- --    03/15/24 1426 -- -- 123 -- 137/109 -- 93    03/15/24 1356 -- -- 127 -- 154/92 -- 92    03/15/24 1256 -- -- 128 -- 168/106 -- 91    03/15/24 1230 -- -- 125  -- -- -- 87    03/15/24 1229 -- -- -- -- 162/112 -- --    03/15/24 1205 -- -- 124 -- 153/110 -- 95    03/15/24 1159 -- -- 125 -- 147/117 -- --    03/15/24 1116 -- -- 123 18 -- -- 97    03/15/24 1110 -- -- 123 18 -- -- 91    03/15/24 1056 -- -- 127 -- 137/84 -- --    03/15/24 1026 -- -- 124 -- 146/103 -- 92    03/15/24 1012 98.3 (36.8) Oral -- -- -- -- --    03/15/24 0916 98.2 (36.8) -- 121 20 152/103 -- 96          Oxygen Therapy (since admission)       Date/Time SpO2 Device (Oxygen Therapy) Flow (L/min) Oxygen Concentration (%) ETCO2 (mmHg)    03/18/24 0850 -- nasal cannula 2 -- --    03/18/24 0843 95 -- -- -- --    03/18/24 0726 -- nasal cannula -- -- --    03/18/24 0646 91 nasal cannula 2 -- --    03/17/24 2325 92 nasal cannula 3 -- --    03/17/24 2013 95 nasal cannula 3 -- --    03/17/24 2006 98 nasal cannula 3 -- --    03/17/24 1420 -- nasal cannula 3 -- --    03/17/24 1410 92 nasal cannula 3 -- --    03/17/24 1326 92 nasal cannula 3 -- --    03/17/24 1137 99 -- -- -- --    03/17/24 1130 95 nasal cannula;humidified 3 -- --    03/17/24 0820 -- nasal cannula 3 -- --    03/17/24 0748 98 -- -- -- --    03/17/24 0746 98 nasal cannula 3 -- --    03/17/24 0725 90 nasal cannula 3 -- --    03/17/24 0518 92 nasal cannula 3 -- --    03/17/24 0517 82 nasal cannula 2 -- --    03/17/24 0009 94 nasal cannula 2 -- --    03/16/24 2022 100 nasal cannula 2 -- --    03/16/24 2015 92 nasal cannula 2 -- --    03/16/24 1939 90 nasal cannula 2 -- --    03/16/24 1510 95 -- -- -- --    03/16/24 1427 92 nasal cannula 2 -- --    03/16/24 1410 -- nasal cannula 2 -- --    03/16/24 1141 -- nasal cannula 3 -- --    03/16/24 0810 -- nasal cannula 3 -- --    03/16/24 0739 -- nasal cannula 3 -- --    03/16/24 0730 -- nasal cannula -- -- --    03/16/24 0159 95 nasal cannula 3 -- --    03/15/24 2356 94 nasal cannula 3 -- --    03/15/24 2354 89 nasal cannula 3 -- --    03/15/24 2005 95 nasal cannula 3 -- --    03/15/24 1659 -- nasal cannula 3 -- --     03/15/24 1626 92 -- -- -- --    03/15/24 1600 92 -- -- -- --    03/15/24 1426 93 -- -- -- --    03/15/24 1356 92 -- -- -- --    03/15/24 1256 91 -- -- -- --    03/15/24 1230 87 -- -- -- --    03/15/24 1205 95 -- -- -- --    03/15/24 1116 97 nasal cannula 3 32 --    03/15/24 1110 91 nasal cannula 3 32 --    03/15/24 1026 92 -- -- -- --    03/15/24 0916 96 nasal cannula 3 -- --          Intake & Output (last 4 days)         03/14 0701  03/15 0700 03/15 0701 03/16 0700 03/16 0701  03/17 0700 03/17 0701  03/18 0700 03/18 0701  03/19 0700    P.O.  480 240 240     Total Intake(mL/kg)  480 (4.4) 240 (2.2) 240 (2.2)     Net  +480 +240 +240              Urine Unmeasured Occurrence    1 x           Lines, Drains & Airways       Active LDAs       Name Placement date Placement time Site Days    Peripheral IV 03/16/24 0708 Posterior;Right Hand 03/16/24  0708  Hand  2                  CIWA (since admission)        Date/Time CIWA-Ar Score    03/18/24 0850 0     03/18/24 0015 1     03/17/24 2054 1     03/17/24 1420 3     03/17/24 0820 4     03/17/24 0410 1     03/17/24 0009 1     03/16/24 2213 1     03/16/24 1410 3     03/16/24 0810 3     03/16/24 0230 4     03/16/24 0015 3     03/15/24 2156 5                   Medication Administration Report for Sebastien Tang as of 3/15/24 through 3/18/24     Legend:    Given Hold Not Given Due Canceled Entry Other Actions    Time Time (Time) Time Time-Action         Discontinued     Completed     Future     MAR Hold     Linked             Medications 03/15/24 03/16/24 03/17/24 03/18/24      acetaminophen (TYLENOL) tablet 650 mg  Dose: 650 mg  Freq: Every 4 Hours PRN Route: PO  PRN Reason: Mild Pain  Start: 03/15/24 9899     Admin Instructions:   If given for fever, use fever parameter: fever greater than 100.4 °F  Based on patient request - if ordered for moderate or severe pain, provider allows for administration of a medication prescribed for a lower pain scale.    Do not exceed 4 grams  of acetaminophen in a 24 hr period. Max dose of 2gm for AST/ALT greater than 120 units/L.    If given for pain, use the following pain scale:   Mild Pain = Pain Score of 1-3, CPOT 1-2  Moderate Pain = Pain Score of 4-6, CPOT 3-4  Severe Pain = Pain Score of 7-10, CPOT 5-8      2215-Given           0645-Given           Or   acetaminophen (TYLENOL) 160 MG/5ML oral solution 650 mg  Dose: 650 mg  Freq: Every 4 Hours PRN Route: PO  PRN Reason: Mild Pain  Start: 03/15/24 1653     Admin Instructions:   If given for fever, use fever parameter: fever greater than 100.4 °F  Based on patient request - if ordered for moderate or severe pain, provider allows for administration of a medication prescribed for a lower pain scale.    Do not exceed 4 grams of acetaminophen in a 24 hr period. Max dose of 2gm for AST/ALT greater than 120 units/L.    If given for pain, use the following pain scale:   Mild Pain = Pain Score of 1-3, CPOT 1-2  Moderate Pain = Pain Score of 4-6, CPOT 3-4  Severe Pain = Pain Score of 7-10, CPOT 5-8      2215-Not Given:  See Alt           0645-Not Given:  See Alt           Or   acetaminophen (TYLENOL) suppository 650 mg  Dose: 650 mg  Freq: Every 4 Hours PRN Route: RE  PRN Reason: Mild Pain  Start: 03/15/24 1653     Admin Instructions:   If given for fever, use fever parameter: fever greater than 100.4 °F  Based on patient request - if ordered for moderate or severe pain, provider allows for administration of a medication prescribed for a lower pain scale.    Do not exceed 4 grams of acetaminophen in a 24 hr period. Max dose of 2gm for AST/ALT greater than 120 units/L.    If given for pain, use the following pain scale:   Mild Pain = Pain Score of 1-3, CPOT 1-2  Moderate Pain = Pain Score of 4-6, CPOT 3-4  Severe Pain = Pain Score of 7-10, CPOT 5-8      2215-Not Given:  See Alt           0645-Not Given:  See Alt           albuterol (PROVENTIL) nebulizer solution 0.083% 2.5 mg/3mL  Dose: 2.5 mg  Freq: Every 6  Hours PRN Route: NEBULIZATION  PRN Reason: Wheezing  Start: 03/15/24 1653     Admin Instructions:   Include Respiratory Treatment Education             sennosides-docusate (PERICOLACE) 8.6-50 MG per tablet 2 tablet  Dose: 2 tablet  Freq: 2 Times Daily Route: PO  Start: 03/15/24 2100     Admin Instructions:   HOLD MEDICATION IF PATIENT HAS HAD BOWEL MOVEMENT. Start bowel management regimen if patient has not had a bowel movement after 12 hours.      (2218)-Not Given         (0842)-Not Given     2209-Given        0846-Given     (2050)-Not Given        (0850)-Not Given     2100          And   polyethylene glycol (MIRALAX) packet 17 g  Dose: 17 g  Freq: Daily PRN Route: PO  PRN Reason: Constipation  PRN Comment: Use if senna-docusate is ineffective  Start: 03/15/24 1653     Admin Instructions:   Use if no bowel movement after 12 hours. Mix in 6-8 ounces of water.  Use 4-8 ounces of water, tea, or juice for each 17 gram dose.            And   bisacodyl (DULCOLAX) EC tablet 5 mg  Dose: 5 mg  Freq: Daily PRN Route: PO  PRN Reason: Constipation  PRN Comment: Use if polyethylene glycol is ineffective  Start: 03/15/24 1653     Admin Instructions:   Use if no bowel movement after 12 hours.  Swallow whole. Do not crush, split, or chew tablet.            And   bisacodyl (DULCOLAX) suppository 10 mg  Dose: 10 mg  Freq: Daily PRN Route: RE  PRN Reason: Constipation  PRN Comment: Use if bisacodyl oral is ineffective  Start: 03/15/24 1653     Admin Instructions:   Use if no bowel movement after 12 hours.  Hold for diarrhea            budesonide-formoterol (SYMBICORT) 160-4.5 MCG/ACT inhaler 2 puff  Dose: 2 puff  Freq: 2 Times Daily Route: IN  Start: 03/15/24 2100     Admin Instructions:   Include Respiratory Treatment Education     Shake well.  Rinse mouth after use, do not swallow water.  Send aerosols to pharmacy in ziplock bag for proper disposal.      2005-Given         0739-Given     2016-Given        0748-Given      "2006-Given        0646-Given     2100          busPIRone (BUSPAR) tablet 10 mg  Dose: 10 mg  Freq: 3 Times Daily Route: PO  Start: 03/16/24 1830     Admin Instructions:   Caution: Look alike/sound alike drug alert. Take with food.  Avoid grapefruit juice.       1817-Given         0846-Given     1730-Given       2049-Given         0849-Given     1600       2100           Calcium Replacement - Follow Nurse / BPA Driven Protocol  Freq: As Needed Route: XX  PRN Reason: Other  Start: 03/15/24 1653     Admin Instructions:   Open Order & Select \"[facility] Electrolyte Replacement Protocol Algorithm\" to View Details            cefTRIAXone (ROCEPHIN) 2,000 mg in sodium chloride 0.9 % 100 mL MBP  Dose: 2,000 mg  Freq: Every 24 Hours Route: IV  Indications of Use: PNEUMONIA  Start: 03/17/24 1815 End: 03/22/24 1814     Admin Instructions:   LR should be paused and flushing of the line with NS is recommended prior to and after completion of ceftriaxone infusion due to incompatibility. Do not co-adminster with calcium-containing solutions.  Caution: Look alike/sound alike drug alert        1734-New Bag         1815           dextrose (D50W) (25 g/50 mL) IV injection 25 g  Dose: 25 g  Freq: Every 15 Minutes PRN Route: IV  PRN Reason: Low Blood Sugar  PRN Comment: Blood Sugar Less Than 70  Start: 03/16/24 0802     Admin Instructions:   Blood sugar less than 70; patient has IV access - Unresponsive, NPO or Unable To Safely Swallow            dextrose (GLUTOSE) oral gel 15 g  Dose: 15 g  Freq: Every 15 Minutes PRN Route: PO  PRN Reason: Low Blood Sugar  PRN Comment: Blood sugar less than 70  Start: 03/16/24 0802     Admin Instructions:   BS<70, Patient Alert, Is not NPO, Can safely swallow.            finasteride (PROSCAR) tablet 5 mg  Dose: 5 mg  Freq: Daily Route: PO  Start: 03/15/24 1745     Admin Instructions:   Do not crush or chew the capsules or tablets. Contact Pharmacy if needed.  Group 2 ([place]) Hazardous Drug - Reproductive Risk " Only - See Handling Guide      1751-Given         0840-Given         0846-Given         0849-Given           folic acid (FOLVITE) tablet 1 mg  Dose: 1 mg  Freq: Daily Route: PO  Start: 03/16/24 0900       0840-Given         0846-Given         0849-Given           furosemide (LASIX) injection 40 mg  Dose: 40 mg  Freq: 2 Times Daily (Diuretics) Route: IV  Start: 03/17/24 1800     Admin Instructions:   Hold for SBP less than 100, DBP less than 60        1732-Given         0849-Given     1800          glucagon (GLUCAGEN) injection 1 mg  Dose: 1 mg  Freq: Every 15 Minutes PRN Route: IM  PRN Reason: Low Blood Sugar  PRN Comment: Blood Glucose Less Than 70  Start: 03/16/24 0802     Admin Instructions:   Blood Glucose Less Than 70 - Patient Without IV Access - Unresponsive, NPO or Unable To Safely Swallow  Reconstitute powder for injection by adding 1 mL of -supplied sterile diluent or sterile water for injection to a vial containing 1 mg of the drug, to provide solutions containing 1 mg/mL. Shake vial gently to dissolve.            hydrOXYzine pamoate (VISTARIL) capsule 50 mg  Dose: 50 mg  Freq: Every 4 Hours PRN Route: PO  PRN Reasons: Anxiety,Itching  Start: 03/16/24 1626     Admin Instructions:   Caution: Look alike/sound alike drug alert       1824-Given             insulin lispro (HUMALOG/ADMELOG) injection 2-7 Units  Dose: 2-7 Units  Freq: 4 Times Daily Before Meals & Nightly Route: SC  Start: 03/16/24 0900     Admin Instructions:   Correction Insulin - Low Dose - Total Insulin Dose Less Than 40 units/day (Lean, Elderly or Renal Patients)    Blood Glucose 150-199 mg/dL - 2 units  Blood Glucose 200-249 mg/dL - 3 units  Blood Glucose 250-299 mg/dL - 4 units  Blood Glucose 300-349 mg/dL - 5 units  Blood Glucose 350-400 mg/dL - 6 units  Blood Glucose Greater Than 400 mg/dL - 7 units & Call Provider     Caution: Look alike/sound alike drug alert         0839-Given     1259-Given       (1731)-Not Given      2210-Given        (0746)-Not Given     (1130)-Not Given       (1712)-Not Given     (2050)-Not Given        (0738)-Not Given     1130       1730     2100          ipratropium-albuterol (DUO-NEB) nebulizer solution 3 mL  Dose: 3 mL  Freq: 4 Times Daily - RT Route: NEBULIZATION  Start: 03/16/24 1230     Admin Instructions:   Include Respiratory Treatment Education       1141-Given     1425-Given       2015-Given         0746-Given     1130-Given       1409-Given     2006-Given        0646-Given     1230       1630     2030          lisinopril (PRINIVIL,ZESTRIL) tablet 40 mg  Dose: 40 mg  Freq: Daily Route: PO  Start: 03/15/24 1745     Admin Instructions:   Hold for SBP less than 100, DBP less than 60      1653-Held by provider     1745-Held by provider        0900-Held by provider     2215-Unheld by provider        0846-Given         0849-Given            LORazepam (ATIVAN) tablet 1 mg  Dose: 1 mg  Freq: Every 1 Hour PRN Route: PO  PRN Reason: Withdrawal  PRN Comment: For CIWA-Ar 8-10  Start: 03/15/24 2006 End: 03/20/24 2005     Admin Instructions:   Reassess 1 Hour After Administration     Caution: Look alike/sound alike drug alert            Or   LORazepam (ATIVAN) injection 1 mg  Dose: 1 mg  Freq: Every 1 Hour PRN Route: IV  PRN Reason: Withdrawal  PRN Comment: For CIWA-Ar 8-10  Start: 03/15/24 2006 End: 03/20/24 2005     Admin Instructions:   Reassess 1 Hour After Administration     Caution: Look alike/sound alike drug alert. Dilute 1:1 with normal saline.            Or   LORazepam (ATIVAN) tablet 2 mg  Dose: 2 mg  Freq: Every 1 Hour PRN Route: PO  PRN Reason: Withdrawal  PRN Comment: For CIWA-Ar 11-15 or -160, DBP , -125  Start: 03/15/24 2006 End: 03/20/24 2005     Admin Instructions:   Reassess 1 Hour After Administration.     Caution: Look alike/sound alike drug alert            Or   LORazepam (ATIVAN) injection 2 mg  Dose: 2 mg  Freq: Every 1 Hour PRN Route: IV  PRN Reason: Withdrawal  PRN  "Comment: For CIWA-Ar 11-15 or -160, DBP , -125  Start: 03/15/24 2006 End: 03/20/24 2005     Admin Instructions:   Reassess 1 Hour After Administration     Caution: Look alike/sound alike drug alert. Dilute 1:1 with normal saline.            Or   LORazepam (ATIVAN) injection 2 mg  Dose: 2 mg  Freq: Every 15 Minutes PRN Route: IV  PRN Reason: Withdrawal  PRN Comment: For CIWA-Ar Greater Than 15 or SBP greater than 160, DBP greater than 110, or HR greater than 125.  Start: 03/15/24 2006 End: 03/20/24 2005     Admin Instructions:   Reassess 15 Minutes After Each Administration.  If CIWA-Ar Does Not Decrease Contact Provider To Discuss Transfer to Higher Level of Care.     Caution: Look alike/sound alike drug alert. Dilute 1:1 with normal saline.            Or   LORazepam (ATIVAN) injection 2 mg  Dose: 2 mg  Freq: Every 15 Minutes PRN Route: IM  PRN Reason: Withdrawal  PRN Comment: For CIWA-Ar Greater Than 15 or SBP greater than 160, DBP greater than 110, or HR greater than 125.  Start: 03/15/24 2006 End: 03/20/24 2005     Admin Instructions:   Reassess 15 Minutes After Each Administration.  If CIWA-Ar Does Not Decrease Contact Provider To Discuss Transfer to Higher Level of Care.     Caution: Look alike/sound alike drug alert. Dilute 1:1 with normal saline.            Magnesium Standard Dose Replacement - Follow Nurse / BPA Driven Protocol  Freq: As Needed Route: XX  PRN Reason: Other  Start: 03/15/24 1653     Admin Instructions:   Open Order & Select \"[facility] Electrolyte Replacement Protocol Algorithm\" to View Details            metoprolol succinate XL (TOPROL-XL) 24 hr tablet 100 mg  Dose: 100 mg  Freq: Every 12 Hours Scheduled Route: PO  Start: 03/15/24 2100     Admin Instructions:   Hold for SBP less than 100, DBP less than 60, or heart rate less than 50    Do not crush or chew the capsules or tablets. The drug may not work as designed if the capsule or tablet is crushed or chewed. Swallow whole.  Do " "not crush or chew.      2216-Given         0840-Given     2210-Given        0846-Given     2050-Given        0849-Given     2100          mirtazapine (REMERON) tablet 30 mg  Dose: 30 mg  Freq: Nightly Route: PO  Start: 03/16/24 2100       2210-Given         2050-Given         2100           nitroglycerin (NITROSTAT) SL tablet 0.4 mg  Dose: 0.4 mg  Freq: Every 5 Minutes PRN Route: SL  PRN Reason: Chest Pain  PRN Comment: Only if SBP Greater Than 100  Start: 03/15/24 1653     Admin Instructions:   If Pain Unrelieved After 3 Doses Notify MD  May administer up to 3 doses per episode.             ondansetron ODT (ZOFRAN-ODT) disintegrating tablet 4 mg  Dose: 4 mg  Freq: Every 6 Hours PRN Route: PO  PRN Reasons: Nausea,Vomiting  Start: 03/15/24 1653     Admin Instructions:   If BOTH ondansetron (ZOFRAN) and promethazine (PHENERGAN) are ordered use ondansetron first and THEN promethazine IF ondansetron is ineffective.  Place on tongue and allow to dissolve.            Or   ondansetron (ZOFRAN) injection 4 mg  Dose: 4 mg  Freq: Every 6 Hours PRN Route: IV  PRN Reasons: Nausea,Vomiting  Start: 03/15/24 1653     Admin Instructions:   If BOTH ondansetron (ZOFRAN) and promethazine (PHENERGAN) are ordered use ondansetron first and THEN promethazine IF ondansetron is ineffective.            pantoprazole (PROTONIX) injection 40 mg  Dose: 40 mg  Freq: 2 Times Daily Before Meals Route: IV  Indications of Use: GASTROESOPHAGEAL REFLUX DISEASE  Start: 03/16/24 1030     Admin Instructions:   Dilute with 10 mL of 0.9% NaCl and give IV push over 2 minutes.       1304-Given     1821-Given        0637-Given     1731-Given        0644-Given     1730          Phosphorus Replacement - Follow Nurse / BPA Driven Protocol  Freq: As Needed Route: XX  PRN Reason: Other  Start: 03/15/24 1653     Admin Instructions:   Open Order & Select \"[facility] Electrolyte Replacement Protocol Algorithm\" to View Details            Potassium Replacement - Follow Nurse / " "BPA Driven Protocol  Freq: As Needed Route: XX  PRN Reason: Other  Start: 03/15/24 1653     Admin Instructions:   Open Order & Select \"[facility] Electrolyte Replacement Protocol Algorithm\" to View Details            sodium chloride 0.9 % flush 10 mL  Dose: 10 mL  Freq: As Needed Route: IV  PRN Reason: Line Care  Start: 03/15/24 1653            sodium chloride 0.9 % flush 10 mL  Dose: 10 mL  Freq: Every 12 Hours Scheduled Route: IV  Start: 03/15/24 2100      2216-Given         0842-Given     2211-Given        0847-Given     2050-Given        0850-Given     2100           sodium chloride 0.9 % flush 10 mL  Dose: 10 mL  Freq: As Needed Route: IV  PRN Reason: Line Care  Start: 03/15/24 1051            sodium chloride 0.9 % infusion 40 mL  Dose: 40 mL  Freq: As Needed Route: IV  PRN Reason: Line Care  Start: 03/15/24 1653     Admin Instructions:   Following administration of an IV intermittent medication, flush line with 40mL NS at 100mL/hr.             thiamine (B-1) injection 200 mg  Dose: 200 mg  Freq: Every 8 Hours Scheduled Route: IV  Start: 03/15/24 2200 End: 03/20/24 2159     Admin Instructions:   Doses of up to 250mg, give over 1-2 minutes (IV push). Doses 250mg and above, give over 30 minutes.      2216-Given         0631-Given     1557-Given       2211-Given         0637-Given     1316-Given       2329-Given         0644-Given     1400       2200           Followed by   thiamine (VITAMIN B-1) tablet 100 mg  Dose: 100 mg  Freq: Daily Route: PO  Start: 03/21/24 0900            Completed Medications  Medications 03/15/24 03/16/24 03/17/24 03/18/24      acetaminophen (TYLENOL) tablet 650 mg  Dose: 650 mg  Freq: Daily Route: PO  Start: 03/16/24 1030 End: 03/18/24 0849     Admin Instructions:   Give 30 min prior to Ferrlecit infusion  Based on patient request - if ordered for moderate or severe pain, provider allows for administration of a medication prescribed for a lower pain scale.    Do not exceed 4 grams of " acetaminophen in a 24 hr period. Max dose of 2gm for AST/ALT greater than 120 units/L.    If given for pain, use the following pain scale:   Mild Pain = Pain Score of 1-3, CPOT 1-2  Moderate Pain = Pain Score of 4-6, CPOT 3-4  Severe Pain = Pain Score of 7-10, CPOT 5-8       1302-Given         0845-Given         0849-Given           digoxin (LANOXIN) injection 500 mcg  Dose: 500 mcg  Freq: Once Route: IV  Start: 03/16/24 2300 End: 03/17/24 0009     Admin Instructions:      Check and record heart rate. Use filter needle to withdraw dose from ampule. Dose may be pushed over 5 minutes.        0009-Given            digoxin (LANOXIN) injection 500 mcg  Dose: 500 mcg  Freq: Once Route: IV  Start: 03/15/24 1830 End: 03/15/24 1746     Admin Instructions:      Check and record heart rate. Use filter needle to withdraw dose from ampule. Dose may be pushed over 5 minutes.      1746-Given              diphenhydrAMINE (BENADRYL) capsule 25 mg  Dose: 25 mg  Freq: Daily Route: PO  Start: 03/16/24 1030 End: 03/18/24 0849     Admin Instructions:   Give 30 minutes prior to Ferrlecit infusion  Caution: Look alike/sound alike drug alert. This med may be ordered in other forms and routes. Before giving verify the last time the drug was given by any route/form.       1302-Given         0846-Given         0849-Given           ferric gluconate (FERRLECIT)125 MG in sodium chloride 0.9 % 100 mL IVPB  Dose: 125 mg  Freq: Daily Route: IV  Start: 03/16/24 1100 End: 03/18/24 0949     Admin Instructions:   Not to exceed 2.1 mg/min     Order specific questions:   Please Select Indication for Intravenous Iron Therapy (Criteria can be seen in report to the left) Absolute Iron Deficiency       1308-New Bag         0850-New Bag         0849-New Bag           furosemide (LASIX) injection 20 mg  Dose: 20 mg  Freq: Once Route: IV  Start: 03/17/24 1400 End: 03/17/24 1317     Admin Instructions:   Doses over 100 mg will dispense an IVPB bolus.         1317-Given            furosemide (LASIX) injection 40 mg  Dose: 40 mg  Freq: Once Route: IV  Start: 03/15/24 1203 End: 03/15/24 1159     Admin Instructions:   Hold for SBP less than 100, DBP less than 60      1159-Given              iopamidol (ISOVUE-370) 76 % injection 100 mL  Dose: 100 mL  Freq: Once in Imaging Route: IV  Start: 03/16/24 0200 End: 03/16/24 0104       0104-Given by Other             ipratropium-albuterol (DUO-NEB) nebulizer solution 3 mL  Dose: 3 mL  Freq: Once Route: NEBULIZATION  Start: 03/15/24 1108 End: 03/15/24 1110     Admin Instructions:   Include Respiratory Treatment Education      1110-Given              methylPREDNISolone sodium succinate (SOLU-Medrol) injection 125 mg  Dose: 125 mg  Freq: Once Route: IV  Start: 03/15/24 1108 End: 03/15/24 1103     Admin Instructions:   Caution: Look alike/sound alike drug alert      1103-Given              metoprolol succinate XL (TOPROL-XL) 24 hr tablet 100 mg  Dose: 100 mg  Freq: Once Route: PO  Start: 03/15/24 1203 End: 03/15/24 1201     Admin Instructions:   Hold for SBP less than 100, DBP less than 60, or heart rate less than 50    Do not crush or chew the capsules or tablets. The drug may not work as designed if the capsule or tablet is crushed or chewed. Swallow whole.  Do not crush or chew.      1201-Given              metoprolol tartrate (LOPRESSOR) injection 5 mg  Dose: 5 mg  Freq: Once Route: IV  Start: 03/15/24 1419 End: 03/15/24 1409     Admin Instructions:   Hold for SBP less than 100, DBP less than 60, or heart rate less than 50      1409-Given              metoprolol tartrate (LOPRESSOR) injection 5 mg  Dose: 5 mg  Freq: Once Route: IV  Start: 03/15/24 1203 End: 03/15/24 1203     Admin Instructions:   Hold for SBP less than 100, DBP less than 60, or heart rate less than 50      1203-Given              Discontinued Medications  Medications 03/15/24 03/16/24 03/17/24 03/18/24      apixaban (ELIQUIS) tablet 5 mg  Dose: 5 mg  Freq: Every 12  Hours Route: PO  Indications of Use: ATRIAL FIBRILLATION  Start: 03/15/24 2100 End: 03/16/24 0931     Admin Instructions:   Tablet may be crushed and suspended in 60 mL of water or D5W and immediately delivered via NG tube.      2216-Given         0840-Given             aspirin EC tablet 81 mg  Dose: 81 mg  Freq: Daily Route: PO  Start: 03/15/24 1745 End: 03/16/24 0941     Admin Instructions:   Do not crush or chew the capsules or tablets. The drug may not work as designed if the capsule or tablet is crushed or chewed. Swallow whole.  Do not exceed 4 grams of aspirin in a 24 hr period.    If given for pain, use the following pain scale:   Mild Pain = Pain Score of 1-3, CPOT 1-2  Moderate Pain = Pain Score of 4-6, CPOT 3-4  Severe Pain = Pain Score of 7-10, CPOT 5-8      1746-Given         0840-Given             furosemide (LASIX) injection 20 mg  Dose: 20 mg  Freq: 2 Times Daily (Diuretics) Route: IV  Start: 03/16/24 1030 End: 03/17/24 1304     Admin Instructions:   Hold for SBP less than 100, DBP less than 60       1300-Given     1820-Given        0849-Given            mirtazapine (REMERON) tablet 15 mg  Dose: 15 mg  Freq: Nightly Route: PO  Start: 03/15/24 2100 End: 03/16/24 1627       0111-Given             pantoprazole (PROTONIX) EC tablet 40 mg  Dose: 40 mg  Freq: Every Early Morning Route: PO  Start: 03/16/24 1000 End: 03/16/24 0930     Admin Instructions:   Swallow whole; do not crush, split, or chew.                   Lab Results (all)       Procedure Component Value Units Date/Time    POC Glucose Once [[id number]]  (Normal) Collected: 03/18/24 0605    Specimen: Blood Updated: 03/18/24 0607     Glucose 103 mg/dL     Basic Metabolic Panel [[id number]]  (Abnormal) Collected: 03/18/24 0326    Specimen: Blood Updated: 03/18/24 0412     Glucose 143 mg/dL      BUN 21 mg/dL      Creatinine 0.95 mg/dL      Sodium 141 mmol/L      Potassium 4.0 mmol/L      Chloride 100 mmol/L      CO2 30.7 mmol/L      Calcium 9.1 mg/dL   "    BUN/Creatinine Ratio 22.1     Anion Gap 10.3 mmol/L      eGFR 88.3 mL/min/1.73     Narrative:      GFR Normal >60  Chronic Kidney Disease <60  Kidney Failure <15      Magnesium [[id number]]  (Abnormal) Collected: 03/18/24 0326    Specimen: Blood Updated: 03/18/24 0412     Magnesium 2.5 mg/dL     Phosphorus [[id number]]  (Normal) Collected: 03/18/24 0326    Specimen: Blood Updated: 03/18/24 0412     Phosphorus 3.7 mg/dL     CBC (No Diff) [[id number]]  (Abnormal) Collected: 03/18/24 0326    Specimen: Blood Updated: 03/18/24 0356     WBC 11.70 10*3/mm3      RBC 4.90 10*6/mm3      Hemoglobin 11.3 g/dL      Hematocrit 38.2 %      MCV 78.0 fL      MCH 23.1 pg      MCHC 29.6 g/dL      RDW 15.8 %      RDW-SD 44.0 fl      MPV 9.5 fL      Platelets 330 10*3/mm3     POC Glucose Once [[id number]]  (Normal) Collected: 03/17/24 2042    Specimen: Blood Updated: 03/17/24 2043     Glucose 104 mg/dL     Procalcitonin [[id number]]  (Normal) Collected: 03/17/24 0000    Specimen: Blood Updated: 03/17/24 1738     Procalcitonin 0.08 ng/mL     Narrative:      As a Marker for Sepsis (Non-Neonates):    1. <0.5 ng/mL represents a low risk of severe sepsis and/or septic shock.  2. >2 ng/mL represents a high risk of severe sepsis and/or septic shock.    As a Marker for Lower Respiratory Tract Infections that require antibiotic therapy:    PCT on Admission    Antibiotic Therapy       6-12 Hrs later    >0.5                Strongly Recommended  >0.25 - <0.5        Recommended   0.1 - 0.25          Discouraged              Remeasure/reassess PCT  <0.1                Strongly Discouraged     Remeasure/reassess PCT    As 28 day mortality risk marker: \"Change in Procalcitonin Result\" (>80% or <=80%) if Day 0 (or Day 1) and Day 4 values are available. Refer to [email or web address]    Change in PCT <=80%  A decrease of PCT levels below or equal to 80% defines a positive change in PCT test result representing a higher risk for 28-day " all-cause mortality of patients diagnosed with severe sepsis for septic shock.    Change in PCT >80%  A decrease of PCT levels of more than 80% defines a negative change in PCT result representing a lower risk for 28-day all-cause mortality of patients diagnosed with severe sepsis or septic shock.       POC Glucose Once [[id number]]  (Normal) Collected: 03/17/24 1623    Specimen: Blood Updated: 03/17/24 1626     Glucose 114 mg/dL     Hemoglobin & Hematocrit, Blood [[id number]]  (Abnormal) Collected: 03/17/24 1542    Specimen: Blood Updated: 03/17/24 1618     Hemoglobin 10.9 g/dL      Hematocrit 37.9 %     POC Glucose Once [[id number]]  (Normal) Collected: 03/17/24 1101    Specimen: Blood Updated: 03/17/24 1102     Glucose 118 mg/dL     Hemoglobin & Hematocrit, Blood [[id number]]  (Abnormal) Collected: 03/17/24 0754    Specimen: Blood from Hand, Left Updated: 03/17/24 0813     Hemoglobin 10.5 g/dL      Hematocrit 35.4 %     POC Glucose Once [[id number]]  (Normal) Collected: 03/17/24 0653    Specimen: Blood Updated: 03/17/24 0657     Glucose 111 mg/dL     Vitamin B12 [[id number]]  (Normal) Collected: 03/17/24 0000    Specimen: Blood Updated: 03/17/24 0108     Vitamin B-12 558 pg/mL     Narrative:      Results may be falsely increased if patient taking Biotin.      Folate [[id number]]  (Normal) Collected: 03/17/24 0000    Specimen: Blood Updated: 03/17/24 0108     Folate 8.73 ng/mL     Narrative:      Results may be falsely increased if patient taking Biotin.      TSH [[id number]]  (Normal) Collected: 03/17/24 0000    Specimen: Blood Updated: 03/17/24 0059     TSH 0.601 uIU/mL     Magnesium [[id number]]  (Abnormal) Collected: 03/17/24 0000    Specimen: Blood Updated: 03/17/24 0055     Magnesium 2.6 mg/dL     Basic Metabolic Panel [[id number]]  (Abnormal) Collected: 03/17/24 0000    Specimen: Blood Updated: 03/17/24 0052     Glucose 152 mg/dL      BUN 26 mg/dL      Creatinine 0.92 mg/dL      Sodium 139 mmol/L      Potassium 4.1  mmol/L      Chloride 101 mmol/L      CO2 27.0 mmol/L      Calcium 9.2 mg/dL      BUN/Creatinine Ratio 28.3     Anion Gap 11.0 mmol/L      eGFR 91.7 mL/min/1.73     Narrative:      GFR Normal >60  Chronic Kidney Disease <60  Kidney Failure <15      Phosphorus [[id number]]  (Normal) Collected: 03/17/24 0000    Specimen: Blood Updated: 03/17/24 0052     Phosphorus 3.1 mg/dL     CBC (No Diff) [[id number]]  (Abnormal) Collected: 03/17/24 0000    Specimen: Blood Updated: 03/17/24 0036     WBC 14.68 10*3/mm3      RBC 4.61 10*6/mm3      Hemoglobin 10.3 g/dL      Hematocrit 36.0 %      MCV 78.1 fL      MCH 22.3 pg      MCHC 28.6 g/dL      RDW 15.5 %      RDW-SD 43.1 fl      MPV 9.2 fL      Platelets 312 10*3/mm3     POC Glucose Once [[id number]]  (Abnormal) Collected: 03/16/24 2107    Specimen: Blood Updated: 03/16/24 2124     Glucose 154 mg/dL     POC Glucose Once [[id number]]  (Normal) Collected: 03/16/24 1718    Specimen: Blood Updated: 03/16/24 1720     Glucose 127 mg/dL     Hemoglobin & Hematocrit, Blood [[id number]]  (Abnormal) Collected: 03/16/24 1553    Specimen: Blood Updated: 03/16/24 1646     Hemoglobin 10.2 g/dL      Hematocrit 34.8 %     Hemoglobin & Hematocrit, Blood [[id number]]  (Abnormal) Collected: 03/16/24 1151    Specimen: Blood Updated: 03/16/24 1204     Hemoglobin 9.8 g/dL      Hematocrit 33.7 %     POC Glucose Once [[id number]]  (Abnormal) Collected: 03/16/24 1139    Specimen: Blood Updated: 03/16/24 1141     Glucose 164 mg/dL     Hemoglobin A1c [[id number]]  (Abnormal) Collected: 03/16/24 0235    Specimen: Blood Updated: 03/16/24 0916     Hemoglobin A1C 6.60 %     Narrative:      Hemoglobin A1C Ranges:    Increased Risk for Diabetes  5.7% to 6.4%  Diabetes                     >= 6.5%  Diabetic Goal                < 7.0%    Ferritin [[id number]]  (Abnormal) Collected: 03/16/24 0235    Specimen: Blood Updated: 03/16/24 0849     Ferritin 13.60 ng/mL     Narrative:      Results may be falsely decreased if patient  taking Biotin.      Iron Profile [[id number]]  (Abnormal) Collected: 03/16/24 0235    Specimen: Blood Updated: 03/16/24 0843     Iron 30 mcg/dL      Iron Saturation (TSAT) 4 %      Transferrin 455 mg/dL      TIBC 678 mcg/dL     Basic Metabolic Panel [[id number]]  (Abnormal) Collected: 03/16/24 0235    Specimen: Blood Updated: 03/16/24 0410     Glucose 195 mg/dL      BUN 21 mg/dL      Creatinine 1.08 mg/dL      Sodium 139 mmol/L      Potassium 4.1 mmol/L      Chloride 99 mmol/L      CO2 26.0 mmol/L      Calcium 9.6 mg/dL      BUN/Creatinine Ratio 19.4     Anion Gap 14.0 mmol/L      eGFR 75.7 mL/min/1.73     Narrative:      GFR Normal >60  Chronic Kidney Disease <60  Kidney Failure <15      Magnesium [[id number]]  (Normal) Collected: 03/16/24 0235    Specimen: Blood Updated: 03/16/24 0410     Magnesium 2.0 mg/dL     Phosphorus [[id number]]  (Normal) Collected: 03/16/24 0235    Specimen: Blood Updated: 03/16/24 0410     Phosphorus 3.3 mg/dL     CBC (No Diff) [[id number]]  (Abnormal) Collected: 03/16/24 0235    Specimen: Blood Updated: 03/16/24 0351     WBC 6.44 10*3/mm3      RBC 4.55 10*6/mm3      Hemoglobin 10.3 g/dL      Hematocrit 35.3 %      MCV 77.6 fL      MCH 22.6 pg      MCHC 29.2 g/dL      RDW 15.7 %      RDW-SD 43.4 fl      MPV 9.3 fL      Platelets 287 10*3/mm3     High Sensitivity Troponin T 2Hr [[id number]]  (Abnormal) Collected: 03/15/24 1231    Specimen: Blood Updated: 03/15/24 1327     HS Troponin T 33 ng/L      Troponin T Delta -1 ng/L     Narrative:      High Sensitive Troponin T Reference Range:  <14.0 ng/L- Negative Female for AMI  <22.0 ng/L- Negative Male for AMI  >=14 - Abnormal Female indicating possible myocardial injury.  >=22 - Abnormal Male indicating possible myocardial injury.   Clinicians would have to utilize clinical acumen, EKG, Troponin, and serial changes to determine if it is an Acute Myocardial Infarction or myocardial injury due to an underlying chronic condition.         Respiratory  Panel PCR w/COVID-19(SARS-CoV-2) XAVIER/BETZY/PAULA/PAD/COR/HAN In-House, NP Swab in UTM/VTM, 2 HR TAT - Swab, Nasopharynx [[id number]]  (Normal) Collected: 03/15/24 1101    Specimen: Swab from Nasopharynx Updated: 03/15/24 1237     ADENOVIRUS, PCR Not Detected     Coronavirus 229E Not Detected     Coronavirus HKU1 Not Detected     Coronavirus NL63 Not Detected     Coronavirus OC43 Not Detected     COVID19 Not Detected     Human Metapneumovirus Not Detected     Human Rhinovirus/Enterovirus Not Detected     Influenza A PCR Not Detected     Influenza B PCR Not Detected     Parainfluenza Virus 1 Not Detected     Parainfluenza Virus 2 Not Detected     Parainfluenza Virus 3 Not Detected     Parainfluenza Virus 4 Not Detected     RSV, PCR Not Detected     Bordetella pertussis pcr Not Detected     Bordetella parapertussis PCR Not Detected     Chlamydophila pneumoniae PCR Not Detected     Mycoplasma pneumo by PCR Not Detected    Narrative:      In the setting of a positive respiratory panel with a viral infection PLUS a negative procalcitonin without other underlying concern for bacterial infection, consider observing off antibiotics or discontinuation of antibiotics and continue supportive care. If the respiratory panel is positive for atypical bacterial infection (Bordetella pertussis, Chlamydophila pneumoniae, or Mycoplasma pneumoniae), consider antibiotic de-escalation to target atypical bacterial infection.    D-dimer, Quantitative [[id number]]  (Normal) Collected: 03/15/24 1101    Specimen: Blood Updated: 03/15/24 1139     D-Dimer, Quantitative 0.54 MCGFEU/mL     Narrative:      According to the assay 's published package insert, a normal (<0.50 MCGFEU/mL) D-dimer result in conjunction with a non-high clinical probability assessment, excludes deep vein thrombosis (DVT) and pulmonary embolism (PE) with high sensitivity.    D-dimer values increase with age and this can make VTE exclusion of an older population  "difficult. To address this, the [employer], based on best available evidence and recent guidelines, recommends that clinicians use age-adjusted D-dimer thresholds in patients greater than 50 years of age with: a) a low probability of PE who do not meet all Pulmonary Embolism Rule Out Criteria, or b) in those with intermediate probability of PE.   The formula for an age-adjusted D-dimer cut-off is \"age/100\".  For example, a 60 year old patient would have an age-adjusted cut-off of 0.60 MCGFEU/mL and an 80 year old 0.80 MCGFEU/mL.    CBC & Differential [[id number]]  (Abnormal) Collected: 03/15/24 1101    Specimen: Blood Updated: 03/15/24 1121    Narrative:      The following orders were created for panel order CBC & Differential.  Procedure                               Abnormality         Status                     ---------                               -----------         ------                     CBC Auto Differential[[id number]]        Abnormal            Final result                 Please view results for these tests on the individual orders.    CBC Auto Differential [[id number]]  (Abnormal) Collected: 03/15/24 1101    Specimen: Blood Updated: 03/15/24 1121     WBC 7.25 10*3/mm3      RBC 4.54 10*6/mm3      Hemoglobin 10.1 g/dL      Hematocrit 34.5 %      MCV 76.0 fL      MCH 22.2 pg      MCHC 29.3 g/dL      RDW 15.7 %      RDW-SD 41.9 fl      MPV 9.2 fL      Platelets 284 10*3/mm3      Neutrophil % 85.6 %      Lymphocyte % 8.1 %      Monocyte % 3.4 %      Eosinophil % 1.2 %      Basophil % 1.1 %      Immature Grans % 0.6 %      Neutrophils, Absolute 6.20 10*3/mm3      Lymphocytes, Absolute 0.59 10*3/mm3      Monocytes, Absolute 0.25 10*3/mm3      Eosinophils, Absolute 0.09 10*3/mm3      Basophils, Absolute 0.08 10*3/mm3      Immature Grans, Absolute 0.04 10*3/mm3      nRBC 0.3 /100 WBC     High Sensitivity Troponin T [[id number]]  (Abnormal) Collected: 03/15/24 1011    Specimen: Blood Updated: " 03/15/24 1121     HS Troponin T 34 ng/L     Narrative:      High Sensitive Troponin T Reference Range:  <14.0 ng/L- Negative Female for AMI  <22.0 ng/L- Negative Male for AMI  >=14 - Abnormal Female indicating possible myocardial injury.  >=22 - Abnormal Male indicating possible myocardial injury.   Clinicians would have to utilize clinical acumen, EKG, Troponin, and serial changes to determine if it is an Acute Myocardial Infarction or myocardial injury due to an underlying chronic condition.         BNP [[id number]]  (Abnormal) Collected: 03/15/24 1011    Specimen: Blood Updated: 03/15/24 1121     proBNP 2,284.0 pg/mL     Narrative:      This assay is used as an aid in the diagnosis of individuals suspected of having heart failure. It can be used as an aid in the diagnosis of acute decompensated heart failure (ADHF) in patients presenting with signs and symptoms of ADHF to the emergency department (ED). In addition, NT-proBNP of <300 pg/mL indicates ADHF is not likely.    Age Range Result Interpretation  NT-proBNP Concentration (pg/mL:      <50             Positive            >450                   Gray                 300-450                    Negative             <300    50-75           Positive            >900                  Gray                300-900                  Negative            <300      >75             Positive            >1800                  Gray                300-1800                  Negative            <300    Procalcitonin [[id number]]  (Normal) Collected: 03/15/24 1011    Specimen: Blood Updated: 03/15/24 1121     Procalcitonin 0.11 ng/mL     Narrative:      As a Marker for Sepsis (Non-Neonates):    1. <0.5 ng/mL represents a low risk of severe sepsis and/or septic shock.  2. >2 ng/mL represents a high risk of severe sepsis and/or septic shock.    As a Marker for Lower Respiratory Tract Infections that require antibiotic therapy:    PCT on Admission    Antibiotic Therapy       6-12 Hrs  "later    >0.5                Strongly Recommended  >0.25 - <0.5        Recommended   0.1 - 0.25          Discouraged              Remeasure/reassess PCT  <0.1                Strongly Discouraged     Remeasure/reassess PCT    As 28 day mortality risk marker: \"Change in Procalcitonin Result\" (>80% or <=80%) if Day 0 (or Day 1) and Day 4 values are available. Refer to [email or web address]    Change in PCT <=80%  A decrease of PCT levels below or equal to 80% defines a positive change in PCT test result representing a higher risk for 28-day all-cause mortality of patients diagnosed with severe sepsis for septic shock.    Change in PCT >80%  A decrease of PCT levels of more than 80% defines a negative change in PCT result representing a lower risk for 28-day all-cause mortality of patients diagnosed with severe sepsis or septic shock.       Comprehensive Metabolic Panel [[id number]]  (Abnormal) Collected: 03/15/24 1011    Specimen: Blood Updated: 03/15/24 1116     Glucose 119 mg/dL      BUN 16 mg/dL      Creatinine 1.01 mg/dL      Sodium 141 mmol/L      Potassium 3.7 mmol/L      Chloride 104 mmol/L      CO2 23.2 mmol/L      Calcium 9.4 mg/dL      Total Protein 6.5 g/dL      Albumin 3.9 g/dL      ALT (SGPT) 30 U/L      AST (SGOT) 37 U/L      Alkaline Phosphatase 73 U/L      Total Bilirubin 0.5 mg/dL      Globulin 2.6 gm/dL      A/G Ratio 1.5 g/dL      BUN/Creatinine Ratio 15.8     Anion Gap 13.8 mmol/L      eGFR 82.0 mL/min/1.73     Narrative:      GFR Normal >60  Chronic Kidney Disease <60  Kidney Failure <15      Magnesium [[id number]]  (Normal) Collected: 03/15/24 1011    Specimen: Blood Updated: 03/15/24 1116     Magnesium 2.2 mg/dL           Imaging Results (All)       Procedure Component Value Units Date/Time    XR Chest PA & Lateral [[id number]] Collected: 03/18/24 0049     Updated: 03/18/24 0054    Narrative:      PA AND LATERAL CHEST RADIOGRAPH     HISTORY: Pulmonary edema     COMPARISON: March 15, " 2024     FINDINGS:  There is cardiomegaly. Left-sided pacemaker is noted. No pneumothorax or  pleural effusion is seen. Bilateral alveolar and interstitial  infiltrates are again noted. Appearance is similar to the exam from  March 15, 2024 there are background emphysematous changes.       Impression:      No significant interval change when compared to March 15, 2024.     This report was finalized on 3/18/2024 12:51 AM by Dr. Judi Chow M.D on Workstation: [id number]       CT Angiogram Chest [[id number]] Collected: 03/16/24 0422     Updated: 03/16/24 0432    Narrative:      CT ANGIOGRAM OF THE CHEST     HISTORY: Hypoxia     COMPARISON: April 1, 2021     TECHNIQUE: Axial CT imaging was obtained through the thorax. IV contrast  was administered. Three-D reformatted images were obtained.     FINDINGS:  No acute pulmonary thromboembolus is seen. There is dilatation of the  aortic root, measuring up to 4 cm. Remainder of the aorta measures  within normal size limits. There is some atherosclerotic involvement of  the thoracic aorta. There are coronary artery calcifications. There is  enlargement of the main pulmonary artery, which can be seen in setting  of pulmonary arterial hypertension. There are advanced background  emphysematous changes. There do appear to be some minimal tree-in-bud  infiltrates within the lingula and right upper lobe. These are likely  infectious or inflammatory. There is a separate origin of the left  vertebral artery from the aortic arch. Mediastinal lymph nodes do not  appear pathologically enlarged, although there are some prominent hilar  nodes. For example, a left hilar node measures up to 1.4 cm in short  axis dimensions. This is larger than in April 2021, when it measured 1.2  cm. A right hilar node measures 1.3 cm. Previously, it measured 1 cm.  Thyroid gland, trachea, and esophagus appear unremarkable. Images  through the upper abdomen do not demonstrate any acute abnormalities.  No  acute osseous abnormalities are seen.       Impression:         1. The patient has some scattered tree-in-bud infiltrates which are  noted within the right upper lobe and lingula. These are likely  infectious or inflammatory.  2. Prominent hilar lymph nodes may be reactive. Consider short-term CT  follow-up in 3 to 6 months, given advanced background emphysematous  changes.     Radiation dose reduction techniques were utilized, including automated  exposure control and exposure modulation based on body size.        This report was finalized on 3/16/2024 4:29 AM by Dr. Judi Chow M.D on Workstation: [id number]       XR Chest 1 View [[id number]] Collected: 03/15/24 1132     Updated: 03/15/24 1136    Narrative:      XR CHEST 1 VW-     HISTORY: Male who is 64 years-old, cough, short of breath     TECHNIQUE: Frontal view of the chest     COMPARISON: 12/12/2022     FINDINGS: The heart is enlarged. Pulmonary vasculature is congested.  Left-sided generator device and cardiac lead are noted. Aorta appears  ectatic. Patchy densities predominantly at the mid to lower lungs may  represent edema and/or pneumonia, follow-up suggested. Minimal right  pleural effusion is apparent. No pneumothorax. No acute osseous process.       Impression:      As described.     This report was finalized on 3/15/2024 11:33 AM by Dr. Librado Orantes M.D on Workstation: [id number]             ECG/EMG Results (all)       Procedure Component Value Units Date/Time    ECG 12 Lead Tachycardia [[id number]] Collected: 03/15/24 1445     Updated: 03/15/24 1601     QT Interval 386 ms      QTC Interval 553 ms     Narrative:      HEART RATE= 123  bpm  RR Interval= 488  ms  [place] Interval= 133  ms  P Horizontal Axis= 216  deg  P Front Axis= 163  deg  QRSD Interval= 127  ms  QT Interval= 386  ms  QTcB= 553  ms  QRS Axis= -24  deg  T Wave Axis= 0  deg  - ABNORMAL ECG -  Sinus or ectopic atrial tachycardia  Ventricular premature complex  Left bundle  branch block - new  Electronically Signed By: Myranda Cornelius ([facility]) 15-Mar-2024 16:01:32  Date and Time of Study: 2024-03-15 14:45:11    ECG 12 Lead Dyspnea [[id number]] Collected: 03/15/24 0956     Updated: 03/15/24 1601     QT Interval 375 ms      QTC Interval 530 ms     Narrative:      HEART RATE= 120  bpm  RR Interval= 500  ms  [place] Interval= 153  ms  P Horizontal Axis=   deg  P Front Axis= 162  deg  QRSD Interval= 100  ms  QT Interval= 375  ms  QTcB= 530  ms  QRS Axis= -27  deg  T Wave Axis= -8  deg  - ABNORMAL ECG -  Sinus tachycardia with irregular rate  Abnormal R-wave progression, early transition  Inferior infarct, old  Prolonged QT interval  Ventricular premature complex  Electronically Signed By: Myranda Cornelius ([facility]) 15-Mar-2024 16:01:46  Date and Time of Study: 2024-03-15 09:56:04    Adult Transthoracic Echo Complete W/ Cont if Necessary Per Protocol [[id number]] Resulted: 03/16/24 1309     Updated: 03/16/24 1319     EF(MOD-bp) 38.5 %      LVIDd 5.2 cm      LVIDs 3.7 cm      IVSd 1.06 cm      LVPWd 1.03 cm      FS 28.8 %      IVS/LVPW 1.03 cm      ESV(cubed) 50.4 ml      LV Sys Vol (BSA corrected) 21.9 cm2      EDV(cubed) 139.6 ml      LV Land Vol (BSA corrected) 41.0 cm2      LV mass(C)d 204.3 grams      LVOT area 4.3 cm2      LVOT diam 2.34 cm      EDV(MOD-sp2) 88.0 ml      EDV(MOD-sp4) 88.0 ml      ESV(MOD-sp2) 59.0 ml      ESV(MOD-sp4) 47.0 ml      SV(MOD-sp2) 29.0 ml      SV(MOD-sp4) 41.0 ml      SI(MOD-sp2) 13.5 ml/m2      SI(MOD-sp4) 19.1 ml/m2      EF(MOD-sp2) 33.0 %      EF(MOD-sp4) 46.6 %      MV E max kane 100.0 cm/sec      MV dec time 0.13 sec      LA ESV Index (BP) 28.8 ml/m2      Med Peak E' Kane 15.7 cm/sec      Lat Peak E' Kane 15.3 cm/sec      TR max kane 272.1 cm/sec      Avg E/e' ratio 6.45     SV(LVOT) 68.6 ml      RV Base 3.6 cm      RV Mid 3.5 cm      RV Length 8.6 cm      TAPSE (>1.6) 1.78 cm      RV S' 12.4 cm/sec      LV V1 max 111.1 cm/sec      LV V1 max PG 4.9 mmHg      LV V1 mean PG  2.35 mmHg      LV V1 VTI 16.0 cm      Ao pk jacquelin 145.8 cm/sec      Ao max PG 8.5 mmHg      Ao mean PG 5.0 mmHg      Ao V2 VTI 25.0 cm      SCARLETT(I,D) 2.7 cm2      TR max PG 29.6 mmHg      RV V1 max PG 2.44 mmHg      RV V1 max 78.1 cm/sec      RV V1 VTI 13.1 cm      PA V2 max 111.6 cm/sec      PA acc time 0.10 sec      Ao root diam 3.5 cm      ACS 2.12 cm      Sinus 3.4 cm      STJ 3.1 cm      RVSP(TR) 37 mmHg      RAP systole 8 mmHg      Ascending aorta 3.5 cm      Echo EF Estimated 45.0 %     Narrative:        The left ventricular cavity is mildly dilated.    Left ventricular wall thickness is consistent with borderline   concentric hypertrophy.    There is akinesis of the basal to mid inferior wall. The remaining wall   segments are hyperdynamic    Left ventricular systolic function is mildly decreased. Estimated left   ventricular EF = 45%    The right ventricular cavity is mildly dilated. Normal right   ventricular systolic function noted.    The left atrial cavity is mildly dilated.    Mild mitral valve regurgitation is present.    Mild to moderate tricuspid valve regurgitation is present.    Calculated right ventricular systolic pressure from tricuspid   regurgitation is 37 mmHg.    There is no evidence of pericardial effusion.      Telemetry Scan [[id number]] Resulted: 03/15/24     Updated: 03/17/24 0830    Telemetry Scan [[id number]] Resulted: 03/15/24     Updated: 03/17/24 0830    Telemetry Scan [[id number]] Resulted: 03/15/24     Updated: 03/17/24 0830    Telemetry Scan [[id number]] Resulted: 03/15/24     Updated: 03/17/24 0830    ECG 12 Lead Rhythm Change [[id number]] Collected: 03/17/24 0423     Updated: 03/17/24 1851     QT Interval 369 ms      QTC Interval 469 ms     Narrative:      HEART RATE= 97  bpm  RR Interval= 619  ms  SD Interval=   ms  P Horizontal Axis=   deg  P Front Axis=   deg  QRSD Interval= 102  ms  QT Interval= 369  ms  QTcB= 469  ms  QRS Axis= -35  deg  T Wave Axis= 79  deg  - ABNORMAL ECG  "-  Atrial flutter  Left axis deviation  No change from previous tracing  Electronically Signed By: He AnnaKEYLA) ([facility]) 17-Mar-2024 18:51:44  Date and Time of Study: 2024-03-17 04:23:56    Telemetry Scan [[id number]] Resulted: 03/15/24     Updated: 03/18/24 0033    Telemetry Scan [[id number]] Resulted: 03/15/24     Updated: 03/18/24 0714          Physician Progress Notes (all)        Linda Grimaldo APRN at 03/17/24 1246          CC: Follow-up atrial fibrillation/flutter, heart failure mildly reduced ejection fraction, coronary artery disease and anemia    Interval History: He reports intermittent shortness of breath but that is slightly better.  He is concerned he is still holding onto extra fluid.      Vital Signs  Temp:  [97.5 °F (36.4 °C)-98.4 °F (36.9 °C)] 97.5 °F (36.4 °C)  Heart Rate:  [] 117  Resp:  [18-20] 18  BP: (124-140)/(84-92) 124/87    Intake/Output Summary (Last 24 hours) at 3/17/2024 1246  Last data filed at 3/16/2024 2213  Gross per 24 hour   Intake 240 ml   Output --   Net 240 ml     Flowsheet Rows      Flowsheet Row First Filed Value   Admission Height 167.6 cm (65.98\") Documented at 03/15/2024 1026   Admission Weight 110 kg (242 lb 8.1 oz) Documented at 03/15/2024 1026            PHYSICAL EXAM:  General: No acute distress  Resp:NL Rate, unlabored, diminished bases  CV: Tachycardic rate and rhythm, NL PMI, Nl S1 and S2, no Murmur, no gallop, no rub, No JVD. Normal pedal pulses  ABD:Nl sounds, no masses or tenderness, nondistended, no guarding or rebound  Neuro: alert,cooperative and oriented  Extr: No edema or cyanosis, moves all extremities      Results Review:        I reviewed the patient's new clinical results.  I personally viewed and interpreted the patient's EKG/Telemetry data- a fib         Medication Review:   Meds reviewed    Assessment/Plan  1.  Acute on chronic combined CHF  2.  Acute hypoxic respiratory failure  3.  Ischemic cardiomyopathy, EF 45% by echo on 3/16/2024  4.  " History of ventricular fibrillation cardiac arrest in 2021, status post Biotronik single-chamber ICD by Dr. Rosas on 2021  5.  Paroxysmal atrial fibrillation (history)-he was inconsistently taking Eliquis prior to admission  6.  Typical atrial flutter    7.  Coronary artery disease with proximal  of the RCA and  of OM2 by cath in 2021 (PCI attempts to OM2 were unsuccessful)  -No angina  8.  Recent anxiety and psychosocial stressors- psychiatry following   9.  Nightly alcohol intake (recently 2 shots of vodka to sleep per night)  10.  Bright red blood per rectum - awaiting GI eval  11.  Iron deficiency anemia-IV Venofer.  X 3 days starting 3/16/2024  12.  COPD without acute exacerbation  13.  Morbid obesity, complicating all aspects of care  14.  Type 2 diabetes, new hemoglobin A1C 6.60  15.  Hypertension- BP better today  16.  Dilated aortic root measuring 4 cm    -I will increase IV diuretic for further diureses and we will monitor to see if this helps dyspnea as well, follow renal function  -Awaiting GI consult/input due to anemia with bright red blood per rectum.  -Eliquis is held due to anemia/bright red blood per rectum  -Heart rate has improved since IV digoxin.  Still a little fast. Call with questions or concerns.  If pulse sustains > 120 bp m  -At this time no need for cardioversion.  He would need LILA due to inconsistently taking Eliquis prior to admission    ERIC Ernst  24  12:46 EDT      Electronically signed by Linda Grimaldo APRN at [phone]       Jimmy Marcum MD at 24 0840              Name: Sebastien Tang ADMIT: 3/15/2024   : 1960  PCP: Yuliana Flynn DO    MRN: [id number] LOS: 1 days   AGE/SEX: 64 y.o. male  ROOM: S403/1     Subjective   Subjective   Patient seen this morning, reports he is feeling better.  Denies any signs of GI bleed.  Denies shortness of breath, chest pain or palpitations.  Does have Intermittent cough.  Heart  rate improved.    Review of Systems  As above  Objective   Objective   Vital Signs  Temp:  [97.5 °F (36.4 °C)-98.4 °F (36.9 °C)] 97.5 °F (36.4 °C)  Heart Rate:  [] 98  Resp:  [16-20] 18  BP: (124-142)/(84-99) 124/87  SpO2:  [82 %-100 %] 98 %  on  Flow (L/min):  [2-3] 3;   Device (Oxygen Therapy): nasal cannula  Body mass index is 40.75 kg/m².  Physical Exam    General: Alert and oriented x3, no acute distress, ill-appearing  HEENT: Normocephalic, atraumatic  CV: Irregular rhythm, normal rate  Lungs: Diminished, no wheezing, nonlabored breathing,  Abdomen: Soft, nontender, nondistended  Extremities: No significant peripheral edema , no cyanosis     Results Review     I reviewed the patient's new clinical results.  Adult Transthoracic Echo Complete W/ Cont if Necessary Per Protocol    The left ventricular cavity is mildly dilated.    Left ventricular wall thickness is consistent with borderline   concentric hypertrophy.    There is akinesis of the basal to mid inferior wall. The remaining wall   segments are hyperdynamic    Left ventricular systolic function is mildly decreased. Estimated left   ventricular EF = 45%    The right ventricular cavity is mildly dilated. Normal right   ventricular systolic function noted.    The left atrial cavity is mildly dilated.    Mild mitral valve regurgitation is present.    Mild to moderate tricuspid valve regurgitation is present.    Calculated right ventricular systolic pressure from tricuspid   regurgitation is 37 mmHg.    There is no evidence of pericardial effusion.    Scheduled Medications  acetaminophen, 650 mg, Oral, Daily  budesonide-formoterol, 2 puff, Inhalation, BID  busPIRone, 10 mg, Oral, TID  diphenhydrAMINE, 25 mg, Oral, Daily  ferric gluconate, 125 mg, Intravenous, Daily  finasteride, 5 mg, Oral, Daily  folic acid, 1 mg, Oral, Daily  furosemide, 20 mg, Intravenous, BID  insulin lispro, 2-7 Units, Subcutaneous, 4x Daily AC & at Bedtime  ipratropium-albuterol,  3 mL, Nebulization, 4x Daily - RT  lisinopril, 40 mg, Oral, Daily  metoprolol succinate XL, 100 mg, Oral, Q12H  mirtazapine, 30 mg, Oral, Nightly  pantoprazole, 40 mg, Intravenous, BID AC  senna-docusate sodium, 2 tablet, Oral, BID  sodium chloride, 10 mL, Intravenous, Q12H  thiamine (B-1) IV, 200 mg, Intravenous, Q8H   Followed by  [START ON 3/21/2024] thiamine, 100 mg, Oral, Daily    Infusions   Diet  Diet: Cardiac, Diabetic; Healthy Heart (2-3 Na+); Consistent Carbohydrate; Fluid Consistency: Thin (IDDSI 0)    I have personally reviewed     [x]  Laboratory   []  Microbiology   []  Radiology   [x]  EKG/Telemetry  []  Cardiology/Vascular   []  Pathology    []  Records      Assessment/Plan     Active Hospital Problems    Diagnosis  POA    **Acute CHF [I50.9]  Yes    Hypoxemia [R09.02]  Unknown    Iron deficiency anemia [D50.9]  Unknown    Diabetes mellitus, type 2 [E11.9]  Unknown    Heart failure [I50.9]  Yes    Essential hypertension [I10]  Yes    ICD (implantable cardioverter-defibrillator) in place [Z95.810]  Yes    Atrial flutter [I48.92]  Yes    COPD (chronic obstructive pulmonary disease) [J44.9]  Yes      Resolved Hospital Problems   No resolved problems to display.     This is a 64-year-old gentleman with a history of chronic systolic  heart failure,  COPD, previous V-fib arrest, paroxysmal A-fib on anticoagulation, hypertension, obesity, hyperlipidemia and poor medical compliance who presents to the hospital with increasing shortness of breath and is found to have decompensated heart failure and pulmonary edema       Acute on chronic systolic heart failure  -Echocardiogram on 4/2021 showed EF of 48%.  -IV Lasix 20 mg twice daily,   -Repeat echocardiogram 03/16 showed EF of 45%-There is akinesis of the basal to mid inferior wall.   -Cardiology consulted      Typical atrial flutter with RVR/paroxysmal atrial fibrillation/history of V-fib cardiac arrest 2021-post ICD  -Continue metoprolol  mg twice  daily,   -hold Eliquis secondary to iron deficiency anemia/GI bleed (of note has not been taking Eliquis consistently prior to admission as was not able to afford)  -Status post digoxin  -Currently in atrial flutter      Leukocytosis:   -WBC trended up to 14.68 from 6.44 yesterday  -Afebrile  -Initial chest x-ray showed edema versus possible pneumonia  -Repeat chest x-ray ordered  -Procalcitonin ordered  -Will start on IV ceftriaxone for possible pneumonia pending chest x-ray    CAD  -Continue statin, metoprolol  -Holding aspirin secondary to iron deficiency anemia and concern for GI bleed.    Iron deficiency anemia/GI bleed  -Hemoglobin 10.3, hemoglobin in 12/2022 was 12.2  -Patient reports intermittent bright red blood per rectum with bowel movements  -Iron panel consistent with significant iron deficiency with iron saturation of 4, and ferritin of 13.6  -Ordered IV iron for 3 days 03/16  -IV PPI  -Hemoglobin remained stable, monitor twice daily  -GI consulted    Essential hypertension  -Stable on current regimen, continue    COPD/hypoxemia  -Continue Symbicort  -DuoNebs  -Incentive spirometer  -Wean off O2 as able  -O2 saturation goal 88% and above    Type II DM, new diagnosis  -Hemoglobin A1c 6.6, previously was 6.2  -Continue SSI, diabetic diet  -Diabetic educator consult      Alcohol use disorder  -Continue CIWA protocol, multivitamins    Major depressive disorder  -Psychiatry following  -mirtazapine increased to 30 mg nightly  -BuSpar 10 mg 3 times daily added      Paroxysmal atrial fibrillation  -Continue metoprolol  mg twice daily, hold Eliquis secondary to iron deficiency anemia/GI bleed      Prominent hilar lymph nodes seen on CT   -Follow-up short-term CT  in 3 to 6 months given advanced background emphysematous changes recommended      Aortic root dilation.   -CT scan showed there is dilatation of the aortic root, measuring up to 4 cm.   -Will need outpatient surveillance      SCDs for DVT  prophylaxis.  Full code.  Discussed with patient.        Copied text in this note has been reviewed and is accurate as of 24.         Dictated utilizing Dragon dictation        Jimmy Marcum MD  [facility]  24  09:04 EDT        Electronically signed by Jimmy Marcum MD at 24 1929       Jimmy Marcum MD at 24 [phone]              Name: Sebastien Tang ADMIT: 3/15/2024   : 1960  PCP: Yuliana Flynn DO    MRN: [id number] LOS: 0 days   AGE/SEX: 64 y.o. male  ROOM: S4/     Subjective   Subjective   Patient seen this morning, no acute events overnight.  Shortness of breath improved, coughing, nonproductive.  Was able to ambulate without significant shortness of breath per patient.  Denies fevers or chills.  No chest pain or palpitations.    Review of Systems  As above  Objective   Objective   Vital Signs  Temp:  [97.7 °F (36.5 °C)-99.3 °F (37.4 °C)] 97.7 °F (36.5 °C)  Heart Rate:  [112-128] 112  Resp:  [16-18] 16  BP: (135-168)/() 142/99  SpO2:  [87 %-97 %] 95 %  on  Flow (L/min):  [3] 3;   Device (Oxygen Therapy): nasal cannula  Body mass index is 39.16 kg/m².  Physical Exam    General: Alert and oriented x3, no acute distress, ill-appearing  HEENT: Normocephalic, atraumatic  CV: Tachycardic, irregular rhythm  Lungs: Diminished, no wheezing, nonlabored breathing,  Abdomen: Soft, nontender, nondistended  Extremities: No significant peripheral edema , no cyanosis     Results Review     I reviewed the patient's new clinical results.         Scheduled Medications  acetaminophen, 650 mg, Oral, Daily  budesonide-formoterol, 2 puff, Inhalation, BID  diphenhydrAMINE, 25 mg, Oral, Daily  ferric gluconate, 125 mg, Intravenous, Daily  finasteride, 5 mg, Oral, Daily  folic acid, 1 mg, Oral, Daily  furosemide, 20 mg, Intravenous, BID  insulin lispro, 2-7 Units, Subcutaneous, 4x Daily AC & at Bedtime  ipratropium-albuterol, 3 mL, Nebulization, 4x Daily -  RT  [Held by provider] lisinopril, 40 mg, Oral, Daily  metoprolol succinate XL, 100 mg, Oral, Q12H  mirtazapine, 15 mg, Oral, Nightly  pantoprazole, 40 mg, Intravenous, BID AC  senna-docusate sodium, 2 tablet, Oral, BID  sodium chloride, 10 mL, Intravenous, Q12H  thiamine (B-1) IV, 200 mg, Intravenous, Q8H   Followed by  [START ON 3/21/2024] thiamine, 100 mg, Oral, Daily    Infusions   Diet  Diet: Cardiac, Diabetic; Healthy Heart (2-3 Na+); Consistent Carbohydrate; Fluid Consistency: Thin (IDDSI 0)    I have personally reviewed     [x]  Laboratory   [x]  Microbiology   [x]  Radiology   [x]  EKG/Telemetry  []  Cardiology/Vascular   []  Pathology    []  Records      Assessment/Plan     Active Hospital Problems    Diagnosis  POA    **Acute CHF [I50.9]  Yes    Iron deficiency anemia [D50.9]  Unknown    Diabetes mellitus, type 2 [E11.9]  Unknown    Heart failure [I50.9]  Yes      Resolved Hospital Problems   No resolved problems to display.     This is a 64-year-old gentleman with a history of chronic systolic  heart failure,  COPD, previous V-fib arrest, paroxysmal A-fib on anticoagulation, hypertension, obesity, hyperlipidemia and poor medical compliance who presents to the hospital with increasing shortness of breath and is found to have decompensated heart failure and pulmonary edema       Acute on chronic systolic heart failure  -Echocardiogram on 4/2021 showed EF of 48%.  -IV Lasix 20 mg twice daily, echocardiogram ordered.  -Cardiology consulted      CAD  -Continue statin, metoprolol  -Holding aspirin secondary to iron deficiency anemia and concern for GI bleed.    Iron deficiency anemia/GI bleed  -Hemoglobin 10.3, hemoglobin in 12/2022 was 12.2  -Patient reports intermittent bright red blood per rectum with bowel movements  -Iron panel consistent with severe iron deficiency  -Ordered IV iron  -IV PPI  -Monitor hemoglobin every 8 hours  -GI consulted    COPD/hypoxemia  -Continue Symbicort  -DuoNebs  -Incentive  spirometer  -Wean off O2 as able    Type II DM, new diagnosis  -Hemoglobin A1c 6.6, previously was 6.2  -Initiate SSI, diabetic diet  -Diabetic educator consult    Alcohol use disorder  -Continue CIWA protocol, multivitamins      Paroxysmal atrial fibrillation  -Continue metoprolol  mg twice daily, hold Eliquis secondary to iron deficiency anemia/GI bleed      Prominent hilar lymph nodes seen on CT   -Follow-up short-term CT  in 3 to 6 months given advanced background emphysematous changes recommended      Aortic root dilation.    -CT scan showed there is dilatation of the aortic root, measuring up to 4 cm.   -Will need outpatient surveillance        SCDs for DVT prophylaxis.  Full code.  Discussed with patient.        Copied text in this note has been reviewed and is accurate as of 03/16/24.         Dictated utilizing Dragon dictation        Jimmy Marcum MD  [facility]  03/16/24  09:46 EDT        Electronically signed by Jimmy Marcum MD at 03/16/24 1632          Consult Notes (all)        Ruddy Briseno MD at 03/17/24 1333        Consult Orders    1. Inpatient Gastroenterology Consult [[id number]] ordered by Jimmy Marcum MD at 03/16/24 0939                 [facility]  Initial Inpatient Consult Note    Referring Provider: Lois Caballero    Reason for Consultation: Rectal bleeding    Subjective     History of present illness:    64 y.o. male admitted with multiple complaints including generalized malaise, shortness of air, emotional distress with anxiety and depression, also endorses rectal bleeding.  Has not been taking his Eliquis.  No previous EGD or colonoscopy his lifetime.  Denies melena or hematemesis.    Home Meds:  Medications Prior to Admission   Medication Sig Dispense Refill Last Dose    albuterol sulfate  (90 Base) MCG/ACT inhaler Inhale 2 puffs Every 6 (Six) Hours As Needed for Wheezing. 18 g 3 3/15/2024    apixaban  (Eliquis) 5 MG tablet tablet Take 1 tablet by mouth Every 12 (Twelve) Hours. 60 tablet 3 3/14/2024    aspirin 81 MG EC tablet Take 1 tablet by mouth Daily. 30 tablet 0 3/14/2024    finasteride (PROSCAR) 5 MG tablet Take 1 tablet by mouth Daily. 90 tablet 0 3/14/2024    furosemide (LASIX) 40 MG tablet Take 1 tablet by mouth Daily. 90 tablet 1 3/14/2024    lisinopril (PRINIVIL,ZESTRIL) 40 MG tablet TAKE 1 TABLET BY MOUTH DAILY 90 tablet 0 3/14/2024    metoprolol succinate XL (TOPROL-XL) 100 MG 24 hr tablet Take 1 tablet by mouth Every 12 (Twelve) Hours. 120 tablet 5 3/14/2024    mirtazapine (REMERON) 15 MG tablet Take 1 tablet by mouth Every Night. 90 tablet 0 3/14/2024     Current Meds:   acetaminophen, 650 mg, Oral, Daily  budesonide-formoterol, 2 puff, Inhalation, BID  busPIRone, 10 mg, Oral, TID  diphenhydrAMINE, 25 mg, Oral, Daily  ferric gluconate, 125 mg, Intravenous, Daily  finasteride, 5 mg, Oral, Daily  folic acid, 1 mg, Oral, Daily  furosemide, 40 mg, Intravenous, BID  insulin lispro, 2-7 Units, Subcutaneous, 4x Daily AC & at Bedtime  ipratropium-albuterol, 3 mL, Nebulization, 4x Daily - RT  lisinopril, 40 mg, Oral, Daily  metoprolol succinate XL, 100 mg, Oral, Q12H  mirtazapine, 30 mg, Oral, Nightly  pantoprazole, 40 mg, Intravenous, BID AC  senna-docusate sodium, 2 tablet, Oral, BID  sodium chloride, 10 mL, Intravenous, Q12H  thiamine (B-1) IV, 200 mg, Intravenous, Q8H   Followed by  [START ON 3/21/2024] thiamine, 100 mg, Oral, Daily      Allergies:  No Known Allergies  Review of Systems  Is weakness and fatigue all other systems reviewed and negative     Objective     Vital Signs  Temp:  [97.5 °F (36.4 °C)-98.4 °F (36.9 °C)] 97.5 °F (36.4 °C)  Heart Rate:  [] 117  Resp:  [18-20] 18  BP: (124-140)/(84-92) 124/87  Physical Exam:  General Appearance:    Alert, cooperative, in no acute distress   Head:    Normocephalic, without obvious abnormality, atraumatic   Eyes:          conjunctivae and sclerae  normal, no   icterus   Throat:   no thrush, oral mucosa moist   Neck:   Supple, no adenopathy   Lungs:     Clear to auscultation bilaterally    Heart:    Regular rhythm and normal rate    Chest Wall:    No abnormalities observed   Abdomen:     Soft, nondistended, nontender; normal bowel sounds   Extremities:   no edema, no redness   Skin:   No bruising or rash   Psychiatric:  normal mood and insight     Results Review:   I reviewed the patient's new clinical results.    Assessment & Plan   Active Hospital Problems    Diagnosis     **Acute CHF     Hypoxemia     Iron deficiency anemia     Diabetes mellitus, type 2     Heart failure     Essential hypertension     ICD (implantable cardioverter-defibrillator) in place     Atrial flutter     COPD (chronic obstructive pulmonary disease)        Assessment:  Anemia-iron deficiency  Rectal bleeding  CHF  Acute hypoxic respiratory failure  History of ventricular fibrillation already echo rest, ICD in place  A-fib  A flutter  Coronary artery disease  Anxiety  Depression  COPD  Type 2 diabetes    Plan:  I have recommended EGD and colonoscopy for his rectal bleeding and for iron deficiency, he is currently off Eliquis for quite some time, I have gone through risks and benefits of these procedures and he would like to think about it and let us know tomorrow  Serial hemoglobin  Hold NOAC  Transfusion per primary team      I discussed the patients findings and my recommendations with patient and nursing staff.    Ruddy Briseno MD      Electronically signed by Ruddy Briseno MD at 03/17/24 7647       Chip Dolan MD at 03/16/24 2152        Consult Orders    1. Inpatient Cardiology Consult [[id number]] ordered by Tato Kerr MD at 03/15/24 1506                 Date of Consultation: 03/16/24    Referral Provider: Dr. Marcum.     Reason for Consultation: CHF exacerbation.    Encounter Provider: Chip Dolan MD    Group of Service: [facility]     Patient Name: Sebastien Tang    :1960    Chief complaint: Shortness of breath.    History of Present Illness:      This is a very pleasant 64 year-old male who is normally followed by Dr. Rosas in our group.  He had an out of hospital cardiac arrest secondary to ventricular fibrillation in 2021.  An echocardiogram at that time showed an ejection fraction of approximately 39%.  A heart catheterization showed a large caliber RCA to be occluded in the proximal segment, although it was filling via bridging collaterals and left-to-right collaterals.  There was also an occluded branch of OM2.  He underwent an attempted PCI of OM 2, although wires were unable to cross the lesion, and it was felt to be chronic.  He did undergo placement of a Biotronik single-chamber ICD by Dr. Rosas on 2021.  He also has a history of persistent atrial fibrillation which was cardioverted and became more paroxysmal.  On his last ICD interrogation on 3/13/2024, he had an atrial fibrillation burden of approximately 32%.    The patient presented on 3/15/2024 with multiple complaints, although he was mainly short of breath.  He had also had generalized malaise, and bodyaches.  He had been under a significant amount of emotional stress in his personal life as well.  Unfortunately, he has been using about 2 shots of vodka per night to help with sleep as he has had significant anxiety.  He appears to be in atrial flutter with rapid rates currently.  He also was hypoxic, and was found to have pulmonary edema consistent with CHF.  He denied any chest pain or pressure.  He is currently being diuresed with IV Lasix.  He also has had some form of blood for him recently, and this Eliquis is being held.    No Known Allergies      No current facility-administered medications on file prior to encounter.     Current Outpatient Medications on File Prior to Encounter   Medication Sig Dispense Refill    albuterol sulfate   "(90 Base) MCG/ACT inhaler Inhale 2 puffs Every 6 (Six) Hours As Needed for Wheezing. 18 g 3    apixaban (Eliquis) 5 MG tablet tablet Take 1 tablet by mouth Every 12 (Twelve) Hours. 60 tablet 3    aspirin 81 MG EC tablet Take 1 tablet by mouth Daily. 30 tablet 0    finasteride (PROSCAR) 5 MG tablet Take 1 tablet by mouth Daily. 90 tablet 0    furosemide (LASIX) 40 MG tablet Take 1 tablet by mouth Daily. 90 tablet 1    lisinopril (PRINIVIL,ZESTRIL) 40 MG tablet TAKE 1 TABLET BY MOUTH DAILY 90 tablet 0    metoprolol succinate XL (TOPROL-XL) 100 MG 24 hr tablet Take 1 tablet by mouth Every 12 (Twelve) Hours. 120 tablet 5    mirtazapine (REMERON) 15 MG tablet Take 1 tablet by mouth Every Night. 90 tablet 0     History reviewed. No pertinent family history.    REVIEW OF SYSTEMS:   Pertinent positives are noted in the HPI above.  Otherwise, all other systems were reviewed, and are negative.     Objective:     Vitals:    03/16/24 1510 03/16/24 1939 03/16/24 2015 03/16/24 2022   BP: 130/90 140/84     BP Location: Right arm Right arm     Patient Position: Lying Lying     Pulse: 120 117 114 118   Resp: 20 18 18 20   Temp: 98.2 °F (36.8 °C) 98.4 °F (36.9 °C)     TempSrc: Oral Oral     SpO2: 95% 90% 92% 100%   Weight:       Height:         Body mass index is 40.27 kg/m².  Flowsheet Rows      Flowsheet Row First Filed Value   Admission Height 167.6 cm (65.98\") Documented at 03/15/2024 1026   Admission Weight 110 kg (242 lb 8.1 oz) Documented at 03/15/2024 1026             General:    No acute distress, alert and oriented x4, pleasant, morbidly obese.                   Head:    Normocephalic, atraumatic.   Eyes:          Conjunctivae and sclerae normal, no icterus.   Throat:   No oral lesions, no thrush, oral mucosa moist.    Neck:   Supple, trachea midline.   Lungs:     Decreased breath sounds bilaterally.    Heart:    Irregularly irregular rhythm and tachycardic rate. II/VI SM LLSB.   Abdomen:     Soft, non-tender, obese, " distended, positive bowel sounds.    Extremities:   Trace edema of the lower extremities bilaterally.   Pulses:   Pulses palpable and equal bilaterally.    Skin:   No bleeding or rash.   Neuro:   Non-focal.  Moves all extremities well.    Psychiatric:   Anxious.     Lab Review:                  EKG (reviewed by me personally): Typical atrial flutter, nonspecific ST and T wave changes.  Low voltage.      Assessment:   1.  Acute on chronic combined CHF  2.  Acute hypoxic respiratory failure  3.  Ischemic cardiomyopathy, EF 45% by echo on 3/16/2024  4.  History of ventricular fibrillation cardiac arrest in April 2021, status post Biotronik single-chamber ICD by Dr. Rosas on 4/7/2021  5.  Paroxysmal atrial fibrillation (history)  6.  Typical atrial flutter with RVR (current)  7.  Coronary artery disease with proximal  of the RCA and  of OM2 by cath in April 2021 (PCI attempts to OM2 were unsuccessful)  8.  Recent anxiety and psychosocial stressors  9.  Nightly alcohol (recently 2 shots of vodka per sleep per night)  10.  Bright red blood per rectum   11.  Iron deficiency anemia  12.  COPD without acute exacerbation  13.  Morbid obesity, complicating all aspects of care  14.  Type 2 diabetes, new (previously prediabetic)  15.  Hypertension    Plan:       He has multiple issues.  Some of the issues are psychosocial, and he has had significant life events recently which psychiatry is addressing.  He has been drinking 2 shots of vodka per night to help him sleep because of anxiety.  This is obviously not helping his overall cardiac issues.    I suspect that the CHF exacerbation is being triggered by the rapid atrial flutter mainly.  He is on Toprol- mg twice daily, although his rate is not controlled.  I am going to give him a dose of digoxin 500 mcg x 1 now to see if this will help.  His last ICD interrogation several days ago showed an atrial fibrillation burden of approximately 32%.  Ultimately, he may  require other medication changes or cardioversion.  However, he would need a LILA if a cardioversion is considered.  He admitted that he has been out of his medications at times, including his Eliquis because he cannot afford them.  He also is currently off of Eliquis because of rectal bleeding.  However, I suspect that this is not going to be a true GI bleed.  GI evaluation is pending.     I would continue diuresis with Lasix 20 mg IV twice daily for now.  Wall motion findings in his inferior wall would correlate to the occluded RCA, which has been known.  His EF is about 45%, which is unchanged from previous.  His lisinopril was held on admission, and I am going to resume this as he has had some elevated blood pressures as well.    Cardiology will continue to follow.  Thank you very much for this consult.    Duncan Dolan MD    Electronically signed by Chip Dolan MD at 03/16/24 9116       Julio Cesar Neves III, MD at 03/16/24 1621        Consult Orders    1. Inpatient Psychiatrist Consult [[id number]] ordered by Tato Kerr MD at 03/15/24 1512                 IDENTIFYING INFORMATION: The patient is a 64-year-old retired white male admitted with congestive heart failure.  He is seen by psychiatry related to her history of depression and anxiety.    CHIEF COMPLAINT: None given    INFORMANT: Patient, staff and chart    RELIABILITY: Good    HISTORY OF PRESENT ILLNESS: The patient is a 64-year-old white male admitted for congestive heart failure on 3/15/2024.  He is seen by psychiatry related to complaints of depression and anxiety.  The patient reports that he has been on Remeron prescribed by his primary care physician for the past 9 months.  He reports that he has mainly been on this medication to help him sleep.  He reports that he recently lost his female  of 16 years and is grieving this.  He also laments his own health issues.  The patient is retired [employer].  He denies prior  "suicide attempts or gestures and denies current suicidal or homicidal ideation.  He reports occasional cannabis use.  He does report constant feelings of anxiety, and believes that he may have posttraumatic stress disorder related to several \"near-death\" experiences\".    PAST PSYCHIATRIC HISTORY: The patient has been on Remeron for 9 months but has no other history of psychiatric treatment field    PAST MEDICAL HISTORY: Significant for obesity, congestive heart failure, acute respiratory failure, atrial flutter, asthma, anemia, history of coronary artery disease, history of cardiac arrest, COPD, enlarged prostate, hypertension, hypokalemia, ICD in place, ischemic cardiomyopathy, orthopnea, pulmonary edema,     MEDICATIONS:   Current Facility-Administered Medications   Medication Dose Route Frequency Provider Last Rate Last Admin    acetaminophen (TYLENOL) tablet 650 mg  650 mg Oral Q4H PRN Tato Kerr MD   650 mg at 03/15/24 2215    Or    acetaminophen (TYLENOL) 160 MG/5ML oral solution 650 mg  650 mg Oral Q4H PRN Tato Kerr MD        Or    acetaminophen (TYLENOL) suppository 650 mg  650 mg Rectal Q4H PRN Tato Kerr MD        acetaminophen (TYLENOL) tablet 650 mg  650 mg Oral Daily Jimmy Marcum MD   650 mg at 03/16/24 1302    albuterol (PROVENTIL) nebulizer solution 0.083% 2.5 mg/3mL  2.5 mg Nebulization Q6H PRN Tato Kerr MD        sennosides-docusate (PERICOLACE) 8.6-50 MG per tablet 2 tablet  2 tablet Oral BID Tato Kerr MD        And    polyethylene glycol (MIRALAX) packet 17 g  17 g Oral Daily PRN Tato Kerr MD        And    bisacodyl (DULCOLAX) EC tablet 5 mg  5 mg Oral Daily PRN Tato Kerr MD        And    bisacodyl (DULCOLAX) suppository 10 mg  10 mg Rectal Daily PRN Tato Kerr MD        budesonide-formoterol (SYMBICORT) 160-4.5 MCG/ACT inhaler 2 puff  2 puff Inhalation BID Tato Kerr MD   2 puff at 03/16/24 0739 "    busPIRone (BUSPAR) tablet 10 mg  10 mg Oral TID Julio Cesar Neves III, MD        Calcium Replacement - Follow Nurse / BPA Driven Protocol   Does not apply PRN Tato Kerr MD        dextrose (D50W) (25 g/50 mL) IV injection 25 g  25 g Intravenous Q15 Min PRN Jimmy Marcum MD        dextrose (GLUTOSE) oral gel 15 g  15 g Oral Q15 Min PRN Jimmy Marcum MD        diphenhydrAMINE (BENADRYL) capsule 25 mg  25 mg Oral Daily Jimmy Marcum MD   25 mg at 03/16/24 1302    ferric gluconate (FERRLECIT)125 MG in sodium chloride 0.9 % 100 mL IVPB  125 mg Intravenous Daily Jimmy Marcum  mL/hr at 03/16/24 1308 125 mg at 03/16/24 1308    finasteride (PROSCAR) tablet 5 mg  5 mg Oral Daily Tato Kerr MD   5 mg at 03/16/24 0840    folic acid (FOLVITE) tablet 1 mg  1 mg Oral Daily Tato Kerr MD   1 mg at 03/16/24 0840    furosemide (LASIX) injection 20 mg  20 mg Intravenous BID Jimmy Marcum MD   20 mg at 03/16/24 1300    glucagon (GLUCAGEN) injection 1 mg  1 mg Intramuscular Q15 Min PRN Jimmy Marcum MD        hydrOXYzine pamoate (VISTARIL) capsule 50 mg  50 mg Oral Q4H PRN Julio Cesar Neves III, MD        insulin lispro (HUMALOG/ADMELOG) injection 2-7 Units  2-7 Units Subcutaneous 4x Daily AC & at Bedtime Jimmy Mracum MD   2 Units at 03/16/24 1259    ipratropium-albuterol (DUO-NEB) nebulizer solution 3 mL  3 mL Nebulization 4x Daily - RT Jimmy Marcum MD   3 mL at 03/16/24 1425    [Held by provider] lisinopril (PRINIVIL,ZESTRIL) tablet 40 mg  40 mg Oral Daily Tato Kerr MD        LORazepam (ATIVAN) tablet 1 mg  1 mg Oral Q1H PRN Tato Kerr MD        Or    LORazepam (ATIVAN) injection 1 mg  1 mg Intravenous Q1H PRN Tato Kerr MD        Or    LORazepam (ATIVAN) tablet 2 mg  2 mg Oral Q1H PRN Tato Kerr MD        Or    LORazepam (ATIVAN) injection 2 mg  2 mg Intravenous Q1H PRN Usha  Tato FITZPATRICK MD        Or    LORazepam (ATIVAN) injection 2 mg  2 mg Intravenous Q15 Min PRN Tato Kerr MD        Or    LORazepam (ATIVAN) injection 2 mg  2 mg Intramuscular Q15 Min PRTato Durán MD        Magnesium Standard Dose Replacement - Follow Nurse / BPA Driven Protocol   Does not apply Tato Pappas MD        metoprolol succinate XL (TOPROL-XL) 24 hr tablet 100 mg  100 mg Oral Q12H Tato Kerr MD   100 mg at 03/16/24 0840    mirtazapine (REMERON) tablet 30 mg  30 mg Oral Nightly Julio Cesar Neves III, MD        nitroglycerin (NITROSTAT) SL tablet 0.4 mg  0.4 mg Sublingual Q5 Min PRN Tato Kerr MD        ondansetron ODT (ZOFRAN-ODT) disintegrating tablet 4 mg  4 mg Oral Q6H PRN Tato Kerr MD        Or    ondansetron (ZOFRAN) injection 4 mg  4 mg Intravenous Q6H PRN Tato Kerr MD        pantoprazole (PROTONIX) injection 40 mg  40 mg Intravenous BID AC Jimmy Marcum MD   40 mg at 03/16/24 1304    Phosphorus Replacement - Follow Nurse / BPA Driven Protocol   Does not apply Tato Pappas MD        Potassium Replacement - Follow Nurse / BPA Driven Protocol   Does not apply Tato Pappas MD        sodium chloride 0.9 % flush 10 mL  10 mL Intravenous PRN Tato Kerr MD        sodium chloride 0.9 % flush 10 mL  10 mL Intravenous Q12H Tato Kerr MD   10 mL at 03/16/24 0842    sodium chloride 0.9 % flush 10 mL  10 mL Intravenous PRN Tato Kerr MD        sodium chloride 0.9 % infusion 40 mL  40 mL Intravenous PRN Tato Kerr MD        thiamine (B-1) injection 200 mg  200 mg Intravenous Q8H Tato Kerr MD   200 mg at 03/16/24 1557    Followed by    [START ON 3/21/2024] thiamine (VITAMIN B-1) tablet 100 mg  100 mg Oral Daily Tato Kerr MD             ALLERGIES: None    FAMILY HISTORY: Noncontributory    SOCIAL HISTORY: The patient is a retired .  He is a  former smoker and reports that he has no use of cannabis.    MENTAL STATUS EXAM: The patient is an obese white male appearing stated age.  He is in no apparent physical distress at the time of examination.  He is awake alert and oriented in all spheres.  His mood is euthymic his affect congruent.  Speech is generally relevant and coherent.  There are no gross deficits in memory or cognition noted.  Intelligence is judged to be in the average range based on fund of knowledge, the patient is currently off interview.  He is currently denying suicidal homicidal or psychotic features.  Judgement and insight are intact.    ASSETS/LIABILITIES: To be assessed    DIAGNOSTIC IMPRESSION: Major depressive disorder single episode moderate, posttraumatic stress disorder, medical problems as described previously    PLAN: I will increase the patient's mirtazapine dose to 30 mg nightly and will add BuSpar for anxiety as well as as needed Vistaril.  I note that the patient is also prescribed Benadryl but that this is part of his iron infusion protocol and will not discontinue that medication.  I will continue to follow and will ask the access center to assist the patient with arrangements for post discharge psychiatric treatment.    Electronically signed by Julio Cesar Neves III, MD at 03/16/24 3327

## 2024-03-18 NOTE — TELEPHONE ENCOUNTER
Please call this patient to set up a hospital f/u with paulette in 2-3 weeks for : rectal bleeding, iron deficiency anemia. Thank you.

## 2024-03-18 NOTE — PROGRESS NOTES
[facility]  Inpatient Progress Note    Reason for Follow Up:  Anemia, rectal bleeding    Subjective     Interval History:   He states he is doing okay.  Refuses EGD/colonoscopy. Understands he could have cancer that could be the cause of his symptoms and it would go undiagnosed w/o the procedures which can lead to death.   He states his rectal bleeding has been ongoing for a while and states is from hemorrhoids. No bleeding currently.       Current Facility-Administered Medications:     acetaminophen (TYLENOL) tablet 650 mg, 650 mg, Oral, Q4H PRN, 650 mg at 03/18/24 0645 **OR** acetaminophen (TYLENOL) 160 MG/5ML oral solution 650 mg, 650 mg, Oral, Q4H PRN **OR** acetaminophen (TYLENOL) suppository 650 mg, 650 mg, Rectal, Q4H PRN, Tato Kerr MD    albuterol (PROVENTIL) nebulizer solution 0.083% 2.5 mg/3mL, 2.5 mg, Nebulization, Q6H PRN, Tato Kerr MD    sennosides-docusate (PERICOLACE) 8.6-50 MG per tablet 2 tablet, 2 tablet, Oral, BID, 2 tablet at 03/17/24 0846 **AND** polyethylene glycol (MIRALAX) packet 17 g, 17 g, Oral, Daily PRN **AND** bisacodyl (DULCOLAX) EC tablet 5 mg, 5 mg, Oral, Daily PRN **AND** bisacodyl (DULCOLAX) suppository 10 mg, 10 mg, Rectal, Daily PRN, Tato Kerr MD    budesonide-formoterol (SYMBICORT) 160-4.5 MCG/ACT inhaler 2 puff, 2 puff, Inhalation, BID, Tato Kerr MD, 2 puff at 03/18/24 0646    busPIRone (BUSPAR) tablet 10 mg, 10 mg, Oral, TID, Julio Cesar Neves III, MD, 10 mg at 03/18/24 0849    Calcium Replacement - Follow Nurse / BPA Driven Protocol, , Does not apply, PRN, Tato Kerr MD    cefTRIAXone (ROCEPHIN) 2,000 mg in sodium chloride 0.9 % 100 mL MBP, 2,000 mg, Intravenous, Q24H, Jimmy Marcum MD, Last Rate: 200 mL/hr at 03/17/24 1734, 2,000 mg at 03/17/24 1734    dextrose (D50W) (25 g/50 mL) IV injection 25 g, 25 g, Intravenous, Q15 Min PRN, Jimmy Marcum MD    dextrose (GLUTOSE) oral  gel 15 g, 15 g, Oral, Q15 Min PRN, Jimmy Marcum MD    finasteride (PROSCAR) tablet 5 mg, 5 mg, Oral, Daily, Tato Kerr MD, 5 mg at 03/18/24 0849    folic acid (FOLVITE) tablet 1 mg, 1 mg, Oral, Daily, Tato Kerr MD, 1 mg at 03/18/24 0849    furosemide (LASIX) injection 40 mg, 40 mg, Intravenous, BID, Linda Grimaldo, APRN, 40 mg at 03/18/24 0849    glucagon (GLUCAGEN) injection 1 mg, 1 mg, Intramuscular, Q15 Min PRN, Jimmy Marcum MD    hydrOXYzine pamoate (VISTARIL) capsule 50 mg, 50 mg, Oral, Q4H PRN, Julio Cesar Neves III, MD, 50 mg at 03/16/24 1824    insulin lispro (HUMALOG/ADMELOG) injection 2-7 Units, 2-7 Units, Subcutaneous, 4x Daily AC & at Bedtime, Jimmy Marcum MD, 2 Units at 03/16/24 2210    ipratropium-albuterol (DUO-NEB) nebulizer solution 3 mL, 3 mL, Nebulization, 4x Daily - RT, Jimmy Marcum MD, 3 mL at 03/18/24 0646    lisinopril (PRINIVIL,ZESTRIL) tablet 40 mg, 40 mg, Oral, Daily, Chip Dolan MD, 40 mg at 03/18/24 0849    LORazepam (ATIVAN) tablet 1 mg, 1 mg, Oral, Q1H PRN **OR** LORazepam (ATIVAN) injection 1 mg, 1 mg, Intravenous, Q1H PRN **OR** LORazepam (ATIVAN) tablet 2 mg, 2 mg, Oral, Q1H PRN **OR** LORazepam (ATIVAN) injection 2 mg, 2 mg, Intravenous, Q1H PRN **OR** LORazepam (ATIVAN) injection 2 mg, 2 mg, Intravenous, Q15 Min PRN **OR** LORazepam (ATIVAN) injection 2 mg, 2 mg, Intramuscular, Q15 Min PRN, Tato eKrr MD    Magnesium Standard Dose Replacement - Follow Nurse / BPA Driven Protocol, , Does not apply, PRN, Tato Kerr MD    metoprolol succinate XL (TOPROL-XL) 24 hr tablet 100 mg, 100 mg, Oral, Q12H, Tato Kerr MD, 100 mg at 03/18/24 0849    mirtazapine (REMERON) tablet 30 mg, 30 mg, Oral, Nightly, Julio Cesar Neves III, MD, 30 mg at 03/17/24 2050    nitroglycerin (NITROSTAT) SL tablet 0.4 mg, 0.4 mg, Sublingual, Q5 Min PRN, Tato Kerr MD    ondansetron ODT (ZOFRAN-ODT)  disintegrating tablet 4 mg, 4 mg, Oral, Q6H PRN **OR** ondansetron (ZOFRAN) injection 4 mg, 4 mg, Intravenous, Q6H PRN, Tato Kerr MD    pantoprazole (PROTONIX) injection 40 mg, 40 mg, Intravenous, BID AC, Jimmy Marcum MD, 40 mg at 03/18/24 0644    Phosphorus Replacement - Follow Nurse / BPA Driven Protocol, , Does not apply, Usha RODRÍGUEZ Stephen J, MD    Potassium Replacement - Follow Nurse / BPA Driven Protocol, , Does not apply, Usha RODRÍGUEZ Stephen J, MD    [COMPLETED] Insert Peripheral IV, , , Once **AND** sodium chloride 0.9 % flush 10 mL, 10 mL, Intravenous, PRN, Tato Kerr MD    sodium chloride 0.9 % flush 10 mL, 10 mL, Intravenous, Q12H, Tato Kerr MD, 10 mL at 03/18/24 0850    sodium chloride 0.9 % flush 10 mL, 10 mL, Intravenous, PRN, Tato Kerr MD    sodium chloride 0.9 % infusion 40 mL, 40 mL, Intravenous, PRN, Tato Kerr MD    thiamine (B-1) injection 200 mg, 200 mg, Intravenous, Q8H, 200 mg at 03/18/24 0644 **FOLLOWED BY** [START ON 3/21/2024] thiamine (VITAMIN B-1) tablet 100 mg, 100 mg, Oral, Daily, Tato Kerr MD        Objective     Vital Signs  Temp:  [97.2 °F (36.2 °C)-99.4 °F (37.4 °C)] 98.6 °F (37 °C)  Heart Rate:  [103-130] 122  Resp:  [16-18] 16  BP: (128-169)/(80-95) 169/80  Body mass index is 39.92 kg/m².    Intake/Output Summary (Last 24 hours) at 3/18/2024 1132  Last data filed at 3/17/2024 2054  Gross per 24 hour   Intake 240 ml   Output --   Net 240 ml     No intake/output data recorded.     Physical Exam:   General: patient awake, alert and cooperative   Eyes: Normal lids and lashes, no scleral icterus   Abdomen: soft, nontender, nondistended; normal bowel sounds      Results Review:     I reviewed the patient's new clinical results.    Results from last 7 days   Lab Units 03/18/24  0326 03/17/24  1542 03/17/24  0754 03/17/24  0000 03/16/24  1151 03/16/24  0235   WBC 10*3/mm3 11.70*  --   --  14.68*  --  6.44    HEMOGLOBIN g/dL 11.3* 10.9* 10.5* 10.3*   < > 10.3*   HEMATOCRIT % 38.2 37.9 35.4* 36.0*   < > 35.3*   PLATELETS 10*3/mm3 330  --   --  312  --  287    < > = values in this interval not displayed.     Results from last 7 days   Lab Units 03/18/24  0326 03/17/24  0000 03/16/24  0235 03/15/24  1011   SODIUM mmol/L 141 139 139 141   POTASSIUM mmol/L 4.0 4.1 4.1 3.7   CHLORIDE mmol/L 100 101 99 104   CO2 mmol/L 30.7* 27.0 26.0 23.2   BUN mg/dL 21 26* 21 16   CREATININE mg/dL 0.95 0.92 1.08 1.01   CALCIUM mg/dL 9.1 9.2 9.6 9.4   BILIRUBIN mg/dL  --   --   --  0.5   ALK PHOS U/L  --   --   --  73   ALT (SGPT) U/L  --   --   --  30   AST (SGOT) U/L  --   --   --  37   GLUCOSE mg/dL 143* 152* 195* 119*         Lab Results   Lab Value Date/Time    LIPASE 20 03/08/2021 1108       Radiology:  XR Chest PA & Lateral   Final Result   No significant interval change when compared to March 15, 2024.       This report was finalized on 3/18/2024 12:51 AM by Dr. Judi Chow M.D on Workstation: [id number]          CT Angiogram Chest   Final Result       1. The patient has some scattered tree-in-bud infiltrates which are   noted within the right upper lobe and lingula. These are likely   infectious or inflammatory.   2. Prominent hilar lymph nodes may be reactive. Consider short-term CT   follow-up in 3 to 6 months, given advanced background emphysematous   changes.       Radiation dose reduction techniques were utilized, including automated   exposure control and exposure modulation based on body size.           This report was finalized on 3/16/2024 4:29 AM by Dr. Judi Chow M.D on Workstation: [id number]          XR Chest 1 View   Final Result   As described.       This report was finalized on 3/15/2024 11:33 AM by Dr. Librado Orantes M.D on Workstation: [id number]              Assessment & Plan     Active Hospital Problems    Diagnosis     **Acute CHF     Hypoxemia     Iron deficiency anemia     Diabetes  mellitus, type 2     Heart failure     Essential hypertension     ICD (implantable cardioverter-defibrillator) in place     Atrial flutter     COPD (chronic obstructive pulmonary disease)          Assessment:   Iron deficiency anemia  Rectal bleeding  Acute on chronic CHF  Acute respiratory failure  Hx of atrial fib- inconsistent use of Eliquis    All problems new to me   Plan:   Given rectal bleeding, ARUN, and no prior endoscopic procedures in the back, recommended EGD/colonoscopy, however patient has declined at this time. He is aware he could have GI malignancy (like colon cancer) that could be cause of his rectal bleeding and anemia. He understands the risks/benefits of the procedure, but wants to hold off for now.  He is agreeable to see us in office in a few weeks. He will reconsider at the time if he wants to proceed w/ endoscopic eval.   Continue to monitor for GI bleed. Trend H/H  GI to s/o at this time. We will see pt in office in a few weeks. Please call us with any questions or concerns.     I discussed the patients findings and my recommendations with patient.         Alla Chakraborty PA-C  [facility]  [address] [place] [address]  Office: [phone]

## 2024-03-18 NOTE — NURSING NOTE
"Diabetes Education  Assessment/Teaching    Patient Name:  Sebastien Tang  YOB: 1960  MRN: [id number]  Admit Date:  3/15/2024      Assessment Date:  3/18/2024  Flowsheet Row Most Recent Value   General Information     Referral From: MD order. Meet with pt at bedside on 4S to assess needs for DM ed. Introduce self to pt. Pt denies this is a new dx.    Height 165.1 cm (65\")   Height Method Estimated   Weight 109 kg (239 lb 14.4 oz)   Weight Method Standing scale   Diabetes History    What type of diabetes do you have? Type 2 A1c now 6.6%.   Length of Diabetes Diagnosis --[-pt describes being told in the past by PCP he has DM.] Pt did not answer as to when.    Education Preferences    Barriers to Learning -- emotional. pt c/o recent instances of lack of communication.   Assessment Topics    Healthy Eating - Assessment -- Pt declines RD visit stating he has  experience and training.    Taking Medication - Assessment N/A -do not anticipate pt to dc home w/DM meds. Currently no scheduled insulin.    Reducing Risk - Assessment Needs education   Healthy Coping - Assessment Competent   Monitoring - Assessment -- Pt reports he knows how to check BG. Declines (free sample) BG meter at this time.   DM Goals    Contact Plan Follow-up medical care with PCP.            Flowsheet Row Most Recent Value   DM Education Needs    Reducing Risks A1C testing   Healthy Coping Appropriate   Discharge Plan Home, Follow-up with PCP   Teaching Method Discussion. Pt declines printed ed materials.    Patient Response Verbalized understanding        Other Comments:  Pt denies educational needs at this time. Thank you.   Electronically signed by:  Angelina Palm RN, BSN, [facility] [place]  03/18/24 14:47 EDT  "

## 2024-03-18 NOTE — PROGRESS NOTES
"    Patient Name: Sebastien Tang  :1960  64 y.o.      Patient Care Team:  Yuliana Flynn DO as PCP - General (Family Medicine)    Chief Complaint: follow up heart failure, atrial fibrillation    Interval History: subjectively had good UOP though not recorded. 's. Feels better. He declined EGD/colonoscopy.       Objective   Vital Signs  Temp:  [97.2 °F (36.2 °C)-99.4 °F (37.4 °C)] 98.6 °F (37 °C)  Heart Rate:  [103-130] 122  Resp:  [16-18] 16  BP: (128-169)/(80-95) 169/80    Intake/Output Summary (Last 24 hours) at 3/18/2024 1243  Last data filed at 3/17/2024 2054  Gross per 24 hour   Intake 240 ml   Output --   Net 240 ml     Flowsheet Rows      Flowsheet Row First Filed Value   Admission Height 167.6 cm (65.98\") Documented at 03/15/2024 1026   Admission Weight 110 kg (242 lb 8.1 oz) Documented at 03/15/2024 1026            Physical Exam:   General Appearance:    Alert, cooperative, in no acute distress   Lungs:     Clear to auscultation.  Normal respiratory effort and rate.      Heart:    irregular rhythm and normal rate, normal S1 and S2, no murmurs, gallops or rubs.     Chest Wall:    No abnormalities observed   Abdomen:     Soft, nontender, positive bowel sounds.     Extremities:   no cyanosis, clubbing or edema.  No marked joint deformities.  Adequate musculoskeletal strength.       Results Review:    Results from last 7 days   Lab Units 24  0326   SODIUM mmol/L 141   POTASSIUM mmol/L 4.0   CHLORIDE mmol/L 100   CO2 mmol/L 30.7*   BUN mg/dL 21   CREATININE mg/dL 0.95   GLUCOSE mg/dL 143*   CALCIUM mg/dL 9.1     Results from last 7 days   Lab Units 03/15/24  1231 03/15/24  1011   HSTROP T ng/L 33* 34*     Results from last 7 days   Lab Units 24  0326   WBC 10*3/mm3 11.70*   HEMOGLOBIN g/dL 11.3*   HEMATOCRIT % 38.2   PLATELETS 10*3/mm3 330         Results from last 7 days   Lab Units 24  0326   MAGNESIUM mg/dL 2.5*                   Medication Review:   budesonide-formoterol, 2 " puff, Inhalation, BID  busPIRone, 10 mg, Oral, TID  cefTRIAXone, 2,000 mg, Intravenous, Q24H  finasteride, 5 mg, Oral, Daily  folic acid, 1 mg, Oral, Daily  furosemide, 40 mg, Intravenous, BID  insulin lispro, 2-7 Units, Subcutaneous, 4x Daily AC & at Bedtime  ipratropium-albuterol, 3 mL, Nebulization, 4x Daily - RT  lisinopril, 40 mg, Oral, Daily  metoprolol succinate XL, 100 mg, Oral, Q12H  mirtazapine, 30 mg, Oral, Nightly  pantoprazole, 40 mg, Intravenous, BID AC  senna-docusate sodium, 2 tablet, Oral, BID  sodium chloride, 10 mL, Intravenous, Q12H  thiamine (B-1) IV, 200 mg, Intravenous, Q8H   Followed by  [START ON 3/21/2024] thiamine, 100 mg, Oral, Daily              Assessment & Plan   Acute on chronic combined CHF   Acute hypoxic respiratory failure   Ischemic cardiomyopathy EF 45% by echo 3/16/2024  History of ventricular fibrillation and cardiac arrest April 2021 status post biotronik single chamber ICD   Paroxysmal atrial fibrillation - persistent since here. Has not had much AF device interrogations.   Chronic anticoagulation with rivaroxaban - though he reports inconsistent use.   Coronary artery disease with proximal  of the RCA and  of the OM2. Attempted at percutaneous intervention were unsuccessful.   Daily alcohol use  Bright red blood per rectum - he declined EGD and colonoscopy  COPD without acute exacerbation  Morbid obesity  Diabetes mellitus type II   Hypertension  Dilated aortic room measuring 4 cm.   Typical atrial flutter - rate elevated. He has been fully loaded with digoxin. Will start oral dig. Not a candidate for cardioversion - he would need a LILA and even then -- he has had rectal bleeding and declines endoscopy. Would be hesitant to cardiovert then have additional bleeding problems requiring AC to be held.     Continue IV diuretics another day - hopefully oral diuretics soon.   Add oral digoxin for rate control. 's consistently today.   GDMT with lisinopril and  metoprolol succinate. Add spironolactone.     ERIC Velasquez  [facility]  03/18/24  12:43 EDT

## 2024-03-19 ENCOUNTER — TELEPHONE (OUTPATIENT)
Dept: GASTROENTEROLOGY | Facility: CLINIC | Age: 64
End: 2024-03-19
Payer: MEDICARE

## 2024-03-19 LAB
ANION GAP SERPL CALCULATED.3IONS-SCNC: 10.1 MMOL/L (ref 5–15)
BUN SERPL-MCNC: 18 MG/DL (ref 8–23)
BUN/CREAT SERPL: 20.2 (ref 7–25)
CALCIUM SPEC-SCNC: 9.6 MG/DL (ref 8.6–10.5)
CHLORIDE SERPL-SCNC: 102 MMOL/L (ref 98–107)
CO2 SERPL-SCNC: 28.9 MMOL/L (ref 22–29)
CREAT SERPL-MCNC: 0.89 MG/DL (ref 0.76–1.27)
DEPRECATED RDW RBC AUTO: 41.9 FL (ref 37–54)
EGFRCR SERPLBLD CKD-EPI 2021: 94.5 ML/MIN/1.73
ERYTHROCYTE [DISTWIDTH] IN BLOOD BY AUTOMATED COUNT: 15.7 % (ref 12.3–15.4)
GLUCOSE BLDC GLUCOMTR-MCNC: 122 MG/DL (ref 70–130)
GLUCOSE BLDC GLUCOMTR-MCNC: 123 MG/DL (ref 70–130)
GLUCOSE BLDC GLUCOMTR-MCNC: 126 MG/DL (ref 70–130)
GLUCOSE BLDC GLUCOMTR-MCNC: 178 MG/DL (ref 70–130)
GLUCOSE SERPL-MCNC: 104 MG/DL (ref 65–99)
HCT VFR BLD AUTO: 39.8 % (ref 37.5–51)
HGB BLD-MCNC: 11.5 G/DL (ref 13–17.7)
MAGNESIUM SERPL-MCNC: 2.4 MG/DL (ref 1.6–2.4)
MCH RBC QN AUTO: 22.2 PG (ref 26.6–33)
MCHC RBC AUTO-ENTMCNC: 28.9 G/DL (ref 31.5–35.7)
MCV RBC AUTO: 77 FL (ref 79–97)
PHOSPHATE SERPL-MCNC: 4.4 MG/DL (ref 2.5–4.5)
PLATELET # BLD AUTO: 354 10*3/MM3 (ref 140–450)
PMV BLD AUTO: 9.4 FL (ref 6–12)
POTASSIUM SERPL-SCNC: 4.3 MMOL/L (ref 3.5–5.2)
RBC # BLD AUTO: 5.17 10*6/MM3 (ref 4.14–5.8)
SODIUM SERPL-SCNC: 141 MMOL/L (ref 136–145)
WBC NRBC COR # BLD AUTO: 8.48 10*3/MM3 (ref 3.4–10.8)

## 2024-03-19 PROCEDURE — 94799 UNLISTED PULMONARY SVC/PX: CPT

## 2024-03-19 PROCEDURE — 25010000002 FUROSEMIDE PER 20 MG: Performed by: NURSE PRACTITIONER

## 2024-03-19 PROCEDURE — 85027 COMPLETE CBC AUTOMATED: CPT | Performed by: STUDENT IN AN ORGANIZED HEALTH CARE EDUCATION/TRAINING PROGRAM

## 2024-03-19 PROCEDURE — 80048 BASIC METABOLIC PNL TOTAL CA: CPT | Performed by: STUDENT IN AN ORGANIZED HEALTH CARE EDUCATION/TRAINING PROGRAM

## 2024-03-19 PROCEDURE — 84100 ASSAY OF PHOSPHORUS: CPT | Performed by: STUDENT IN AN ORGANIZED HEALTH CARE EDUCATION/TRAINING PROGRAM

## 2024-03-19 PROCEDURE — 94761 N-INVAS EAR/PLS OXIMETRY MLT: CPT

## 2024-03-19 PROCEDURE — 82948 REAGENT STRIP/BLOOD GLUCOSE: CPT

## 2024-03-19 PROCEDURE — 83735 ASSAY OF MAGNESIUM: CPT | Performed by: STUDENT IN AN ORGANIZED HEALTH CARE EDUCATION/TRAINING PROGRAM

## 2024-03-19 PROCEDURE — 25010000002 CEFTRIAXONE PER 250 MG: Performed by: STUDENT IN AN ORGANIZED HEALTH CARE EDUCATION/TRAINING PROGRAM

## 2024-03-19 PROCEDURE — 94664 DEMO&/EVAL PT USE INHALER: CPT

## 2024-03-19 PROCEDURE — 63710000001 INSULIN LISPRO (HUMAN) PER 5 UNITS: Performed by: STUDENT IN AN ORGANIZED HEALTH CARE EDUCATION/TRAINING PROGRAM

## 2024-03-19 PROCEDURE — 25010000002 THIAMINE HCL 200 MG/2ML SOLUTION: Performed by: HOSPITALIST

## 2024-03-19 RX ADMIN — DIGOXIN 125 MCG: 125 TABLET ORAL at 12:48

## 2024-03-19 RX ADMIN — HYDROXYZINE PAMOATE 50 MG: 50 CAPSULE ORAL at 21:41

## 2024-03-19 RX ADMIN — BUDESONIDE AND FORMOTEROL FUMARATE DIHYDRATE 2 PUFF: 160; 4.5 AEROSOL RESPIRATORY (INHALATION) at 07:18

## 2024-03-19 RX ADMIN — FINASTERIDE 5 MG: 5 TABLET, FILM COATED ORAL at 08:16

## 2024-03-19 RX ADMIN — BUDESONIDE AND FORMOTEROL FUMARATE DIHYDRATE 2 PUFF: 160; 4.5 AEROSOL RESPIRATORY (INHALATION) at 20:23

## 2024-03-19 RX ADMIN — THIAMINE HYDROCHLORIDE 200 MG: 100 INJECTION, SOLUTION INTRAMUSCULAR; INTRAVENOUS at 20:43

## 2024-03-19 RX ADMIN — IPRATROPIUM BROMIDE AND ALBUTEROL SULFATE 3 ML: 2.5; .5 SOLUTION RESPIRATORY (INHALATION) at 15:49

## 2024-03-19 RX ADMIN — IPRATROPIUM BROMIDE AND ALBUTEROL SULFATE 3 ML: 2.5; .5 SOLUTION RESPIRATORY (INHALATION) at 07:16

## 2024-03-19 RX ADMIN — METOPROLOL SUCCINATE 100 MG: 100 TABLET, EXTENDED RELEASE ORAL at 08:16

## 2024-03-19 RX ADMIN — BUSPIRONE HYDROCHLORIDE 10 MG: 10 TABLET ORAL at 17:41

## 2024-03-19 RX ADMIN — Medication 10 ML: at 08:17

## 2024-03-19 RX ADMIN — SPIRONOLACTONE 25 MG: 25 TABLET ORAL at 08:16

## 2024-03-19 RX ADMIN — THIAMINE HYDROCHLORIDE 200 MG: 100 INJECTION, SOLUTION INTRAMUSCULAR; INTRAVENOUS at 06:23

## 2024-03-19 RX ADMIN — PANTOPRAZOLE SODIUM 40 MG: 40 TABLET, DELAYED RELEASE ORAL at 06:23

## 2024-03-19 RX ADMIN — Medication 10 ML: at 20:43

## 2024-03-19 RX ADMIN — LISINOPRIL 40 MG: 40 TABLET ORAL at 08:16

## 2024-03-19 RX ADMIN — FOLIC ACID 1 MG: 1 TABLET ORAL at 08:16

## 2024-03-19 RX ADMIN — METOPROLOL SUCCINATE 100 MG: 100 TABLET, EXTENDED RELEASE ORAL at 20:42

## 2024-03-19 RX ADMIN — IPRATROPIUM BROMIDE AND ALBUTEROL SULFATE 3 ML: 2.5; .5 SOLUTION RESPIRATORY (INHALATION) at 19:53

## 2024-03-19 RX ADMIN — HYDROXYZINE PAMOATE 50 MG: 50 CAPSULE ORAL at 08:22

## 2024-03-19 RX ADMIN — ACETAMINOPHEN 325MG 650 MG: 325 TABLET ORAL at 08:16

## 2024-03-19 RX ADMIN — ASPIRIN 81 MG: 81 TABLET, COATED ORAL at 08:16

## 2024-03-19 RX ADMIN — BUSPIRONE HYDROCHLORIDE 10 MG: 10 TABLET ORAL at 20:42

## 2024-03-19 RX ADMIN — FUROSEMIDE 40 MG: 10 INJECTION, SOLUTION INTRAMUSCULAR; INTRAVENOUS at 08:16

## 2024-03-19 RX ADMIN — MIRTAZAPINE 30 MG: 30 TABLET, FILM COATED ORAL at 20:42

## 2024-03-19 RX ADMIN — FUROSEMIDE 40 MG: 10 INJECTION, SOLUTION INTRAMUSCULAR; INTRAVENOUS at 17:41

## 2024-03-19 RX ADMIN — INSULIN LISPRO 2 UNITS: 100 INJECTION, SOLUTION INTRAVENOUS; SUBCUTANEOUS at 17:40

## 2024-03-19 RX ADMIN — BUSPIRONE HYDROCHLORIDE 10 MG: 10 TABLET ORAL at 08:16

## 2024-03-19 RX ADMIN — CEFTRIAXONE 2000 MG: 2 INJECTION, POWDER, FOR SOLUTION INTRAMUSCULAR; INTRAVENOUS at 17:41

## 2024-03-19 RX ADMIN — IPRATROPIUM BROMIDE AND ALBUTEROL SULFATE 3 ML: 2.5; .5 SOLUTION RESPIRATORY (INHALATION) at 11:56

## 2024-03-19 RX ADMIN — THIAMINE HYDROCHLORIDE 200 MG: 100 INJECTION, SOLUTION INTRAMUSCULAR; INTRAVENOUS at 14:32

## 2024-03-19 NOTE — PROGRESS NOTES
Access did follow up with pt. Pt pleasant and watching tv. Pt states he received more medical updates today and feels it has helped clarify things. Pt states he had a good sleep last night. Pt anxious about the medical bills he will get from his stay. Pt has necessary outpt resources. Access will continue to follow.

## 2024-03-19 NOTE — PROGRESS NOTES
Name: Sebastien Tang ADMIT: 3/15/2024   : 1960  PCP: Yuliana Flynn DO    MRN: [id number] LOS: 3 days   AGE/SEX: 64 y.o. male  ROOM: [facility]     Subjective   Subjective   Feeling better each day. Denies any MARTIN or SOA at rest. No CP or palp. No orthopnea. Voiding well. Tolerating diet. No cough. No F/C but does report episodes of sweating.       Objective   Objective   Vital Signs  Temp:  [97.5 °F (36.4 °C)-98.6 °F (37 °C)] 97.7 °F (36.5 °C)  Heart Rate:  [] 115  Resp:  [16-20] 20  BP: (108-138)/(64-96) 108/64  SpO2:  [91 %-100 %] 100 %  on  Flow (L/min):  [1-2] 2;   Device (Oxygen Therapy): room air  Body mass index is 40.39 kg/m².  Physical Exam  Vitals and nursing note reviewed.   Constitutional:       General: He is not in acute distress.     Appearance: He is not ill-appearing, toxic-appearing or diaphoretic.   HENT:      Head: Normocephalic.      Nose: Nose normal.      Mouth/Throat:      Mouth: Mucous membranes are moist.      Pharynx: Oropharynx is clear.   Eyes:      General: No scleral icterus.        Right eye: No discharge.         Left eye: No discharge.      Conjunctiva/sclera: Conjunctivae normal.   Cardiovascular:      Rate and Rhythm: Tachycardia present. Rhythm irregular.      Pulses: Normal pulses.   Pulmonary:      Effort: Pulmonary effort is normal. No respiratory distress.      Breath sounds: Normal breath sounds. No wheezing or rales.      Comments: Anteriorly   Abdominal:      General: Bowel sounds are normal. There is no distension.      Palpations: Abdomen is soft.      Tenderness: There is no abdominal tenderness.   Musculoskeletal:         General: Swelling (1+ in BLEs) present. Normal range of motion.      Cervical back: Neck supple.   Skin:     General: Skin is warm and dry.      Capillary Refill: Capillary refill takes less than 2 seconds.      Coloration: Skin is not jaundiced.   Neurological:      General: No focal deficit present.      Mental Status: He is alert  and oriented to person, place, and time. Mental status is at baseline.      Cranial Nerves: No cranial nerve deficit.      Coordination: Coordination normal.   Psychiatric:         Mood and Affect: Mood normal.         Behavior: Behavior normal.         Thought Content: Thought content normal.       Results Review     I reviewed the patient's new clinical results.  Results from last 7 days   Lab Units 03/19/24  0539 03/18/24  0326 03/17/24  1542 03/17/24  0754 03/17/24  0000 03/16/24  1151 03/16/24  0235   WBC 10*3/mm3 8.48 11.70*  --   --  14.68*  --  6.44   HEMOGLOBIN g/dL 11.5* 11.3* 10.9* 10.5* 10.3*   < > 10.3*   PLATELETS 10*3/mm3 354 330  --   --  312  --  287    < > = values in this interval not displayed.     Results from last 7 days   Lab Units 03/19/24  0539 03/18/24  0326 03/17/24  0000 03/16/24  0235   SODIUM mmol/L 141 141 139 139   POTASSIUM mmol/L 4.3 4.0 4.1 4.1   CHLORIDE mmol/L 102 100 101 99   CO2 mmol/L 28.9 30.7* 27.0 26.0   BUN mg/dL 18 21 26* 21   CREATININE mg/dL 0.89 0.95 0.92 1.08   GLUCOSE mg/dL 104* 143* 152* 195*   EGFR mL/min/1.73 94.5 88.3 91.7 75.7     Results from last 7 days   Lab Units 03/15/24  1011   ALBUMIN g/dL 3.9   BILIRUBIN mg/dL 0.5   ALK PHOS U/L 73   AST (SGOT) U/L 37   ALT (SGPT) U/L 30     Results from last 7 days   Lab Units 03/19/24  0539 03/18/24  0326 03/17/24  0000 03/16/24  0235 03/15/24  1011   CALCIUM mg/dL 9.6 9.1 9.2 9.6 9.4   ALBUMIN g/dL  --   --   --   --  3.9   MAGNESIUM mg/dL 2.4 2.5* 2.6* 2.0 2.2   PHOSPHORUS mg/dL 4.4 3.7 3.1 3.3  --      Results from last 7 days   Lab Units 03/17/24  0000 03/15/24  1011   PROCALCITONIN ng/mL 0.08 0.11     Glucose   Date/Time Value Ref Range Status   03/19/2024 1139 123 70 - 130 mg/dL Final   03/19/2024 0638 126 70 - 130 mg/dL Final   03/18/2024 2125 171 (H) 70 - 130 mg/dL Final   03/18/2024 1635 156 (H) 70 - 130 mg/dL Final   03/18/2024 1138 162 (H) 70 - 130 mg/dL Final   03/18/2024 0605 103 70 - 130 mg/dL Final    03/17/2024 2042 104 70 - 130 mg/dL Final       XR Chest PA & Lateral    Result Date: 3/18/2024  No significant interval change when compared to March 15, 2024.  This report was finalized on 3/18/2024 12:51 AM by Dr. Judi Chow M.D on Workstation: [id number]       I have personally reviewed all medications:  Scheduled Medications  aspirin, 81 mg, Oral, Daily  budesonide-formoterol, 2 puff, Inhalation, BID  busPIRone, 10 mg, Oral, TID  cefTRIAXone, 2,000 mg, Intravenous, Q24H  digoxin, 125 mcg, Oral, Daily  finasteride, 5 mg, Oral, Daily  folic acid, 1 mg, Oral, Daily  furosemide, 40 mg, Intravenous, BID  insulin lispro, 2-7 Units, Subcutaneous, 4x Daily AC & at Bedtime  ipratropium-albuterol, 3 mL, Nebulization, 4x Daily - RT  lisinopril, 40 mg, Oral, Daily  metoprolol succinate XL, 100 mg, Oral, Q12H  mirtazapine, 30 mg, Oral, Nightly  pantoprazole, 40 mg, Oral, Q AM  senna-docusate sodium, 2 tablet, Oral, BID  sodium chloride, 10 mL, Intravenous, Q12H  spironolactone, 25 mg, Oral, Daily  thiamine (B-1) IV, 200 mg, Intravenous, Q8H   Followed by  [START ON 3/21/2024] thiamine, 100 mg, Oral, Daily    Infusions   Diet  Diet: Cardiac, Diabetic; Healthy Heart (2-3 Na+); Consistent Carbohydrate; Fluid Consistency: Thin (IDDSI 0)    I have personally reviewed:  [x]  Laboratory   [x]  Microbiology   [x]  Radiology   [x]  EKG/Telemetry  [x]  Cardiology/Vascular   []  Pathology    [x]  Records       Assessment/Plan     Active Hospital Problems    Diagnosis  POA    **Acute CHF [I50.9]  Yes    Hypoxemia [R09.02]  Unknown    Iron deficiency anemia [D50.9]  Unknown    Diabetes mellitus, type 2 [E11.9]  Unknown    Heart failure [I50.9]  Yes    Essential hypertension [I10]  Yes    ICD (implantable cardioverter-defibrillator) in place [Z95.810]  Yes    Atrial flutter [I48.92]  Yes    COPD (chronic obstructive pulmonary disease) [J44.9]  Yes      Resolved Hospital Problems   No resolved problems to display.      64-year-old gentleman with a history of chronic systolic heart failure, COPD, previous VFib arrest with AICD, PAF on anticoagulation, hypertension, obesity, hyperlipidemia, and poor medical compliance, who presented with increasing shortness of breath and was found to have decompensated heart failure and pulmonary edema.     Acute on chronic systolic CHF  -Echo in April 2021 showed EF of 48%.  -Continue IV Lasix and PO Aldactone  -Repeat Echo 3/16 showed EF of 45%  -There is akinesis of the basal to mid inferior wall.   -Card following     Typical atrial flutter with RVR  Paroxysmal atrial fibrillation  History of V-fib cardiac arrest 2021  AICD  -Continue metoprolol and Digoxin   -holding AC secondary to iron deficiency anemia/GI bleed (of note was not taking Eliquis consistently prior to admission as was not able to afford)  -HRs still high  -Card following     Pneumonia  Leukocytosis  -WBC trended up to 14.68 on 3/17 from 6.44   -Initial chest x-ray showed edema vs possible pneumonia  -Repeat chest x-ray 3/17 showed lateral alveolar and interstitial  infiltrates again  -Initiated IV ceftriaxone for pneumonia on 3/17  -WBC normalized, afebrile, RVP neg, blood cultures not sent for some reason     CAD  -Continue statin, ASA, lisinopril, and metoprolol  -Monitor for signs of recurrent bleeding on ASA     Iron deficiency anemia  Rectal bleeding  -Patient reported intermittent bright red blood per rectum with bowel movements  -Iron panel consistent with significant iron deficiency with iron saturation of 4 and ferritin of 13.6  -S/p 3 doses IV iron  -GI evaluated, recommended EGD and colonoscopy however patient declined   -On IV PPI initially, now transitioned to PO Protonix daily  -Hgb stable/improved  -Aspirin resumed 3/18  -monitor for recurrent signs of bleeding     Essential hypertension  -BPs acceptable on current regimen     COPD  Hypoxemia  -Continue Symbicort and scheduled DuoNebs  -Incentive  spirometer  -Weaning O2 as able  -O2 saturation goal 88% and above     Type II DM  -A1c 6.6, previously was 6.2  -Continue SSI, requiring very little insulin here--suspect would do well with OHG  -Continue diabetic diet  -Diabetic Educator has met with pt     Alcohol use disorder  -CIWA protocol in place  -Continue vitamin replacement  -CIWA scores remain low     Major depressive disorder  -Psychiatry following  -Mirtazapine increased to 30 mg nightly  -Buspar added  -PRN Atarax added     Prominent hilar lymph nodes seen on CT  -Follow-up short-term CT in 3 to 6 months given advanced background emphysematous changes  -Discussed with patient     Aortic root dilation.   -CT scan showed there is dilatation of the aortic root measuring up to 4 cm.   -Will need outpatient surveillance        SCDs for DVT prophylaxis.  Full code confirmed.  Discussed with patient.  Discussed with CCP and RN at multidisciplinary rounds  Disposition TBD        Abdoulaye Gómez MD  [facility]  03/19/24  14:41 EDT

## 2024-03-19 NOTE — PLAN OF CARE
Goal Outcome Evaluation:  Plan of Care Reviewed With: patient        Progress: no change  Outcome Evaluation: weaned to RA although only sating between 88-92, hr elevated when ambulating into 120s-130s, resting is 90s-100s, no other significant changes, iv lasix, iv abx, vss, will continue to monitor

## 2024-03-19 NOTE — PLAN OF CARE
Goal Outcome Evaluation:  Plan of Care Reviewed With: patient        Progress: no change  Outcome Evaluation: vss. 1-2L NC. atrial flutter and nsr. pt hr when up reaches 130s. HR when asleep in 90s.

## 2024-03-19 NOTE — TELEPHONE ENCOUNTER
VIA PROVIDER   Please call this patient to set up a hospital f/u with paulette in 2-3 weeks for : rectal bleeding, iron deficiency anemia. Thank you.      OK FOR THE HUB TO READ

## 2024-03-20 LAB
ALBUMIN SERPL-MCNC: 4.1 G/DL (ref 3.5–5.2)
ALP SERPL-CCNC: 70 U/L (ref 39–117)
ALT SERPL W P-5'-P-CCNC: 59 U/L (ref 1–41)
ANION GAP SERPL CALCULATED.3IONS-SCNC: 11 MMOL/L (ref 5–15)
AST SERPL-CCNC: 48 U/L (ref 1–40)
BILIRUB CONJ SERPL-MCNC: <0.2 MG/DL (ref 0–0.3)
BILIRUB INDIRECT SERPL-MCNC: ABNORMAL MG/DL
BILIRUB SERPL-MCNC: 0.3 MG/DL (ref 0–1.2)
BUN SERPL-MCNC: 21 MG/DL (ref 8–23)
BUN/CREAT SERPL: 17.6 (ref 7–25)
CALCIUM SPEC-SCNC: 9.5 MG/DL (ref 8.6–10.5)
CHLORIDE SERPL-SCNC: 99 MMOL/L (ref 98–107)
CO2 SERPL-SCNC: 32 MMOL/L (ref 22–29)
CREAT SERPL-MCNC: 1.19 MG/DL (ref 0.76–1.27)
DEPRECATED RDW RBC AUTO: 45.6 FL (ref 37–54)
EGFRCR SERPLBLD CKD-EPI 2021: 67.4 ML/MIN/1.73
ERYTHROCYTE [DISTWIDTH] IN BLOOD BY AUTOMATED COUNT: 16.6 % (ref 12.3–15.4)
GLUCOSE BLDC GLUCOMTR-MCNC: 101 MG/DL (ref 70–130)
GLUCOSE BLDC GLUCOMTR-MCNC: 114 MG/DL (ref 70–130)
GLUCOSE BLDC GLUCOMTR-MCNC: 120 MG/DL (ref 70–130)
GLUCOSE BLDC GLUCOMTR-MCNC: 161 MG/DL (ref 70–130)
GLUCOSE SERPL-MCNC: 101 MG/DL (ref 65–99)
HCT VFR BLD AUTO: 40 % (ref 37.5–51)
HGB BLD-MCNC: 11.8 G/DL (ref 13–17.7)
MAGNESIUM SERPL-MCNC: 2.1 MG/DL (ref 1.6–2.4)
MCH RBC QN AUTO: 23.4 PG (ref 26.6–33)
MCHC RBC AUTO-ENTMCNC: 29.5 G/DL (ref 31.5–35.7)
MCV RBC AUTO: 79.2 FL (ref 79–97)
PHOSPHATE SERPL-MCNC: 4.6 MG/DL (ref 2.5–4.5)
PLATELET # BLD AUTO: 338 10*3/MM3 (ref 140–450)
PMV BLD AUTO: 9.4 FL (ref 6–12)
POTASSIUM SERPL-SCNC: 4 MMOL/L (ref 3.5–5.2)
PROT SERPL-MCNC: 6.5 G/DL (ref 6–8.5)
RBC # BLD AUTO: 5.05 10*6/MM3 (ref 4.14–5.8)
SODIUM SERPL-SCNC: 142 MMOL/L (ref 136–145)
WBC NRBC COR # BLD AUTO: 10.28 10*3/MM3 (ref 3.4–10.8)

## 2024-03-20 PROCEDURE — 25010000002 CEFTRIAXONE PER 250 MG: Performed by: STUDENT IN AN ORGANIZED HEALTH CARE EDUCATION/TRAINING PROGRAM

## 2024-03-20 PROCEDURE — 80048 BASIC METABOLIC PNL TOTAL CA: CPT | Performed by: STUDENT IN AN ORGANIZED HEALTH CARE EDUCATION/TRAINING PROGRAM

## 2024-03-20 PROCEDURE — 84100 ASSAY OF PHOSPHORUS: CPT | Performed by: STUDENT IN AN ORGANIZED HEALTH CARE EDUCATION/TRAINING PROGRAM

## 2024-03-20 PROCEDURE — 94799 UNLISTED PULMONARY SVC/PX: CPT

## 2024-03-20 PROCEDURE — 80076 HEPATIC FUNCTION PANEL: CPT | Performed by: HOSPITALIST

## 2024-03-20 PROCEDURE — 63710000001 INSULIN LISPRO (HUMAN) PER 5 UNITS: Performed by: STUDENT IN AN ORGANIZED HEALTH CARE EDUCATION/TRAINING PROGRAM

## 2024-03-20 PROCEDURE — 94761 N-INVAS EAR/PLS OXIMETRY MLT: CPT

## 2024-03-20 PROCEDURE — 83735 ASSAY OF MAGNESIUM: CPT | Performed by: STUDENT IN AN ORGANIZED HEALTH CARE EDUCATION/TRAINING PROGRAM

## 2024-03-20 PROCEDURE — 25010000002 ENOXAPARIN PER 10 MG: Performed by: INTERNAL MEDICINE

## 2024-03-20 PROCEDURE — 25010000002 THIAMINE HCL 200 MG/2ML SOLUTION: Performed by: HOSPITALIST

## 2024-03-20 PROCEDURE — 25010000002 FUROSEMIDE PER 20 MG: Performed by: NURSE PRACTITIONER

## 2024-03-20 PROCEDURE — 99232 SBSQ HOSP IP/OBS MODERATE 35: CPT | Performed by: INTERNAL MEDICINE

## 2024-03-20 PROCEDURE — 85027 COMPLETE CBC AUTOMATED: CPT | Performed by: STUDENT IN AN ORGANIZED HEALTH CARE EDUCATION/TRAINING PROGRAM

## 2024-03-20 PROCEDURE — 82948 REAGENT STRIP/BLOOD GLUCOSE: CPT

## 2024-03-20 PROCEDURE — 94664 DEMO&/EVAL PT USE INHALER: CPT

## 2024-03-20 RX ORDER — FUROSEMIDE 80 MG
80 TABLET ORAL
Status: DISCONTINUED | OUTPATIENT
Start: 2024-03-20 | End: 2024-03-22

## 2024-03-20 RX ORDER — ENOXAPARIN SODIUM 150 MG/ML
1 INJECTION SUBCUTANEOUS EVERY 12 HOURS
Status: DISCONTINUED | OUTPATIENT
Start: 2024-03-20 | End: 2024-03-22

## 2024-03-20 RX ORDER — ATENOLOL 50 MG/1
50 TABLET ORAL 2 TIMES DAILY
Status: DISCONTINUED | OUTPATIENT
Start: 2024-03-20 | End: 2024-03-22

## 2024-03-20 RX ADMIN — IPRATROPIUM BROMIDE AND ALBUTEROL SULFATE 3 ML: 2.5; .5 SOLUTION RESPIRATORY (INHALATION) at 19:34

## 2024-03-20 RX ADMIN — DIGOXIN 125 MCG: 125 TABLET ORAL at 12:32

## 2024-03-20 RX ADMIN — IPRATROPIUM BROMIDE AND ALBUTEROL SULFATE 3 ML: 2.5; .5 SOLUTION RESPIRATORY (INHALATION) at 11:02

## 2024-03-20 RX ADMIN — BUSPIRONE HYDROCHLORIDE 10 MG: 10 TABLET ORAL at 08:49

## 2024-03-20 RX ADMIN — FUROSEMIDE 40 MG: 10 INJECTION, SOLUTION INTRAMUSCULAR; INTRAVENOUS at 08:47

## 2024-03-20 RX ADMIN — FINASTERIDE 5 MG: 5 TABLET, FILM COATED ORAL at 08:49

## 2024-03-20 RX ADMIN — HYDROXYZINE PAMOATE 50 MG: 50 CAPSULE ORAL at 20:57

## 2024-03-20 RX ADMIN — Medication 10 ML: at 08:50

## 2024-03-20 RX ADMIN — THIAMINE HYDROCHLORIDE 200 MG: 100 INJECTION, SOLUTION INTRAMUSCULAR; INTRAVENOUS at 15:08

## 2024-03-20 RX ADMIN — FOLIC ACID 1 MG: 1 TABLET ORAL at 08:49

## 2024-03-20 RX ADMIN — ATENOLOL 50 MG: 50 TABLET ORAL at 20:57

## 2024-03-20 RX ADMIN — METOPROLOL SUCCINATE 100 MG: 100 TABLET, EXTENDED RELEASE ORAL at 08:49

## 2024-03-20 RX ADMIN — IPRATROPIUM BROMIDE AND ALBUTEROL SULFATE 3 ML: 2.5; .5 SOLUTION RESPIRATORY (INHALATION) at 06:49

## 2024-03-20 RX ADMIN — BUSPIRONE HYDROCHLORIDE 10 MG: 10 TABLET ORAL at 15:08

## 2024-03-20 RX ADMIN — BUDESONIDE AND FORMOTEROL FUMARATE DIHYDRATE 2 PUFF: 160; 4.5 AEROSOL RESPIRATORY (INHALATION) at 19:34

## 2024-03-20 RX ADMIN — ATENOLOL 50 MG: 50 TABLET ORAL at 15:08

## 2024-03-20 RX ADMIN — IPRATROPIUM BROMIDE AND ALBUTEROL SULFATE 3 ML: 2.5; .5 SOLUTION RESPIRATORY (INHALATION) at 15:55

## 2024-03-20 RX ADMIN — INSULIN LISPRO 2 UNITS: 100 INJECTION, SOLUTION INTRAVENOUS; SUBCUTANEOUS at 18:09

## 2024-03-20 RX ADMIN — MIRTAZAPINE 30 MG: 30 TABLET, FILM COATED ORAL at 20:57

## 2024-03-20 RX ADMIN — FUROSEMIDE 80 MG: 80 TABLET ORAL at 18:10

## 2024-03-20 RX ADMIN — LISINOPRIL 40 MG: 40 TABLET ORAL at 08:49

## 2024-03-20 RX ADMIN — ASPIRIN 81 MG: 81 TABLET, COATED ORAL at 08:49

## 2024-03-20 RX ADMIN — THIAMINE HYDROCHLORIDE 200 MG: 100 INJECTION, SOLUTION INTRAMUSCULAR; INTRAVENOUS at 06:44

## 2024-03-20 RX ADMIN — CEFTRIAXONE 2000 MG: 2 INJECTION, POWDER, FOR SOLUTION INTRAMUSCULAR; INTRAVENOUS at 18:22

## 2024-03-20 RX ADMIN — PANTOPRAZOLE SODIUM 40 MG: 40 TABLET, DELAYED RELEASE ORAL at 06:44

## 2024-03-20 RX ADMIN — BUDESONIDE AND FORMOTEROL FUMARATE DIHYDRATE 2 PUFF: 160; 4.5 AEROSOL RESPIRATORY (INHALATION) at 06:50

## 2024-03-20 RX ADMIN — ENOXAPARIN SODIUM 105 MG: 150 INJECTION SUBCUTANEOUS at 15:09

## 2024-03-20 RX ADMIN — BUSPIRONE HYDROCHLORIDE 10 MG: 10 TABLET ORAL at 20:57

## 2024-03-20 RX ADMIN — SPIRONOLACTONE 25 MG: 25 TABLET ORAL at 08:49

## 2024-03-20 RX ADMIN — Medication 10 ML: at 20:57

## 2024-03-20 NOTE — PLAN OF CARE
Problem: Adult Inpatient Plan of Care  Goal: Plan of Care Review  Outcome: Ongoing, Progressing  Goal: Patient-Specific Goal (Individualized)  Outcome: Ongoing, Progressing  Goal: Absence of Hospital-Acquired Illness or Injury  Outcome: Ongoing, Progressing  Intervention: Identify and Manage Fall Risk  Recent Flowsheet Documentation  Taken 3/20/2024 1420 by Bhavin Durand RN  Safety Promotion/Fall Prevention:   safety round/check completed   room organization consistent   activity supervised   assistive device/personal items within reach   clutter free environment maintained   fall prevention program maintained  Taken 3/20/2024 1215 by Bhavin Durand RN  Safety Promotion/Fall Prevention:   safety round/check completed   room organization consistent   activity supervised   assistive device/personal items within reach   clutter free environment maintained   fall prevention program maintained  Taken 3/20/2024 1005 by Bhavin Durand RN  Safety Promotion/Fall Prevention:   safety round/check completed   room organization consistent   activity supervised   assistive device/personal items within reach   clutter free environment maintained   fall prevention program maintained  Taken 3/20/2024 0820 by Bhavin Durand RN  Safety Promotion/Fall Prevention:   safety round/check completed   room organization consistent   activity supervised   assistive device/personal items within reach   clutter free environment maintained   fall prevention program maintained  Intervention: Prevent Skin Injury  Recent Flowsheet Documentation  Taken 3/20/2024 1420 by Bhavin Durand RN  Body Position: position changed independently  Taken 3/20/2024 1215 by Bhavin Durand RN  Body Position: position changed independently  Taken 3/20/2024 1005 by Bhavin Durand RN  Body Position: position changed independently  Taken 3/20/2024 0820 by Herber Banegas  TOBIAS Delcid  Body Position: position changed independently  Intervention: Prevent and Manage VTE (Venous Thromboembolism) Risk  Recent Flowsheet Documentation  Taken 3/20/2024 1420 by Bhavin Durand RN  Activity Management: up ad ricky  VTE Prevention/Management: (Lovenox) --  Range of Motion: active ROM (range of motion) encouraged  Taken 3/20/2024 1215 by Bhavin Durand RN  Activity Management: up ad ricky  Taken 3/20/2024 1005 by Bhavin Druand RN  Activity Management: up ad ricky  Taken 3/20/2024 0820 by Bhavin Durand RN  Activity Management: up ad ricky  Range of Motion: active ROM (range of motion) encouraged  Intervention: Prevent Infection  Recent Flowsheet Documentation  Taken 3/20/2024 1420 by Bhavin Durand RN  Infection Prevention:   single patient room provided   hand hygiene promoted   equipment surfaces disinfected   environmental surveillance performed  Taken 3/20/2024 1215 by Bhavin Durand RN  Infection Prevention:   single patient room provided   environmental surveillance performed   equipment surfaces disinfected   hand hygiene promoted  Taken 3/20/2024 1005 by Bhavin Durand RN  Infection Prevention:   single patient room provided   hand hygiene promoted   equipment surfaces disinfected   environmental surveillance performed  Taken 3/20/2024 0820 by Bhavin Durand RN  Infection Prevention:   single patient room provided   hand hygiene promoted   equipment surfaces disinfected   environmental surveillance performed  Goal: Optimal Comfort and Wellbeing  Outcome: Ongoing, Progressing  Intervention: Provide Person-Centered Care  Recent Flowsheet Documentation  Taken 3/20/2024 1420 by Bhavin Durand RN  Trust Relationship/Rapport:   care explained   choices provided   questions answered  Taken 3/20/2024 0820 by Bhavin Durand RN  Trust Relationship/Rapport:   care explained    choices provided   questions answered  Goal: Readiness for Transition of Care  Outcome: Ongoing, Progressing     Problem: Pain Acute  Goal: Acceptable Pain Control and Functional Ability  Outcome: Ongoing, Progressing  Intervention: Prevent or Manage Pain  Recent Flowsheet Documentation  Taken 3/20/2024 1420 by Bhavin Durand RN  Medication Review/Management: medications reviewed  Taken 3/20/2024 1215 by Bhavin Durand RN  Medication Review/Management: medications reviewed  Taken 3/20/2024 1005 by hBavin Durand RN  Medication Review/Management: medications reviewed  Taken 3/20/2024 0820 by Bhavin Durand RN  Medication Review/Management: medications reviewed  Intervention: Optimize Psychosocial Wellbeing  Recent Flowsheet Documentation  Taken 3/20/2024 1420 by Bhavin Durand RN  Diversional Activities: television  Taken 3/20/2024 0820 by Bhavin Durand RN  Diversional Activities: television     Problem: Fall Injury Risk  Goal: Absence of Fall and Fall-Related Injury  Outcome: Ongoing, Progressing  Intervention: Identify and Manage Contributors  Recent Flowsheet Documentation  Taken 3/20/2024 1420 by Bhavin Durand RN  Medication Review/Management: medications reviewed  Taken 3/20/2024 1215 by Bhavin Durand RN  Medication Review/Management: medications reviewed  Taken 3/20/2024 1005 by Bhavin Durand RN  Medication Review/Management: medications reviewed  Taken 3/20/2024 0820 by Bhavin Durand RN  Medication Review/Management: medications reviewed  Intervention: Promote Injury-Free Environment  Recent Flowsheet Documentation  Taken 3/20/2024 1420 by Bhavin Durand RN  Safety Promotion/Fall Prevention:   safety round/check completed   room organization consistent   activity supervised   assistive device/personal items within reach   clutter free environment maintained   fall  prevention program maintained  Taken 3/20/2024 1215 by Bhavin Durand, RN  Safety Promotion/Fall Prevention:   safety round/check completed   room organization consistent   activity supervised   assistive device/personal items within reach   clutter free environment maintained   fall prevention program maintained  Taken 3/20/2024 1005 by Bhavin Durand, RN  Safety Promotion/Fall Prevention:   safety round/check completed   room organization consistent   activity supervised   assistive device/personal items within reach   clutter free environment maintained   fall prevention program maintained  Taken 3/20/2024 0820 by Bhavin Durand, RN  Safety Promotion/Fall Prevention:   safety round/check completed   room organization consistent   activity supervised   assistive device/personal items within reach   clutter free environment maintained   fall prevention program maintained   Goal Outcome Evaluation:               Alert, oriented, room air, a-flutter -115, VSS, denied SOA, no chest pain, cardiology consulted, metoprolol discontinued and started atenolol and PO lasix. Plan of care ongoing.

## 2024-03-20 NOTE — NURSING NOTE
Access attempted follow-up; however, pt found asleep/snoring. Spoke w/ primary RN who reported there have been no issues with the pt.   Access will continue to follow.

## 2024-03-20 NOTE — PROGRESS NOTES
"CC: CHF     Interval History: No acute events overnight       Vital Signs  Temp:  [97.7 °F (36.5 °C)-98.3 °F (36.8 °C)] 98.3 °F (36.8 °C)  Heart Rate:  [] 100  Resp:  [18-20] 18  BP: (108-128)/(64-91) 121/90    Intake/Output Summary (Last 24 hours) at 3/20/2024 1154  Last data filed at 3/20/2024 0000  Gross per 24 hour   Intake 220 ml   Output 1300 ml   Net -1080 ml     Flowsheet Rows      Flowsheet Row First Filed Value   Admission Height 167.6 cm (65.98\") Documented at 03/15/2024 1026   Admission Weight 110 kg (242 lb 8.1 oz) Documented at 03/15/2024 1026            PHYSICAL EXAM:  General: No acute distress  Resp:NL Rate, symmetric chest expansion,unlabored, clear  CV: Irregularly irregular tach heart, NL PMI, NL S1 and S2, no Murmur, no gallop, no rub, No JVD.   ABD:Nl sounds, no masses or tenderness, nondistended, no guarding or rebound  Neuro: alert,cooperative and oriented  Extr:Normal pedal pulses, No edema or cyanosis, moves all extremities      Results Review:    Results from last 7 days   Lab Units 03/20/24  0220   SODIUM mmol/L 142   POTASSIUM mmol/L 4.0   CHLORIDE mmol/L 99   CO2 mmol/L 32.0*   BUN mg/dL 21   CREATININE mg/dL 1.19   GLUCOSE mg/dL 101*   CALCIUM mg/dL 9.5     Results from last 7 days   Lab Units 03/15/24  1231 03/15/24  1011   HSTROP T ng/L 33* 34*     Results from last 7 days   Lab Units 03/20/24  0220   WBC 10*3/mm3 10.28   HEMOGLOBIN g/dL 11.8*   HEMATOCRIT % 40.0   PLATELETS 10*3/mm3 338             Results from last 7 days   Lab Units 03/20/24  0220   MAGNESIUM mg/dL 2.1         I reviewed the patient's new clinical results.  I personally viewed and interpreted the patient's EKG/Telemetry data        Medication Review:   Meds reviewed           Assessment & Plan  Acute on chronic diastolic congestive heart failure.  Recent echo shows preserved left ventricular ejection fraction.  Acute hypoxic respiratory failure CHF  History of ventricular fibrillation and cardiac arrest " April 2021 status post biotronik single chamber ICD   Paroxysmal atrial fibrillation - persistent since here. Has not had much AF device interrogations.   Chronic anticoagulation with rivaroxaban -consistent use  Coronary artery disease with proximal  of the RCA and  of the OM2. Attempted at percutaneous intervention were unsuccessful.   Daily alcohol use  Bright red blood per rectum - he declined EGD and colonoscopy-done for iron deficiency.  Patient reports he noted bright red bleeding following laxative use and thinks is from his rectum.  COPD without acute exacerbation  Morbid obesity  Diabetes mellitus type II   Hypertension  Dilated aortic room measuring 4 cm.   Apical atrial flutter with rapid ventricular response    Patient remains tachycardic especially with minimal activity and reports orthopnea.  Also responding fairly well to diuresis.  His H&H remained stable and no further rectal bleeding noted.  He declined GI workup.  He would benefit with LILA guided cardioversion.  I discussed with him importance of anticoagulation and risks with anticoagulation.  I will try him on Lovenox.  I will communicate with Dr. Clinton.    Orlin Beatty MD  03/20/24  11:54 EDT

## 2024-03-20 NOTE — PROGRESS NOTES
Name: Sebastien Tang ADMIT: 3/15/2024   : 1960  PCP: Yuliana Flynn DO    MRN: [id number] LOS: 4 days   AGE/SEX: 64 y.o. male  ROOM: [facility]     Subjective   Subjective   Feeling better each day. Denies any MARTIN or SOA at rest. No CP or palp. No orthopnea. Voiding well. Tolerating diet. No cough. No F/C but does still report episodes of sweating.       Objective   Objective   Vital Signs  Temp:  [97.7 °F (36.5 °C)-98.3 °F (36.8 °C)] 98.3 °F (36.8 °C)  Heart Rate:  [] 100  Resp:  [18] 18  BP: (110-128)/(80-91) 121/90  SpO2:  [90 %-100 %] 96 %  on  Flow (L/min):  [2] 2;   Device (Oxygen Therapy): room air  Body mass index is 40.1 kg/m².    (No change in exam today)    Physical Exam  Vitals and nursing note reviewed.   Constitutional:       General: He is not in acute distress.     Appearance: He is not ill-appearing, toxic-appearing or diaphoretic.   HENT:      Head: Normocephalic.      Nose: Nose normal.      Mouth/Throat:      Mouth: Mucous membranes are moist.      Pharynx: Oropharynx is clear.   Eyes:      General: No scleral icterus.        Right eye: No discharge.         Left eye: No discharge.      Conjunctiva/sclera: Conjunctivae normal.   Cardiovascular:      Rate and Rhythm: Tachycardia present. Rhythm irregular.      Pulses: Normal pulses.   Pulmonary:      Effort: Pulmonary effort is normal. No respiratory distress.      Breath sounds: Normal breath sounds. No wheezing or rales.      Comments: Anteriorly   Abdominal:      General: Bowel sounds are normal. There is no distension.      Palpations: Abdomen is soft.      Tenderness: There is no abdominal tenderness.   Musculoskeletal:         General: Swelling (trace in BLEs) present. Normal range of motion.      Cervical back: Neck supple.   Skin:     General: Skin is warm and dry.      Capillary Refill: Capillary refill takes less than 2 seconds.      Coloration: Skin is not jaundiced.   Neurological:      General: No focal deficit  present.      Mental Status: He is alert and oriented to person, place, and time. Mental status is at baseline.      Cranial Nerves: No cranial nerve deficit.      Coordination: Coordination normal.   Psychiatric:         Mood and Affect: Mood normal.         Behavior: Behavior normal.         Thought Content: Thought content normal.       Results Review     I reviewed the patient's new clinical results.  Results from last 7 days   Lab Units 03/20/24  0220 03/19/24  0539 03/18/24  0326 03/17/24  1542 03/17/24  0754 03/17/24  0000   WBC 10*3/mm3 10.28 8.48 11.70*  --   --  14.68*   HEMOGLOBIN g/dL 11.8* 11.5* 11.3* 10.9*   < > 10.3*   PLATELETS 10*3/mm3 338 354 330  --   --  312    < > = values in this interval not displayed.     Results from last 7 days   Lab Units 03/20/24 0220 03/19/24  0539 03/18/24  0326 03/17/24  0000   SODIUM mmol/L 142 141 141 139   POTASSIUM mmol/L 4.0 4.3 4.0 4.1   CHLORIDE mmol/L 99 102 100 101   CO2 mmol/L 32.0* 28.9 30.7* 27.0   BUN mg/dL 21 18 21 26*   CREATININE mg/dL 1.19 0.89 0.95 0.92   GLUCOSE mg/dL 101* 104* 143* 152*   EGFR mL/min/1.73 67.4 94.5 88.3 91.7     Results from last 7 days   Lab Units 03/20/24  0220 03/15/24  1011   ALBUMIN g/dL 4.1 3.9   BILIRUBIN mg/dL 0.3 0.5   ALK PHOS U/L 70 73   AST (SGOT) U/L 48* 37   ALT (SGPT) U/L 59* 30     Results from last 7 days   Lab Units 03/20/24  0220 03/19/24  0539 03/18/24  0326 03/17/24  0000 03/16/24  0235 03/15/24  1011   CALCIUM mg/dL 9.5 9.6 9.1 9.2   < > 9.4   ALBUMIN g/dL 4.1  --   --   --   --  3.9   MAGNESIUM mg/dL 2.1 2.4 2.5* 2.6*   < > 2.2   PHOSPHORUS mg/dL 4.6* 4.4 3.7 3.1   < >  --     < > = values in this interval not displayed.     Results from last 7 days   Lab Units 03/17/24  0000 03/15/24  1011   PROCALCITONIN ng/mL 0.08 0.11     Glucose   Date/Time Value Ref Range Status   03/20/2024 1146 120 70 - 130 mg/dL Final   03/20/2024 0641 114 70 - 130 mg/dL Final   03/19/2024 2054 122 70 - 130 mg/dL Final   03/19/2024  1713 178 (H) 70 - 130 mg/dL Final   03/19/2024 1139 123 70 - 130 mg/dL Final   03/19/2024 0638 126 70 - 130 mg/dL Final   03/18/2024 2125 171 (H) 70 - 130 mg/dL Final       No radiology results for the last day    I have personally reviewed all medications:  Scheduled Medications  aspirin, 81 mg, Oral, Daily  atenolol, 50 mg, Oral, BID  budesonide-formoterol, 2 puff, Inhalation, BID  busPIRone, 10 mg, Oral, TID  cefTRIAXone, 2,000 mg, Intravenous, Q24H  digoxin, 125 mcg, Oral, Daily  enoxaparin, 1 mg/kg, Subcutaneous, Q12H  finasteride, 5 mg, Oral, Daily  folic acid, 1 mg, Oral, Daily  furosemide, 80 mg, Oral, BID  insulin lispro, 2-7 Units, Subcutaneous, 4x Daily AC & at Bedtime  ipratropium-albuterol, 3 mL, Nebulization, 4x Daily - RT  lisinopril, 40 mg, Oral, Daily  mirtazapine, 30 mg, Oral, Nightly  pantoprazole, 40 mg, Oral, Q AM  senna-docusate sodium, 2 tablet, Oral, BID  sodium chloride, 10 mL, Intravenous, Q12H  spironolactone, 25 mg, Oral, Daily  thiamine (B-1) IV, 200 mg, Intravenous, Q8H   Followed by  [START ON 3/21/2024] thiamine, 100 mg, Oral, Daily    Infusions   Diet  Diet: Cardiac, Diabetic; Healthy Heart (2-3 Na+); Consistent Carbohydrate; Fluid Consistency: Thin (IDDSI 0)    I have personally reviewed:  [x]  Laboratory   []  Microbiology   []  Radiology   [x]  EKG/Telemetry  []  Cardiology/Vascular   []  Pathology    []  Records       Assessment/Plan     Active Hospital Problems    Diagnosis  POA    **Acute CHF [I50.9]  Yes    Hypoxemia [R09.02]  Unknown    Iron deficiency anemia [D50.9]  Unknown    Diabetes mellitus, type 2 [E11.9]  Unknown    Heart failure [I50.9]  Yes    Essential hypertension [I10]  Yes    ICD (implantable cardioverter-defibrillator) in place [Z95.810]  Yes    Atrial flutter [I48.92]  Yes    COPD (chronic obstructive pulmonary disease) [J44.9]  Yes      Resolved Hospital Problems   No resolved problems to display.     64-year-old gentleman with a history of chronic systolic  heart failure, COPD, previous VFib arrest with AICD, PAF on anticoagulation, hypertension, obesity, hyperlipidemia, and poor medical compliance, who presented with increasing shortness of breath and was found to have decompensated heart failure and pulmonary edema.     Acute on chronic systolic CHF  -Echo in April 2021 showed EF of 48%.  -Continue Lasix (changed to PO today) and Aldactone per Card  -Repeat Echo 3/16 showed EF of 45%  -There is akinesis of the basal to mid inferior wall.   -Card following     Typical atrial flutter with RVR  Paroxysmal atrial fibrillation  History of V-fib cardiac arrest 2021  AICD  -Continue Digoxin, metoprolol changed to atenolol today by Card  -holding AC secondary to iron deficiency anemia/GI bleed (of note was not taking Eliquis consistently prior to admission as was not able to afford)  -HRs still high  -Card has started Lovenox today and plans LILA with cardioversion on Friday     Pneumonia  Leukocytosis  -WBC trended up to 14.68 on 3/17 from 6.44   -Initial chest x-ray showed edema vs possible pneumonia  -Repeat chest x-ray 3/17 showed lateral alveolar and interstitial  infiltrates again  -Initiated IV ceftriaxone for pneumonia on 3/17  -WBC normalized, afebrile, RVP neg, blood cultures not sent for some reason     CAD  -Continue statin, ASA, lisinopril, and atenolol  -Monitor for signs of recurrent bleeding on ASA     Iron deficiency anemia  Rectal bleeding  -Patient reported intermittent bright red blood per rectum with bowel movements  -Iron panel consistent with significant iron deficiency with iron saturation of 4 and ferritin of 13.6  -S/p 3 doses IV iron  -GI evaluated, recommended EGD and colonoscopy however patient declined   -On IV PPI initially, now transitioned to PO Protonix daily  -Hgb stable/improved  -Aspirin resumed 3/18  -Lovenox started today by Card  -monitor for recurrent signs of bleeding     Essential hypertension  -BPs acceptable on current regimen      COPD  Hypoxemia  -Continue Symbicort and scheduled DuoNebs  -Incentive spirometer  -Weaned to RA now  -O2 saturation goal 88% and above     Type II DM  -A1c 6.6, previously was 6.2  -Continue SSI, requiring very little insulin here--suspect would do well with OHG  -Continue diabetic diet  -Diabetic Educator has met with pt     Alcohol use disorder  -CIWA protocol in place  -Continue vitamin replacement  -CIWA scores remain low     Major depressive disorder  -Psychiatry following  -Mirtazapine increased to 30 mg nightly  -Buspar added  -PRN Atarax added  -Seems to be doing better     Prominent hilar lymph nodes seen on CT  -Follow-up short-term CT in 3 to 6 months given advanced background emphysematous changes  -This was discussed with pt     Aortic root dilation.   -CT scan showed there is dilatation of the aortic root measuring up to 4 cm.   -Will need outpatient surveillance        Lovenox should suffice for DVT prophylaxis.  Full code confirmed.  Discussed with patient and Dr. Beatty.  Discussed with CCP and RN at multidisciplinary rounds  Disposition TBD        Abdoulaye Gómez MD  [facility]  03/20/24  14:04 EDT

## 2024-03-21 PROBLEM — I50.23 ACUTE ON CHRONIC SYSTOLIC CHF (CONGESTIVE HEART FAILURE): Status: ACTIVE | Noted: 2024-03-21

## 2024-03-21 LAB
ANION GAP SERPL CALCULATED.3IONS-SCNC: 11.4 MMOL/L (ref 5–15)
BUN SERPL-MCNC: 24 MG/DL (ref 8–23)
BUN/CREAT SERPL: 23.8 (ref 7–25)
CALCIUM SPEC-SCNC: 10.4 MG/DL (ref 8.6–10.5)
CHLORIDE SERPL-SCNC: 99 MMOL/L (ref 98–107)
CO2 SERPL-SCNC: 30.6 MMOL/L (ref 22–29)
CREAT SERPL-MCNC: 1.01 MG/DL (ref 0.76–1.27)
DEPRECATED RDW RBC AUTO: 44.5 FL (ref 37–54)
EGFRCR SERPLBLD CKD-EPI 2021: 82 ML/MIN/1.73
ERYTHROCYTE [DISTWIDTH] IN BLOOD BY AUTOMATED COUNT: 16.7 % (ref 12.3–15.4)
GLUCOSE BLDC GLUCOMTR-MCNC: 113 MG/DL (ref 70–130)
GLUCOSE BLDC GLUCOMTR-MCNC: 151 MG/DL (ref 70–130)
GLUCOSE BLDC GLUCOMTR-MCNC: 154 MG/DL (ref 70–130)
GLUCOSE BLDC GLUCOMTR-MCNC: 158 MG/DL (ref 70–130)
GLUCOSE SERPL-MCNC: 112 MG/DL (ref 65–99)
HCT VFR BLD AUTO: 39.7 % (ref 37.5–51)
HGB BLD-MCNC: 11.8 G/DL (ref 13–17.7)
MAGNESIUM SERPL-MCNC: 2.6 MG/DL (ref 1.6–2.4)
MCH RBC QN AUTO: 23.1 PG (ref 26.6–33)
MCHC RBC AUTO-ENTMCNC: 29.7 G/DL (ref 31.5–35.7)
MCV RBC AUTO: 77.7 FL (ref 79–97)
PHOSPHATE SERPL-MCNC: 5.3 MG/DL (ref 2.5–4.5)
PLATELET # BLD AUTO: 338 10*3/MM3 (ref 140–450)
PMV BLD AUTO: 9.1 FL (ref 6–12)
POTASSIUM SERPL-SCNC: 4.5 MMOL/L (ref 3.5–5.2)
RBC # BLD AUTO: 5.11 10*6/MM3 (ref 4.14–5.8)
SODIUM SERPL-SCNC: 141 MMOL/L (ref 136–145)
WBC NRBC COR # BLD AUTO: 9.51 10*3/MM3 (ref 3.4–10.8)

## 2024-03-21 PROCEDURE — 94799 UNLISTED PULMONARY SVC/PX: CPT

## 2024-03-21 PROCEDURE — 25010000002 ENOXAPARIN PER 10 MG: Performed by: INTERNAL MEDICINE

## 2024-03-21 PROCEDURE — 25010000002 CEFTRIAXONE PER 250 MG: Performed by: STUDENT IN AN ORGANIZED HEALTH CARE EDUCATION/TRAINING PROGRAM

## 2024-03-21 PROCEDURE — 94664 DEMO&/EVAL PT USE INHALER: CPT

## 2024-03-21 PROCEDURE — 84100 ASSAY OF PHOSPHORUS: CPT | Performed by: STUDENT IN AN ORGANIZED HEALTH CARE EDUCATION/TRAINING PROGRAM

## 2024-03-21 PROCEDURE — 83735 ASSAY OF MAGNESIUM: CPT | Performed by: STUDENT IN AN ORGANIZED HEALTH CARE EDUCATION/TRAINING PROGRAM

## 2024-03-21 PROCEDURE — 99222 1ST HOSP IP/OBS MODERATE 55: CPT | Performed by: PHYSICIAN ASSISTANT

## 2024-03-21 PROCEDURE — 63710000001 INSULIN LISPRO (HUMAN) PER 5 UNITS: Performed by: STUDENT IN AN ORGANIZED HEALTH CARE EDUCATION/TRAINING PROGRAM

## 2024-03-21 PROCEDURE — 85027 COMPLETE CBC AUTOMATED: CPT | Performed by: STUDENT IN AN ORGANIZED HEALTH CARE EDUCATION/TRAINING PROGRAM

## 2024-03-21 PROCEDURE — 80048 BASIC METABOLIC PNL TOTAL CA: CPT | Performed by: STUDENT IN AN ORGANIZED HEALTH CARE EDUCATION/TRAINING PROGRAM

## 2024-03-21 PROCEDURE — 99232 SBSQ HOSP IP/OBS MODERATE 35: CPT | Performed by: INTERNAL MEDICINE

## 2024-03-21 PROCEDURE — 94760 N-INVAS EAR/PLS OXIMETRY 1: CPT

## 2024-03-21 PROCEDURE — 94761 N-INVAS EAR/PLS OXIMETRY MLT: CPT

## 2024-03-21 PROCEDURE — 25010000002 FUROSEMIDE PER 20 MG: Performed by: INTERNAL MEDICINE

## 2024-03-21 PROCEDURE — 82948 REAGENT STRIP/BLOOD GLUCOSE: CPT

## 2024-03-21 RX ORDER — FUROSEMIDE 10 MG/ML
80 INJECTION INTRAMUSCULAR; INTRAVENOUS ONCE
Status: COMPLETED | OUTPATIENT
Start: 2024-03-21 | End: 2024-03-21

## 2024-03-21 RX ORDER — HYDROCODONE BITARTRATE AND ACETAMINOPHEN 5; 325 MG/1; MG/1
1 TABLET ORAL EVERY 6 HOURS PRN
Status: DISCONTINUED | OUTPATIENT
Start: 2024-03-21 | End: 2024-03-23 | Stop reason: HOSPADM

## 2024-03-21 RX ADMIN — FINASTERIDE 5 MG: 5 TABLET, FILM COATED ORAL at 09:55

## 2024-03-21 RX ADMIN — BUDESONIDE AND FORMOTEROL FUMARATE DIHYDRATE 2 PUFF: 160; 4.5 AEROSOL RESPIRATORY (INHALATION) at 20:47

## 2024-03-21 RX ADMIN — IPRATROPIUM BROMIDE AND ALBUTEROL SULFATE 3 ML: 2.5; .5 SOLUTION RESPIRATORY (INHALATION) at 14:58

## 2024-03-21 RX ADMIN — Medication 10 ML: at 20:20

## 2024-03-21 RX ADMIN — DOCUSATE SODIUM 50MG AND SENNOSIDES 8.6MG 2 TABLET: 8.6; 5 TABLET, FILM COATED ORAL at 09:58

## 2024-03-21 RX ADMIN — FOLIC ACID 1 MG: 1 TABLET ORAL at 09:56

## 2024-03-21 RX ADMIN — ATENOLOL 50 MG: 50 TABLET ORAL at 09:56

## 2024-03-21 RX ADMIN — IPRATROPIUM BROMIDE AND ALBUTEROL SULFATE 3 ML: 2.5; .5 SOLUTION RESPIRATORY (INHALATION) at 20:40

## 2024-03-21 RX ADMIN — ACETAMINOPHEN 325MG 650 MG: 325 TABLET ORAL at 16:51

## 2024-03-21 RX ADMIN — MIRTAZAPINE 30 MG: 30 TABLET, FILM COATED ORAL at 20:19

## 2024-03-21 RX ADMIN — ASPIRIN 81 MG: 81 TABLET, COATED ORAL at 09:56

## 2024-03-21 RX ADMIN — IPRATROPIUM BROMIDE AND ALBUTEROL SULFATE 3 ML: 2.5; .5 SOLUTION RESPIRATORY (INHALATION) at 11:10

## 2024-03-21 RX ADMIN — Medication 100 MG: at 09:56

## 2024-03-21 RX ADMIN — FUROSEMIDE 80 MG: 10 INJECTION, SOLUTION INTRAMUSCULAR; INTRAVENOUS at 09:46

## 2024-03-21 RX ADMIN — INSULIN LISPRO 2 UNITS: 100 INJECTION, SOLUTION INTRAVENOUS; SUBCUTANEOUS at 11:54

## 2024-03-21 RX ADMIN — INSULIN LISPRO 2 UNITS: 100 INJECTION, SOLUTION INTRAVENOUS; SUBCUTANEOUS at 17:46

## 2024-03-21 RX ADMIN — BUSPIRONE HYDROCHLORIDE 10 MG: 10 TABLET ORAL at 09:56

## 2024-03-21 RX ADMIN — IPRATROPIUM BROMIDE AND ALBUTEROL SULFATE 3 ML: 2.5; .5 SOLUTION RESPIRATORY (INHALATION) at 06:45

## 2024-03-21 RX ADMIN — BUSPIRONE HYDROCHLORIDE 10 MG: 10 TABLET ORAL at 16:51

## 2024-03-21 RX ADMIN — ACETAMINOPHEN 325MG 650 MG: 325 TABLET ORAL at 23:25

## 2024-03-21 RX ADMIN — DIGOXIN 125 MCG: 125 TABLET ORAL at 11:54

## 2024-03-21 RX ADMIN — SPIRONOLACTONE 25 MG: 25 TABLET ORAL at 09:55

## 2024-03-21 RX ADMIN — PANTOPRAZOLE SODIUM 40 MG: 40 TABLET, DELAYED RELEASE ORAL at 06:11

## 2024-03-21 RX ADMIN — BUSPIRONE HYDROCHLORIDE 10 MG: 10 TABLET ORAL at 20:19

## 2024-03-21 RX ADMIN — ENOXAPARIN SODIUM 105 MG: 150 INJECTION SUBCUTANEOUS at 03:01

## 2024-03-21 RX ADMIN — LISINOPRIL 40 MG: 40 TABLET ORAL at 09:56

## 2024-03-21 RX ADMIN — BUDESONIDE AND FORMOTEROL FUMARATE DIHYDRATE 2 PUFF: 160; 4.5 AEROSOL RESPIRATORY (INHALATION) at 06:47

## 2024-03-21 RX ADMIN — Medication 10 ML: at 09:56

## 2024-03-21 RX ADMIN — ENOXAPARIN SODIUM 105 MG: 150 INJECTION SUBCUTANEOUS at 16:51

## 2024-03-21 RX ADMIN — CEFTRIAXONE 2000 MG: 2 INJECTION, POWDER, FOR SOLUTION INTRAMUSCULAR; INTRAVENOUS at 17:48

## 2024-03-21 NOTE — PROGRESS NOTES
"ACCESS Center f/u d/t anxiety. Chart reviewed and spoke w/ primary RN who denied any issues. Pt in room alone upon entry. A&Ox4. Pt talkative and used a lot of humor/sarcasm. Regarding depression, stated \"so far, so good.\" Admitted anxiety around next steps with physical health. Denies SI/HI/AVH. Reports sleep is \"pretty good\" and appetite is \"too good.\" Last CIWA score 2 at 20:54 yesterday. Denies withdrawal sxs or cravings for ETOH. Expressed offense to questions surrounding ETOH use. Pt initiated lengthy discussion around mental health and ROWAN hx. Provided emotional support and reassurance.     Discussed recent medication changes from psychiatrist, Dr. Neves, including increasing mirtazapine, as well as adding Buspar and PRN Vistaril. Pt stated hx of Buspar not being effective and no hx w/ Vistaril, but reports mirtazapine has been improving sleep. Discussed previous resources provided for ROWAN tx and PTSD support groups. Denied interest or intent to participate in groups. Explored possible benefits with individual therapy, as well as continuing medications through an OP psychiatrist. Considered connections b/t pt's ETOH use and sleep disruptions, PTSD, and anxiety. Pt willing to consider OP resources for mental health/ROWAN counseling/psychiatry but would like time/space to consider before accepting resources. No further needs/concerns identified by pt or medical team at this time. ACCESS following.   "

## 2024-03-21 NOTE — NURSING NOTE
Oral Maxillofacial surgery consulted r/t tooth fall. I spoke with Doctor Cornelius and he states they can address this issue as an outpatient. Follow up placed. Dr. Gómez notified.

## 2024-03-21 NOTE — CONSULTS
Electrophysiology Hospital Consult            Patient Name: Sebastien Tang  Age/Sex: 64 y.o. male  : 1960  MRN: [id number]    Date of Admission: 3/15/2024  Date of Encounter Visit: 24  Encounter Provider: Vaughn Tao PA-C  Referring Provider: Jorge Luis Rodriguez MD  Place of Service: [facility] CARDIOLOGY  Electrophysiologist: Dr. Rosas    Subjective:   EP Consultation for: Atrial fibrillation/ flutter    Chief Complaint: Shortness of breath     History of Present Illness:  Sebastien Tang is a 64 y.o. male who presented to ER with worsening shortness of breath and an episode of hematochezia. Admits to symptoms of orthopnea. On arrival to ER, he was seen to be in AF with RVR and volume overloaded. He has been resumed on GMT for his cardiomyopathy and is being diuresed. His heart rates have been better controlled and EP was consulted to evaluate his arrhythmia further.     Overnight heart rates into the 80-90s.     Today he denies any palpitations, dizziness, orthopnea, dizziness or chest pain. He is on room air.     He has not been able to afford his Eliquis. He is now unemployed.     Admits to increased life stressors as his roommate of 16 years recently passed away. He is mid move.     He is upset about being charged for remote monitoring of his ICD.     Past Medical History: SCD s/p SC Biotronik ICD, Cardiomyopathy, Persistent atrial fibrillation/flutter with hx of prior CV, CAD with proximal  of the RCA and OM2, alcohol abuse, DM type II    Past Medical History:   Diagnosis Date    Acidosis     mixed resp/metabolic acidosis    Acute congestive heart failure     Acute respiratory failure     hypoxic    Anemia     Asthma     Atrial flutter     Azotemia     Cardiac arrest 2021    Chronic coronary artery disease     Constipation     COPD (chronic obstructive pulmonary disease)     Enlarged prostate     Hypertension     Hypokalemia     severe    ICD  (implantable cardioverter-defibrillator) in place     Ischemic cardiomyopathy     MRSA nasal colonization     Obesity (BMI 30-39.9)     Orthopnea     PAF (paroxysmal atrial fibrillation)     Persistent atrial fibrillation     Pulmonary edema     Tobacco abuse     Transaminitis     Trouble in sleeping     Ventricular fibrillation        Past Surgical History:   Procedure Laterality Date    CARDIAC CATHETERIZATION Left 4/1/2021    Procedure: Coronary Angiography;  Surgeon: Anna Puga MD;  Location:  [facility];  Service: Cardiology;  Laterality: Left;    CARDIAC CATHETERIZATION N/A 4/1/2021    Procedure: Left ventriculography;  Surgeon: Anna Puga MD;  Location:  [facility];  Service: Cardiology;  Laterality: N/A;    CARDIAC CATHETERIZATION N/A 4/1/2021    Procedure: Left Heart Cath;  Surgeon: Anna Puga MD;  Location:  [facility];  Service: Cardiology;  Laterality: N/A;    CARDIAC ELECTROPHYSIOLOGY PROCEDURE N/A 4/7/2021    Procedure: IMPLANTABLE CARDIOVERTER DEFIBRILLATOR- SC BIOTRONIK;  Surgeon: Nik Rosas MD;  Location:  [facility];  Service: Cardiovascular;  Laterality: N/A;    CARDIOVERSION  05/19/2021    Dr. Rosas    CARDIOVERSION  07/26/2021    Dr. Rosas    TONSILLECTOMY         Home Medications:   Medications Prior to Admission   Medication Sig Dispense Refill Last Dose    albuterol sulfate  (90 Base) MCG/ACT inhaler Inhale 2 puffs Every 6 (Six) Hours As Needed for Wheezing. 18 g 3 3/15/2024    apixaban (Eliquis) 5 MG tablet tablet Take 1 tablet by mouth Every 12 (Twelve) Hours. 60 tablet 3 3/14/2024    aspirin 81 MG EC tablet Take 1 tablet by mouth Daily. 30 tablet 0 3/14/2024    finasteride (PROSCAR) 5 MG tablet Take 1 tablet by mouth Daily. 90 tablet 0 3/14/2024    furosemide (LASIX) 40 MG tablet Take 1 tablet by mouth Daily. 90 tablet 1 3/14/2024    lisinopril (PRINIVIL,ZESTRIL) 40 MG tablet TAKE 1  TABLET BY MOUTH DAILY 90 tablet 0 3/14/2024    metoprolol succinate XL (TOPROL-XL) 100 MG 24 hr tablet Take 1 tablet by mouth Every 12 (Twelve) Hours. 120 tablet 5 3/14/2024    mirtazapine (REMERON) 15 MG tablet Take 1 tablet by mouth Every Night. 90 tablet 0 3/14/2024       Allergies:  No Known Allergies    Past Social History:  Social History     Socioeconomic History    Marital status: Single   Tobacco Use    Smoking status: Former     Current packs/day: 0.00     Average packs/day: 1.5 packs/day for 10.0 years (15.0 ttl pk-yrs)     Types: Cigarettes     Start date: 2011     Quit date: 2021     Years since quittin.9    Smokeless tobacco: Never    Tobacco comments:     2021   Vaping Use    Vaping status: Never Used   Substance and Sexual Activity    Alcohol use: Yes     Comment: 1 BEER DAILY    Drug use: Not Currently    Sexual activity: Not Currently       Past Family History:  History reviewed. No pertinent family history.    14 point ROS was performed and is negative except as outlined in HPI.     Objective:     Objective:  Vital Signs (last 24 hours)          0700   0659  0700   0830   Most Recent      Temp (°F) 98 -  98.8      98.1     98.1 (36.7)  0725    Heart Rate 85 -  100      87     87  0725    Resp   18      18     18  0725    /77 -  125/84      110/65     110/65  0725    SpO2 (%) 93 -  97      96     96  0725    Flow (L/min)   2      2     2  0725          Temp:  [98 °F (36.7 °C)-98.8 °F (37.1 °C)] 98.1 °F (36.7 °C)  Heart Rate:  [] 87  Resp:  [18] 18  BP: (110-125)/(65-84) 110/65  Body mass index is 40.1 kg/m².        Physical Exam:     General Appearance: Laying in bed, obese male, NAD  Eyes: Conjunctiva and lids: No erythema, swelling, or discharge. Sclera non-icteric.   HENT: Atraumatic, normocephalic. External eyes, ears, and nose normal.   Respiratory: No signs of respiratory distress. Respiration rhythm and depth  normal.   Clear to auscultation. No rales, crackles, rhonchi, or wheezing auscultated.   Cardiovascular:  Jugular Venous Pressure: Normal  Heart Rate and Rhythm: Normal, Heart Sounds: Normal S1 and S2. No S3 or S4 noted.  Murmurs: No murmurs noted. No rubs, thrills, or gallops.   Arterial Pulses: Posterior tibialis and dorsalis pedis pulses normal.   Lower Extremities: No edema noted.  Gastrointestinal:  Abdomen soft, non-distended, non-tender.  Musculoskeletal: Normal movement of extremities  Skin: Warm and dry.   Psychiatric: Patient alert and oriented to person, place, and time. Speech and behavior appropriate. Normal mood and affect.    Labs:   Lab Review:     Results from last 7 days   Lab Units 03/21/24  0247 03/20/24  0220 03/19/24  0539 03/18/24  0326 03/17/24  0000 03/16/24  0235 03/15/24  1011   SODIUM mmol/L 141 142 141 141 139 139 141   POTASSIUM mmol/L 4.5 4.0 4.3 4.0 4.1 4.1 3.7   CHLORIDE mmol/L 99 99 102 100 101 99 104   CO2 mmol/L 30.6* 32.0* 28.9 30.7* 27.0 26.0 23.2   BUN mg/dL 24* 21 18 21 26* 21 16   CREATININE mg/dL 1.01 1.19 0.89 0.95 0.92 1.08 1.01   GLUCOSE mg/dL 112* 101* 104* 143* 152* 195* 119*   CALCIUM mg/dL 10.4 9.5 9.6 9.1 9.2 9.6 9.4   AST (SGOT) U/L  --  48*  --   --   --   --  37   ALT (SGPT) U/L  --  59*  --   --   --   --  30     Results from last 7 days   Lab Units 03/15/24  1231 03/15/24  1011   HSTROP T ng/L 33* 34*     Results from last 7 days   Lab Units 03/21/24  0247   WBC 10*3/mm3 9.51   HEMOGLOBIN g/dL 11.8*   HEMATOCRIT % 39.7   PLATELETS 10*3/mm3 338             Results from last 7 days   Lab Units 03/21/24  0247   MAGNESIUM mg/dL 2.6*         Results from last 7 days   Lab Units 03/15/24  1011   PROBNP pg/mL 2,284.0*         Results from last 7 days   Lab Units 03/17/24  0000   TSH uIU/mL 0.601           Imaging:   Echo       EKG/Tele:   Typical atrial flutter, rate controlled                    I personally viewed and interpreted the patient's EKG/Telemetry  tracings.    Assessment/Plan:       Acute CHF    Atrial flutter    COPD (chronic obstructive pulmonary disease)    ICD (implantable cardioverter-defibrillator) in place    Essential hypertension    Heart failure    Iron deficiency anemia    Diabetes mellitus, type 2    Hypoxemia    Persistent typical atrial flutter with hx of Atrial fibrillation- rate now controlled. Continue Metoprolol 100 mg daily and digoxin 125 mcg daily. He has not consistently been taking his eliquis due to cost. Will plan on LILA/CV 3/22/2024 to restore SR. NPO at midnight. He will need discharged with 1month of eliquis post op. Will plan to follow up in patient in 1 month in office to discuss alternative treatments for AF. Afib appears to have started becoming persistent in January of this year.   I reached out to case management to help get supply of Eliquis on discharge.   Hx of sudden cardiac death- s/p Biotronic ICD- normal function on recent interrogation.   HFrEF- Cardiology team follow, resumed GMT therapy as tolerated, ongoing diuresis.     Thank you for allowing me to participate in the care of Sebastien KRYSTAL Tang. Feel free to contact me directly with any further questions or concerns.    Vaughn Tao PA-C  [facility]  03/21/24  08:30 EDT

## 2024-03-21 NOTE — CASE MANAGEMENT/SOCIAL WORK
Continued Stay Note  [facility]     Patient Name: Sebastien Tang  MRN: [id number]  Today's Date: 3/21/2024    Admit Date: 3/15/2024    Plan: Home via family   Discharge Plan       Row Name 03/21/24 1204       Plan    Plan Home via family    Patient/Family in Agreement with Plan yes    Plan Comments CCP notified via secure by PA with EP team that pt needed Eliquis upon DC from the hospital. Discussed with Mya/Pharmacist who stated pt had medication filled on 3/13. CCP met with pt at bedside to confirm DC. Pt confirmed DC plan is to return home, family to transport. Pt also confirmed he picked up his Eliquis prescription on 3/13. Guido COLLADO/CCP                   Discharge Codes    No documentation.                 Expected Discharge Date and Time       Expected Discharge Date Expected Discharge Time    Mar 22, 2024               Dania Rodrigues RN

## 2024-03-21 NOTE — PROGRESS NOTES
"CC: Atrial flutter with rapid ventricular response/CHF    Interval History: No new acute events overnight      Vital Signs  Temp:  [98 °F (36.7 °C)-98.8 °F (37.1 °C)] 98.7 °F (37.1 °C)  Heart Rate:  [] 99  Resp:  [18] 18  BP: (114-125)/(77-90) 114/77    Intake/Output Summary (Last 24 hours) at 3/21/2024 0727  Last data filed at 3/20/2024 2054  Gross per 24 hour   Intake 120 ml   Output 575 ml   Net -455 ml     Flowsheet Rows      Flowsheet Row First Filed Value   Admission Height 167.6 cm (65.98\") Documented at 03/15/2024 1026   Admission Weight 110 kg (242 lb 8.1 oz) Documented at 03/15/2024 1026            PHYSICAL EXAM:  General: No acute distress  Resp:NL Rate, symmetric chest expansion,unlabored, clear  CV: Irregularly irregular, NL PMI, NL S1 and S2, no Murmur, no gallop, no rub, No JVD.   ABD:Nl sounds, no masses or tenderness, nondistended, no guarding or rebound  Neuro: alert,cooperative and oriented  Extr:Normal pedal pulses, No edema or cyanosis, moves all extremities      Results Review:    Results from last 7 days   Lab Units 03/21/24  0247   SODIUM mmol/L 141   POTASSIUM mmol/L 4.5   CHLORIDE mmol/L 99   CO2 mmol/L 30.6*   BUN mg/dL 24*   CREATININE mg/dL 1.01   GLUCOSE mg/dL 112*   CALCIUM mg/dL 10.4     Results from last 7 days   Lab Units 03/15/24  1231 03/15/24  1011   HSTROP T ng/L 33* 34*     Results from last 7 days   Lab Units 03/21/24  0247   WBC 10*3/mm3 9.51   HEMOGLOBIN g/dL 11.8*   HEMATOCRIT % 39.7   PLATELETS 10*3/mm3 338             Results from last 7 days   Lab Units 03/21/24  0247   MAGNESIUM mg/dL 2.6*         I reviewed the patient's new clinical results.  I personally viewed and interpreted the patient's EKG/Telemetry data        Medication Review:   Meds reviewed         Assessment & Plan  Acute on chronic diastolic congestive heart failure.  Recent echo shows preserved left ventricular ejection fraction.  Acute hypoxic respiratory failure CHF  History of ventricular " fibrillation and cardiac arrest April 2021 status post biotronik single chamber ICD   Paroxysmal atrial fibrillation - persistent since here. Has not had much AF device interrogations.   Chronic anticoagulation with rivaroxaban -consistent use  Coronary artery disease with proximal  of the RCA and  of the OM2. Attempted at percutaneous intervention were unsuccessful.   Daily alcohol use  Bright red blood per rectum - he declined EGD and colonoscopy-done for iron deficiency.  Patient reports he noted bright red bleeding following laxative use and thinks is from his rectum.  COPD without acute exacerbation  Morbid obesity  Diabetes mellitus type II   Hypertension  Dilated aortic room measuring 4 cm.   Typical atrial flutter with rapid ventricular response     Breathing slightly better today.  I will give him a dose of IV Lasix.  No significant new acute events overnight  LILA cardioversion in a.m.    Patient is agreeable to this plan.    Orlin Beatty MD  03/21/24  07:27 EDT

## 2024-03-21 NOTE — PROGRESS NOTES
"    Name: Sebastien Tang ADMIT: 3/15/2024   : 1960  PCP: Yuliana Flynn DO    MRN: [id number] LOS: 5 days   AGE/SEX: 64 y.o. male  ROOM: [facility]     Subjective   Subjective   Feeling better each day. Denies any MARTIN or SOA at rest. No CP or palp. No orthopnea. Voiding well. Tolerating diet. No cough. No F/C/NS.  Just earlier this AM one of low anterior teeth fell out. He says it \"just fell out\"--he did not hit and was not chewing anything. No pain or swelling. No bleeding.       Objective   Objective   Vital Signs  Temp:  [98 °F (36.7 °C)-98.8 °F (37.1 °C)] 98.1 °F (36.7 °C)  Heart Rate:  [] 107  Resp:  [18] 18  BP: (110-125)/(65-84) 110/65  SpO2:  [91 %-97 %] 91 %  on  Flow (L/min):  [2] 2;   Device (Oxygen Therapy): nasal cannula  Body mass index is 39.61 kg/m².    Physical Exam  Vitals and nursing note reviewed. Exam conducted with a chaperone present (RN).   Constitutional:       General: He is not in acute distress.     Appearance: He is not ill-appearing, toxic-appearing or diaphoretic.   HENT:      Head: Normocephalic.      Nose: Nose normal.      Mouth/Throat:      Mouth: Mucous membranes are moist.      Dentition: Abnormal dentition (tooth out (see image)). No dental tenderness, gingival swelling, dental abscesses or gum lesions.      Pharynx: Oropharynx is clear.        Comments: All teeth are pretty worn  Eyes:      General: No scleral icterus.        Right eye: No discharge.         Left eye: No discharge.      Conjunctiva/sclera: Conjunctivae normal.   Cardiovascular:      Rate and Rhythm: Tachycardia present. Rhythm irregular.      Pulses: Normal pulses.   Pulmonary:      Effort: Pulmonary effort is normal. No respiratory distress.      Breath sounds: Normal breath sounds. No wheezing or rales.      Comments: Anteriorly   Abdominal:      General: Bowel sounds are normal. There is no distension.      Palpations: Abdomen is soft.      Tenderness: There is no abdominal tenderness. "   Musculoskeletal:         General: Swelling (trace in BLEs) present. Normal range of motion.      Cervical back: Neck supple.   Skin:     General: Skin is warm and dry.      Capillary Refill: Capillary refill takes less than 2 seconds.      Coloration: Skin is not jaundiced.   Neurological:      General: No focal deficit present.      Mental Status: He is alert and oriented to person, place, and time. Mental status is at baseline.      Cranial Nerves: No cranial nerve deficit.      Coordination: Coordination normal.   Psychiatric:         Mood and Affect: Mood normal.         Behavior: Behavior normal.         Thought Content: Thought content normal.       Results Review     I reviewed the patient's new clinical results.  Results from last 7 days   Lab Units 03/21/24 0247 03/20/24 0220 03/19/24 0539 03/18/24  0326   WBC 10*3/mm3 9.51 10.28 8.48 11.70*   HEMOGLOBIN g/dL 11.8* 11.8* 11.5* 11.3*   PLATELETS 10*3/mm3 338 338 354 330     Results from last 7 days   Lab Units 03/21/24 0247 03/20/24 0220 03/19/24 0539 03/18/24  0326   SODIUM mmol/L 141 142 141 141   POTASSIUM mmol/L 4.5 4.0 4.3 4.0   CHLORIDE mmol/L 99 99 102 100   CO2 mmol/L 30.6* 32.0* 28.9 30.7*   BUN mg/dL 24* 21 18 21   CREATININE mg/dL 1.01 1.19 0.89 0.95   GLUCOSE mg/dL 112* 101* 104* 143*   EGFR mL/min/1.73 82.0 67.4 94.5 88.3     Results from last 7 days   Lab Units 03/20/24  0220 03/15/24  1011   ALBUMIN g/dL 4.1 3.9   BILIRUBIN mg/dL 0.3 0.5   ALK PHOS U/L 70 73   AST (SGOT) U/L 48* 37   ALT (SGPT) U/L 59* 30     Results from last 7 days   Lab Units 03/21/24  0247 03/20/24 0220 03/19/24  0539 03/18/24  0326 03/16/24  0235 03/15/24  1011   CALCIUM mg/dL 10.4 9.5 9.6 9.1   < > 9.4   ALBUMIN g/dL  --  4.1  --   --   --  3.9   MAGNESIUM mg/dL 2.6* 2.1 2.4 2.5*   < > 2.2   PHOSPHORUS mg/dL 5.3* 4.6* 4.4 3.7   < >  --     < > = values in this interval not displayed.     Results from last 7 days   Lab Units 03/17/24  0000 03/15/24  1011    PROCALCITONIN ng/mL 0.08 0.11     Glucose   Date/Time Value Ref Range Status   03/21/2024 1112 154 (H) 70 - 130 mg/dL Final   03/21/2024 0608 113 70 - 130 mg/dL Final   03/20/2024 2056 101 70 - 130 mg/dL Final   03/20/2024 1610 161 (H) 70 - 130 mg/dL Final   03/20/2024 1146 120 70 - 130 mg/dL Final   03/20/2024 0641 114 70 - 130 mg/dL Final   03/19/2024 2054 122 70 - 130 mg/dL Final       No radiology results for the last day    I have personally reviewed all medications:  Scheduled Medications  aspirin, 81 mg, Oral, Daily  atenolol, 50 mg, Oral, BID  budesonide-formoterol, 2 puff, Inhalation, BID  busPIRone, 10 mg, Oral, TID  cefTRIAXone, 2,000 mg, Intravenous, Q24H  digoxin, 125 mcg, Oral, Daily  enoxaparin, 1 mg/kg, Subcutaneous, Q12H  finasteride, 5 mg, Oral, Daily  folic acid, 1 mg, Oral, Daily  furosemide, 80 mg, Oral, BID  insulin lispro, 2-7 Units, Subcutaneous, 4x Daily AC & at Bedtime  ipratropium-albuterol, 3 mL, Nebulization, 4x Daily - RT  lisinopril, 40 mg, Oral, Daily  mirtazapine, 30 mg, Oral, Nightly  pantoprazole, 40 mg, Oral, Q AM  senna-docusate sodium, 2 tablet, Oral, BID  sodium chloride, 10 mL, Intravenous, Q12H  spironolactone, 25 mg, Oral, Daily  thiamine, 100 mg, Oral, Daily    Infusions   Diet  Diet: Cardiac, Diabetic; Healthy Heart (2-3 Na+); Consistent Carbohydrate; Fluid Consistency: Thin (IDDSI 0)  NPO Diet NPO Type: Strict NPO    I have personally reviewed:  [x]  Laboratory   []  Microbiology   []  Radiology   [x]  EKG/Telemetry  []  Cardiology/Vascular   []  Pathology    []  Records       Assessment/Plan     Active Hospital Problems    Diagnosis  POA    **Acute on chronic systolic CHF (congestive heart failure) [I50.23]  Yes    Hypoxemia [R09.02]  Yes    Iron deficiency anemia [D50.9]  Yes    Diabetes mellitus, type 2 [E11.9]  Yes    Heart failure [I50.9]  Yes    Essential hypertension [I10]  Yes    ICD (implantable cardioverter-defibrillator) in place [Z95.810]  Yes    Atrial  flutter [I48.92]  Yes    COPD (chronic obstructive pulmonary disease) [J44.9]  Yes      Resolved Hospital Problems   No resolved problems to display.     64-year-old gentleman with a history of chronic systolic heart failure, COPD, previous VFib arrest with AICD, PAF on anticoagulation, hypertension, obesity, hyperlipidemia, and poor medical compliance, who presented with increasing shortness of breath and was found to have decompensated heart failure and pulmonary edema.     Acute on chronic systolic CHF  -Continue Lasix and Aldactone per Card  -Repeat Echo 3/16 showed EF of 45%  -Card following     Typical atrial flutter with RVR  Paroxysmal atrial fibrillation  History of V-fib cardiac arrest 2021  AICD  -Continue Digoxin, metoprolol changed to atenolol 3/20 by Card  -holding AC secondary to iron deficiency anemia/GI bleed (of note was not taking Eliquis consistently prior to admission as was not able to afford)  -HRs still high  -Card has started Lovenox now and plans LILA with cardioversion tomorrow     Pneumonia  Leukocytosis  -WBC trended up to 14.68 on 3/17 from 6.44   -Initial chest x-ray showed edema vs possible pneumonia  -Repeat chest x-ray 3/17 showed lateral alveolar and interstitial  infiltrates again  -Initiated IV ceftriaxone for pneumonia on 3/17  -WBC normalized, afebrile, RVP neg, blood cultures not sent for some reason     CAD  -Continue ASA, lisinopril, and atenolol  -Monitor for signs of recurrent bleeding on ASA and full AC     Iron deficiency anemia  Rectal bleeding  -Patient reported intermittent bright red blood per rectum with bowel movements  -Iron panel consistent with significant iron deficiency with iron saturation of 4 and ferritin of 13.6  -S/p 3 doses IV iron  -GI evaluated, recommended EGD and colonoscopy however patient declined   -On IV PPI initially, now transitioned to PO Protonix daily  -Hgb stable/improved  -Aspirin resumed 3/18  -Lovenox started 3/20 by Card  -monitoring for  recurrent signs of bleeding     Essential hypertension  -BPs acceptable on current regimen     COPD  Hypoxemia  -Continue Symbicort and scheduled DuoNebs  -Incentive spirometer  -Weaned to RA but now back on 2L/min  -O2 saturation goal 88% and above     Type II DM  -A1c 6.6, previously was 6.2  -Continue SSI, requiring very little insulin here--suspect would do well with OHG  -Continue diabetic diet  -Diabetic Educator has met with pt     Alcohol use disorder  -CIWA protocol in place  -Continue vitamin replacement  -CIWA scores remain low     Major depressive disorder  -Psychiatry following  -Mirtazapine increased to 30 mg nightly  -Buspar added  -PRN Atarax added  -Seems to be doing better     Prominent hilar lymph nodes seen on CT  -Follow-up short-term CT in 3 to 6 months given advanced background emphysematous changes  -This was discussed with pt     Aortic root dilation.   -CT scan showed there is dilatation of the aortic root measuring up to 4 cm.   -Will need outpatient surveillance    Tooth loss  -no bleeding or pain or evidence of infection  -root visible in socket  -no retained tissue in tooth  -looks as if tooth was no longer viable  -will ask OMFS opinion  -pt not interested in placing tooth back in socket as he would then not be able to eat  -procedure unlikely while admitted given other issues and full AC  -may just have to f/u as outpt for prosthetic        Lovenox should suffice for DVT prophylaxis.  Full code confirmed.  Discussed with patient and Dr. Beatty.  Discussed with CCP and RN at multidisciplinary rounds  Disposition TBD        Abdoulaye Gómez MD  [facility]  03/21/24  11:54 EDT

## 2024-03-21 NOTE — PLAN OF CARE
Goal Outcome Evaluation:            Pt alert, oriented x4, sinus rhythm / afib on monitor, room air during day shift. Pt schedule to Cardioversion and LILA tomorrow, NPO after midnight. Continues with Lovenox and IV Abt. Plan of Care on going.

## 2024-03-22 ENCOUNTER — APPOINTMENT (OUTPATIENT)
Dept: POSTOP/PACU | Facility: HOSPITAL | Age: 64
DRG: 291 | End: 2024-03-22
Payer: MEDICARE

## 2024-03-22 ENCOUNTER — ANESTHESIA (OUTPATIENT)
Dept: POSTOP/PACU | Facility: HOSPITAL | Age: 64
End: 2024-03-22
Payer: MEDICARE

## 2024-03-22 ENCOUNTER — ANESTHESIA EVENT (OUTPATIENT)
Dept: POSTOP/PACU | Facility: HOSPITAL | Age: 64
End: 2024-03-22
Payer: MEDICARE

## 2024-03-22 VITALS — SYSTOLIC BLOOD PRESSURE: 120 MMHG | DIASTOLIC BLOOD PRESSURE: 63 MMHG | HEART RATE: 85 BPM | OXYGEN SATURATION: 98 %

## 2024-03-22 LAB
ANION GAP SERPL CALCULATED.3IONS-SCNC: 13.7 MMOL/L (ref 5–15)
BUN SERPL-MCNC: 37 MG/DL (ref 8–23)
BUN/CREAT SERPL: 23.3 (ref 7–25)
CALCIUM SPEC-SCNC: 9.9 MG/DL (ref 8.6–10.5)
CHLORIDE SERPL-SCNC: 99 MMOL/L (ref 98–107)
CO2 SERPL-SCNC: 27.3 MMOL/L (ref 22–29)
CREAT SERPL-MCNC: 1.59 MG/DL (ref 0.76–1.27)
DEPRECATED RDW RBC AUTO: 46.3 FL (ref 37–54)
EGFRCR SERPLBLD CKD-EPI 2021: 47.6 ML/MIN/1.73
ERYTHROCYTE [DISTWIDTH] IN BLOOD BY AUTOMATED COUNT: 16.8 % (ref 12.3–15.4)
GLUCOSE BLDC GLUCOMTR-MCNC: 113 MG/DL (ref 70–130)
GLUCOSE BLDC GLUCOMTR-MCNC: 131 MG/DL (ref 70–130)
GLUCOSE BLDC GLUCOMTR-MCNC: 157 MG/DL (ref 70–130)
GLUCOSE SERPL-MCNC: 146 MG/DL (ref 65–99)
HCT VFR BLD AUTO: 39 % (ref 37.5–51)
HGB BLD-MCNC: 11.4 G/DL (ref 13–17.7)
MAGNESIUM SERPL-MCNC: 2.6 MG/DL (ref 1.6–2.4)
MCH RBC QN AUTO: 22.8 PG (ref 26.6–33)
MCHC RBC AUTO-ENTMCNC: 29.2 G/DL (ref 31.5–35.7)
MCV RBC AUTO: 78 FL (ref 79–97)
PHOSPHATE SERPL-MCNC: 4.9 MG/DL (ref 2.5–4.5)
PLATELET # BLD AUTO: 326 10*3/MM3 (ref 140–450)
PMV BLD AUTO: 9.7 FL (ref 6–12)
POTASSIUM SERPL-SCNC: 4.3 MMOL/L (ref 3.5–5.2)
QT INTERVAL: 358 MS
QT INTERVAL: 399 MS
QTC INTERVAL: 421 MS
QTC INTERVAL: 461 MS
RBC # BLD AUTO: 5 10*6/MM3 (ref 4.14–5.8)
SODIUM SERPL-SCNC: 140 MMOL/L (ref 136–145)
WBC NRBC COR # BLD AUTO: 9.21 10*3/MM3 (ref 3.4–10.8)

## 2024-03-22 PROCEDURE — 84100 ASSAY OF PHOSPHORUS: CPT | Performed by: STUDENT IN AN ORGANIZED HEALTH CARE EDUCATION/TRAINING PROGRAM

## 2024-03-22 PROCEDURE — 94799 UNLISTED PULMONARY SVC/PX: CPT

## 2024-03-22 PROCEDURE — 25010000002 ENOXAPARIN PER 10 MG: Performed by: INTERNAL MEDICINE

## 2024-03-22 PROCEDURE — 92960 CARDIOVERSION ELECTRIC EXT: CPT | Performed by: INTERNAL MEDICINE

## 2024-03-22 PROCEDURE — 93312 ECHO TRANSESOPHAGEAL: CPT | Performed by: INTERNAL MEDICINE

## 2024-03-22 PROCEDURE — 25010000002 LIDOCAINE 1 % SOLUTION

## 2024-03-22 PROCEDURE — 93320 DOPPLER ECHO COMPLETE: CPT | Performed by: INTERNAL MEDICINE

## 2024-03-22 PROCEDURE — 82948 REAGENT STRIP/BLOOD GLUCOSE: CPT

## 2024-03-22 PROCEDURE — 93325 DOPPLER ECHO COLOR FLOW MAPG: CPT | Performed by: INTERNAL MEDICINE

## 2024-03-22 PROCEDURE — 83735 ASSAY OF MAGNESIUM: CPT | Performed by: STUDENT IN AN ORGANIZED HEALTH CARE EDUCATION/TRAINING PROGRAM

## 2024-03-22 PROCEDURE — 5A2204Z RESTORATION OF CARDIAC RHYTHM, SINGLE: ICD-10-PCS | Performed by: INTERNAL MEDICINE

## 2024-03-22 PROCEDURE — 93010 ELECTROCARDIOGRAM REPORT: CPT | Performed by: INTERNAL MEDICINE

## 2024-03-22 PROCEDURE — 93320 DOPPLER ECHO COMPLETE: CPT

## 2024-03-22 PROCEDURE — 85027 COMPLETE CBC AUTOMATED: CPT | Performed by: STUDENT IN AN ORGANIZED HEALTH CARE EDUCATION/TRAINING PROGRAM

## 2024-03-22 PROCEDURE — 93312 ECHO TRANSESOPHAGEAL: CPT

## 2024-03-22 PROCEDURE — 94618 PULMONARY STRESS TESTING: CPT

## 2024-03-22 PROCEDURE — 99232 SBSQ HOSP IP/OBS MODERATE 35: CPT | Performed by: INTERNAL MEDICINE

## 2024-03-22 PROCEDURE — 93325 DOPPLER ECHO COLOR FLOW MAPG: CPT

## 2024-03-22 PROCEDURE — 25810000003 LACTATED RINGERS PER 1000 ML

## 2024-03-22 PROCEDURE — 25010000002 PROPOFOL 10 MG/ML EMULSION

## 2024-03-22 PROCEDURE — 92960 CARDIOVERSION ELECTRIC EXT: CPT

## 2024-03-22 PROCEDURE — 80048 BASIC METABOLIC PNL TOTAL CA: CPT | Performed by: STUDENT IN AN ORGANIZED HEALTH CARE EDUCATION/TRAINING PROGRAM

## 2024-03-22 PROCEDURE — 93005 ELECTROCARDIOGRAM TRACING: CPT | Performed by: INTERNAL MEDICINE

## 2024-03-22 RX ORDER — ATENOLOL 25 MG/1
25 TABLET ORAL 2 TIMES DAILY
Status: DISCONTINUED | OUTPATIENT
Start: 2024-03-22 | End: 2024-03-23 | Stop reason: HOSPADM

## 2024-03-22 RX ORDER — SODIUM CHLORIDE, SODIUM LACTATE, POTASSIUM CHLORIDE, CALCIUM CHLORIDE 600; 310; 30; 20 MG/100ML; MG/100ML; MG/100ML; MG/100ML
INJECTION, SOLUTION INTRAVENOUS CONTINUOUS PRN
Status: DISCONTINUED | OUTPATIENT
Start: 2024-03-22 | End: 2024-03-22 | Stop reason: SURG

## 2024-03-22 RX ORDER — PROPOFOL 10 MG/ML
VIAL (ML) INTRAVENOUS AS NEEDED
Status: DISCONTINUED | OUTPATIENT
Start: 2024-03-22 | End: 2024-03-22 | Stop reason: SURG

## 2024-03-22 RX ORDER — LIDOCAINE HYDROCHLORIDE 10 MG/ML
INJECTION, SOLUTION INFILTRATION; PERINEURAL AS NEEDED
Status: DISCONTINUED | OUTPATIENT
Start: 2024-03-22 | End: 2024-03-22 | Stop reason: SURG

## 2024-03-22 RX ADMIN — APIXABAN 5 MG: 5 TABLET, FILM COATED ORAL at 11:24

## 2024-03-22 RX ADMIN — Medication 10 ML: at 20:47

## 2024-03-22 RX ADMIN — ATENOLOL 25 MG: 25 TABLET ORAL at 20:47

## 2024-03-22 RX ADMIN — BUSPIRONE HYDROCHLORIDE 10 MG: 10 TABLET ORAL at 18:25

## 2024-03-22 RX ADMIN — FOLIC ACID 1 MG: 1 TABLET ORAL at 10:00

## 2024-03-22 RX ADMIN — ACETAMINOPHEN 325MG 650 MG: 325 TABLET ORAL at 13:01

## 2024-03-22 RX ADMIN — BUSPIRONE HYDROCHLORIDE 10 MG: 10 TABLET ORAL at 10:00

## 2024-03-22 RX ADMIN — DIGOXIN 125 MCG: 125 TABLET ORAL at 11:52

## 2024-03-22 RX ADMIN — Medication 10 ML: at 10:02

## 2024-03-22 RX ADMIN — BUDESONIDE AND FORMOTEROL FUMARATE DIHYDRATE 2 PUFF: 160; 4.5 AEROSOL RESPIRATORY (INHALATION) at 21:13

## 2024-03-22 RX ADMIN — PROPOFOL 200 MCG/KG/MIN: 10 INJECTION, EMULSION INTRAVENOUS at 07:24

## 2024-03-22 RX ADMIN — MIRTAZAPINE 30 MG: 30 TABLET, FILM COATED ORAL at 20:47

## 2024-03-22 RX ADMIN — ENOXAPARIN SODIUM 105 MG: 150 INJECTION SUBCUTANEOUS at 03:41

## 2024-03-22 RX ADMIN — IPRATROPIUM BROMIDE AND ALBUTEROL SULFATE 3 ML: 2.5; .5 SOLUTION RESPIRATORY (INHALATION) at 21:03

## 2024-03-22 RX ADMIN — LIDOCAINE HYDROCHLORIDE 50 ML: 10 INJECTION, SOLUTION INFILTRATION; PERINEURAL at 07:23

## 2024-03-22 RX ADMIN — IPRATROPIUM BROMIDE AND ALBUTEROL SULFATE 3 ML: 2.5; .5 SOLUTION RESPIRATORY (INHALATION) at 15:18

## 2024-03-22 RX ADMIN — SPIRONOLACTONE 25 MG: 25 TABLET ORAL at 10:00

## 2024-03-22 RX ADMIN — ASPIRIN 81 MG: 81 TABLET, COATED ORAL at 10:00

## 2024-03-22 RX ADMIN — FINASTERIDE 5 MG: 5 TABLET, FILM COATED ORAL at 10:00

## 2024-03-22 RX ADMIN — Medication 100 MG: at 10:00

## 2024-03-22 RX ADMIN — APIXABAN 5 MG: 5 TABLET, FILM COATED ORAL at 20:47

## 2024-03-22 RX ADMIN — SODIUM CHLORIDE, SODIUM LACTATE, POTASSIUM CHLORIDE, AND CALCIUM CHLORIDE: 600; 310; 30; 20 INJECTION, SOLUTION INTRAVENOUS at 06:58

## 2024-03-22 RX ADMIN — BUSPIRONE HYDROCHLORIDE 10 MG: 10 TABLET ORAL at 20:47

## 2024-03-22 RX ADMIN — HYDROCODONE BITARTRATE AND ACETAMINOPHEN 1 TABLET: 5; 325 TABLET ORAL at 20:50

## 2024-03-22 RX ADMIN — HYDROXYZINE PAMOATE 50 MG: 50 CAPSULE ORAL at 13:01

## 2024-03-22 RX ADMIN — PROPOFOL 100 MG: 10 INJECTION, EMULSION INTRAVENOUS at 07:23

## 2024-03-22 NOTE — PROGRESS NOTES
"CC: Congestive heart failure/atrial fibrillation    Interval History: No new acute events overnight      Vital Signs  Temp:  [97.9 °F (36.6 °C)-98.8 °F (37.1 °C)] 98.6 °F (37 °C)  Heart Rate:  [] 78  Resp:  [16-23] 20  BP: ()/() 97/56    Intake/Output Summary (Last 24 hours) at 3/22/2024 0924  Last data filed at 3/22/2024 0757  Gross per 24 hour   Intake 200 ml   Output --   Net 200 ml     Flowsheet Rows      Flowsheet Row First Filed Value   Admission Height 167.6 cm (65.98\") Documented at 03/15/2024 1026   Admission Weight 110 kg (242 lb 8.1 oz) Documented at 03/15/2024 1026            PHYSICAL EXAM:  General: No acute distress  Resp:NL Rate, symmetric chest expansion,unlabored, clear  CV:NL rate and rhythm, NL PMI, NL S1 and S2, no Murmur, no gallop, no rub, No JVD.   ABD:Nl sounds, no masses or tenderness, nondistended, no guarding or rebound  Neuro: alert,cooperative and oriented  Extr:Normal pedal pulses, No edema or cyanosis, moves all extremities      Results Review:    Results from last 7 days   Lab Units 03/22/24  0226   SODIUM mmol/L 140   POTASSIUM mmol/L 4.3   CHLORIDE mmol/L 99   CO2 mmol/L 27.3   BUN mg/dL 37*   CREATININE mg/dL 1.59*   GLUCOSE mg/dL 146*   CALCIUM mg/dL 9.9     Results from last 7 days   Lab Units 03/15/24  1231 03/15/24  1011   HSTROP T ng/L 33* 34*     Results from last 7 days   Lab Units 03/22/24  0226   WBC 10*3/mm3 9.21   HEMOGLOBIN g/dL 11.4*   HEMATOCRIT % 39.0   PLATELETS 10*3/mm3 326             Results from last 7 days   Lab Units 03/22/24  0226   MAGNESIUM mg/dL 2.6*         I reviewed the patient's new clinical results.  I personally viewed and interpreted the patient's EKG/Telemetry data        Medication Review:   Meds reviewed           Assessment & Plan  Acute on chronic diastolic congestive heart failure.  Recent echo shows preserved left ventricular ejection fraction.  Acute hypoxic respiratory failure CHF  History of ventricular fibrillation and " cardiac arrest April 2021 status post biotronik single chamber ICD   Paroxysmal atrial fibrillation - persistent since here. Has not had much AF device interrogations.   Chronic anticoagulation with rivaroxaban -consistent use  Coronary artery disease with proximal  of the RCA and  of the OM2. Attempted at percutaneous intervention were unsuccessful.   Daily alcohol use  Bright red blood per rectum - he declined EGD and colonoscopy-done for iron deficiency.  Patient reports he noted bright red bleeding following laxative use and thinks is from his rectum.  COPD without acute exacerbation  Morbid obesity  Diabetes mellitus type II   Hypertension  Dilated aortic room measuring 4 cm.   Typical atrial flutter with rapid ventricular response     Mr. Segal is successful to get a cardioversion today.  He complained of loose teeth after completion of LILA.  He lost a tooth spontaneously yesterday morning.  He is going to see his dentist.  He reports feeling better/better breathing this morning.  BP low after propofol-hold antihypertensive and diuretic while monitoring blood pressure.  Continue anticoagulation  He dropped O2 sats during LILA and procedure interrupted and patient was bagged.  Procedure resumed after his sats returned to normal.  He has significant GUTIERREZ that needs outpatient evaluation treatment.  Mild bump in creatinine-hold diuretic        Orlin Beatty MD  03/22/24  09:24 EDT

## 2024-03-22 NOTE — PROGRESS NOTES
Exercise Oximetry    Patient Name:Sebastien Tang   MRN: [id number]   Date: 03/22/24             ROOM AIR BASELINE   SpO2% 94   Heart Rate 88   Blood Pressure      EXERCISE ON ROOM AIR SpO2% EXERCISE ON O2 @ LPM SpO2%   1 MINUTE 93 1 MINUTE    2 MINUTES 94 2 MINUTES    3 MINUTES 96 3 MINUTES    4 MINUTES 97 4 MINUTES    5 MINUTES 98 5 MINUTES    6 MINUTES 99 6 MINUTES               Distance Walked   Distance Walked   Dyspnea (Na Scale)   Dyspnea (Na Scale)   Fatigue (Na Scale)   Fatigue (Na Scale)   SpO2% Post Exercise   SpO2% Post Exercise   HR Post Exercise  84 HR Post Exercise   Time to Recovery  1 enedina Time to Recovery     Comments: o2 sats on room air with forehead probe, with ambulation on room air, o2 sats 93% to 99% on room air. Walked at a brisk pace with no problems. Gely Torres RRT

## 2024-03-22 NOTE — ANESTHESIA POSTPROCEDURE EVALUATION
Patient: Sebastien Tang    Procedure Summary       Date: 03/22/24 Room / Location: [facility] PACU    Anesthesia Start: 0658 Anesthesia Stop: 0757    Procedures:       ADULT TRANSESOPHAGEAL ECHO (LILA) W/ CONT IF NECESSARY PER PROTOCOL      CARDIOVERSION EXTERNAL IN CARDIOLOGY DEPARTMENT Diagnosis:       (Cardiac Source of Emboli)      (Afib)    Scheduled Providers: Orlin Beatty MD Provider: Gavino Neil MD    Anesthesia Type: MAC ASA Status: 3            Anesthesia Type: MAC    Vitals  Vitals Value Taken Time   /52 03/22/24 0925   Temp     Pulse 77 03/22/24 0925   Resp 20 03/22/24 0925   SpO2 97 % 03/22/24 0925           Anesthesia Post Evaluation

## 2024-03-22 NOTE — DISCHARGE PLACEMENT REQUEST
"Sebastien Prabhakar (64 y.o. Male)       Date of Birth   1960    Social Security Number       Address   [address] [place] [id number]    Home Phone   [phone]    MRN   [id number]       Yazidism   None    Marital Status   Single                            Admission Date   3/15/24    Admission Type   Emergency    Admitting Provider   Tato Kerr MD    Attending Provider   Abdoulaye Gómez MD    Department, Room/Bed   [address], [address]       Discharge Date       Discharge Disposition       Discharge Destination                                 Attending Provider: Abdoulaye Gómez MD    Allergies: No Known Allergies    Isolation: None   Infection: MRSA/History Only (04/04/21)   Code Status: CPR    Ht: 165.1 cm (65\")   Wt: 107 kg (235 lb 3.2 oz)    Admission Cmt: None   Principal Problem: Acute on chronic systolic CHF (congestive heart failure) [I50.23]                   Active Insurance as of 3/15/2024       Primary Coverage       Payor Plan Insurance Group Employer/Plan Group    AETNA MEDICARE REPLACEMENT AETNA MED ADV HMO [id number]       Payor Plan Address Payor Plan Phone Number Payor Plan Fax Number Effective Dates    [address] [phone]  1/1/2024 - None Entered    [place] [address]         Subscriber Name Subscriber Birth Date Member ID       SEBASTIEN PRABHAKAR 1960 [id number]                     Emergency Contacts        (Rel.) Home Phone Work Phone Mobile Phone    Zara Garcia (Daughter) [phone] -- [phone]    HenriKeithboone (Sister) -- -- [phone]                "

## 2024-03-22 NOTE — PROGRESS NOTES
Name: Sebastien Tang ADMIT: 3/15/2024   : 1960  PCP: Yuliana Flynn DO    MRN: [id number] LOS: 6 days   AGE/SEX: 64 y.o. male  ROOM: [facility]     Subjective   Subjective   Feeling fine today. Denies any MARTIN or SOA at rest. No CP or palp. No orthopnea. Voiding well. Tolerating diet. No cough. No F/C/NS.  Had another tooth fall out after LILA/CV this AM.       Objective   Objective   Vital Signs  Temp:  [98.6 °F (37 °C)-98.8 °F (37.1 °C)] 98.6 °F (37 °C)  Heart Rate:  [] 92  Resp:  [16-23] 18  BP: ()/() 129/81  SpO2:  [90 %-100 %] 93 %  on  Flow (L/min):  [2-10] 2;   Device (Oxygen Therapy): room air  Body mass index is 39.14 kg/m².    Physical Exam  Vitals and nursing note reviewed.   Constitutional:       General: He is not in acute distress.     Appearance: He is not ill-appearing, toxic-appearing or diaphoretic.   HENT:      Head: Normocephalic.      Nose: Nose normal.      Mouth/Throat:      Mouth: Mucous membranes are moist.      Dentition: Abnormal dentition (tooth out (see image)). No dental tenderness, gingival swelling, dental abscesses or gum lesions.      Pharynx: Oropharynx is clear.        Comments: All teeth are pretty worn  Pt now has an empty socket behind upper front tooth as above  Eyes:      General: No scleral icterus.        Right eye: No discharge.         Left eye: No discharge.      Conjunctiva/sclera: Conjunctivae normal.   Cardiovascular:      Rate and Rhythm: Normal rate and regular rhythm.      Pulses: Normal pulses.   Pulmonary:      Effort: Pulmonary effort is normal. No respiratory distress.      Breath sounds: Normal breath sounds. No wheezing or rales.      Comments: Anteriorly   Abdominal:      General: Bowel sounds are normal. There is no distension.      Palpations: Abdomen is soft.      Tenderness: There is no abdominal tenderness.   Musculoskeletal:         General: Swelling (trace in BLEs) present. Normal range of motion.      Cervical back: Neck  supple.   Skin:     General: Skin is warm and dry.      Capillary Refill: Capillary refill takes less than 2 seconds.      Coloration: Skin is not jaundiced.   Neurological:      General: No focal deficit present.      Mental Status: He is alert and oriented to person, place, and time. Mental status is at baseline.      Cranial Nerves: No cranial nerve deficit.      Coordination: Coordination normal.   Psychiatric:         Mood and Affect: Mood normal.         Behavior: Behavior normal.         Thought Content: Thought content normal.       Results Review     I reviewed the patient's new clinical results.  Results from last 7 days   Lab Units 03/22/24 0226 03/21/24 0247 03/20/24 0220 03/19/24  0539   WBC 10*3/mm3 9.21 9.51 10.28 8.48   HEMOGLOBIN g/dL 11.4* 11.8* 11.8* 11.5*   PLATELETS 10*3/mm3 326 338 338 354     Results from last 7 days   Lab Units 03/22/24 0226 03/21/24  0247 03/20/24 0220 03/19/24  0539   SODIUM mmol/L 140 141 142 141   POTASSIUM mmol/L 4.3 4.5 4.0 4.3   CHLORIDE mmol/L 99 99 99 102   CO2 mmol/L 27.3 30.6* 32.0* 28.9   BUN mg/dL 37* 24* 21 18   CREATININE mg/dL 1.59* 1.01 1.19 0.89   GLUCOSE mg/dL 146* 112* 101* 104*   EGFR mL/min/1.73 47.6* 82.0 67.4 94.5     Results from last 7 days   Lab Units 03/20/24  0220   ALBUMIN g/dL 4.1   BILIRUBIN mg/dL 0.3   ALK PHOS U/L 70   AST (SGOT) U/L 48*   ALT (SGPT) U/L 59*     Results from last 7 days   Lab Units 03/22/24 0226 03/21/24  0247 03/20/24 0220 03/19/24  0539   CALCIUM mg/dL 9.9 10.4 9.5 9.6   ALBUMIN g/dL  --   --  4.1  --    MAGNESIUM mg/dL 2.6* 2.6* 2.1 2.4   PHOSPHORUS mg/dL 4.9* 5.3* 4.6* 4.4     Results from last 7 days   Lab Units 03/17/24  0000   PROCALCITONIN ng/mL 0.08     Glucose   Date/Time Value Ref Range Status   03/22/2024 0543 113 70 - 130 mg/dL Final   03/21/2024 2131 158 (H) 70 - 130 mg/dL Final   03/21/2024 1627 151 (H) 70 - 130 mg/dL Final   03/21/2024 1112 154 (H) 70 - 130 mg/dL Final   03/21/2024 0608 113 70 - 130  mg/dL Final   03/20/2024 2056 101 70 - 130 mg/dL Final   03/20/2024 1610 161 (H) 70 - 130 mg/dL Final       No radiology results for the last day    I have personally reviewed all medications:  Scheduled Medications  apixaban, 5 mg, Oral, Q12H  aspirin, 81 mg, Oral, Daily  budesonide-formoterol, 2 puff, Inhalation, BID  busPIRone, 10 mg, Oral, TID  digoxin, 125 mcg, Oral, Daily  finasteride, 5 mg, Oral, Daily  folic acid, 1 mg, Oral, Daily  insulin lispro, 2-7 Units, Subcutaneous, 4x Daily AC & at Bedtime  ipratropium-albuterol, 3 mL, Nebulization, 4x Daily - RT  mirtazapine, 30 mg, Oral, Nightly  pantoprazole, 40 mg, Oral, Q AM  senna-docusate sodium, 2 tablet, Oral, BID  sodium chloride, 10 mL, Intravenous, Q12H  spironolactone, 25 mg, Oral, Daily  thiamine, 100 mg, Oral, Daily    Infusions   Diet  NPO Diet NPO Type: Strict NPO    I have personally reviewed:  [x]  Laboratory   []  Microbiology   []  Radiology   [x]  EKG/Telemetry  []  Cardiology/Vascular   []  Pathology    []  Records       Assessment/Plan     Active Hospital Problems    Diagnosis  POA    **Acute on chronic systolic CHF (congestive heart failure) [I50.23]  Yes    Hypoxemia [R09.02]  Yes    Iron deficiency anemia [D50.9]  Yes    Diabetes mellitus, type 2 [E11.9]  Yes    Heart failure [I50.9]  Yes    Essential hypertension [I10]  Yes    ICD (implantable cardioverter-defibrillator) in place [Z95.810]  Yes    Atrial flutter [I48.92]  Yes    COPD (chronic obstructive pulmonary disease) [J44.9]  Yes      Resolved Hospital Problems   No resolved problems to display.     64-year-old gentleman with a history of chronic systolic heart failure, COPD, previous VFib arrest with AICD, PAF on anticoagulation, hypertension, obesity, hyperlipidemia, and poor medical compliance, who presented with increasing shortness of breath and was found to have decompensated heart failure and pulmonary edema.     Acute on chronic systolic CHF  -Continue Aldactone per  Card  -Lasix stopped due to hypotension and elevated Cr this AM  -Repeat Echo 3/16 showed EF of 45%  -Card following     Typical atrial flutter with RVR  Paroxysmal atrial fibrillation  History of V-fib cardiac arrest 2021  AICD  -Continue Digoxin  -Atenolol stopped this AM due to hypotension  -holding AC initially secondary to iron deficiency anemia/GI bleed (of note was not taking Eliquis consistently prior to admission as was not able to afford)  -Lovenox started per Card  -S/p LILA/CV this AM, back in NSR now  -Change Lovenox to Eliquis (pt already has at home)    ANDIE  Cr up sharply today  Suspect due to combination of diuresis and hypotension  Lasix and Atenolol stopped  BP improved  Recheck in AM     Pneumonia  Leukocytosis  -WBC trended up to 14.68 on 3/17 from 6.44   -Initial chest x-ray showed edema vs possible pneumonia  -Repeat chest x-ray 3/17 showed lateral alveolar and interstitial  infiltrates again  -Initiated IV ceftriaxone for pneumonia on 3/17--has completed 5d course  -WBC normalized, afebrile, RVP neg, blood cultures not sent for some reason     CAD  -Continue ASA and lisinopril  -Monitor for signs of recurrent bleeding on ASA and full AC     Iron deficiency anemia  Rectal bleeding  -Patient reported intermittent bright red blood per rectum with bowel movements  -Iron panel consistent with significant iron deficiency with iron saturation of 4 and ferritin of 13.6  -S/p 3 doses IV iron  -GI evaluated, recommended EGD and colonoscopy however patient declined   -On IV PPI initially, now transitioned to PO Protonix daily  -Hgb stable/improved  -Aspirin resumed 3/18  -Lovenox started 3/20 by Card, changed to Eliquis today  -monitoring for recurrent signs of bleeding     Essential hypertension  -BPs very low today  -Atenolol stopped by Card  -Continue to monitor     COPD  Hypoxemia  -Continue Symbicort and scheduled DuoNebs  -Incentive spirometer  -Weaned to RA now  -Suspect sleep apnea--will need  outpt PSG  -Checking walking oximetry today     Type II DM  -A1c 6.6, previously was 6.2  -Continue SSI, requiring very little insulin here--suspect would do well with OHG at [place]  -Continue diabetic diet  -Diabetic Educator has met with pt     Alcohol use disorder  -CIWA protocol in place  -Continue vitamin replacement  -CIWA scores remain low     Major depressive disorder  -Psychiatry following  -Mirtazapine increased to 30 mg nightly  -Buspar added  -PRN Atarax added  -Seems to be doing better     Prominent hilar lymph nodes seen on CT  -Follow-up short-term CT in 3 to 6 months given advanced background emphysematous changes  -This was discussed with pt     Aortic root dilation.   -CT scan showed there is dilatation of the aortic root measuring up to 4 cm.   -Will need outpatient surveillance    Tooth loss  -no bleeding or pain or evidence of infection  -looks as if teeth were no longer viable  -OM[facility] ([facility]) reviewed case and suggested outpt f/u only  -pt has made appt with his dentist for 3/25        Lovenox should suffice for DVT prophylaxis.  Full code confirmed.  Discussed with patient and Dr. Beatty.  Discussed with CCP and RN at multidisciplinary rounds  Disposition TBD--likely home tomorrow if Cr improved and BPs better.        Abdoulaye Gómez MD  [facility]  03/22/24  13:27 EDT

## 2024-03-22 NOTE — CASE MANAGEMENT/SOCIAL WORK
Continued Stay Note  [facility]     Patient Name: Sebastien Tang  MRN: [id number]  Today's Date: 3/22/2024    Admit Date: 3/15/2024    Plan: Home with family, Tim for possible home O2.   Discharge Plan       Row Name 03/22/24 1147       Plan    Plan Home with family, Tim for possible home O2.    Patient/Family in Agreement with Plan yes    Plan Comments Discussed in AM MD lexy plans to DC over the weekend and pt needs a walking OX. CCP s/w pt via phone to discuss DME provider for possible home O2; pt denies preference of DME company. [facility]ep[facility] notified and will follow for home O2 needs. Guido COLLADO/CCP                   Discharge Codes    No documentation.                 Expected Discharge Date and Time       Expected Discharge Date Expected Discharge Time    Mar 22, 2024               Dania Rodrigues RN

## 2024-03-22 NOTE — NURSING NOTE
"[facility] follow-up d/t anxiety.    Patient sitting on side of bed. When asked how he was doing the patient answered \"not worth a damn.\" The patient voiced frustration d/t having dental issues but being told he would have to take care of it as an outpatient. The patient showed this writer a piece of his tooth that broke off. The patient voiced he lost another tooth recently. The patient also talked about being told he has sleep apnea and will need oxygen. The patient stated his sleep has been good. The patient recently had his Remeron increased by the psychiatrist to 30mg nightly. The patient was informed he has PRN hydroxyzine for anxiety. The patient voiced he would like some now and the patient's nurse was updated on the patient's request. The patient was joking and smiling some and stated he is trying to use humor to help cope. The patient acknowledged receiving resources from [facility] and denied any questions. Access following.   "

## 2024-03-23 ENCOUNTER — READMISSION MANAGEMENT (OUTPATIENT)
Dept: CALL CENTER | Facility: HOSPITAL | Age: 64
End: 2024-03-23
Payer: MEDICARE

## 2024-03-23 VITALS
BODY MASS INDEX: 39.97 KG/M2 | OXYGEN SATURATION: 97 % | TEMPERATURE: 99 F | HEART RATE: 70 BPM | RESPIRATION RATE: 18 BRPM | HEIGHT: 65 IN | SYSTOLIC BLOOD PRESSURE: 116 MMHG | WEIGHT: 239.9 LBS | DIASTOLIC BLOOD PRESSURE: 79 MMHG

## 2024-03-23 LAB
ANION GAP SERPL CALCULATED.3IONS-SCNC: 13 MMOL/L (ref 5–15)
BUN SERPL-MCNC: 30 MG/DL (ref 8–23)
BUN/CREAT SERPL: 31.9 (ref 7–25)
CALCIUM SPEC-SCNC: 9.7 MG/DL (ref 8.6–10.5)
CHLORIDE SERPL-SCNC: 104 MMOL/L (ref 98–107)
CO2 SERPL-SCNC: 24 MMOL/L (ref 22–29)
CREAT SERPL-MCNC: 0.94 MG/DL (ref 0.76–1.27)
DEPRECATED RDW RBC AUTO: 46.4 FL (ref 37–54)
EGFRCR SERPLBLD CKD-EPI 2021: 89.4 ML/MIN/1.73
ERYTHROCYTE [DISTWIDTH] IN BLOOD BY AUTOMATED COUNT: 17.3 % (ref 12.3–15.4)
GLUCOSE BLDC GLUCOMTR-MCNC: 163 MG/DL (ref 70–130)
GLUCOSE SERPL-MCNC: 130 MG/DL (ref 65–99)
HCT VFR BLD AUTO: 39.2 % (ref 37.5–51)
HGB BLD-MCNC: 11.6 G/DL (ref 13–17.7)
MAGNESIUM SERPL-MCNC: 2.8 MG/DL (ref 1.6–2.4)
MCH RBC QN AUTO: 23.4 PG (ref 26.6–33)
MCHC RBC AUTO-ENTMCNC: 29.6 G/DL (ref 31.5–35.7)
MCV RBC AUTO: 79 FL (ref 79–97)
PHOSPHATE SERPL-MCNC: 3.9 MG/DL (ref 2.5–4.5)
PLATELET # BLD AUTO: 278 10*3/MM3 (ref 140–450)
PMV BLD AUTO: 9.8 FL (ref 6–12)
POTASSIUM SERPL-SCNC: 4.6 MMOL/L (ref 3.5–5.2)
RBC # BLD AUTO: 4.96 10*6/MM3 (ref 4.14–5.8)
SODIUM SERPL-SCNC: 141 MMOL/L (ref 136–145)
WBC NRBC COR # BLD AUTO: 7.37 10*3/MM3 (ref 3.4–10.8)

## 2024-03-23 PROCEDURE — 82948 REAGENT STRIP/BLOOD GLUCOSE: CPT

## 2024-03-23 PROCEDURE — 94799 UNLISTED PULMONARY SVC/PX: CPT

## 2024-03-23 PROCEDURE — 94761 N-INVAS EAR/PLS OXIMETRY MLT: CPT

## 2024-03-23 PROCEDURE — 84100 ASSAY OF PHOSPHORUS: CPT | Performed by: STUDENT IN AN ORGANIZED HEALTH CARE EDUCATION/TRAINING PROGRAM

## 2024-03-23 PROCEDURE — 63710000001 INSULIN LISPRO (HUMAN) PER 5 UNITS: Performed by: STUDENT IN AN ORGANIZED HEALTH CARE EDUCATION/TRAINING PROGRAM

## 2024-03-23 PROCEDURE — 80048 BASIC METABOLIC PNL TOTAL CA: CPT | Performed by: STUDENT IN AN ORGANIZED HEALTH CARE EDUCATION/TRAINING PROGRAM

## 2024-03-23 PROCEDURE — 83735 ASSAY OF MAGNESIUM: CPT | Performed by: STUDENT IN AN ORGANIZED HEALTH CARE EDUCATION/TRAINING PROGRAM

## 2024-03-23 PROCEDURE — 99232 SBSQ HOSP IP/OBS MODERATE 35: CPT | Performed by: INTERNAL MEDICINE

## 2024-03-23 PROCEDURE — 85027 COMPLETE CBC AUTOMATED: CPT | Performed by: STUDENT IN AN ORGANIZED HEALTH CARE EDUCATION/TRAINING PROGRAM

## 2024-03-23 PROCEDURE — 94664 DEMO&/EVAL PT USE INHALER: CPT

## 2024-03-23 RX ORDER — ATENOLOL 25 MG/1
25 TABLET ORAL 2 TIMES DAILY
Qty: 60 TABLET | Refills: 0 | Status: SHIPPED | OUTPATIENT
Start: 2024-03-23 | End: 2024-03-29

## 2024-03-23 RX ORDER — MIRTAZAPINE 30 MG/1
30 TABLET, FILM COATED ORAL NIGHTLY
Qty: 30 TABLET | Refills: 0 | Status: SHIPPED | OUTPATIENT
Start: 2024-03-23

## 2024-03-23 RX ORDER — SPIRONOLACTONE 25 MG/1
25 TABLET ORAL DAILY
Qty: 30 TABLET | Refills: 0 | Status: SHIPPED | OUTPATIENT
Start: 2024-03-24

## 2024-03-23 RX ORDER — PANTOPRAZOLE SODIUM 40 MG/1
40 TABLET, DELAYED RELEASE ORAL
Qty: 30 TABLET | Refills: 0 | Status: SHIPPED | OUTPATIENT
Start: 2024-03-24

## 2024-03-23 RX ORDER — DIGOXIN 125 MCG
125 TABLET ORAL
Qty: 30 TABLET | Refills: 0 | Status: SHIPPED | OUTPATIENT
Start: 2024-03-23

## 2024-03-23 RX ORDER — BUDESONIDE AND FORMOTEROL FUMARATE DIHYDRATE 160; 4.5 UG/1; UG/1
2 AEROSOL RESPIRATORY (INHALATION) 2 TIMES DAILY
Qty: 10.2 G | Refills: 0 | Status: SHIPPED | OUTPATIENT
Start: 2024-03-23

## 2024-03-23 RX ORDER — FUROSEMIDE 40 MG/1
40 TABLET ORAL DAILY
Status: DISCONTINUED | OUTPATIENT
Start: 2024-03-23 | End: 2024-03-23 | Stop reason: HOSPADM

## 2024-03-23 RX ORDER — BUSPIRONE HYDROCHLORIDE 10 MG/1
10 TABLET ORAL 3 TIMES DAILY
Qty: 90 TABLET | Refills: 0 | Status: SHIPPED | OUTPATIENT
Start: 2024-03-23

## 2024-03-23 RX ORDER — HYDROXYZINE PAMOATE 50 MG/1
50 CAPSULE ORAL EVERY 4 HOURS PRN
Qty: 30 CAPSULE | Refills: 0 | Status: SHIPPED | OUTPATIENT
Start: 2024-03-23

## 2024-03-23 RX ADMIN — Medication 100 MG: at 08:30

## 2024-03-23 RX ADMIN — BUDESONIDE AND FORMOTEROL FUMARATE DIHYDRATE 2 PUFF: 160; 4.5 AEROSOL RESPIRATORY (INHALATION) at 07:10

## 2024-03-23 RX ADMIN — INSULIN LISPRO 2 UNITS: 100 INJECTION, SOLUTION INTRAVENOUS; SUBCUTANEOUS at 08:31

## 2024-03-23 RX ADMIN — IPRATROPIUM BROMIDE AND ALBUTEROL SULFATE 3 ML: 2.5; .5 SOLUTION RESPIRATORY (INHALATION) at 07:10

## 2024-03-23 RX ADMIN — Medication 10 ML: at 08:09

## 2024-03-23 RX ADMIN — DIGOXIN 125 MCG: 125 TABLET ORAL at 12:19

## 2024-03-23 RX ADMIN — PANTOPRAZOLE SODIUM 40 MG: 40 TABLET, DELAYED RELEASE ORAL at 06:19

## 2024-03-23 RX ADMIN — BUSPIRONE HYDROCHLORIDE 10 MG: 10 TABLET ORAL at 08:31

## 2024-03-23 RX ADMIN — FINASTERIDE 5 MG: 5 TABLET, FILM COATED ORAL at 08:31

## 2024-03-23 RX ADMIN — ASPIRIN 81 MG: 81 TABLET, COATED ORAL at 08:30

## 2024-03-23 RX ADMIN — IPRATROPIUM BROMIDE AND ALBUTEROL SULFATE 3 ML: 2.5; .5 SOLUTION RESPIRATORY (INHALATION) at 10:48

## 2024-03-23 RX ADMIN — EMPAGLIFLOZIN 25 MG: 25 TABLET, FILM COATED ORAL at 12:19

## 2024-03-23 RX ADMIN — FOLIC ACID 1 MG: 1 TABLET ORAL at 08:31

## 2024-03-23 RX ADMIN — FUROSEMIDE 40 MG: 40 TABLET ORAL at 12:19

## 2024-03-23 RX ADMIN — ATENOLOL 25 MG: 25 TABLET ORAL at 08:30

## 2024-03-23 RX ADMIN — APIXABAN 5 MG: 5 TABLET, FILM COATED ORAL at 08:31

## 2024-03-23 RX ADMIN — ACETAMINOPHEN 325MG 650 MG: 325 TABLET ORAL at 08:37

## 2024-03-23 RX ADMIN — SPIRONOLACTONE 25 MG: 25 TABLET ORAL at 08:31

## 2024-03-23 NOTE — OUTREACH NOTE
Prep Survey      Flowsheet Row Responses   [facility] facility patient discharged from? [place]   Is LACE score < 7 ? No   Eligibility [facility]   Date of Admission 03/15/24   Date of Discharge 03/23/24   Discharge Disposition Home or Self Care   Discharge diagnosis a/c CHF   Does the patient have one of the following disease processes/diagnoses(primary or secondary)? CHF   Does the patient have Home health ordered? No   Is there a DME ordered? Yes   What DME was ordered? [place] for home O2   Prep survey completed? Yes            DOMINIC SUGGS - Registered Nurse

## 2024-03-23 NOTE — CASE MANAGEMENT/SOCIAL WORK
Continued Stay Note  [facility]     Patient Name: Sebastien Tang  MRN: [id number]  Today's Date: 3/23/2024    Admit Date: 3/15/2024    Plan: Plan home with family.  GABE Cortes RN   Discharge Plan       Row Name 03/23/24 4165       Plan    Plan Plan home with family.  GABE Cortes RN    Patient/Family in Agreement with Plan yes    Plan Comments Pharmacy called and states pt cannot afford his medications. Called and spoke with pt and he states he does not get his social security check until 3/27 and cannot afford $60.49 for prescriptions.   Called and discussed with Tameka (Director) and she states for [facility] to pay for discharge medications.  Pt informed  would cover the cost of his prescriptions.  Plan home with family.   GABE Cortes RN                   Discharge Codes    No documentation.                 Expected Discharge Date and Time       Expected Discharge Date Expected Discharge Time    Mar 23, 2024               Alfreda Cortes RN

## 2024-03-23 NOTE — PROGRESS NOTES
"CC: Persistent atrial flutter/CHF    Interval History: No new acute events overnight      Vital Signs  Temp:  [97.9 °F (36.6 °C)-99 °F (37.2 °C)] 99 °F (37.2 °C)  Heart Rate:  [81-92] 89  Resp:  [16-18] 16  BP: (100-144)/(72-85) 116/79  No intake or output data in the 24 hours ending 03/23/24 0939  Flowsheet Rows      Flowsheet Row First Filed Value   Admission Height 167.6 cm (65.98\") Documented at 03/15/2024 1026   Admission Weight 110 kg (242 lb 8.1 oz) Documented at 03/15/2024 1026            PHYSICAL EXAM:  General: No acute distress  Resp:NL Rate, symmetric chest expansion,unlabored, clear  CV:NL rate and rhythm, NL PMI, NL S1 and S2, no Murmur, no gallop, no rub, No JVD.   ABD:Nl sounds, no masses or tenderness, nondistended, no guarding or rebound  Neuro: alert,cooperative and oriented  Extr:Normal pedal pulses, No edema or cyanosis, moves all extremities      Results Review:    Results from last 7 days   Lab Units 03/23/24  0317   SODIUM mmol/L 141   POTASSIUM mmol/L 4.6   CHLORIDE mmol/L 104   CO2 mmol/L 24.0   BUN mg/dL 30*   CREATININE mg/dL 0.94   GLUCOSE mg/dL 130*   CALCIUM mg/dL 9.7         Results from last 7 days   Lab Units 03/23/24  0317   WBC 10*3/mm3 7.37   HEMOGLOBIN g/dL 11.6*   HEMATOCRIT % 39.2   PLATELETS 10*3/mm3 278             Results from last 7 days   Lab Units 03/23/24  0317   MAGNESIUM mg/dL 2.8*         I reviewed the patient's new clinical results.  I personally viewed and interpreted the patient's EKG/Telemetry data        Medication Review:   Meds reviewed         Assessment/Plan    Assessment & Plan  Acute on chronic diastolic congestive heart failure.  Recent echo shows preserved left ventricular ejection fraction.  Acute hypoxic respiratory failure from CHF-significantly improved  History of ventricular fibrillation and cardiac arrest April 2021 status post biotronik single chamber ICD   Paroxysmal atrial fibrillation - persistent since here. Has not had much AF on device " interrogations.   Chronic anticoagulation with rivaroxaban -inconsistent use.  Started on Eliquis here  Coronary artery disease with proximal  of the RCA and  of the OM2. Attempted at percutaneous intervention were unsuccessful.  On medical therapy  Daily alcohol use  Bright red blood per rectum - he declined EGD and colonoscopy-done for iron deficiency.  Patient reports he noted bright red bleeding following laxative use and thinks is from his rectum.  COPD without acute exacerbation  Morbid obesity  Diabetes mellitus type II   Hypertension  Dilated aortic room measuring 4 cm.   Typical atrial flutter with rapid ventricular response status post successful LILA guided cardioversion on 3/22/2024.  Severe sleep apnea noted during anesthesia.  Loose teeth - one fell out spontaneously during current admission.    Vital signs within range  No acute events overnight  Creatinine back to normal  Start oral Lasix and Jardiance.  Outpatient sleep study    I have discussed patient with Dr. Gómez.  Orlin Beatty MD  03/23/24  09:39 EDT

## 2024-03-23 NOTE — PLAN OF CARE
Goal Outcome Evaluation:  Plan of Care Reviewed With: patient        Progress: improving  Outcome Evaluation: RA during day  with GUTIERREZ when sleeping, HF education provided, d/c home

## 2024-03-23 NOTE — DISCHARGE SUMMARY
Patient Name: Sebastien Tang  : 1960  MRN: [id number]    Date of Admission: 3/15/2024  Date of Discharge:  3/23/2024  Primary Care Physician: Yuliana Flynn DO      Chief Complaint:   Shortness of Breath      Discharge Diagnoses     Active Hospital Problems    Diagnosis  POA    **Acute on chronic systolic CHF (congestive heart failure) [I50.23]  Yes    Hypoxemia [R09.02]  Yes    Iron deficiency anemia [D50.9]  Yes    Diabetes mellitus, type 2 [E11.9]  Yes    Heart failure [I50.9]  Yes    Essential hypertension [I10]  Yes    ICD (implantable cardioverter-defibrillator) in place [Z95.810]  Yes    Atrial flutter [I48.92]  Yes    COPD (chronic obstructive pulmonary disease) [J44.9]  Yes      Resolved Hospital Problems   No resolved problems to display.        Hospital Course     Very pleasant 64-year-old gentleman with a history of chronic systolic heart failure, COPD, previous VFib arrest with AICD, PAF on anticoagulation, hypertension, obesity, hyperlipidemia, and poor medical compliance, who presented with increasing shortness of breath and was found to have decompensated heart failure and pulmonary edema. Please see below for details of admission by problem:      Acute on chronic systolic CHF  -Continue Lasix and Aldactone per Card  -BPs and Cr fine today  -Repeat Echo 3/16 showed EF of 45%  -F/u with Card later this week   -F/u with Dr. Rosas in 2 weeks     Typical atrial flutter with RVR  Paroxysmal atrial fibrillation  History of V-fib cardiac arrest   AICD  -Continue Digoxin  -Atenolol restarted now and BPs fine  -holding AC initially secondary to iron deficiency anemia/GI bleed (of note was not taking Eliquis consistently prior to admission as was not able to afford)  -S/p LILA/CV yesterday, back in NSR now  -Changed Lovenox to Eliquis (pt already has at home--confirmed by CCP this week)     ANDIE  Cr normalized today  Lasix and Atenolol restarted  BPs improved     Pneumonia  Leukocytosis  -WBC  trended up to 14.68 on 3/17 from 6.44   -Initial chest x-ray showed edema vs possible pneumonia  -Repeat chest x-ray 3/17 showed lateral alveolar and interstitial  infiltrates again  -Initiated IV ceftriaxone for pneumonia on 3/17--has completed 5d course  -WBC normalized, afebrile, RVP neg, blood cultures not sent for some reason     CAD  -Continue ASA  -Monitor for signs of recurrent bleeding on ASA and full AC     Iron deficiency anemia  Rectal bleeding  -Patient reported intermittent bright red blood per rectum with bowel movements  -Iron panel consistent with significant iron deficiency with iron saturation of 4 and ferritin of 13.6  -S/p 3 doses IV iron  -GI evaluated, recommended EGD and colonoscopy however patient declined   -On IV PPI initially, now transitioned to PO Protonix daily  -Hgb stable/improved  -Aspirin resumed 3/18  -Lovenox started 3/20 by Card, changed to Eliquis yesterday  -No signs of bleeding  -Continue to monitor Hgb through PCP office     Essential hypertension  -BPs better today on current regimen     COPD  Hypoxemia  -Continue Symbicort at [facility]  -Weaned to RA now  -Suspect sleep apnea--will need outpt PSG  -Walking oximetry was fine     Type II DM  -A1c 6.6, previously was 6.2  -Covered with SSI here, requiring very little insulin here--suspect would do well with OHG at [facility]  -Continued diabetic diet  -Diabetic Educator has met with pt  -Card has started Jardiance at [facility]  -F/u with PCP     Alcohol use disorder  -CIWA scores remained low     Major depressive disorder  -Psychiatry followed  -Mirtazapine increased to 30 mg nightly  -TID Buspar added  -Doing much better     Prominent hilar lymph nodes seen on CT  -Follow-up short-term CT in 3 to 6 months given advanced background emphysematous changes  -This was discussed with pt     Aortic root dilation.   -CT scan showed there is dilatation of the aortic root measuring up to 4 cm.   -Will need outpatient surveillance     Tooth loss  -no  bleeding or pain or evidence of infection  -looks as if teeth were no longer viable  -[facility] ([facility]) reviewed case and suggested outpt f/u only  -pt has made appt with his dentist for 3/25        Eliquis sufficed for DVT prophylaxis.  Full code confirmed.  Discussed with patient and Dr. Beatty.  Discussed with RN.    Day of Discharge     Subjective:  Feeling fine today. Denies any MARTIN or SOA at rest. No CP or palp. No orthopnea. Voiding well. Tolerating diet. No cough. No F/C/NS. Eager to go home.    Physical Exam:  Temp:  [97.9 °F (36.6 °C)-99 °F (37.2 °C)] 99 °F (37.2 °C)  Heart Rate:  [70-92] 70  Resp:  [16-18] 18  BP: (100-144)/(72-85) 116/79  Body mass index is 39.92 kg/m².  Physical Exam  Vitals and nursing note reviewed.   Constitutional:       General: He is not in acute distress.     Appearance: He is not ill-appearing, toxic-appearing or diaphoretic.   HENT:      Head: Normocephalic.      Nose: Nose normal.      Mouth/Throat:      Mouth: Mucous membranes are moist.      Dentition: Abnormal dentition (tooth out (see image)). No dental tenderness, gingival swelling, dental abscesses or gum lesions.      Pharynx: Oropharynx is clear.        Comments: All teeth are pretty worn  Pt now has an empty socket behind upper front tooth as above  Eyes:      General: No scleral icterus.        Right eye: No discharge.         Left eye: No discharge.      Conjunctiva/sclera: Conjunctivae normal.   Cardiovascular:      Rate and Rhythm: Normal rate and regular rhythm.      Pulses: Normal pulses.   Pulmonary:      Effort: Pulmonary effort is normal. No respiratory distress.      Breath sounds: Normal breath sounds. No wheezing or rales.      Comments: Anteriorly   Abdominal:      General: Bowel sounds are normal. There is no distension.      Palpations: Abdomen is soft.      Tenderness: There is no abdominal tenderness.   Musculoskeletal:         General: Swelling (trace in BLEs) present. Normal range of motion.       Cervical back: Neck supple.   Skin:     General: Skin is warm and dry.      Capillary Refill: Capillary refill takes less than 2 seconds.      Coloration: Skin is not jaundiced.   Neurological:      General: No focal deficit present.      Mental Status: He is alert and oriented to person, place, and time. Mental status is at baseline.      Cranial Nerves: No cranial nerve deficit.      Coordination: Coordination normal.   Psychiatric:         Mood and Affect: Mood normal.         Behavior: Behavior normal.         Thought Content: Thought content normal.      Consultants     Consult Orders (all) (From admission, onward)       Start     Ordered    03/21/24 1214  Inpatient Oral & Maxillofacial Surgery Consult  Once        Specialty:  Oral Surgery  Provider:  Car Mello MD    03/21/24 1214    03/19/24 0800  Inpatient Diabetes Educator Consult  Once        Provider:  (Not yet assigned)    03/16/24 0947    03/16/24 0939  Inpatient Gastroenterology Consult  Once        Specialty:  Gastroenterology  Provider:  Ruddy Contreras MD    03/16/24 0939    03/15/24 1510  Inpatient Psychiatrist Consult  Once        Specialty:  Psychiatry  Provider:  Julio Cesar Neves III, MD    03/15/24 1510    03/15/24 1510  Inpatient Access Center Consult  Once        Provider:  (Not yet assigned)    03/15/24 1510    03/15/24 1508  Inpatient Case Management  Consult  Once        Provider:  (Not yet assigned)    03/15/24 1507    03/15/24 1503  Inpatient Cardiology Consult  Once        Specialty:  Cardiology  Provider:  Nik Rosas MD    03/15/24 1506    03/15/24 1415  [facility] (on-call MD unless specified) Details  Once        Specialty:  Hospitalist  Provider:  (Not yet assigned)    03/15/24 1414                  Procedures     * Surgery not found *    Imaging Results (All)       Procedure Component Value Units Date/Time    XR Chest PA & Lateral [[id number]] Collected: 03/18/24 0049     Updated: 03/18/24  0054    Narrative:      PA AND LATERAL CHEST RADIOGRAPH     HISTORY: Pulmonary edema     COMPARISON: March 15, 2024     FINDINGS:  There is cardiomegaly. Left-sided pacemaker is noted. No pneumothorax or  pleural effusion is seen. Bilateral alveolar and interstitial  infiltrates are again noted. Appearance is similar to the exam from  March 15, 2024 there are background emphysematous changes.       Impression:      No significant interval change when compared to March 15, 2024.     This report was finalized on 3/18/2024 12:51 AM by Dr. Judi Chow M.D on Workstation: [id number]       CT Angiogram Chest [[id number]] Collected: 03/16/24 0422     Updated: 03/16/24 0432    Narrative:      CT ANGIOGRAM OF THE CHEST     HISTORY: Hypoxia     COMPARISON: April 1, 2021     TECHNIQUE: Axial CT imaging was obtained through the thorax. IV contrast  was administered. Three-D reformatted images were obtained.     FINDINGS:  No acute pulmonary thromboembolus is seen. There is dilatation of the  aortic root, measuring up to 4 cm. Remainder of the aorta measures  within normal size limits. There is some atherosclerotic involvement of  the thoracic aorta. There are coronary artery calcifications. There is  enlargement of the main pulmonary artery, which can be seen in setting  of pulmonary arterial hypertension. There are advanced background  emphysematous changes. There do appear to be some minimal tree-in-bud  infiltrates within the lingula and right upper lobe. These are likely  infectious or inflammatory. There is a separate origin of the left  vertebral artery from the aortic arch. Mediastinal lymph nodes do not  appear pathologically enlarged, although there are some prominent hilar  nodes. For example, a left hilar node measures up to 1.4 cm in short  axis dimensions. This is larger than in April 2021, when it measured 1.2  cm. A right hilar node measures 1.3 cm. Previously, it measured 1 cm.  Thyroid gland, trachea,  and esophagus appear unremarkable. Images  through the upper abdomen do not demonstrate any acute abnormalities. No  acute osseous abnormalities are seen.       Impression:         1. The patient has some scattered tree-in-bud infiltrates which are  noted within the right upper lobe and lingula. These are likely  infectious or inflammatory.  2. Prominent hilar lymph nodes may be reactive. Consider short-term CT  follow-up in 3 to 6 months, given advanced background emphysematous  changes.     Radiation dose reduction techniques were utilized, including automated  exposure control and exposure modulation based on body size.        This report was finalized on 3/16/2024 4:29 AM by Dr. Judi Chow M.D on Workstation: [id number]       XR Chest 1 View [[id number]] Collected: 03/15/24 1132     Updated: 03/15/24 1136    Narrative:      XR CHEST 1 VW-     HISTORY: Male who is 64 years-old, cough, short of breath     TECHNIQUE: Frontal view of the chest     COMPARISON: 12/12/2022     FINDINGS: The heart is enlarged. Pulmonary vasculature is congested.  Left-sided generator device and cardiac lead are noted. Aorta appears  ectatic. Patchy densities predominantly at the mid to lower lungs may  represent edema and/or pneumonia, follow-up suggested. Minimal right  pleural effusion is apparent. No pneumothorax. No acute osseous process.       Impression:      As described.     This report was finalized on 3/15/2024 11:33 AM by Dr. Librado Orantes M.D on Workstation: [id number]             Results for orders placed during the hospital encounter of 11/25/22    Duplex Venous Lower Extremity - Left CAR    Interpretation Summary    Normal left lower extremity venous duplex scan.    Results for orders placed during the hospital encounter of 03/15/24    Adult Transesophageal Echo (LILA) W/ Cont if Necessary Per Protocol    Interpretation Summary    Left ventricular systolic function is normal. Left ventricular ejection fraction  "appears to be 51 - 55%.    The right ventricular cavity is mildly dilated but normal right ventricular systolic function.    There is mild biatrial enlargement.    Saline test results are negative for right to left atrial level shunt.    Moderate tricuspid valve regurgitation is present.Estimated right ventricular systolic pressure from tricuspid regurgitation is moderately elevated (45-55 mmHg).Moderate pulmonary hypertension is present.    Pertinent Labs     Results from last 7 days   Lab Units 03/23/24 0317 03/22/24 0226 03/21/24 0247 03/20/24 0220   WBC 10*3/mm3 7.37 9.21 9.51 10.28   HEMOGLOBIN g/dL 11.6* 11.4* 11.8* 11.8*   PLATELETS 10*3/mm3 278 326 338 338     Results from last 7 days   Lab Units 03/23/24 0317 03/22/24 0226 03/21/24 0247 03/20/24 0220   SODIUM mmol/L 141 140 141 142   POTASSIUM mmol/L 4.6 4.3 4.5 4.0   CHLORIDE mmol/L 104 99 99 99   CO2 mmol/L 24.0 27.3 30.6* 32.0*   BUN mg/dL 30* 37* 24* 21   CREATININE mg/dL 0.94 1.59* 1.01 1.19   GLUCOSE mg/dL 130* 146* 112* 101*   EGFR mL/min/1.73 89.4 47.6* 82.0 67.4     Results from last 7 days   Lab Units 03/20/24 0220   ALBUMIN g/dL 4.1   BILIRUBIN mg/dL 0.3   ALK PHOS U/L 70   AST (SGOT) U/L 48*   ALT (SGPT) U/L 59*     Results from last 7 days   Lab Units 03/23/24 0317 03/22/24 0226 03/21/24 0247 03/20/24 0220   CALCIUM mg/dL 9.7 9.9 10.4 9.5   ALBUMIN g/dL  --   --   --  4.1   MAGNESIUM mg/dL 2.8* 2.6* 2.6* 2.1   PHOSPHORUS mg/dL 3.9 4.9* 5.3* 4.6*               Invalid input(s): \"LDLCALC\"          Test Results Pending at Discharge       Discharge Details        Discharge Medications        New Medications        Instructions Start Date   atenolol 25 MG tablet  Commonly known as: TENORMIN   25 mg, Oral, 2 Times Daily      busPIRone 10 MG tablet  Commonly known as: BUSPAR   10 mg, Oral, 3 Times Daily      digoxin 125 MCG tablet  Commonly known as: LANOXIN   125 mcg, Oral, Daily Digoxin      empagliflozin 25 MG tablet tablet  Commonly " known as: JARDIANCE   25 mg, Oral, Daily      hydrOXYzine pamoate 50 MG capsule  Commonly known as: VISTARIL   50 mg, Oral, Every 4 Hours PRN      pantoprazole 40 MG EC tablet  Commonly known as: PROTONIX   40 mg, Oral, Every Early Morning   Start Date: March 24, 2024     spironolactone 25 MG tablet  Commonly known as: ALDACTONE   25 mg, Oral, Daily   Start Date: March 24, 2024            Changes to Medications        Instructions Start Date   mirtazapine 30 MG tablet  Commonly known as: REMERON  What changed:   medication strength  how much to take   30 mg, Oral, Nightly             Continue These Medications        Instructions Start Date   albuterol sulfate  (90 Base) MCG/ACT inhaler  Commonly known as: PROVENTIL HFA;VENTOLIN HFA;PROAIR HFA   2 puffs, Inhalation, Every 6 Hours PRN      apixaban 5 MG tablet tablet  Commonly known as: Eliquis   5 mg, Oral, Every 12 Hours      Aspirin Low Dose 81 MG EC tablet  Generic drug: aspirin   81 mg, Oral, Daily      budesonide-formoterol 160-4.5 MCG/ACT inhaler  Commonly known as: SYMBICORT   2 puffs, Inhalation, 2 Times Daily      finasteride 5 MG tablet  Commonly known as: PROSCAR   5 mg, Oral, Daily      furosemide 40 MG tablet  Commonly known as: LASIX   40 mg, Oral, Daily             Stop These Medications      lisinopril 40 MG tablet  Commonly known as: PRINIVIL,ZESTRIL     metoprolol succinate  MG 24 hr tablet  Commonly known as: TOPROL-XL              No Known Allergies    Discharge Disposition:  Home or Self Care      Discharge Diet:  Diet Order   Procedures    Diet: Regular/House, Cardiac, Diabetic; Healthy Heart (2-3 Na+); Consistent Carbohydrate; Fluid Consistency: Thin (IDDSI 0)       Discharge Activity:   As tolerated    CODE STATUS:    Code Status and Medical Interventions:   Ordered at: 03/15/24 1445     Code Status (Patient has no pulse and is not breathing):    CPR (Attempt to Resuscitate)     Medical Interventions (Patient has pulse or is  breathing):    Full Support       Future Appointments   Date Time Provider Department Center   3/27/2024  3:00 PM Yuliana Flynn DO [id number]   11/12/2024  1:30 PM [facility] [place] DEVICE CHECK [id number] None   11/12/2024  2:00 PM Nik Rosas MD [facility] None     Additional Instructions for the Follow-ups that You Need to Schedule       Discharge Follow-up with PCP   As directed       Currently Documented PCP:    Yuliana Flynn DO    PCP Phone Number:    [phone]     Follow Up Details: Dr. Flynn (PCP) on March 27th        Discharge Follow-up with Specified Provider: Dentist on March 25th   As directed      To: Dentist on March 25th        Discharge Follow-up with Specified Provider: Dr. Rosas (Card); 2 Weeks   As directed      To: Dr. Rosas (Card)   Follow Up: 2 Weeks               Follow-up Information       Raul Shields DMD Follow up.    Specialty: Oral Surgery  Why: Call for appointment after discharge to see me in office for extraction evaluation.  Contact information:  [address] [facility] [address]  [facility] [id number] [phone]               Yuliana Flynn DO .    Specialty: Family Medicine  Why: Dr. Flynn (PCP) on March 27th  Contact information:  [address] [facility] [address]  [facility] [id number] [phone]                             Additional Instructions for the Follow-ups that You Need to Schedule       Discharge Follow-up with PCP   As directed       Currently Documented PCP:    Yuliana Flynn DO    PCP Phone Number:    [phone]     Follow Up Details: Dr. Flynn (PCP) on March 27th        Discharge Follow-up with Specified Provider: Dentist on March 25th   As directed      To: Dentist on March 25th        Discharge Follow-up with Specified Provider: Dr. Rosas (Karen); 2 Weeks   As directed      To: Dr. Rosas (Karen)   Follow Up: 2 Weeks            Time Spent on Discharge:  Greater than 30 minutes      Abdoulaye Gómez MD  [facility]  03/23/24  11:12 EDT

## 2024-03-23 NOTE — PROGRESS NOTES
[facility] follow up d/t anxiety; this writer reviewed chart and spoke with RN Lottie. Per RN, patient slept overnight, no behavioral health concerns noted; possible discharge home with family later today.  Last CIWA 2 at 14:15; psychiatrist, Dr. Neves, increased patient's Remeron to 30 mg nightly and added BuSpar and as needed Atarax. Per EMR, patient has behavioral health resources; no further needs/concerns noted at this time per RN and/or medical team. [facility] to continue following.

## 2024-03-24 NOTE — PAYOR COMM NOTE
"Sebastien Prabhakar (64 y.o. Male)      PATIENT DISCHARGED:  REF# [id number]      DEPT: [phone],  [phone]     [facility]: [facility] [id number] [facility]# [id number]     KATE BAUM RN,CCP       Date of Birth   1960    Social Security Number       Address   [address] [place] [id number]    Home Phone   [phone]    MRN   [id number]       Evangelical   None    Marital Status   Single                            Admission Date   3/15/24    Admission Type   Emergency    Admitting Provider   Tato Kerr MD    Attending Provider       Department, Room/Bed   [address], S4       Discharge Date   3/23/2024    Discharge Disposition   Home or Self Care    Discharge Destination                                 Attending Provider: (none)   Allergies: No Known Allergies    Isolation: None   Infection: MRSA/History Only (21)   Code Status: Prior    Ht: 165.1 cm (65\")   Wt: 109 kg (239 lb 14.4 oz)    Admission Cmt: None   Principal Problem: Acute on chronic systolic CHF (congestive heart failure) [I50.23]                   Active Insurance as of 3/15/2024       Primary Coverage       Payor Plan Insurance Group Employer/Plan Group    AETNA MEDICARE REPLACEMENT AETNA MED ADV HMO [id number]       Payor Plan Address Payor Plan Phone Number Payor Plan Fax Number Effective Dates    [address] [phone]  2024 - None Entered    [facility] [id number]         Subscriber Name Subscriber Birth Date Member ID       SEBASTIEN PRABHAKAR 1960 [id number]                     Emergency Contacts        (Rel.) Home Phone Work Phone Mobile Phone    RadhaZara (Daughter) [phone] -- [phone]    HenriDiongui (Sister) -- -- [phone]                 Discharge Summary        Abdoulaye Gómez MD at 24 1112              Patient Name: Sebastien Prabhakar  : 1960  MRN: [id number]    Date of Admission: 3/15/2024  Date of " Discharge:  3/23/2024  Primary Care Physician: Yuliana Flynn DO      Chief Complaint:   Shortness of Breath      Discharge Diagnoses     Active Hospital Problems    Diagnosis  POA    **Acute on chronic systolic CHF (congestive heart failure) [I50.23]  Yes    Hypoxemia [R09.02]  Yes    Iron deficiency anemia [D50.9]  Yes    Diabetes mellitus, type 2 [E11.9]  Yes    Heart failure [I50.9]  Yes    Essential hypertension [I10]  Yes    ICD (implantable cardioverter-defibrillator) in place [Z95.810]  Yes    Atrial flutter [I48.92]  Yes    COPD (chronic obstructive pulmonary disease) [J44.9]  Yes      Resolved Hospital Problems   No resolved problems to display.        Hospital Course     Very pleasant 64-year-old gentleman with a history of chronic systolic heart failure, COPD, previous VFib arrest with AICD, PAF on anticoagulation, hypertension, obesity, hyperlipidemia, and poor medical compliance, who presented with increasing shortness of breath and was found to have decompensated heart failure and pulmonary edema. Please see below for details of admission by problem:      Acute on chronic systolic CHF  -Continue Lasix and Aldactone per Card  -BPs and Cr fine today  -Repeat Echo 3/16 showed EF of 45%  -F/u with Card later this week   -F/u with Dr. Rosas in 2 weeks     Typical atrial flutter with RVR  Paroxysmal atrial fibrillation  History of V-fib cardiac arrest 2021  AICD  -Continue Digoxin  -Atenolol restarted now and BPs fine  -holding AC initially secondary to iron deficiency anemia/GI bleed (of note was not taking Eliquis consistently prior to admission as was not able to afford)  -S/p LILA/CV yesterday, back in NSR now  -Changed Lovenox to Eliquis (pt already has at home--confirmed by CCP this week)     ANDIE  Cr normalized today  Lasix and Atenolol restarted  BPs improved     Pneumonia  Leukocytosis  -WBC trended up to 14.68 on 3/17 from 6.44   -Initial chest x-ray showed edema vs possible pneumonia  -Repeat  chest x-ray 3/17 showed lateral alveolar and interstitial  infiltrates again  -Initiated IV ceftriaxone for pneumonia on 3/17--has completed 5d course  -WBC normalized, afebrile, RVP neg, blood cultures not sent for some reason     CAD  -Continue ASA  -Monitor for signs of recurrent bleeding on ASA and full AC     Iron deficiency anemia  Rectal bleeding  -Patient reported intermittent bright red blood per rectum with bowel movements  -Iron panel consistent with significant iron deficiency with iron saturation of 4 and ferritin of 13.6  -S/p 3 doses IV iron  -GI evaluated, recommended EGD and colonoscopy however patient declined   -On IV PPI initially, now transitioned to PO Protonix daily  -Hgb stable/improved  -Aspirin resumed 3/18  -Lovenox started 3/20 by Card, changed to Eliquis yesterday  -No signs of bleeding  -Continue to monitor Hgb through PCP office     Essential hypertension  -BPs better today on current regimen     COPD  Hypoxemia  -Continue Symbicort at dc  -Weaned to RA now  -Suspect sleep apnea--will need outpt PSG  -Walking oximetry was fine     Type II DM  -A1c 6.6, previously was 6.2  -Covered with SSI here, requiring very little insulin here--suspect would do well with OHG at [facility]  -Continued diabetic diet  -Diabetic Educator has met with pt  -Card has started Jardiance at [facility]  -F/u with PCP     Alcohol use disorder  -CIWA scores remained low     Major depressive disorder  -Psychiatry followed  -Mirtazapine increased to 30 mg nightly  -TID Buspar added  -Doing much better     Prominent hilar lymph nodes seen on CT  -Follow-up short-term CT in 3 to 6 months given advanced background emphysematous changes  -This was discussed with pt     Aortic root dilation.   -CT scan showed there is dilatation of the aortic root measuring up to 4 cm.   -Will need outpatient surveillance     Tooth loss  -no bleeding or pain or evidence of infection  -looks as if teeth were no longer viable  -OMFS ([facility]) reviewed case  and suggested outpt f/u only  -pt has made appt with his dentist for 3/25        Eliquis sufficed for DVT prophylaxis.  Full code confirmed.  Discussed with patient and Dr. Beatty.  Discussed with RN.    Day of Discharge     Subjective:  Feeling fine today. Denies any MARTIN or SOA at rest. No CP or palp. No orthopnea. Voiding well. Tolerating diet. No cough. No F/C/NS. Eager to go home.    Physical Exam:  Temp:  [97.9 °F (36.6 °C)-99 °F (37.2 °C)] 99 °F (37.2 °C)  Heart Rate:  [70-92] 70  Resp:  [16-18] 18  BP: (100-144)/(72-85) 116/79  Body mass index is 39.92 kg/m².  Physical Exam  Vitals and nursing note reviewed.   Constitutional:       General: He is not in acute distress.     Appearance: He is not ill-appearing, toxic-appearing or diaphoretic.   HENT:      Head: Normocephalic.      Nose: Nose normal.      Mouth/Throat:      Mouth: Mucous membranes are moist.      Dentition: Abnormal dentition (tooth out (see image)). No dental tenderness, gingival swelling, dental abscesses or gum lesions.      Pharynx: Oropharynx is clear.        Comments: All teeth are pretty worn  Pt now has an empty socket behind upper front tooth as above  Eyes:      General: No scleral icterus.        Right eye: No discharge.         Left eye: No discharge.      Conjunctiva/sclera: Conjunctivae normal.   Cardiovascular:      Rate and Rhythm: Normal rate and regular rhythm.      Pulses: Normal pulses.   Pulmonary:      Effort: Pulmonary effort is normal. No respiratory distress.      Breath sounds: Normal breath sounds. No wheezing or rales.      Comments: Anteriorly   Abdominal:      General: Bowel sounds are normal. There is no distension.      Palpations: Abdomen is soft.      Tenderness: There is no abdominal tenderness.   Musculoskeletal:         General: Swelling (trace in BLEs) present. Normal range of motion.      Cervical back: Neck supple.   Skin:     General: Skin is warm and dry.      Capillary Refill: Capillary refill  takes less than 2 seconds.      Coloration: Skin is not jaundiced.   Neurological:      General: No focal deficit present.      Mental Status: He is alert and oriented to person, place, and time. Mental status is at baseline.      Cranial Nerves: No cranial nerve deficit.      Coordination: Coordination normal.   Psychiatric:         Mood and Affect: Mood normal.         Behavior: Behavior normal.         Thought Content: Thought content normal.      Consultants     Consult Orders (all) (From admission, onward)       Start     Ordered    03/21/24 1214  Inpatient Oral & Maxillofacial Surgery Consult  Once        Specialty:  Oral Surgery  Provider:  Car Mello MD    03/21/24 1214    03/19/24 0800  Inpatient Diabetes Educator Consult  Once        Provider:  (Not yet assigned)    03/16/24 0947    03/16/24 0939  Inpatient Gastroenterology Consult  Once        Specialty:  Gastroenterology  Provider:  Ruddy Contreras MD    03/16/24 0939    03/15/24 1510  Inpatient Psychiatrist Consult  Once        Specialty:  Psychiatry  Provider:  Julio Cesar Neves III, MD    03/15/24 1510    03/15/24 1510  Inpatient Access Center Consult  Once        Provider:  (Not yet assigned)    03/15/24 1510    03/15/24 1508  Inpatient Case Management  Consult  Once        Provider:  (Not yet assigned)    03/15/24 1507    03/15/24 1503  Inpatient Cardiology Consult  Once        Specialty:  Cardiology  Provider:  Nik Rosas MD    03/15/24 1506    03/15/24 1415  [facility] (on-call MD unless specified) Details  Once        Specialty:  Hospitalist  Provider:  (Not yet assigned)    03/15/24 1414                  Procedures     * Surgery not found *    Imaging Results (All)       Procedure Component Value Units Date/Time    XR Chest PA & Lateral [[id number]] Collected: 03/18/24 0049     Updated: 03/18/24 0054    Narrative:      PA AND LATERAL CHEST RADIOGRAPH     HISTORY: Pulmonary edema     COMPARISON: March 15,  2024     FINDINGS:  There is cardiomegaly. Left-sided pacemaker is noted. No pneumothorax or  pleural effusion is seen. Bilateral alveolar and interstitial  infiltrates are again noted. Appearance is similar to the exam from  March 15, 2024 there are background emphysematous changes.       Impression:      No significant interval change when compared to March 15, 2024.     This report was finalized on 3/18/2024 12:51 AM by Dr. Judi Chow M.D on Workstation: [id number]       CT Angiogram Chest [[id number]] Collected: 03/16/24 0422     Updated: 03/16/24 0432    Narrative:      CT ANGIOGRAM OF THE CHEST     HISTORY: Hypoxia     COMPARISON: April 1, 2021     TECHNIQUE: Axial CT imaging was obtained through the thorax. IV contrast  was administered. Three-D reformatted images were obtained.     FINDINGS:  No acute pulmonary thromboembolus is seen. There is dilatation of the  aortic root, measuring up to 4 cm. Remainder of the aorta measures  within normal size limits. There is some atherosclerotic involvement of  the thoracic aorta. There are coronary artery calcifications. There is  enlargement of the main pulmonary artery, which can be seen in setting  of pulmonary arterial hypertension. There are advanced background  emphysematous changes. There do appear to be some minimal tree-in-bud  infiltrates within the lingula and right upper lobe. These are likely  infectious or inflammatory. There is a separate origin of the left  vertebral artery from the aortic arch. Mediastinal lymph nodes do not  appear pathologically enlarged, although there are some prominent hilar  nodes. For example, a left hilar node measures up to 1.4 cm in short  axis dimensions. This is larger than in April 2021, when it measured 1.2  cm. A right hilar node measures 1.3 cm. Previously, it measured 1 cm.  Thyroid gland, trachea, and esophagus appear unremarkable. Images  through the upper abdomen do not demonstrate any acute abnormalities.  No  acute osseous abnormalities are seen.       Impression:         1. The patient has some scattered tree-in-bud infiltrates which are  noted within the right upper lobe and lingula. These are likely  infectious or inflammatory.  2. Prominent hilar lymph nodes may be reactive. Consider short-term CT  follow-up in 3 to 6 months, given advanced background emphysematous  changes.     Radiation dose reduction techniques were utilized, including automated  exposure control and exposure modulation based on body size.        This report was finalized on 3/16/2024 4:29 AM by Dr. Judi Chow M.D on Workstation: [id number]       XR Chest 1 View [[id number]] Collected: 03/15/24 1132     Updated: 03/15/24 1136    Narrative:      XR CHEST 1 VW-     HISTORY: Male who is 64 years-old, cough, short of breath     TECHNIQUE: Frontal view of the chest     COMPARISON: 12/12/2022     FINDINGS: The heart is enlarged. Pulmonary vasculature is congested.  Left-sided generator device and cardiac lead are noted. Aorta appears  ectatic. Patchy densities predominantly at the mid to lower lungs may  represent edema and/or pneumonia, follow-up suggested. Minimal right  pleural effusion is apparent. No pneumothorax. No acute osseous process.       Impression:      As described.     This report was finalized on 3/15/2024 11:33 AM by Dr. Librado Orantes M.D on Workstation: [id number]             Results for orders placed during the hospital encounter of 11/25/22    Duplex Venous Lower Extremity - Left CAR    Interpretation Summary    Normal left lower extremity venous duplex scan.    Results for orders placed during the hospital encounter of 03/15/24    Adult Transesophageal Echo (LILA) W/ Cont if Necessary Per Protocol    Interpretation Summary    Left ventricular systolic function is normal. Left ventricular ejection fraction appears to be 51 - 55%.    The right ventricular cavity is mildly dilated but normal right ventricular systolic  "function.    There is mild biatrial enlargement.    Saline test results are negative for right to left atrial level shunt.    Moderate tricuspid valve regurgitation is present.Estimated right ventricular systolic pressure from tricuspid regurgitation is moderately elevated (45-55 mmHg).Moderate pulmonary hypertension is present.    Pertinent Labs     Results from last 7 days   Lab Units 03/23/24 0317 03/22/24 0226 03/21/24 0247 03/20/24 0220   WBC 10*3/mm3 7.37 9.21 9.51 10.28   HEMOGLOBIN g/dL 11.6* 11.4* 11.8* 11.8*   PLATELETS 10*3/mm3 278 326 338 338     Results from last 7 days   Lab Units 03/23/24 0317 03/22/24 0226 03/21/24 0247 03/20/24 0220   SODIUM mmol/L 141 140 141 142   POTASSIUM mmol/L 4.6 4.3 4.5 4.0   CHLORIDE mmol/L 104 99 99 99   CO2 mmol/L 24.0 27.3 30.6* 32.0*   BUN mg/dL 30* 37* 24* 21   CREATININE mg/dL 0.94 1.59* 1.01 1.19   GLUCOSE mg/dL 130* 146* 112* 101*   EGFR mL/min/1.73 89.4 47.6* 82.0 67.4     Results from last 7 days   Lab Units 03/20/24 0220   ALBUMIN g/dL 4.1   BILIRUBIN mg/dL 0.3   ALK PHOS U/L 70   AST (SGOT) U/L 48*   ALT (SGPT) U/L 59*     Results from last 7 days   Lab Units 03/23/24 0317 03/22/24 0226 03/21/24 0247 03/20/24  0220   CALCIUM mg/dL 9.7 9.9 10.4 9.5   ALBUMIN g/dL  --   --   --  4.1   MAGNESIUM mg/dL 2.8* 2.6* 2.6* 2.1   PHOSPHORUS mg/dL 3.9 4.9* 5.3* 4.6*               Invalid input(s): \"LDLCALC\"          Test Results Pending at Discharge       Discharge Details        Discharge Medications        New Medications        Instructions Start Date   atenolol 25 MG tablet  Commonly known as: TENORMIN   25 mg, Oral, 2 Times Daily      busPIRone 10 MG tablet  Commonly known as: BUSPAR   10 mg, Oral, 3 Times Daily      digoxin 125 MCG tablet  Commonly known as: LANOXIN   125 mcg, Oral, Daily Digoxin      empagliflozin 25 MG tablet tablet  Commonly known as: JARDIANCE   25 mg, Oral, Daily      hydrOXYzine pamoate 50 MG capsule  Commonly known as: VISTARIL   50 " mg, Oral, Every 4 Hours PRN      pantoprazole 40 MG EC tablet  Commonly known as: PROTONIX   40 mg, Oral, Every Early Morning   Start Date: March 24, 2024     spironolactone 25 MG tablet  Commonly known as: ALDACTONE   25 mg, Oral, Daily   Start Date: March 24, 2024            Changes to Medications        Instructions Start Date   mirtazapine 30 MG tablet  Commonly known as: REMERON  What changed:   medication strength  how much to take   30 mg, Oral, Nightly             Continue These Medications        Instructions Start Date   albuterol sulfate  (90 Base) MCG/ACT inhaler  Commonly known as: PROVENTIL HFA;VENTOLIN HFA;PROAIR HFA   2 puffs, Inhalation, Every 6 Hours PRN      apixaban 5 MG tablet tablet  Commonly known as: Eliquis   5 mg, Oral, Every 12 Hours      Aspirin Low Dose 81 MG EC tablet  Generic drug: aspirin   81 mg, Oral, Daily      budesonide-formoterol 160-4.5 MCG/ACT inhaler  Commonly known as: SYMBICORT   2 puffs, Inhalation, 2 Times Daily      finasteride 5 MG tablet  Commonly known as: PROSCAR   5 mg, Oral, Daily      furosemide 40 MG tablet  Commonly known as: LASIX   40 mg, Oral, Daily             Stop These Medications      lisinopril 40 MG tablet  Commonly known as: PRINIVIL,ZESTRIL     metoprolol succinate  MG 24 hr tablet  Commonly known as: TOPROL-XL              No Known Allergies    Discharge Disposition:  Home or Self Care      Discharge Diet:  Diet Order   Procedures    Diet: Regular/House, Cardiac, Diabetic; Healthy Heart (2-3 Na+); Consistent Carbohydrate; Fluid Consistency: Thin (IDDSI 0)       Discharge Activity:   As tolerated    CODE STATUS:    Code Status and Medical Interventions:   Ordered at: 03/15/24 1447     Code Status (Patient has no pulse and is not breathing):    CPR (Attempt to Resuscitate)     Medical Interventions (Patient has pulse or is breathing):    Full Support       Future Appointments   Date Time Provider Department Center   3/27/2024  3:00 PM  Yuliana Flynn DO [id number]   11/12/2024  1:30 PM [facility] [place] DEVICE CHECK [facility] None   11/12/2024  2:00 PM Nik Rosas MD [facility] None     Additional Instructions for the Follow-ups that You Need to Schedule       Discharge Follow-up with PCP   As directed       Currently Documented PCP:    Yuliana Flynn DO    PCP Phone Number:    [phone]     Follow Up Details: Dr. Flynn (PCP) on March 27th        Discharge Follow-up with Specified Provider: Dentist on March 25th   As directed      To: Dentist on March 25th        Discharge Follow-up with Specified Provider: Dr. Rosas (Card); 2 Weeks   As directed      To: Dr. Rosas (Karen)   Follow Up: 2 Weeks               Follow-up Information       Raul Shields DMD Follow up.    Specialty: Oral Surgery  Why: Call for appointment after discharge to see me in office for extraction evaluation.  Contact information:  [address] [facility] [address]  [facility] [id number] [phone]               Yuliana Flynn DO .    Specialty: Family Medicine  Why: Dr. Flynn (PCP) on March 27th  Contact information:  [address] [facility]  [address]  [place] [id number] [phone]                             Additional Instructions for the Follow-ups that You Need to Schedule       Discharge Follow-up with PCP   As directed       Currently Documented PCP:    Yuliana Flynn DO    PCP Phone Number:    [phone]     Follow Up Details: Dr. Flynn (PCP) on March 27th        Discharge Follow-up with Specified Provider: Dentist on March 25th   As directed      To: Dentist on March 25th        Discharge Follow-up with Specified Provider: Dr. Rosas (Karen); 2 Weeks   As directed      To: Dr. Rosas (Karen)   Follow Up: 2 Weeks            Time Spent on Discharge:  Greater than 30 minutes      Abdoulaye Gómez MD  [facility]  03/23/24  11:12 EDT                Electronically signed by Abdoulaye Gómez MD at 03/23/24  1124       Discharge Order (From admission, onward)       Start     Ordered    03/23/24 1109  Discharge patient  Once        Expected Discharge Date: 03/23/24   Expected Discharge Time: Morning   Discharge Disposition: Home or Self Care   Physician of Record for Attribution - Please select from Treatment Team: YOLI ALLEN [[id number]]   Review needed by CMO to determine Physician of Record: No      Question Answer Comment   Physician of Record for Attribution - Please select from Treatment Team YOLI ALLEN    Review needed by CMO to determine Physician of Record No        03/23/24 1112

## 2024-03-24 NOTE — CASE MANAGEMENT/SOCIAL WORK
Case Management Discharge Note      Final Note: dc home         Selected Continued Care - Discharged on 3/23/2024 Admission date: 3/15/2024 - Discharge disposition: Home or Self Care      Destination    No services have been selected for the patient.                Durable Medical Equipment    No services have been selected for the patient.                Dialysis/Infusion    No services have been selected for the patient.                Home Medical Care    No services have been selected for the patient.                Therapy    No services have been selected for the patient.                Community Resources    No services have been selected for the patient.                Community & DME    No services have been selected for the patient.                         Final Discharge Disposition Code: 01 - home or self-care

## 2024-03-25 ENCOUNTER — TRANSITIONAL CARE MANAGEMENT TELEPHONE ENCOUNTER (OUTPATIENT)
Dept: CALL CENTER | Facility: HOSPITAL | Age: 64
End: 2024-03-25
Payer: MEDICARE

## 2024-03-25 NOTE — OUTREACH NOTE
Call Center TCM Note      Flowsheet Row Responses   [facility] patient discharged from? [place]   Does the patient have one of the following disease processes/diagnoses(primary or secondary)? CHF   TCM attempt successful? No   Unsuccessful attempts Attempt 1  [no PCP [facility] verbal release on file.]   Call Status --  [kept ringing, no voicemail to .]             Gwen Loaiza RN    3/25/2024, 10:12 EDT

## 2024-03-25 NOTE — OUTREACH NOTE
Call Center TCM Note      Flowsheet Row Responses   [facility] patient discharged from? [place]   Does the patient have one of the following disease processes/diagnoses(primary or secondary)? CHF   TCM attempt successful? No   Unsuccessful attempts Attempt 2             Gwen Loaiza RN    3/25/2024, 17:00 EDT

## 2024-03-25 NOTE — PROGRESS NOTES
Enter Query Response Below      Query Response: Prophylactic use of thiamine             If applicable, please update the problem list.     Patient: Sebastien Tang        : 1960  Account: [id number]           Admit Date: 3/15/2024        How to Respond to this query:       a. Click New Note     b. Answer query within the yellow box.                c. Update the Problem List, if applicable.      If you have any questions about this query contact me at: [phone]     Dr. Gómez:    64-year-old male with history of chronic systolic heart failure, COPD, previous VFib arrest with AICD, atrial fibrillation, hypertension, and poor medical compliance, presented with decompensated heart failure and pneumonia.  Progress notes include alcohol use disorder.  CIWA protocol placed and patient given thiamine IV 3/15-3/20.      Please clarify if patient treated/monitored for one or more of the following:    Thiamine deficiency  Prophylactic use of thiamine  Other- specify______  Unable to determine      By submitting this query, we are merely seeking further clarification of documentation to accurately reflect all conditions that you are monitoring, evaluating, treating or that extend the hospitalization or utilize additional resources of care. Please utilize your independent clinical judgment when addressing the question(s) above.     This query and your response, once completed, will be entered into the legal medical record.    Sincerely,  Che Lugo RN,  MSN  Clinical Documentation Integrity Program   [email or web address]

## 2024-03-26 ENCOUNTER — TRANSITIONAL CARE MANAGEMENT TELEPHONE ENCOUNTER (OUTPATIENT)
Dept: CALL CENTER | Facility: HOSPITAL | Age: 64
End: 2024-03-26
Payer: MEDICARE

## 2024-03-26 LAB
GLUCOSE BLDC GLUCOMTR-MCNC: 102 MG/DL (ref 70–130)
GLUCOSE BLDC GLUCOMTR-MCNC: 119 MG/DL (ref 70–130)

## 2024-03-26 NOTE — OUTREACH NOTE
Call Center TCM Note      Flowsheet Row Responses   [facility] patient discharged from? [place]   Does the patient have one of the following disease processes/diagnoses(primary or secondary)? CHF   TCM attempt successful? No   Unsuccessful attempts Attempt 3             Yanira Fonseca RN    3/26/2024, 11:47 EDT

## 2024-03-27 ENCOUNTER — OFFICE VISIT (OUTPATIENT)
Dept: FAMILY MEDICINE CLINIC | Facility: CLINIC | Age: 64
End: 2024-03-27
Payer: MEDICARE

## 2024-03-27 VITALS
SYSTOLIC BLOOD PRESSURE: 126 MMHG | HEART RATE: 94 BPM | DIASTOLIC BLOOD PRESSURE: 67 MMHG | OXYGEN SATURATION: 92 % | BODY MASS INDEX: 39.61 KG/M2 | WEIGHT: 238 LBS

## 2024-03-27 DIAGNOSIS — I10 ESSENTIAL HYPERTENSION: ICD-10-CM

## 2024-03-27 DIAGNOSIS — I50.23 ACUTE ON CHRONIC SYSTOLIC CHF (CONGESTIVE HEART FAILURE): ICD-10-CM

## 2024-03-27 DIAGNOSIS — I48.92 ATRIAL FLUTTER, UNSPECIFIED TYPE: ICD-10-CM

## 2024-03-27 DIAGNOSIS — E78.00 HIGH CHOLESTEROL: ICD-10-CM

## 2024-03-27 DIAGNOSIS — J44.1 CHRONIC OBSTRUCTIVE PULMONARY DISEASE WITH ACUTE EXACERBATION: ICD-10-CM

## 2024-03-27 DIAGNOSIS — G47.33 OSA (OBSTRUCTIVE SLEEP APNEA): ICD-10-CM

## 2024-03-27 DIAGNOSIS — E11.69 TYPE 2 DIABETES MELLITUS WITH OTHER SPECIFIED COMPLICATION, WITHOUT LONG-TERM CURRENT USE OF INSULIN: ICD-10-CM

## 2024-03-27 DIAGNOSIS — Z09 HOSPITAL DISCHARGE FOLLOW-UP: Primary | ICD-10-CM

## 2024-03-27 DIAGNOSIS — E66.9 OBESITY (BMI 30-39.9): ICD-10-CM

## 2024-03-27 DIAGNOSIS — Z72.0 TOBACCO ABUSE: ICD-10-CM

## 2024-03-27 DIAGNOSIS — D50.8 OTHER IRON DEFICIENCY ANEMIA: ICD-10-CM

## 2024-03-27 DIAGNOSIS — I48.19 ATRIAL FIBRILLATION, PERSISTENT: ICD-10-CM

## 2024-03-27 NOTE — PROGRESS NOTES
"Chief Complaint  Respiratory Distress    Subjective        Sebastien Tang presents to [facility]  History of Present Illness    Here for hospital follow up visit   Admitted 3/15-3/23/24  -Had acute on chronic CHF - on lasix and aldactone per cardiology. -Had repeat echo with EF of 45%. Has follow up with cards in two weeks.   -Had typical a flutter with RVR, paroxysmal A fib, AICD - continue digoxin, atenolol restarted, on eliquis   -Had ANDIE which improved prior to discharge   -had pneumonia - was given IV ceftriaxone x 5 days   -CAD - continue aspirin   -ARUN and rectal bleeding - was given IV iron and GI recommended colonoscopy but he declined   -COPD/hypoxemia - continue symbicort, needs outpatient PSG  -Type 2 DM - started on jardiance   -MDD - increased mirtazapine and added buspar  -aortic root dilation - CT with aortic room measuring 4 cm   -tooth loss in hospital following procedure       Was told he has sleep apnea in the hospital and needs sleep medicine referral     Has had some issues with cost with medication     Objective   Vital Signs:  /67 (BP Location: Left arm, Patient Position: Sitting, Cuff Size: Large Adult)   Pulse 94   Wt 108 kg (238 lb)   SpO2 92%   BMI 39.61 kg/m²   Estimated body mass index is 39.61 kg/m² as calculated from the following:    Height as of 3/16/24: 165.1 cm (65\").    Weight as of this encounter: 108 kg (238 lb).             Physical Exam   Result Review :                     Assessment and Plan     Diagnoses and all orders for this visit:    1. Hospital discharge follow-up (Primary)    2. GUTIERREZ (obstructive sleep apnea)  -     Ambulatory Referral to Sleep Medicine  -     Ambulatory Referral to Case Management Medication Adherence, Disease Education, Care Coordination    3. Essential hypertension  -     Comprehensive metabolic panel  -     Ambulatory Referral to Case Management Medication Adherence, Disease Education, Care " Coordination    4. Atrial flutter, unspecified type  -     Ambulatory Referral to Case Management Medication Adherence, Disease Education, Care Coordination    5. Obesity (BMI 30-39.9)  -     Ambulatory Referral to Case Management Medication Adherence, Disease Education, Care Coordination    6. Type 2 diabetes mellitus with other specified complication, without long-term current use of insulin  -     MicroAlbumin, Urine, Random - Urine, Clean Catch  -     Ambulatory Referral to Case Management Medication Adherence, Disease Education, Care Coordination    7. High cholesterol  -     Lipid panel    8. Tobacco abuse    9. Chronic obstructive pulmonary disease with acute exacerbation    10. Other iron deficiency anemia    11. Atrial fibrillation, persistent    12. Acute on chronic systolic CHF (congestive heart failure)        Unable to contact for TCM.   Samples of jardiance and eliquis given   Medication list updated  Pt doing well - has cards follow up scheduled  Sleep medicine referral placed        Follow Up     Return in about 3 months (around 6/27/2024) for Medicare Wellness.  Patient was given instructions and counseling regarding his condition or for health maintenance advice. Please see specific information pulled into the AVS if appropriate.

## 2024-03-28 ENCOUNTER — REFERRAL TRIAGE (OUTPATIENT)
Dept: CASE MANAGEMENT | Facility: OTHER | Age: 64
End: 2024-03-28
Payer: MEDICARE

## 2024-03-28 LAB
ALBUMIN SERPL-MCNC: 4.5 G/DL (ref 3.9–4.9)
ALBUMIN/GLOB SERPL: 1.5 {RATIO} (ref 1.2–2.2)
ALP SERPL-CCNC: 113 IU/L (ref 44–121)
ALT SERPL-CCNC: 96 IU/L (ref 0–44)
AST SERPL-CCNC: 74 IU/L (ref 0–40)
BILIRUB SERPL-MCNC: 0.3 MG/DL (ref 0–1.2)
BUN SERPL-MCNC: 24 MG/DL (ref 8–27)
BUN/CREAT SERPL: 22 (ref 10–24)
CALCIUM SERPL-MCNC: 11 MG/DL (ref 8.6–10.2)
CHLORIDE SERPL-SCNC: 100 MMOL/L (ref 96–106)
CHOLEST SERPL-MCNC: 215 MG/DL (ref 100–199)
CO2 SERPL-SCNC: 24 MMOL/L (ref 20–29)
CREAT SERPL-MCNC: 1.1 MG/DL (ref 0.76–1.27)
EGFRCR SERPLBLD CKD-EPI 2021: 74 ML/MIN/1.73
GLOBULIN SER CALC-MCNC: 3.1 G/DL (ref 1.5–4.5)
GLUCOSE SERPL-MCNC: 120 MG/DL (ref 70–99)
HDLC SERPL-MCNC: 35 MG/DL
LDLC SERPL CALC-MCNC: 121 MG/DL (ref 0–99)
MICROALBUMIN UR-MCNC: 24.6 UG/ML
POTASSIUM SERPL-SCNC: 5.4 MMOL/L (ref 3.5–5.2)
PROT SERPL-MCNC: 7.6 G/DL (ref 6–8.5)
SODIUM SERPL-SCNC: 142 MMOL/L (ref 134–144)
TRIGL SERPL-MCNC: 336 MG/DL (ref 0–149)
VLDLC SERPL CALC-MCNC: 59 MG/DL (ref 5–40)

## 2024-03-28 RX ORDER — ROSUVASTATIN CALCIUM 10 MG/1
10 TABLET, COATED ORAL DAILY
Qty: 90 TABLET | Refills: 3 | Status: SHIPPED | OUTPATIENT
Start: 2024-03-28 | End: 2024-03-29

## 2024-03-29 ENCOUNTER — OFFICE VISIT (OUTPATIENT)
Dept: CARDIOLOGY | Facility: CLINIC | Age: 64
End: 2024-03-29
Payer: MEDICARE

## 2024-03-29 VITALS
SYSTOLIC BLOOD PRESSURE: 140 MMHG | HEART RATE: 80 BPM | HEIGHT: 65 IN | BODY MASS INDEX: 39.82 KG/M2 | DIASTOLIC BLOOD PRESSURE: 90 MMHG | WEIGHT: 239 LBS

## 2024-03-29 DIAGNOSIS — I48.19 ATRIAL FIBRILLATION, PERSISTENT: ICD-10-CM

## 2024-03-29 DIAGNOSIS — I25.810 CORONARY ARTERY DISEASE INVOLVING CORONARY BYPASS GRAFT OF NATIVE HEART WITHOUT ANGINA PECTORIS: ICD-10-CM

## 2024-03-29 DIAGNOSIS — I50.41 ACUTE COMBINED SYSTOLIC AND DIASTOLIC CONGESTIVE HEART FAILURE: Primary | ICD-10-CM

## 2024-03-29 DIAGNOSIS — I50.32 CHRONIC DIASTOLIC CHF (CONGESTIVE HEART FAILURE): ICD-10-CM

## 2024-03-29 PROBLEM — I25.5 ISCHEMIC CARDIOMYOPATHY: Status: RESOLVED | Noted: 2021-04-05 | Resolved: 2024-03-29

## 2024-03-29 RX ORDER — FUROSEMIDE 40 MG/1
40 TABLET ORAL 2 TIMES DAILY
COMMUNITY
End: 2024-03-29

## 2024-03-29 RX ORDER — METOPROLOL SUCCINATE 25 MG/1
100 TABLET, EXTENDED RELEASE ORAL DAILY
COMMUNITY
End: 2024-03-29

## 2024-03-29 RX ORDER — FUROSEMIDE 80 MG
80 TABLET ORAL DAILY
Start: 2024-03-29

## 2024-03-29 RX ORDER — ROSUVASTATIN CALCIUM 20 MG/1
20 TABLET, COATED ORAL DAILY
Qty: 90 TABLET | Refills: 3 | Status: SHIPPED | OUTPATIENT
Start: 2024-03-29

## 2024-03-29 RX ORDER — ATENOLOL 25 MG/1
25 TABLET ORAL DAILY
COMMUNITY
End: 2024-03-29

## 2024-03-29 RX ORDER — ATENOLOL 50 MG/1
25 TABLET ORAL 2 TIMES DAILY
Qty: 180 TABLET | Refills: 3 | Status: SHIPPED | OUTPATIENT
Start: 2024-03-29

## 2024-03-29 NOTE — PROGRESS NOTES
PATIENTINFORMATION    Date of Office Visit: 2024  Encounter Provider: Orlin Beatty MD  Place of Service: [facility] CARDIOLOGY  Patient Name: Sebastien Tang  : 1960    Subjective:     Encounter Date:2024      Patient ID: Sebastien Tang is a 64 y.o. male.    No chief complaint on file.    HPI  Mr. Tang is here for post hospital dc fu.  He reports stable breathing denies any new extremity swelling or weight gain.  Denies orthopnea, PND, palpitations, presyncope syncope.  Compliant with all current medications.  Does not check blood pressure at home.  Pending sleep study      ROS  All systems reviewed and negative except as noted in HPI.    Past Medical History:   Diagnosis Date    Acidosis     mixed resp/metabolic acidosis    Acute congestive heart failure     Acute respiratory failure     hypoxic    Anemia     Asthma     Atrial flutter     Azotemia     Cardiac arrest 2021    Chronic coronary artery disease     Constipation     COPD (chronic obstructive pulmonary disease)     Enlarged prostate     Hypertension     Hypokalemia     severe    ICD (implantable cardioverter-defibrillator) in place     Ischemic cardiomyopathy     MRSA nasal colonization     Obesity (BMI 30-39.9)     Orthopnea     PAF (paroxysmal atrial fibrillation)     Persistent atrial fibrillation     Pulmonary edema     Tobacco abuse     Transaminitis     Trouble in sleeping     Ventricular fibrillation        Past Surgical History:   Procedure Laterality Date    CARDIAC CATHETERIZATION Left 2021    Procedure: Coronary Angiography;  Surgeon: Anna Puga MD;  Location:  [facility];  Service: Cardiology;  Laterality: Left;    CARDIAC CATHETERIZATION N/A 2021    Procedure: Left ventriculography;  Surgeon: Anna Puga MD;  Location:  [facility];  Service: Cardiology;  Laterality: N/A;    CARDIAC CATHETERIZATION N/A 2021    Procedure: Left Heart Cath;   "Surgeon: Anna Puga MD;  Location:  [facility];  Service: Cardiology;  Laterality: N/A;    CARDIAC ELECTROPHYSIOLOGY PROCEDURE N/A 2021    Procedure: IMPLANTABLE CARDIOVERTER DEFIBRILLATOR- SC BIOTRONIK;  Surgeon: Nik Rosas MD;  Location:  [facility];  Service: Cardiovascular;  Laterality: N/A;    CARDIOVERSION  2021    Dr. Rosas    CARDIOVERSION  2021    Dr. Rosas    TONSILLECTOMY         Social History     Socioeconomic History    Marital status: Single   Tobacco Use    Smoking status: Former     Current packs/day: 0.00     Average packs/day: 1.5 packs/day for 10.0 years (15.0 ttl pk-yrs)     Types: Cigarettes     Start date: 2011     Quit date: 2021     Years since quittin.9    Smokeless tobacco: Never    Tobacco comments:     2021   Vaping Use    Vaping status: Never Used   Substance and Sexual Activity    Alcohol use: Yes     Comment: 1 BEER DAILY    Drug use: Not Currently    Sexual activity: Not Currently       History reviewed. No pertinent family history.        ECG 12 Lead    Date/Time: 3/29/2024 4:14 PM  Performed by: Orlin Beatty MD    Authorized by: Orlin Beatty MD  Comparison: compared with previous ECG from 3/22/2024  Comparison to previous ECG: A-fib replaces sinus rhythm on this tracing  Rhythm: atrial fibrillation  Rate: normal  Conduction: conduction normal  ST Segments: ST segments normal  T Waves: T waves normal  QRS axis: normal  Other: no other findings    Clinical impression: abnormal EKG             Objective:     /90   Pulse 80   Ht 165.1 cm (65\")   Wt 108 kg (239 lb)   BMI 39.77 kg/m²  Body mass index is 39.77 kg/m².     Constitutional:       General: Not in acute distress.     Appearance: Well-developed. Not diaphoretic.   Eyes:      Pupils: Pupils are equal, round, and reactive to light.   HENT:      Head: Normocephalic and atraumatic.   Neck:      Thyroid: No thyromegaly. "   Pulmonary:      Effort: Pulmonary effort is normal. No respiratory distress.      Breath sounds: Normal breath sounds. No wheezing. No rales.   Chest:      Chest wall: Not tender to palpatation.   Cardiovascular:      Normal rate. Irregularly irregular rhythm.      No gallop.    Pulses:     Intact distal pulses.   Edema:     Peripheral edema absent.   Abdominal:      General: Bowel sounds are normal. There is no distension.      Palpations: Abdomen is soft.      Tenderness: There is no guarding.   Musculoskeletal: Normal range of motion.         General: No deformity.      Cervical back: Normal range of motion and neck supple. Skin:     General: Skin is warm and dry.      Findings: No rash.   Neurological:      Mental Status: Alert and oriented to person, place, and time.      Cranial Nerves: No cranial nerve deficit.      Deep Tendon Reflexes: Reflexes are normal and symmetric.   Psychiatric:         Judgment: Judgment normal.         Review Of Data: I have reviewed pertinent recent labs, images and documents and pertinent findings included in HPI or assessment below.    Lipid Panel          3/27/2024    15:59   Lipid Panel   Total Cholesterol 215    Triglycerides 336    HDL Cholesterol 35    VLDL Cholesterol 59    LDL Cholesterol  121          Assessment/Plan:       Persistent atrial flutter with rapid ventricular response with prior history of paroxysmal atrial fibrillation.  Status post successful LILA guided cardioversion on 3/22/2024   Chronic diastolic congestive heart failure.  Admitted in March 2024 with acute hypoxemic respiratory failure from CHF  History of ventricular fibrillation and cardiac arrest April 2021 status post biotronik single chamber ICD   Coronary artery disease with proximal  of the RCA and  of the OM2. Attempted at percutaneous intervention were unsuccessful in the past  Daily alcohol use  COPD   Morbid obesity with complications including suspected sleep apnea and type 2 diabetes  mellitus  Hypertension  Dilated aortic room measuring 4 cm.   Gingivitis with dental loss and loose teeth     He is back in atrial fibrillation but denies any significant new symptoms.  Heart rate is fairly controlled.  He is taking both metoprolol, atenolol and digoxin.  Discontinued metoprolol and increased dose of atenolol  He will start taking rosuvastatin  Otherwise continue current medical regimen  Pending sleep study  BMP in 2 weeks  Follow-up in 1 month.     Diagnosis and plan of care discussed with patient and verbalized understanding.            Your medication list            Accurate as of March 29, 2024  4:15 PM. If you have any questions, ask your nurse or doctor.                CHANGE how you take these medications        Instructions Last Dose Given Next Dose Due   atenolol 50 MG tablet  Commonly known as: TENORMIN  What changed:   medication strength  Another medication with the same name was removed. Continue taking this medication, and follow the directions you see here.  Changed by: Orlin Beatty MD      Take 0.5 tablets by mouth 2 (Two) Times a Day.       furosemide 80 MG tablet  Commonly known as: LASIX  What changed:   medication strength  how much to take  Another medication with the same name was removed. Continue taking this medication, and follow the directions you see here.  Changed by: Orlin Beatty MD      Take 1 tablet by mouth Daily.       rosuvastatin 20 MG tablet  Commonly known as: Crestor  What changed:   medication strength  how much to take  Changed by: Orlni Beatty MD      Take 1 tablet by mouth Daily.              CONTINUE taking these medications        Instructions Last Dose Given Next Dose Due   albuterol sulfate  (90 Base) MCG/ACT inhaler  Commonly known as: PROVENTIL HFA;VENTOLIN HFA;PROAIR HFA      Inhale 2 puffs Every 6 (Six) Hours As Needed for Wheezing.       apixaban 5 MG tablet tablet  Commonly known as: Eliquis      Take 1 tablet by  mouth Every 12 (Twelve) Hours.       budesonide-formoterol 160-4.5 MCG/ACT inhaler  Commonly known as: SYMBICORT      Inhale 2 puffs 2 (Two) Times a Day.       busPIRone 10 MG tablet  Commonly known as: BUSPAR      Take 1 tablet by mouth 3 (Three) Times a Day.       digoxin 125 MCG tablet  Commonly known as: LANOXIN      Take 1 tablet by mouth Daily.       finasteride 5 MG tablet  Commonly known as: PROSCAR      Take 1 tablet by mouth Daily.       hydrOXYzine pamoate 50 MG capsule  Commonly known as: VISTARIL      Take 1 capsule by mouth Every 4 (Four) Hours As Needed for Anxiety or Itching.       Jardiance 25 MG tablet tablet  Generic drug: empagliflozin      Take 1 tablet by mouth Daily.       mirtazapine 30 MG tablet  Commonly known as: REMERON      Take 1 tablet by mouth Every Night.       pantoprazole 40 MG EC tablet  Commonly known as: PROTONIX      Take 1 tablet by mouth Every Morning.       spironolactone 25 MG tablet  Commonly known as: ALDACTONE      Take 1 tablet by mouth Daily.              STOP taking these medications      Aspirin Low Dose 81 MG EC tablet  Generic drug: aspirin  Stopped by: Orlin Beatty MD        metoprolol succinate XL 25 MG 24 hr tablet  Commonly known as: TOPROL-XL  Stopped by: Orlin Beatty MD                  Where to Get Your Medications        These medications were sent to [id number] - [place], [place] [facility] [address] [facility] - [phone] [facility] [phone]   [address] [facility] [address]      Phone: [phone]   atenolol 50 MG tablet  rosuvastatin 20 MG tablet       Information about where to get these medications is not yet available    Ask your nurse or doctor about these medications  furosemide 80 MG tablet             Orlin Beatty MD  03/29/24  16:15 EDT

## 2024-04-01 ENCOUNTER — TELEPHONE (OUTPATIENT)
Dept: CASE MANAGEMENT | Facility: OTHER | Age: 64
End: 2024-04-01
Payer: MEDICARE

## 2024-04-03 ENCOUNTER — TELEPHONE (OUTPATIENT)
Dept: CASE MANAGEMENT | Facility: OTHER | Age: 64
End: 2024-04-03
Payer: MEDICARE

## 2024-04-03 NOTE — TELEPHONE ENCOUNTER
Attempted to call for CCM services per provider referral. Unable to leave message. Attempt #2    Sent [employer] Message.

## 2024-04-04 ENCOUNTER — PATIENT OUTREACH (OUTPATIENT)
Dept: CASE MANAGEMENT | Facility: OTHER | Age: 64
End: 2024-04-04
Payer: MEDICARE

## 2024-04-04 DIAGNOSIS — E11.9 TYPE 2 DIABETES MELLITUS WITHOUT COMPLICATION, WITHOUT LONG-TERM CURRENT USE OF INSULIN: ICD-10-CM

## 2024-04-04 DIAGNOSIS — I10 ESSENTIAL HYPERTENSION: Primary | ICD-10-CM

## 2024-04-04 NOTE — OUTREACH NOTE
AMBULATORY CASE MANAGEMENT NOTE    Name and Relationship of Patient/Support Person: Sebastien Tang T - Self    CCM Interim Update    Communication via [employer]. Patient's phone not working correct at the moment. CCM in process.         Education Documentation  No documentation found.        Amanda ARRIAGA  Ambulatory Case Management    4/4/2024, 14:20 EDT

## 2024-04-05 ENCOUNTER — PATIENT OUTREACH (OUTPATIENT)
Dept: CASE MANAGEMENT | Facility: OTHER | Age: 64
End: 2024-04-05
Payer: MEDICARE

## 2024-04-05 DIAGNOSIS — I10 ESSENTIAL HYPERTENSION: Primary | ICD-10-CM

## 2024-04-05 DIAGNOSIS — E11.9 TYPE 2 DIABETES MELLITUS WITHOUT COMPLICATION, WITHOUT LONG-TERM CURRENT USE OF INSULIN: ICD-10-CM

## 2024-04-05 NOTE — OUTREACH NOTE
AMBULATORY CASE MANAGEMENT NOTE    Name and Relationship of Patient/Support Person: Sebastien Tang T - Self    CCM Interim Update    Communication via [employer].        Education Documentation  No documentation found.        Amanda ARRIAGA  Ambulatory Case Management    4/5/2024, 07:57 EDT

## 2024-04-09 ENCOUNTER — TELEPHONE (OUTPATIENT)
Dept: CASE MANAGEMENT | Facility: OTHER | Age: 64
End: 2024-04-09
Payer: MEDICARE

## 2024-04-09 ENCOUNTER — HOSPITAL ENCOUNTER (OUTPATIENT)
Dept: SLEEP MEDICINE | Facility: HOSPITAL | Age: 64
Discharge: HOME | End: 2024-04-09
Admitting: INTERNAL MEDICINE
Payer: MEDICARE

## 2024-04-09 DIAGNOSIS — I50.23 ACUTE ON CHRONIC SYSTOLIC CHF (CONGESTIVE HEART FAILURE): ICD-10-CM

## 2024-04-09 DIAGNOSIS — I48.19 ATRIAL FIBRILLATION, PERSISTENT: ICD-10-CM

## 2024-04-09 DIAGNOSIS — G47.33 OSA (OBSTRUCTIVE SLEEP APNEA): ICD-10-CM

## 2024-04-09 PROCEDURE — 95806 SLEEP STUDY UNATT&RESP EFFT: CPT

## 2024-04-09 NOTE — TELEPHONE ENCOUNTER
Attempted to call for CCM services per provider referral. Phone kept ringing, unable to leave VM. Lost contact with patient. Max attempt reached.

## 2024-04-12 ENCOUNTER — TELEPHONE (OUTPATIENT)
Dept: SLEEP MEDICINE | Facility: HOSPITAL | Age: 64
End: 2024-04-12
Payer: MEDICARE

## 2024-04-15 ENCOUNTER — TELEPHONE (OUTPATIENT)
Dept: SLEEP MEDICINE | Facility: HOSPITAL | Age: 64
End: 2024-04-15
Payer: MEDICARE

## 2024-04-15 NOTE — TELEPHONE ENCOUNTER
Tried to call pt to set up a f/u appt with  to review results and tx options. No answer and no vm.

## 2024-04-18 DIAGNOSIS — I48.91 ATRIAL FIBRILLATION, UNSPECIFIED TYPE: ICD-10-CM

## 2024-04-18 DIAGNOSIS — I10 ESSENTIAL HYPERTENSION: ICD-10-CM

## 2024-04-22 RX ORDER — PANTOPRAZOLE SODIUM 40 MG/1
40 TABLET, DELAYED RELEASE ORAL
Qty: 30 TABLET | Refills: 0 | Status: CANCELLED | OUTPATIENT
Start: 2024-04-22

## 2024-04-22 RX ORDER — DIGOXIN 125 MCG
125 TABLET ORAL
Qty: 30 TABLET | Refills: 0 | Status: CANCELLED | OUTPATIENT
Start: 2024-04-22

## 2024-04-22 RX ORDER — SPIRONOLACTONE 25 MG/1
25 TABLET ORAL DAILY
Qty: 30 TABLET | Refills: 0 | Status: CANCELLED | OUTPATIENT
Start: 2024-04-22

## 2024-04-22 NOTE — TELEPHONE ENCOUNTER
Caller: Sebastien Tang    Relationship: Self    Best call back number: [phone]    Requested Prescriptions:   Requested Prescriptions     Pending Prescriptions Disp Refills    lisinopril (PRINIVIL,ZESTRIL) 40 MG tablet [Pharmacy Med Name: LISINOPRIL 40MG TABLETS] 90 tablet 0     Sig: TAKE 1 TABLET BY MOUTH DAILY    pantoprazole (PROTONIX) 40 MG EC tablet 30 tablet 0     Sig: Take 1 tablet by mouth Every Morning.    spironolactone (ALDACTONE) 25 MG tablet 30 tablet 0     Sig: Take 1 tablet by mouth Daily.    digoxin (LANOXIN) 125 MCG tablet 30 tablet 0     Sig: Take 1 tablet by mouth Daily.    mirtazapine (REMERON) 30 MG tablet 30 tablet 0     Sig: Take 1 tablet by mouth Every Night.    apixaban (Eliquis) 5 MG tablet tablet 60 tablet 3     Sig: Take 1 tablet by mouth Every 12 (Twelve) Hours.    empagliflozin (JARDIANCE) 25 MG tablet tablet 30 tablet 0     Sig: Take 1 tablet by mouth Daily.    finasteride (PROSCAR) 5 MG tablet 90 tablet 0     Sig: Take 1 tablet by mouth Daily.    hydrOXYzine pamoate (VISTARIL) 50 MG capsule 30 capsule 0     Sig: Take 1 capsule by mouth Every 4 (Four) Hours As Needed for Anxiety or Itching.        Pharmacy where request should be sent: [id number] [place] [id number] [facility] AT [facility] [address] - [phone] [facility] [phone] FX     Last office visit with prescribing clinician: 3/27/2024   Last telemedicine visit with prescribing clinician: Visit date not found   Next office visit with prescribing clinician: Visit date not found     Additional details provided by patient: PATIENT STATED HE IS OUT OF MOST OF THESE.  HE IS COMPLETELY OUT OF MIRTAZAPINE.    PATIENT WAS IN THE HOSPITAL AND DOES NOT KNOW IF HE STILL NEEDS TO BE ON ALL OF THESE, IF SO HE NEEDS REFILLS OF ALL. SPECIFICALLY THE DIGOXIN, SPIRONOLACTONE AND PANTOPRAZOLE.    PATIENT STATED SOME OF THE MEDICATIONS HE MAY NOT BE ABLE TO AFFORD AND WOULD LIKE TO SEE IF HE CAN THESE  THROUGH INSURANCE, IF THEY % OR VERY LITTLE OUT OF POCKET.    PATIENT STATED THE ONES HE KNOWS HE WASN'T ABLE TO AFFORD WAS THE ALBUTEROL  SULFATE INHALER,  JARDIMALACHI, AND ELIQUIS    Does the patient have less than a 3 day supply:  [x] Yes  [] No    Would you like a call back once the refill request has been completed: [] Yes [x] No    If the office needs to give you a call back, can they leave a voicemail: [] Yes [x] No    Kelli Granado Rep   04/22/24 16:03 EDT

## 2024-04-23 RX ORDER — SPIRONOLACTONE 25 MG/1
25 TABLET ORAL DAILY
Qty: 30 TABLET | Refills: 2 | Status: SHIPPED | OUTPATIENT
Start: 2024-04-23

## 2024-04-23 RX ORDER — PANTOPRAZOLE SODIUM 40 MG/1
40 TABLET, DELAYED RELEASE ORAL
Qty: 30 TABLET | Refills: 2 | Status: SHIPPED | OUTPATIENT
Start: 2024-04-23

## 2024-04-23 RX ORDER — DIGOXIN 125 MCG
125 TABLET ORAL
Qty: 30 TABLET | Refills: 2 | Status: SHIPPED | OUTPATIENT
Start: 2024-04-23

## 2024-04-23 RX ORDER — FINASTERIDE 5 MG/1
5 TABLET, FILM COATED ORAL DAILY
Qty: 90 TABLET | Refills: 0 | Status: SHIPPED | OUTPATIENT
Start: 2024-04-23

## 2024-04-23 RX ORDER — LISINOPRIL 40 MG/1
40 TABLET ORAL DAILY
Qty: 90 TABLET | Refills: 0 | Status: SHIPPED | OUTPATIENT
Start: 2024-04-23

## 2024-04-23 RX ORDER — HYDROXYZINE PAMOATE 50 MG/1
50 CAPSULE ORAL EVERY 4 HOURS PRN
Qty: 30 CAPSULE | Refills: 1 | Status: SHIPPED | OUTPATIENT
Start: 2024-04-23

## 2024-04-23 RX ORDER — MIRTAZAPINE 30 MG/1
30 TABLET, FILM COATED ORAL NIGHTLY
Qty: 30 TABLET | Refills: 2 | Status: SHIPPED | OUTPATIENT
Start: 2024-04-23

## 2024-04-23 NOTE — TELEPHONE ENCOUNTER
LOV             3/27/2024   NOV             (around 6/27/2024) for Medicare Wellness.   Last RF        3/24/24 on all  Protocol       not me    SPECIFICALLY THE DIGOXIN, SPIRONOLACTONE AND PANTOPRAZOLE.     AYESHA Mchugh/LMR

## 2024-04-30 ENCOUNTER — LAB (OUTPATIENT)
Dept: LAB | Facility: HOSPITAL | Age: 64
End: 2024-04-30
Payer: MEDICARE

## 2024-04-30 ENCOUNTER — OFFICE VISIT (OUTPATIENT)
Dept: CARDIOLOGY | Facility: CLINIC | Age: 64
End: 2024-04-30
Payer: MEDICARE

## 2024-04-30 VITALS
WEIGHT: 241.2 LBS | HEART RATE: 78 BPM | SYSTOLIC BLOOD PRESSURE: 100 MMHG | HEIGHT: 66 IN | BODY MASS INDEX: 38.76 KG/M2 | DIASTOLIC BLOOD PRESSURE: 80 MMHG | OXYGEN SATURATION: 93 %

## 2024-04-30 DIAGNOSIS — I50.32 CHRONIC DIASTOLIC CHF (CONGESTIVE HEART FAILURE): Primary | ICD-10-CM

## 2024-04-30 DIAGNOSIS — I73.9 PAD (PERIPHERAL ARTERY DISEASE): ICD-10-CM

## 2024-04-30 DIAGNOSIS — I48.19 ATRIAL FIBRILLATION, PERSISTENT: ICD-10-CM

## 2024-04-30 DIAGNOSIS — I50.32 CHRONIC DIASTOLIC CHF (CONGESTIVE HEART FAILURE): ICD-10-CM

## 2024-04-30 DIAGNOSIS — I50.41 ACUTE COMBINED SYSTOLIC AND DIASTOLIC CONGESTIVE HEART FAILURE: ICD-10-CM

## 2024-04-30 LAB
ANION GAP SERPL CALCULATED.3IONS-SCNC: 11.4 MMOL/L (ref 5–15)
BUN SERPL-MCNC: 18 MG/DL (ref 8–23)
BUN/CREAT SERPL: 15.1 (ref 7–25)
CALCIUM SPEC-SCNC: 10.7 MG/DL (ref 8.6–10.5)
CHLORIDE SERPL-SCNC: 101 MMOL/L (ref 98–107)
CO2 SERPL-SCNC: 26.6 MMOL/L (ref 22–29)
CREAT SERPL-MCNC: 1.19 MG/DL (ref 0.76–1.27)
DIGOXIN SERPL-MCNC: 0.5 NG/ML (ref 0.6–1.2)
EGFRCR SERPLBLD CKD-EPI 2021: 67.4 ML/MIN/1.73
GLUCOSE SERPL-MCNC: 131 MG/DL (ref 65–99)
NT-PROBNP SERPL-MCNC: 2486 PG/ML (ref 0–900)
POTASSIUM SERPL-SCNC: 4.2 MMOL/L (ref 3.5–5.2)
SODIUM SERPL-SCNC: 139 MMOL/L (ref 136–145)

## 2024-04-30 PROCEDURE — 93000 ELECTROCARDIOGRAM COMPLETE: CPT | Performed by: INTERNAL MEDICINE

## 2024-04-30 PROCEDURE — 3079F DIAST BP 80-89 MM HG: CPT | Performed by: INTERNAL MEDICINE

## 2024-04-30 PROCEDURE — 83880 ASSAY OF NATRIURETIC PEPTIDE: CPT

## 2024-04-30 PROCEDURE — 80048 BASIC METABOLIC PNL TOTAL CA: CPT

## 2024-04-30 PROCEDURE — 3074F SYST BP LT 130 MM HG: CPT | Performed by: INTERNAL MEDICINE

## 2024-04-30 PROCEDURE — 80162 ASSAY OF DIGOXIN TOTAL: CPT

## 2024-04-30 PROCEDURE — 99214 OFFICE O/P EST MOD 30 MIN: CPT | Performed by: INTERNAL MEDICINE

## 2024-04-30 PROCEDURE — 36415 COLL VENOUS BLD VENIPUNCTURE: CPT

## 2024-04-30 RX ORDER — SPIRONOLACTONE 25 MG/1
25 TABLET ORAL DAILY
Qty: 90 TABLET | Refills: 3 | Status: SHIPPED | OUTPATIENT
Start: 2024-04-30 | End: 2024-04-30

## 2024-04-30 NOTE — PROGRESS NOTES
PATIENTINFORMATION    Date of Office Visit: 2024  Encounter Provider: Orlin Beatty MD  Place of Service: [facility] CARDIOLOGY  Patient Name: Sebastien Tang  : 1960    Subjective:     Encounter Date:2024      Patient ID: Sebastien Tang is a 64 y.o. male.    No chief complaint on file.    HPI  Mr. Tang is a pleasant 64 years old gentleman who came to cardiology clinic for follow-up visit.  He denies any significant new symptoms today.  Compliant with current medications without significant side effects.  He has started to walk more but admits he does not exercise regularly.  Denies bleeding from any site on Eliquis.  He has not started taking spironolactone.      ROS  All systems reviewed and negative except as noted in HPI.    Past Medical History:   Diagnosis Date    Acidosis     mixed resp/metabolic acidosis    Acute congestive heart failure     Acute respiratory failure     hypoxic    Anemia     Asthma     Atrial flutter     Azotemia     Cardiac arrest 2021    Chronic coronary artery disease     Constipation     COPD (chronic obstructive pulmonary disease)     Enlarged prostate     Hypertension     Hypokalemia     severe    ICD (implantable cardioverter-defibrillator) in place     Ischemic cardiomyopathy     MRSA nasal colonization     Obesity (BMI 30-39.9)     Orthopnea     PAF (paroxysmal atrial fibrillation)     Persistent atrial fibrillation     Pulmonary edema     Tobacco abuse     Transaminitis     Trouble in sleeping     Ventricular fibrillation        Past Surgical History:   Procedure Laterality Date    CARDIAC CATHETERIZATION Left 2021    Procedure: Coronary Angiography;  Surgeon: Anna Puga MD;  Location: [facility] CATH INVASIVE LOCATION;  Service: Cardiology;  Laterality: Left;    CARDIAC CATHETERIZATION N/A 2021    Procedure: Left ventriculography;  Surgeon: Anna Puga MD;  Location: [facility] CATH INVASIVE LOCATION;  Service: Cardiology;   "Laterality: N/A;    CARDIAC CATHETERIZATION N/A 4/1/2021    Procedure: Left Heart Cath;  Surgeon: Anna Puga MD;  Location:  [facility] CATH INVASIVE LOCATION;  Service: Cardiology;  Laterality: N/A;    CARDIAC ELECTROPHYSIOLOGY PROCEDURE N/A 4/7/2021    Procedure: IMPLANTABLE CARDIOVERTER DEFIBRILLATOR- SC BIOTRONIK;  Surgeon: Nik Rosas MD;  Location:  [facility] CATH INVASIVE LOCATION;  Service: Cardiovascular;  Laterality: N/A;    CARDIOVERSION  05/19/2021    Dr. Rosas    CARDIOVERSION  07/26/2021    Dr. Rosas    TONSILLECTOMY         Social History     Socioeconomic History    Marital status: Single   Tobacco Use    Smoking status: Former     Current packs/day: 0.00     Average packs/day: 1.5 packs/day for 10.0 years (15.0 ttl pk-yrs)     Types: Cigarettes     Start date: 4/22/2011     Quit date: 4/22/2021     Years since quitting: 3.0     Passive exposure: Past    Smokeless tobacco: Never    Tobacco comments:     4/2021   Vaping Use    Vaping status: Never Used   Substance and Sexual Activity    Alcohol use: Yes     Alcohol/week: 1.0 standard drink of alcohol     Types: 1 Cans of beer per week     Comment: 1 BEER DAILY    Drug use: Not Currently    Sexual activity: Not Currently       History reviewed. No pertinent family history.        ECG 12 Lead    Date/Time: 4/30/2024 4:15 PM  Performed by: Orlin Beatty MD    Authorized by: Orlin Beatty MD  Comparison: compared with previous ECG from 3/29/2024  Similar to previous ECG  Rhythm: atrial fibrillation  Rate: normal  Conduction: conduction normal  ST Segments: ST segments normal  T Waves: T waves normal  QRS axis: normal  Other: no other findings    Clinical impression: abnormal EKG             Objective:     /80 (BP Location: Right arm, Patient Position: Sitting, Cuff Size: Large Adult)   Pulse 78   Ht 167.6 cm (66\")   Wt 109 kg (241 lb 3.2 oz)   SpO2 93%   BMI 38.93 kg/m²  Body mass index is 38.93 kg/m². "     Constitutional:       General: Not in acute distress.     Appearance: Well-developed. Not diaphoretic.   Eyes:      Pupils: Pupils are equal, round, and reactive to light.   HENT:      Head: Normocephalic and atraumatic.   Neck:      Thyroid: No thyromegaly.   Pulmonary:      Effort: Pulmonary effort is normal. No respiratory distress.      Breath sounds: Normal breath sounds. No wheezing. No rales.   Chest:      Chest wall: Not tender to palpatation.   Cardiovascular:      Normal rate. Irregularly irregular rhythm.      No gallop.    Pulses:     Intact distal pulses.   Edema:     Peripheral edema absent.   Abdominal:      General: Bowel sounds are normal. There is no distension.      Palpations: Abdomen is soft.      Tenderness: There is no guarding.   Musculoskeletal: Normal range of motion.         General: No deformity.      Cervical back: Normal range of motion and neck supple. Skin:     General: Skin is warm and dry.      Findings: No rash.   Neurological:      Mental Status: Alert and oriented to person, place, and time.      Cranial Nerves: No cranial nerve deficit.      Deep Tendon Reflexes: Reflexes are normal and symmetric.   Psychiatric:         Judgment: Judgment normal.         Review Of Data: I have reviewed pertinent recent labs, images and documents and pertinent findings included in HPI or assessment below.    Lipid Panel          3/27/2024    15:59   Lipid Panel   Total Cholesterol 215    Triglycerides 336    HDL Cholesterol 35    VLDL Cholesterol 59    LDL Cholesterol  121          Assessment/Plan:        Persistent atrial flutter with rapid ventricular response with prior history of paroxysmal atrial fibrillation.  Status post successful LILA guided cardioversion on 3/22/2024.  Went back into persistent atrial fibrillation and currently on digoxin, atenolol and Eliquis.  Chronic diastolic congestive heart failure.  Admitted in March 2024 with acute hypoxemic respiratory failure from  CHF  History of ventricular fibrillation and cardiac arrest April 2021 status post biotronik single chamber ICD   Coronary artery disease with proximal  of the RCA and  of the OM2. Attempted at percutaneous intervention were unsuccessful in the past  Daily alcohol use-reports abstaining from alcohol since after hospital discharge  COPD   Morbid obesity with complications including suspected sleep apnea and type 2 diabetes mellitus  Hypertension  Dilated aortic room measuring 4 cm.   Gingivitis with dental loss and loose teeth   Bilateral calf pain.    He is euvolemic on exam with current diuretic regimen.  Heart rate is well-controlled with atenolol and digoxin.  Rule out PAD  Continue current care otherwise  Diagnosis and plan of care discussed with patient and verbalized understanding.            Your medication list            Accurate as of April 30, 2024  4:16 PM. If you have any questions, ask your nurse or doctor.                CONTINUE taking these medications        Instructions Last Dose Given Next Dose Due   albuterol sulfate  (90 Base) MCG/ACT inhaler  Commonly known as: PROVENTIL HFA;VENTOLIN HFA;PROAIR HFA      Inhale 2 puffs Every 6 (Six) Hours As Needed for Wheezing.       apixaban 5 MG tablet tablet  Commonly known as: Eliquis      Take 1 tablet by mouth Every 12 (Twelve) Hours.       atenolol 50 MG tablet  Commonly known as: TENORMIN      Take 0.5 tablets by mouth 2 (Two) Times a Day.       budesonide-formoterol 160-4.5 MCG/ACT inhaler  Commonly known as: SYMBICORT      Inhale 2 puffs 2 (Two) Times a Day.       busPIRone 10 MG tablet  Commonly known as: BUSPAR      Take 1 tablet by mouth 3 (Three) Times a Day.       digoxin 125 MCG tablet  Commonly known as: LANOXIN      Take 1 tablet by mouth Daily.       empagliflozin 25 MG tablet tablet  Commonly known as: JARDIANCE      Take 1 tablet by mouth Daily.       finasteride 5 MG tablet  Commonly known as: PROSCAR      Take 1 tablet by  mouth Daily.       furosemide 80 MG tablet  Commonly known as: LASIX      Take 1 tablet by mouth Daily.       hydrOXYzine pamoate 50 MG capsule  Commonly known as: VISTARIL      Take 1 capsule by mouth Every 4 (Four) Hours As Needed for Anxiety or Itching.       mirtazapine 30 MG tablet  Commonly known as: REMERON      Take 1 tablet by mouth Every Night.       pantoprazole 40 MG EC tablet  Commonly known as: PROTONIX      Take 1 tablet by mouth Every Morning.       rosuvastatin 20 MG tablet  Commonly known as: Crestor      Take 1 tablet by mouth Daily.              STOP taking these medications      spironolactone 25 MG tablet  Commonly known as: ALDACTONE  Stopped by: MD Orlin Balderrama MD  04/30/24  16:16 EDT

## 2024-05-01 ENCOUNTER — PATIENT MESSAGE (OUTPATIENT)
Dept: FAMILY MEDICINE CLINIC | Facility: CLINIC | Age: 64
End: 2024-05-01
Payer: MEDICARE

## 2024-05-01 RX ORDER — BUSPIRONE HYDROCHLORIDE 10 MG/1
10 TABLET ORAL 3 TIMES DAILY
Qty: 90 TABLET | Refills: 0 | Status: SHIPPED | OUTPATIENT
Start: 2024-05-01

## 2024-05-01 NOTE — TELEPHONE ENCOUNTER
From: Sebastien Tang  To: Yuliana Flynn  Sent: 5/1/2024 3:25 AM EDT  Subject: Refill    Do I need to take busphone medication? Need a refill ?.? I do not have any more of this medication.

## 2024-05-04 DIAGNOSIS — I48.19 ATRIAL FIBRILLATION, PERSISTENT: Primary | ICD-10-CM

## 2024-05-04 RX ORDER — DIGOXIN 250 MCG
250 TABLET ORAL
Qty: 90 TABLET | Refills: 3 | Status: SHIPPED | OUTPATIENT
Start: 2024-05-04

## 2024-05-04 NOTE — PROGRESS NOTES
Please notify Sebastien most recent  blood work no significant abnormalities his digoxin level is low.  Advise him to start taking 2 pills in the morning instead of 1 and when he gets the 250 mcg's that I called today he can start taking 1 pill in the morning.  For another digoxin level this coming Friday before taking digoxin in the morning. Thank you

## 2024-05-06 ENCOUNTER — TELEPHONE (OUTPATIENT)
Dept: CARDIOLOGY | Facility: CLINIC | Age: 64
End: 2024-05-06
Payer: MEDICARE

## 2024-05-21 RX ORDER — FUROSEMIDE 80 MG
80 TABLET ORAL DAILY
Start: 2024-05-21

## 2024-05-24 DIAGNOSIS — R06.02 SHORTNESS OF BREATH: ICD-10-CM

## 2024-05-28 RX ORDER — SPIRONOLACTONE 25 MG/1
1 TABLET ORAL DAILY
COMMUNITY
Start: 2024-05-01

## 2024-05-28 RX ORDER — ALBUTEROL SULFATE 90 UG/1
2 AEROSOL, METERED RESPIRATORY (INHALATION) EVERY 6 HOURS PRN
Qty: 18 G | Refills: 3 | Status: SHIPPED | OUTPATIENT
Start: 2024-05-28

## 2024-05-28 NOTE — TELEPHONE ENCOUNTER
LOV             3/27/2024   NOV             (around 6/27/2024) for Medicare Wellness.   Last RF        10/27/23  Protocol       Met    AYESHA Mchugh/POLA

## 2024-05-30 RX ORDER — FUROSEMIDE 80 MG
80 TABLET ORAL DAILY
Qty: 90 TABLET | Refills: 3 | Status: SHIPPED | OUTPATIENT
Start: 2024-05-30

## 2024-06-03 NOTE — TELEPHONE ENCOUNTER
LOV             3/27/2024 Hosp f/u  NOV             (around 6/27/2024) for Medicare Wellness.   Last RF        4/30/24    Protocol       Met    AYESHA Mchugh/POLA

## 2024-06-05 ENCOUNTER — PATIENT OUTREACH (OUTPATIENT)
Dept: CASE MANAGEMENT | Facility: OTHER | Age: 64
End: 2024-06-05
Payer: MEDICARE

## 2024-06-05 NOTE — OUTREACH NOTE
"AMBULATORY CASE MANAGEMENT NOTE    Names and Relationships of Patient/Support Persons: Caller: Sebastien Tang; Relationship: Self -     Patient Outreach    Received incoming call from patient. He is requesting assistance for medication costs. Made outreach attempts with patient before but lost contact. Will assist with Jardiance and Eliquis patient assistance programs with the goal that if we get approved, it might alleviate some costs. Also, offered to refer to social work for financial resources, patient refused at this time. Will provide [facility] information as well. Scheduled to meet in person next week for application signature.     Had a long discussion with patient on the phone. He went over his prior hospitalization and medical history. He follows Cardiology, he states his BP is controlled. He was suppose to follow up with his sleep doctor but their office lost contact with him. Offered patient to give him his sleep doctor office contact info, patient refused he states \"I'm not worried about it\". Patient reports that he's taking his medications as prescribed, verbalizes understanding of his medical issues and management. He used to work as a cook and he verbalizes understanding on what foods he should be eating and avoiding. He is well informed of symptoms to watch for and when to seek emergency medical attention. At this time, patient only needed assistance in his medication cost. Patient enrolled to [facility] for short term assistance.     Care Coordination    Sent medication request refill to provider.     [facility] updated and reviewed with the patient during this program:  Financial Resource Strain: High Risk (6/5/2024)    Overall Financial Resource Strain (CARDIA)     Difficulty of Paying Living Expenses: Hard      --     Food Insecurity: No Food Insecurity (6/5/2024)    Hunger Vital Sign     Worried About Running Out of Food in the Last Year: Never true     Ran Out of Food in the Last Year: Never true      -- "     Housing Stability: Low Risk  (6/5/2024)    Housing Stability Vital Sign     Unable to Pay for Housing in the Last Year: No     Number of Times Moved in the Last Year: 1     Homeless in the Last Year: No      --     Transportation Needs: No Transportation Needs (6/5/2024)    PRAPARE - Transportation     Lack of Transportation (Medical): No     Lack of Transportation (Non-Medical): No       Education Documentation  No documentation found.        Amanda ARRIAGA  Ambulatory Case Management    6/5/2024, 15:25 EDT

## 2024-06-05 NOTE — TELEPHONE ENCOUNTER
Dr. Flynn,    Patient requesting refill for his Hydroxyzine. Please sign if you agree.     Thank you.  Sheila COLLADO

## 2024-06-06 RX ORDER — HYDROXYZINE PAMOATE 50 MG/1
50 CAPSULE ORAL EVERY 4 HOURS PRN
Qty: 90 CAPSULE | Refills: 1 | Status: SHIPPED | OUTPATIENT
Start: 2024-06-06

## 2024-06-07 RX ORDER — ATENOLOL 50 MG/1
25 TABLET ORAL 2 TIMES DAILY
Qty: 180 TABLET | Refills: 3 | Status: SHIPPED | OUTPATIENT
Start: 2024-06-07

## 2024-06-10 ENCOUNTER — TELEPHONE (OUTPATIENT)
Dept: CASE MANAGEMENT | Facility: OTHER | Age: 64
End: 2024-06-10
Payer: MEDICARE

## 2024-06-10 NOTE — TELEPHONE ENCOUNTER
Attempted to call to follow up regarding medication clarification per his message from 6/7 via [employer]. Phone kept ringing. Unable to leave VM.

## 2024-06-11 RX ORDER — BUSPIRONE HYDROCHLORIDE 10 MG/1
10 TABLET ORAL 3 TIMES DAILY
Qty: 90 TABLET | Refills: 0 | Status: SHIPPED | OUTPATIENT
Start: 2024-06-11

## 2024-06-11 NOTE — TELEPHONE ENCOUNTER
Caller: Clyde Sebastien KRYSTAL    Relationship: Self    Best call back number: [phone]    Requested Prescriptions:   Requested Prescriptions     Pending Prescriptions Disp Refills    busPIRone (BUSPAR) 10 MG tablet 90 tablet 0     Sig: Take 1 tablet by mouth 3 (Three) Times a Day.        Pharmacy where request should be sent: [facility] #[id number] [facility] [address] [place] [address] TRL AT [facility] - [phone] [facility] [phone]      Last office visit with prescribing clinician: 3/27/2024   Last telemedicine visit with prescribing clinician: Visit date not found   Next office visit with prescribing clinician: Visit date not found     Additional details provided by patient: PATIENT STATES HE IS COMPLETLEY OUT OF THIS MEDICATION.     Does the patient have less than a 3 day supply:  [x] Yes  [] No    Enedina Middleton, GERMAN   06/11/24 14:26 EDT

## 2024-06-13 ENCOUNTER — TELEPHONE (OUTPATIENT)
Dept: CASE MANAGEMENT | Facility: OTHER | Age: 64
End: 2024-06-13
Payer: MEDICARE

## 2024-06-19 NOTE — TELEPHONE ENCOUNTER
Received a fax request from [facility] for 100 day supply of Jardiance.    LOV  3/27/24  NOV None  LF 6/3/24    Please advise    [facility] RMA

## 2024-07-01 ENCOUNTER — TELEPHONE (OUTPATIENT)
Dept: FAMILY MEDICINE CLINIC | Facility: CLINIC | Age: 64
End: 2024-07-01
Payer: MEDICARE

## 2024-07-01 NOTE — TELEPHONE ENCOUNTER
Patient called to request previously discussed samples of either Eliquis 5mg or Jardiance 30 MG. Patient will be in the area around 3 tomorrow, 7/2 and would like to  then if possible. Patient can be reached at [phone] when the samples are ready.

## 2024-07-02 ENCOUNTER — TELEPHONE (OUTPATIENT)
Dept: FAMILY MEDICINE CLINIC | Facility: CLINIC | Age: 64
End: 2024-07-02
Payer: MEDICARE

## 2024-07-02 NOTE — TELEPHONE ENCOUNTER
Tried calling pt. To let them know that the samples of eliquis will be at the  for  and we do not have the jamaryellen samples at this time.    Please relay message

## 2024-07-12 ENCOUNTER — TELEPHONE (OUTPATIENT)
Dept: CARDIOLOGY | Facility: CLINIC | Age: 64
End: 2024-07-12

## 2024-07-12 RX ORDER — BUSPIRONE HYDROCHLORIDE 10 MG/1
10 TABLET ORAL 3 TIMES DAILY
Qty: 90 TABLET | Refills: 0 | Status: SHIPPED | OUTPATIENT
Start: 2024-07-12

## 2024-07-12 NOTE — TELEPHONE ENCOUNTER
Caller: Clyde Sebastien KRYSTAL    Relationship: Self    Best call back number: [phone]     Requested Prescriptions:   Requested Prescriptions     Pending Prescriptions Disp Refills    busPIRone (BUSPAR) 10 MG tablet 90 tablet 0     Sig: Take 1 tablet by mouth 3 (Three) Times a Day.        Pharmacy where request should be sent: [facility] #[id number] [address] - [phone] [facility] [phone]      Last office visit with prescribing clinician: 3/27/2024   Last telemedicine visit with prescribing clinician: Visit date not found   Next office visit with prescribing clinician: Visit date not found     Additional details provided by patient: WILL NEED NEW PRESCRIPTION WITH REFILLS    Does the patient have less than a 3 day supply:  [x] Yes  [] No    Would you like a call back once the refill request has been completed: [] Yes [] No    If the office needs to give you a call back, can they leave a voicemail: [] Yes [] No    Kelli Fisher Rep   07/12/24 08:08 EDT

## 2024-07-12 NOTE — TELEPHONE ENCOUNTER
Caller Name: Sebastien Tang KRYSTAL      Relationship: Self      Best Contact Number: [phone]       Patient is requesting samples of ELIQUIS 5 MG AND JARDIANCE 25 MG        How many days of medication do you have left? 4 DAYS         Additional Information:PLEASE CALL PT TO ADVISE

## 2024-07-22 RX ORDER — MIRTAZAPINE 30 MG/1
30 TABLET, FILM COATED ORAL NIGHTLY
Qty: 30 TABLET | Refills: 2 | Status: SHIPPED | OUTPATIENT
Start: 2024-07-22

## 2024-07-22 NOTE — TELEPHONE ENCOUNTER
LOV                   3/27/2024  NOV                   Return in about 3 months (around 6/27/2024) for Medicare Wellness   Last RF              4/23/24       PROTOCOL       Not met    I was going to refuse this one but looks like mertazapine was refilled today.    Last refill I asked them to inform pt - Please remind Sebastien Tang to sched an appt (around 6/27/2024) for Medicare Wellness.  Thank you~!    AYESHA Cavanaugh/POLA

## 2024-07-22 NOTE — TELEPHONE ENCOUNTER
Caller: Sebastien Tang KRYSTAL    Relationship: Self    Best call back number:[phone]     Requested Prescriptions:   Requested Prescriptions     Pending Prescriptions Disp Refills    pantoprazole (PROTONIX) 40 MG EC tablet 30 tablet 2     Sig: Take 1 tablet by mouth Every Morning.        Pharmacy where request should be sent: [facility] #[id number] [facility] [address] [facility] [address] AT [facility] - [phone] [facility] [phone]      Last office visit with prescribing clinician: 3/27/2024   Last telemedicine visit with prescribing clinician: Visit date not found   Next office visit with prescribing clinician: Visit date not found     Additional details provided by patient:     Does the patient have less than a 3 day supply:  [x] Yes  [] No    Would you like a call back once the refill request has been completed: [] Yes [x] No    If the office needs to give you a call back, can they leave a voicemail: [] Yes [x] No    Kelli Evans Rep   07/22/24 12:38 EDT

## 2024-07-23 RX ORDER — PANTOPRAZOLE SODIUM 40 MG/1
40 TABLET, DELAYED RELEASE ORAL
Qty: 30 TABLET | Refills: 2 | Status: SHIPPED | OUTPATIENT
Start: 2024-07-23

## 2024-07-25 ENCOUNTER — TELEPHONE (OUTPATIENT)
Dept: FAMILY MEDICINE CLINIC | Facility: CLINIC | Age: 64
End: 2024-07-25
Payer: MEDICARE

## 2024-07-25 NOTE — TELEPHONE ENCOUNTER
OK for Hub to relay  I called patient to schedule his Medicare Wellness Visit for the year, due any time after 4/21/2024. JEANNETCB

## 2024-08-01 RX ORDER — SPIRONOLACTONE 25 MG/1
25 TABLET ORAL DAILY
Qty: 30 TABLET | Refills: 0 | OUTPATIENT
Start: 2024-08-01

## 2024-08-01 NOTE — TELEPHONE ENCOUNTER
LOV       3/27/2024  NOV           None:  (around 6/27/2024)   LF              5/01/2024    Historical provider

## 2024-08-08 RX ORDER — BUSPIRONE HYDROCHLORIDE 10 MG/1
10 TABLET ORAL 3 TIMES DAILY
Qty: 90 TABLET | Refills: 0 | OUTPATIENT
Start: 2024-08-08

## 2024-08-08 NOTE — TELEPHONE ENCOUNTER
Caller: Clyde Sebastien KRYSTAL    Relationship: Self    Best call back number: [phone]    Requested Prescriptions:   Requested Prescriptions     Pending Prescriptions Disp Refills    busPIRone (BUSPAR) 10 MG tablet 90 tablet 0     Sig: Take 1 tablet by mouth 3 (Three) Times a Day.        Pharmacy where request should be sent: [facility] #[id number] [place] [id number] [facility] - [phone] [facility] [phone]      Last office visit with prescribing clinician: 3/27/2024   Last telemedicine visit with prescribing clinician: Visit date not found   Next office visit with prescribing clinician: Visit date not found     Additional details provided by patient: PLEASE ADVISE.    Does the patient have less than a 3 day supply:  [] Yes  [x] No    Would you like a call back once the refill request has been completed: [] Yes [x] No    If the office needs to give you a call back, can they leave a voicemail: [] Yes [x] No    Kelli Vásquez Rep   08/08/24 10:29 EDT

## 2024-08-08 NOTE — TELEPHONE ENCOUNTER
LOV                   3/27/2024  NOV                   (around 6/27/2024) for Medicare Wellness.   Last RF        busPIRone (BUSPAR) 10 MG tablet [[id number]]    Order Details  Dose: 10 mg Route: Oral Frequency: 3 Times Daily   Dispense Quantity: 90 tablet Refills: 0          Sig: Take 1 tablet by mouth 3 (Three) Times a Day.         Start Date: 07/12/24       Should not need a refill  under 10/27/24                                AYESHA Cavanaugh/JEANNER

## 2024-08-09 ENCOUNTER — TELEPHONE (OUTPATIENT)
Dept: FAMILY MEDICINE CLINIC | Facility: CLINIC | Age: 64
End: 2024-08-09
Payer: MEDICARE

## 2024-08-09 DIAGNOSIS — F41.9 ANXIETY: Primary | ICD-10-CM

## 2024-08-09 RX ORDER — BUSPIRONE HYDROCHLORIDE 10 MG/1
10 TABLET ORAL 3 TIMES DAILY
Qty: 90 TABLET | Refills: 0 | Status: SHIPPED | OUTPATIENT
Start: 2024-08-09

## 2024-08-09 NOTE — TELEPHONE ENCOUNTER
Caller: Sebastien Tang    Relationship: Self    Best call back number: [phone]    Which medication are you concerned about:     busPIRone (BUSPAR) 10 MG tablet       Who prescribed you this medication: ABDIRASHID    When did you start taking this medication: 7/12/24    What are your concerns: PATIENT IS WANTING ADVICE ON WHAT TO DO WITH HIS LAST 3 TABLETS OF MEDICATION AND CLARIFICATION ON REFILL . PLEASE CALL BACK AND ADVISE. PATIENT HAS APPT WITH ERIC KAY TO FOLLOW UP ON MEDICATION. PATIENT REQUESTED EARLIER DATE THAN DR MENDENHALL HAD AVAILABLE.     How long have you had these concerns: SINCE 8/8/24.

## 2024-08-09 NOTE — TELEPHONE ENCOUNTER
LOV       3/27/2024  NOV           8/12/2024  LF              7/12/2024    Pt. Only has 3 tablets left

## 2024-08-12 ENCOUNTER — OFFICE VISIT (OUTPATIENT)
Dept: FAMILY MEDICINE CLINIC | Facility: CLINIC | Age: 64
End: 2024-08-12
Payer: MEDICARE

## 2024-08-12 VITALS
BODY MASS INDEX: 41.03 KG/M2 | WEIGHT: 254.2 LBS | DIASTOLIC BLOOD PRESSURE: 80 MMHG | TEMPERATURE: 97.9 F | HEART RATE: 98 BPM | OXYGEN SATURATION: 92 % | SYSTOLIC BLOOD PRESSURE: 110 MMHG

## 2024-08-12 DIAGNOSIS — F41.9 ANXIETY: Primary | ICD-10-CM

## 2024-08-12 DIAGNOSIS — F51.01 PRIMARY INSOMNIA: ICD-10-CM

## 2024-08-12 DIAGNOSIS — E11.65 TYPE 2 DIABETES MELLITUS WITH HYPERGLYCEMIA, WITHOUT LONG-TERM CURRENT USE OF INSULIN: ICD-10-CM

## 2024-08-12 DIAGNOSIS — I50.9 HEART FAILURE, UNSPECIFIED HF CHRONICITY, UNSPECIFIED HEART FAILURE TYPE: ICD-10-CM

## 2024-08-12 DIAGNOSIS — K59.09 OTHER CONSTIPATION: ICD-10-CM

## 2024-08-12 DIAGNOSIS — I10 ESSENTIAL HYPERTENSION: ICD-10-CM

## 2024-08-12 DIAGNOSIS — F43.29 ADJUSTMENT DISORDER WITH OTHER SYMPTOM: ICD-10-CM

## 2024-08-12 DIAGNOSIS — I48.0 PAROXYSMAL ATRIAL FIBRILLATION: ICD-10-CM

## 2024-08-12 DIAGNOSIS — F32.A DEPRESSION, UNSPECIFIED DEPRESSION TYPE: ICD-10-CM

## 2024-08-12 DIAGNOSIS — E78.00 HIGH CHOLESTEROL: ICD-10-CM

## 2024-08-12 PROCEDURE — 3074F SYST BP LT 130 MM HG: CPT

## 2024-08-12 PROCEDURE — 3044F HG A1C LEVEL LT 7.0%: CPT

## 2024-08-12 PROCEDURE — 1160F RVW MEDS BY RX/DR IN RCRD: CPT

## 2024-08-12 PROCEDURE — 1126F AMNT PAIN NOTED NONE PRSNT: CPT

## 2024-08-12 PROCEDURE — 99214 OFFICE O/P EST MOD 30 MIN: CPT

## 2024-08-12 PROCEDURE — 3079F DIAST BP 80-89 MM HG: CPT

## 2024-08-12 PROCEDURE — 1159F MED LIST DOCD IN RCRD: CPT

## 2024-08-12 RX ORDER — POLYETHYLENE GLYCOL 3350 17 G/17G
17 POWDER, FOR SOLUTION ORAL DAILY
Qty: 30 EACH | Refills: 3 | Status: SHIPPED | OUTPATIENT
Start: 2024-08-12

## 2024-08-12 RX ORDER — HYDROXYZINE PAMOATE 50 MG/1
50 CAPSULE ORAL 4 TIMES DAILY PRN
Qty: 360 CAPSULE | Refills: 0 | Status: SHIPPED | OUTPATIENT
Start: 2024-08-12 | End: 2024-08-12

## 2024-08-12 RX ORDER — HYDROXYZINE PAMOATE 50 MG/1
50 CAPSULE ORAL 4 TIMES DAILY PRN
Qty: 360 CAPSULE | Refills: 0 | Status: SHIPPED | OUTPATIENT
Start: 2024-08-12 | End: 2024-11-10

## 2024-08-12 NOTE — PROGRESS NOTES
Chief Complaint  Hypertension, Diabetes, and Insomnia    Subjective          History of Present Illness          Anxiety/depression  Chronic, ongoing, poorly controlled.  Current medication regimen Buspar, remeron, hydroxyzine.  Reports his Stress has increased tremendously over the last few months, lost his room mate of 16 years; she passed away, they shared a daughter together.  Moved recently and is now in new environment and all alone. Reports he is very sad and scared.   Reports he is experiencing financial difficulties with affording his medications.  Talked to someone in hospital with psych but has not seen anyone outpatient.   Used [place] in the past.   Denies si/hi.  Is working on finding his own therapist and psychiatrist but is okay with referrals.    Insomnia  Chronic ongoing poorly controlled.  Used to take ambien, would like to be on it again.  Current medication regimen hydroxyzine.        Constipation  Reports he is having some issues with constipation.  This is an ongoing issue for him.      Htn/afib/chf  Chronic, ongoing, well controlled.  Current medication Atenolol digoxin lasix spironolactone eliquis jardiance.  Managed by cardiology.  BP Readings from Last 3 Encounters:   08/12/24 110/80   04/30/24 100/80   03/29/24 140/90      Hld  Chronic, ongoing, poorly controlled.  Current medication Crestor eliquis.  Managed by cardiology.  Lab Results   Component Value Date    CHOL 182 04/02/2021    CHLPL 215 (H) 03/27/2024    TRIG 336 (H) 03/27/2024    HDL 35 (L) 03/27/2024     (H) 03/27/2024       Dm   Chronic, ongoing, well- controlled.  Current medication Jardiance.  Lab Results   Component Value Date    HGBA1C 6.60 (H) 03/16/2024       Objective   Vital Signs:  /80 (BP Location: Right arm, Patient Position: Sitting, Cuff Size: Large Adult)   Pulse 98   Temp 97.9 °F (36.6 °C) (Temporal)   Wt 115 kg (254 lb 3.2 oz)   SpO2 92%   BMI 41.03 kg/m²   Estimated body mass index is  "41.03 kg/m² as calculated from the following:    Height as of 4/30/24: 167.6 cm (66\").    Weight as of this encounter: 115 kg (254 lb 3.2 oz).               Physical Exam  Vitals reviewed.   Constitutional:       General: He is not in acute distress.     Appearance: Normal appearance. He is obese.   HENT:      Head: Normocephalic and atraumatic.      Mouth/Throat:      Mouth: Mucous membranes are moist.      Pharynx: No oropharyngeal exudate or posterior oropharyngeal erythema.   Eyes:      Conjunctiva/sclera: Conjunctivae normal.      Pupils: Pupils are equal, round, and reactive to light.   Cardiovascular:      Rate and Rhythm: Rhythm irregular.      Pulses: Normal pulses.      Heart sounds: No murmur heard.     No gallop.   Pulmonary:      Effort: Pulmonary effort is normal. No respiratory distress.      Breath sounds: Normal breath sounds. No wheezing.   Abdominal:      General: Bowel sounds are normal. There is no distension.      Palpations: Abdomen is soft.      Tenderness: There is no abdominal tenderness.   Musculoskeletal:         General: Normal range of motion.      Cervical back: Normal range of motion and neck supple. No tenderness.      Right lower leg: No edema.      Left lower leg: No edema.   Skin:     General: Skin is warm and dry.   Neurological:      Mental Status: He is alert and oriented to person, place, and time. Mental status is at baseline.   Psychiatric:         Mood and Affect: Mood normal.        Result Review :                     Assessment and Plan     Diagnoses and all orders for this visit:    1. Anxiety (Primary)  -     Ambulatory Referral to Behavioral Health  -     Ambulatory Referral to Psychiatry  -     Discontinue: hydrOXYzine pamoate (VISTARIL) 50 MG capsule; Take 1 capsule by mouth 4 (Four) Times a Day As Needed for Anxiety for up to 90 days.  Dispense: 360 capsule; Refill: 0  -     hydrOXYzine pamoate (VISTARIL) 50 MG capsule; Take 1 capsule by mouth 4 (Four) Times a Day " As Needed for Anxiety for up to 90 days.  Dispense: 360 capsule; Refill: 0    2. Depression, unspecified depression type  -     Ambulatory Referral to Behavioral Health  -     Ambulatory Referral to Psychiatry  -     Discontinue: hydrOXYzine pamoate (VISTARIL) 50 MG capsule; Take 1 capsule by mouth 4 (Four) Times a Day As Needed for Anxiety for up to 90 days.  Dispense: 360 capsule; Refill: 0  -     hydrOXYzine pamoate (VISTARIL) 50 MG capsule; Take 1 capsule by mouth 4 (Four) Times a Day As Needed for Anxiety for up to 90 days.  Dispense: 360 capsule; Refill: 0    3. Adjustment disorder with other symptom  -     Ambulatory Referral to Behavioral Health  -     Ambulatory Referral to Psychiatry  -     Discontinue: hydrOXYzine pamoate (VISTARIL) 50 MG capsule; Take 1 capsule by mouth 4 (Four) Times a Day As Needed for Anxiety for up to 90 days.  Dispense: 360 capsule; Refill: 0  -     hydrOXYzine pamoate (VISTARIL) 50 MG capsule; Take 1 capsule by mouth 4 (Four) Times a Day As Needed for Anxiety for up to 90 days.  Dispense: 360 capsule; Refill: 0    4. Other constipation  -     polyethylene glycol (MIRALAX) 17 g packet; Take 17 g by mouth Daily.  Dispense: 30 each; Refill: 3    5. Paroxysmal atrial fibrillation    6. High cholesterol    7. Heart failure, unspecified HF chronicity, unspecified heart failure type    8. Essential hypertension    9. Primary insomnia  -     hydrOXYzine pamoate (VISTARIL) 50 MG capsule; Take 1 capsule by mouth 4 (Four) Times a Day As Needed for Anxiety for up to 90 days.  Dispense: 360 capsule; Refill: 0    10. Type 2 diabetes mellitus with hyperglycemia, without long-term current use of insulin         Samples given today for eliquis and jardiance.  Follow up with specialists as scheduled.  Patient did not want his anxiety or depression medications increased or changed today. Would like to stay on current regimen.  Referral to psych and therapy.  May use hydroxyzine for  insomnia.      Follow Up     Return in about 3 months (around 11/12/2024) for Medicare Wellness.  Patient was given instructions and counseling regarding his condition or for health maintenance advice. Please see specific information pulled into the AVS if appropriate.

## 2024-08-13 ENCOUNTER — PATIENT OUTREACH (OUTPATIENT)
Dept: CASE MANAGEMENT | Facility: OTHER | Age: 64
End: 2024-08-13
Payer: MEDICARE

## 2024-08-13 PROBLEM — F43.20 ADJUSTMENT DISORDER: Status: ACTIVE | Noted: 2024-08-13

## 2024-08-13 PROBLEM — F32.A DEPRESSION: Status: ACTIVE | Noted: 2024-08-13

## 2024-08-13 NOTE — OUTREACH NOTE
AMBULATORY CASE MANAGEMENT NOTE    Names and Relationships of Patient/Support Persons: Contact: Sebastien Tang; Relationship: Self -     [facility] Interim Update    Called and spoke to patient per provider referral. Working with patient few months ago and we were suppose to work on patient assistance program application for his Jardiance and Eliquis and then lost contact. Will assist on application process. Plan to meet patient next week Wednesday for his signature. He is agreeable.     Patient agreeable for a referral to Social Work as well for other financial resources.     Care Coordination    Sent referral to Social Work.    Education Documentation  No documentation found.        Amanda ARRIAGA  Ambulatory Case Management    8/13/2024, 14:35 EDT

## 2024-08-15 ENCOUNTER — PATIENT OUTREACH (OUTPATIENT)
Dept: CASE MANAGEMENT | Facility: OTHER | Age: 64
End: 2024-08-15
Payer: MEDICARE

## 2024-08-15 ENCOUNTER — PATIENT OUTREACH (OUTPATIENT)
Age: 64
End: 2024-08-15
Payer: MEDICARE

## 2024-08-15 NOTE — OUTREACH NOTE
MSW received referral from RN-MICKI for assistance with community resources. MSW outreach to patient by phone and MSW left message and call back number. MSW will continue to attempt outreach for assistance.     Rachael RIVERS -   Ambulatory Case Management    8/15/2024, 11:13 EDT

## 2024-08-15 NOTE — OUTREACH NOTE
AMBULATORY CASE MANAGEMENT NOTE    Names and Relationships of Patient/Support Persons:  -     Care Coordination    Patient assistance program for Jardiance and Eliquis filled out today. Plan to meet patient next week Wednesday in office for his signature and fill out some patient portions of the application.     Education Documentation  No documentation found.        Amanda ARRIAGA  Ambulatory Case Management    8/15/2024, 08:40 EDT

## 2024-08-21 ENCOUNTER — TELEPHONE (OUTPATIENT)
Dept: FAMILY MEDICINE CLINIC | Facility: CLINIC | Age: 64
End: 2024-08-21
Payer: MEDICARE

## 2024-08-21 ENCOUNTER — PATIENT OUTREACH (OUTPATIENT)
Dept: CASE MANAGEMENT | Facility: OTHER | Age: 64
End: 2024-08-21
Payer: MEDICARE

## 2024-08-21 NOTE — OUTREACH NOTE
AMBULATORY CASE MANAGEMENT NOTE    Names and Relationships of Patient/Support Persons: Contact: Sebastien Tang; Relationship: Self -     Patient Outreach    Met patient in person, signature obtained for Eliquis and Jardiance patient assistance application. PCP signature obtained. Documents scanned into media by  staff.     Care Coordination    Faxed patient assistance application and required documents to [facility] for Eliquis Fax# [phone].    Faxed patient assistance application and required documents to [facility] for Jardiance Fax# [phone].    Education Documentation  No documentation found.        Amanda ARRIAGA  Ambulatory Case Management    8/21/2024, 14:51 EDT

## 2024-08-22 ENCOUNTER — PATIENT OUTREACH (OUTPATIENT)
Dept: CASE MANAGEMENT | Facility: OTHER | Age: 64
End: 2024-08-22
Payer: MEDICARE

## 2024-08-22 NOTE — OUTREACH NOTE
AMBULATORY CASE MANAGEMENT NOTE    Names and Relationships of Patient/Support Persons: Contact: [employer]; Relationship:   Contact: [employer]; Relationship:  -     Care Coordination    Called and spoke to [facility] [place] to follow up on Jardiance application. Rep states they received application and patient was found to be eligible to medicaid and further states that they have sent a referral to medicaid. Patient will have to apply to medicaid and if denied will have to resubmit the denial letter to [facility] [place] to process.     Called and spoke to [employer] to follow up on Eliquis application. Rep states they have received the application and currently is in process.    RN-ACM to follow.     Education Documentation  No documentation found.        Amanda ARRIAGA  Ambulatory Case Management    8/22/2024, 13:28 EDT

## 2024-08-26 ENCOUNTER — PATIENT OUTREACH (OUTPATIENT)
Age: 64
End: 2024-08-26
Payer: MEDICARE

## 2024-08-26 NOTE — OUTREACH NOTE
Social Work Assessment  Questions/Answers      Flowsheet Row Most Recent Value   Referral Source outpatient staff, outpatient clinic, nursing   Reason for Consult financial concerns, community resources, medication concerns   Preferred Language English   Advance Care Planning Reviewed no concerns identified   People in Home alone   Current Living Arrangements apartment   Potentially Unsafe Housing Conditions none   In the past 12 months has the electric, gas, oil, or water company threatened to shut off services in your home? No   Primary Care Provided by self   Source of Income unable to assess   Financial/Environmental Concerns unable to afford medication(s)   Application for Public Assistance pending public assistance pending number   Usual Activity Tolerance good   Current Activity Tolerance good          SDOH updated and reviewed with the patient during this program:  --     Employment: Not At Risk (8/26/2024)    Employment     Do you want help finding or keeping work or a job?: I do not need or want help      Financial Resource Strain: High Risk (8/26/2024)    Overall Financial Resource Strain (CARDIA)     Difficulty of Paying Living Expenses: Hard      --     Food Insecurity: No Food Insecurity (8/26/2024)    Hunger Vital Sign     Worried About Running Out of Food in the Last Year: Never true     Ran Out of Food in the Last Year: Never true      --     Housing Stability: Low Risk  (8/26/2024)    Housing Stability Vital Sign     Unable to Pay for Housing in the Last Year: No     Number of Times Moved in the Last Year: 1     Homeless in the Last Year: No      --     Transportation Needs: No Transportation Needs (8/26/2024)    PRAPARE - Transportation     Lack of Transportation (Medical): No     Lack of Transportation (Non-Medical): No      --     Utilities: Not At Risk (8/26/2024)    [facility] Utilities     Threatened with loss of utilities: No      Continuing Care   [facility]  [facility]    [address] [address]    Phone: [phone]    Resource for: Financial Resource Strain, Food Insecurity, Utilities   [facility] [place] [facility]    [address] [facility] [address]    Phone: [phone]    Resource for: Financial Resource Strain, Utilities   [employer] [facility]    [address] [facility] [address]    Phone: [phone]    Resource for: Food Insecurity     Patient Outreach    MSW outreach to patient via telephone on 3 attempts with voicemails and MSW unable to reach patient.    MSW sent [employer] message to patient with information on how to enroll in Medicaid coverage as requested per RN-ACM. Patient provided with financial resources including [facility], [employer], and [employer]. RN-ACM working with patient for medication assistance. MSW included contact information within [employer] message if patient would like to discuss community resource information further with MSW.    Rachael RIVERS -   Ambulatory Case Management    8/26/2024, 15:06 EDT

## 2024-08-27 ENCOUNTER — TELEPHONE (OUTPATIENT)
Dept: CASE MANAGEMENT | Facility: OTHER | Age: 64
End: 2024-08-27
Payer: MEDICARE

## 2024-08-27 NOTE — TELEPHONE ENCOUNTER
Attempted to call, no answer LMTCB. Need to inform patient of supportive documentations we are needing for patient application programs for Eliquis and Jardiance:    Eliquis - Need proof of 3% proof of out of pocket expenses.    Jardiance - Medicaid application, if denied, need denial letter.

## 2024-08-28 ENCOUNTER — PATIENT OUTREACH (OUTPATIENT)
Dept: CASE MANAGEMENT | Facility: OTHER | Age: 64
End: 2024-08-28
Payer: MEDICARE

## 2024-08-28 NOTE — OUTREACH NOTE
AMBULATORY CASE MANAGEMENT NOTE    Names and Relationships of Patient/Support Persons:  -     Patient Outreach    Sent message via [employer].    Education Documentation  No documentation found.        Amanda ARRIAGA  Ambulatory Case Management    8/28/2024, 09:23 EDT

## 2024-08-29 DIAGNOSIS — F41.9 ANXIETY: ICD-10-CM

## 2024-08-30 DIAGNOSIS — F41.9 ANXIETY: ICD-10-CM

## 2024-08-30 RX ORDER — BUSPIRONE HYDROCHLORIDE 10 MG/1
10 TABLET ORAL 3 TIMES DAILY
Qty: 90 TABLET | Refills: 0 | Status: SHIPPED | OUTPATIENT
Start: 2024-08-30

## 2024-08-30 RX ORDER — BUSPIRONE HYDROCHLORIDE 10 MG/1
10 TABLET ORAL 3 TIMES DAILY
Qty: 90 TABLET | Refills: 0 | Status: SHIPPED | OUTPATIENT
Start: 2024-08-30 | End: 2024-08-30 | Stop reason: SDUPTHER

## 2024-09-10 ENCOUNTER — PATIENT OUTREACH (OUTPATIENT)
Dept: CASE MANAGEMENT | Facility: OTHER | Age: 64
End: 2024-09-10
Payer: MEDICARE

## 2024-09-10 NOTE — OUTREACH NOTE
AMBULATORY CASE MANAGEMENT NOTE    Names and Relationships of Patient/Support Persons: Contact: Sebastien Tang; Relationship: Self -     Patient Outreach    Sent message via [employer].     Education Documentation  No documentation found.        Amanda ARRIAGA  Ambulatory Case Management    9/10/2024, 15:13 EDT

## 2024-09-16 RX ORDER — FUROSEMIDE 40 MG
40 TABLET ORAL DAILY
Qty: 90 TABLET | Refills: 3 | OUTPATIENT
Start: 2024-09-16

## 2024-09-17 RX ORDER — FUROSEMIDE 80 MG
80 TABLET ORAL DAILY
Qty: 90 TABLET | Refills: 3 | Status: SHIPPED | OUTPATIENT
Start: 2024-09-17

## 2024-09-24 ENCOUNTER — TELEPHONE (OUTPATIENT)
Dept: FAMILY MEDICINE CLINIC | Facility: CLINIC | Age: 64
End: 2024-09-24

## 2024-09-24 NOTE — TELEPHONE ENCOUNTER
Caller: Sebastien Tang    Relationship: Self    Best call back number: [phone]     What was the call regarding: PATIENT WAS TOLD EVERY FEW MONTHS TO CHECK FOR SAMPLES OF JARDIANCE AND ELIQUIS. PLEASE CALL AND ADVISE     PATIENT IS OUT OF JARDIANCE.

## 2024-10-10 RX ORDER — DIGOXIN 250 MCG
250 TABLET ORAL
Qty: 90 TABLET | Refills: 3 | Status: SHIPPED | OUTPATIENT
Start: 2024-10-10

## 2024-10-17 RX ORDER — MIRTAZAPINE 30 MG/1
30 TABLET, FILM COATED ORAL NIGHTLY
Qty: 30 TABLET | Refills: 2 | OUTPATIENT
Start: 2024-10-17

## 2024-10-17 RX ORDER — MIRTAZAPINE 30 MG/1
30 TABLET, FILM COATED ORAL NIGHTLY
Qty: 30 TABLET | Refills: 2 | Status: SHIPPED | OUTPATIENT
Start: 2024-10-17

## 2024-10-18 RX ORDER — PANTOPRAZOLE SODIUM 40 MG/1
40 TABLET, DELAYED RELEASE ORAL
Qty: 30 TABLET | Refills: 5 | Status: SHIPPED | OUTPATIENT
Start: 2024-10-18

## 2024-10-18 NOTE — TELEPHONE ENCOUNTER
LOV                  8/12/24  NOV                   11/13/2024  Last refill             7/23/24  Protocol              met

## 2024-10-28 ENCOUNTER — TELEPHONE (OUTPATIENT)
Age: 64
End: 2024-10-28
Payer: MEDICARE

## 2024-10-28 NOTE — TELEPHONE ENCOUNTER
Update: his daughter called back to the clinic and left a VM stating that he is doing fine, he had been asleep when we were trying to reach him. She did not report any issues/concerns from the pt on the voicemail. He knows he can call us with any concerns.  Kindly,   Meg Schotanus Device RN

## 2024-10-28 NOTE — TELEPHONE ENCOUNTER
The pt's remote data report from their [employer] ICD shows 1 new VF labeled event with 1 40J shock on 10/26 8:51pm. The EGM shows the pt in AF/VS @ 80s bpm with bigeminy PVCs in the start of the episode, then this turns into what appears to be true AF/VF at an avg rate of 260s bpm. The device appropriately detects this event and then gives a 40J shock, converting the rhythm to an irregular AS/VS in the 70s-150s bpm (some are PVCs) - EGM below.     I tried to call the pt x 2 but no voicemail is set up. I called his daughter and she stated that he does not use a phone anymore. She said she would call his apartment complex and have them check on him. She is supposed to call us back and let us know how he is doing. I will await call back.     Per records pt has a f/u with Dr. Beatty on 11/1 and Dr. Rosas on 11/12    Diane,   Meg Schotanus Device RN

## 2024-10-28 NOTE — TELEPHONE ENCOUNTER
Discussed with KP, he will keep current follow up with us in 2 weeks and follow up with Dr. Beatty in 2 days. Will go over symptoms then and see if we need to get blood work.

## 2024-10-29 DIAGNOSIS — F41.9 ANXIETY: ICD-10-CM

## 2024-10-30 RX ORDER — BUSPIRONE HYDROCHLORIDE 10 MG/1
10 TABLET ORAL 3 TIMES DAILY
Qty: 90 TABLET | Refills: 0 | Status: SHIPPED | OUTPATIENT
Start: 2024-10-30

## 2024-10-30 RX ORDER — FINASTERIDE 5 MG/1
5 TABLET, FILM COATED ORAL DAILY
Qty: 90 TABLET | Refills: 0 | Status: SHIPPED | OUTPATIENT
Start: 2024-10-30

## 2024-10-30 NOTE — TELEPHONE ENCOUNTER
LOV                 8/12/2024                    NOV                 11/13/2024   LAST REFILL    8/30/2024   PROTOCOL       Met

## 2024-11-27 ENCOUNTER — APPOINTMENT (OUTPATIENT)
Dept: GENERAL RADIOLOGY | Facility: HOSPITAL | Age: 64
End: 2024-11-27
Payer: MEDICARE

## 2024-11-27 ENCOUNTER — HOSPITAL ENCOUNTER (INPATIENT)
Facility: HOSPITAL | Age: 64
LOS: 6 days | Discharge: HOME | End: 2024-12-03
Attending: EMERGENCY MEDICINE | Admitting: INTERNAL MEDICINE
Payer: MEDICARE

## 2024-11-27 DIAGNOSIS — F51.01 PRIMARY INSOMNIA: ICD-10-CM

## 2024-11-27 DIAGNOSIS — I49.01 VENTRICULAR FIBRILLATION, PAROXYSMAL: Primary | ICD-10-CM

## 2024-11-27 DIAGNOSIS — I50.9 ACUTE CONGESTIVE HEART FAILURE, UNSPECIFIED HEART FAILURE TYPE: ICD-10-CM

## 2024-11-27 DIAGNOSIS — Z79.01 ANTICOAGULATED BY ANTICOAGULATION TREATMENT: ICD-10-CM

## 2024-11-27 DIAGNOSIS — N28.9 RENAL INSUFFICIENCY: ICD-10-CM

## 2024-11-27 DIAGNOSIS — D64.9 MILD CHRONIC ANEMIA: ICD-10-CM

## 2024-11-27 DIAGNOSIS — Z45.02 DEFIBRILLATOR DISCHARGE: ICD-10-CM

## 2024-11-27 DIAGNOSIS — E87.6 HYPOKALEMIA: ICD-10-CM

## 2024-11-27 DIAGNOSIS — F41.9 ANXIETY: ICD-10-CM

## 2024-11-27 PROBLEM — T46.0X1A DIGITALIS TOXICITY: Status: ACTIVE | Noted: 2024-11-27

## 2024-11-27 PROBLEM — N17.9 AKI (ACUTE KIDNEY INJURY): Status: ACTIVE | Noted: 2024-11-27

## 2024-11-27 PROBLEM — I47.29 VENTRICULAR TACHYCARDIA (PAROXYSMAL): Status: ACTIVE | Noted: 2024-11-27

## 2024-11-27 PROBLEM — E11.42 DIABETIC POLYNEUROPATHY ASSOCIATED WITH TYPE 2 DIABETES MELLITUS: Status: ACTIVE | Noted: 2024-11-27

## 2024-11-27 PROBLEM — I50.33 ACUTE ON CHRONIC DIASTOLIC CHF (CONGESTIVE HEART FAILURE): Status: ACTIVE | Noted: 2024-03-29

## 2024-11-27 PROBLEM — E66.01 MORBID OBESITY: Status: ACTIVE | Noted: 2024-11-27

## 2024-11-27 LAB
ALBUMIN SERPL-MCNC: 4 G/DL (ref 3.5–5.2)
ALBUMIN/GLOB SERPL: 1.4 G/DL
ALP SERPL-CCNC: 91 U/L (ref 39–117)
ALT SERPL W P-5'-P-CCNC: 61 U/L (ref 1–41)
ANION GAP SERPL CALCULATED.3IONS-SCNC: 13.9 MMOL/L (ref 5–15)
AST SERPL-CCNC: 89 U/L (ref 1–40)
BACTERIA UR QL AUTO: NORMAL /HPF
BASOPHILS # BLD AUTO: 0.06 10*3/MM3 (ref 0–0.2)
BASOPHILS NFR BLD AUTO: 0.6 % (ref 0–1.5)
BILIRUB SERPL-MCNC: 0.7 MG/DL (ref 0–1.2)
BILIRUB UR QL STRIP: NEGATIVE
BUN SERPL-MCNC: 35 MG/DL (ref 8–23)
BUN/CREAT SERPL: 17.4 (ref 7–25)
CALCIUM SPEC-SCNC: 10 MG/DL (ref 8.6–10.5)
CHLORIDE SERPL-SCNC: 86 MMOL/L (ref 98–107)
CLARITY UR: CLEAR
CO2 SERPL-SCNC: 32.1 MMOL/L (ref 22–29)
COLOR UR: YELLOW
CREAT SERPL-MCNC: 2.01 MG/DL (ref 0.76–1.27)
CREAT UR-MCNC: 11.1 MG/DL
DEPRECATED RDW RBC AUTO: 49.7 FL (ref 37–54)
DIGOXIN SERPL-MCNC: 1.6 NG/ML (ref 0.6–1.2)
EGFRCR SERPLBLD CKD-EPI 2021: 35.9 ML/MIN/1.73
EOSINOPHIL # BLD AUTO: 0.07 10*3/MM3 (ref 0–0.4)
EOSINOPHIL NFR BLD AUTO: 0.7 % (ref 0.3–6.2)
ERYTHROCYTE [DISTWIDTH] IN BLOOD BY AUTOMATED COUNT: 18 % (ref 12.3–15.4)
GEN 5 1HR TROPONIN T REFLEX: 137 NG/L
GLOBULIN UR ELPH-MCNC: 2.8 GM/DL
GLUCOSE SERPL-MCNC: 147 MG/DL (ref 65–99)
GLUCOSE UR STRIP-MCNC: NEGATIVE MG/DL
HCT VFR BLD AUTO: 38.3 % (ref 37.5–51)
HGB BLD-MCNC: 11.8 G/DL (ref 13–17.7)
HGB UR QL STRIP.AUTO: ABNORMAL
HOLD SPECIMEN: NORMAL
HYALINE CASTS UR QL AUTO: NORMAL /LPF
IMM GRANULOCYTES # BLD AUTO: 0.02 10*3/MM3 (ref 0–0.05)
IMM GRANULOCYTES NFR BLD AUTO: 0.2 % (ref 0–0.5)
KETONES UR QL STRIP: NEGATIVE
LEUKOCYTE ESTERASE UR QL STRIP.AUTO: NEGATIVE
LYMPHOCYTES # BLD AUTO: 1.36 10*3/MM3 (ref 0.7–3.1)
LYMPHOCYTES NFR BLD AUTO: 14.3 % (ref 19.6–45.3)
MAGNESIUM SERPL-MCNC: 2.8 MG/DL (ref 1.6–2.4)
MAGNESIUM SERPL-MCNC: 3 MG/DL (ref 1.6–2.4)
MCH RBC QN AUTO: 24 PG (ref 26.6–33)
MCHC RBC AUTO-ENTMCNC: 30.8 G/DL (ref 31.5–35.7)
MCV RBC AUTO: 77.8 FL (ref 79–97)
MONOCYTES # BLD AUTO: 1.39 10*3/MM3 (ref 0.1–0.9)
MONOCYTES NFR BLD AUTO: 14.7 % (ref 5–12)
NEUTROPHILS NFR BLD AUTO: 6.58 10*3/MM3 (ref 1.7–7)
NEUTROPHILS NFR BLD AUTO: 69.5 % (ref 42.7–76)
NITRITE UR QL STRIP: NEGATIVE
NRBC BLD AUTO-RTO: 0.2 /100 WBC (ref 0–0.2)
NT-PROBNP SERPL-MCNC: 6000 PG/ML (ref 0–900)
PH UR STRIP.AUTO: 7.5 [PH] (ref 5–8)
PLATELET # BLD AUTO: 267 10*3/MM3 (ref 140–450)
PMV BLD AUTO: 8.8 FL (ref 6–12)
POTASSIUM SERPL-SCNC: 2.8 MMOL/L (ref 3.5–5.2)
POTASSIUM SERPL-SCNC: 3.3 MMOL/L (ref 3.5–5.2)
PROT SERPL-MCNC: 6.8 G/DL (ref 6–8.5)
PROT UR QL STRIP: ABNORMAL
QT INTERVAL: 345 MS
QTC INTERVAL: 339 MS
RBC # BLD AUTO: 4.92 10*6/MM3 (ref 4.14–5.8)
RBC # UR STRIP: NORMAL /HPF
REF LAB TEST METHOD: NORMAL
SODIUM SERPL-SCNC: 132 MMOL/L (ref 136–145)
SODIUM UR-SCNC: 75 MMOL/L
SP GR UR STRIP: <=1.005 (ref 1–1.03)
SQUAMOUS #/AREA URNS HPF: NORMAL /HPF
TROPONIN T DELTA: -9 NG/L
TROPONIN T SERPL HS-MCNC: 146 NG/L
UROBILINOGEN UR QL STRIP: ABNORMAL
WBC # UR STRIP: NORMAL /HPF
WBC NRBC COR # BLD AUTO: 9.48 10*3/MM3 (ref 3.4–10.8)
WHOLE BLOOD HOLD COAG: NORMAL

## 2024-11-27 PROCEDURE — 93005 ELECTROCARDIOGRAM TRACING: CPT | Performed by: EMERGENCY MEDICINE

## 2024-11-27 PROCEDURE — 25010000002 FUROSEMIDE PER 20 MG

## 2024-11-27 PROCEDURE — 25010000002 FUROSEMIDE PER 20 MG: Performed by: EMERGENCY MEDICINE

## 2024-11-27 PROCEDURE — 25010000002 POTASSIUM CHLORIDE 10 MEQ/100ML SOLUTION: Performed by: EMERGENCY MEDICINE

## 2024-11-27 PROCEDURE — 84132 ASSAY OF SERUM POTASSIUM: CPT | Performed by: INTERNAL MEDICINE

## 2024-11-27 PROCEDURE — 99291 CRITICAL CARE FIRST HOUR: CPT

## 2024-11-27 PROCEDURE — 83880 ASSAY OF NATRIURETIC PEPTIDE: CPT | Performed by: EMERGENCY MEDICINE

## 2024-11-27 PROCEDURE — 83735 ASSAY OF MAGNESIUM: CPT | Performed by: EMERGENCY MEDICINE

## 2024-11-27 PROCEDURE — 81001 URINALYSIS AUTO W/SCOPE: CPT | Performed by: INTERNAL MEDICINE

## 2024-11-27 PROCEDURE — 25010000002 AMIODARONE IN DEXTROSE 5% 150-4.21 MG/100ML-% SOLUTION: Performed by: EMERGENCY MEDICINE

## 2024-11-27 PROCEDURE — 36415 COLL VENOUS BLD VENIPUNCTURE: CPT

## 2024-11-27 PROCEDURE — 82570 ASSAY OF URINE CREATININE: CPT | Performed by: INTERNAL MEDICINE

## 2024-11-27 PROCEDURE — 83735 ASSAY OF MAGNESIUM: CPT | Performed by: INTERNAL MEDICINE

## 2024-11-27 PROCEDURE — 80053 COMPREHEN METABOLIC PANEL: CPT | Performed by: EMERGENCY MEDICINE

## 2024-11-27 PROCEDURE — 85025 COMPLETE CBC W/AUTO DIFF WBC: CPT | Performed by: EMERGENCY MEDICINE

## 2024-11-27 PROCEDURE — 80162 ASSAY OF DIGOXIN TOTAL: CPT | Performed by: EMERGENCY MEDICINE

## 2024-11-27 PROCEDURE — 63710000001 ONDANSETRON ODT 4 MG TABLET DISPERSIBLE: Performed by: INTERNAL MEDICINE

## 2024-11-27 PROCEDURE — 25010000002 AMIODARONE IN DEXTROSE 5% 360-4.14 MG/200ML-% SOLUTION: Performed by: EMERGENCY MEDICINE

## 2024-11-27 PROCEDURE — 71045 X-RAY EXAM CHEST 1 VIEW: CPT

## 2024-11-27 PROCEDURE — 84484 ASSAY OF TROPONIN QUANT: CPT | Performed by: EMERGENCY MEDICINE

## 2024-11-27 PROCEDURE — 84300 ASSAY OF URINE SODIUM: CPT | Performed by: INTERNAL MEDICINE

## 2024-11-27 RX ORDER — POTASSIUM CHLORIDE 7.45 MG/ML
10 INJECTION INTRAVENOUS
Status: COMPLETED | OUTPATIENT
Start: 2024-11-27 | End: 2024-11-27

## 2024-11-27 RX ORDER — SODIUM CHLORIDE 0.9 % (FLUSH) 0.9 %
3 SYRINGE (ML) INJECTION EVERY 12 HOURS SCHEDULED
Status: DISCONTINUED | OUTPATIENT
Start: 2024-11-27 | End: 2024-12-03 | Stop reason: HOSPADM

## 2024-11-27 RX ORDER — ONDANSETRON 4 MG/1
4 TABLET, ORALLY DISINTEGRATING ORAL EVERY 6 HOURS PRN
Status: DISCONTINUED | OUTPATIENT
Start: 2024-11-27 | End: 2024-12-03 | Stop reason: HOSPADM

## 2024-11-27 RX ORDER — MIRTAZAPINE 15 MG/1
30 TABLET, FILM COATED ORAL NIGHTLY
Status: DISCONTINUED | OUTPATIENT
Start: 2024-11-27 | End: 2024-12-03 | Stop reason: HOSPADM

## 2024-11-27 RX ORDER — ATORVASTATIN CALCIUM 20 MG/1
40 TABLET, FILM COATED ORAL NIGHTLY
Status: DISCONTINUED | OUTPATIENT
Start: 2024-11-27 | End: 2024-12-03 | Stop reason: HOSPADM

## 2024-11-27 RX ORDER — POTASSIUM CHLORIDE 750 MG/1
40 TABLET, FILM COATED, EXTENDED RELEASE ORAL
Status: COMPLETED | OUTPATIENT
Start: 2024-11-27 | End: 2024-11-27

## 2024-11-27 RX ORDER — ATENOLOL 25 MG/1
25 TABLET ORAL 2 TIMES DAILY
Status: DISCONTINUED | OUTPATIENT
Start: 2024-11-27 | End: 2024-11-27

## 2024-11-27 RX ORDER — TEMAZEPAM 15 MG/1
15 CAPSULE ORAL NIGHTLY
Status: COMPLETED | OUTPATIENT
Start: 2024-11-27 | End: 2024-12-01

## 2024-11-27 RX ORDER — HYDROCODONE BITARTRATE AND ACETAMINOPHEN 10; 325 MG/1; MG/1
1 TABLET ORAL EVERY 6 HOURS PRN
COMMUNITY
End: 2024-12-03 | Stop reason: HOSPADM

## 2024-11-27 RX ORDER — BISACODYL 5 MG/1
5 TABLET, DELAYED RELEASE ORAL DAILY PRN
Status: DISCONTINUED | OUTPATIENT
Start: 2024-11-27 | End: 2024-11-30

## 2024-11-27 RX ORDER — SODIUM CHLORIDE 0.9 % (FLUSH) 0.9 %
3-10 SYRINGE (ML) INJECTION AS NEEDED
Status: DISCONTINUED | OUTPATIENT
Start: 2024-11-27 | End: 2024-12-03 | Stop reason: HOSPADM

## 2024-11-27 RX ORDER — ALBUTEROL SULFATE 0.83 MG/ML
2.5 SOLUTION RESPIRATORY (INHALATION) EVERY 6 HOURS PRN
Status: DISCONTINUED | OUTPATIENT
Start: 2024-11-27 | End: 2024-12-03 | Stop reason: HOSPADM

## 2024-11-27 RX ORDER — SODIUM CHLORIDE 9 MG/ML
40 INJECTION, SOLUTION INTRAVENOUS AS NEEDED
Status: DISCONTINUED | OUTPATIENT
Start: 2024-11-27 | End: 2024-12-03 | Stop reason: HOSPADM

## 2024-11-27 RX ORDER — FUROSEMIDE 10 MG/ML
40 INJECTION INTRAMUSCULAR; INTRAVENOUS
Status: DISCONTINUED | OUTPATIENT
Start: 2024-11-28 | End: 2024-11-27

## 2024-11-27 RX ORDER — BUSPIRONE HYDROCHLORIDE 15 MG/1
15 TABLET ORAL 2 TIMES DAILY
COMMUNITY
End: 2024-12-03 | Stop reason: HOSPADM

## 2024-11-27 RX ORDER — POLYETHYLENE GLYCOL 3350 17 G/17G
17 POWDER, FOR SOLUTION ORAL DAILY PRN
Status: DISCONTINUED | OUTPATIENT
Start: 2024-11-27 | End: 2024-11-30

## 2024-11-27 RX ORDER — FUROSEMIDE 10 MG/ML
80 INJECTION INTRAMUSCULAR; INTRAVENOUS ONCE
Status: COMPLETED | OUTPATIENT
Start: 2024-11-27 | End: 2024-11-27

## 2024-11-27 RX ORDER — ATENOLOL 25 MG/1
25 TABLET ORAL 2 TIMES DAILY
COMMUNITY
End: 2024-12-03 | Stop reason: HOSPADM

## 2024-11-27 RX ORDER — ACETAMINOPHEN 325 MG/1
650 TABLET ORAL EVERY 4 HOURS PRN
Status: DISCONTINUED | OUTPATIENT
Start: 2024-11-27 | End: 2024-12-03 | Stop reason: HOSPADM

## 2024-11-27 RX ORDER — ACETAMINOPHEN 650 MG/1
650 SUPPOSITORY RECTAL EVERY 4 HOURS PRN
Status: DISCONTINUED | OUTPATIENT
Start: 2024-11-27 | End: 2024-12-03 | Stop reason: HOSPADM

## 2024-11-27 RX ORDER — NITROGLYCERIN 0.4 MG/1
0.4 TABLET SUBLINGUAL
Status: DISCONTINUED | OUTPATIENT
Start: 2024-11-27 | End: 2024-11-27 | Stop reason: SDUPTHER

## 2024-11-27 RX ORDER — FINASTERIDE 5 MG/1
5 TABLET, FILM COATED ORAL DAILY
Status: DISCONTINUED | OUTPATIENT
Start: 2024-11-27 | End: 2024-12-03 | Stop reason: HOSPADM

## 2024-11-27 RX ORDER — ONDANSETRON 2 MG/ML
4 INJECTION INTRAMUSCULAR; INTRAVENOUS EVERY 6 HOURS PRN
Status: DISCONTINUED | OUTPATIENT
Start: 2024-11-27 | End: 2024-12-03 | Stop reason: HOSPADM

## 2024-11-27 RX ORDER — BUDESONIDE AND FORMOTEROL FUMARATE DIHYDRATE 160; 4.5 UG/1; UG/1
2 AEROSOL RESPIRATORY (INHALATION) 2 TIMES DAILY
Status: DISCONTINUED | OUTPATIENT
Start: 2024-11-27 | End: 2024-12-03 | Stop reason: HOSPADM

## 2024-11-27 RX ORDER — NITROGLYCERIN 0.4 MG/1
0.4 TABLET SUBLINGUAL
Status: DISCONTINUED | OUTPATIENT
Start: 2024-11-27 | End: 2024-12-03 | Stop reason: HOSPADM

## 2024-11-27 RX ORDER — FUROSEMIDE 10 MG/ML
80 INJECTION INTRAMUSCULAR; INTRAVENOUS
Status: DISCONTINUED | OUTPATIENT
Start: 2024-11-27 | End: 2024-11-28

## 2024-11-27 RX ORDER — ACETAMINOPHEN 160 MG/5ML
650 SOLUTION ORAL EVERY 4 HOURS PRN
Status: DISCONTINUED | OUTPATIENT
Start: 2024-11-27 | End: 2024-12-03 | Stop reason: HOSPADM

## 2024-11-27 RX ORDER — PANTOPRAZOLE SODIUM 40 MG/1
40 TABLET, DELAYED RELEASE ORAL
Status: DISCONTINUED | OUTPATIENT
Start: 2024-11-28 | End: 2024-12-03 | Stop reason: HOSPADM

## 2024-11-27 RX ORDER — BUSPIRONE HYDROCHLORIDE 5 MG/1
10 TABLET ORAL EVERY 12 HOURS SCHEDULED
Status: DISCONTINUED | OUTPATIENT
Start: 2024-11-27 | End: 2024-12-03 | Stop reason: HOSPADM

## 2024-11-27 RX ORDER — SODIUM CHLORIDE 0.9 % (FLUSH) 0.9 %
10 SYRINGE (ML) INJECTION AS NEEDED
Status: DISCONTINUED | OUTPATIENT
Start: 2024-11-27 | End: 2024-12-03 | Stop reason: HOSPADM

## 2024-11-27 RX ORDER — BISACODYL 10 MG
10 SUPPOSITORY, RECTAL RECTAL DAILY PRN
Status: DISCONTINUED | OUTPATIENT
Start: 2024-11-27 | End: 2024-11-30

## 2024-11-27 RX ORDER — FAMOTIDINE 20 MG/1
10 TABLET, FILM COATED ORAL 2 TIMES DAILY PRN
Status: DISCONTINUED | OUTPATIENT
Start: 2024-11-27 | End: 2024-12-03 | Stop reason: HOSPADM

## 2024-11-27 RX ORDER — AMOXICILLIN 250 MG
2 CAPSULE ORAL 2 TIMES DAILY PRN
Status: DISCONTINUED | OUTPATIENT
Start: 2024-11-27 | End: 2024-11-30

## 2024-11-27 RX ORDER — AMOXICILLIN 500 MG/1
1000 CAPSULE ORAL 3 TIMES DAILY
COMMUNITY
End: 2024-12-03 | Stop reason: HOSPADM

## 2024-11-27 RX ORDER — POTASSIUM CHLORIDE 750 MG/1
40 TABLET, FILM COATED, EXTENDED RELEASE ORAL EVERY 4 HOURS
Status: COMPLETED | OUTPATIENT
Start: 2024-11-27 | End: 2024-11-28

## 2024-11-27 RX ADMIN — AMIODARONE HYDROCHLORIDE 1 MG/MIN: 1.8 INJECTION, SOLUTION INTRAVENOUS at 10:24

## 2024-11-27 RX ADMIN — FUROSEMIDE 80 MG: 10 INJECTION, SOLUTION INTRAMUSCULAR; INTRAVENOUS at 17:26

## 2024-11-27 RX ADMIN — POTASSIUM CHLORIDE 40 MEQ: 750 TABLET, EXTENDED RELEASE ORAL at 13:41

## 2024-11-27 RX ADMIN — ATORVASTATIN CALCIUM 40 MG: 20 TABLET, FILM COATED ORAL at 20:51

## 2024-11-27 RX ADMIN — Medication 3 ML: at 20:55

## 2024-11-27 RX ADMIN — BUSPIRONE HYDROCHLORIDE 10 MG: 5 TABLET ORAL at 17:27

## 2024-11-27 RX ADMIN — POTASSIUM CHLORIDE 40 MEQ: 750 TABLET, EXTENDED RELEASE ORAL at 11:22

## 2024-11-27 RX ADMIN — TEMAZEPAM 15 MG: 15 CAPSULE ORAL at 21:03

## 2024-11-27 RX ADMIN — POTASSIUM CHLORIDE 10 MEQ: 7.46 INJECTION, SOLUTION INTRAVENOUS at 12:45

## 2024-11-27 RX ADMIN — AMIODARONE HYDROCHLORIDE 0.5 MG/MIN: 1.8 INJECTION, SOLUTION INTRAVENOUS at 14:12

## 2024-11-27 RX ADMIN — AMIODARONE HYDROCHLORIDE 150 MG: 1.5 INJECTION, SOLUTION INTRAVENOUS at 10:10

## 2024-11-27 RX ADMIN — ONDANSETRON 4 MG: 4 TABLET, ORALLY DISINTEGRATING ORAL at 16:58

## 2024-11-27 RX ADMIN — APIXABAN 5 MG: 5 TABLET, FILM COATED ORAL at 15:00

## 2024-11-27 RX ADMIN — FUROSEMIDE 80 MG: 10 INJECTION, SOLUTION INTRAMUSCULAR; INTRAVENOUS at 11:21

## 2024-11-27 RX ADMIN — FINASTERIDE 5 MG: 5 TABLET, FILM COATED ORAL at 17:27

## 2024-11-27 RX ADMIN — POTASSIUM CHLORIDE 40 MEQ: 750 TABLET, EXTENDED RELEASE ORAL at 20:52

## 2024-11-27 RX ADMIN — MIRTAZAPINE 30 MG: 15 TABLET, FILM COATED ORAL at 20:52

## 2024-11-27 RX ADMIN — POTASSIUM CHLORIDE 10 MEQ: 7.46 INJECTION, SOLUTION INTRAVENOUS at 11:19

## 2024-11-27 RX ADMIN — Medication 3 ML: at 13:30

## 2024-11-27 NOTE — ED PROVIDER NOTES
EMERGENCY DEPARTMENT ENCOUNTER    Room Number:  16/16  PCP: Yuliana Flynn DO  Historian: Patient, EMS    I initially evaluated the patient at the immediately upon ED arrival in bed 16    HPI:  Chief Complaint: Discharge of AICD  A complete HPI/ROS/PMH/PSH/SH/FH are unobtainable due to: Nothing  Context: Sebastien Tang is a 64 y.o. male with a medical history of atrial fibrillation, AICD who presents to the ED by EMS c/o acute discharge of his AICD.  Patient was called by his cardiologist office about 30 minutes ago and told that his defibrillator had fired.  He was advised to come to the ED.  En route to the ED, EMS reports that the patient had 2 episodes of ventricular tachycardia and that his AICD fired both times.  EMS reports that he is in A-fib.  Patient currently complains of some soreness in his chest.  Denies shortness of breath, nausea, vomiting, or sweating.  He had some teeth pulled recently but denies recent illness.  He is on atenolol, Eliquis, and digoxin.    Per the telephone encounter note from Kylah Bhat RN: Patient had an episode of V-fib on 11/26/2024.  Did have an episode of slow NSVT at 1014 last night.  Had an episode of fast NSVT at 11 PM and then a V-fib event at 1134.  40 J shock was administered at that time.  Patient had a second VF event at 945 this morning.  40 J shock was administered.        PAST MEDICAL HISTORY  Active Ambulatory Problems     Diagnosis Date Noted    Cardiac arrest 04/01/2021    Atrial flutter 04/05/2021    Tobacco abuse 04/05/2021    Azotemia 04/05/2021    COPD (chronic obstructive pulmonary disease) 04/05/2021    MRSA nasal colonization 04/05/2021    Transaminitis 04/05/2021    Obesity (BMI 30-39.9) 04/05/2021    Anemia 04/05/2021    Constipation 04/06/2021    Acute congestive heart failure 04/26/2021    PSA elevation 04/26/2021    Wellness examination 04/26/2021    High risk medication use 04/26/2021    Abnormal CXR 04/26/2021    Abdominal aneurysm 04/26/2021     Iliac aneurysm 04/26/2021    Coronary artery disease involving coronary bypass graft of native heart without angina pectoris 05/11/2021    Atrial fibrillation, persistent 05/11/2021    ICD (implantable cardioverter-defibrillator) in place 05/11/2021    Anxiety and depression 06/14/2021    Shortness of breath 06/14/2021    Primary insomnia 06/14/2021    Close exposure to COVID-19 virus 08/19/2021    Iliac artery stenosis, right 10/22/2021    Dysuria 10/22/2021    Gross hematuria 10/22/2021    Essential hypertension 11/23/2021    High cholesterol 02/07/2022    Screening PSA (prostate specific antigen) 09/01/2022    Anxiety 09/22/2022    Neuropathy 12/15/2022    Heart failure 03/15/2024    Iron deficiency anemia 03/16/2024    Diabetes mellitus, type 2 03/16/2024    Hypoxemia 03/17/2024    Acute on chronic systolic CHF (congestive heart failure) 03/21/2024    Acute on chronic diastolic CHF (congestive heart failure) 03/29/2024    Depression 08/13/2024    Adjustment disorder 08/13/2024     Resolved Ambulatory Problems     Diagnosis Date Noted    Ventricular fibrillation 04/01/2021    Ischemic cardiomyopathy 04/05/2021    Paroxysmal atrial fibrillation 04/08/2021    Cough 04/26/2021    Trouble in sleeping 04/26/2021    Atrial fibrillation 06/14/2021    Upper respiratory tract infection 06/14/2021    Elevated blood pressure reading 10/12/2021    Chronic coronary artery disease     COPD with exacerbation 09/01/2022    Insomnia 09/22/2022     Past Medical History:   Diagnosis Date    Acidosis     Acute respiratory failure     Arthritis     Asthma     Enlarged prostate     Hypertension     Hypokalemia     Orthopnea     PAF (paroxysmal atrial fibrillation)     Persistent atrial fibrillation     Pulmonary edema          PAST SURGICAL HISTORY  Past Surgical History:   Procedure Laterality Date    CARDIAC CATHETERIZATION Left 4/1/2021    Procedure: Coronary Angiography;  Surgeon: Anna Puga MD;  Location: [facility]  INVASIVE LOCATION;  Service: Cardiology;  Laterality: Left;    CARDIAC CATHETERIZATION N/A 4/1/2021    Procedure: Left ventriculography;  Surgeon: Anna Puga MD;  Location:  [facility] CATH INVASIVE LOCATION;  Service: Cardiology;  Laterality: N/A;    CARDIAC CATHETERIZATION N/A 4/1/2021    Procedure: Left Heart Cath;  Surgeon: Anna Puga MD;  Location:  [facility] CATH INVASIVE LOCATION;  Service: Cardiology;  Laterality: N/A;    CARDIAC ELECTROPHYSIOLOGY PROCEDURE N/A 4/7/2021    Procedure: IMPLANTABLE CARDIOVERTER DEFIBRILLATOR- SC BIOTRONIK;  Surgeon: Nik Rosas MD;  Location:  [facility] CATH INVASIVE LOCATION;  Service: Cardiovascular;  Laterality: N/A;    CARDIOVERSION  05/19/2021    Dr. Rosas    CARDIOVERSION  07/26/2021    Dr. Rosas    TONSILLECTOMY           FAMILY HISTORY  No family history on file.      SOCIAL HISTORY  Social History     Socioeconomic History    Marital status: Single   Tobacco Use    Smoking status: Former     Current packs/day: 0.00     Average packs/day: 1.5 packs/day for 10.0 years (15.0 ttl pk-yrs)     Types: Cigarettes     Start date: 4/22/2011     Quit date: 4/22/2021     Years since quitting: 3.6     Passive exposure: Past    Smokeless tobacco: Never    Tobacco comments:     4/2021   Vaping Use    Vaping status: Never Used   Substance and Sexual Activity    Alcohol use: Yes     Alcohol/week: 1.0 standard drink of alcohol     Comment: 1 BEER DAILY    Drug use: Not Currently     Types: Hydrocodone    Sexual activity: Not Currently     Partners: Female         ALLERGIES  Patient has no known allergies.    REVIEW OF SYSTEMS  Review of Systems  Included in HPI  All systems reviewed and negative except for those discussed in HPI.      PHYSICAL EXAM  ED Triage Vitals   Temp Pulse Resp BP SpO2   -- -- -- -- --      Temp src Heart Rate Source Patient Position BP Location FiO2 (%)   -- -- -- -- --       Physical Exam      GENERAL: Awake, alert, oriented x 3.  Chronically ill but  nontoxic-appearing elderly male.    HENT: NCAT, nares patent  EYES: no scleral icterus  CV: Irregularly irregular rhythm, normal rate  RESPIRATORY: normal effort, clear to auscultation bilaterally  ABDOMEN: soft, obese, nontender  MUSCULOSKELETAL: Extremities are nontender with full range of motion.  2+ edema in both lower extremities  NEURO: Speech is normal.  No facial droop.  Follows commands  PSYCH:  calm, cooperative  SKIN: warm, dry    Vital signs and nursing notes reviewed.          LAB RESULTS  Recent Results (from the past 24 hours)   ECG 12 Lead Rhythm Change    Collection Time: 11/27/24  9:58 AM   Result Value Ref Range    QT Interval 345 ms    QTC Interval 339 ms   Digoxin Level    Collection Time: 11/27/24 10:11 AM    Specimen: Blood   Result Value Ref Range    Digoxin 1.60 (H) 0.60 - 1.20 ng/mL   Comprehensive Metabolic Panel    Collection Time: 11/27/24 10:11 AM    Specimen: Blood   Result Value Ref Range    Glucose 147 (H) 65 - 99 mg/dL    BUN 35 (H) 8 - 23 mg/dL    Creatinine 2.01 (H) 0.76 - 1.27 mg/dL    Sodium 132 (L) 136 - 145 mmol/L    Potassium 2.8 (L) 3.5 - 5.2 mmol/L    Chloride 86 (L) 98 - 107 mmol/L    CO2 32.1 (H) 22.0 - 29.0 mmol/L    Calcium 10.0 8.6 - 10.5 mg/dL    Total Protein 6.8 6.0 - 8.5 g/dL    Albumin 4.0 3.5 - 5.2 g/dL    ALT (SGPT) 61 (H) 1 - 41 U/L    AST (SGOT) 89 (H) 1 - 40 U/L    Alkaline Phosphatase 91 39 - 117 U/L    Total Bilirubin 0.7 0.0 - 1.2 mg/dL    Globulin 2.8 gm/dL    A/G Ratio 1.4 g/dL    BUN/Creatinine Ratio 17.4 7.0 - 25.0    Anion Gap 13.9 5.0 - 15.0 mmol/L    eGFR 35.9 (L) >60.0 mL/min/1.73   BNP    Collection Time: 11/27/24 10:11 AM    Specimen: Blood   Result Value Ref Range    proBNP 6,000.0 (H) 0.0 - 900.0 pg/mL   Single High Sensitivity Troponin T    Collection Time: 11/27/24 10:11 AM    Specimen: Blood   Result Value Ref Range    HS Troponin T 146 (C) <22 ng/L   Magnesium    Collection Time: 11/27/24 10:11 AM    Specimen: Blood   Result Value Ref Range     Magnesium 3.0 (H) 1.6 - 2.4 mg/dL   CBC Auto Differential    Collection Time: 11/27/24 10:11 AM    Specimen: Blood   Result Value Ref Range    WBC 9.48 3.40 - 10.80 10*3/mm3    RBC 4.92 4.14 - 5.80 10*6/mm3    Hemoglobin 11.8 (L) 13.0 - 17.7 g/dL    Hematocrit 38.3 37.5 - 51.0 %    MCV 77.8 (L) 79.0 - 97.0 fL    MCH 24.0 (L) 26.6 - 33.0 pg    MCHC 30.8 (L) 31.5 - 35.7 g/dL    RDW 18.0 (H) 12.3 - 15.4 %    RDW-SD 49.7 37.0 - 54.0 fl    MPV 8.8 6.0 - 12.0 fL    Platelets 267 140 - 450 10*3/mm3    Neutrophil % 69.5 42.7 - 76.0 %    Lymphocyte % 14.3 (L) 19.6 - 45.3 %    Monocyte % 14.7 (H) 5.0 - 12.0 %    Eosinophil % 0.7 0.3 - 6.2 %    Basophil % 0.6 0.0 - 1.5 %    Immature Grans % 0.2 0.0 - 0.5 %    Neutrophils, Absolute 6.58 1.70 - 7.00 10*3/mm3    Lymphocytes, Absolute 1.36 0.70 - 3.10 10*3/mm3    Monocytes, Absolute 1.39 (H) 0.10 - 0.90 10*3/mm3    Eosinophils, Absolute 0.07 0.00 - 0.40 10*3/mm3    Basophils, Absolute 0.06 0.00 - 0.20 10*3/mm3    Immature Grans, Absolute 0.02 0.00 - 0.05 10*3/mm3    nRBC 0.2 0.0 - 0.2 /100 WBC   Gold Top - SST    Collection Time: 11/27/24 10:16 AM   Result Value Ref Range    Extra Tube Hold for add-ons.    Light Blue Top    Collection Time: 11/27/24 10:16 AM   Result Value Ref Range    Extra Tube Hold for add-ons.        Ordered the above labs and reviewed the results.        RADIOLOGY  XR Chest 1 View    Result Date: 11/27/2024  XR CHEST 1 VW-11/27/2024  HISTORY: Shortness of breath.  Heart size is mild to moderately enlarged. There is some soft tissue attenuation of bilateral lung markings with some mild vascular congestion. No focal infiltrates are seen.  Cardiac pacemaker is seen in good position. Cardioversion type devices overlie the left hemithorax.      1. Cardiomegaly with mild vascular congestion.   This report was finalized on 11/27/2024 10:41 AM by Dr. Ken hCin M.D on Workstation: [id number]       Ordered the above noted radiological studies. Reviewed by  me in PACS.            PROCEDURES  Critical Care    Performed by: He Hernandez MD  Authorized by: He Hernandez MD    Critical care provider statement:     Critical care time (minutes):  39    Critical care time was exclusive of:  Separately billable procedures and treating other patients    Critical care was necessary to treat or prevent imminent or life-threatening deterioration of the following conditions:  Cardiac failure and circulatory failure    Critical care was time spent personally by me on the following activities:  Development of treatment plan with patient or surrogate, discussions with consultants, discussions with primary provider, evaluation of patient's response to treatment, examination of patient, obtaining history from patient or surrogate, ordering and performing treatments and interventions, ordering and review of laboratory studies, ordering and review of radiographic studies, pulse oximetry, re-evaluation of patient's condition and review of old charts    I assumed direction of critical care for this patient from another provider in my specialty: no      Care discussed with: admitting provider          OUTPATIENT MEDICATION MANAGEMENT:  Current Facility-Administered Medications Ordered in Epic   Medication Dose Route Frequency Provider Last Rate Last Admin    acetaminophen (TYLENOL) tablet 650 mg  650 mg Oral Q4H PRN Tato Parks MD        Or    acetaminophen (TYLENOL) 160 MG/5ML oral solution 650 mg  650 mg Oral Q4H PRN Tato Parks MD        Or    acetaminophen (TYLENOL) suppository 650 mg  650 mg Rectal Q4H PRN Tato Parks MD        albuterol (PROVENTIL) nebulizer solution 0.083% 2.5 mg/3mL  2.5 mg Nebulization Q6H PRN Tato Parks MD        amiodarone 360 mg in 200 mL D5W infusion  1 mg/min Intravenous Continuous He Hernandez MD 33.3 mL/hr at 11/27/24 1024 1 mg/min at 11/27/24 1024    Followed by    amiodarone 360 mg in  200 mL D5W infusion  0.5 mg/min Intravenous Continuous He Hernandez MD        apixaban (ELIQUIS) tablet 5 mg  5 mg Oral Q12H Tato Parks MD        atenolol (TENORMIN) tablet 25 mg  25 mg Oral BID Tato Parks MD        atorvastatin (LIPITOR) tablet 40 mg  40 mg Oral Nightly Tato Parks MD        sennosides-docusate (PERICOLACE) 8.6-50 MG per tablet 2 tablet  2 tablet Oral BID PRN Tato Parks MD        And    polyethylene glycol (MIRALAX) packet 17 g  17 g Oral Daily PRN Tato Parks MD        And    bisacodyl (DULCOLAX) EC tablet 5 mg  5 mg Oral Daily PRN Tato Parks MD        And    bisacodyl (DULCOLAX) suppository 10 mg  10 mg Rectal Daily PRN Tato Parks MD        budesonide-formoterol (SYMBICORT) 160-4.5 MCG/ACT inhaler 2 puff  2 puff Inhalation BID Tato Parks MD        busPIRone (BUSPAR) tablet 10 mg  10 mg Oral Q12H Tato Parks MD        Calcium Replacement - Follow Nurse / BPA Driven Protocol   Not Applicable Tato Leblanc MD        famotidine (PEPCID) tablet 10 mg  10 mg Oral BID Tato Leblanc MD        finasteride (PROSCAR) tablet 5 mg  5 mg Oral Daily Tato Parks MD        Magnesium Low Dose Replacement - Follow Nurse / BPA Driven Protocol   Not Applicable Tato Leblanc MD        melatonin tablet 2.5 mg  2.5 mg Oral Nightly Tato Parks MD        mirtazapine (REMERON) tablet 30 mg  30 mg Oral Nightly Tato Parks MD        nitroglycerin (NITROSTAT) SL tablet 0.4 mg  0.4 mg Sublingual Q5 Min Tato Leblanc MD        ondansetron ODT (ZOFRAN-ODT) disintegrating tablet 4 mg  4 mg Oral Q6H PRN Tato Parks MD        Or    ondansetron (ZOFRAN) injection 4 mg  4 mg Intravenous Q6H PRN Tato Parks MD        [START ON 11/28/2024] pantoprazole (PROTONIX) EC tablet 40 mg  40 mg  Oral Q AM Tato Parks MD        Phosphorus Replacement - Follow Nurse / BPA Driven Protocol   Not Applicable Tato Leblanc MD        potassium chloride (K-DUR,KLOR-CON) ER tablet 40 mEq  40 mEq Oral Q2H He Hernandez MD   40 mEq at 11/27/24 1122    potassium chloride 10 mEq in 100 mL IVPB  10 mEq Intravenous Q1H He Hernandez  mL/hr at 11/27/24 1119 10 mEq at 11/27/24 1119    Potassium Replacement - Follow Nurse / BPA Driven Protocol   Not Applicable He Gomez MD        Potassium Replacement - Follow Nurse / BPA Driven Protocol   Not Applicable Tato Leblanc MD        sodium chloride 0.9 % flush 10 mL  10 mL Intravenous PRHe Hanson MD        sodium chloride 0.9 % flush 3 mL  3 mL Intravenous Q12H Tato Parks MD        sodium chloride 0.9 % flush 3-10 mL  3-10 mL Intravenous PRTato Louie MD        sodium chloride 0.9 % infusion 40 mL  40 mL Intravenous PRN Tato Parks MD         Current Outpatient Medications Ordered in Epic   Medication Sig Dispense Refill    albuterol sulfate  (90 Base) MCG/ACT inhaler INHALE 2 PUFFS BY MOUTH EVERY 6 HOURS AS NEEDED FOR WHEEZING 18 g 3    apixaban (Eliquis) 5 MG tablet tablet Take 1 tablet by mouth Every 12 (Twelve) Hours. 60 tablet 3    atenolol (TENORMIN) 50 MG tablet Take 0.5 tablets by mouth 2 (Two) Times a Day. 180 tablet 3    budesonide-formoterol (SYMBICORT) 160-4.5 MCG/ACT inhaler Inhale 2 puffs 2 (Two) Times a Day. 10.2 g 0    busPIRone (BUSPAR) 10 MG tablet Take 1 tablet by mouth 3 (Three) Times a Day. 90 tablet 0    digoxin (LANOXIN) 250 MCG tablet Take 1 tablet by mouth Daily. 90 tablet 3    empagliflozin (JARDIANCE) 25 MG tablet tablet Take 1 tablet by mouth Daily. 90 tablet 1    finasteride (PROSCAR) 5 MG tablet Take 1 tablet by mouth Daily. 90 tablet 0    furosemide (LASIX) 80 MG tablet Take 1 tablet by mouth Daily. 90 tablet 3     hydrOXYzine pamoate (VISTARIL) 50 MG capsule Take 1 capsule by mouth 4 (Four) Times a Day As Needed for Anxiety for up to 90 days. 360 capsule 0    mirtazapine (REMERON) 30 MG tablet Take 1 tablet by mouth Every Night. 30 tablet 2    pantoprazole (PROTONIX) 40 MG EC tablet TAKE 1 TABLET BY MOUTH EVERY MORNING 30 tablet 5    polyethylene glycol (MIRALAX) 17 g packet Take 17 g by mouth Daily. 30 each 3    rosuvastatin (Crestor) 20 MG tablet Take 1 tablet by mouth Daily. 90 tablet 3    spironolactone (ALDACTONE) 25 MG tablet Take 1 tablet by mouth Daily.             MEDICATIONS GIVEN IN ER  Medications   sodium chloride 0.9 % flush 10 mL (has no administration in time range)   amiodarone 150 mg in 100 mL D5W (loading dose) (0 mg Intravenous Stopped 11/27/24 1024)     Followed by   amiodarone 360 mg in 200 mL D5W infusion (1 mg/min Intravenous New Bag 11/27/24 1024)     Followed by   amiodarone 360 mg in 200 mL D5W infusion (has no administration in time range)   Potassium Replacement - Follow Nurse / BPA Driven Protocol (has no administration in time range)   potassium chloride (K-DUR,KLOR-CON) ER tablet 40 mEq (40 mEq Oral Given 11/27/24 1122)   potassium chloride 10 mEq in 100 mL IVPB (10 mEq Intravenous New Bag 11/27/24 1119)   sodium chloride 0.9 % flush 3 mL (has no administration in time range)   sodium chloride 0.9 % flush 3-10 mL (has no administration in time range)   sodium chloride 0.9 % infusion 40 mL (has no administration in time range)   acetaminophen (TYLENOL) tablet 650 mg (has no administration in time range)     Or   acetaminophen (TYLENOL) 160 MG/5ML oral solution 650 mg (has no administration in time range)     Or   acetaminophen (TYLENOL) suppository 650 mg (has no administration in time range)   famotidine (PEPCID) tablet 10 mg (has no administration in time range)   sennosides-docusate (PERICOLACE) 8.6-50 MG per tablet 2 tablet (has no administration in time range)     And   polyethylene glycol  (MIRALAX) packet 17 g (has no administration in time range)     And   bisacodyl (DULCOLAX) EC tablet 5 mg (has no administration in time range)     And   bisacodyl (DULCOLAX) suppository 10 mg (has no administration in time range)   ondansetron ODT (ZOFRAN-ODT) disintegrating tablet 4 mg (has no administration in time range)     Or   ondansetron (ZOFRAN) injection 4 mg (has no administration in time range)   melatonin tablet 2.5 mg (has no administration in time range)   nitroglycerin (NITROSTAT) SL tablet 0.4 mg (has no administration in time range)   Potassium Replacement - Follow Nurse / BPA Driven Protocol (has no administration in time range)   Magnesium Low Dose Replacement - Follow Nurse / BPA Driven Protocol (has no administration in time range)   Phosphorus Replacement - Follow Nurse / BPA Driven Protocol (has no administration in time range)   Calcium Replacement - Follow Nurse / BPA Driven Protocol (has no administration in time range)   albuterol (PROVENTIL) nebulizer solution 0.083% 2.5 mg/3mL (has no administration in time range)   apixaban (ELIQUIS) tablet 5 mg (has no administration in time range)   atenolol (TENORMIN) tablet 25 mg (has no administration in time range)   budesonide-formoterol (SYMBICORT) 160-4.5 MCG/ACT inhaler 2 puff (has no administration in time range)   busPIRone (BUSPAR) tablet 10 mg (has no administration in time range)   finasteride (PROSCAR) tablet 5 mg (has no administration in time range)   mirtazapine (REMERON) tablet 30 mg (has no administration in time range)   pantoprazole (PROTONIX) EC tablet 40 mg (has no administration in time range)   atorvastatin (LIPITOR) tablet 40 mg (has no administration in time range)   furosemide (LASIX) injection 80 mg (80 mg Intravenous Given 11/27/24 1121)                   MEDICAL DECISION MAKING, PROGRESS, and CONSULTS    All labs have been independently reviewed by me.  All radiology studies have been reviewed by me and I have also  reviewed the radiology report.   EKG's independently viewed and interpreted by me.  Discussion below represents my analysis of pertinent findings related to patient's condition, differential diagnosis, treatment plan and final disposition.      Additional sources:    - Discussed/ obtained information from independent historians: EMS    - External (non-ED) record review: Patient saw Dr. Beatty, his cardiologist, in April 2024 for follow-up for atrial fibrillation.  He was told to continue Eliquis, atenolol, and digoxin.  He had cardioversion in March 2024.  Echocardiogram showed an EF of 55%.    -Prescription drug monitoring program review:     N/A    - Chronic or social conditions impacting patient care (Social Determinants of Health): None          Orders placed during this visit:  Orders Placed This Encounter   Procedures    Critical Care    XR Chest 1 View    Digoxin Level    Comprehensive Metabolic Panel    BNP    Single High Sensitivity Troponin T    Magnesium    CBC Auto Differential    High Sensitivity Troponin T 2Hr    CBC (No Diff)    Comprehensive Metabolic Panel    Urinalysis With Microscopic If Indicated (No Culture) - Urine, Clean Catch    Creatinine Urine Random (kidney function) GFR component - Urine, Clean Catch    Sodium, Urine, Random - Urine, Clean Catch    Diet: Cardiac, Diabetic; Healthy Heart (2-3 Na+); Consistent Carbohydrate; Fluid Consistency: Thin (IDDSI 0)    Monitor Blood Pressure    Vital Signs    Notify Provider (With Default Parameters)    Activity - Ad Magda    Up With Assistance    Intake & Output    Weigh Patient    Oral Care    Maintain IV Access    Telemetry - Place Orders & Notify Provider of Results When Patient Experiences Acute Chest Pain, Dysrhythmia or Respiratory Distress    May Be Off Telemetry for Tests    Code Status and Medical Interventions: CPR (Attempt to Resuscitate); Full Support    LHA (on-call MD unless specified) Details    Inpatient Cardiology Consult    OT  Consult: Eval & Treat ADL Performance Below Baseline    PT Consult: Eval & Treat Discharge Placement Assessment    Oxygen Therapy- Nasal Cannula; Titrate 1-6 LPM Per SpO2; 90 - 95%    ECG 12 Lead Rhythm Change    Insert Peripheral IV    Insert Peripheral IV    Inpatient Admission    CBC & Differential    Extra Tubes    Gold Top - SST    Light Blue Top         Additional orders considered but not ordered:          Differential diagnosis includes, but is not limited to:    Ventricular fibrillation, ventricular tachycardia, acute coronary syndrome, electrolyte abnormality, CHF      Independent interpretation of labs, radiology studies, and discussions with consultants:  ED Course as of 11/27/24 1230   Wed Nov 27, 2024   1001 Case discussed with Ron, DAR, for Dr. Rosas.  Pertinent history and exam findings were discussed with him.  He recommends starting the patient on amiodarone. [WH]   1007 EKG personally interpreted by me at 10 AM.  My personal interpretation is:          EKG time: 9:58 AM  Rhythm/Rate: Atrial fibrillation with occasional ventricular paced complexes, rate 58  P waves and IN: Irregular, varying  QRS, axis: LAD, early transition, inferior Q waves  ST and T waves: ST elevation in the inferior leads, ST depression in the lateral leads    Interpreted Contemporaneously by me, independently viewed  EKG is changed compared to prior EKG done on 3/22/2024   [WH]   1011 Patient currently denies chest pain or shortness of breath.  He is currently in A-fib with a rate in the 50s. [WH]   1020 WBC: 9.48 [WH]   1021 Hemoglobin(!): 11.8 [WH]   1023 Chest x-ray personally interpreted by me.  My personal interpretation is: Heart is enlarged.  There is increased vascular congestion.  No large pleural effusion. [WH]   1049 [employer] rep is at bedside interrogating the patient's device. [WH]   1049 HS Troponin T(!!): 146 [WH]   1049 BUN(!): 35 [WH]   1049 Creatinine(!): 2.01  BUN 18, creatinine 1.19 seven months  ago [WH]   1049 Glucose(!): 147 [WH]   1049 Sodium(!): 132 [WH]   1049 Potassium(!): 2.8 [WH]   1050 Magnesium(!): 3.0 [WH]   1050 Digoxin(!): 1.60 [WH]   1050 proBNP(!): 6,000.0 [WH]   1104 [employer] rep confirms that the patient was appropriately shocked 3 times for V-fib [WH]   1106 Patient will be given a dose of IV Lasix for treatment of acute CHF.  Potassium replacement protocol has been ordered for hypokalemia. [WH]   1114 I spoke with DAR Velasquez, and updated him on the patient's workup and test results.  He agrees with the plan for admission to the hospitalist.  He will see the patient in consult. [WH]   1150 Test results, diagnosis, and plans for admission were discussed with the patient.  He is agreeable with this.  He currently denies chest pain.  Heart rate is in the 50s.  His AICD has not fired since ED arrival. [WH]   1157 Case discussed with Dr. Parks, hospitalist, and he agrees to admit the patient to a monitored bed.  Pertinent history, exam findings, test results, ED course, and diagnoses were discussed with them. [WH]   1228 MDM: Patient presented to the ED after his AICD fired several times.  His device was interrogated and showed that he was appropriately shocked for ventricular fibrillation.  Patient was started on amiodarone.  Case was discussed with cardiology.  Patient was also found to be in congestive heart failure.  He was given IV Lasix.  He also had abnormal kidney function and was hypokalemic.  He was given oral potassium.  He will be admitted to the hospitalist.  Critical care was performed on this patient. [WH]      ED Course User Index  [WH] He Hernandez MD         COMPLEXITY OF CARE  The patient requires admission.      DIAGNOSIS  Final diagnoses:   Ventricular fibrillation, paroxysmal   Defibrillator discharge   Hypokalemia   Renal insufficiency   Mild chronic anemia   Acute congestive heart failure, unspecified heart failure type   Anticoagulated by anticoagulation  treatment         DISPOSITION  ADMISSION    Discussed treatment plan and reason for admission with pt/family and admitting physician.  Pt/family voiced understanding of the plan for admission for further testing/treatment as needed.               Latest Documented Vital Signs:  AS OF 12:30 EST VITALS:    BP - 126/83  HR - (!) 45  TEMP - 98.7 °F (37.1 °C) (Tympanic)  O2 SATS - 95%            --    Please note that portions of this were completed with a voice recognition program.       Note Disclaimer: At [facility], we believe that sharing information builds trust and better relationships. You are receiving this note because you are receiving care at [facility] or recently visited. It is possible you will see health information before a provider has talked with you about it. This kind of information can be easy to misunderstand. To help you fully understand what it means for your health, we urge you to discuss this note with your provider.             He Hernandez MD  11/27/24 5832

## 2024-11-27 NOTE — H&P
[facility] Medicine Services  HISTORY AND PHYSICAL    Patient Name: Sebastien Tang  : 1960  MRN: [id number]  Primary Care Physician: Yuliana Flynn DO  Date of admission: 2024    Subjective   Subjective   Chief Complaint:  Weakness and ICD shock    HPI:  Sebastien Tang is a 64 y.o. male with past medical history of previous ventricular tachycardia with AICD present presents the hospital after discharge from his ICD.  Patient was called by his cardiologist office and told that his defibrillator had fired and instructed to come to the emergency room.  And route to the ED while being transported by EMS patient had 2 additional episodes of ventricular tachycardia that reportedly transition to ventricular fibrillation and his AICD fired twice reportedly with EMS.  There was also question of intermittent atrial fibrillation with EMS.  Patient notes his chest is mildly sore due to the defibrillation.  He stated the defibrillation made him somewhat anxious and little fearful.  He notes some generalized weakness.  He is a little frustrated about his issues.  We reviewed our treatment plan.  I discussed with him his elevated digoxin level as well and he was frustrated when I recommended that we hold this medication until the level returns back to normal.  After extensive conversation is agreeable with hospitalization and evaluation by his EP physician.      Review of Systems   No current fevers or chills  No current shortness of breath or cough  No current nausea, vomiting, or diarrhea  No current chest pain or palpitations    All other systems reviewed and are negative.     Personal History     Past Medical History:   Diagnosis Date    Acidosis     mixed resp/metabolic acidosis    Acute congestive heart failure     Acute respiratory failure     hypoxic    Anemia     Arthritis     Asthma     Atrial flutter     Azotemia     Cardiac arrest 2021    Chronic coronary artery disease     Constipation     COPD  (chronic obstructive pulmonary disease)     Depression     Enlarged prostate     Hypertension     Hypokalemia     severe    ICD (implantable cardioverter-defibrillator) in place     Ischemic cardiomyopathy     MRSA nasal colonization     Obesity (BMI 30-39.9)     Orthopnea     PAF (paroxysmal atrial fibrillation)     Persistent atrial fibrillation     Pulmonary edema     Tobacco abuse     Transaminitis     Trouble in sleeping     Ventricular fibrillation        Past Surgical History:   Procedure Laterality Date    CARDIAC CATHETERIZATION Left 4/1/2021    Procedure: Coronary Angiography;  Surgeon: Anna Puga MD;  Location:  [facility];  Service: Cardiology;  Laterality: Left;    CARDIAC CATHETERIZATION N/A 4/1/2021    Procedure: Left ventriculography;  Surgeon: Anna Puga MD;  Location:  [facility];  Service: Cardiology;  Laterality: N/A;    CARDIAC CATHETERIZATION N/A 4/1/2021    Procedure: Left Heart Cath;  Surgeon: Anna Puga MD;  Location:  [facility];  Service: Cardiology;  Laterality: N/A;    CARDIAC ELECTROPHYSIOLOGY PROCEDURE N/A 4/7/2021    Procedure: IMPLANTABLE CARDIOVERTER DEFIBRILLATOR- SC BIOTRONIK;  Surgeon: Nik Rosas MD;  Location:  [facility];  Service: Cardiovascular;  Laterality: N/A;    CARDIOVERSION  05/19/2021    Dr. Rosas    CARDIOVERSION  07/26/2021    Dr. Rosas    TONSILLECTOMY         Family History: family history is not on file. Other pertinent FHx was reviewed and unremarkable.     Social History:  reports that he quit smoking about 3 years ago. His smoking use included cigarettes. He started smoking about 13 years ago. He has a 15 pack-year smoking history. He has been exposed to tobacco smoke. He has never used smokeless tobacco. He reports current alcohol use of about 1.0 standard drink of alcohol per week. He reports that he does not currently use drugs after having used the following  drugs: Hydrocodone.  Social History     Social History Narrative    Not on file       Medications:  Available home medication information reviewed.    No Known Allergies    Objective   Objective   Vital Signs:   Temp:  [98.7 °F (37.1 °C)] 98.7 °F (37.1 °C)  Heart Rate:  [45-81] 67  Resp:  [18] 18  BP: (126-136)/(76-94) 131/79        Physical Exam   Constitutional:Awake, alert  HENT: NCAT, mucous membranes moist, neck supple  Respiratory: No cough or wheezes, normal respirations, nonlabored breathing   Cardiovascular: Pulse rate is normal, palpable radial pulses  Gastrointestinal:  soft, nontender, nondistended  Musculoskeletal: Morbidly obese BMI is 40, + lower extremity edema, somewhat debilitated in appearance but able to ambulate  Psychiatric: Appropriate affect, cooperative, conversational  Neurologic: No slurred speech or facial droop, follows commands  Skin: No rashes or jaundice, warm      Results from last 7 days   Lab Units 11/27/24  1011   WBC 10*3/mm3 9.48   HEMOGLOBIN g/dL 11.8*   HEMATOCRIT % 38.3   PLATELETS 10*3/mm3 267     Results from last 7 days   Lab Units 11/27/24  1259 11/27/24  1011   SODIUM mmol/L  --  132*   POTASSIUM mmol/L  --  2.8*   CHLORIDE mmol/L  --  86*   CO2 mmol/L  --  32.1*   BUN mg/dL  --  35*   CREATININE mg/dL  --  2.01*   GLUCOSE mg/dL  --  147*   CALCIUM mg/dL  --  10.0   ALK PHOS U/L  --  91   ALT (SGPT) U/L  --  61*   AST (SGOT) U/L  --  89*   HSTROP T ng/L 137* 146*   PROBNP pg/mL  --  6,000.0*     Estimated Creatinine Clearance: 42.7 mL/min (A) (by C-G formula based on SCr of 2.01 mg/dL (H)).  Brief Urine Lab Results  (Last result in the past 365 days)        Color   Clarity   Blood   Leuk Est   Nitrite   Protein   CREAT   Urine HCG        11/27/24 1301             11.1         11/27/24 1301 Yellow   Clear   Small (1+)   Negative   Negative   30 mg/dL (1+)                 Imaging Results (Last 24 Hours)       Procedure Component Value Units Date/Time    XR Chest 1 View  [[id number]] Collected: 11/27/24 1040     Updated: 11/27/24 1044    Narrative:      XR CHEST 1 VW-11/27/2024     HISTORY: Shortness of breath.     Heart size is mild to moderately enlarged. There is some soft tissue  attenuation of bilateral lung markings with some mild vascular  congestion. No focal infiltrates are seen.     Cardiac pacemaker is seen in good position. Cardioversion type devices  overlie the left hemithorax.       Impression:      1. Cardiomegaly with mild vascular congestion.        This report was finalized on 11/27/2024 10:41 AM by Dr. Ken Chin M.D on Workstation: [id number]             Results for orders placed during the hospital encounter of 03/15/24    Adult Transesophageal Echo (LILA) W/ Cont if Necessary Per Protocol    Interpretation Summary    Left ventricular systolic function is normal. Left ventricular ejection fraction appears to be 51 - 55%.    The right ventricular cavity is mildly dilated but normal right ventricular systolic function.    There is mild biatrial enlargement.    Saline test results are negative for right to left atrial level shunt.    Moderate tricuspid valve regurgitation is present.Estimated right ventricular systolic pressure from tricuspid regurgitation is moderately elevated (45-55 mmHg).Moderate pulmonary hypertension is present.      Assessment & Plan   Assessment & Plan     Active Hospital Problems    Diagnosis  POA    **Paroxysmal ventricular fibrillation [I49.01]  Yes    Digitalis toxicity [T46.0X1A]  Yes    ICD (implantable cardioverter-defibrillator) discharge [Z45.02]  Not Applicable    Ventricular tachycardia (paroxysmal) [I47.29]  Yes    Morbid obesity [E66.01]  Yes    Diabetic polyneuropathy associated with type 2 diabetes mellitus [E11.42]  Yes    Hypokalemia [E87.6]  Yes    ANDIE (acute kidney injury) [N17.9]  Yes    Acute on chronic diastolic CHF (congestive heart failure) [I50.33]  Yes    High cholesterol [E78.00]  Yes    Essential  hypertension [I10]  Yes    Anxiety and depression [F41.9, F32.A]  Yes    Primary insomnia [F51.01]  Yes    ICD (implantable cardioverter-defibrillator) in place [Z95.810]  Yes    Constipation [K59.00]  Yes    Atrial flutter [I48.92]  Yes    COPD (chronic obstructive pulmonary disease) [J44.9]  Yes    Anemia [D64.9]  Yes     64-year-old male presents the hospital after multiple ICD discharges in the setting of ventricular tachycardia and ventricular fibrillation.    ICD discharges, ventricular tachycardia and ventricular fibrillation, PAF:  Consult EP.  Case discussed with the EP and they recommended to initially hold atenolol until they can evaluate.  Amiodarone drip initiated per their recommendation.  Hold digoxin due to elevated level which is likely related to abnormal renal function.  Telemetry monitor.  Continue anticoagulation    Elevated digoxin level: Hold digoxin.  Trend level.  Defer to cardiology whether they feel this medication eventually will need to be restarted.    Acute kidney injury: Likely prerenal due to hypotension related to ventricular tachycardia.  Allow for more elevated blood pressure.  Possible cardiorenal.    Acute diastolic CHF: IV diuresis.  Cardiology for assistance.  Previous echocardiogram reviewed.    Hypokalemia: Replete potassium level.    Diabetes with polyneuropathy: Monitor glucose and adjust insulin as needed.    Insomnia: Continue Remeron, scheduled melatonin, add low-dose temazepam.  Patient very frustrated with his issues with insomnia.    BPH: Finasteride.  Monitor for any sign of urine retention.    Anxiety: Continue BuSpar.  Stable.    Hyperlipidemia: Continue statin.  Stable.    COPD: Monitor respiratory status.  Currently stable.  Symbicort.  As needed nebulizers.  Inhalers    GERD: PPI.  Stable.    Physical debility: PT OT    Treatment plan discussed with patient is in agreement    DVT prophylaxis:   Anticoagulated    CODE STATUS:     Code Status and Medical  Interventions: CPR (Attempt to Resuscitate); Full Support   Ordered at: 11/27/24 1226     Level Of Support Discussed With:    Patient     Code Status (Patient has no pulse and is not breathing):    CPR (Attempt to Resuscitate)     Medical Interventions (Patient has pulse or is breathing):    Full Support          Tato Parks MD  11/27/24

## 2024-11-27 NOTE — ED NOTES
Nursing report ED to floor  Sebastien Tang  64 y.o.  male    HPI :  HPI  Stated Reason for Visit: Pt to ED via [facility] from [facility]. Pt experienced several episodes through the morning with defib fires. EMS reports 2 incidents in route. Interrogation shows episodes of VF and Vtach with successful firing.  History Obtained From: patient    Chief Complaint  Chief Complaint   Patient presents with    Pacemaker Problem       Admitting doctor:   Tato Parks MD    Admitting diagnosis:   The primary encounter diagnosis was Ventricular fibrillation, paroxysmal. Diagnoses of Defibrillator discharge, Hypokalemia, Renal insufficiency, Mild chronic anemia, Acute congestive heart failure, unspecified heart failure type, and Anticoagulated by anticoagulation treatment were also pertinent to this visit.    Code status:   Current Code Status       Date Active Code Status Order ID Comments User Context       11/27/2024 1226 CPR (Attempt to Resuscitate) [id number]  Tato Parks MD ED        Question Answer    Code Status (Patient has no pulse and is not breathing) CPR (Attempt to Resuscitate)    Medical Interventions (Patient has pulse or is breathing) Full Support    Level Of Support Discussed With Patient                    Allergies:   Patient has no known allergies.    Isolation:   No active isolations    Intake and Output    Intake/Output Summary (Last 24 hours) at 11/27/2024 1519  Last data filed at 11/27/2024 1412  Gross per 24 hour   Intake 426.54 ml   Output 600 ml   Net -173.46 ml       Weight:       11/27/24  0956   Weight: 113 kg (250 lb)       Most recent vitals:   Vitals:    11/27/24 1246 11/27/24 1310 11/27/24 1357 11/27/24 1500   BP:       BP Location:       Patient Position:       Pulse: 66 53 55 (!) 45   Resp:       Temp:       TempSrc:       SpO2: 93% 97% 98% 98%   Weight:       Height:           Active LDAs/IV Access:   Lines, Drains & Airways       Active LDAs       Name Placement date  Placement time Site Days    Peripheral IV 11/27/24 1008 Left Antecubital 11/27/24  1008  Antecubital  less than 1    Peripheral IV 11/27/24 1011 Anterior;Left Forearm 11/27/24  1011  Forearm  less than 1                    Labs (abnormal labs have a star):   Labs Reviewed   DIGOXIN LEVEL - Abnormal; Notable for the following components:       Result Value    Digoxin 1.60 (*)     All other components within normal limits   COMPREHENSIVE METABOLIC PANEL - Abnormal; Notable for the following components:    Glucose 147 (*)     BUN 35 (*)     Creatinine 2.01 (*)     Sodium 132 (*)     Potassium 2.8 (*)     Chloride 86 (*)     CO2 32.1 (*)     ALT (SGPT) 61 (*)     AST (SGOT) 89 (*)     eGFR 35.9 (*)     All other components within normal limits    Narrative:     GFR Normal >60  Chronic Kidney Disease <60  Kidney Failure <15     BNP (IN-HOUSE) - Abnormal; Notable for the following components:    proBNP 6,000.0 (*)     All other components within normal limits    Narrative:     This assay is used as an aid in the diagnosis of individuals suspected of having heart failure. It can be used as an aid in the diagnosis of acute decompensated heart failure (ADHF) in patients presenting with signs and symptoms of ADHF to the emergency department (ED). In addition, NT-proBNP of <300 pg/mL indicates ADHF is not likely.    Age Range Result Interpretation  NT-proBNP Concentration (pg/mL:      <50             Positive            >450                   Gray                 300-450                    Negative             <300    50-75           Positive            >900                  Gray                300-900                  Negative            <300      >75             Positive            >1800                  Gray                300-1800                  Negative            <300   SINGLE HS TROPONIN T - Abnormal; Notable for the following components:    HS Troponin T 146 (*)     All other components within normal limits     Narrative:     High Sensitive Troponin T Reference Range:  <14.0 ng/L- Negative Female for AMI  <22.0 ng/L- Negative Male for AMI  >=14 - Abnormal Female indicating possible myocardial injury.  >=22 - Abnormal Male indicating possible myocardial injury.   Clinicians would have to utilize clinical acumen, EKG, Troponin, and serial changes to determine if it is an Acute Myocardial Infarction or myocardial injury due to an underlying chronic condition.        MAGNESIUM - Abnormal; Notable for the following components:    Magnesium 3.0 (*)     All other components within normal limits   CBC WITH AUTO DIFFERENTIAL - Abnormal; Notable for the following components:    Hemoglobin 11.8 (*)     MCV 77.8 (*)     MCH 24.0 (*)     MCHC 30.8 (*)     RDW 18.0 (*)     Lymphocyte % 14.3 (*)     Monocyte % 14.7 (*)     Monocytes, Absolute 1.39 (*)     All other components within normal limits   HIGH SENSITIVITIY TROPONIN T 2HR - Abnormal; Notable for the following components:    HS Troponin T 137 (*)     Troponin T Delta -9 (*)     All other components within normal limits    Narrative:     High Sensitive Troponin T Reference Range:  <14.0 ng/L- Negative Female for AMI  <22.0 ng/L- Negative Male for AMI  >=14 - Abnormal Female indicating possible myocardial injury.  >=22 - Abnormal Male indicating possible myocardial injury.   Clinicians would have to utilize clinical acumen, EKG, Troponin, and serial changes to determine if it is an Acute Myocardial Infarction or myocardial injury due to an underlying chronic condition.        URINALYSIS W/ MICROSCOPIC IF INDICATED (NO CULTURE) - Abnormal; Notable for the following components:    Blood, UA Small (1+) (*)     Protein, UA 30 mg/dL (1+) (*)     All other components within normal limits   CREATININE URINE RANDOM (KIDNEY FUNCTION) GFR COMPONENT    Narrative:     Reference intervals for random urine have not been established.  Clinical usage is dependent upon physician's interpretation in  combination with other laboratory tests.      SODIUM, URINE, RANDOM    Narrative:     Reference intervals for random urine have not been established.  Clinical usage is dependent upon physician's interpretation in combination with other laboratory tests.      URINALYSIS, MICROSCOPIC ONLY   CBC AND DIFFERENTIAL    Narrative:     The following orders were created for panel order CBC & Differential.  Procedure                               Abnormality         Status                     ---------                               -----------         ------                     CBC Auto Differential[[id number]]        Abnormal            Final result                 Please view results for these tests on the individual orders.   EXTRA TUBES    Narrative:     The following orders were created for panel order Extra Tubes.  Procedure                               Abnormality         Status                     ---------                               -----------         ------                     Gold Top - SST[[id number]]                                   Final result               Light Blue Top[[id number]]                                   Final result                 Please view results for these tests on the individual orders.   GOLD TOP - SST   LIGHT BLUE TOP       EKG:   ECG 12 Lead Rhythm Change   Preliminary Result   HEART RATE=58  bpm   RR Uycwmfcs=6892  ms   OK Interval=  ms   P Horizontal Axis=  deg   P Front Axis=  deg   QRSD Dmzcrduv=019  ms   QT Wctlpfxl=647  ms   FXrK=804  ms   QRS Axis=-53  deg   T Wave Axis=241  deg   - ABNORMAL ECG -   Atrial fibrillation   Abnormal R-wave progression, early transition   Inferior infarct, old   Lateral leads are also involved   Baseline wander in lead(s) I,II,aVR   Date and Time of Study:2024-11-27 09:58:20          Meds given in ED:   Medications   sodium chloride 0.9 % flush 10 mL (has no administration in time range)   amiodarone 150 mg in 100 mL D5W (loading dose) (0 mg  Intravenous Stopped 11/27/24 1024)     Followed by   amiodarone 360 mg in 200 mL D5W infusion (0 mg/min Intravenous Stopped 11/27/24 1412)     Followed by   amiodarone 360 mg in 200 mL D5W infusion (0.5 mg/min Intravenous New Bag 11/27/24 1412)   Potassium Replacement - Follow Nurse / BPA Driven Protocol (has no administration in time range)   sodium chloride 0.9 % flush 3 mL (3 mL Intravenous Given 11/27/24 1330)   sodium chloride 0.9 % flush 3-10 mL (has no administration in time range)   sodium chloride 0.9 % infusion 40 mL (has no administration in time range)   acetaminophen (TYLENOL) tablet 650 mg (has no administration in time range)     Or   acetaminophen (TYLENOL) 160 MG/5ML oral solution 650 mg (has no administration in time range)     Or   acetaminophen (TYLENOL) suppository 650 mg (has no administration in time range)   famotidine (PEPCID) tablet 10 mg (has no administration in time range)   sennosides-docusate (PERICOLACE) 8.6-50 MG per tablet 2 tablet (has no administration in time range)     And   polyethylene glycol (MIRALAX) packet 17 g (has no administration in time range)     And   bisacodyl (DULCOLAX) EC tablet 5 mg (has no administration in time range)     And   bisacodyl (DULCOLAX) suppository 10 mg (has no administration in time range)   ondansetron ODT (ZOFRAN-ODT) disintegrating tablet 4 mg (has no administration in time range)     Or   ondansetron (ZOFRAN) injection 4 mg (has no administration in time range)   nitroglycerin (NITROSTAT) SL tablet 0.4 mg (has no administration in time range)   Potassium Replacement - Follow Nurse / BPA Driven Protocol (has no administration in time range)   Magnesium Low Dose Replacement - Follow Nurse / BPA Driven Protocol (has no administration in time range)   Phosphorus Replacement - Follow Nurse / BPA Driven Protocol (has no administration in time range)   Calcium Replacement - Follow Nurse / BPA Driven Protocol (has no administration in time range)    albuterol (PROVENTIL) nebulizer solution 0.083% 2.5 mg/3mL (has no administration in time range)   apixaban (ELIQUIS) tablet 5 mg (5 mg Oral Given 11/27/24 1500)   budesonide-formoterol (SYMBICORT) 160-4.5 MCG/ACT inhaler 2 puff (2 puffs Inhalation Not Given 11/27/24 1300)   busPIRone (BUSPAR) tablet 10 mg (has no administration in time range)   finasteride (PROSCAR) tablet 5 mg (has no administration in time range)   mirtazapine (REMERON) tablet 30 mg (has no administration in time range)   pantoprazole (PROTONIX) EC tablet 40 mg (has no administration in time range)   atorvastatin (LIPITOR) tablet 40 mg (has no administration in time range)   melatonin tablet 5 mg (has no administration in time range)   temazepam (RESTORIL) capsule 15 mg (has no administration in time range)   furosemide (LASIX) injection 80 mg (has no administration in time range)   furosemide (LASIX) injection 80 mg (80 mg Intravenous Given 11/27/24 1121)   potassium chloride (K-DUR,KLOR-CON) ER tablet 40 mEq (40 mEq Oral Given 11/27/24 1341)   potassium chloride 10 mEq in 100 mL IVPB (0 mEq Intravenous Stopped 11/27/24 1411)       Imaging results:  XR Chest 1 View    Result Date: 11/27/2024  1. Cardiomegaly with mild vascular congestion.   This report was finalized on 11/27/2024 10:41 AM by Dr. Ken Chin M.D on Workstation: [id number]       Ambulatory status:   - ax2    Social issues:   Social History     Socioeconomic History    Marital status: Single   Tobacco Use    Smoking status: Former     Current packs/day: 0.00     Average packs/day: 1.5 packs/day for 10.0 years (15.0 ttl pk-yrs)     Types: Cigarettes     Start date: 4/22/2011     Quit date: 4/22/2021     Years since quitting: 3.6     Passive exposure: Past    Smokeless tobacco: Never    Tobacco comments:     4/2021   Vaping Use    Vaping status: Never Used   Substance and Sexual Activity    Alcohol use: Yes     Alcohol/week: 1.0 standard drink of alcohol     Comment: 1  BEER DAILY    Drug use: Not Currently     Types: Hydrocodone    Sexual activity: Not Currently     Partners: Female       Peripheral Neurovascular  Peripheral Neurovascular (Adult)  Peripheral Neurovascular WDL: WDL    Neuro Cognitive  Neuro Cognitive (Adult)  Cognitive/Neuro/Behavioral WDL: WDL    Learning  Learning Assessment  Learning Readiness and Ability: no barriers identified    Respiratory  Respiratory WDL  Respiratory WDL: WDL  Breath Sounds  All Lung Fields Breath Sounds: All Fields  All Lung Fields Breath Sounds: Anterior:, Lateral:, clear, equal bilaterally    Abdominal Pain       Pain Assessments  Pain (Adult)  (0-10) Pain Rating: Rest: 0    NIH Stroke Scale       Belle Su RN  11/27/24 15:19 EST

## 2024-11-27 NOTE — CONSULTS
Electrophysiology Hospital Consult            Patient Name: Sebastein Tang  Age/Sex: 64 y.o. male  : 1960  MRN: [id number]    Date of Admission: 2024  Date of Encounter Visit: 24  Encounter Provider: ERIC Walter  Referring Provider: Tato Parks,*  Place of Service: [facility] CARDIOLOGY  Patient Care Team:  Yuliana Flynn DO as PCP - General (Family Medicine)      Subjective:   EP Consultation for: Icd discharge     Chief Complaint: Icd discharge     History of Present Illness:  Sebastien Tang is a 64 y.o. male follows with Dr. Beatty and Dr. Rosas for persistent AF and SCD--s/p SC ICD ().      He had SCD and underwent SC ICD in .      Saw Dr. Rosas last year. He was doing well. No AF on device. Class II NYHA symptoms. No AF symptoms. Was advised to follow up in one year.      I saw him in . He was doing okay. Class II NYHA symptoms. He was on GDMT. LVEF around 40%.     He was admitted in 2024 for dyspnea. He was in AF with RVR and volume overloaded. He ended up undergoing CV.     Saw Dr. Beatty in April. Was doing okay. No new or significant symptoms. Was back in AF. Digoxin and atenolol continued.     We received an alert that he was shocked for VT/VF.     We called this morning to advised him to come to ED. While in route he had two more ICD shocks.         He presented to ED this morning.     In the ED his BNP was 6,000. Creatinine 2. Potassium was 2.8.     He was actually in NSR from ICD shocks this morning. Sinus bradycardia.     EP has been asked to see for VT/VF        Dr. Rosas and I saw him this morning.     He says he was diagnosed with pneumonia about a month ago and since then he has struggled with dyspnea.     He also notes it has been difficult to sleep due to inability to lie flat at night.     Device interrogation shows VT which degrades to VF requiring 3 shocks. These were 3 separate episodes.      Since admission and starting amiodarone he has not had any further events.     He has SC ICD. His HR is currently in the 50s. He denies any symptoms.       Past Medical History:  Past Medical History:   Diagnosis Date    Acidosis     mixed resp/metabolic acidosis    Acute congestive heart failure     Acute respiratory failure     hypoxic    Anemia     Arthritis     Asthma     Atrial flutter     Azotemia     Cardiac arrest 04/2021    Chronic coronary artery disease     Constipation     COPD (chronic obstructive pulmonary disease)     Depression     Enlarged prostate     Hypertension     Hypokalemia     severe    ICD (implantable cardioverter-defibrillator) in place     Ischemic cardiomyopathy     MRSA nasal colonization     Obesity (BMI 30-39.9)     Orthopnea     PAF (paroxysmal atrial fibrillation)     Persistent atrial fibrillation     Pulmonary edema     Tobacco abuse     Transaminitis     Trouble in sleeping     Ventricular fibrillation        Past Surgical History:   Procedure Laterality Date    CARDIAC CATHETERIZATION Left 4/1/2021    Procedure: Coronary Angiography;  Surgeon: Anna Puga MD;  Location:  [facility];  Service: Cardiology;  Laterality: Left;    CARDIAC CATHETERIZATION N/A 4/1/2021    Procedure: Left ventriculography;  Surgeon: Anna Puga MD;  Location:  [facility];  Service: Cardiology;  Laterality: N/A;    CARDIAC CATHETERIZATION N/A 4/1/2021    Procedure: Left Heart Cath;  Surgeon: Anna Puga MD;  Location:  [facility];  Service: Cardiology;  Laterality: N/A;    CARDIAC ELECTROPHYSIOLOGY PROCEDURE N/A 4/7/2021    Procedure: IMPLANTABLE CARDIOVERTER DEFIBRILLATOR- SC BIOTRONIK;  Surgeon: Nik Rosas MD;  Location:  [facility];  Service: Cardiovascular;  Laterality: N/A;    CARDIOVERSION  05/19/2021    Dr. Rosas    CARDIOVERSION  07/26/2021    Dr. Rosas    TONSILLECTOMY         Home Medications:    (Not in a hospital admission)      Allergies:  No Known Allergies    Past Social History:  Social History     Socioeconomic History    Marital status: Single   Tobacco Use    Smoking status: Former     Current packs/day: 0.00     Average packs/day: 1.5 packs/day for 10.0 years (15.0 ttl pk-yrs)     Types: Cigarettes     Start date: 4/22/2011     Quit date: 4/22/2021     Years since quitting: 3.6     Passive exposure: Past    Smokeless tobacco: Never    Tobacco comments:     4/2021   Vaping Use    Vaping status: Never Used   Substance and Sexual Activity    Alcohol use: Yes     Alcohol/week: 1.0 standard drink of alcohol     Comment: 1 BEER DAILY    Drug use: Not Currently     Types: Hydrocodone    Sexual activity: Not Currently     Partners: Female       Past Family History:  History reviewed. No pertinent family history.    Review of Systems: All systems reviewed. Pertinent positives identified in HPI. All other systems are negative.     14 point ROS was performed and is negative except as outlined in HPI.     Objective:     Objective:  Vital Signs (last 24 hours)         11/26 0700  11/27 0659 11/27 0700  11/27 1451   Most Recent      Temp (°F)     98.7     98.7 (37.1) 11/27 1012    Heart Rate   45 -  81     53 11/27 1310    Resp     18     18 11/27 0956    BP   126/83 -  136/94     131/79 11/27 1201    SpO2 (%)   93 -  97     97 11/27 1310    Flow (L/min) (Oxygen Therapy)     2     2 11/27 1110          Temp:  [98.7 °F (37.1 °C)] 98.7 °F (37.1 °C)  Heart Rate:  [45-81] 53  Resp:  [18] 18  BP: (126-136)/(76-94) 131/79  Body mass index is 40.35 kg/m².        Physical Exam:     General Appearance: No acute distress, well developed and well nourished.   Eyes: Conjunctiva and lids: No erythema, swelling, or discharge. Sclera non-icteric.   HENT: Atraumatic, normocephalic. External eyes, ears, and nose normal.   Respiratory: No signs of respiratory distress. Respiration rhythm and depth normal.   Clear to  auscultation. No rales, crackles, rhonchi, or wheezing auscultated.   Cardiovascular:  Heart Rate and Rhythm: Normal, Heart Sounds: Normal S1 and S2. No S3 or S4 noted.  Gastrointestinal:  Abdomen soft, non-distended, non-tender.  Musculoskeletal: Normal movement of extremities  Skin: Warm and dry.   Psychiatric: Patient alert and oriented to person, place, and time. Speech and behavior appropriate. Normal mood and affect.    Labs:   Lab Review:     Results from last 7 days   Lab Units 11/27/24  1011   SODIUM mmol/L 132*   POTASSIUM mmol/L 2.8*   CHLORIDE mmol/L 86*   CO2 mmol/L 32.1*   BUN mg/dL 35*   CREATININE mg/dL 2.01*   GLUCOSE mg/dL 147*   CALCIUM mg/dL 10.0   AST (SGOT) U/L 89*   ALT (SGPT) U/L 61*     Results from last 7 days   Lab Units 11/27/24  1259 11/27/24  1011   HSTROP T ng/L 137* 146*     Results from last 7 days   Lab Units 11/27/24  1011   WBC 10*3/mm3 9.48   HEMOGLOBIN g/dL 11.8*   HEMATOCRIT % 38.3   PLATELETS 10*3/mm3 267             Results from last 7 days   Lab Units 11/27/24  1011   MAGNESIUM mg/dL 3.0*         Results from last 7 days   Lab Units 11/27/24  1011   PROBNP pg/mL 6,000.0*     Results from last 7 days   Lab Units 11/27/24  1011   DIGOXIN LVL ng/mL 1.60*                   I personally viewed and interpreted the patient's EKG/Telemetry tracings.    Assessment:       Paroxysmal ventricular fibrillation    Atrial flutter    COPD (chronic obstructive pulmonary disease)    Anemia    Constipation    ICD (implantable cardioverter-defibrillator) in place    Anxiety and depression    Primary insomnia    Essential hypertension    High cholesterol    Acute on chronic diastolic CHF (congestive heart failure)    Digitalis toxicity    ICD (implantable cardioverter-defibrillator) discharge    Ventricular tachycardia (paroxysmal)    Morbid obesity    Diabetic polyneuropathy associated with type 2 diabetes mellitus    Hypokalemia    ANDIE (acute kidney injury)        Plan:   Dr. Armstrong and I saw  this patient.       He is volume overloaded and in acute HF. Suspect this has been worsening over the past month and led to him having VT/VF.     He is going to be admitted. We will diurese with lasix 80mg BID and monitor response, electrolytes, and kidney function.     Will continue IV amiodarone and transition to PO. Will stop atenolol for now.     He has SC ICD and currently in sinus manuela. No symptoms. No plans for upgrade to dual chamber but could consider in the future.     His digoxin level was also elevated. I am going to stop that for now since he is in NSR. Continue apixaban as he was essentially just cardioverted.       Thank you for allowing me to participate in the care of Sebastien Tang. Feel free to contact me directly with any further questions or concerns.    ERIC Walter  [facility]  11/27/24  14:51 EST

## 2024-11-27 NOTE — PLAN OF CARE
Goal Outcome Evaluation:              Outcome Evaluation: pt admitted from ER to 4N. pt states he was called by his cardiologist and informed that his AICD fired while he was sleeping and to come to the hospital. AICD fired 2 more times in route. pt admitted on floor and denies any c/o of chest pain. currently in afib, HR maintained in 50-60s. Amiodarone infusing. pt c/o some nausea, zofran admin. Diet tolerated. Lasix IV admin. pt expresses no other needs at this time. call light within reach.

## 2024-11-27 NOTE — PROGRESS NOTES
Clinical Pharmacy Services: Medication History    Sebastien Tang is a 64 y.o. male presenting to [facility] for   Chief Complaint   Patient presents with    Pacemaker Problem       He  has a past medical history of Acidosis, Acute congestive heart failure, Acute respiratory failure, Anemia, Arthritis, Asthma, Atrial flutter, Azotemia, Cardiac arrest (04/2021), Chronic coronary artery disease, Constipation, COPD (chronic obstructive pulmonary disease), Depression, Enlarged prostate, Hypertension, Hypokalemia, ICD (implantable cardioverter-defibrillator) in place, Ischemic cardiomyopathy, MRSA nasal colonization, Obesity (BMI 30-39.9), Orthopnea, PAF (paroxysmal atrial fibrillation), Persistent atrial fibrillation, Pulmonary edema, Tobacco abuse, Transaminitis, Trouble in sleeping, and Ventricular fibrillation.    Allergies as of 11/27/2024    (No Known Allergies)       Medication information was obtained from: Patient/Pharmacy  Pharmacy and Phone Number:   [id number] [place], [place] - [address]  AT [facility] - [phone] [facility] [phone] FX  [address] [place], [place] [address]  [facility] [address]  Phone: [phone] Fax: [phone]    [id number] [address] [facility] - [phone] [facility] [phone] [address] [address]  Phone: [phone] Fax: [phone]        Prior to Admission Medications       Prescriptions Last Dose Informant Patient Reported? Taking?    albuterol sulfate  (90 Base) MCG/ACT inhaler 11/26/2024 Self, Pharmacy No Yes    INHALE 2 PUFFS BY MOUTH EVERY 6 HOURS AS NEEDED FOR WHEEZING    amoxicillin (AMOXIL) 500 MG capsule 11/26/2024 Pharmacy, Self Yes Yes    Take 2 capsules by mouth 3 (Three) Times a Day.    apixaban (Eliquis) 5 MG tablet tablet 11/26/2024 Pharmacy, Self No Yes    Take 1 tablet by mouth Every 12 (Twelve) Hours.    atenolol  (TENORMIN) 25 MG tablet 11/26/2024 Pharmacy, Self Yes Yes    Take 1 tablet by mouth 2 (Two) Times a Day.    budesonide-formoterol (SYMBICORT) 160-4.5 MCG/ACT inhaler 11/26/2024 Pharmacy, Self No Yes    Inhale 2 puffs 2 (Two) Times a Day.    busPIRone (BUSPAR) 15 MG tablet 11/26/2024 Pharmacy, Self Yes Yes    Take 1 tablet by mouth 2 (Two) Times a Day.    digoxin (LANOXIN) 250 MCG tablet 11/26/2024 Pharmacy, Self No Yes    Take 1 tablet by mouth Daily.    finasteride (PROSCAR) 5 MG tablet 11/26/2024 Pharmacy, Self No Yes    Take 1 tablet by mouth Daily.    furosemide (LASIX) 80 MG tablet 11/26/2024 Pharmacy, Self No Yes    Take 1 tablet by mouth Daily.    HYDROcodone-acetaminophen (NORCO)  MG per tablet Past Week Pharmacy, Self Yes Yes    Take 1 tablet by mouth Every 6 (Six) Hours As Needed for Moderate Pain.    metFORMIN (GLUCOPHAGE) 500 MG tablet 11/26/2024 Pharmacy, Self Yes Yes    Take 1 tablet by mouth 2 (Two) Times a Day With Meals.    mirtazapine (REMERON) 30 MG tablet 11/26/2024 Pharmacy, Self No Yes    Take 1 tablet by mouth Every Night.    pantoprazole (PROTONIX) 40 MG EC tablet 11/26/2024 Pharmacy, Self No Yes    TAKE 1 TABLET BY MOUTH EVERY MORNING    polyethylene glycol (MIRALAX) 17 g packet  Self No Yes    Take 17 g by mouth Daily.    rosuvastatin (Crestor) 20 MG tablet 11/26/2024 Pharmacy, Self No Yes    Take 1 tablet by mouth Daily.    spironolactone (ALDACTONE) 25 MG tablet 11/26/2024 Pharmacy, Self Yes Yes    Take 1 tablet by mouth Daily.    tiZANidine (ZANAFLEX) 4 MG tablet 11/27/2024 Pharmacy, Self Yes Yes    Take 1 tablet by mouth 2 (Two) Times a Day As Needed for Muscle Spasms.              Medication notes:    This medication list is complete to the best of my knowledge as of 11/27/2024    Please call if questions.    Shelton Wheeler  Medication History Technician  [phone]    11/27/2024 13:07 EST

## 2024-11-28 LAB
ALBUMIN SERPL-MCNC: 3.8 G/DL (ref 3.5–5.2)
ALBUMIN/GLOB SERPL: 1.3 G/DL
ALP SERPL-CCNC: 92 U/L (ref 39–117)
ALT SERPL W P-5'-P-CCNC: 57 U/L (ref 1–41)
ANION GAP SERPL CALCULATED.3IONS-SCNC: 14 MMOL/L (ref 5–15)
AST SERPL-CCNC: 72 U/L (ref 1–40)
BILIRUB SERPL-MCNC: 0.8 MG/DL (ref 0–1.2)
BUN SERPL-MCNC: 28 MG/DL (ref 8–23)
BUN/CREAT SERPL: 14.4 (ref 7–25)
CALCIUM SPEC-SCNC: 9.8 MG/DL (ref 8.6–10.5)
CHLORIDE SERPL-SCNC: 92 MMOL/L (ref 98–107)
CO2 SERPL-SCNC: 28 MMOL/L (ref 22–29)
CREAT SERPL-MCNC: 1.94 MG/DL (ref 0.76–1.27)
DEPRECATED RDW RBC AUTO: 50 FL (ref 37–54)
DEPRECATED RDW RBC AUTO: 50.1 FL (ref 37–54)
EGFRCR SERPLBLD CKD-EPI 2021: 37.5 ML/MIN/1.73
ERYTHROCYTE [DISTWIDTH] IN BLOOD BY AUTOMATED COUNT: 17.6 % (ref 12.3–15.4)
ERYTHROCYTE [DISTWIDTH] IN BLOOD BY AUTOMATED COUNT: 17.7 % (ref 12.3–15.4)
GLOBULIN UR ELPH-MCNC: 3 GM/DL
GLUCOSE BLDC GLUCOMTR-MCNC: 154 MG/DL (ref 70–130)
GLUCOSE BLDC GLUCOMTR-MCNC: 158 MG/DL (ref 70–130)
GLUCOSE BLDC GLUCOMTR-MCNC: 170 MG/DL (ref 70–130)
GLUCOSE SERPL-MCNC: 161 MG/DL (ref 65–99)
HBA1C MFR BLD: 10.1 % (ref 4.8–5.6)
HCT VFR BLD AUTO: 39.3 % (ref 37.5–51)
HCT VFR BLD AUTO: 40.5 % (ref 37.5–51)
HGB BLD-MCNC: 11.7 G/DL (ref 13–17.7)
HGB BLD-MCNC: 12 G/DL (ref 13–17.7)
MCH RBC QN AUTO: 23.6 PG (ref 26.6–33)
MCH RBC QN AUTO: 23.7 PG (ref 26.6–33)
MCHC RBC AUTO-ENTMCNC: 29.6 G/DL (ref 31.5–35.7)
MCHC RBC AUTO-ENTMCNC: 29.8 G/DL (ref 31.5–35.7)
MCV RBC AUTO: 79.2 FL (ref 79–97)
MCV RBC AUTO: 79.9 FL (ref 79–97)
PLATELET # BLD AUTO: 275 10*3/MM3 (ref 140–450)
PLATELET # BLD AUTO: 277 10*3/MM3 (ref 140–450)
PMV BLD AUTO: 9.2 FL (ref 6–12)
PMV BLD AUTO: 9.5 FL (ref 6–12)
POTASSIUM SERPL-SCNC: 3.8 MMOL/L (ref 3.5–5.2)
POTASSIUM SERPL-SCNC: 3.8 MMOL/L (ref 3.5–5.2)
PROT SERPL-MCNC: 6.8 G/DL (ref 6–8.5)
RBC # BLD AUTO: 4.96 10*6/MM3 (ref 4.14–5.8)
RBC # BLD AUTO: 5.07 10*6/MM3 (ref 4.14–5.8)
SODIUM SERPL-SCNC: 134 MMOL/L (ref 136–145)
WBC NRBC COR # BLD AUTO: 10.1 10*3/MM3 (ref 3.4–10.8)
WBC NRBC COR # BLD AUTO: 10.76 10*3/MM3 (ref 3.4–10.8)

## 2024-11-28 PROCEDURE — 84132 ASSAY OF SERUM POTASSIUM: CPT | Performed by: INTERNAL MEDICINE

## 2024-11-28 PROCEDURE — 63710000001 INSULIN LISPRO (HUMAN) PER 5 UNITS: Performed by: INTERNAL MEDICINE

## 2024-11-28 PROCEDURE — 85027 COMPLETE CBC AUTOMATED: CPT | Performed by: INTERNAL MEDICINE

## 2024-11-28 PROCEDURE — 97110 THERAPEUTIC EXERCISES: CPT

## 2024-11-28 PROCEDURE — 80053 COMPREHEN METABOLIC PANEL: CPT | Performed by: INTERNAL MEDICINE

## 2024-11-28 PROCEDURE — 83036 HEMOGLOBIN GLYCOSYLATED A1C: CPT | Performed by: INTERNAL MEDICINE

## 2024-11-28 PROCEDURE — 97161 PT EVAL LOW COMPLEX 20 MIN: CPT

## 2024-11-28 PROCEDURE — 63710000001 ONDANSETRON ODT 4 MG TABLET DISPERSIBLE: Performed by: INTERNAL MEDICINE

## 2024-11-28 PROCEDURE — 25010000002 AMIODARONE IN DEXTROSE 5% 360-4.14 MG/200ML-% SOLUTION: Performed by: EMERGENCY MEDICINE

## 2024-11-28 PROCEDURE — 82948 REAGENT STRIP/BLOOD GLUCOSE: CPT

## 2024-11-28 PROCEDURE — 36415 COLL VENOUS BLD VENIPUNCTURE: CPT | Performed by: INTERNAL MEDICINE

## 2024-11-28 PROCEDURE — 25010000002 FUROSEMIDE PER 20 MG

## 2024-11-28 RX ORDER — POTASSIUM CHLORIDE 750 MG/1
40 TABLET, FILM COATED, EXTENDED RELEASE ORAL
Status: COMPLETED | OUTPATIENT
Start: 2024-11-28 | End: 2024-11-28

## 2024-11-28 RX ORDER — NICOTINE POLACRILEX 4 MG
15 LOZENGE BUCCAL
Status: DISCONTINUED | OUTPATIENT
Start: 2024-11-28 | End: 2024-12-03 | Stop reason: HOSPADM

## 2024-11-28 RX ORDER — FUROSEMIDE 10 MG/ML
40 INJECTION INTRAMUSCULAR; INTRAVENOUS
Status: DISCONTINUED | OUTPATIENT
Start: 2024-11-29 | End: 2024-11-30

## 2024-11-28 RX ORDER — IBUPROFEN 600 MG/1
1 TABLET ORAL
Status: DISCONTINUED | OUTPATIENT
Start: 2024-11-28 | End: 2024-12-03 | Stop reason: HOSPADM

## 2024-11-28 RX ORDER — CETIRIZINE HYDROCHLORIDE 10 MG/1
10 TABLET ORAL DAILY
Status: DISCONTINUED | OUTPATIENT
Start: 2024-11-28 | End: 2024-12-03 | Stop reason: HOSPADM

## 2024-11-28 RX ORDER — DEXTROSE MONOHYDRATE 25 G/50ML
25 INJECTION, SOLUTION INTRAVENOUS
Status: DISCONTINUED | OUTPATIENT
Start: 2024-11-28 | End: 2024-12-03 | Stop reason: HOSPADM

## 2024-11-28 RX ORDER — INSULIN LISPRO 100 [IU]/ML
2-9 INJECTION, SOLUTION INTRAVENOUS; SUBCUTANEOUS
Status: DISCONTINUED | OUTPATIENT
Start: 2024-11-28 | End: 2024-12-03 | Stop reason: HOSPADM

## 2024-11-28 RX ORDER — AMIODARONE HYDROCHLORIDE 200 MG/1
200 TABLET ORAL EVERY 12 HOURS SCHEDULED
Status: DISCONTINUED | OUTPATIENT
Start: 2024-11-28 | End: 2024-12-03 | Stop reason: HOSPADM

## 2024-11-28 RX ADMIN — BUSPIRONE HYDROCHLORIDE 10 MG: 5 TABLET ORAL at 09:56

## 2024-11-28 RX ADMIN — AMIODARONE HYDROCHLORIDE 200 MG: 200 TABLET ORAL at 09:58

## 2024-11-28 RX ADMIN — AMIODARONE HYDROCHLORIDE 0.5 MG/MIN: 1.8 INJECTION, SOLUTION INTRAVENOUS at 01:27

## 2024-11-28 RX ADMIN — ACETAMINOPHEN 325MG 650 MG: 325 TABLET ORAL at 07:06

## 2024-11-28 RX ADMIN — POTASSIUM CHLORIDE 40 MEQ: 750 TABLET, EXTENDED RELEASE ORAL at 01:13

## 2024-11-28 RX ADMIN — POTASSIUM CHLORIDE 40 MEQ: 750 TABLET, EXTENDED RELEASE ORAL at 15:00

## 2024-11-28 RX ADMIN — TEMAZEPAM 15 MG: 15 CAPSULE ORAL at 20:56

## 2024-11-28 RX ADMIN — BUSPIRONE HYDROCHLORIDE 10 MG: 5 TABLET ORAL at 20:57

## 2024-11-28 RX ADMIN — INSULIN LISPRO 2 UNITS: 100 INJECTION, SOLUTION INTRAVENOUS; SUBCUTANEOUS at 18:02

## 2024-11-28 RX ADMIN — ATORVASTATIN CALCIUM 40 MG: 20 TABLET, FILM COATED ORAL at 20:56

## 2024-11-28 RX ADMIN — APIXABAN 5 MG: 5 TABLET, FILM COATED ORAL at 04:09

## 2024-11-28 RX ADMIN — MIRTAZAPINE 30 MG: 15 TABLET, FILM COATED ORAL at 20:56

## 2024-11-28 RX ADMIN — APIXABAN 5 MG: 5 TABLET, FILM COATED ORAL at 15:00

## 2024-11-28 RX ADMIN — CETIRIZINE HYDROCHLORIDE 10 MG: 10 TABLET, FILM COATED ORAL at 09:56

## 2024-11-28 RX ADMIN — FINASTERIDE 5 MG: 5 TABLET, FILM COATED ORAL at 09:56

## 2024-11-28 RX ADMIN — Medication 3 ML: at 20:58

## 2024-11-28 RX ADMIN — FUROSEMIDE 80 MG: 10 INJECTION, SOLUTION INTRAMUSCULAR; INTRAVENOUS at 09:56

## 2024-11-28 RX ADMIN — Medication 5 MG: at 20:57

## 2024-11-28 RX ADMIN — INSULIN LISPRO 2 UNITS: 100 INJECTION, SOLUTION INTRAVENOUS; SUBCUTANEOUS at 13:43

## 2024-11-28 RX ADMIN — PANTOPRAZOLE SODIUM 40 MG: 40 TABLET, DELAYED RELEASE ORAL at 04:11

## 2024-11-28 RX ADMIN — AMIODARONE HYDROCHLORIDE 200 MG: 200 TABLET ORAL at 20:57

## 2024-11-28 RX ADMIN — Medication 3 ML: at 09:56

## 2024-11-28 RX ADMIN — ONDANSETRON 4 MG: 4 TABLET, ORALLY DISINTEGRATING ORAL at 20:56

## 2024-11-28 RX ADMIN — POTASSIUM CHLORIDE 40 MEQ: 750 TABLET, EXTENDED RELEASE ORAL at 13:43

## 2024-11-28 NOTE — PLAN OF CARE
Problem: Adult Inpatient Plan of Care  Goal: Plan of Care Review  11/28/2024 0544 by Linda Morgan, RN  Outcome: Progressing  Flowsheets (Taken 11/28/2024 0544)  Progress: no change  Outcome Evaluation: Maintained on 2L N/C. Amiodarone gtt infusing as ordered, maintained per policy. Pt in AFIB with episodes of freq PVC's, multifocal, paired and bigemeny, rate manuela at times in 40's. Pt asymptomatic. Denies nausea, verbalizes anxiety over hospitalzation.Pt reports loose stool and freq urination. Safety maintained.  Plan of Care Reviewed With: patient   Goal Outcome Evaluation:  Plan of Care Reviewed With: patient

## 2024-11-28 NOTE — THERAPY EVALUATION
Patient Name: Sebastien Tang  : 1960    MRN: [id number]                              Today's Date: 2024       Admit Date: 2024    Visit Dx:     ICD-10-CM ICD-9-CM   1. Ventricular fibrillation, paroxysmal  I49.01 427.41   2. Defibrillator discharge  Z45.02 V71.89   3. Hypokalemia  E87.6 276.8   4. Renal insufficiency  N28.9 593.9   5. Mild chronic anemia  D64.9 285.9   6. Acute congestive heart failure, unspecified heart failure type  I50.9 428.0   7. Anticoagulated by anticoagulation treatment  Z79.01 V58.61     Patient Active Problem List   Diagnosis    Cardiac arrest    Atrial flutter    Tobacco abuse    Azotemia    COPD (chronic obstructive pulmonary disease)    MRSA nasal colonization    Transaminitis    Obesity (BMI 30-39.9)    Anemia    Constipation    Acute congestive heart failure    PSA elevation    Wellness examination    High risk medication use    Abnormal CXR    Abdominal aneurysm    Iliac aneurysm    Coronary artery disease involving coronary bypass graft of native heart without angina pectoris    Atrial fibrillation, persistent    ICD (implantable cardioverter-defibrillator) in place    Anxiety and depression    Shortness of breath    Primary insomnia    Close exposure to COVID-19 virus    Iliac artery stenosis, right    Dysuria    Gross hematuria    Essential hypertension    High cholesterol    Screening PSA (prostate specific antigen)    Anxiety    Neuropathy    Heart failure    Iron deficiency anemia    Diabetes mellitus, type 2    Hypoxemia    Acute on chronic systolic CHF (congestive heart failure)    Acute on chronic diastolic CHF (congestive heart failure)    Depression    Adjustment disorder    Paroxysmal ventricular fibrillation    Digitalis toxicity    ICD (implantable cardioverter-defibrillator) discharge    Ventricular tachycardia (paroxysmal)    Morbid obesity    Diabetic polyneuropathy associated with type 2 diabetes mellitus    Hypokalemia    ANDIE (acute kidney  injury)     Past Medical History:   Diagnosis Date    Acidosis     mixed resp/metabolic acidosis    Acute congestive heart failure     Acute respiratory failure     hypoxic    Anemia     Arthritis     Asthma     Atrial flutter     Azotemia     Cardiac arrest 04/2021    Chronic coronary artery disease     Constipation     COPD (chronic obstructive pulmonary disease)     Depression     Enlarged prostate     Hypertension     Hypokalemia     severe    ICD (implantable cardioverter-defibrillator) in place     Ischemic cardiomyopathy     MRSA nasal colonization     Obesity (BMI 30-39.9)     Orthopnea     PAF (paroxysmal atrial fibrillation)     Persistent atrial fibrillation     Pulmonary edema     Tobacco abuse     Transaminitis     Trouble in sleeping     Ventricular fibrillation      Past Surgical History:   Procedure Laterality Date    CARDIAC CATHETERIZATION Left 4/1/2021    Procedure: Coronary Angiography;  Surgeon: Anna Puga MD;  Location:  [facility];  Service: Cardiology;  Laterality: Left;    CARDIAC CATHETERIZATION N/A 4/1/2021    Procedure: Left ventriculography;  Surgeon: Anna Puga MD;  Location:  [facility];  Service: Cardiology;  Laterality: N/A;    CARDIAC CATHETERIZATION N/A 4/1/2021    Procedure: Left Heart Cath;  Surgeon: Anna Puga MD;  Location:  [facility];  Service: Cardiology;  Laterality: N/A;    CARDIAC ELECTROPHYSIOLOGY PROCEDURE N/A 4/7/2021    Procedure: IMPLANTABLE CARDIOVERTER DEFIBRILLATOR- SC BIOTRONIK;  Surgeon: Nik Rosas MD;  Location:  [facility];  Service: Cardiovascular;  Laterality: N/A;    CARDIOVERSION  05/19/2021    Dr. Rosas    CARDIOVERSION  07/26/2021    Dr. Rosas    TONSILLECTOMY        General Information       Row Name 11/28/24 1414          Physical Therapy Time and Intention    Document Type evaluation  -LB     Mode of Treatment physical therapy  -LB       Row Name  "11/28/24 1414          General Information    Patient Profile Reviewed yes  -LB     Prior Level of Function independent:  -LB     Existing Precautions/Restrictions fall;oxygen therapy device and L/min  -LB     Barriers to Rehab medically complex  -       Row Name 11/28/24 1414          Living Environment    People in Home alone  -       Row Name 11/28/24 1414          Home Main Entrance    Number of Stairs, Main Entrance none  elevator to apartment  -       Row Name 11/28/24 1414          Stairs Within Home, Primary    Number of Stairs, Within Home, Primary none  -       Row Name 11/28/24 1414          Cognition    Orientation Status (Cognition) oriented to;person;place  -       Row Name 11/28/24 1414          Safety Issues/Impairments Affecting Functional Mobility    Safety Issues Affecting Function (Mobility) insight into deficits/self-awareness;safety precautions follow-through/compliance  -LB     Impairments Affecting Function (Mobility) balance;shortness of breath  -LB     Comment, Safety Issues/Impairments (Mobility) o2 saturation dropping when entering room; low 70s without O2 as pt had nasal cannula around his neck  -LB               User Key  (r) = Recorded By, (t) = Taken By, (c) = Cosigned By      Initials Name Provider Type    LB Susanne Lopez, PT Physical Therapist                   Mobility       Row Name 11/28/24 1415          Bed Mobility    Bed Mobility bed mobility (all) activities  -LB     All Activities, [place] (Bed Mobility) standby assist  -[facility] Name 11/28/24 1415          Sit-Stand Transfer    Sit-Stand [place] (Transfers) supervision  -LB     Comment, (Sit-Stand Transfer) pt impulsive and jumped to a stand to \"prove he could\"  -       Row Name 11/28/24 1415          Gait/Stairs (Locomotion)    [place] Level (Gait) contact guard  -LB     Distance in Feet (Gait) 15  -LB     Comment, (Gait/Stairs) pt steady on feet, no overt LOB, limited by O2 line length, pt " stood in room and would not return to bed to allow PT to obtain O2 tank  -LB               User Key  (r) = Recorded By, (t) = Taken By, (c) = Cosigned By      Initials Name Provider Type    Susanne Contreras, RITA Physical Therapist                   Obj/Interventions       Row Name 11/28/24 1417          Range of Motion Comprehensive    General Range of Motion no range of motion deficits identified  -LB       Row Name 11/28/24 1417          Strength Comprehensive (MMT)    Comment, General Manual Muscle Testing (MMT) Assessment BLE grossly 4/5  -LB               User Key  (r) = Recorded By, (t) = Taken By, (c) = Cosigned By      Initials Name Provider Type    Susanne Contreras, PT Physical Therapist                   Goals/Plan       Row Name 11/28/24 1421          Bed Mobility Goal 1 (PT)    Activity/Assistive Device (Bed Mobility Goal 1, PT) bed mobility activities, all  -LB     [place] Level/Cues Needed (Bed Mobility Goal 1, PT) standby assist  -LB     Time Frame (Bed Mobility Goal 1, PT) 1 week  -LB       Row Name 11/28/24 1421          Transfer Goal 1 (PT)    Activity/Assistive Device (Transfer Goal 1, PT) transfers, all  -LB     [place] Level/Cues Needed (Transfer Goal 1, PT) standby assist  -LB     Time Frame (Transfer Goal 1, PT) 1 week  -LB       Row Name 11/28/24 1421          Gait Training Goal 1 (PT)    Activity/Assistive Device (Gait Training Goal 1, PT) gait (walking locomotion)  -LB     [place] Level (Gait Training Goal 1, PT) standby assist  -LB     Distance (Gait Training Goal 1, PT) 120  -LB     Time Frame (Gait Training Goal 1, PT) 1 week  -LB               User Key  (r) = Recorded By, (t) = Taken By, (c) = Cosigned By      Initials Name Provider Type    Susanne Contreras, PT Physical Therapist                   Clinical Impression       Row Name 11/28/24 1417          Pain    Pretreatment Pain Rating 0/10 - no pain  -LB     Posttreatment Pain Rating 0/10 - no pain  -LB       Row Name  11/28/24 1417          Plan of Care Review    Plan of Care Reviewed With patient  -LB     Progress no change  -LB     Outcome Evaluation Pt is 63 yo male admitted via EMS after AICD fired while he was sleeping. He lives alone in apartment and states he has been feeling less balanced and feeling less motivated to ambulate. B ankle/feet swelling noted today. Pt LE ROM and strength is functional. He was impulsive during session and stood while PT was prepping room for ambulation. He did not wish to return to seated position to allow PT to retrieve O2 tank so ambulation distance was limited by available line. 15' distance ambulation around bed without overt LOB and pt was steady on his feet. He will benefit from continued skilled PT services to progress ambulation distance. He may benefit from RW at d/c to ensure safety and  PT to allow further strengthening in his home environment following d/c.  -LB       Row Name 11/28/24 1417          Therapy Assessment/Plan (PT)    Rehab Potential (PT) good  -LB     Criteria for Skilled Interventions Met (PT) yes  -LB     Therapy Frequency (PT) daily  -LB       Row Name 11/28/24 1417          Vital Signs    O2 Delivery Pre Treatment room air  -LB     Intra SpO2 (%) 74  -LB     O2 Delivery Intra Treatment supplemental O2  -LB     Post SpO2 (%) 90  -LB     O2 Delivery Post Treatment supplemental O2  -LB     Pre Patient Position Supine  -LB     Intra Patient Position Standing  -LB     Post Patient Position Supine  -LB       Row Name 11/28/24 1417          Positioning and Restraints    Pre-Treatment Position in bed  -LB     Post Treatment Position bed  -LB     In Bed supine;call light within reach;encouraged to call for assist  -LB               User Key  (r) = Recorded By, (t) = Taken By, (c) = Cosigned By      Initials Name Provider Type    Susanne Contreras, PT Physical Therapist                   Outcome Measures       Row Name 11/28/24 1422          How much help from another  person do you currently need...    Turning from your back to your side while in flat bed without using bedrails? 4  -LB     Moving from lying on back to sitting on the side of a flat bed without bedrails? 4  -LB     Moving to and from a bed to a chair (including a wheelchair)? 3  -LB     Standing up from a chair using your arms (e.g., wheelchair, bedside chair)? 4  -LB     Climbing 3-5 steps with a railing? 3  -LB     To walk in hospital room? 3  -LB     AM-PAC 6 Clicks Score (PT) 21  -LB     Highest Level of Mobility Goal 6 --> Walk 10 steps or more  -LB       Row Name 11/28/24 1422          Functional Assessment    Outcome Measure Options AM-PAC 6 Clicks Basic Mobility (PT)  -LB               User Key  (r) = Recorded By, (t) = Taken By, (c) = Cosigned By      Initials Name Provider Type    Susanne Contreras PT Physical Therapist                                 Physical Therapy Education       Title: PT OT SLP Therapies (In Progress)       Topic: Physical Therapy (In Progress)       Point: Mobility training (In Progress)       Learning Progress Summary            Patient Acceptance, E,TB, NR by LB at 11/28/2024 1422                      Point: Home exercise program (In Progress)       Learning Progress Summary            Patient Acceptance, E,TB, NR by LB at 11/28/2024 1422                      Point: Body mechanics (In Progress)       Learning Progress Summary            Patient Acceptance, E,TB, NR by LB at 11/28/2024 1422                      Point: Precautions (In Progress)       Learning Progress Summary            Patient Acceptance, E,TB, NR by LB at 11/28/2024 1422                                      User Key       Initials Effective Dates Name Provider Type Discipline    NATHEN 08/09/20 -  Susanne Lopez PT Physical Therapist PT                  PT Recommendation and Plan     Progress: no change  Outcome Evaluation: Pt is 63 yo male admitted via EMS after AICD fired while he was sleeping. He lives alone in  apartment and states he has been feeling less balanced and feeling less motivated to ambulate. B ankle/feet swelling noted today. Pt LE ROM and strength is functional. He was impulsive during session and stood while PT was prepping room for ambulation. He did not wish to return to seated position to allow PT to retrieve O2 tank so ambulation distance was limited by available line. 15' distance ambulation around bed without overt LOB and pt was steady on his feet. He will benefit from continued skilled PT services to progress ambulation distance. He may benefit from RW at d/c to ensure safety and HH PT to allow further strengthening in his home environment following d/c.     Time Calculation:   PT Evaluation Complexity  History, PT Evaluation Complexity: 1-2 personal factors and/or comorbidities  Examination of Body Systems (PT Eval Complexity): 1-2 elements  Clinical Presentation (PT Evaluation Complexity): evolving  Clinical Decision Making (PT Evaluation Complexity): low complexity  Overall Complexity (PT Evaluation Complexity): low complexity     PT Charges       Row Name 11/28/24 1423             Time Calculation    Start Time 1300  -LB      Stop Time 1330  -LB      Time Calculation (min) 30 min  -LB      PT Received On 11/28/24  -LB      PT - Next Appointment 11/29/24  -LB      PT Goal Re-Cert Due Date 12/05/24  -LB         Time Calculation- PT    Total Timed Code Minutes- PT 15 minute(s)  -LB                User Key  (r) = Recorded By, (t) = Taken By, (c) = Cosigned By      Initials Name Provider Type    LB Susanne Lopez, PT Physical Therapist                  Therapy Charges for Today       Code Description Service Date Service Provider Modifiers Qty    55360518962 HC PT EVAL LOW COMPLEXITY 2 11/28/2024 Susanne Lopez, PT GP 1    87249984501 HC PT THER PROC EA 15 MIN 11/28/2024 Susanne Lopez, PT GP 1            PT G-Codes  Outcome Measure Options: AM-PAC 6 Clicks Basic Mobility (PT)  AM-PAC 6 Clicks Score  (PT): 21  PT Discharge Summary  Anticipated Discharge Disposition (PT): home with home health    Susanne Lopez, PT  11/28/2024

## 2024-11-28 NOTE — PROGRESS NOTES
Patient Name: Sebastien Tang  Age/Sex: 64 y.o. male  : 1960  MRN: [id number]    Date of Admission: 2024  Date of Encounter Visit: 24  Encounter Provider: Sang Kennedy MD   Place of Service: [facility]ARD[facility]    Admit Diagnosis: Hypokalemia [E87.6]  Renal insufficiency [N28.9]  Defibrillator discharge [Z45.02]  Paroxysmal ventricular fibrillation [I49.01]  Ventricular fibrillation, paroxysmal [I49.01]  Mild chronic anemia [D64.9]  Anticoagulated by anticoagulation treatment [Z79.01]  Acute congestive heart failure, unspecified heart failure type [I50.9]    Subjective:     No acute events overnight.  No acute complaints.    Occasional PVCs on monitor.      Current Medications:    Current Facility-Administered Medications:     acetaminophen (TYLENOL) tablet 650 mg, 650 mg, Oral, Q4H PRN, 650 mg at 24 0706 **OR** acetaminophen (TYLENOL) 160 MG/5ML oral solution 650 mg, 650 mg, Oral, Q4H PRN **OR** acetaminophen (TYLENOL) suppository 650 mg, 650 mg, Rectal, Q4H PRN, Tato Parks MD    albuterol (PROVENTIL) nebulizer solution 0.083% 2.5 mg/3mL, 2.5 mg, Nebulization, Q6H PRN, Tato Parks MD    apixaban (ELIQUIS) tablet 5 mg, 5 mg, Oral, Q12H, Tato Parks MD, 5 mg at 24 0409    atorvastatin (LIPITOR) tablet 40 mg, 40 mg, Oral, Nightly, Tato Parks MD, 40 mg at 24    sennosides-docusate (PERICOLACE) 8.6-50 MG per tablet 2 tablet, 2 tablet, Oral, BID PRN **AND** polyethylene glycol (MIRALAX) packet 17 g, 17 g, Oral, Daily PRN **AND** bisacodyl (DULCOLAX) EC tablet 5 mg, 5 mg, Oral, Daily PRN **AND** bisacodyl (DULCOLAX) suppository 10 mg, 10 mg, Rectal, Daily PRN, Tato Parks MD    budesonide-formoterol (SYMBICORT) 160-4.5 MCG/ACT inhaler 2 puff, 2 puff, Inhalation, BID, Tato Parks MD    busPIRone (BUSPAR) tablet 10 mg, 10 mg, Oral, Q12H, Tato Parks MD,  10 mg at 11/27/24 1727    Calcium Replacement - Follow Nurse / BPA Driven Protocol, , Not Applicable, PRN, Tato Parks MD    cetirizine (zyrTEC) tablet 10 mg, 10 mg, Oral, Daily, Tato Parks MD    famotidine (PEPCID) tablet 10 mg, 10 mg, Oral, BID PRN, Tato Parks MD    finasteride (PROSCAR) tablet 5 mg, 5 mg, Oral, Daily, Tato Parks MD, 5 mg at 11/27/24 1727    furosemide (LASIX) injection 80 mg, 80 mg, Intravenous, BID, Ron Jara W, APRN, 80 mg at 11/27/24 1726    Magnesium Low Dose Replacement - Follow Nurse / BPA Driven Protocol, , Not Applicable, Kasey RODRÍGUEZ Stephen Matthew, MD    melatonin tablet 5 mg, 5 mg, Oral, Nightly, Tato Parks MD    mirtazapine (REMERON) tablet 30 mg, 30 mg, Oral, Nightly, Tato Parks MD, 30 mg at 11/27/24 2052    nitroglycerin (NITROSTAT) SL tablet 0.4 mg, 0.4 mg, Sublingual, Q5 Min PRN, Tato Parks MD    ondansetron ODT (ZOFRAN-ODT) disintegrating tablet 4 mg, 4 mg, Oral, Q6H PRN, 4 mg at 11/27/24 1658 **OR** ondansetron (ZOFRAN) injection 4 mg, 4 mg, Intravenous, Q6H PRN, Tato Parks MD    pantoprazole (PROTONIX) EC tablet 40 mg, 40 mg, Oral, Q AM, Tato Parks MD, 40 mg at 11/28/24 0411    Phosphorus Replacement - Follow Nurse / BPA Driven Protocol, , Not Applicable, Kasey RODRÍGUEZ Stephen Matthew, MD    Potassium Replacement - Follow Nurse / BPA Driven Protocol, , Not Applicable, David RODRÍGUEZ William D, MD    Potassium Replacement - Follow Nurse / BPA Driven Protocol, , Not Applicable, Kasey RODRÍGUEZ Stephen Matthew, MD    [COMPLETED] Insert Peripheral IV, , , Once **AND** sodium chloride 0.9 % flush 10 mL, 10 mL, Intravenous, PRN, He Hernandez MD    sodium chloride 0.9 % flush 3 mL, 3 mL, Intravenous, Q12H, Tato Parks MD, 3 mL at 11/27/24 2055    sodium chloride 0.9 % flush 3-10 mL, 3-10 mL, Intravenous, PRN, Tato Parks MD     sodium chloride 0.9 % infusion 40 mL, 40 mL, Intravenous, PRN, Tato Parks MD    temazepam (RESTORIL) capsule 15 mg, 15 mg, Oral, Nightly, Tato Parks MD, 15 mg at 11/27/24 2103    Prior to Admission Medications:  Medications Prior to Admission   Medication Sig Dispense Refill Last Dose/Taking    albuterol sulfate  (90 Base) MCG/ACT inhaler INHALE 2 PUFFS BY MOUTH EVERY 6 HOURS AS NEEDED FOR WHEEZING 18 g 3 11/26/2024 Evening    amoxicillin (AMOXIL) 500 MG capsule Take 2 capsules by mouth 3 (Three) Times a Day.   11/26/2024 Evening    apixaban (Eliquis) 5 MG tablet tablet Take 1 tablet by mouth Every 12 (Twelve) Hours. 60 tablet 3 11/27/2024 Noon    atenolol (TENORMIN) 25 MG tablet Take 1 tablet by mouth 2 (Two) Times a Day.   11/26/2024 Evening    budesonide-formoterol (SYMBICORT) 160-4.5 MCG/ACT inhaler Inhale 2 puffs 2 (Two) Times a Day. 10.2 g 0 11/26/2024 Evening    busPIRone (BUSPAR) 15 MG tablet Take 1 tablet by mouth 2 (Two) Times a Day.   11/26/2024 Evening    digoxin (LANOXIN) 250 MCG tablet Take 1 tablet by mouth Daily. 90 tablet 3 11/26/2024 Evening    finasteride (PROSCAR) 5 MG tablet Take 1 tablet by mouth Daily. 90 tablet 0 11/26/2024 Evening    furosemide (LASIX) 80 MG tablet Take 1 tablet by mouth Daily. 90 tablet 3 11/26/2024 Evening    HYDROcodone-acetaminophen (NORCO)  MG per tablet Take 1 tablet by mouth Every 6 (Six) Hours As Needed for Moderate Pain.   Past Week    metFORMIN (GLUCOPHAGE) 500 MG tablet Take 1 tablet by mouth 2 (Two) Times a Day With Meals.   11/26/2024 Evening    mirtazapine (REMERON) 30 MG tablet Take 1 tablet by mouth Every Night. 30 tablet 2 11/26/2024 Evening    pantoprazole (PROTONIX) 40 MG EC tablet TAKE 1 TABLET BY MOUTH EVERY MORNING 30 tablet 5 11/26/2024 Evening    polyethylene glycol (MIRALAX) 17 g packet Take 17 g by mouth Daily. 30 each 3 11/26/2024 Evening    rosuvastatin (Crestor) 20 MG tablet Take 1 tablet by mouth Daily.  90 tablet 3 11/26/2024 Evening    spironolactone (ALDACTONE) 25 MG tablet Take 1 tablet by mouth Daily.   11/26/2024 Evening    tiZANidine (ZANAFLEX) 4 MG tablet Take 1 tablet by mouth 2 (Two) Times a Day As Needed for Muscle Spasms.   11/26/2024 Evening       Past Medical History     Past Medical History:   Diagnosis Date    Acidosis     mixed resp/metabolic acidosis    Acute congestive heart failure     Acute respiratory failure     hypoxic    Anemia     Arthritis     Asthma     Atrial flutter     Azotemia     Cardiac arrest 04/2021    Chronic coronary artery disease     Constipation     COPD (chronic obstructive pulmonary disease)     Depression     Enlarged prostate     Hypertension     Hypokalemia     severe    ICD (implantable cardioverter-defibrillator) in place     Ischemic cardiomyopathy     MRSA nasal colonization     Obesity (BMI 30-39.9)     Orthopnea     PAF (paroxysmal atrial fibrillation)     Persistent atrial fibrillation     Pulmonary edema     Tobacco abuse     Transaminitis     Trouble in sleeping     Ventricular fibrillation      Past Surgical History:   Procedure Laterality Date    CARDIAC CATHETERIZATION Left 4/1/2021    Procedure: Coronary Angiography;  Surgeon: Anna Puga MD;  [facility] C[facility];  Service: Cardiology;  Laterality: Left;    CARDIAC CATHETERIZATION N/A 4/1/2021    Procedure: Left ventriculography;  Surgeon: Anna Puga MD;  Location: [facility] CATH INVASIVE LOCATION;  Service: Cardiology;  Laterality: N/A;    CARDIAC CATHETERIZATION N/A 4/1/2021    Procedure: Left Heart Cath;  Surgeon: Anna Puga MD;  Location: [facility] CATH INVASIVE LOCATION;  Service: Cardiology;  Laterality: N/A;    CARDIAC ELECTROPHYSIOLOGY PROCEDURE N/A 4/7/2021    Procedure: IMPLANTABLE CARDIOVERTER DEFIBRILLATOR- SC BIOTRONIK;  Surgeon: Nik Rosas MD;  Location: [facility] CATH INVASIVE LOCATION;  Service: Cardiovascular;  Laterality: N/A;    CARDIOVERSION  05/19/2021     Dr. Rosas    CARDIOVERSION  07/26/2021    Dr. Rosas    TONSILLECTOMY         Family History     History reviewed. No pertinent family history.      Social History     Social History     Socioeconomic History    Marital status: Single   Tobacco Use    Smoking status: Former     Current packs/day: 0.00     Average packs/day: 1.5 packs/day for 10.0 years (15.0 ttl pk-yrs)     Types: Cigarettes     Start date: 4/22/2011     Quit date: 4/22/2021     Years since quitting: 3.6     Passive exposure: Past    Smokeless tobacco: Never    Tobacco comments:     4/2021   Vaping Use    Vaping status: Never Used   Substance and Sexual Activity    Alcohol use: Yes     Alcohol/week: 1.0 standard drink of alcohol     Comment: 1 BEER DAILY    Drug use: Not Currently     Types: Hydrocodone    Sexual activity: Not Currently     Partners: Female       Allergies:  No Known Allergies         Objective:     Objective:  Temp:  [97.3 °F (36.3 °C)-98.7 °F (37.1 °C)] 97.3 °F (36.3 °C)  Heart Rate:  [45-82] 82  Resp:  [18-20] 20  BP: (111-143)/(67-94) 122/80    Intake/Output Summary (Last 24 hours) at 11/28/2024 0948  Last data filed at 11/28/2024 0750  Gross per 24 hour   Intake 866.54 ml   Output 3050 ml   Net -2183.46 ml     Body mass index is 39.03 kg/m².      11/27/24  0956 11/27/24  1830   Weight: 113 kg (250 lb) 113 kg (249 lb 3.2 oz)                 Physical Exam:   Gen: Well nourished; Alert  Skin: no visible rashes and no abnormalities with palpation  Eyes: no evidence of visual defects and normal sclera  Ears: no abnormalities.  Mouth: normal teeth and appropriately moist mucous membranes  Chest: clear to auscultation. There is normal respiratory effort and expansion.  CV: examination showed a regular rhythm. S1 and S2 were normal.  1+ bilateral lower extremity edema  Abd: no visible distension.   Neurologic:Cranial nerves appear intact; Sensation normal; no localizing signs    Lab Review:     Results from last 7 days   Lab Units  11/28/24  0545 11/27/24 1951 11/27/24  1011   SODIUM mmol/L 134*  --  132*   POTASSIUM mmol/L 3.8  3.8 3.3* 2.8*   CHLORIDE mmol/L 92*  --  86*   CO2 mmol/L 28.0  --  32.1*   BUN mg/dL 28*  --  35*   CREATININE mg/dL 1.94*  --  2.01*   GLUCOSE mg/dL 161*  --  147*   CALCIUM mg/dL 9.8  --  10.0       Results from last 7 days   Lab Units 11/27/24  1259 11/27/24  1011   HSTROP T ng/L 137* 146*     Results from last 7 days   Lab Units 11/28/24  0545   WBC 10*3/mm3 10.76   HEMOGLOBIN g/dL 11.7*   HEMATOCRIT % 39.3   PLATELETS 10*3/mm3 277             Results from last 7 days   Lab Units 11/27/24 1951   MAGNESIUM mg/dL 2.8*         Results from last 7 days   Lab Units 11/27/24  1011   PROBNP pg/mL 6,000.0*     Results from last 7 days   Lab Units 11/27/24  1011   DIGOXIN LVL ng/mL 1.60*           Imaging:    chest X-ray    Results for orders placed during the hospital encounter of 03/15/24    Adult Transesophageal Echo (LILA) W/ Cont if Necessary Per Protocol    Interpretation Summary    Left ventricular systolic function is normal. Left ventricular ejection fraction appears to be 51 - 55%.    The right ventricular cavity is mildly dilated but normal right ventricular systolic function.    There is mild biatrial enlargement.    Saline test results are negative for right to left atrial level shunt.    Moderate tricuspid valve regurgitation is present.Estimated right ventricular systolic pressure from tricuspid regurgitation is moderately elevated (45-55 mmHg).Moderate pulmonary hypertension is present.        I personally viewed and interpreted the patient's EKG/Telemetry data.    Assessment/ Plan:     Paroxysmal ventricular fibrillation    Atrial flutter    COPD (chronic obstructive pulmonary disease)    Anemia    Constipation    ICD (implantable cardioverter-defibrillator) in place    Anxiety and depression    Primary insomnia    Essential hypertension    High cholesterol    Acute on chronic diastolic CHF (congestive  heart failure)    Digitalis toxicity    ICD (implantable cardioverter-defibrillator) discharge    Ventricular tachycardia (paroxysmal)    Morbid obesity    Diabetic polyneuropathy associated with type 2 diabetes mellitus    Hypokalemia    ANDIE (acute kidney injury)      # VT/VF  S/p history of single-chamber ICD  - Felt secondary to volume overload.  On diuresis with Lasix.  Good urine output net -1.8  - Still volume up.  - Maintain electrolytes  - Continue amiodarone.  Convert to p.o.    # Past medical history of persistent atrial fibrillation  - Currently sinus rhythm  - Continue Eliquis.    Sang Kennedy MD  [facility]  11/28/24  09:48 EST

## 2024-11-28 NOTE — PLAN OF CARE
Goal Outcome Evaluation:  Plan of Care Reviewed With: patient        Progress: no change  Outcome Evaluation: Pt is 65 yo male admitted via EMS after AICD fired while he was sleeping. He lives alone in apartment and states he has been feeling less balanced and feeling less motivated to ambulate. B ankle/feet swelling noted today. Pt LE ROM and strength is functional. He was impulsive during session and stood while PT was prepping room for ambulation. He did not wish to return to seated position to allow PT to retrieve O2 tank so ambulation distance was limited by available line. 15' distance ambulation around bed without overt LOB and pt was steady on his feet. He will benefit from continued skilled PT services to progress ambulation distance. He may benefit from RW at d/c to ensure safety and  PT to allow further strengthening in his home environment following d/c.    Anticipated Discharge Disposition (PT): home with home health

## 2024-11-28 NOTE — PROGRESS NOTES
[facility] Medicine Services  PROGRESS NOTE    Patient Name: Sebastien Tang  : 1960  MRN: [id number]    Date of Admission: 2024  Primary Care Physician: Yuliana Flynn DO    Subjective   Subjective     CC:  Follow-up recent ventricular tachycardia, weakness and ICD shock    Subjective: Patient feeling better today.  No further ICD discharges.  He denies any chest pain or other new cardiac symptoms.  Good appetite reported    Review of Systems  No current fevers or chills  No current shortness of breath or cough  No current nausea, vomiting, or diarrhea       Objective   Objective     Vital Signs:   Temp:  [97.3 °F (36.3 °C)-98.7 °F (37.1 °C)] 97.3 °F (36.3 °C)  Heart Rate:  [45-82] 82  Resp:  [18-20] 20  BP: (111-143)/(67-94) 122/80        Physical Exam:  Constitutional:Awake, alert, no acute distress  HENT: NCAT, mucous membranes moist, neck supple  Respiratory: No cough or wheezes, normal respirations, nonlabored breathing   Cardiovascular: Pulse rate is normal, palpable radial pulses  Gastrointestinal:  soft, nontender, nondistended  Musculoskeletal: Morbidly obese BMI is 40, improving lower extremity edema, somewhat debilitated in appearance but able to ambulate  Psychiatric: Appropriate affect, cooperative, conversational  Neurologic: No slurred speech or facial droop, follows commands  Skin: No rashes or jaundice, warm      Results Reviewed:  Results from last 7 days   Lab Units 24  0545 24  1011   WBC 10*3/mm3 10.76 9.48   HEMOGLOBIN g/dL 11.7* 11.8*   HEMATOCRIT % 39.3 38.3   PLATELETS 10*3/mm3 277 267     Results from last 7 days   Lab Units 24  0545 24  1259 24  1011   SODIUM mmol/L 134*  --   --  132*   POTASSIUM mmol/L 3.8  3.8 3.3*  --  2.8*   CHLORIDE mmol/L 92*  --   --  86*   CO2 mmol/L 28.0  --   --  32.1*   BUN mg/dL 28*  --   --  35*   CREATININE mg/dL 1.94*  --   --  2.01*   GLUCOSE mg/dL 161*  --   --  147*   CALCIUM mg/dL 9.8   --   --  10.0   ALK PHOS U/L 92  --   --  91   ALT (SGPT) U/L 57*  --   --  61*   AST (SGOT) U/L 72*  --   --  89*   HSTROP T ng/L  --   --  137* 146*   PROBNP pg/mL  --   --   --  6,000.0*     Estimated Creatinine Clearance: 45 mL/min (A) (by C-G formula based on SCr of 1.94 mg/dL (H)).    Microbiology Results Abnormal       None            Imaging Results (Last 24 Hours)       Procedure Component Value Units Date/Time    XR Chest 1 View [[id number]] Collected: 11/27/24 1040     Updated: 11/27/24 1044    Narrative:      XR CHEST 1 VW-11/27/2024     HISTORY: Shortness of breath.     Heart size is mild to moderately enlarged. There is some soft tissue  attenuation of bilateral lung markings with some mild vascular  congestion. No focal infiltrates are seen.     Cardiac pacemaker is seen in good position. Cardioversion type devices  overlie the left hemithorax.       Impression:      1. Cardiomegaly with mild vascular congestion.        This report was finalized on 11/27/2024 10:41 AM by Dr. Ken Chin M.D on Workstation: [id number]               Results for orders placed during the hospital encounter of 03/15/24    Adult Transesophageal Echo (LILA) W/ Cont if Necessary Per Protocol    Interpretation Summary    Left ventricular systolic function is normal. Left ventricular ejection fraction appears to be 51 - 55%.    The right ventricular cavity is mildly dilated but normal right ventricular systolic function.    There is mild biatrial enlargement.    Saline test results are negative for right to left atrial level shunt.    Moderate tricuspid valve regurgitation is present.Estimated right ventricular systolic pressure from tricuspid regurgitation is moderately elevated (45-55 mmHg).Moderate pulmonary hypertension is present.      I have reviewed the medications:  Scheduled Meds:amiodarone, 200 mg, Oral, Q12H  apixaban, 5 mg, Oral, Q12H  atorvastatin, 40 mg, Oral, Nightly  budesonide-formoterol, 2 puff,  Inhalation, BID  busPIRone, 10 mg, Oral, Q12H  cetirizine, 10 mg, Oral, Daily  finasteride, 5 mg, Oral, Daily  furosemide, 80 mg, Intravenous, BID  melatonin, 5 mg, Oral, Nightly  mirtazapine, 30 mg, Oral, Nightly  pantoprazole, 40 mg, Oral, Q AM  sodium chloride, 3 mL, Intravenous, Q12H  temazepam, 15 mg, Oral, Nightly      Continuous Infusions:   PRN Meds:.  acetaminophen **OR** acetaminophen **OR** acetaminophen    albuterol    senna-docusate sodium **AND** polyethylene glycol **AND** bisacodyl **AND** bisacodyl    Calcium Replacement - Follow Nurse / BPA Driven Protocol    famotidine    Magnesium Low Dose Replacement - Follow Nurse / BPA Driven Protocol    nitroglycerin    ondansetron ODT **OR** ondansetron    Phosphorus Replacement - Follow Nurse / BPA Driven Protocol    Potassium Replacement - Follow Nurse / BPA Driven Protocol    Potassium Replacement - Follow Nurse / BPA Driven Protocol    [COMPLETED] Insert Peripheral IV **AND** sodium chloride    sodium chloride    sodium chloride    Assessment & Plan   Assessment & Plan     Active Hospital Problems    Diagnosis  POA    **Paroxysmal ventricular fibrillation [I49.01]  Yes    Digitalis toxicity [T46.0X1A]  Yes    ICD (implantable cardioverter-defibrillator) discharge [Z45.02]  Not Applicable    Ventricular tachycardia (paroxysmal) [I47.29]  Yes    Morbid obesity [E66.01]  Yes    Diabetic polyneuropathy associated with type 2 diabetes mellitus [E11.42]  Yes    Hypokalemia [E87.6]  Yes    ANDIE (acute kidney injury) [N17.9]  Yes    Acute on chronic diastolic CHF (congestive heart failure) [I50.33]  Yes    High cholesterol [E78.00]  Yes    Essential hypertension [I10]  Yes    Anxiety and depression [F41.9, F32.A]  Yes    Primary insomnia [F51.01]  Yes    ICD (implantable cardioverter-defibrillator) in place [Z95.810]  Yes    Constipation [K59.00]  Yes    Atrial flutter [I48.92]  Yes    COPD (chronic obstructive pulmonary disease) [J44.9]  Yes    Anemia [D64.9]   Yes      Resolved Hospital Problems   No resolved problems to display.        Brief Hospital Course to date:  Sebastien Tang is a 64 y.o. male presents the hospital after multiple ICD discharges in the setting of ventricular tachycardia and ventricular fibrillation.     Discussions for today:  No further ICD discharge.  Continue current amiodarone drip.  Discussed with cardiology team who recommended to hold atenolol for now.  Digoxin is on hold due to elevated level.  Recheck digoxin level tomorrow.  Careful telemetry monitoring.  Renal function slightly improved.  Continue to trend.  Transaminitis improved.  Suspect hepatic congestion due to ventricular fibrillation.  Careful diuresis for CHF.  Replete potassium further today.  Correction scale insulin ordered today.  Insomnia improved with current medication.  Otherwise per below.    ICD discharges, ventricular tachycardia and ventricular fibrillation, PAF:  Consulted EP.  Initially placed on amiodarone drip.  Telemetry monitor.  Atenolol and digoxin initially held.  Continue anticoagulation      Elevated digoxin level: Hold digoxin.  Trend level.  Defer to cardiology whether they feel this medication eventually will need to be restarted.     Acute kidney injury: Likely prerenal due to hypotension related to ventricular tachycardia.  Allow for more elevated blood pressure.  Possible cardiorenal.     Acute diastolic CHF: IV diuresis.  Cardiology for assistance.  Previous echocardiogram reviewed.     Hypokalemia: Replete potassium level.     Diabetes with polyneuropathy: Monitor glucose and adjust insulin as needed.     Insomnia: Continue Remeron, scheduled melatonin, add low-dose temazepam.  Patient very frustrated with his issues with insomnia.     BPH: Finasteride.  Monitor for any sign of urine retention.     Anxiety: Continue BuSpar.  Stable.     Hyperlipidemia: Continue statin.  Stable.     COPD: Monitor respiratory status.  Currently stable.  Symbicort.  As  needed nebulizers.  Inhalers     GERD: PPI.  Stable.     Physical debility: PT OT     Treatment plan discussed with patient is in agreement     DVT prophylaxis:   Anticoagulated    Disposition: Likely home when improved    CODE STATUS:   Code Status and Medical Interventions: CPR (Attempt to Resuscitate); Full Support   Ordered at: 11/27/24 1226     Level Of Support Discussed With:    Patient     Code Status (Patient has no pulse and is not breathing):    CPR (Attempt to Resuscitate)     Medical Interventions (Patient has pulse or is breathing):    Full Support       Tato Parks MD  11/28/24

## 2024-11-29 LAB
ALBUMIN SERPL-MCNC: 3.9 G/DL (ref 3.5–5.2)
ALBUMIN/GLOB SERPL: 1.3 G/DL
ALP SERPL-CCNC: 96 U/L (ref 39–117)
ALT SERPL W P-5'-P-CCNC: 50 U/L (ref 1–41)
ANION GAP SERPL CALCULATED.3IONS-SCNC: 9 MMOL/L (ref 5–15)
AST SERPL-CCNC: 57 U/L (ref 1–40)
B PARAPERT DNA SPEC QL NAA+PROBE: NOT DETECTED
B PERT DNA SPEC QL NAA+PROBE: NOT DETECTED
BILIRUB SERPL-MCNC: 0.7 MG/DL (ref 0–1.2)
BUN SERPL-MCNC: 25 MG/DL (ref 8–23)
BUN/CREAT SERPL: 14.9 (ref 7–25)
C PNEUM DNA NPH QL NAA+NON-PROBE: NOT DETECTED
CALCIUM SPEC-SCNC: 9.9 MG/DL (ref 8.6–10.5)
CHLORIDE SERPL-SCNC: 96 MMOL/L (ref 98–107)
CO2 SERPL-SCNC: 31 MMOL/L (ref 22–29)
CREAT SERPL-MCNC: 1.68 MG/DL (ref 0.76–1.27)
DEPRECATED RDW RBC AUTO: 48.4 FL (ref 37–54)
DIGOXIN SERPL-MCNC: 1.8 NG/ML (ref 0.6–1.2)
EGFRCR SERPLBLD CKD-EPI 2021: 44.5 ML/MIN/1.73
ERYTHROCYTE [DISTWIDTH] IN BLOOD BY AUTOMATED COUNT: 17.4 % (ref 12.3–15.4)
FLUAV SUBTYP SPEC NAA+PROBE: NOT DETECTED
FLUBV RNA ISLT QL NAA+PROBE: NOT DETECTED
GLOBULIN UR ELPH-MCNC: 2.9 GM/DL
GLUCOSE BLDC GLUCOMTR-MCNC: 136 MG/DL (ref 70–130)
GLUCOSE BLDC GLUCOMTR-MCNC: 136 MG/DL (ref 70–130)
GLUCOSE BLDC GLUCOMTR-MCNC: 183 MG/DL (ref 70–130)
GLUCOSE BLDC GLUCOMTR-MCNC: 215 MG/DL (ref 70–130)
GLUCOSE SERPL-MCNC: 123 MG/DL (ref 65–99)
HADV DNA SPEC NAA+PROBE: NOT DETECTED
HCOV 229E RNA SPEC QL NAA+PROBE: NOT DETECTED
HCOV HKU1 RNA SPEC QL NAA+PROBE: NOT DETECTED
HCOV NL63 RNA SPEC QL NAA+PROBE: NOT DETECTED
HCOV OC43 RNA SPEC QL NAA+PROBE: NOT DETECTED
HCT VFR BLD AUTO: 39.4 % (ref 37.5–51)
HGB BLD-MCNC: 11.8 G/DL (ref 13–17.7)
HMPV RNA NPH QL NAA+NON-PROBE: NOT DETECTED
HPIV1 RNA ISLT QL NAA+PROBE: NOT DETECTED
HPIV2 RNA SPEC QL NAA+PROBE: NOT DETECTED
HPIV3 RNA NPH QL NAA+PROBE: NOT DETECTED
HPIV4 P GENE NPH QL NAA+PROBE: NOT DETECTED
M PNEUMO IGG SER IA-ACNC: NOT DETECTED
MCH RBC QN AUTO: 23.4 PG (ref 26.6–33)
MCHC RBC AUTO-ENTMCNC: 29.9 G/DL (ref 31.5–35.7)
MCV RBC AUTO: 78 FL (ref 79–97)
PLATELET # BLD AUTO: 253 10*3/MM3 (ref 140–450)
PMV BLD AUTO: 9.1 FL (ref 6–12)
POTASSIUM SERPL-SCNC: 3.8 MMOL/L (ref 3.5–5.2)
PROT SERPL-MCNC: 6.8 G/DL (ref 6–8.5)
RBC # BLD AUTO: 5.05 10*6/MM3 (ref 4.14–5.8)
RHINOVIRUS RNA SPEC NAA+PROBE: NOT DETECTED
RSV RNA NPH QL NAA+NON-PROBE: NOT DETECTED
SARS-COV-2 RNA NPH QL NAA+NON-PROBE: NOT DETECTED
SODIUM SERPL-SCNC: 136 MMOL/L (ref 136–145)
WBC NRBC COR # BLD AUTO: 9.66 10*3/MM3 (ref 3.4–10.8)

## 2024-11-29 PROCEDURE — 36415 COLL VENOUS BLD VENIPUNCTURE: CPT | Performed by: INTERNAL MEDICINE

## 2024-11-29 PROCEDURE — 94799 UNLISTED PULMONARY SVC/PX: CPT

## 2024-11-29 PROCEDURE — 63710000001 INSULIN LISPRO (HUMAN) PER 5 UNITS: Performed by: INTERNAL MEDICINE

## 2024-11-29 PROCEDURE — 80053 COMPREHEN METABOLIC PANEL: CPT | Performed by: INTERNAL MEDICINE

## 2024-11-29 PROCEDURE — 94761 N-INVAS EAR/PLS OXIMETRY MLT: CPT

## 2024-11-29 PROCEDURE — 94664 DEMO&/EVAL PT USE INHALER: CPT

## 2024-11-29 PROCEDURE — 97535 SELF CARE MNGMENT TRAINING: CPT

## 2024-11-29 PROCEDURE — 97166 OT EVAL MOD COMPLEX 45 MIN: CPT

## 2024-11-29 PROCEDURE — 82948 REAGENT STRIP/BLOOD GLUCOSE: CPT

## 2024-11-29 PROCEDURE — 0202U NFCT DS 22 TRGT SARS-COV-2: CPT | Performed by: INTERNAL MEDICINE

## 2024-11-29 PROCEDURE — 80162 ASSAY OF DIGOXIN TOTAL: CPT | Performed by: INTERNAL MEDICINE

## 2024-11-29 PROCEDURE — 25010000002 FUROSEMIDE PER 20 MG: Performed by: INTERNAL MEDICINE

## 2024-11-29 PROCEDURE — 85027 COMPLETE CBC AUTOMATED: CPT | Performed by: INTERNAL MEDICINE

## 2024-11-29 PROCEDURE — 94760 N-INVAS EAR/PLS OXIMETRY 1: CPT

## 2024-11-29 RX ORDER — INSULIN LISPRO 100 [IU]/ML
2 INJECTION, SOLUTION INTRAVENOUS; SUBCUTANEOUS
Status: DISCONTINUED | OUTPATIENT
Start: 2024-11-29 | End: 2024-12-01

## 2024-11-29 RX ORDER — POTASSIUM CHLORIDE 750 MG/1
40 TABLET, FILM COATED, EXTENDED RELEASE ORAL
Status: COMPLETED | OUTPATIENT
Start: 2024-11-29 | End: 2024-11-29

## 2024-11-29 RX ADMIN — INSULIN LISPRO 2 UNITS: 100 INJECTION, SOLUTION INTRAVENOUS; SUBCUTANEOUS at 21:21

## 2024-11-29 RX ADMIN — MIRTAZAPINE 30 MG: 15 TABLET, FILM COATED ORAL at 21:21

## 2024-11-29 RX ADMIN — INSULIN LISPRO 2 UNITS: 100 INJECTION, SOLUTION INTRAVENOUS; SUBCUTANEOUS at 17:29

## 2024-11-29 RX ADMIN — BUDESONIDE AND FORMOTEROL FUMARATE DIHYDRATE 2 PUFF: 160; 4.5 AEROSOL RESPIRATORY (INHALATION) at 20:28

## 2024-11-29 RX ADMIN — SENNOSIDES AND DOCUSATE SODIUM 2 TABLET: 50; 8.6 TABLET ORAL at 21:49

## 2024-11-29 RX ADMIN — APIXABAN 5 MG: 5 TABLET, FILM COATED ORAL at 04:27

## 2024-11-29 RX ADMIN — FUROSEMIDE 40 MG: 10 INJECTION, SOLUTION INTRAMUSCULAR; INTRAVENOUS at 17:30

## 2024-11-29 RX ADMIN — ATORVASTATIN CALCIUM 40 MG: 20 TABLET, FILM COATED ORAL at 21:21

## 2024-11-29 RX ADMIN — ACETAMINOPHEN 325MG 650 MG: 325 TABLET ORAL at 17:33

## 2024-11-29 RX ADMIN — FUROSEMIDE 40 MG: 10 INJECTION, SOLUTION INTRAMUSCULAR; INTRAVENOUS at 08:53

## 2024-11-29 RX ADMIN — AMIODARONE HYDROCHLORIDE 200 MG: 200 TABLET ORAL at 08:53

## 2024-11-29 RX ADMIN — AMIODARONE HYDROCHLORIDE 200 MG: 200 TABLET ORAL at 21:21

## 2024-11-29 RX ADMIN — CETIRIZINE HYDROCHLORIDE 10 MG: 10 TABLET, FILM COATED ORAL at 08:53

## 2024-11-29 RX ADMIN — APIXABAN 5 MG: 5 TABLET, FILM COATED ORAL at 14:55

## 2024-11-29 RX ADMIN — Medication 3 ML: at 21:49

## 2024-11-29 RX ADMIN — INSULIN LISPRO 4 UNITS: 100 INJECTION, SOLUTION INTRAVENOUS; SUBCUTANEOUS at 11:33

## 2024-11-29 RX ADMIN — POTASSIUM CHLORIDE 40 MEQ: 750 TABLET, EXTENDED RELEASE ORAL at 08:53

## 2024-11-29 RX ADMIN — TEMAZEPAM 15 MG: 15 CAPSULE ORAL at 21:21

## 2024-11-29 RX ADMIN — FINASTERIDE 5 MG: 5 TABLET, FILM COATED ORAL at 08:53

## 2024-11-29 RX ADMIN — Medication 3 ML: at 09:09

## 2024-11-29 RX ADMIN — INSULIN LISPRO 2 UNITS: 100 INJECTION, SOLUTION INTRAVENOUS; SUBCUTANEOUS at 11:34

## 2024-11-29 RX ADMIN — POTASSIUM CHLORIDE 40 MEQ: 750 TABLET, EXTENDED RELEASE ORAL at 09:09

## 2024-11-29 RX ADMIN — PANTOPRAZOLE SODIUM 40 MG: 40 TABLET, DELAYED RELEASE ORAL at 04:27

## 2024-11-29 RX ADMIN — BUSPIRONE HYDROCHLORIDE 10 MG: 5 TABLET ORAL at 08:53

## 2024-11-29 RX ADMIN — BUDESONIDE AND FORMOTEROL FUMARATE DIHYDRATE 2 PUFF: 160; 4.5 AEROSOL RESPIRATORY (INHALATION) at 07:44

## 2024-11-29 NOTE — PLAN OF CARE
Goal Outcome Evaluation:  Plan of Care Reviewed With: patient           Outcome Evaluation: Pt admit from home with acute discharge of AICD.  Pt lives at home alone and reports does ADL on own. Pt presents sitting EOB, has may complaints including being on diuretic, difficulty using urinal.  OT provides assist only to set up lines and pt walks to BR to stand at toilet to urinate, then wash up and change pants.  Increase time with all tasks as OT  provides prompting and redirection during tasks.  OT ed pt to call for assist to get up due to lines connected to rolling monitor and now on O2.  Notify RN that sats 96% with activity.   Will follow for skilled OT for progression of activity tolerance.  Pt plans to return home.    Anticipated Discharge Disposition (OT): home with home health (per CCP notes plan d/c  home with HH.)

## 2024-11-29 NOTE — PROGRESS NOTES
LOS: 2 days   Patient Care Team:  Yuliana Flynn DO as PCP - General (Family Medicine)    Chief Complaint:  ICD shock, atrial fibrillation    Interval History:     He has been stable, no critical arrhythmias    He continues in sinus rhythm, with occasional to frequent ventricular ectopy    He says he feels a little confused, but this improved after he has been awake a few minutes.  He is alert time place and situation      Objective   Vital Signs  Temp:  [97.5 °F (36.4 °C)-97.8 °F (36.6 °C)] 97.8 °F (36.6 °C)  Heart Rate:  [62-88] 76  Resp:  [18-22] 18  BP: (133-160)/(67-99) 135/99    Intake/Output Summary (Last 24 hours) at 11/29/2024 1053  Last data filed at 11/29/2024 0900  Gross per 24 hour   Intake 360 ml   Output 3850 ml   Net -3490 ml       No acute distress, lying in hospital bed, sleeping, without oxygen  He awakens to tactile stimulation  Initially confused, but improves after being awake for a few minutes  His respirations are even, and he is able to carry out conversation  His heart rate is irregular, there is occasional ventricular ectopy  Abdomen is soft  There is no peripheral edema  His lungs sound clear    Results Review:      Results from last 7 days   Lab Units 11/29/24  0242 11/28/24  0545 11/27/24 1951 11/27/24  1011   SODIUM mmol/L 136 134*  --  132*   POTASSIUM mmol/L 3.8 3.8  3.8 3.3* 2.8*   CHLORIDE mmol/L 96* 92*  --  86*   CO2 mmol/L 31.0* 28.0  --  32.1*   BUN mg/dL 25* 28*  --  35*   CREATININE mg/dL 1.68* 1.94*  --  2.01*   GLUCOSE mg/dL 123* 161*  --  147*   CALCIUM mg/dL 9.9 9.8  --  10.0     Results from last 7 days   Lab Units 11/27/24  1259 11/27/24  1011   HSTROP T ng/L 137* 146*     Results from last 7 days   Lab Units 11/29/24  0242 11/28/24  1930 11/28/24  0545   WBC 10*3/mm3 9.66 10.10 10.76   HEMOGLOBIN g/dL 11.8* 12.0* 11.7*   HEMATOCRIT % 39.4 40.5 39.3   PLATELETS 10*3/mm3 253 275 277             Results from last 7 days   Lab Units 11/27/24 1951   MAGNESIUM mg/dL  2.8*           I reviewed the patient's new clinical results.  I personally viewed and interpreted the patient's EKG/Telemetry data        Medication Review:   amiodarone, 200 mg, Oral, Q12H  apixaban, 5 mg, Oral, Q12H  atorvastatin, 40 mg, Oral, Nightly  budesonide-formoterol, 2 puff, Inhalation, BID  busPIRone, 10 mg, Oral, Q12H  cetirizine, 10 mg, Oral, Daily  finasteride, 5 mg, Oral, Daily  furosemide, 40 mg, Intravenous, BID  insulin lispro, 2 Units, Subcutaneous, TID With Meals  insulin lispro, 2-9 Units, Subcutaneous, 4x Daily AC & at Bedtime  melatonin, 5 mg, Oral, Nightly  mirtazapine, 30 mg, Oral, Nightly  pantoprazole, 40 mg, Oral, Q AM  sodium chloride, 3 mL, Intravenous, Q12H  temazepam, 15 mg, Oral, Nightly             Assessment & Plan     1.  ICD discharge  2.  Ventricular fibrillation  3.  Permanent atrial fibrillation  4.  Coronary artery disease status post previous sudden cardiac death  5.  Acute kidney injury    Stable, no further arrhythmic events or ICD therapies.    I will continue IV diuresis for at least another day.    I would like him to be observed over the weekend, I may consider coronary angiography as his kidney function improves.  Also he cardioverted during ICD therapy, so now he is in sinus bradycardia.  If this is going to continue may also have to consider upgrade to dual-chamber ICD for atrial pacing.    He should remain on Eliquis, because he did cardiovert, which also poses a barrier to these issues        Nik Rosas MD  11/29/24  10:53 EST

## 2024-11-29 NOTE — PLAN OF CARE
Goal Outcome Evaluation:  Plan of Care Reviewed With: patient        Progress: no change  Outcome Evaluation: Pt remains on 2L NC while sleeping. C/o nausea, medicated with PRN Zofran. No BM this shift. Hgb stable. HR down in the 50's at times and PVCs. No c/o pain.

## 2024-11-29 NOTE — DISCHARGE PLACEMENT REQUEST
"Sebastien Prabhakar (64 y.o. Male)       Date of Birth   1960    Social Security Number       Address   [address] [place] [address] [id number]    Home Phone   [phone]    MRN   [id number]       Anabaptism   None    Marital Status   Single                            Admission Date   11/27/24    Admission Type   Emergency    Admitting Provider   Tato Parks MD    Attending Provider   Tato Parks MD    Department, Room/Bed   [address], [address]/1       Discharge Date       Discharge Disposition       Discharge Destination                                 Attending Provider: Tato Parks MD    Allergies: No Known Allergies    Isolation: None   Infection: MRSA/History Only (04/04/21)   Code Status: CPR    Ht: 170.2 cm (67\")   Wt: 113 kg (249 lb 3.2 oz)    Admission Cmt: None   Principal Problem: Paroxysmal ventricular fibrillation [I49.01]                   Active Insurance as of 11/27/2024       Primary Coverage       Payor Plan Insurance Group Employer/Plan Group    HUMANA MEDICARE REPLACEMENT HUMANA MED ADV SNP HMO [id number]       Payor Plan Address Payor Plan Phone Number Payor Plan Fax Number Effective Dates    [address] [address] [phone]  11/1/2024 - None Entered    [facility] [id number]         Subscriber Name Subscriber Birth Date Member ID       SEBASTIEN PRABHAKAR 1960 [id number]                     Emergency Contacts        (Rel.) Home Phone Work Phone Mobile Phone    Zara Garcia (Daughter) [phone] -- [phone]    Reyna Álvarez (Sister) -- -- [phone]                "

## 2024-11-29 NOTE — THERAPY EVALUATION
Patient Name: Sebastien Tang  : 1960    MRN: [id number]                              Today's Date: 2024       Admit Date: 2024    Visit Dx:     ICD-10-CM ICD-9-CM   1. Ventricular fibrillation, paroxysmal  I49.01 427.41   2. Defibrillator discharge  Z45.02 V71.89   3. Hypokalemia  E87.6 276.8   4. Renal insufficiency  N28.9 593.9   5. Mild chronic anemia  D64.9 285.9   6. Acute congestive heart failure, unspecified heart failure type  I50.9 428.0   7. Anticoagulated by anticoagulation treatment  Z79.01 V58.61     Patient Active Problem List   Diagnosis    Cardiac arrest    Atrial flutter    Tobacco abuse    Azotemia    COPD (chronic obstructive pulmonary disease)    MRSA nasal colonization    Transaminitis    Obesity (BMI 30-39.9)    Anemia    Constipation    Acute congestive heart failure    PSA elevation    Wellness examination    High risk medication use    Abnormal CXR    Abdominal aneurysm    Iliac aneurysm    Coronary artery disease involving coronary bypass graft of native heart without angina pectoris    Atrial fibrillation, persistent    ICD (implantable cardioverter-defibrillator) in place    Anxiety and depression    Shortness of breath    Primary insomnia    Close exposure to COVID-19 virus    Iliac artery stenosis, right    Dysuria    Gross hematuria    Essential hypertension    High cholesterol    Screening PSA (prostate specific antigen)    Anxiety    Neuropathy    Heart failure    Iron deficiency anemia    Diabetes mellitus, type 2    Hypoxemia    Acute on chronic systolic CHF (congestive heart failure)    Acute on chronic diastolic CHF (congestive heart failure)    Depression    Adjustment disorder    Paroxysmal ventricular fibrillation    Digitalis toxicity    ICD (implantable cardioverter-defibrillator) discharge    Ventricular tachycardia (paroxysmal)    Morbid obesity    Diabetic polyneuropathy associated with type 2 diabetes mellitus    Hypokalemia    ANDIE (acute kidney  injury)     Past Medical History:   Diagnosis Date    Acidosis     mixed resp/metabolic acidosis    Acute congestive heart failure     Acute respiratory failure     hypoxic    Anemia     Arthritis     Asthma     Atrial flutter     Azotemia     Cardiac arrest 04/2021    Chronic coronary artery disease     Constipation     COPD (chronic obstructive pulmonary disease)     Depression     Enlarged prostate     Hypertension     Hypokalemia     severe    ICD (implantable cardioverter-defibrillator) in place     Ischemic cardiomyopathy     MRSA nasal colonization     Obesity (BMI 30-39.9)     Orthopnea     PAF (paroxysmal atrial fibrillation)     Persistent atrial fibrillation     Pulmonary edema     Tobacco abuse     Transaminitis     Trouble in sleeping     Ventricular fibrillation      Past Surgical History:   Procedure Laterality Date    CARDIAC CATHETERIZATION Left 4/1/2021    Procedure: Coronary Angiography;  Surgeon: Anna Puga MD;  Location:  [facility];  Service: Cardiology;  Laterality: Left;    CARDIAC CATHETERIZATION N/A 4/1/2021    Procedure: Left ventriculography;  Surgeon: Anna Puga MD;  Location:  [facility];  Service: Cardiology;  Laterality: N/A;    CARDIAC CATHETERIZATION N/A 4/1/2021    Procedure: Left Heart Cath;  Surgeon: Anna Puga MD;  Location:  [facility];  Service: Cardiology;  Laterality: N/A;    CARDIAC ELECTROPHYSIOLOGY PROCEDURE N/A 4/7/2021    Procedure: IMPLANTABLE CARDIOVERTER DEFIBRILLATOR- SC BIOTRONIK;  Surgeon: Nik Rosas MD;  Location:  [facility];  Service: Cardiovascular;  Laterality: N/A;    CARDIOVERSION  05/19/2021    Dr. Rosas    CARDIOVERSION  07/26/2021    Dr. Rosas    TONSILLECTOMY        General Information       Row Name 11/29/24 5816          OT Time and Intention    Subjective Information complains of  upset about spill while trying to use urinal at EOB.  -LE     Document  Type evaluation;therapy note (daily note)  -LE     Mode of Treatment individual therapy;occupational therapy  -LE     Patient Effort adequate  -LE       Row Name 11/29/24 1325          General Information    Patient Profile Reviewed yes  -LE     Prior Level of Function independent:;transfer;ADL's  -LE     Existing Precautions/Restrictions fall;oxygen therapy device and L/min  -LE     Barriers to Rehab medically complex  -LE       Row Name 11/29/24 1325          Living Environment    People in Home alone  -LE       Row Name 11/29/24 1325          Cognition    Orientation Status (Cognition) oriented to  -LE       Row Name 11/29/24 1325          Safety Issues/Impairments Affecting Functional Mobility    Safety Issues Affecting Function (Mobility) judgment  -LE     Comment, Safety Issues/Impairments (Mobility) non skid socks and gait belt worn.  -LE               User Key  (r) = Recorded By, (t) = Taken By, (c) = Cosigned By      Initials Name Provider Type    Lilly Coppola OTR Occupational Therapist                     Mobility/ADL's       Row Name 11/29/24 1326          Bed Mobility    Bed Mobility supine-sit;sit-supine;scooting/bridging  -LE     Comment, (Bed Mobility) sitting EOB when enter and returns to sitting EOB at end.  RN aware and OK with no alarm on bed.  -LE       Row Name 11/29/24 1326          Transfers    Transfers sit-stand transfer;stand-sit transfer;bed-chair transfer;toilet transfer  -LE       Row Name 11/29/24 1326          Sit-Stand Transfer    Sit-Stand Cross (Transfers) set up  -LE       Row Name 11/29/24 1326          Stand-Sit Transfer    Stand-Sit Cross (Transfers) set up  -LE       Row Name 11/29/24 1326          Toilet Transfer    Cross Level (Toilet Transfer) set up  -LE     Assistive Device (Toilet Transfer) grab bars/safety frame  -LE     Comment, (Toilet Transfer) stood at toilet to urinate and then sat on toilet to change pants.  -LE       Row Name 11/29/24 1326  "         Functional Mobility    Functional Mobility- Ind. Level set up required  -LE     Functional Mobility- Comment initially said he wants a 4 wheel walker for home.  OT has front wheeled walker for a simulation and pt does not really answer when asked directly if he feels he needs at walker at home.  -LE       Row Name 11/29/24 1326          Activities of Daily Living    BADL Assessment/Intervention toileting;feeding;grooming;lower body dressing;upper body dressing;bathing  -LE       Row Name 11/29/24 1326          Lower Body Dressing Assessment/Training    Reeves Level (Lower Body Dressing) don;socks;dependent (less than 25% patient effort)  -LE     Position (Lower Body Dressing) edge of bed sitting  -LE     Comment, (Lower Body Dressing) pt does not wear socks at baseline.   pt declines to put on slip on shoes from home \"I don't want to pee on them\".   OT [facility] issued non skid socks.  -LE       Row Name 11/29/24 1326          Grooming Assessment/Training    Comment, (Grooming) set up to wash  hands after toileting.  -       Row Name 11/29/24 1326          Toileting Assessment/Training    Reeves Level (Toileting) set up  -LE     Comment, (Toileting) pt stands at toilet with distant SBA.  stands for about 7 min stating \"It feels good to stand\".  when seated pt washes bottom before changes scrub pants.  -LE               User Key  (r) = Recorded By, (t) = Taken By, (c) = Cosigned By      Initials Name Provider Type    Lilly Coppola OTR Occupational Therapist                   Obj/Interventions       Row Name 11/29/24 1331          Range of Motion Comprehensive    General Range of Motion bilateral upper extremity ROM WFL  -LE       Row Name 11/29/24 1331          Balance    Balance Assessment sitting static balance;standing static balance;standing dynamic balance  -LE     Static Sitting Balance independent  -LE     Static Standing Balance independent  -LE     Dynamic Standing Balance " standby assist  -LE     Position/Device Used, Standing Balance walker, front-wheeled  -LE     Comment, Balance with and without walker.  -LE               User Key  (r) = Recorded By, (t) = Taken By, (c) = Cosigned By      Initials Name Provider Type    Lilly Coppola OTR Occupational Therapist                   Goals/Plan       Row Name 11/29/24 3175          Transfer Goal 1 (OT)    Activity/Assistive Device (Transfer Goal 1, OT) sit-to-stand/stand-to-sit;bed-to-chair/chair-to-bed;toilet;walker, rolling  consistently.  -LE     Leslie Level/Cues Needed (Transfer Goal 1, OT) independent;modified independence  -LE     Time Frame (Transfer Goal 1, OT) 2 weeks  -LE     Progress/Outcome (Transfer Goal 1, OT) goal ongoing  -LE       Row Name 11/29/24 4341          Bathing Goal 1 (OT)    Activity/Device (Bathing Goal 1, OT) bathing skills, all  -LE     Leslie Level/Cues Needed (Bathing Goal 1, OT) independent;modified independence  -LE     Time Frame (Bathing Goal 1, OT) 2 weeks  -LE     Progress/Outcomes (Bathing Goal 1, OT) goal ongoing  -LE       Row Name 11/29/24 5932          Dressing Goal 1 (OT)    Activity/Device (Dressing Goal 1, OT) upper body dressing;lower body dressing;dressing skills, all  -LE     Leslie/Cues Needed (Dressing Goal 1, OT) modified independence  including item retrieval.  -LE     Time Frame (Dressing Goal 1, OT) 2 weeks  -LE     Progress/Outcome (Dressing Goal 1, OT) goal ongoing  -LE       Row Name 11/29/24 8861          Problem Specific Goal 1 (OT)    Problem Specific Goal 1 (OT) Pt to demo mod I/Ind with dynamic standing balance activities.  -LE     Time Frame (Problem Specific Goal 1, OT) 2 weeks  -LE     Progress/Outcome (Problem Specific Goal 1, OT) goal ongoing  -LE       Row Name 11/29/24 4954          Therapy Assessment/Plan (OT)    Planned Therapy Interventions (OT) activity tolerance training;BADL retraining;functional balance retraining;patient/caregiver  education/training;transfer/mobility retraining  -LE               User Key  (r) = Recorded By, (t) = Taken By, (c) = Cosigned By      Initials Name Provider Type    Lilly Coppola, OTR Occupational Therapist                   Clinical Impression       Row Name 11/29/24 1331          Pain Assessment    Pretreatment Pain Rating 0/10 - no pain  -LE       Row Name 11/29/24 1331          Plan of Care Review    Plan of Care Reviewed With patient  -LE     Outcome Evaluation Pt admit from home with acute discharge of AICD.  Pt lives at home alone and reports does ADL on own. Pt presents sitting EOB, has may complaints including being on diuretic, difficulty using urinal.  OT provides assist only to set up lines and pt walks to BR to stand at toilet to urinate, then wash up and change pants.  Increase time with all tasks as OT  provides prompting and redirection during tasks.  OT ed pt to call for assist to get up due to lines connected to rolling monitor and now on O2.  Notify RN that sats 96% with activity.   Will follow for skilled OT for progression of activity tolerance.  Pt plans to return home.  -MARICARMEN       Row Name 11/29/24 1331          Therapy Assessment/Plan (OT)    Patient/Family Therapy Goal Statement (OT) Return to prior level of function.  -LE     Rehab Potential (OT) fair  -LE     Criteria for Skilled Therapeutic Interventions Met (OT) meets criteria;yes  -LE     Therapy Frequency (OT) 3 times/wk  -MARICARMEN       Row Name 11/29/24 1331          Therapy Plan Review/Discharge Plan (OT)    Equipment Needs Upon Discharge (OT) --  dificulty to get direct answer about pt wanting walker for home.  -LE     Anticipated Discharge Disposition (OT) home with home health  per CCP notes plan d/c  home with HH.  -MARICARMEN       Row Name 11/29/24 1331          Vital Signs    Pre SpO2 (%) 96  -LE     O2 Delivery Pre Treatment nasal cannula  -LE     Intra SpO2 (%) 96  -LE     O2 Delivery Intra Treatment nasal cannula  -LE     Post SpO2  (%) 96  -LE     O2 Delivery Post Treatment nasal cannula  -LE     Pre Patient Position Sitting  -LE     Intra Patient Position Standing  -LE     Post Patient Position Sitting  -LE       Row Name 11/29/24 1331          Positioning and Restraints    Pre-Treatment Position --  sit EOB, MD just saw pt.  -LE     Post Treatment Position bed  -LE     In Bed notified nsg;sitting EOB;call light within reach;encouraged to call for assist  -LE               User Key  (r) = Recorded By, (t) = Taken By, (c) = Cosigned By      Initials Name Provider Type    Lilly Coppola OTR Occupational Therapist                   Outcome Measures       Row Name 11/29/24 1336          How much help from another is currently needed...    Putting on and taking off regular lower body clothing? 3  -LE     Bathing (including washing, rinsing, and drying) 3  -LE     Toileting (which includes using toilet bed pan or urinal) 3  -LE     Putting on and taking off regular upper body clothing 3  -LE     Taking care of personal grooming (such as brushing teeth) 3  -LE     Eating meals 4  -LE     AM-PAC 6 Clicks Score (OT) 19  -LE       Row Name 11/29/24 0800          How much help from another person do you currently need...    Turning from your back to your side while in flat bed without using bedrails? 4  -KS     Moving from lying on back to sitting on the side of a flat bed without bedrails? 4  -KS     Moving to and from a bed to a chair (including a wheelchair)? 3  -KS     Standing up from a chair using your arms (e.g., wheelchair, bedside chair)? 4  -KS     Climbing 3-5 steps with a railing? 3  -KS     To walk in hospital room? 3  -KS     AM-PAC 6 Clicks Score (PT) 21  -KS     Highest Level of Mobility Goal 6 --> Walk 10 steps or more  -KS       Row Name 11/29/24 1336          Functional Assessment    Outcome Measure Options AM-PAC 6 Clicks Daily Activity (OT)  -LE               User Key  (r) = Recorded By, (t) = Taken By, (c) = Cosigned By       Initials Name Provider Type    Lilly Coppola OTR Occupational Therapist    KS Siegrist, Kaitlyn, RN Registered Nurse                    Occupational Therapy Education       Title: PT OT SLP Therapies (In Progress)       Topic: Occupational Therapy (Done)       Point: ADL training (Done)       Description:   Instruct learner(s) on proper safety adaptation and remediation techniques during self care or transfers.   Instruct in proper use of assistive devices.                  Learning Progress Summary            Patient Acceptance, E, Bed IU by MARICARMEN at 11/29/2024 1337    Comment: role of OT, plan of care, ed to call to get up due to lines.                      Point: Precautions (Done)       Description:   Instruct learner(s) on prescribed precautions during self-care and functional transfers.                  Learning Progress Summary            Patient Acceptance, E, Bed IU by MARICARMEN at 11/29/2024 1337    Comment: role of OT, plan of care, ed to call to get up due to lines.                                      User Key       Initials Effective Dates Name Provider Type Discipline    MARICARMEN 06/16/21 -  Lilly Corrigan OTR Occupational Therapist OT                  OT Recommendation and Plan  Planned Therapy Interventions (OT): activity tolerance training, BADL retraining, functional balance retraining, patient/caregiver education/training, transfer/mobility retraining  Therapy Frequency (OT): 3 times/wk  Plan of Care Review  Plan of Care Reviewed With: patient  Outcome Evaluation: Pt admit from home with acute discharge of AICD.  Pt lives at home alone and reports does ADL on own. Pt presents sitting EOB, has may complaints including being on diuretic, difficulty using urinal.  OT provides assist only to set up lines and pt walks to BR to stand at toilet to urinate, then wash up and change pants.  Increase time with all tasks as OT  provides prompting and redirection during tasks.  OT ed pt to call for assist to get up due to  lines connected to rolling monitor and now on O2.  Notify RN that sats 96% with activity.   Will follow for skilled OT for progression of activity tolerance.  Pt plans to return home.     Time Calculation:   Evaluation Complexity (OT)  Review Occupational Profile/Medical/Therapy History Complexity: expanded/moderate complexity  Assessment, Occupational Performance/Identification of Deficit Complexity: 3-5 performance deficits  Clinical Decision Making Complexity (OT): detailed assessment/moderate complexity  Overall Complexity of Evaluation (OT): moderate complexity     Time Calculation- OT       Row Name 11/29/24 1337             Time Calculation- OT    OT Start Time 0928  -LE      OT Stop Time 0952  -LE      OT Time Calculation (min) 24 min  -LE      Total Timed Code Minutes- OT 9 minute(s)  -LE      OT Received On 11/29/24  -LE      OT - Next Appointment 12/02/24  -LE      OT Goal Re-Cert Due Date 12/13/24  -LE         Timed Charges    98194 - OT Self Care/Mgmt Minutes 9  -LE         Total Minutes    Timed Charges Total Minutes 9  -LE       Total Minutes 9  -LE                User Key  (r) = Recorded By, (t) = Taken By, (c) = Cosigned By      Initials Name Provider Type    Lilly Coppola OTR Occupational Therapist                  Therapy Charges for Today       Code Description Service Date Service Provider Modifiers Qty    [id number] HC OT SELF CARE/MGMT/TRAIN EA 15 MIN 11/29/2024 Lilly Corrigan OTR GO 1    [id number] HC OT EVAL MOD COMPLEXITY 3 11/29/2024 Lilly Corrigan OTR GO 1                 HARITHA Buitrago  11/29/2024

## 2024-11-29 NOTE — CASE MANAGEMENT/SOCIAL WORK
Discharge Planning Assessment  [facility]     Patient Name: Sebastien Tang  MRN: [id number]  Today's Date: 11/29/2024    Admit Date: 11/27/2024    Plan: Home, family to transport   Discharge Needs Assessment       Row Name 11/29/24 1159       Living Environment    People in Home alone    Current Living Arrangements apartment    Potentially Unsafe Housing Conditions none    In the past 12 months has the electric, gas, oil, or water company threatened to shut off services in your home? No    Primary Care Provided by self    Provides Primary Care For no one    Family Caregiver if Needed child(yung), adult    Family Caregiver Names Zara Garcia [phone]    Quality of Family Relationships unable to assess    Able to Return to Prior Arrangements yes       Resource/Environmental Concerns    Resource/Environmental Concerns none    Transportation Concerns none       Transportation Needs    In the past 12 months, has lack of transportation kept you from medical appointments or from getting medications? no    In the past 12 months, has lack of transportation kept you from meetings, work, or from getting things needed for daily living? No       Food Insecurity    Within the past 12 months, you worried that your food would run out before you got the money to buy more. Never true    Within the past 12 months, the food you bought just didn't last and you didn't have money to get more. Never true       Transition Planning    Patient/Family Anticipates Transition to home    Patient/Family Anticipated Services at Transition     Transportation Anticipated family or friend will provide       Discharge Needs Assessment    Readmission Within the Last 30 Days no previous admission in last 30 days    Current Outpatient/Agency/Support Group homecare agency    Equipment Currently Used at Home none    Concerns to be Addressed no discharge needs identified;denies needs/concerns at this time    Do you want help finding or  "keeping work or a job? I do not need or want help    Do you want help with school or training? For example, starting or completing job training or getting a high school diploma, GED or equivalent No    Anticipated Changes Related to Illness none    Equipment Needed After Discharge none                   Discharge Plan       Row Name 11/29/24 9552       Plan    Plan Home, family to transport    Patient/Family in Agreement with Plan yes    Plan Comments Met with pt at bedside. Introduced self and explained role of . Face sheet verified, PCP is Yuliana Flynn. Pt denies any difficulty paying for medications, and he obtains his medications from [employer]. Pt lives alone, and stated that his family could assist with any care needs that may arise. Pt is independent in ADL's and he does not use, nor require any assistive devices. If pt requires home o2, he does not have a preferred oxygen provider. Pt has never had home health, but is requesting home health at discharge, and he does not have a preferred choice, \"just who ever will come\". Referrals placed in [facility]. Pt has ner been to rehab/SNF. Upon discharge, pt stated that he would have a family member transport home. Explained that CCP would follow to assess for discahrge needs.                  Continued Care and Services - Admitted Since 11/27/2024       Home Medical Care       Service Provider Request Status Services Address Phone Fax Patient Preferred    [facility] -- [address] [address] [phone] --                  Expected Discharge Date and Time       Expected Discharge Date Expected Discharge Time    Dec 2, 2024            Demographic Summary    No documentation.                  Functional Status    No documentation.                  Psychosocial    No documentation.                  Abuse/Neglect    No documentation.                  Legal    No " documentation.                  Substance Abuse    No documentation.                  Patient Forms    No documentation.                     Serena Thompson RN

## 2024-11-29 NOTE — PROGRESS NOTES
[facility] Medicine Services  PROGRESS NOTE    Patient Name: Sebastien Tang  : 1960  MRN: [id number]    Date of Admission: 2024  Primary Care Physician: Yuliana Flynn DO    Subjective   Subjective     CC:  Follow-up recent ventricular tachycardia, weakness and ICD shock    Subjective:   No further ICD discharges today.  Patient is still feeling somewhat weak from his episodes.  He notes a little achiness.  He also feels a little fearful from the ICD discharge the other day.  He is currently on 2 L nasal cannula.    Review of Systems  No current fevers or chills  No current shortness of breath or cough  No current nausea, vomiting, or diarrhea       Objective   Objective     Vital Signs:   Temp:  [97.5 °F (36.4 °C)-97.8 °F (36.6 °C)] 97.8 °F (36.6 °C)  Heart Rate:  [62-88] 76  Resp:  [18-22] 18  BP: (133-160)/(67-99) 135/99        Physical Exam:  Constitutional:Awake, alert, no acute distress  HENT: NCAT, mucous membranes moist, neck supple  Respiratory: No cough or wheezes, normal respirations, nonlabored breathing   Cardiovascular: Pulse rate is normal, palpable radial pulses  Gastrointestinal:  soft, nontender, nondistended  Musculoskeletal: Morbidly obese BMI is 40, improving lower extremity edema, somewhat debilitated in appearance but able to ambulate  Psychiatric: Appropriate affect, cooperative, conversational  Neurologic: No slurred speech or facial droop, follows commands  Skin: No rashes or jaundice, warm      Results Reviewed:  Results from last 7 days   Lab Units 24  0545   WBC 10*3/mm3 9.66 10.10 10.76   HEMOGLOBIN g/dL 11.8* 12.0* 11.7*   HEMATOCRIT % 39.4 40.5 39.3   PLATELETS 10*3/mm3 253 275 277     Results from last 7 days   Lab Units 24  0545 24  1259 24  1011   SODIUM mmol/L 136 134*  --   --  132*   POTASSIUM mmol/L 3.8 3.8  3.8 3.3*  --  2.8*   CHLORIDE mmol/L 96* 92*  --   --  86*   CO2  mmol/L 31.0* 28.0  --   --  32.1*   BUN mg/dL 25* 28*  --   --  35*   CREATININE mg/dL 1.68* 1.94*  --   --  2.01*   GLUCOSE mg/dL 123* 161*  --   --  147*   CALCIUM mg/dL 9.9 9.8  --   --  10.0   ALK PHOS U/L 96 92  --   --  91   ALT (SGPT) U/L 50* 57*  --   --  61*   AST (SGOT) U/L 57* 72*  --   --  89*   HSTROP T ng/L  --   --   --  137* 146*   PROBNP pg/mL  --   --   --   --  6,000.0*     Estimated Creatinine Clearance: 51.9 mL/min (A) (by C-G formula based on SCr of 1.68 mg/dL (H)).    Microbiology Results Abnormal       None            Imaging Results (Last 24 Hours)       ** No results found for the last 24 hours. **            Results for orders placed during the hospital encounter of 03/15/24    Adult Transesophageal Echo (LILA) W/ Cont if Necessary Per Protocol    Interpretation Summary    Left ventricular systolic function is normal. Left ventricular ejection fraction appears to be 51 - 55%.    The right ventricular cavity is mildly dilated but normal right ventricular systolic function.    There is mild biatrial enlargement.    Saline test results are negative for right to left atrial level shunt.    Moderate tricuspid valve regurgitation is present.Estimated right ventricular systolic pressure from tricuspid regurgitation is moderately elevated (45-55 mmHg).Moderate pulmonary hypertension is present.      I have reviewed the medications:  Scheduled Meds:amiodarone, 200 mg, Oral, Q12H  apixaban, 5 mg, Oral, Q12H  atorvastatin, 40 mg, Oral, Nightly  budesonide-formoterol, 2 puff, Inhalation, BID  busPIRone, 10 mg, Oral, Q12H  cetirizine, 10 mg, Oral, Daily  finasteride, 5 mg, Oral, Daily  furosemide, 40 mg, Intravenous, BID  insulin lispro, 2-9 Units, Subcutaneous, 4x Daily AC & at Bedtime  melatonin, 5 mg, Oral, Nightly  mirtazapine, 30 mg, Oral, Nightly  pantoprazole, 40 mg, Oral, Q AM  sodium chloride, 3 mL, Intravenous, Q12H  temazepam, 15 mg, Oral, Nightly      Continuous Infusions:   PRN Meds:.   acetaminophen **OR** acetaminophen **OR** acetaminophen    albuterol    senna-docusate sodium **AND** polyethylene glycol **AND** bisacodyl **AND** bisacodyl    Calcium Replacement - Follow Nurse / BPA Driven Protocol    dextrose    dextrose    famotidine    glucagon (human recombinant)    Magnesium Low Dose Replacement - Follow Nurse / BPA Driven Protocol    nitroglycerin    ondansetron ODT **OR** ondansetron    Phosphorus Replacement - Follow Nurse / BPA Driven Protocol    Potassium Replacement - Follow Nurse / BPA Driven Protocol    Potassium Replacement - Follow Nurse / BPA Driven Protocol    [COMPLETED] Insert Peripheral IV **AND** sodium chloride    sodium chloride    sodium chloride    Assessment & Plan   Assessment & Plan     Active Hospital Problems    Diagnosis  POA    **Paroxysmal ventricular fibrillation [I49.01]  Yes    Digitalis toxicity [T46.0X1A]  Yes    ICD (implantable cardioverter-defibrillator) discharge [Z45.02]  Not Applicable    Ventricular tachycardia (paroxysmal) [I47.29]  Yes    Morbid obesity [E66.01]  Yes    Diabetic polyneuropathy associated with type 2 diabetes mellitus [E11.42]  Yes    Hypokalemia [E87.6]  Yes    ANDIE (acute kidney injury) [N17.9]  Yes    Acute on chronic diastolic CHF (congestive heart failure) [I50.33]  Yes    High cholesterol [E78.00]  Yes    Essential hypertension [I10]  Yes    Anxiety and depression [F41.9, F32.A]  Yes    Primary insomnia [F51.01]  Yes    ICD (implantable cardioverter-defibrillator) in place [Z95.810]  Yes    Constipation [K59.00]  Yes    Atrial flutter [I48.92]  Yes    COPD (chronic obstructive pulmonary disease) [J44.9]  Yes    Anemia [D64.9]  Yes      Resolved Hospital Problems   No resolved problems to display.        Brief Hospital Course to date:  Sebastien Tang is a 64 y.o. male presents the hospital after multiple ICD discharges in the setting of ventricular tachycardia and ventricular fibrillation.     Discussions for today:  Digoxin level  is slightly higher today.  Likely related to ANDIE.  Renal function slightly improved.  IV diuresis with careful monitoring.  Additional potassium given this morning with diuresis.  Transaminitis improving, suspect hepatic congestion.  Anemia stable.  No sign of bleeding.  Continue insomnia medications as sleeping is improving.  Treatment plan discussed with patient is agreement.  Plan to follow-up further cardiology recommendations.  Wean supplemental oxygen if possible.  Treatment plan discussed with patient is in agreement.    ICD discharges, ventricular tachycardia and ventricular fibrillation, PAF:  Consulted EP.  Initially placed on amiodarone drip initially and switched to oral amiodarone.  Telemetry monitor.  Atenolol and digoxin initially held.  Continue anticoagulation      Elevated digoxin level: Hold digoxin.  Trend level.  Defer to cardiology whether they feel this medication eventually will need to be restarted.     Acute kidney injury: Likely prerenal due to hypotension related to ventricular tachycardia.  Allow for more elevated blood pressure.  Possible cardiorenal.     Acute diastolic CHF: IV diuresis.  Cardiology for assistance.  Previous echocardiogram reviewed.     Hypokalemia: Replete potassium level.     Diabetes with polyneuropathy: Monitor glucose and adjust insulin as needed.     Insomnia: Continue Remeron, scheduled melatonin, add low-dose temazepam.  Patient very frustrated with his issues with insomnia.     BPH: Finasteride.  Monitor for any sign of urine retention.     Anxiety: Continue BuSpar.  Stable.     Hyperlipidemia: Continue statin.  Stable.     COPD: Monitor respiratory status.  Currently stable.  Symbicort.  As needed nebulizers.  Inhalers     GERD: PPI.  Stable.     Physical debility: PT OT     Treatment plan discussed with patient is in agreement     DVT prophylaxis:   Anticoagulated    Disposition: Likely home when improved    CODE STATUS:   Code Status and Medical  Interventions: CPR (Attempt to Resuscitate); Full Support   Ordered at: 11/27/24 1226     Level Of Support Discussed With:    Patient     Code Status (Patient has no pulse and is not breathing):    CPR (Attempt to Resuscitate)     Medical Interventions (Patient has pulse or is breathing):    Full Support       Tato Parks MD  11/29/24

## 2024-11-29 NOTE — PLAN OF CARE
Goal Outcome Evaluation:              Outcome Evaluation: pt c/o of headache, tylenol admin. pt denies any chest pain or nausea. Pt very tired this shift. Worked with OT. Refused PT this shift. attempted to wean oxygen down while he was sleeping to 2L. Pt dropped down to 87-88%. turned back up to 3L and maintained in 96-97%. pt denies any SOA. IV lasix admin, UOP good. Pt expresses no needs at this time. call light within reach.

## 2024-11-29 NOTE — SIGNIFICANT NOTE
"   11/29/24 1441   OTHER   Discipline physical therapist   Rehab Time/Intention   Session Not Performed patient/family declined treatment  (pt sitting up on EOB this morning, declined PT stating he needed to \"rest\", stated \"maybe later\". Patient sleeping soundly when checked back this afternoon, did not awaken. PT will f/u on Monday, pt mobilizing well in room)   Recommendation   PT - Next Appointment 12/02/24       "

## 2024-11-30 LAB
ALBUMIN SERPL-MCNC: 3.7 G/DL (ref 3.5–5.2)
ALBUMIN/GLOB SERPL: 1.2 G/DL
ALP SERPL-CCNC: 95 U/L (ref 39–117)
ALT SERPL W P-5'-P-CCNC: 44 U/L (ref 1–41)
ANION GAP SERPL CALCULATED.3IONS-SCNC: 13.3 MMOL/L (ref 5–15)
AST SERPL-CCNC: 55 U/L (ref 1–40)
BILIRUB SERPL-MCNC: 0.9 MG/DL (ref 0–1.2)
BUN SERPL-MCNC: 20 MG/DL (ref 8–23)
BUN/CREAT SERPL: 13.9 (ref 7–25)
CALCIUM SPEC-SCNC: 9.3 MG/DL (ref 8.6–10.5)
CHLORIDE SERPL-SCNC: 94 MMOL/L (ref 98–107)
CO2 SERPL-SCNC: 27.7 MMOL/L (ref 22–29)
CREAT SERPL-MCNC: 1.44 MG/DL (ref 0.76–1.27)
DEPRECATED RDW RBC AUTO: 50.9 FL (ref 37–54)
DIGOXIN SERPL-MCNC: 1.1 NG/ML (ref 0.6–1.2)
EGFRCR SERPLBLD CKD-EPI 2021: 53.6 ML/MIN/1.73
ERYTHROCYTE [DISTWIDTH] IN BLOOD BY AUTOMATED COUNT: 17.7 % (ref 12.3–15.4)
GLOBULIN UR ELPH-MCNC: 3.2 GM/DL
GLUCOSE BLDC GLUCOMTR-MCNC: 143 MG/DL (ref 70–130)
GLUCOSE BLDC GLUCOMTR-MCNC: 146 MG/DL (ref 70–130)
GLUCOSE BLDC GLUCOMTR-MCNC: 150 MG/DL (ref 70–130)
GLUCOSE BLDC GLUCOMTR-MCNC: 231 MG/DL (ref 70–130)
GLUCOSE SERPL-MCNC: 138 MG/DL (ref 65–99)
HCT VFR BLD AUTO: 42.3 % (ref 37.5–51)
HGB BLD-MCNC: 12.2 G/DL (ref 13–17.7)
MAGNESIUM SERPL-MCNC: 2.3 MG/DL (ref 1.6–2.4)
MCH RBC QN AUTO: 23.2 PG (ref 26.6–33)
MCHC RBC AUTO-ENTMCNC: 28.8 G/DL (ref 31.5–35.7)
MCV RBC AUTO: 80.4 FL (ref 79–97)
PLATELET # BLD AUTO: 223 10*3/MM3 (ref 140–450)
PMV BLD AUTO: 9 FL (ref 6–12)
POTASSIUM SERPL-SCNC: 3.9 MMOL/L (ref 3.5–5.2)
PROT SERPL-MCNC: 6.9 G/DL (ref 6–8.5)
RBC # BLD AUTO: 5.26 10*6/MM3 (ref 4.14–5.8)
SODIUM SERPL-SCNC: 135 MMOL/L (ref 136–145)
WBC NRBC COR # BLD AUTO: 10.26 10*3/MM3 (ref 3.4–10.8)

## 2024-11-30 PROCEDURE — 63710000001 INSULIN LISPRO (HUMAN) PER 5 UNITS: Performed by: INTERNAL MEDICINE

## 2024-11-30 PROCEDURE — 83735 ASSAY OF MAGNESIUM: CPT | Performed by: NURSE PRACTITIONER

## 2024-11-30 PROCEDURE — 94799 UNLISTED PULMONARY SVC/PX: CPT

## 2024-11-30 PROCEDURE — 82948 REAGENT STRIP/BLOOD GLUCOSE: CPT

## 2024-11-30 PROCEDURE — 85027 COMPLETE CBC AUTOMATED: CPT | Performed by: INTERNAL MEDICINE

## 2024-11-30 PROCEDURE — 94664 DEMO&/EVAL PT USE INHALER: CPT

## 2024-11-30 PROCEDURE — 36415 COLL VENOUS BLD VENIPUNCTURE: CPT | Performed by: INTERNAL MEDICINE

## 2024-11-30 PROCEDURE — 94761 N-INVAS EAR/PLS OXIMETRY MLT: CPT

## 2024-11-30 PROCEDURE — 80162 ASSAY OF DIGOXIN TOTAL: CPT | Performed by: INTERNAL MEDICINE

## 2024-11-30 PROCEDURE — 80053 COMPREHEN METABOLIC PANEL: CPT | Performed by: INTERNAL MEDICINE

## 2024-11-30 PROCEDURE — 25010000002 FUROSEMIDE PER 20 MG: Performed by: INTERNAL MEDICINE

## 2024-11-30 RX ORDER — AMOXICILLIN 250 MG
2 CAPSULE ORAL 2 TIMES DAILY
Status: DISCONTINUED | OUTPATIENT
Start: 2024-11-30 | End: 2024-12-03 | Stop reason: HOSPADM

## 2024-11-30 RX ORDER — FUROSEMIDE 40 MG/1
80 TABLET ORAL DAILY
Status: DISCONTINUED | OUTPATIENT
Start: 2024-12-01 | End: 2024-12-03 | Stop reason: HOSPADM

## 2024-11-30 RX ORDER — POTASSIUM CHLORIDE 750 MG/1
40 TABLET, FILM COATED, EXTENDED RELEASE ORAL
Status: COMPLETED | OUTPATIENT
Start: 2024-11-30 | End: 2024-11-30

## 2024-11-30 RX ORDER — HYDROXYZINE HYDROCHLORIDE 25 MG/1
25 TABLET, FILM COATED ORAL ONCE
Status: DISCONTINUED | OUTPATIENT
Start: 2024-11-30 | End: 2024-12-03 | Stop reason: HOSPADM

## 2024-11-30 RX ORDER — METOPROLOL SUCCINATE 25 MG/1
25 TABLET, EXTENDED RELEASE ORAL
Status: DISCONTINUED | OUTPATIENT
Start: 2024-11-30 | End: 2024-12-02

## 2024-11-30 RX ORDER — FUROSEMIDE 10 MG/ML
40 INJECTION INTRAMUSCULAR; INTRAVENOUS
Status: COMPLETED | OUTPATIENT
Start: 2024-11-30 | End: 2024-11-30

## 2024-11-30 RX ORDER — POLYETHYLENE GLYCOL 3350 17 G/17G
17 POWDER, FOR SOLUTION ORAL DAILY PRN
Status: DISCONTINUED | OUTPATIENT
Start: 2024-11-30 | End: 2024-12-03 | Stop reason: HOSPADM

## 2024-11-30 RX ORDER — BISACODYL 5 MG/1
5 TABLET, DELAYED RELEASE ORAL DAILY PRN
Status: DISCONTINUED | OUTPATIENT
Start: 2024-11-30 | End: 2024-12-03 | Stop reason: HOSPADM

## 2024-11-30 RX ORDER — BISACODYL 10 MG
10 SUPPOSITORY, RECTAL RECTAL DAILY PRN
Status: DISCONTINUED | OUTPATIENT
Start: 2024-11-30 | End: 2024-12-03 | Stop reason: HOSPADM

## 2024-11-30 RX ADMIN — INSULIN LISPRO 4 UNITS: 100 INJECTION, SOLUTION INTRAVENOUS; SUBCUTANEOUS at 18:12

## 2024-11-30 RX ADMIN — POTASSIUM CHLORIDE 40 MEQ: 750 TABLET, EXTENDED RELEASE ORAL at 12:04

## 2024-11-30 RX ADMIN — Medication 3 ML: at 09:20

## 2024-11-30 RX ADMIN — APIXABAN 5 MG: 5 TABLET, FILM COATED ORAL at 04:42

## 2024-11-30 RX ADMIN — ATORVASTATIN CALCIUM 40 MG: 20 TABLET, FILM COATED ORAL at 20:58

## 2024-11-30 RX ADMIN — INSULIN LISPRO 2 UNITS: 100 INJECTION, SOLUTION INTRAVENOUS; SUBCUTANEOUS at 18:15

## 2024-11-30 RX ADMIN — APIXABAN 5 MG: 5 TABLET, FILM COATED ORAL at 18:13

## 2024-11-30 RX ADMIN — TEMAZEPAM 15 MG: 15 CAPSULE ORAL at 20:58

## 2024-11-30 RX ADMIN — AMIODARONE HYDROCHLORIDE 200 MG: 200 TABLET ORAL at 08:46

## 2024-11-30 RX ADMIN — Medication 5 MG: at 20:58

## 2024-11-30 RX ADMIN — INSULIN LISPRO 2 UNITS: 100 INJECTION, SOLUTION INTRAVENOUS; SUBCUTANEOUS at 12:05

## 2024-11-30 RX ADMIN — FUROSEMIDE 40 MG: 10 INJECTION, SOLUTION INTRAMUSCULAR; INTRAVENOUS at 08:46

## 2024-11-30 RX ADMIN — BUDESONIDE AND FORMOTEROL FUMARATE DIHYDRATE 2 PUFF: 160; 4.5 AEROSOL RESPIRATORY (INHALATION) at 07:54

## 2024-11-30 RX ADMIN — MIRTAZAPINE 30 MG: 15 TABLET, FILM COATED ORAL at 20:58

## 2024-11-30 RX ADMIN — INSULIN LISPRO 2 UNITS: 100 INJECTION, SOLUTION INTRAVENOUS; SUBCUTANEOUS at 08:46

## 2024-11-30 RX ADMIN — POLYETHYLENE GLYCOL 3350 17 G: 17 POWDER, FOR SOLUTION ORAL at 18:13

## 2024-11-30 RX ADMIN — FINASTERIDE 5 MG: 5 TABLET, FILM COATED ORAL at 08:46

## 2024-11-30 RX ADMIN — BUSPIRONE HYDROCHLORIDE 10 MG: 5 TABLET ORAL at 20:58

## 2024-11-30 RX ADMIN — ACETAMINOPHEN 325MG 650 MG: 325 TABLET ORAL at 08:46

## 2024-11-30 RX ADMIN — INSULIN LISPRO 2 UNITS: 100 INJECTION, SOLUTION INTRAVENOUS; SUBCUTANEOUS at 12:04

## 2024-11-30 RX ADMIN — METOPROLOL SUCCINATE 25 MG: 25 TABLET, EXTENDED RELEASE ORAL at 12:05

## 2024-11-30 RX ADMIN — CETIRIZINE HYDROCHLORIDE 10 MG: 10 TABLET, FILM COATED ORAL at 08:45

## 2024-11-30 RX ADMIN — FUROSEMIDE 40 MG: 10 INJECTION, SOLUTION INTRAMUSCULAR; INTRAVENOUS at 18:13

## 2024-11-30 RX ADMIN — BUSPIRONE HYDROCHLORIDE 10 MG: 5 TABLET ORAL at 00:35

## 2024-11-30 RX ADMIN — AMIODARONE HYDROCHLORIDE 200 MG: 200 TABLET ORAL at 20:58

## 2024-11-30 RX ADMIN — BUDESONIDE AND FORMOTEROL FUMARATE DIHYDRATE 2 PUFF: 160; 4.5 AEROSOL RESPIRATORY (INHALATION) at 20:44

## 2024-11-30 RX ADMIN — PANTOPRAZOLE SODIUM 40 MG: 40 TABLET, DELAYED RELEASE ORAL at 04:43

## 2024-11-30 RX ADMIN — BUSPIRONE HYDROCHLORIDE 10 MG: 5 TABLET ORAL at 08:45

## 2024-11-30 RX ADMIN — SENNOSIDES AND DOCUSATE SODIUM 2 TABLET: 50; 8.6 TABLET ORAL at 12:05

## 2024-11-30 RX ADMIN — POTASSIUM CHLORIDE 40 MEQ: 750 TABLET, EXTENDED RELEASE ORAL at 14:52

## 2024-11-30 RX ADMIN — Medication 3 ML: at 21:01

## 2024-11-30 NOTE — PLAN OF CARE
Goal Outcome Evaluation:  Plan of Care Reviewed With: patient        Progress: no change  Outcome Evaluation: Maintained on 2L N/C. Pt rested well during shift, appears fatigued. No soa or dyspnea noted. No stool, no s/s of bleeding noted. Safety maintained.

## 2024-11-30 NOTE — PROGRESS NOTES
[facility]  Cardiology Progress Note    Patient Identification:  Name: Sebastien Tang  Age: 64 y.o.  Sex: male  :  1960  MRN: [id number]                 Follow Up / Chief Complaint: ICD shock, atrial fibs    Interval History: This problem and patient is new to this provider.  On p.o. amiodarone.  Resting in bed.  No chest pain or shortness of breath.           Objective:    Past Medical History:  Past Medical History:   Diagnosis Date    Acidosis     mixed resp/metabolic acidosis    Acute congestive heart failure     Acute respiratory failure     hypoxic    Anemia     Arthritis     Asthma     Atrial flutter     Azotemia     Cardiac arrest 2021    Chronic coronary artery disease     Constipation     COPD (chronic obstructive pulmonary disease)     Depression     Enlarged prostate     Hypertension     Hypokalemia     severe    ICD (implantable cardioverter-defibrillator) in place     Ischemic cardiomyopathy     MRSA nasal colonization     Obesity (BMI 30-39.9)     Orthopnea     PAF (paroxysmal atrial fibrillation)     Persistent atrial fibrillation     Pulmonary edema     Tobacco abuse     Transaminitis     Trouble in sleeping     Ventricular fibrillation      Past Surgical History:  Past Surgical History:   Procedure Laterality Date    CARDIAC CATHETERIZATION Left 2021    Procedure: Coronary Angiography;  Surgeon: Anna Puga MD;  Location: [facility] CATH INVASIVE LOCATION;  Service: Cardiology;  Laterality: Left;    CARDIAC CATHETERIZATION N/A 2021    Procedure: Left ventriculography;  Surgeon: Anna Puga MD;  Location:  [facility] CATH INVASIVE LOCATION;  Service: Cardiology;  Laterality: N/A;    CARDIAC CATHETERIZATION N/A 2021    Procedure: Left Heart Cath;  Surgeon: Anna Puga MD;  Location:  [facility] CATH INVASIVE LOCATION;  Service: Cardiology;  Laterality: N/A;    CARDIAC ELECTROPHYSIOLOGY PROCEDURE N/A 2021    Procedure: IMPLANTABLE CARDIOVERTER DEFIBRILLATOR- SC  BIOTRONIK;  Surgeon: Nik Rosas MD;  Location: [facility] INVASIVE LOCATION;  Service: Cardiovascular;  Laterality: N/A;    CARDIOVERSION  05/19/2021    Dr. Rosas    CARDIOVERSION  07/26/2021    Dr. Rosas    TONSILLECTOMY          Social History:   Social History     Tobacco Use    Smoking status: Former     Current packs/day: 0.00     Average packs/day: 1.5 packs/day for 10.0 years (15.0 ttl pk-yrs)     Types: Cigarettes     Start date: 4/22/2011     Quit date: 4/22/2021     Years since quitting: 3.6     Passive exposure: Past    Smokeless tobacco: Never    Tobacco comments:     4/2021   Substance Use Topics    Alcohol use: Yes     Alcohol/week: 1.0 standard drink of alcohol     Comment: 1 BEER DAILY      Family History:  History reviewed. No pertinent family history.       Allergies:  No Known Allergies  Scheduled Meds:  amiodarone, 200 mg, Q12H  apixaban, 5 mg, Q12H  atorvastatin, 40 mg, Nightly  budesonide-formoterol, 2 puff, BID  busPIRone, 10 mg, Q12H  cetirizine, 10 mg, Daily  finasteride, 5 mg, Daily  furosemide, 40 mg, BID  [START ON 12/1/2024] furosemide, 80 mg, Daily  insulin lispro, 2 Units, TID With Meals  insulin lispro, 2-9 Units, 4x Daily AC & at Bedtime  melatonin, 5 mg, Nightly  mirtazapine, 30 mg, Nightly  pantoprazole, 40 mg, Q AM  potassium chloride ER, 40 mEq, Q2H  senna-docusate sodium, 2 tablet, BID  sodium chloride, 3 mL, Q12H  temazepam, 15 mg, Nightly            INTAKE AND OUTPUT:    Intake/Output Summary (Last 24 hours) at 11/30/2024 1116  Last data filed at 11/30/2024 0500  Gross per 24 hour   Intake 120 ml   Output 2825 ml   Net -2705 ml       Review of Systems:   GI: No nausea vomiting  Cardiac: No chest pain  Pulmonary: No shortness of breath    Constitutional:  Temp:  [97.3 °F (36.3 °C)-98.4 °F (36.9 °C)] 97.3 °F (36.3 °C)  Heart Rate:  [60-84] 84  Resp:  [18-20] 20  BP: (141-147)/(59-96) 142/84      Physical Exam:  General:  Appears in no acute distress, resting in  bed  Eyes: eom normal no conjunctival drainage  HEENT:  No JVD. Thyroid not visibly enlarged. No mucosal pallor or cyanosis  Respiratory: Respirations regular and unlabored at rest. BBS with good air entry in all fields. No crackles, rubs or wheezes auscultated  Cardiovascular: S1S2 irregularly irregular rhythm. No murmur, rub or gallop. No carotid bruits. DP/PT pulses     . No pretibial pitting edema  Gastrointestinal: Abdomen soft, flat, non tender. Bowel sounds present. No hepatosplenomegaly. No ascites  Skin:   Skin warm and dry to touch. No rashes    Neuro: AAO x3 CN II-XII grossly intact  Psych: Mood and affect normal, pleasant and cooperative        I reviewed the patient's new clinical results, and personally reviewed and interpreted the patient's ECG and telemetry data from the last 24 hours      Lab Review   Results from last 7 days   Lab Units 11/27/24  1259 11/27/24  1011   HSTROP T ng/L 137* 146*     Results from last 7 days   Lab Units 11/27/24  1951   MAGNESIUM mg/dL 2.8*     Results from last 7 days   Lab Units 11/30/24  0343   SODIUM mmol/L 135*   POTASSIUM mmol/L 3.9   BUN mg/dL 20   CREATININE mg/dL 1.44*   CALCIUM mg/dL 9.3     @[id number]@  Results from last 7 days   Lab Units 11/30/24  0343 11/29/24  0242 11/28/24  1930   WBC 10*3/mm3 10.26 9.66 10.10   HEMOGLOBIN g/dL 12.2* 11.8* 12.0*   HEMATOCRIT % 42.3 39.4 40.5   PLATELETS 10*3/mm3 223 253 275             Assessment/ plan:  1.  ICD discharge  2.  Ventricular fibrillation  3.  Permanent atrial fib: Atrial fib with rate control on the monitor.  4.  CAD: status post previous sudden cardiac death.  No chest pain.  Potassium WNL, draw mag.  5.  Acute kidney injury    Monitor reviewed no further arrhythmic events or ICD therapies.  Consideration for coronary angiography if his kidneys improve.  He cardioverted during ICD therapy, however is now in A-fib on the monitor. Consideration for upgrade to dual-chamber ICD for atrial pacing may be  "considered in the future.  Obtain ecg, mag, continue Eliquis and add metoprolol succinate with parameters.        )11/30/2024  ERIC Cheng/Transcription:   \"Dictated utilizing Dragon dictation\".     "

## 2024-11-30 NOTE — PROGRESS NOTES
[facility] Medicine Services  PROGRESS NOTE    Patient Name: Sebastien Tang  : 1960  MRN: [id number]    Date of Admission: 2024  Primary Care Physician: Yuliana Flynn DO    Subjective   Subjective     CC:  Follow-up recent ventricular tachycardia, weakness and ICD shock    Subjective:   Patient says he is doing pretty well today.  Nursing reported he did disimpact himself the other day and then subsequently had a little bit of blood in his stool.  No further bleeding today.  Recommend patient not disimpact himself any further and we will work on a bowel regimen and discussed with staff if there is any further issues.    Review of Systems  No current fevers or chills  No current shortness of breath or cough  No current nausea, vomiting, or diarrhea       Objective   Objective     Vital Signs:   Temp:  [97.3 °F (36.3 °C)-98.4 °F (36.9 °C)] 97.3 °F (36.3 °C)  Heart Rate:  [60-84] 84  Resp:  [18-20] 20  BP: (141-147)/(59-96) 142/84        Physical Exam:  Constitutional:Awake, alert, no acute distress  HENT: NCAT, mucous membranes moist, neck supple  Respiratory: No cough or wheezes, normal respirations, nonlabored breathing   Cardiovascular: Pulse rate is normal, palpable radial pulses  Gastrointestinal:  soft, nontender, nondistended  Musculoskeletal: Morbidly obese BMI is 40, improving lower extremity edema, somewhat debilitated in appearance but able to ambulate  Psychiatric: Appropriate affect, cooperative, conversational  Neurologic: No slurred speech or facial droop, follows commands  Skin: No rashes or jaundice, warm      Results Reviewed:  Results from last 7 days   Lab Units 24  0343 11/29/24  0242 11/28/24  1930   WBC 10*3/mm3 10.26 9.66 10.10   HEMOGLOBIN g/dL 12.2* 11.8* 12.0*   HEMATOCRIT % 42.3 39.4 40.5   PLATELETS 10*3/mm3 223 253 275     Results from last 7 days   Lab Units 24  0343 24  0242 24  0545 24  1951 24  1259 24  1011   SODIUM  mmol/L 135* 136 134*  --   --  132*   POTASSIUM mmol/L 3.9 3.8 3.8  3.8   < >  --  2.8*   CHLORIDE mmol/L 94* 96* 92*  --   --  86*   CO2 mmol/L 27.7 31.0* 28.0  --   --  32.1*   BUN mg/dL 20 25* 28*  --   --  35*   CREATININE mg/dL 1.44* 1.68* 1.94*  --   --  2.01*   GLUCOSE mg/dL 138* 123* 161*  --   --  147*   CALCIUM mg/dL 9.3 9.9 9.8  --   --  10.0   ALK PHOS U/L 95 96 92  --   --  91   ALT (SGPT) U/L 44* 50* 57*  --   --  61*   AST (SGOT) U/L 55* 57* 72*  --   --  89*   HSTROP T ng/L  --   --   --   --  137* 146*   PROBNP pg/mL  --   --   --   --   --  6,000.0*    < > = values in this interval not displayed.     Estimated Creatinine Clearance: 59.7 mL/min (A) (by C-G formula based on SCr of 1.44 mg/dL (H)).    Microbiology Results Abnormal       None            Imaging Results (Last 24 Hours)       ** No results found for the last 24 hours. **            Results for orders placed during the hospital encounter of 03/15/24    Adult Transesophageal Echo (LILA) W/ Cont if Necessary Per Protocol    Interpretation Summary    Left ventricular systolic function is normal. Left ventricular ejection fraction appears to be 51 - 55%.    The right ventricular cavity is mildly dilated but normal right ventricular systolic function.    There is mild biatrial enlargement.    Saline test results are negative for right to left atrial level shunt.    Moderate tricuspid valve regurgitation is present.Estimated right ventricular systolic pressure from tricuspid regurgitation is moderately elevated (45-55 mmHg).Moderate pulmonary hypertension is present.      I have reviewed the medications:  Scheduled Meds:amiodarone, 200 mg, Oral, Q12H  apixaban, 5 mg, Oral, Q12H  atorvastatin, 40 mg, Oral, Nightly  budesonide-formoterol, 2 puff, Inhalation, BID  busPIRone, 10 mg, Oral, Q12H  cetirizine, 10 mg, Oral, Daily  finasteride, 5 mg, Oral, Daily  furosemide, 40 mg, Intravenous, BID  [START ON 12/1/2024] furosemide, 80 mg, Oral,  Daily  insulin lispro, 2 Units, Subcutaneous, TID With Meals  insulin lispro, 2-9 Units, Subcutaneous, 4x Daily AC & at Bedtime  melatonin, 5 mg, Oral, Nightly  mirtazapine, 30 mg, Oral, Nightly  pantoprazole, 40 mg, Oral, Q AM  sodium chloride, 3 mL, Intravenous, Q12H  temazepam, 15 mg, Oral, Nightly      Continuous Infusions:   PRN Meds:.  acetaminophen **OR** acetaminophen **OR** acetaminophen    albuterol    senna-docusate sodium **AND** polyethylene glycol **AND** bisacodyl **AND** bisacodyl    Calcium Replacement - Follow Nurse / BPA Driven Protocol    dextrose    dextrose    famotidine    glucagon (human recombinant)    Magnesium Low Dose Replacement - Follow Nurse / BPA Driven Protocol    nitroglycerin    ondansetron ODT **OR** ondansetron    Phosphorus Replacement - Follow Nurse / BPA Driven Protocol    Potassium Replacement - Follow Nurse / BPA Driven Protocol    Potassium Replacement - Follow Nurse / BPA Driven Protocol    [COMPLETED] Insert Peripheral IV **AND** sodium chloride    sodium chloride    sodium chloride    Assessment & Plan   Assessment & Plan     Active Hospital Problems    Diagnosis  POA    **Paroxysmal ventricular fibrillation [I49.01]  Yes    Digitalis toxicity [T46.0X1A]  Yes    ICD (implantable cardioverter-defibrillator) discharge [Z45.02]  Not Applicable    Ventricular tachycardia (paroxysmal) [I47.29]  Yes    Morbid obesity [E66.01]  Yes    Diabetic polyneuropathy associated with type 2 diabetes mellitus [E11.42]  Yes    Hypokalemia [E87.6]  Yes    ANDIE (acute kidney injury) [N17.9]  Yes    Acute on chronic diastolic CHF (congestive heart failure) [I50.33]  Yes    High cholesterol [E78.00]  Yes    Essential hypertension [I10]  Yes    Anxiety and depression [F41.9, F32.A]  Yes    Primary insomnia [F51.01]  Yes    ICD (implantable cardioverter-defibrillator) in place [Z95.810]  Yes    Constipation [K59.00]  Yes    Atrial flutter [I48.92]  Yes    COPD (chronic obstructive pulmonary  disease) [J44.9]  Yes    Anemia [D64.9]  Yes      Resolved Hospital Problems   No resolved problems to display.        Brief Hospital Course to date:  Seabstien Tang is a 64 y.o. male presents the hospital after multiple ICD discharges in the setting of ventricular tachycardia and ventricular fibrillation.     Discussions for today:  Digoxin level has now normalized.  Renal function continues to improve.  I have new reports today that patient did disimpact himself earlier this hospital stay and had some transient blood in his stools after his manual manipulation.  No further bleeding today.  Plan to monitor and I will recommend patient get an outpatient colonoscopy if he is not up-to-date on his screening or if he has any further bleeding issues.  Plan for bowel regimen to work on his constipation.  Last day of IV recent diuresis today.  Anticipate switching back to oral diuretics tomorrow.  Give additional potassium today.  Transaminitis improving, suspect hepatic congestion and improved.   Continue insomnia medications as sleeping is improving.  Treatment plan discussed with patient is agreement.  Plan to follow-up further cardiology recommendations.  Wean supplemental oxygen if possible.  Treatment plan discussed with patient is in agreement.    ICD discharges, ventricular tachycardia and ventricular fibrillation, PAF:  Consulted EP.  Initially placed on amiodarone drip initially and switched to oral amiodarone.  Telemetry monitor.  Atenolol and digoxin initially held.  Continue anticoagulation      Elevated digoxin level: Hold digoxin.  Trend level.  Defer to cardiology whether they feel this medication eventually will need to be restarted.     Acute kidney injury: Likely prerenal due to hypotension related to ventricular tachycardia.  Allow for more elevated blood pressure.  Possible cardiorenal.     Acute diastolic CHF: IV diuresis initially provided.  Cardiology for assistance.  Previous echocardiogram  reviewed.     Hypokalemia: Replete potassium level.     Diabetes with polyneuropathy: Monitor glucose and adjust insulin as needed.     Insomnia: Continue Remeron, scheduled melatonin, add low-dose temazepam.  Patient very frustrated with his issues with insomnia.     BPH: Finasteride.  Monitor for any sign of urine retention.     Anxiety: Continue BuSpar.  Stable.     Hyperlipidemia: Continue statin.  Stable.     COPD: Monitor respiratory status.  Currently stable.  Symbicort.  As needed nebulizers.  Inhalers     GERD: PPI.  Stable.     Physical debility: PT OT     Treatment plan discussed with patient is in agreement     DVT prophylaxis:   Anticoagulated    Disposition: Likely home when improved    CODE STATUS:   Code Status and Medical Interventions: CPR (Attempt to Resuscitate); Full Support   Ordered at: 11/27/24 1226     Level Of Support Discussed With:    Patient     Code Status (Patient has no pulse and is not breathing):    CPR (Attempt to Resuscitate)     Medical Interventions (Patient has pulse or is breathing):    Full Support       Tato Parks MD  11/30/24

## 2024-11-30 NOTE — PLAN OF CARE
Goal Outcome Evaluation:              Outcome Evaluation: pt tolerating room air while awake, needs 3L while sleeping. pt drops down to 80% on RA when asleep. pt denies any SOA but does express concern for his health and states he is very worried. pt very concerned about BM, some bright red blood noted in stool. pt denying any chest pain. did not work with PT this shift, but is ambulating independently. pt expresses no other needs at this time. call light within reach.

## 2024-12-01 LAB
ANION GAP SERPL CALCULATED.3IONS-SCNC: 10 MMOL/L (ref 5–15)
BUN SERPL-MCNC: 15 MG/DL (ref 8–23)
BUN/CREAT SERPL: 12.8 (ref 7–25)
CALCIUM SPEC-SCNC: 9.2 MG/DL (ref 8.6–10.5)
CHLORIDE SERPL-SCNC: 97 MMOL/L (ref 98–107)
CO2 SERPL-SCNC: 28 MMOL/L (ref 22–29)
CREAT SERPL-MCNC: 1.17 MG/DL (ref 0.76–1.27)
DEPRECATED RDW RBC AUTO: 51.1 FL (ref 37–54)
EGFRCR SERPLBLD CKD-EPI 2021: 68.8 ML/MIN/1.73
ERYTHROCYTE [DISTWIDTH] IN BLOOD BY AUTOMATED COUNT: 17.8 % (ref 12.3–15.4)
GLUCOSE BLDC GLUCOMTR-MCNC: 153 MG/DL (ref 70–130)
GLUCOSE BLDC GLUCOMTR-MCNC: 160 MG/DL (ref 70–130)
GLUCOSE BLDC GLUCOMTR-MCNC: 191 MG/DL (ref 70–130)
GLUCOSE BLDC GLUCOMTR-MCNC: 260 MG/DL (ref 70–130)
GLUCOSE BLDC GLUCOMTR-MCNC: 275 MG/DL (ref 70–130)
GLUCOSE BLDC GLUCOMTR-MCNC: 472 MG/DL (ref 70–130)
GLUCOSE SERPL-MCNC: 128 MG/DL (ref 65–99)
HCT VFR BLD AUTO: 41.7 % (ref 37.5–51)
HGB BLD-MCNC: 12.5 G/DL (ref 13–17.7)
MCH RBC QN AUTO: 23.9 PG (ref 26.6–33)
MCHC RBC AUTO-ENTMCNC: 30 G/DL (ref 31.5–35.7)
MCV RBC AUTO: 79.7 FL (ref 79–97)
PLATELET # BLD AUTO: 245 10*3/MM3 (ref 140–450)
PMV BLD AUTO: 9 FL (ref 6–12)
POTASSIUM SERPL-SCNC: 4.2 MMOL/L (ref 3.5–5.2)
RBC # BLD AUTO: 5.23 10*6/MM3 (ref 4.14–5.8)
SODIUM SERPL-SCNC: 135 MMOL/L (ref 136–145)
WBC NRBC COR # BLD AUTO: 8.85 10*3/MM3 (ref 3.4–10.8)

## 2024-12-01 PROCEDURE — 94761 N-INVAS EAR/PLS OXIMETRY MLT: CPT

## 2024-12-01 PROCEDURE — 82948 REAGENT STRIP/BLOOD GLUCOSE: CPT

## 2024-12-01 PROCEDURE — 93005 ELECTROCARDIOGRAM TRACING: CPT | Performed by: NURSE PRACTITIONER

## 2024-12-01 PROCEDURE — 94664 DEMO&/EVAL PT USE INHALER: CPT

## 2024-12-01 PROCEDURE — 63710000001 INSULIN LISPRO (HUMAN) PER 5 UNITS: Performed by: INTERNAL MEDICINE

## 2024-12-01 PROCEDURE — 80048 BASIC METABOLIC PNL TOTAL CA: CPT | Performed by: INTERNAL MEDICINE

## 2024-12-01 PROCEDURE — 85027 COMPLETE CBC AUTOMATED: CPT | Performed by: INTERNAL MEDICINE

## 2024-12-01 PROCEDURE — 94760 N-INVAS EAR/PLS OXIMETRY 1: CPT

## 2024-12-01 PROCEDURE — 94799 UNLISTED PULMONARY SVC/PX: CPT

## 2024-12-01 RX ORDER — INSULIN LISPRO 100 [IU]/ML
4 INJECTION, SOLUTION INTRAVENOUS; SUBCUTANEOUS
Status: DISCONTINUED | OUTPATIENT
Start: 2024-12-01 | End: 2024-12-03 | Stop reason: HOSPADM

## 2024-12-01 RX ORDER — BENZOCAINE/MENTHOL 6 MG-10 MG
1 LOZENGE MUCOUS MEMBRANE EVERY 12 HOURS SCHEDULED
Status: DISCONTINUED | OUTPATIENT
Start: 2024-12-01 | End: 2024-12-03 | Stop reason: HOSPADM

## 2024-12-01 RX ORDER — POTASSIUM CHLORIDE 750 MG/1
20 TABLET, FILM COATED, EXTENDED RELEASE ORAL DAILY
Status: DISCONTINUED | OUTPATIENT
Start: 2024-12-01 | End: 2024-12-03 | Stop reason: HOSPADM

## 2024-12-01 RX ORDER — HYDROCORTISONE 25 MG/G
CREAM TOPICAL 2 TIMES DAILY
Status: DISCONTINUED | OUTPATIENT
Start: 2024-12-01 | End: 2024-12-03 | Stop reason: HOSPADM

## 2024-12-01 RX ADMIN — ACETAMINOPHEN 325MG 650 MG: 325 TABLET ORAL at 06:25

## 2024-12-01 RX ADMIN — BUSPIRONE HYDROCHLORIDE 10 MG: 5 TABLET ORAL at 09:50

## 2024-12-01 RX ADMIN — BUSPIRONE HYDROCHLORIDE 10 MG: 5 TABLET ORAL at 21:55

## 2024-12-01 RX ADMIN — AMIODARONE HYDROCHLORIDE 200 MG: 200 TABLET ORAL at 21:55

## 2024-12-01 RX ADMIN — POTASSIUM CHLORIDE 20 MEQ: 750 TABLET, EXTENDED RELEASE ORAL at 12:42

## 2024-12-01 RX ADMIN — ATORVASTATIN CALCIUM 40 MG: 20 TABLET, FILM COATED ORAL at 21:55

## 2024-12-01 RX ADMIN — HYDROCORTISONE 1 APPLICATION: 1 CREAM TOPICAL at 21:56

## 2024-12-01 RX ADMIN — Medication 3 ML: at 10:56

## 2024-12-01 RX ADMIN — HYDROCORTISONE: 25 CREAM TOPICAL at 09:50

## 2024-12-01 RX ADMIN — FINASTERIDE 5 MG: 5 TABLET, FILM COATED ORAL at 09:50

## 2024-12-01 RX ADMIN — FAMOTIDINE 10 MG: 20 TABLET, FILM COATED ORAL at 09:50

## 2024-12-01 RX ADMIN — INSULIN LISPRO 4 UNITS: 100 INJECTION, SOLUTION INTRAVENOUS; SUBCUTANEOUS at 18:01

## 2024-12-01 RX ADMIN — SENNOSIDES AND DOCUSATE SODIUM 2 TABLET: 50; 8.6 TABLET ORAL at 10:54

## 2024-12-01 RX ADMIN — BUDESONIDE AND FORMOTEROL FUMARATE DIHYDRATE 2 PUFF: 160; 4.5 AEROSOL RESPIRATORY (INHALATION) at 19:29

## 2024-12-01 RX ADMIN — HYDROCORTISONE 1 APPLICATION: 1 CREAM TOPICAL at 09:50

## 2024-12-01 RX ADMIN — METOPROLOL SUCCINATE 25 MG: 25 TABLET, EXTENDED RELEASE ORAL at 09:50

## 2024-12-01 RX ADMIN — APIXABAN 5 MG: 5 TABLET, FILM COATED ORAL at 18:00

## 2024-12-01 RX ADMIN — INSULIN LISPRO 2 UNITS: 100 INJECTION, SOLUTION INTRAVENOUS; SUBCUTANEOUS at 09:51

## 2024-12-01 RX ADMIN — BUDESONIDE AND FORMOTEROL FUMARATE DIHYDRATE 2 PUFF: 160; 4.5 AEROSOL RESPIRATORY (INHALATION) at 08:29

## 2024-12-01 RX ADMIN — INSULIN LISPRO 2 UNITS: 100 INJECTION, SOLUTION INTRAVENOUS; SUBCUTANEOUS at 18:00

## 2024-12-01 RX ADMIN — INSULIN LISPRO 6 UNITS: 100 INJECTION, SOLUTION INTRAVENOUS; SUBCUTANEOUS at 21:55

## 2024-12-01 RX ADMIN — SENNOSIDES AND DOCUSATE SODIUM 2 TABLET: 50; 8.6 TABLET ORAL at 21:55

## 2024-12-01 RX ADMIN — POLYETHYLENE GLYCOL 3350 17 G: 17 POWDER, FOR SOLUTION ORAL at 18:08

## 2024-12-01 RX ADMIN — PANTOPRAZOLE SODIUM 40 MG: 40 TABLET, DELAYED RELEASE ORAL at 06:26

## 2024-12-01 RX ADMIN — BISACODYL 5 MG: 5 TABLET, COATED ORAL at 09:50

## 2024-12-01 RX ADMIN — INSULIN LISPRO 4 UNITS: 100 INJECTION, SOLUTION INTRAVENOUS; SUBCUTANEOUS at 12:43

## 2024-12-01 RX ADMIN — Medication 3 ML: at 21:59

## 2024-12-01 RX ADMIN — TEMAZEPAM 15 MG: 15 CAPSULE ORAL at 21:55

## 2024-12-01 RX ADMIN — HYDROCORTISONE: 25 CREAM TOPICAL at 21:59

## 2024-12-01 RX ADMIN — AMIODARONE HYDROCHLORIDE 200 MG: 200 TABLET ORAL at 09:50

## 2024-12-01 RX ADMIN — MIRTAZAPINE 30 MG: 15 TABLET, FILM COATED ORAL at 21:55

## 2024-12-01 RX ADMIN — FUROSEMIDE 80 MG: 40 TABLET ORAL at 09:50

## 2024-12-01 RX ADMIN — APIXABAN 5 MG: 5 TABLET, FILM COATED ORAL at 06:26

## 2024-12-01 RX ADMIN — INSULIN LISPRO 6 UNITS: 100 INJECTION, SOLUTION INTRAVENOUS; SUBCUTANEOUS at 12:42

## 2024-12-01 RX ADMIN — CETIRIZINE HYDROCHLORIDE 10 MG: 10 TABLET, FILM COATED ORAL at 09:50

## 2024-12-01 NOTE — PROGRESS NOTES
LOS: 4 days   Patient Care Team:  Yuliana Flynn DO as PCP - General (Family Medicine)    Chief Complaint: follow up ICD shock, atrial fibrillation     Interval History: He was resting in bed on my exam. He is concerned about constipation. He denies chest pain or discomfort, palpitations, or shortness of breath.     Vital Signs:  Temp:  [97.2 °F (36.2 °C)-98.2 °F (36.8 °C)] 97.3 °F (36.3 °C)  Heart Rate:  [73-90] 90  Resp:  [18] 18  BP: (115-162)/(63-92) 146/92    Intake/Output Summary (Last 24 hours) at 12/1/2024 1511  Last data filed at 12/1/2024 1324  Gross per 24 hour   Intake 120 ml   Output 2880 ml   Net -2760 ml        Physical Exam  Vitals reviewed.   Constitutional:       General: He is not in acute distress.  HENT:      Head: Normocephalic.   Eyes:      Extraocular Movements: Extraocular movements intact.      Pupils: Pupils are equal, round, and reactive to light.   Cardiovascular:      Rate and Rhythm: Rhythm irregularly irregular.      Pulses: Normal pulses.      Heart sounds: Normal heart sounds, S1 normal and S2 normal. Heart sounds not distant. No murmur heard.     No friction rub. No gallop. No S3 or S4 sounds.   Pulmonary:      Effort: Pulmonary effort is normal.      Breath sounds: Normal breath sounds.   Abdominal:      General: Abdomen is flat. Bowel sounds are normal.      Palpations: Abdomen is soft.      Tenderness: There is no abdominal tenderness.   Skin:     General: Skin is warm and dry.   Neurological:      General: No focal deficit present.      Mental Status: He is alert and oriented to person, place, and time.   Psychiatric:         Mood and Affect: Mood normal.         Behavior: Behavior normal.         Results Review:    Results from last 7 days   Lab Units 12/01/24  0434   SODIUM mmol/L 135*   POTASSIUM mmol/L 4.2   CHLORIDE mmol/L 97*   CO2 mmol/L 28.0   BUN mg/dL 15   CREATININE mg/dL 1.17   GLUCOSE mg/dL 128*   CALCIUM mg/dL 9.2     Results from last 7 days   Lab Units  11/27/24  1259 11/27/24  1011   HSTROP T ng/L 137* 146*     Results from last 7 days   Lab Units 12/01/24  0434   WBC 10*3/mm3 8.85   HEMOGLOBIN g/dL 12.5*   HEMATOCRIT % 41.7   PLATELETS 10*3/mm3 245             Results from last 7 days   Lab Units 11/30/24  1306   MAGNESIUM mg/dL 2.3           I reviewed the patient's new clinical results.        Assessment & Plan:  ICD discharge  Ventricular fibrillation  No further arrhythmic events, on po amiodarone   Permanent atrial fibrillation  Cardioverted during ICD therapy, now back in atrial fibrillation   Continue apixaban   Coronary artery disease  Status post previous sudden cardiac death  Acute kidney injury     Agree with initiation of low dose potassium. Dr. Rosas is considering coronary angiography, renal function has normalized.     Aimee Rico, ERIC  12/01/24  15:11 EST

## 2024-12-01 NOTE — PLAN OF CARE
Goal Outcome Evaluation:         Pt resting on the bed, afib on monitor, R/A 2L o2 nc at sleep, vss, administered meds per mar, no ss of acute distress noted, will continue to monitor.      Problem: Adult Inpatient Plan of Care  Goal: Absence of Hospital-Acquired Illness or Injury  Intervention: Identify and Manage Fall Risk  Recent Flowsheet Documentation  Taken 12/1/2024 0600 by Luly Nieves RN  Safety Promotion/Fall Prevention:   activity supervised   room organization consistent   safety round/check completed  Taken 12/1/2024 0408 by Luly Nieves RN  Safety Promotion/Fall Prevention:   activity supervised   room organization consistent   safety round/check completed  Taken 12/1/2024 0208 by Luly Nieves RN  Safety Promotion/Fall Prevention: activity supervised  Taken 12/1/2024 0010 by Luly Nieves RN  Safety Promotion/Fall Prevention:   activity supervised   room organization consistent   safety round/check completed  Taken 11/30/2024 2208 by Luly Nieves RN  Safety Promotion/Fall Prevention:   activity supervised   room organization consistent   safety round/check completed  Taken 11/30/2024 2045 by Luly Nieves RN  Safety Promotion/Fall Prevention:   activity supervised   room organization consistent   safety round/check completed  Intervention: Prevent Skin Injury  Recent Flowsheet Documentation  Taken 12/1/2024 0600 by Luly Nieves RN  Body Position: position changed independently  Taken 12/1/2024 0408 by Luly Nieves RN  Body Position: position changed independently  Taken 12/1/2024 0208 by Luly Nieves RN  Body Position: position changed independently  Taken 12/1/2024 0010 by uLly Nieves RN  Body Position: position changed independently  Taken 11/30/2024 2208 by Luly Nieves RN  Body Position: position changed independently  Taken 11/30/2024 2045 by Luly Nieves RN  Body Position: position changed independently  Intervention: Prevent  and Manage VTE (Venous Thromboembolism) Risk  Recent Flowsheet Documentation  Taken 11/30/2024 2045 by Luly Nieves RN  VTE Prevention/Management: (eliquis) other (see comments)  Intervention: Prevent Infection  Recent Flowsheet Documentation  Taken 12/1/2024 0600 by Luly Nieves RN  Infection Prevention:   hand hygiene promoted   rest/sleep promoted  Taken 12/1/2024 0408 by Luly Nieves RN  Infection Prevention:   hand hygiene promoted   rest/sleep promoted  Taken 12/1/2024 0208 by Luly Nieves RN  Infection Prevention:   hand hygiene promoted   rest/sleep promoted  Taken 12/1/2024 0010 by Luly Nieves RN  Infection Prevention:   rest/sleep promoted   hand hygiene promoted  Taken 11/30/2024 2208 by Luly Nieves RN  Infection Prevention:   hand hygiene promoted   rest/sleep promoted  Taken 11/30/2024 2045 by Luly Nieves RN  Infection Prevention:   rest/sleep promoted   hand hygiene promoted  Goal: Optimal Comfort and Wellbeing  Intervention: Provide Person-Centered Care  Recent Flowsheet Documentation  Taken 12/1/2024 0010 by Luly Nieves RN  Trust Relationship/Rapport:   care explained   choices provided  Taken 11/30/2024 2045 by Luly Nieves RN  Trust Relationship/Rapport:   care explained   choices provided     Problem: Skin Injury Risk Increased  Goal: Skin Health and Integrity  Intervention: Optimize Skin Protection  Recent Flowsheet Documentation  Taken 12/1/2024 0600 by Luly Nieves RN  Head of Bed (HOB) Positioning: HOB elevated  Taken 12/1/2024 0408 by Luly Nieves RN  Head of Bed (HOB) Positioning: HOB elevated  Taken 12/1/2024 0208 by Luly Nieves RN  Head of Bed (HOB) Positioning: HOB elevated  Taken 12/1/2024 0010 by Luly Nieves RN  Activity Management: activity encouraged  Pressure Reduction Techniques:   frequent weight shift encouraged   weight shift assistance provided  Head of Bed (HOB) Positioning: HOB  elevated  Pressure Reduction Devices:   alternating pressure pump (DAR)   pressure-redistributing mattress utilized  Taken 11/30/2024 2208 by Luly Nieves RN  Head of Bed (HOB) Positioning: HOB elevated  Taken 11/30/2024 2045 by Luly Nieves RN  Activity Management: bedrest  Pressure Reduction Techniques:   frequent weight shift encouraged   weight shift assistance provided  Head of Bed (HOB) Positioning: [facility] elevated  Pressure Reduction Devices:   alternating pressure pump (DAR)   pressure-redistributing mattress utilized     Problem: Violence Risk or Actual  Goal: Anger and Impulse Control  Intervention: Minimize Safety Risk  Recent Flowsheet Documentation  Taken 12/1/2024 0600 by Luly Nieves RN  Enhanced Safety Measures: room near unit station  Taken 12/1/2024 0408 by Luly Nieves RN  Enhanced Safety Measures: room near unit station  Taken 12/1/2024 0208 by Luly Nieves RN  Enhanced Safety Measures: room near unit station  Taken 12/1/2024 0010 by Luly Nieves RN  Enhanced Safety Measures: room near unit station  Taken 11/30/2024 2208 by Luly Nieves RN  Enhanced Safety Measures: room near unit station  Taken 11/30/2024 2045 by Luly Nieves RN  Enhanced Safety Measures: room near unit station     Problem: Fall Injury Risk  Goal: Absence of Fall and Fall-Related Injury  Intervention: Identify and Manage Contributors  Recent Flowsheet Documentation  Taken 12/1/2024 0600 by Luly Nieves RN  Medication Review/Management: medications reviewed  Taken 12/1/2024 0408 by Luly Nieves RN  Medication Review/Management: medications reviewed  Taken 12/1/2024 0208 by Luly Nieves RN  Medication Review/Management: medications reviewed  Taken 12/1/2024 0010 by Luly Nieves RN  Medication Review/Management: medications reviewed  Taken 11/30/2024 2208 by Luly Nieves RN  Medication Review/Management: medications reviewed  Taken 11/30/2024 2045 by  Luly Nieves RN  Medication Review/Management: medications reviewed  Intervention: Promote Injury-Free Environment  Recent Flowsheet Documentation  Taken 12/1/2024 0600 by Luly Nieves RN  Safety Promotion/Fall Prevention:   activity supervised   room organization consistent   safety round/check completed  Taken 12/1/2024 0408 by Luly Nieves RN  Safety Promotion/Fall Prevention:   activity supervised   room organization consistent   safety round/check completed  Taken 12/1/2024 0208 by Luly Nieves RN  Safety Promotion/Fall Prevention: activity supervised  Taken 12/1/2024 0010 by Luly Nieves RN  Safety Promotion/Fall Prevention:   activity supervised   room organization consistent   safety round/check completed  Taken 11/30/2024 2208 by Luly Nieves RN  Safety Promotion/Fall Prevention:   activity supervised   room organization consistent   safety round/check completed  Taken 11/30/2024 2045 by Luly Nieves RN  Safety Promotion/Fall Prevention:   activity supervised   room organization consistent   safety round/check completed

## 2024-12-01 NOTE — PROGRESS NOTES
[facility] Medicine Services  PROGRESS NOTE    Patient Name: Sebastien Tang  : 1960  MRN: [id number]    Date of Admission: 2024  Primary Care Physician: Yuliana Flynn DO    Subjective   Subjective     CC:  Follow-up recent ventricular tachycardia, weakness and ICD shock    Subjective:   Patient having some itching on some of his dry skin on his legs.  He is requesting cream for the itching.  He still had occasional scant bleeding with his stool since he disimpacted himself.  He notes he has chronic hemorrhoids and bleeds from time to time.  Bleeding only reported this small amount.  No other new event reported.    Review of Systems  No current fevers or chills  No current shortness of breath or cough  No current nausea, vomiting, or diarrhea       Objective   Objective     Vital Signs:   Temp:  [97.2 °F (36.2 °C)-98.2 °F (36.8 °C)] 97.3 °F (36.3 °C)  Heart Rate:  [73-85] 73  Resp:  [18] 18  BP: (115-162)/(63-83) 124/77        Physical Exam:  Constitutional:Awake, alert, no acute distress  HENT: NCAT, mucous membranes moist, neck supple  Respiratory: No cough or wheezes, normal respirations, nonlabored breathing   Cardiovascular: Pulse rate is normal, palpable radial pulses  Gastrointestinal:  soft, nontender, nondistended  Musculoskeletal: Morbidly obese BMI is 40, improving lower extremity edema, somewhat debilitated in appearance but able to ambulate  Psychiatric: Appropriate affect, cooperative, conversational  Neurologic: No slurred speech or facial droop, follows commands  Skin: No rashes or jaundice, warm      Results Reviewed:  Results from last 7 days   Lab Units 24  024   WBC 10*3/mm3 8.85 10.26 9.66   HEMOGLOBIN g/dL 12.5* 12.2* 11.8*   HEMATOCRIT % 41.7 42.3 39.4   PLATELETS 10*3/mm3 245 223 253     Results from last 7 days   Lab Units 24  0343 24  0242 24  0545 24  1951 24  1259 24  1011    SODIUM mmol/L 135* 135* 136 134*  --   --  132*   POTASSIUM mmol/L 4.2 3.9 3.8 3.8  3.8   < >  --  2.8*   CHLORIDE mmol/L 97* 94* 96* 92*  --   --  86*   CO2 mmol/L 28.0 27.7 31.0* 28.0  --   --  32.1*   BUN mg/dL 15 20 25* 28*  --   --  35*   CREATININE mg/dL 1.17 1.44* 1.68* 1.94*  --   --  2.01*   GLUCOSE mg/dL 128* 138* 123* 161*  --   --  147*   CALCIUM mg/dL 9.2 9.3 9.9 9.8  --   --  10.0   ALK PHOS U/L  --  95 96 92  --   --  91   ALT (SGPT) U/L  --  44* 50* 57*  --   --  61*   AST (SGOT) U/L  --  55* 57* 72*  --   --  89*   HSTROP T ng/L  --   --   --   --   --  137* 146*   PROBNP pg/mL  --   --   --   --   --   --  6,000.0*    < > = values in this interval not displayed.     Estimated Creatinine Clearance: 73.9 mL/min (by C-G formula based on SCr of 1.17 mg/dL).    Microbiology Results Abnormal       None            Imaging Results (Last 24 Hours)       ** No results found for the last 24 hours. **            Results for orders placed during the hospital encounter of 03/15/24    Adult Transesophageal Echo (LILA) W/ Cont if Necessary Per Protocol    Interpretation Summary    Left ventricular systolic function is normal. Left ventricular ejection fraction appears to be 51 - 55%.    The right ventricular cavity is mildly dilated but normal right ventricular systolic function.    There is mild biatrial enlargement.    Saline test results are negative for right to left atrial level shunt.    Moderate tricuspid valve regurgitation is present.Estimated right ventricular systolic pressure from tricuspid regurgitation is moderately elevated (45-55 mmHg).Moderate pulmonary hypertension is present.      I have reviewed the medications:  Scheduled Meds:amiodarone, 200 mg, Oral, Q12H  apixaban, 5 mg, Oral, Q12H  atorvastatin, 40 mg, Oral, Nightly  budesonide-formoterol, 2 puff, Inhalation, BID  busPIRone, 10 mg, Oral, Q12H  cetirizine, 10 mg, Oral, Daily  finasteride, 5 mg, Oral, Daily  furosemide, 80 mg, Oral,  Daily  Hydrocortisone (Perianal), , Rectal, BID  hydrocortisone, 1 Application, Topical, Q12H  hydrOXYzine, 25 mg, Oral, Once  insulin lispro, 2 Units, Subcutaneous, TID With Meals  insulin lispro, 2-9 Units, Subcutaneous, 4x Daily AC & at Bedtime  melatonin, 5 mg, Oral, Nightly  metoprolol succinate XL, 25 mg, Oral, Q24H  mirtazapine, 30 mg, Oral, Nightly  pantoprazole, 40 mg, Oral, Q AM  senna-docusate sodium, 2 tablet, Oral, BID  sodium chloride, 3 mL, Intravenous, Q12H  temazepam, 15 mg, Oral, Nightly      Continuous Infusions:   PRN Meds:.  acetaminophen **OR** acetaminophen **OR** acetaminophen    albuterol    senna-docusate sodium **AND** polyethylene glycol **AND** bisacodyl **AND** bisacodyl    Calcium Replacement - Follow Nurse / BPA Driven Protocol    dextrose    dextrose    famotidine    glucagon (human recombinant)    Magnesium Low Dose Replacement - Follow Nurse / BPA Driven Protocol    nitroglycerin    ondansetron ODT **OR** ondansetron    Phosphorus Replacement - Follow Nurse / BPA Driven Protocol    Potassium Replacement - Follow Nurse / BPA Driven Protocol    Potassium Replacement - Follow Nurse / BPA Driven Protocol    [COMPLETED] Insert Peripheral IV **AND** sodium chloride    sodium chloride    sodium chloride    Assessment & Plan   Assessment & Plan     Active Hospital Problems    Diagnosis  POA    **Paroxysmal ventricular fibrillation [I49.01]  Yes    Digitalis toxicity [T46.0X1A]  Yes    ICD (implantable cardioverter-defibrillator) discharge [Z45.02]  Not Applicable    Ventricular tachycardia (paroxysmal) [I47.29]  Yes    Morbid obesity [E66.01]  Yes    Diabetic polyneuropathy associated with type 2 diabetes mellitus [E11.42]  Yes    Hypokalemia [E87.6]  Yes    ANDIE (acute kidney injury) [N17.9]  Yes    Acute on chronic diastolic CHF (congestive heart failure) [I50.33]  Yes    High cholesterol [E78.00]  Yes    Essential hypertension [I10]  Yes    Anxiety and depression [F41.9, F32.A]  Yes     Primary insomnia [F51.01]  Yes    ICD (implantable cardioverter-defibrillator) in place [Z95.810]  Yes    Constipation [K59.00]  Yes    Atrial flutter [I48.92]  Yes    COPD (chronic obstructive pulmonary disease) [J44.9]  Yes    Anemia [D64.9]  Yes      Resolved Hospital Problems   No resolved problems to display.        Brief Hospital Course to date:  Sebastien Tang is a 64 y.o. male presents the hospital after multiple ICD discharges in the setting of ventricular tachycardia and ventricular fibrillation.     Discussions for today:  Patient with some continued very small amount of rectal bleeding with stools after he disimpacted himself.  Reports chronic hemorrhoids.  Plan to treat empirically with Anusol suppository.  I have recommended outpatient GI evaluation and colonoscopy to further evaluate.  No significant change on his hemoglobin which is 12.5 this morning.  Add 1% hydrocortisone cream for region of itching on the legs.  I do not see a rash or any other concerning findings.  Digoxin level has normalized.  Renal function continues to improve. bowel regimen for his constipation.  IV diuresis discontinued and patient switched back to his home regimen of 80 mg of oral Lasix today.  Currently seems reasonably euvolemic.  Start low-dose daily potassium.   Transaminitis improving, suspect hepatic congestion and improved.   Continue insomnia medications as sleeping is improving.  Treatment plan discussed with patient is agreement.  Plan to follow-up further cardiology recommendations as they are considering coronary angiography.  Wean supplemental oxygen if possible.  Adjust insulin today with increased prandial.  Treatment plan discussed with patient is in agreement.    ICD discharges, ventricular tachycardia and ventricular fibrillation, PAF:  Consulted EP.  Initially placed on amiodarone drip initially and switched to oral amiodarone.  Telemetry monitor.  Atenolol and digoxin initially held.  Continue  anticoagulation      Elevated digoxin level: Hold digoxin.  Trend level.  Defer to cardiology whether they feel this medication eventually will need to be restarted.     Acute kidney injury: Likely prerenal due to hypotension related to ventricular tachycardia.  Allow for more elevated blood pressure.  Possible cardiorenal.  Creatinine upon admission 2.01.  Now improved.  Previous baseline creatinine 1.19.       Acute diastolic CHF: IV diuresis initially provided and now switched back to oral diuretic.  Cardiology for assistance.  Previous echocardiogram reviewed.     Hypokalemia: Replete potassium level.  Add low-dose daily potassium     Diabetes with polyneuropathy: Monitor glucose and adjust insulin as needed.  Metformin held     Insomnia: Continue Remeron, scheduled melatonin, add low-dose temazepam while in the hospital.  Patient very frustrated with his issues with insomnia.  Improved     BPH: Finasteride.  Monitor for any sign of urine retention.     Anxiety: Continue BuSpar.  Stable.     Hyperlipidemia: Continue statin.  Stable.     COPD: Monitor respiratory status.  Currently stable.  Symbicort.  As needed nebulizers.  Inhalers     GERD: PPI.  Stable.     Physical debility: PT OT recommended home with home health     Treatment plan discussed with patient is in agreement     DVT prophylaxis:   Anticoagulated    Disposition: Likely home with home health once cleared by cardiology    CODE STATUS:   Code Status and Medical Interventions: CPR (Attempt to Resuscitate); Full Support   Ordered at: 11/27/24 1226     Level Of Support Discussed With:    Patient     Code Status (Patient has no pulse and is not breathing):    CPR (Attempt to Resuscitate)     Medical Interventions (Patient has pulse or is breathing):    Full Support       Tato Parks MD  12/01/24

## 2024-12-02 ENCOUNTER — APPOINTMENT (OUTPATIENT)
Dept: GENERAL RADIOLOGY | Facility: HOSPITAL | Age: 64
End: 2024-12-02
Payer: MEDICARE

## 2024-12-02 LAB
ALBUMIN SERPL-MCNC: 3.7 G/DL (ref 3.5–5.2)
ALBUMIN/GLOB SERPL: 1.2 G/DL
ALP SERPL-CCNC: 88 U/L (ref 39–117)
ALT SERPL W P-5'-P-CCNC: 40 U/L (ref 1–41)
ANION GAP SERPL CALCULATED.3IONS-SCNC: 9.9 MMOL/L (ref 5–15)
AST SERPL-CCNC: 49 U/L (ref 1–40)
BILIRUB SERPL-MCNC: 0.6 MG/DL (ref 0–1.2)
BUN SERPL-MCNC: 14 MG/DL (ref 8–23)
BUN/CREAT SERPL: 11.7 (ref 7–25)
CALCIUM SPEC-SCNC: 8.7 MG/DL (ref 8.6–10.5)
CHLORIDE SERPL-SCNC: 99 MMOL/L (ref 98–107)
CO2 SERPL-SCNC: 25.1 MMOL/L (ref 22–29)
CREAT SERPL-MCNC: 1.2 MG/DL (ref 0.76–1.27)
DEPRECATED RDW RBC AUTO: 52.2 FL (ref 37–54)
EGFRCR SERPLBLD CKD-EPI 2021: 66.7 ML/MIN/1.73
ERYTHROCYTE [DISTWIDTH] IN BLOOD BY AUTOMATED COUNT: 18 % (ref 12.3–15.4)
GLOBULIN UR ELPH-MCNC: 3.1 GM/DL
GLUCOSE BLDC GLUCOMTR-MCNC: 122 MG/DL (ref 70–130)
GLUCOSE BLDC GLUCOMTR-MCNC: 139 MG/DL (ref 70–130)
GLUCOSE BLDC GLUCOMTR-MCNC: 193 MG/DL (ref 70–130)
GLUCOSE BLDC GLUCOMTR-MCNC: 272 MG/DL (ref 70–130)
GLUCOSE SERPL-MCNC: 154 MG/DL (ref 65–99)
HCT VFR BLD AUTO: 41.2 % (ref 37.5–51)
HGB BLD-MCNC: 11.7 G/DL (ref 13–17.7)
MCH RBC QN AUTO: 22.8 PG (ref 26.6–33)
MCHC RBC AUTO-ENTMCNC: 28.4 G/DL (ref 31.5–35.7)
MCV RBC AUTO: 80.2 FL (ref 79–97)
PLATELET # BLD AUTO: 276 10*3/MM3 (ref 140–450)
PMV BLD AUTO: 9.1 FL (ref 6–12)
POTASSIUM SERPL-SCNC: 4.1 MMOL/L (ref 3.5–5.2)
PROT SERPL-MCNC: 6.8 G/DL (ref 6–8.5)
RBC # BLD AUTO: 5.14 10*6/MM3 (ref 4.14–5.8)
SODIUM SERPL-SCNC: 134 MMOL/L (ref 136–145)
WBC NRBC COR # BLD AUTO: 7.99 10*3/MM3 (ref 3.4–10.8)

## 2024-12-02 PROCEDURE — 74018 RADEX ABDOMEN 1 VIEW: CPT

## 2024-12-02 PROCEDURE — 82948 REAGENT STRIP/BLOOD GLUCOSE: CPT

## 2024-12-02 PROCEDURE — 99221 1ST HOSP IP/OBS SF/LOW 40: CPT

## 2024-12-02 PROCEDURE — 97116 GAIT TRAINING THERAPY: CPT

## 2024-12-02 PROCEDURE — 94664 DEMO&/EVAL PT USE INHALER: CPT

## 2024-12-02 PROCEDURE — 94799 UNLISTED PULMONARY SVC/PX: CPT

## 2024-12-02 PROCEDURE — 80053 COMPREHEN METABOLIC PANEL: CPT | Performed by: INTERNAL MEDICINE

## 2024-12-02 PROCEDURE — 94761 N-INVAS EAR/PLS OXIMETRY MLT: CPT

## 2024-12-02 PROCEDURE — 85027 COMPLETE CBC AUTOMATED: CPT | Performed by: INTERNAL MEDICINE

## 2024-12-02 PROCEDURE — 63710000001 INSULIN LISPRO (HUMAN) PER 5 UNITS: Performed by: INTERNAL MEDICINE

## 2024-12-02 PROCEDURE — 97110 THERAPEUTIC EXERCISES: CPT

## 2024-12-02 PROCEDURE — 97530 THERAPEUTIC ACTIVITIES: CPT

## 2024-12-02 RX ORDER — LACTULOSE 10 G/15ML
10 SOLUTION ORAL ONCE
Status: COMPLETED | OUTPATIENT
Start: 2024-12-02 | End: 2024-12-02

## 2024-12-02 RX ORDER — TEMAZEPAM 15 MG/1
15 CAPSULE ORAL NIGHTLY
Status: DISCONTINUED | OUTPATIENT
Start: 2024-12-02 | End: 2024-12-03 | Stop reason: HOSPADM

## 2024-12-02 RX ORDER — POLYETHYLENE GLYCOL 3350 17 G/17G
17 POWDER, FOR SOLUTION ORAL DAILY
Status: DISCONTINUED | OUTPATIENT
Start: 2024-12-02 | End: 2024-12-03 | Stop reason: HOSPADM

## 2024-12-02 RX ORDER — METOPROLOL SUCCINATE 50 MG/1
50 TABLET, EXTENDED RELEASE ORAL EVERY 12 HOURS SCHEDULED
Status: DISCONTINUED | OUTPATIENT
Start: 2024-12-02 | End: 2024-12-03 | Stop reason: HOSPADM

## 2024-12-02 RX ADMIN — METOPROLOL SUCCINATE 50 MG: 50 TABLET, EXTENDED RELEASE ORAL at 21:05

## 2024-12-02 RX ADMIN — HYDROCORTISONE: 25 CREAM TOPICAL at 11:54

## 2024-12-02 RX ADMIN — BUDESONIDE AND FORMOTEROL FUMARATE DIHYDRATE 2 PUFF: 160; 4.5 AEROSOL RESPIRATORY (INHALATION) at 07:06

## 2024-12-02 RX ADMIN — Medication 3 ML: at 12:50

## 2024-12-02 RX ADMIN — POLYETHYLENE GLYCOL 3350 17 G: 17 POWDER, FOR SOLUTION ORAL at 16:47

## 2024-12-02 RX ADMIN — ACETAMINOPHEN 325MG 650 MG: 325 TABLET ORAL at 09:15

## 2024-12-02 RX ADMIN — AMIODARONE HYDROCHLORIDE 200 MG: 200 TABLET ORAL at 21:05

## 2024-12-02 RX ADMIN — AMIODARONE HYDROCHLORIDE 200 MG: 200 TABLET ORAL at 09:17

## 2024-12-02 RX ADMIN — FINASTERIDE 5 MG: 5 TABLET, FILM COATED ORAL at 09:16

## 2024-12-02 RX ADMIN — BUSPIRONE HYDROCHLORIDE 10 MG: 5 TABLET ORAL at 21:05

## 2024-12-02 RX ADMIN — APIXABAN 5 MG: 5 TABLET, FILM COATED ORAL at 21:05

## 2024-12-02 RX ADMIN — MIRTAZAPINE 30 MG: 15 TABLET, FILM COATED ORAL at 21:05

## 2024-12-02 RX ADMIN — SENNOSIDES AND DOCUSATE SODIUM 2 TABLET: 50; 8.6 TABLET ORAL at 09:15

## 2024-12-02 RX ADMIN — METOPROLOL SUCCINATE 50 MG: 50 TABLET, EXTENDED RELEASE ORAL at 09:16

## 2024-12-02 RX ADMIN — ATORVASTATIN CALCIUM 40 MG: 20 TABLET, FILM COATED ORAL at 21:05

## 2024-12-02 RX ADMIN — LACTULOSE 10 G: 10 SOLUTION ORAL at 12:47

## 2024-12-02 RX ADMIN — HYDROCORTISONE 1 APPLICATION: 1 CREAM TOPICAL at 12:49

## 2024-12-02 RX ADMIN — SENNOSIDES AND DOCUSATE SODIUM 2 TABLET: 50; 8.6 TABLET ORAL at 21:05

## 2024-12-02 RX ADMIN — BUDESONIDE AND FORMOTEROL FUMARATE DIHYDRATE 2 PUFF: 160; 4.5 AEROSOL RESPIRATORY (INHALATION) at 20:35

## 2024-12-02 RX ADMIN — BUSPIRONE HYDROCHLORIDE 10 MG: 5 TABLET ORAL at 09:16

## 2024-12-02 RX ADMIN — INSULIN LISPRO 2 UNITS: 100 INJECTION, SOLUTION INTRAVENOUS; SUBCUTANEOUS at 21:05

## 2024-12-02 RX ADMIN — HYDROCORTISONE 1 APPLICATION: 1 CREAM TOPICAL at 21:06

## 2024-12-02 RX ADMIN — PANTOPRAZOLE SODIUM 40 MG: 40 TABLET, DELAYED RELEASE ORAL at 05:19

## 2024-12-02 RX ADMIN — FUROSEMIDE 80 MG: 40 TABLET ORAL at 09:16

## 2024-12-02 RX ADMIN — POTASSIUM CHLORIDE 20 MEQ: 750 TABLET, EXTENDED RELEASE ORAL at 09:16

## 2024-12-02 RX ADMIN — Medication 3 ML: at 21:06

## 2024-12-02 RX ADMIN — INSULIN LISPRO 4 UNITS: 100 INJECTION, SOLUTION INTRAVENOUS; SUBCUTANEOUS at 09:15

## 2024-12-02 RX ADMIN — HYDROCORTISONE: 25 CREAM TOPICAL at 21:06

## 2024-12-02 RX ADMIN — APIXABAN 5 MG: 5 TABLET, FILM COATED ORAL at 05:19

## 2024-12-02 RX ADMIN — CETIRIZINE HYDROCHLORIDE 10 MG: 10 TABLET, FILM COATED ORAL at 09:16

## 2024-12-02 RX ADMIN — INSULIN LISPRO 4 UNITS: 100 INJECTION, SOLUTION INTRAVENOUS; SUBCUTANEOUS at 12:49

## 2024-12-02 RX ADMIN — INSULIN LISPRO 4 UNITS: 100 INJECTION, SOLUTION INTRAVENOUS; SUBCUTANEOUS at 16:47

## 2024-12-02 NOTE — PROGRESS NOTES
Electrophysiology Follow-Up Note      Patient Name: Sebastien Tang  Age/Sex: 64 y.o. male  : 1960  MRN: [id number]      Day of Service: 24       Chief Complaint/Follow-up: ICD discharge, ANDIE, AF    S: back in atrial fibrillation, rate okay. He is very worried about having heart cath, says he woke up nervous. No further events since starting amiodarone.       Temp:  [97.3 °F (36.3 °C)-98.4 °F (36.9 °C)] 98.1 °F (36.7 °C)  Heart Rate:  [73-90] 87  Resp:  [17-20] 20  BP: (120-146)/(70-92) 120/89     PHYSICAL EXAM:    General Appearance: No acute distress, well developed and well nourished.   Eyes: Conjunctiva and lids: No erythema, swelling, or discharge. Sclera non-icteric.   HENT: Atraumatic, normocephalic. External eyes, ears, and nose normal.   Respiratory: No signs of respiratory distress. Respiration rhythm and depth normal.   Clear to auscultation. No rales, crackles, rhonchi, or wheezing auscultated.   Cardiovascular:  Heart Rate and Rhythm: irregularly irregular, Heart Sounds: Normal S1 and S2. No S3 or S4 noted.  Gastrointestinal:  Abdomen soft, non-distended, non-tender.  Musculoskeletal: Normal movement of extremities  Skin: Warm and dry.   Psychiatric: Patient alert and oriented to person, place, and time. Speech and behavior appropriate. Normal mood and affect.       Results from last 7 days   Lab Units 24  03024  0343   SODIUM mmol/L 134* 135* 135*   POTASSIUM mmol/L 4.1 4.2 3.9   CHLORIDE mmol/L 99 97* 94*   CO2 mmol/L 25.1 28.0 27.7   BUN mg/dL 14 15 20   CREATININE mg/dL 1.20 1.17 1.44*   GLUCOSE mg/dL 154* 128* 138*   CALCIUM mg/dL 8.7 9.2 9.3     Results from last 7 days   Lab Units 24  0300 24  0343   WBC 10*3/mm3 7.99 8.85 10.26   HEMOGLOBIN g/dL 11.7* 12.5* 12.2*   HEMATOCRIT % 41.2 41.7 42.3   PLATELETS 10*3/mm3 276 245 223         Results from last 7 days   Lab Units 24  1259 24  1011   HSTROP T ng/L 137*  146*               Current Medications:   Scheduled Meds:amiodarone, 200 mg, Oral, Q12H  apixaban, 5 mg, Oral, Q12H  atorvastatin, 40 mg, Oral, Nightly  budesonide-formoterol, 2 puff, Inhalation, BID  busPIRone, 10 mg, Oral, Q12H  cetirizine, 10 mg, Oral, Daily  finasteride, 5 mg, Oral, Daily  furosemide, 80 mg, Oral, Daily  Hydrocortisone (Perianal), , Rectal, BID  hydrocortisone, 1 Application, Topical, Q12H  hydrOXYzine, 25 mg, Oral, Once  insulin lispro, 2-9 Units, Subcutaneous, 4x Daily AC & at Bedtime  insulin lispro, 4 Units, Subcutaneous, TID With Meals  melatonin, 5 mg, Oral, Nightly  metoprolol succinate XL, 50 mg, Oral, Q12H  mirtazapine, 30 mg, Oral, Nightly  pantoprazole, 40 mg, Oral, Q AM  potassium chloride, 20 mEq, Oral, Daily  senna-docusate sodium, 2 tablet, Oral, BID  sodium chloride, 3 mL, Intravenous, Q12H            Paroxysmal ventricular fibrillation    Atrial flutter    COPD (chronic obstructive pulmonary disease)    Anemia    Constipation    ICD (implantable cardioverter-defibrillator) in place    Anxiety and depression    Primary insomnia    Essential hypertension    High cholesterol    Acute on chronic diastolic CHF (congestive heart failure)    Digitalis toxicity    ICD (implantable cardioverter-defibrillator) discharge    Ventricular tachycardia (paroxysmal)    Morbid obesity    Diabetic polyneuropathy associated with type 2 diabetes mellitus    Hypokalemia    ANDIE (acute kidney injury)       Plan:   VT/VF---- was in acute HF on admission. Since diuresing and initiation of amiodarone, no further events. Continue amiodarone 200mg BID until follow up.     2. ICD discharge--- discussed moving forward with coronary angiography. Patient wishes to defer for now, he has not been holding apixaban so would need to wait a few days anyway. We will continue amiodaorne and defer cath for now, if more events then consider as outpatient.     3. HFrEF--- on GDMT with metoprolol and lasix, consider  increasing as outpatient.     4. Persistent AF--- now back in AF, continue apixaban. I increased his metoprolol for better rate control. Will defer any upgrade to device for now.         --- will defer cath for now. I will let him eat.     --continue amiodarone 200mg BID until follow up in two weeks.     ---suspect he can go home this afternoon or tomorrow.          Ron Jara, APRN  12/02/24  08:47 EST

## 2024-12-02 NOTE — PLAN OF CARE
Goal Outcome Evaluation:  Plan of Care Reviewed With: patient        Progress: no change  Outcome Evaluation: Maintained on R/A while awake, 02 at 2L N/c while sleeping to keep sats >90%.Very small formed stool, no clover blood noted. Pt has received miralax and scheduled Senna. No c/o's of chest pain or pressure. No soa or dyspnea. Telemetry Afib, PVC's. Safety maintained.

## 2024-12-02 NOTE — THERAPY TREATMENT NOTE
Patient Name: Sebastien Tang  : 1960    MRN: [id number]                              Today's Date: 2024       Admit Date: 2024    Visit Dx:     ICD-10-CM ICD-9-CM   1. Ventricular fibrillation, paroxysmal  I49.01 427.41   2. Defibrillator discharge  Z45.02 V71.89   3. Hypokalemia  E87.6 276.8   4. Renal insufficiency  N28.9 593.9   5. Mild chronic anemia  D64.9 285.9   6. Acute congestive heart failure, unspecified heart failure type  I50.9 428.0   7. Anticoagulated by anticoagulation treatment  Z79.01 V58.61     Patient Active Problem List   Diagnosis    Cardiac arrest    Atrial flutter    Tobacco abuse    Azotemia    COPD (chronic obstructive pulmonary disease)    MRSA nasal colonization    Transaminitis    Obesity (BMI 30-39.9)    Anemia    Constipation    Acute congestive heart failure    PSA elevation    Wellness examination    High risk medication use    Abnormal CXR    Abdominal aneurysm    Iliac aneurysm    Coronary artery disease involving coronary bypass graft of native heart without angina pectoris    Atrial fibrillation, persistent    ICD (implantable cardioverter-defibrillator) in place    Anxiety and depression    Shortness of breath    Primary insomnia    Close exposure to COVID-19 virus    Iliac artery stenosis, right    Dysuria    Gross hematuria    Essential hypertension    High cholesterol    Screening PSA (prostate specific antigen)    Anxiety    Neuropathy    Heart failure    Iron deficiency anemia    Diabetes mellitus, type 2    Hypoxemia    Acute on chronic systolic CHF (congestive heart failure)    Acute on chronic diastolic CHF (congestive heart failure)    Depression    Adjustment disorder    Paroxysmal ventricular fibrillation    Digitalis toxicity    ICD (implantable cardioverter-defibrillator) discharge    Ventricular tachycardia (paroxysmal)    Morbid obesity    Diabetic polyneuropathy associated with type 2 diabetes mellitus    Hypokalemia    ANDIE (acute kidney injury)      Past Medical History:   Diagnosis Date    Acidosis     mixed resp/metabolic acidosis    Acute congestive heart failure     Acute respiratory failure     hypoxic    Anemia     Arthritis     Asthma     Atrial flutter     Azotemia     Cardiac arrest 04/2021    Chronic coronary artery disease     Constipation     COPD (chronic obstructive pulmonary disease)     Depression     Enlarged prostate     Hypertension     Hypokalemia     severe    ICD (implantable cardioverter-defibrillator) in place     Ischemic cardiomyopathy     MRSA nasal colonization     Obesity (BMI 30-39.9)     Orthopnea     PAF (paroxysmal atrial fibrillation)     Persistent atrial fibrillation     Pulmonary edema     Tobacco abuse     Transaminitis     Trouble in sleeping     Ventricular fibrillation      Past Surgical History:   Procedure Laterality Date    CARDIAC CATHETERIZATION Left 4/1/2021    Procedure: Coronary Angiography;  Surgeon: Anna Puga MD;  Location:  [facility];  Service: Cardiology;  Laterality: Left;    CARDIAC CATHETERIZATION N/A 4/1/2021    Procedure: Left ventriculography;  Surgeon: Anna Puga MD;  Location:  [facility];  Service: Cardiology;  Laterality: N/A;    CARDIAC CATHETERIZATION N/A 4/1/2021    Procedure: Left Heart Cath;  Surgeon: Anna Puga MD;  Location:  [facility];  Service: Cardiology;  Laterality: N/A;    CARDIAC ELECTROPHYSIOLOGY PROCEDURE N/A 4/7/2021    Procedure: IMPLANTABLE CARDIOVERTER DEFIBRILLATOR- SC BIOTRONIK;  Surgeon: Nik Rosas MD;  Location:  [facility];  Service: Cardiovascular;  Laterality: N/A;    CARDIOVERSION  05/19/2021    Dr. Rosas    CARDIOVERSION  07/26/2021    Dr. Rosas    TONSILLECTOMY        General Information       Row Name 12/02/24 4507          Physical Therapy Time and Intention    Document Type therapy note (daily note)  -SM     Mode of Treatment physical therapy  -       Row Name  12/02/24 1327          General Information    Existing Precautions/Restrictions fall  -SM       Row Name 12/02/24 1327          Cognition    Orientation Status (Cognition) oriented x 3  -SM               User Key  (r) = Recorded By, (t) = Taken By, (c) = Cosigned By      Initials Name Provider Type    Jaimee Guzman PTA Physical Therapist Assistant                   Mobility       Row Name 12/02/24 1327          Bed Mobility    Bed Mobility supine-sit  -SM     Supine-Sit [place]ack[place] (Bed Mobility) standby assist  -     Assistive Device (Bed Mobility) bed rails;head of bed elevated  -SM       Row Name 12/02/24 1327          Sit-Stand Transfer    Sit-Stand [place] (Transfers) standby assist  -       Row Name 12/02/24 1327          Gait/Stairs (Locomotion)    [place]ney Level (Gait) standby assist;contact guard  -     Patient was able to Ambulate yes  -SM     Distance in Feet (Gait) 350  -SM     Deviations/Abnormal Patterns (Gait) cinthia decreased;stride length decreased  -SM               User Key  (r) = Recorded By, (t) = Taken By, (c) = Cosigned By      Initials Name Provider Type    Jaimee Guzman PTA Physical Therapist Assistant                   Obj/Interventions       Row Name 12/02/24 1330          Motor Skills    Therapeutic Exercise --  seated AP and LAQ x10 reps  -               User Key  (r) = Recorded By, (t) = Taken By, (c) = Cosigned By      Initials Name Provider Type    Jaimee Guzman PTA Physical Therapist Assistant                   Goals/Plan    No documentation.                  Clinical Impression       Row Name 12/02/24 1330          Pain    Pretreatment Pain Rating 0/10 - no pain  -SM     Posttreatment Pain Rating 0/10 - no pain  -SM       Row Name 12/02/24 1330          Positioning and Restraints    Pre-Treatment Position in bed  -SM     Post Treatment Position bed  -SM     In Bed sitting EOB;call light within reach;encouraged to call for assist  -SM                User Key  (r) = Recorded By, (t) = Taken By, (c) = Cosigned By      Initials Name Provider Type     Jaimee Blanco PTA Physical Therapist Assistant                   Outcome Measures       Row Name 12/02/24 1330          How much help from another person do you currently need...    Turning from your back to your side while in flat bed without using bedrails? 4  -SM     Moving from lying on back to sitting on the side of a flat bed without bedrails? 4  -SM     Moving to and from a bed to a chair (including a wheelchair)? 4  -SM     Standing up from a chair using your arms (e.g., wheelchair, bedside chair)? 4  -SM     Climbing 3-5 steps with a railing? 3  -SM     To walk in hospital room? 3  -SM     AM-PAC 6 Clicks Score (PT) 22  -     Highest Level of Mobility Goal 7 --> Walk 25 feet or more  -       Row Name 12/02/24 1330          Functional Assessment    Outcome Measure Options AM-PAC 6 Clicks Basic Mobility (PT)  -               User Key  (r) = Recorded By, (t) = Taken By, (c) = Cosigned By      Initials Name Provider Type    Jaimee Guzman PTA Physical Therapist Assistant                                 Physical Therapy Education       Title: PT OT SLP Therapies (Done)       Topic: Physical Therapy (Done)       Point: Mobility training (Done)       Learning Progress Summary            Patient Acceptance, E,TB,D, VU,NR by  at 12/2/2024 1331    Acceptance, E,TB, NR by LB at 11/28/2024 1422                      Point: Home exercise program (Done)       Learning Progress Summary            Patient Acceptance, E,TB,D, VU,NR by  at 12/2/2024 1331    Acceptance, E,TB, NR by LB at 11/28/2024 1422                      Point: Body mechanics (Done)       Learning Progress Summary            Patient Acceptance, E,TB,D, VU,NR by  at 12/2/2024 1331    Acceptance, E,TB, NR by LB at 11/28/2024 1422                      Point: Precautions (Done)       Learning Progress Summary             Patient Acceptance, E,TB,D, VU,NR by  at 12/2/2024 1331    Acceptance, E,TB, NR by  at 11/28/2024 1422                                      User Key       Initials Effective Dates Name Provider Type Discipline     03/07/18 -  Jaimee Blanco PTA Physical Therapist Assistant PT    LB 08/09/20 -  Susanne Lopez PT Physical Therapist PT                  PT Recommendation and Plan     Progress: improving  Outcome Evaluation: Pt tolerated treatment well this date. Required SBA for bed mobility and to stand. Pt ambulated 350ft w/ SBA-CGA, no AD used. Encouraged pt to ambulate in hallway w/ nsg during the day.     Time Calculation:         PT Charges       Row Name 12/02/24 1332             Time Calculation    Start Time 0912  -      Stop Time 1000  -      Time Calculation (min) 48 min  -      PT Received On 12/02/24  -      PT - Next Appointment 12/04/24  -                User Key  (r) = Recorded By, (t) = Taken By, (c) = Cosigned By      Initials Name Provider Type     Jaimee Blanco PTA Physical Therapist Assistant                  Therapy Charges for Today       Code Description Service Date Service Provider Modifiers Qty    09189693521 HC GAIT TRAINING EA 15 MIN 12/2/2024 Jaimee Blanco, JAYLEN GP 1    59939343340 HC PT THERAPEUTIC ACT EA 15 MIN 12/2/2024 Jaimee Blanco PTA GP 1    79582168918 HC PT THER PROC EA 15 MIN 12/2/2024 Jaimee Blanco, JAYELN GP 1            PT G-Codes  Outcome Measure Options: AM-PAC 6 Clicks Basic Mobility (PT)  AM-PAC 6 Clicks Score (PT): 22  AM-PAC 6 Clicks Score (OT): 19  PT Discharge Summary  Anticipated Discharge Disposition (PT): home with assist, home with home health    Jaimee Blanco PTA  12/2/2024

## 2024-12-02 NOTE — PLAN OF CARE
Goal Outcome Evaluation:VSS  Pt down to CT scan this morning-results pending  Pt denies pain  Up in room -walked with PT  Urine continues to be kevin but much lighter than admission  Eating well  [place] as noted  Plan to send urine for urine studies-pt to call when he urinates to send clean cath  Safety maintained

## 2024-12-02 NOTE — CONSULTS
[facility]  Initial Inpatient Consult Note    Referring Provider:     Ron Walton MD       Reason for Consultation: BRBPR started on Eliquis     Subjective     History of present illness:    64 y.o. male with history of permanent A-fib with ICD in place who presented to the hospital after ICD discharge due to VT/VF.  He has history of cardiac arrest in 2021.    GI consulted to see patient due to reports of bright red blood per rectum/hematochezia.  Patient reports that for the last few days he has noted small amounts of blood sometimes liquid separate from his stool and sometimes within his stool.  He reports that he is quite constipated recently and does have to strain quite a bit.  He does have reported history of chronic hemorrhoids.  He denies any family history of colon cancer.  He has never had a colonoscopy.  He does have a good appetite and no unintentional weight loss.  He is currently being seen by cardiology who was planning a cath but is deferring for now.    Hemoglobin stable and patient hemodynamically stable on exam today.     Past Medical History:  Past Medical History:   Diagnosis Date    Acidosis     mixed resp/metabolic acidosis    Acute congestive heart failure     Acute respiratory failure     hypoxic    Anemia     Arthritis     Asthma     Atrial flutter     Azotemia     Cardiac arrest 04/2021    Chronic coronary artery disease     Constipation     COPD (chronic obstructive pulmonary disease)     Depression     Enlarged prostate     Hypertension     Hypokalemia     severe    ICD (implantable cardioverter-defibrillator) in place     Ischemic cardiomyopathy     MRSA nasal colonization     Obesity (BMI 30-39.9)     Orthopnea     PAF (paroxysmal atrial fibrillation)     Persistent atrial fibrillation     Pulmonary edema     Tobacco abuse     Transaminitis     Trouble in sleeping     Ventricular fibrillation      Past Surgical History:  Past Surgical History:   Procedure  Laterality Date    CARDIAC CATHETERIZATION Left 4/1/2021    Procedure: Coronary Angiography;  Surgeon: Anna Puga MD;  Location:  [facility];  Service: Cardiology;  Laterality: Left;    CARDIAC CATHETERIZATION N/A 4/1/2021    Procedure: Left ventriculography;  Surgeon: Anna Puga MD;  Location:  [facility];  Service: Cardiology;  Laterality: N/A;    CARDIAC CATHETERIZATION N/A 4/1/2021    Procedure: Left Heart Cath;  Surgeon: Anna Puga MD;  Location:  [facility];  Service: Cardiology;  Laterality: N/A;    CARDIAC ELECTROPHYSIOLOGY PROCEDURE N/A 4/7/2021    Procedure: IMPLANTABLE CARDIOVERTER DEFIBRILLATOR- SC BIOTRONIK;  Surgeon: Nik Rosas MD;  Location:  [facility];  Service: Cardiovascular;  Laterality: N/A;    CARDIOVERSION  05/19/2021    Dr. Rosas    CARDIOVERSION  07/26/2021    Dr. Rosas    TONSILLECTOMY        Social History:   Social History     Tobacco Use    Smoking status: Former     Current packs/day: 0.00     Average packs/day: 1.5 packs/day for 10.0 years (15.0 ttl pk-yrs)     Types: Cigarettes     Start date: 4/22/2011     Quit date: 4/22/2021     Years since quitting: 3.6     Passive exposure: Past    Smokeless tobacco: Never    Tobacco comments:     4/2021   Substance Use Topics    Alcohol use: Yes     Alcohol/week: 1.0 standard drink of alcohol     Comment: 1 BEER DAILY      Family History:  History reviewed. No pertinent family history.    Home Meds:  Medications Prior to Admission   Medication Sig Dispense Refill Last Dose/Taking    albuterol sulfate  (90 Base) MCG/ACT inhaler INHALE 2 PUFFS BY MOUTH EVERY 6 HOURS AS NEEDED FOR WHEEZING 18 g 3 11/26/2024 Evening    amoxicillin (AMOXIL) 500 MG capsule Take 2 capsules by mouth 3 (Three) Times a Day.   11/26/2024 Evening    apixaban (Eliquis) 5 MG tablet tablet Take 1 tablet by mouth Every 12 (Twelve) Hours. 60 tablet 3 11/27/2024 Noon     atenolol (TENORMIN) 25 MG tablet Take 1 tablet by mouth 2 (Two) Times a Day.   11/26/2024 Evening    budesonide-formoterol (SYMBICORT) 160-4.5 MCG/ACT inhaler Inhale 2 puffs 2 (Two) Times a Day. 10.2 g 0 11/26/2024 Evening    busPIRone (BUSPAR) 15 MG tablet Take 1 tablet by mouth 2 (Two) Times a Day.   11/26/2024 Evening    digoxin (LANOXIN) 250 MCG tablet Take 1 tablet by mouth Daily. 90 tablet 3 11/26/2024 Evening    finasteride (PROSCAR) 5 MG tablet Take 1 tablet by mouth Daily. 90 tablet 0 11/26/2024 Evening    furosemide (LASIX) 80 MG tablet Take 1 tablet by mouth Daily. 90 tablet 3 11/26/2024 Evening    HYDROcodone-acetaminophen (NORCO)  MG per tablet Take 1 tablet by mouth Every 6 (Six) Hours As Needed for Moderate Pain.   Past Week    metFORMIN (GLUCOPHAGE) 500 MG tablet Take 1 tablet by mouth 2 (Two) Times a Day With Meals.   11/26/2024 Evening    mirtazapine (REMERON) 30 MG tablet Take 1 tablet by mouth Every Night. 30 tablet 2 11/26/2024 Evening    pantoprazole (PROTONIX) 40 MG EC tablet TAKE 1 TABLET BY MOUTH EVERY MORNING 30 tablet 5 11/26/2024 Evening    polyethylene glycol (MIRALAX) 17 g packet Take 17 g by mouth Daily. 30 each 3 11/26/2024 Evening    rosuvastatin (Crestor) 20 MG tablet Take 1 tablet by mouth Daily. 90 tablet 3 11/26/2024 Evening    spironolactone (ALDACTONE) 25 MG tablet Take 1 tablet by mouth Daily.   11/26/2024 Evening    tiZANidine (ZANAFLEX) 4 MG tablet Take 1 tablet by mouth 2 (Two) Times a Day As Needed for Muscle Spasms.   11/26/2024 Evening     Current Meds:   amiodarone, 200 mg, Oral, Q12H  apixaban, 5 mg, Oral, Q12H  atorvastatin, 40 mg, Oral, Nightly  budesonide-formoterol, 2 puff, Inhalation, BID  busPIRone, 10 mg, Oral, Q12H  cetirizine, 10 mg, Oral, Daily  finasteride, 5 mg, Oral, Daily  furosemide, 80 mg, Oral, Daily  Hydrocortisone (Perianal), , Rectal, BID  hydrocortisone, 1 Application, Topical, Q12H  hydrOXYzine, 25 mg, Oral, Once  insulin lispro, 2-9 Units,  Subcutaneous, 4x Daily AC & at Bedtime  insulin lispro, 4 Units, Subcutaneous, TID With Meals  melatonin, 5 mg, Oral, Nightly  metoprolol succinate XL, 50 mg, Oral, Q12H  mirtazapine, 30 mg, Oral, Nightly  pantoprazole, 40 mg, Oral, Q AM  potassium chloride, 20 mEq, Oral, Daily  senna-docusate sodium, 2 tablet, Oral, BID  sodium chloride, 3 mL, Intravenous, Q12H      Allergies:  No Known Allergies      Objective     Vital Signs  Temp:  [97.7 °F (36.5 °C)-98.4 °F (36.9 °C)] 97.7 °F (36.5 °C)  Heart Rate:  [73-94] 94  Resp:  [17-20] 20  BP: (120-144)/(70-89) 122/80    Physical Exam:   General: patient awake, alert and cooperative   Eyes: Normal lids and lashes, no scleral icterus   Neck: supple, normal ROM   Skin: warm and dry, not jaundiced   Cardiovascular: regular rhythm and rate,   Pulm: clear to auscultation bilaterally, regular and unlabored   Abdomen: soft, nontender, nondistended; normal bowel sounds    Results Review:   I reviewed the patient's new clinical results.    Results from last 7 days   Lab Units 12/02/24 0300 12/01/24 0434 11/30/24  0343   WBC 10*3/mm3 7.99 8.85 10.26   HEMOGLOBIN g/dL 11.7* 12.5* 12.2*   HEMATOCRIT % 41.2 41.7 42.3   PLATELETS 10*3/mm3 276 245 223     Results from last 7 days   Lab Units 12/02/24  0300 12/01/24 0434 11/30/24 0343 11/29/24  0242   SODIUM mmol/L 134* 135* 135* 136   POTASSIUM mmol/L 4.1 4.2 3.9 3.8   CHLORIDE mmol/L 99 97* 94* 96*   CO2 mmol/L 25.1 28.0 27.7 31.0*   BUN mg/dL 14 15 20 25*   CREATININE mg/dL 1.20 1.17 1.44* 1.68*   CALCIUM mg/dL 8.7 9.2 9.3 9.9   BILIRUBIN mg/dL 0.6  --  0.9 0.7   ALK PHOS U/L 88  --  95 96   ALT (SGPT) U/L 40  --  44* 50*   AST (SGOT) U/L 49*  --  55* 57*   GLUCOSE mg/dL 154* 128* 138* 123*         Lab Results   Lab Value Date/Time    LIPASE 20 03/08/2021 1108         Radiology:  XR Chest 1 View   Final Result   1. Cardiomegaly with mild vascular congestion.           This report was finalized on 11/27/2024 10:41 AM by   Ken Chin M.D on Workstation: [id number]              Assessment & Plan   Active Hospital Problems    Diagnosis     **Paroxysmal ventricular fibrillation     Digitalis toxicity     ICD (implantable cardioverter-defibrillator) discharge     Ventricular tachycardia (paroxysmal)     Morbid obesity     Diabetic polyneuropathy associated with type 2 diabetes mellitus     Hypokalemia     ANDIE (acute kidney injury)     Acute on chronic diastolic CHF (congestive heart failure)     High cholesterol     Essential hypertension     Anxiety and depression     Primary insomnia     ICD (implantable cardioverter-defibrillator) in place     Constipation     Atrial flutter     COPD (chronic obstructive pulmonary disease)     Anemia        Assessment:  VT/VF  ICD discharge prior to presentation to the hospital  Persistent A-fib on Eliquis  Bright red blood per rectum  No prior colonoscopy    Plan:  Hemoglobin stable, patient hemodynamically stable on exam.  Had lengthy discussion with patient today.  Given his rectal bleeding and no prior colonoscopy ideally he would have a scope done.  While bleeding is likely hemorrhoids we cannot rule out any other causes including masses or inflammation.  Cardiac history does increase risk quite a bit.  Would recommend completion of cardiac workup and continuation of Anusol suppositories.  He has been quite constipated so I will check a KUB and I do recommend scheduled bowel regimen in the meantime.  We will arrange close office follow-up to discuss potential plans for colonoscopy at that time pending cardiac workup.   Continue Anusol suppository  MiraLAX scheduled.  Would advise good bowel regimen at home.  We could consider Amitiza or Linzess in the future if needed as well    Patient and plan of care discussed with attending, Dr. Ben Zeng PA-C

## 2024-12-02 NOTE — PROGRESS NOTES
Name: Sebastien Tang ADMIT: 2024   : 1960  PCP: Yuliana Flynn DO    MRN: [id number] LOS: 5 days   AGE/SEX: 64 y.o. male  ROOM: [facility]     Subjective   Subjective   Patient lying in bed.  Continues to have bright red blood with bowel movements but none since yesterday.  Now feeling constipated. No dyspnea, chest pain.  Anxious over medical plan.           Objective   Objective   Vital Signs  Temp:  [97.7 °F (36.5 °C)-98.4 °F (36.9 °C)] 97.7 °F (36.5 °C)  Heart Rate:  [73-94] 94  Resp:  [17-20] 20  BP: (120-144)/(70-89) 122/80  SpO2:  [80 %-100 %] 97 %  on  Flow (L/min) (Oxygen Therapy):  [2] 2;   Device (Oxygen Therapy): room air  Body mass index is 38.37 kg/m².  Physical Exam  Vitals and nursing note reviewed.   Constitutional:       General: He is not in acute distress.  Cardiovascular:      Rate and Rhythm: Normal rate and regular rhythm.   Pulmonary:      Effort: Pulmonary effort is normal. No respiratory distress.   Abdominal:      General: Abdomen is flat. There is no distension.      Tenderness: There is no abdominal tenderness.   Musculoskeletal:         General: No swelling or deformity.   Skin:     General: Skin is warm and dry.   Neurological:      General: No focal deficit present.      Mental Status: He is alert. Mental status is at baseline.         Results Review     I reviewed the patient's new clinical results.  Results from last 7 days   Lab Units 24  03024  0242   WBC 10*3/mm3 7.99 8.85 10.26 9.66   HEMOGLOBIN g/dL 11.7* 12.5* 12.2* 11.8*   PLATELETS 10*3/mm3 276 245 223 253     Results from last 7 days   Lab Units 24  0300 24  0343 24  0242   SODIUM mmol/L 134* 135* 135* 136   POTASSIUM mmol/L 4.1 4.2 3.9 3.8   CHLORIDE mmol/L 99 97* 94* 96*   CO2 mmol/L 25.1 28.0 27.7 31.0*   BUN mg/dL 14 15 20 25*   CREATININE mg/dL 1.20 1.17 1.44* 1.68*   GLUCOSE mg/dL 154* 128* 138* 123*   Estimated Creatinine  Clearance: 72 mL/min (by C-G formula based on SCr of 1.2 mg/dL).  Results from last 7 days   Lab Units 12/02/24  0300 11/30/24  0343 11/29/24  0242 11/28/24  0545   ALBUMIN g/dL 3.7 3.7 3.9 3.8   BILIRUBIN mg/dL 0.6 0.9 0.7 0.8   ALK PHOS U/L 88 95 96 92   AST (SGOT) U/L 49* 55* 57* 72*   ALT (SGPT) U/L 40 44* 50* 57*     Results from last 7 days   Lab Units 12/02/24  0300 12/01/24  0434 11/30/24  1306 11/30/24  0343 11/29/24  0242 11/28/24  0545 11/27/24  1951 11/27/24  1011   CALCIUM mg/dL 8.7 9.2  --  9.3 9.9 9.8  --  10.0   ALBUMIN g/dL 3.7  --   --  3.7 3.9 3.8  --  4.0   MAGNESIUM mg/dL  --   --  2.3  --   --   --  2.8* 3.0*       COVID19   Date Value Ref Range Status   11/29/2024 Not Detected Not Detected - Ref. Range Final   03/15/2024 Not Detected Not Detected - Ref. Range Final     Glucose   Date/Time Value Ref Range Status   12/02/2024 1135 272 (H) 70 - 130 mg/dL Final   12/02/2024 0809 139 (H) 70 - 130 mg/dL Final   12/01/2024 2220 191 (H) 70 - 130 mg/dL Final   12/01/2024 2041 275 (H) 70 - 130 mg/dL Final   12/01/2024 1637 160 (H) 70 - 130 mg/dL Final   12/01/2024 1129 260 (H) 70 - 130 mg/dL Final   12/01/2024 1128 472 (C) 70 - 130 mg/dL Final       XR Chest 1 View  Narrative: XR CHEST 1 VW-11/27/2024     HISTORY: Shortness of breath.     Heart size is mild to moderately enlarged. There is some soft tissue  attenuation of bilateral lung markings with some mild vascular  congestion. No focal infiltrates are seen.     Cardiac pacemaker is seen in good position. Cardioversion type devices  overlie the left hemithorax.     Impression: 1. Cardiomegaly with mild vascular congestion.        This report was finalized on 11/27/2024 10:41 AM by Dr. Ken Chin M.D on Workstation: [id number]       I reviewed the patient's daily medications.  Scheduled Medications  amiodarone, 200 mg, Oral, Q12H  apixaban, 5 mg, Oral, Q12H  atorvastatin, 40 mg, Oral, Nightly  budesonide-formoterol, 2 puff, Inhalation,  BID  busPIRone, 10 mg, Oral, Q12H  cetirizine, 10 mg, Oral, Daily  finasteride, 5 mg, Oral, Daily  furosemide, 80 mg, Oral, Daily  Hydrocortisone (Perianal), , Rectal, BID  hydrocortisone, 1 Application, Topical, Q12H  hydrOXYzine, 25 mg, Oral, Once  insulin lispro, 2-9 Units, Subcutaneous, 4x Daily AC & at Bedtime  insulin lispro, 4 Units, Subcutaneous, TID With Meals  melatonin, 5 mg, Oral, Nightly  metoprolol succinate XL, 50 mg, Oral, Q12H  mirtazapine, 30 mg, Oral, Nightly  pantoprazole, 40 mg, Oral, Q AM  polyethylene glycol, 17 g, Oral, Daily  potassium chloride, 20 mEq, Oral, Daily  senna-docusate sodium, 2 tablet, Oral, BID  sodium chloride, 3 mL, Intravenous, Q12H    Infusions   Diet  Diet: Cardiac, Diabetic; Healthy Heart (2-3 Na+); Consistent Carbohydrate; Fluid Consistency: Thin (IDDSI 0)         I have personally reviewed:  [x]  Laboratory   []  Microbiology   [x]  Radiology   []  EKG/Telemetry   [x]  Cardiology/Vascular   []  Pathology   [x]  Records     Assessment/Plan     Active Hospital Problems    Diagnosis  POA    **Paroxysmal ventricular fibrillation [I49.01]  Yes    Digitalis toxicity [T46.0X1A]  Yes    ICD (implantable cardioverter-defibrillator) discharge [Z45.02]  Not Applicable    Ventricular tachycardia (paroxysmal) [I47.29]  Yes    Morbid obesity [E66.01]  Yes    Diabetic polyneuropathy associated with type 2 diabetes mellitus [E11.42]  Yes    Hypokalemia [E87.6]  Yes    ANDIE (acute kidney injury) [N17.9]  Yes    Acute on chronic diastolic CHF (congestive heart failure) [I50.33]  Yes    High cholesterol [E78.00]  Yes    Essential hypertension [I10]  Yes    Anxiety and depression [F41.9, F32.A]  Yes    Primary insomnia [F51.01]  Yes    ICD (implantable cardioverter-defibrillator) in place [Z95.810]  Yes    Constipation [K59.00]  Yes    Atrial flutter [I48.92]  Yes    COPD (chronic obstructive pulmonary disease) [J44.9]  Yes    Anemia [D64.9]  Yes      Resolved Hospital Problems   No  resolved problems to display.       64 y.o. male admitted with Paroxysmal ventricular fibrillation.    Sebastien Tang is a 64 y.o. male presents the hospital after multiple ICD discharges in the setting of ventricular tachycardia and ventricular fibrillation.        ICD discharges, ventricular tachycardia and ventricular fibrillation, PAF:  Consulted EP.  Initially placed on amiodarone drip initially and switched to oral amiodarone.  Metoprolol has been increased.  Cardiology defers cardiac catheterization for now.    Rectal bleeding  -Discussed with patient but patient feels like this needs to be evaluated for other  -GI consulted, with a stable hemoglobin plan for office follow-up to discuss colonoscopy as an outpatient    Constipation  -Continue Doc senna scheduled.  Given one-time dose of lactulose.  MiraLAX scheduled by GI, they think he may need him T zone status in the future     Elevated digoxin level: Hold digoxin.  Trend level.  Defer to cardiology whether they feel this medication eventually will need to be restarted     Acute kidney injury: Likely prerenal due to hypotension related to ventricular tachycardia.  Resolved     Acute diastolic CHF: IV diuresis initially provided and now switched back to oral diuretic.  Cardiology for assistance.      Hypokalemia: Replete potassium level.  Add low-dose daily potassium     Diabetes with polyneuropathy: Monitor glucose and adjust insulin as needed.  Metformin held     Insomnia: Continue Remeron, scheduled melatonin, add low-dose temazepam while in the hospital.  Patient very frustrated with his issues with insomnia.  Improved     BPH: Finasteride.  Monitor for any sign of urine retention.     Anxiety: Continue BuSpar.  Stable.     Hyperlipidemia: Continue statin.  Stable.     COPD: Monitor respiratory status.  Currently stable.  Symbicort.  As needed nebulizers.  Inhalers     GERD: PPI.  Stable.    Eliquis (home med) for DVT prophylaxis.  Full code.  Discussed  with patient and nursing staff.  Anticipate discharge home tomorrow.    Expected Discharge Date: 12/2/2024; Expected Discharge Time:       Ron Walton MD  [facility]  12/02/24  15:20 EST

## 2024-12-02 NOTE — PLAN OF CARE
Goal Outcome Evaluation:  Plan of Care Reviewed With: patient        Progress: improving  Outcome Evaluation: Pt tolerated treatment well this date. Required SBA for bed mobility and to stand. Pt ambulated 350ft w/ SBA-CGA, no AD used. Encouraged pt to ambulate in hallway w/ nsg during the day.    Anticipated Discharge Disposition (PT): home with assist, home with home health

## 2024-12-02 NOTE — PLAN OF CARE
Goal Outcome Evaluation:              Outcome Evaluation: Pt has maintained on RA all day, will need o2 to sleep, remains in afib with eliquis, sat up in chair today for quite some time and walked around in room w/ min assist, KCL replaced, safety maintained will CTM cl

## 2024-12-03 ENCOUNTER — TELEPHONE (OUTPATIENT)
Dept: GASTROENTEROLOGY | Facility: CLINIC | Age: 64
End: 2024-12-03
Payer: MEDICARE

## 2024-12-03 ENCOUNTER — READMISSION MANAGEMENT (OUTPATIENT)
Dept: CALL CENTER | Facility: HOSPITAL | Age: 64
End: 2024-12-03
Payer: MEDICARE

## 2024-12-03 VITALS
SYSTOLIC BLOOD PRESSURE: 136 MMHG | WEIGHT: 243.9 LBS | HEIGHT: 67 IN | DIASTOLIC BLOOD PRESSURE: 73 MMHG | TEMPERATURE: 97.7 F | HEART RATE: 91 BPM | OXYGEN SATURATION: 93 % | RESPIRATION RATE: 18 BRPM | BODY MASS INDEX: 38.28 KG/M2

## 2024-12-03 LAB
ALBUMIN SERPL-MCNC: 3.9 G/DL (ref 3.5–5.2)
ALBUMIN/GLOB SERPL: 1.3 G/DL
ALP SERPL-CCNC: 83 U/L (ref 39–117)
ALT SERPL W P-5'-P-CCNC: 42 U/L (ref 1–41)
ANION GAP SERPL CALCULATED.3IONS-SCNC: 10.7 MMOL/L (ref 5–15)
AST SERPL-CCNC: 47 U/L (ref 1–40)
BILIRUB SERPL-MCNC: 0.6 MG/DL (ref 0–1.2)
BUN SERPL-MCNC: 14 MG/DL (ref 8–23)
BUN/CREAT SERPL: 12.1 (ref 7–25)
CALCIUM SPEC-SCNC: 9 MG/DL (ref 8.6–10.5)
CHLORIDE SERPL-SCNC: 97 MMOL/L (ref 98–107)
CO2 SERPL-SCNC: 26.3 MMOL/L (ref 22–29)
CREAT SERPL-MCNC: 1.16 MG/DL (ref 0.76–1.27)
DEPRECATED RDW RBC AUTO: 50.4 FL (ref 37–54)
EGFRCR SERPLBLD CKD-EPI 2021: 69.5 ML/MIN/1.73
ERYTHROCYTE [DISTWIDTH] IN BLOOD BY AUTOMATED COUNT: 17.6 % (ref 12.3–15.4)
GLOBULIN UR ELPH-MCNC: 2.9 GM/DL
GLUCOSE BLDC GLUCOMTR-MCNC: 127 MG/DL (ref 70–130)
GLUCOSE BLDC GLUCOMTR-MCNC: 182 MG/DL (ref 70–130)
GLUCOSE SERPL-MCNC: 158 MG/DL (ref 65–99)
HCT VFR BLD AUTO: 41.6 % (ref 37.5–51)
HGB BLD-MCNC: 12 G/DL (ref 13–17.7)
MCH RBC QN AUTO: 22.9 PG (ref 26.6–33)
MCHC RBC AUTO-ENTMCNC: 28.8 G/DL (ref 31.5–35.7)
MCV RBC AUTO: 79.4 FL (ref 79–97)
PLATELET # BLD AUTO: 287 10*3/MM3 (ref 140–450)
PMV BLD AUTO: 9 FL (ref 6–12)
POTASSIUM SERPL-SCNC: 3.9 MMOL/L (ref 3.5–5.2)
PROT SERPL-MCNC: 6.8 G/DL (ref 6–8.5)
RBC # BLD AUTO: 5.24 10*6/MM3 (ref 4.14–5.8)
SODIUM SERPL-SCNC: 134 MMOL/L (ref 136–145)
WBC NRBC COR # BLD AUTO: 8.01 10*3/MM3 (ref 3.4–10.8)

## 2024-12-03 PROCEDURE — 82948 REAGENT STRIP/BLOOD GLUCOSE: CPT

## 2024-12-03 PROCEDURE — 80053 COMPREHEN METABOLIC PANEL: CPT | Performed by: INTERNAL MEDICINE

## 2024-12-03 PROCEDURE — 94799 UNLISTED PULMONARY SVC/PX: CPT

## 2024-12-03 PROCEDURE — 94664 DEMO&/EVAL PT USE INHALER: CPT

## 2024-12-03 PROCEDURE — 94618 PULMONARY STRESS TESTING: CPT

## 2024-12-03 PROCEDURE — 99232 SBSQ HOSP IP/OBS MODERATE 35: CPT

## 2024-12-03 PROCEDURE — 85027 COMPLETE CBC AUTOMATED: CPT | Performed by: INTERNAL MEDICINE

## 2024-12-03 PROCEDURE — 63710000001 INSULIN LISPRO (HUMAN) PER 5 UNITS: Performed by: INTERNAL MEDICINE

## 2024-12-03 RX ORDER — METOPROLOL SUCCINATE 50 MG/1
50 TABLET, EXTENDED RELEASE ORAL EVERY 12 HOURS SCHEDULED
Qty: 60 TABLET | Refills: 0 | Status: SHIPPED | OUTPATIENT
Start: 2024-12-03 | End: 2025-01-02

## 2024-12-03 RX ORDER — AMOXICILLIN 250 MG
2 CAPSULE ORAL 2 TIMES DAILY
Qty: 120 TABLET | Refills: 0 | Status: SHIPPED | OUTPATIENT
Start: 2024-12-03 | End: 2025-01-02

## 2024-12-03 RX ORDER — HYDROCORTISONE 25 MG/G
CREAM TOPICAL 2 TIMES DAILY
Qty: 28 G | Refills: 0 | Status: SHIPPED | OUTPATIENT
Start: 2024-12-03 | End: 2025-01-02

## 2024-12-03 RX ORDER — TEMAZEPAM 15 MG/1
15 CAPSULE ORAL NIGHTLY PRN
Qty: 5 CAPSULE | Refills: 0 | Status: SHIPPED | OUTPATIENT
Start: 2024-12-03

## 2024-12-03 RX ORDER — AMIODARONE HYDROCHLORIDE 200 MG/1
200 TABLET ORAL EVERY 12 HOURS SCHEDULED
Qty: 60 TABLET | Refills: 0 | Status: SHIPPED | OUTPATIENT
Start: 2024-12-03 | End: 2025-01-02

## 2024-12-03 RX ORDER — POLYETHYLENE GLYCOL 3350 17 G/17G
17 POWDER, FOR SOLUTION ORAL DAILY PRN
Qty: 510 G | Refills: 0 | Status: SHIPPED | OUTPATIENT
Start: 2024-12-03 | End: 2025-01-02

## 2024-12-03 RX ORDER — ONDANSETRON 4 MG/1
4 TABLET, ORALLY DISINTEGRATING ORAL EVERY 8 HOURS PRN
Qty: 21 TABLET | Refills: 0 | Status: SHIPPED | OUTPATIENT
Start: 2024-12-03 | End: 2024-12-10

## 2024-12-03 RX ORDER — BUSPIRONE HYDROCHLORIDE 10 MG/1
10 TABLET ORAL 3 TIMES DAILY
Qty: 90 TABLET | Refills: 0 | Status: SHIPPED | OUTPATIENT
Start: 2024-12-03

## 2024-12-03 RX ADMIN — BUDESONIDE AND FORMOTEROL FUMARATE DIHYDRATE 2 PUFF: 160; 4.5 AEROSOL RESPIRATORY (INHALATION) at 07:32

## 2024-12-03 RX ADMIN — Medication 3 ML: at 08:26

## 2024-12-03 RX ADMIN — SENNOSIDES AND DOCUSATE SODIUM 2 TABLET: 50; 8.6 TABLET ORAL at 08:26

## 2024-12-03 RX ADMIN — FUROSEMIDE 80 MG: 40 TABLET ORAL at 08:26

## 2024-12-03 RX ADMIN — APIXABAN 5 MG: 5 TABLET, FILM COATED ORAL at 05:20

## 2024-12-03 RX ADMIN — METOPROLOL SUCCINATE 50 MG: 50 TABLET, EXTENDED RELEASE ORAL at 08:26

## 2024-12-03 RX ADMIN — POTASSIUM CHLORIDE 20 MEQ: 750 TABLET, EXTENDED RELEASE ORAL at 08:26

## 2024-12-03 RX ADMIN — CETIRIZINE HYDROCHLORIDE 10 MG: 10 TABLET, FILM COATED ORAL at 08:26

## 2024-12-03 RX ADMIN — BUSPIRONE HYDROCHLORIDE 10 MG: 5 TABLET ORAL at 08:26

## 2024-12-03 RX ADMIN — INSULIN LISPRO 4 UNITS: 100 INJECTION, SOLUTION INTRAVENOUS; SUBCUTANEOUS at 08:27

## 2024-12-03 RX ADMIN — TEMAZEPAM 15 MG: 15 CAPSULE ORAL at 00:34

## 2024-12-03 RX ADMIN — AMIODARONE HYDROCHLORIDE 200 MG: 200 TABLET ORAL at 08:26

## 2024-12-03 RX ADMIN — HYDROCORTISONE 1 APPLICATION: 1 CREAM TOPICAL at 08:27

## 2024-12-03 RX ADMIN — HYDROCORTISONE: 25 CREAM TOPICAL at 08:28

## 2024-12-03 RX ADMIN — PANTOPRAZOLE SODIUM 40 MG: 40 TABLET, DELAYED RELEASE ORAL at 05:20

## 2024-12-03 RX ADMIN — FINASTERIDE 5 MG: 5 TABLET, FILM COATED ORAL at 08:26

## 2024-12-03 RX ADMIN — ACETAMINOPHEN 325MG 650 MG: 325 TABLET ORAL at 08:26

## 2024-12-03 RX ADMIN — POLYETHYLENE GLYCOL 3350 17 G: 17 POWDER, FOR SOLUTION ORAL at 08:26

## 2024-12-03 NOTE — TELEPHONE ENCOUNTER
Please arrange follow-up with Sindy Grady or Lyndsey Christopher following discharge in the next 2-4 weeks.

## 2024-12-03 NOTE — CASE MANAGEMENT/SOCIAL WORK
Case Management Discharge Note      Final Note: Home, family to transport         Selected Continued Care - Discharged on 12/3/2024 Admission date: 11/27/2024 - Discharge disposition: Home or Self Care      Destination    No services have been selected for the patient.                Durable Medical Equipment    No services have been selected for the patient.                Dialysis/Infusion    No services have been selected for the patient.                Home Medical Care    No services have been selected for the patient.                Therapy    No services have been selected for the patient.                Community Resources    No services have been selected for the patient.                Community & DME    No services have been selected for the patient.                    Transportation Services  Private: Car    Final Discharge Disposition Code: 01 - home or self-care

## 2024-12-03 NOTE — PROGRESS NOTES
[facility]  Inpatient Progress Note    Reason for Follow Up:  BRBPR started on Eliquis     Subjective     Interval History:     KUB completed yesterday showed mild to moderate colorectal stool.  MiraLAX has been scheduled.    He reports that he is not having any abdominal pain today.  He had 2 bowel movements since he was seen yesterday with no blood.  No black bowel movements.  Tolerating diet well.    Hemoglobin stable at 12.0 today.      Current Facility-Administered Medications:     acetaminophen (TYLENOL) tablet 650 mg, 650 mg, Oral, Q4H PRN, 650 mg at 12/02/24 0915 **OR** acetaminophen (TYLENOL) 160 MG/5ML oral solution 650 mg, 650 mg, Oral, Q4H PRN **OR** acetaminophen (TYLENOL) suppository 650 mg, 650 mg, Rectal, Q4H PRN, Tato Parks MD    albuterol (PROVENTIL) nebulizer solution 0.083% 2.5 mg/3mL, 2.5 mg, Nebulization, Q6H PRN, Tato Parks MD    amiodarone (PACERONE) tablet 200 mg, 200 mg, Oral, Q12H, Sang Kennedy MD, 200 mg at 12/02/24 2105    apixaban (ELIQUIS) tablet 5 mg, 5 mg, Oral, Q12H, Tato Parks MD, 5 mg at 12/03/24 0520    atorvastatin (LIPITOR) tablet 40 mg, 40 mg, Oral, Nightly, Tato Parks MD, 40 mg at 12/02/24 2105    sennosides-docusate (PERICOLACE) 8.6-50 MG per tablet 2 tablet, 2 tablet, Oral, BID, 2 tablet at 12/02/24 2105 **AND** polyethylene glycol (MIRALAX) packet 17 g, 17 g, Oral, Daily PRN, 17 g at 12/01/24 1808 **AND** bisacodyl (DULCOLAX) EC tablet 5 mg, 5 mg, Oral, Daily PRN, 5 mg at 12/01/24 0950 **AND** bisacodyl (DULCOLAX) suppository 10 mg, 10 mg, Rectal, Daily PRN, Tato Parks MD    budesonide-formoterol (SYMBICORT) 160-4.5 MCG/ACT inhaler 2 puff, 2 puff, Inhalation, BID, Tato Parks MD, 2 puff at 12/03/24 0732    busPIRone (BUSPAR) tablet 10 mg, 10 mg, Oral, Q12H, Tato Parks MD, 10 mg at 12/02/24 5210    Calcium Replacement - Follow Nurse / BPA Driven  Protocol, , Not Applicable, PRN, Tato Parks MD    cetirizine (zyrTEC) tablet 10 mg, 10 mg, Oral, Daily, Tato Parks MD, 10 mg at 12/02/24 0916    dextrose (D50W) (25 g/50 mL) IV injection 25 g, 25 g, Intravenous, Q15 Min PRN, Tato Parks MD    dextrose (GLUTOSE) oral gel 15 g, 15 g, Oral, Q15 Min PRN, Tato Parks MD    famotidine (PEPCID) tablet 10 mg, 10 mg, Oral, BID PRN, Tato Parks MD, 10 mg at 12/01/24 0950    finasteride (PROSCAR) tablet 5 mg, 5 mg, Oral, Daily, Tato Parks MD, 5 mg at 12/02/24 0916    furosemide (LASIX) tablet 80 mg, 80 mg, Oral, Daily, Tato Parks MD, 80 mg at 12/02/24 0916    glucagon (GLUCAGEN) injection 1 mg, 1 mg, Intramuscular, Q15 Min PRN, Tato Parks MD    Hydrocortisone (Perianal) (ANUSOL-HC) 2.5 % rectal cream, , Rectal, BID, Tato Parks MD, Given at 12/02/24 2106    hydrocortisone 1 % cream 1 Application, 1 Application, Topical, Q12H, Tato Parks MD, 1 Application at 12/02/24 2106    hydrOXYzine (ATARAX) tablet 25 mg, 25 mg, Oral, Once, Sindy Castellon, APRN    insulin lispro (HUMALOG/ADMELOG) injection 2-9 Units, 2-9 Units, Subcutaneous, 4x Daily AC & at Bedtime, Tato Parks MD, 2 Units at 12/02/24 2105    insulin lispro (HUMALOG/ADMELOG) injection 4 Units, 4 Units, Subcutaneous, TID With Meals, Tato Parks MD, 4 Units at 12/02/24 1647    Magnesium Low Dose Replacement - Follow Nurse / BPA Driven Protocol, , Not Applicable, PRN, Tato Parks MD    melatonin tablet 5 mg, 5 mg, Oral, Nightly, Tato Parks MD, 5 mg at 11/30/24 2058    metoprolol succinate XL (TOPROL-XL) 24 hr tablet 50 mg, 50 mg, Oral, Q12H, Ron Jara, APRN, 50 mg at 12/02/24 2105    mirtazapine (REMERON) tablet 30 mg, 30 mg, Oral, Nightly, Tato Parks MD, 30 mg at 12/02/24 2105    nitroglycerin (NITROSTAT) SL tablet  0.4 mg, 0.4 mg, Sublingual, Q5 Min PRN, Tato Parks MD    ondansetron ODT (ZOFRAN-ODT) disintegrating tablet 4 mg, 4 mg, Oral, Q6H PRN, 4 mg at 11/28/24 2056 **OR** ondansetron (ZOFRAN) injection 4 mg, 4 mg, Intravenous, Q6H PRN, Tato Parks MD    pantoprazole (PROTONIX) EC tablet 40 mg, 40 mg, Oral, Q AM, Tato Parks MD, 40 mg at 12/03/24 0520    Phosphorus Replacement - Follow Nurse / BPA Driven Protocol, , Not Applicable, PRNKasey Stephen Matthew, MD    polyethylene glycol (MIRALAX) packet 17 g, 17 g, Oral, Daily, Hanh Zeng PA-C, 17 g at 12/02/24 1647    potassium chloride (K-DUR,KLOR-CON) ER tablet 20 mEq, 20 mEq, Oral, Daily, Tato Parks MD, 20 mEq at 12/02/24 0916    Potassium Replacement - Follow Nurse / BPA Driven Protocol, , Not Applicable, PRN, He Hernandez MD    Potassium Replacement - Follow Nurse / BPA Driven Protocol, , Not Applicable, PRKasey RAE Stephen Matthew, MD    [COMPLETED] Insert Peripheral IV, , , Once **AND** sodium chloride 0.9 % flush 10 mL, 10 mL, Intravenous, PRN, He Hernandez MD    sodium chloride 0.9 % flush 3 mL, 3 mL, Intravenous, Q12H, Tato Parks MD, 3 mL at 12/02/24 2106    sodium chloride 0.9 % flush 3-10 mL, 3-10 mL, Intravenous, PRN, Tato Parks MD    sodium chloride 0.9 % infusion 40 mL, 40 mL, Intravenous, PRN, Tato Parks MD    temazepam (RESTORIL) capsule 15 mg, 15 mg, Oral, Nightly, Amauri Lundy, APRN, 15 mg at 12/03/24 0034    Objective     Vital Signs  Temp:  [97.5 °F (36.4 °C)-98.1 °F (36.7 °C)] 98.1 °F (36.7 °C)  Heart Rate:  [84-94] 84  Resp:  [20] 20  BP: (120-136)/(56-89) 135/56  Body mass index is 38.2 kg/m².    Intake/Output Summary (Last 24 hours) at 12/3/2024 0740  Last data filed at 12/3/2024 0030  Gross per 24 hour   Intake 1400 ml   Output --   Net 1400 ml     No intake/output data recorded.     Physical Exam:   General: patient awake,  alert and cooperative   Cardiovascular: regular rhythm and rate   Pulm: regular and unlabored   Abdomen: soft, nontender, nondistended; normal bowel sounds     Results Review:     I reviewed the patient's new clinical results.    Results from last 7 days   Lab Units 12/03/24  0231 12/02/24 0300 12/01/24  0434   WBC 10*3/mm3 8.01 7.99 8.85   HEMOGLOBIN g/dL 12.0* 11.7* 12.5*   HEMATOCRIT % 41.6 41.2 41.7   PLATELETS 10*3/mm3 287 276 245     Results from last 7 days   Lab Units 12/03/24  0230 12/02/24  0300 12/01/24  0434 11/30/24  0343   SODIUM mmol/L 134* 134* 135* 135*   POTASSIUM mmol/L 3.9 4.1 4.2 3.9   CHLORIDE mmol/L 97* 99 97* 94*   CO2 mmol/L 26.3 25.1 28.0 27.7   BUN mg/dL 14 14 15 20   CREATININE mg/dL 1.16 1.20 1.17 1.44*   CALCIUM mg/dL 9.0 8.7 9.2 9.3   BILIRUBIN mg/dL 0.6 0.6  --  0.9   ALK PHOS U/L 83 88  --  95   ALT (SGPT) U/L 42* 40  --  44*   AST (SGOT) U/L 47* 49*  --  55*   GLUCOSE mg/dL 158* 154* 128* 138*         Lab Results   Lab Value Date/Time    LIPASE 20 03/08/2021 1108       Radiology:  XR Abdomen KUB   Final Result   Nonobstructive bowel gas pattern with mild to moderate colorectal stool.       This report was finalized on 12/2/2024 3:26 PM by Thee Choudhury MD on   Workstation: [id number]          XR Chest 1 View   Final Result   1. Cardiomegaly with mild vascular congestion.           This report was finalized on 11/27/2024 10:41 AM by Dr. Ken Chin M.D on Workstation: [id number]              Assessment & Plan     Active Hospital Problems    Diagnosis     **Paroxysmal ventricular fibrillation     Digitalis toxicity     ICD (implantable cardioverter-defibrillator) discharge     Ventricular tachycardia (paroxysmal)     Morbid obesity     Diabetic polyneuropathy associated with type 2 diabetes mellitus     Hypokalemia     ANDIE (acute kidney injury)     Acute on chronic diastolic CHF (congestive heart failure)     High cholesterol     Essential hypertension     Anxiety and  depression     Primary insomnia     ICD (implantable cardioverter-defibrillator) in place     Constipation     Atrial flutter     COPD (chronic obstructive pulmonary disease)     Anemia        Assessment:  VT/VF  ICD discharge prior to presentation to the hospital  Persistent A-fib on Eliquis  Bright red blood per rectum  No prior colonoscopy      Plan:  Hemoglobin stable.  Patient hemodynamically stable on exam today.  No further rectal bleeding.  He was quite constipated on KUB.  I do recommend regular bowel regimen to help regulate bowels and reduce straining.  Will start with MiraLAX once daily.  Okay to use Anusol suppositories as needed as well.  Recommend completion of cardiac workup prior to any procedures.  With bleeding and no prior colonoscopy he would benefit from a colonoscopy at some point if cleared by cardiology. While bleeding is likely hemorrhoids we cannot rule out any other causes including masses or inflammation without endoscopic evaluation.  Will arrange outpatient follow-up to discuss potential outpatient colonoscopy.    At this point, GI will sign off.  Please not hesitate to call back if needed             Hanh Zeng   [facility]  [phone]

## 2024-12-03 NOTE — PROGRESS NOTES
Electrophysiology Follow-Up Note      Patient Name: Sebastien Tang  Age/Sex: 64 y.o. male  : 1960  MRN: [id number]      Day of Service: 24       Chief Complaint/Follow-up: VT/VF, ICD discharge, persistent AF    S: heart rate better today, says he feels better overall. Eager for discharge.       Temp:  [97.5 °F (36.4 °C)-98.1 °F (36.7 °C)] 98.1 °F (36.7 °C)  Heart Rate:  [79-94] 79  Resp:  [20] 20  BP: (122-136)/(56-84) 135/56     PHYSICAL EXAM:    General Appearance: No acute distress, well developed and well nourished.   Eyes: Conjunctiva and lids: No erythema, swelling, or discharge. Sclera non-icteric.   HENT: Atraumatic, normocephalic. External eyes, ears, and nose normal.   Respiratory: No signs of respiratory distress. Respiration rhythm and depth normal.   Clear to auscultation. No rales, crackles, rhonchi, or wheezing auscultated.   Cardiovascular:  Heart Rate and Rhythm: Normal, Heart Sounds: Normal S1 and S2. No S3 or S4 noted.  Gastrointestinal:  Abdomen soft, non-distended, non-tender.  Musculoskeletal: Normal movement of extremities  Skin: Warm and dry.   Psychiatric: Patient alert and oriented to person, place, and time. Speech and behavior appropriate. Normal mood and affect.         Results from last 7 days   Lab Units 24  02324  0300 24  0434   SODIUM mmol/L 134* 134* 135*   POTASSIUM mmol/L 3.9 4.1 4.2   CHLORIDE mmol/L 97* 99 97*   CO2 mmol/L 26.3 25.1 28.0   BUN mg/dL 14 14 15   CREATININE mg/dL 1.16 1.20 1.17   GLUCOSE mg/dL 158* 154* 128*   CALCIUM mg/dL 9.0 8.7 9.2     Results from last 7 days   Lab Units 24  02324  0300 24  0434   WBC 10*3/mm3 8.01 7.99 8.85   HEMOGLOBIN g/dL 12.0* 11.7* 12.5*   HEMATOCRIT % 41.6 41.2 41.7   PLATELETS 10*3/mm3 287 276 245         Results from last 7 days   Lab Units 24  1259 24  1011   HSTROP T ng/L 137* 146*               Current Medications:   Scheduled Meds:amiodarone, 200 mg, Oral,  Q12H  apixaban, 5 mg, Oral, Q12H  atorvastatin, 40 mg, Oral, Nightly  budesonide-formoterol, 2 puff, Inhalation, BID  busPIRone, 10 mg, Oral, Q12H  cetirizine, 10 mg, Oral, Daily  finasteride, 5 mg, Oral, Daily  furosemide, 80 mg, Oral, Daily  Hydrocortisone (Perianal), , Rectal, BID  hydrocortisone, 1 Application, Topical, Q12H  hydrOXYzine, 25 mg, Oral, Once  insulin lispro, 2-9 Units, Subcutaneous, 4x Daily AC & at Bedtime  insulin lispro, 4 Units, Subcutaneous, TID With Meals  melatonin, 5 mg, Oral, Nightly  metoprolol succinate XL, 50 mg, Oral, Q12H  mirtazapine, 30 mg, Oral, Nightly  pantoprazole, 40 mg, Oral, Q AM  polyethylene glycol, 17 g, Oral, Daily  potassium chloride, 20 mEq, Oral, Daily  senna-docusate sodium, 2 tablet, Oral, BID  sodium chloride, 3 mL, Intravenous, Q12H  temazepam, 15 mg, Oral, Nightly            Paroxysmal ventricular fibrillation    Atrial flutter    COPD (chronic obstructive pulmonary disease)    Anemia    Constipation    ICD (implantable cardioverter-defibrillator) in place    Anxiety and depression    Primary insomnia    Essential hypertension    High cholesterol    Acute on chronic diastolic CHF (congestive heart failure)    Digitalis toxicity    ICD (implantable cardioverter-defibrillator) discharge    Ventricular tachycardia (paroxysmal)    Morbid obesity    Diabetic polyneuropathy associated with type 2 diabetes mellitus    Hypokalemia    ANDIE (acute kidney injury)       Plan:   VT/VF---- was in acute HF on admission. Since diuresing and initiation of amiodarone, no further events. Continue amiodarone 200mg BID until follow up.      2. ICD discharge--- We will continue amiodaorne and defer cath for now, if more events then consider as outpatient.      3. HFrEF--- on GDMT with metoprolol and lasix, consider increasing as outpatient if able to tolerate.      4. Persistent AF--- now back in AF, continue apixaban. I increased his metoprolol for better rate control. Will defer any  upgrade to device for now.       Okay for discharge, we will see as needed.             ERIC Walter  12/03/24  08:25 EST

## 2024-12-03 NOTE — OUTREACH NOTE
Prep Survey      Flowsheet Row Responses   [facility] patient discharged from? [place]   Is LACE score < 7 ? No   Eligibility [facility]   Date of Admission 11/27/24   Date of Discharge 12/03/24   Discharge Disposition Home or Self Care   Discharge diagnosis Paroxysmal ventricular fibrillation, ICD discharge   Does the patient have one of the following disease processes/diagnoses(primary or secondary)? Other   Does the patient have Home health ordered? No   Is there a DME ordered? No   Prep survey completed? Yes            Gina NGUYEN - Registered Nurse

## 2024-12-03 NOTE — PLAN OF CARE
Goal Outcome Evaluation:  Plan of Care Reviewed With: patient        Progress: no change  Outcome Evaluation: Maintained on R/a. Pt does require 02 at 2L N/C to keep sats >90%. Pt will desat into low 80's on r/a while sleeping. 2 soft BM's during shift. No blood noted. No c/o's of chest pain or pressure. No soa or dyspnea. Safety maintained.

## 2024-12-03 NOTE — DISCHARGE SUMMARY
Patient Name: Sebastien Tang  : 1960  MRN: [id number]    Date of Admission: 2024  Date of Discharge:  12/3/2024  Primary Care Physician: Yuliana Flynn DO      Chief Complaint:   Pacemaker Problem      Discharge Diagnoses     Active Hospital Problems    Diagnosis  POA    **Paroxysmal ventricular fibrillation [I49.01]  Yes    Digitalis toxicity [T46.0X1A]  Yes    ICD (implantable cardioverter-defibrillator) discharge [Z45.02]  Not Applicable    Ventricular tachycardia (paroxysmal) [I47.29]  Yes    Morbid obesity [E66.01]  Yes    Diabetic polyneuropathy associated with type 2 diabetes mellitus [E11.42]  Yes    Hypokalemia [E87.6]  Yes    ANDIE (acute kidney injury) [N17.9]  Yes    Acute on chronic diastolic CHF (congestive heart failure) [I50.33]  Yes    High cholesterol [E78.00]  Yes    Essential hypertension [I10]  Yes    Anxiety and depression [F41.9, F32.A]  Yes    Primary insomnia [F51.01]  Yes    ICD (implantable cardioverter-defibrillator) in place [Z95.810]  Yes    Constipation [K59.00]  Yes    Atrial flutter [I48.92]  Yes    COPD (chronic obstructive pulmonary disease) [J44.9]  Yes    Anemia [D64.9]  Yes      Resolved Hospital Problems   No resolved problems to display.        Hospital Course     Mr. Tang is a 64 y.o. male with a history of paroxysmal atrial fibrillation, ventricular tachycardia status post ICD presenting with multiple ICD charges.  EP was consulted and patient was on Amio drip that was switched to oral amiodarone.  Digoxin has been discontinued.  Metoprolol succinate has been added and increased to 50 mg twice daily.  Patient had some rectal bleeding that is consistent with his chronic hemorrhoids.  GI was consulted and with stable hemoglobins they will follow-up in office to discuss colonoscopy after cardiology clears him.  Patient's had some constipation, plan to discharge home on a bowel regimen.  Resume diabetes medications on discharge.    Discussed with PCP, okay to  discharge on temazepam and they will take over prescription at the follow-up appointment in a few days.    At the time of discharge patient was told to take all medications as prescribed, keep all follow-up appointments, and call their doctor or return to the hospital with any worsening or concerning symptoms.    Day of Discharge     Subjective:  Patient feeling much better today after having multiple bowel movements.  No further blood.  Discussed plan for discharge later today.    Review of Systems   Constitutional:  Negative for chills and fever.   Respiratory:  Negative for cough and shortness of breath.    Cardiovascular:  Negative for chest pain and palpitations.   Gastrointestinal:  Negative for abdominal pain, diarrhea, nausea and vomiting.       Physical Exam:  Temp:  [97.5 °F (36.4 °C)-98.1 °F (36.7 °C)] 97.7 °F (36.5 °C)  Heart Rate:  [79-94] 91  Resp:  [18-20] 18  BP: (122-136)/(56-84) 136/73  Body mass index is 38.2 kg/m².  Physical Exam  Vitals and nursing note reviewed.   Constitutional:       General: He is not in acute distress.  Cardiovascular:      Rate and Rhythm: Normal rate and regular rhythm.   Pulmonary:      Effort: Pulmonary effort is normal. No respiratory distress.   Abdominal:      General: Abdomen is flat. There is no distension.      Tenderness: There is no abdominal tenderness.   Musculoskeletal:         General: No swelling or deformity.   Skin:     General: Skin is warm and dry.   Neurological:      General: No focal deficit present.      Mental Status: He is alert. Mental status is at baseline.         Consultants     Consult Orders (all) (From admission, onward)       Start     Ordered    12/02/24 1037  Inpatient Gastroenterology Consult  Once        Specialty:  Gastroenterology  Provider:  Ruddy Contreras MD    12/02/24 1038    11/27/24 1159  Inpatient Cardiology Consult  Once        Specialty:  Cardiology  Provider:  Nik Rosas MD    11/27/24 1159    11/27/24  1115  A (on-call MD unless specified) Details  Once        Specialty:  Hospitalist  Provider:  (Not yet assigned)    11/27/24 1114                  Procedures     Imaging Results (All)       Procedure Component Value Units Date/Time    XR Abdomen KUB [[id number]] Collected: 12/02/24 1524     Updated: 12/02/24 1529    Narrative:      XR ABDOMEN KUB-     DATE OF EXAM: 12/2/2024 2:53 PM     INDICATION: Reported constipation. Assess stool burden.     COMPARISON: Chest radiograph 11/27/2024. CT chest 3/16/2024. CT abdomen  pelvis 4/1/2021.     TECHNIQUE: Supine AP views of the abdomen and pelvis were obtained.     FINDINGS:  Mild to moderate colorectal stool. No significantly dilated small bowel  loops. No evidence of pneumatosis. Phleboliths in the pelvis. No  concerning calcifications partially imaged bibasilar opacities. No acute  osseous abnormality is identified.       Impression:      Nonobstructive bowel gas pattern with mild to moderate colorectal stool.     This report was finalized on 12/2/2024 3:26 PM by Thee Choudhury MD on  Workstation: [id number]       XR Chest 1 View [[id number]] Collected: 11/27/24 1040     Updated: 11/27/24 1044    Narrative:      XR CHEST 1 VW-11/27/2024     HISTORY: Shortness of breath.     Heart size is mild to moderately enlarged. There is some soft tissue  attenuation of bilateral lung markings with some mild vascular  congestion. No focal infiltrates are seen.     Cardiac pacemaker is seen in good position. Cardioversion type devices  overlie the left hemithorax.       Impression:      1. Cardiomegaly with mild vascular congestion.        This report was finalized on 11/27/2024 10:41 AM by Dr. Ken Chin M.D on Workstation: [id number]               Pertinent Labs     Results from last 7 days   Lab Units 12/03/24  0231 12/02/24  0300 12/01/24  0434 11/30/24  0343   WBC 10*3/mm3 8.01 7.99 8.85 10.26   HEMOGLOBIN g/dL 12.0* 11.7* 12.5* 12.2*   PLATELETS 10*3/mm3 287 276  "503 223     Results from last 7 days   Lab Units 12/03/24 0230 12/02/24 0300 12/01/24 0434 11/30/24  0343   SODIUM mmol/L 134* 134* 135* 135*   POTASSIUM mmol/L 3.9 4.1 4.2 3.9   CHLORIDE mmol/L 97* 99 97* 94*   CO2 mmol/L 26.3 25.1 28.0 27.7   BUN mg/dL 14 14 15 20   CREATININE mg/dL 1.16 1.20 1.17 1.44*   GLUCOSE mg/dL 158* 154* 128* 138*   Estimated Creatinine Clearance: 74.5 mL/min (by C-G formula based on SCr of 1.16 mg/dL).  Results from last 7 days   Lab Units 12/03/24 0230 12/02/24 0300 11/30/24 0343 11/29/24  0242   ALBUMIN g/dL 3.9 3.7 3.7 3.9   BILIRUBIN mg/dL 0.6 0.6 0.9 0.7   ALK PHOS U/L 83 88 95 96   AST (SGOT) U/L 47* 49* 55* 57*   ALT (SGPT) U/L 42* 40 44* 50*     Results from last 7 days   Lab Units 12/03/24 0230 12/02/24 0300 12/01/24 0434 11/30/24  1306 11/30/24  0343 11/29/24  0242 11/28/24  0545 11/27/24  1951 11/27/24  1011   CALCIUM mg/dL 9.0 8.7 9.2  --  9.3 9.9   < >  --  10.0   ALBUMIN g/dL 3.9 3.7  --   --  3.7 3.9   < >  --  4.0   MAGNESIUM mg/dL  --   --   --  2.3  --   --   --  2.8* 3.0*    < > = values in this interval not displayed.       Results from last 7 days   Lab Units 11/30/24 0343 11/29/24  0242 11/27/24  1259 11/27/24  1011   HSTROP T ng/L  --   --  137* 146*   PROBNP pg/mL  --   --   --  6,000.0*   DIGOXIN LVL ng/mL 1.10 1.80*  --  1.60*     Results from last 7 days   Lab Units 11/27/24  1301   SODIUM UR mmol/L 75   CREATININE UR mg/dL 11.1         Invalid input(s): \"LDLCALC\"        Test Results Pending at Discharge       Discharge Details        Discharge Medications        New Medications        Instructions Start Date   amiodarone 200 MG tablet  Commonly known as: PACERONE   200 mg, Oral, Every 12 Hours Scheduled      Hydrocortisone (Perianal) 2.5 % rectal cream  Commonly known as: ANUSOL-HC   Rectal, 2 Times Daily      metoprolol succinate XL 50 MG 24 hr tablet  Commonly known as: TOPROL-XL   50 mg, Oral, Every 12 Hours Scheduled      ondansetron ODT 4 MG " disintegrating tablet  Commonly known as: ZOFRAN-ODT   4 mg, Oral, Every 8 Hours PRN      sennosides-docusate 8.6-50 MG per tablet  Commonly known as: PERICOLACE   2 tablets, Oral, 2 Times Daily             Changes to Medications        Instructions Start Date   busPIRone 10 MG tablet  Commonly known as: BUSPAR  What changed: Another medication with the same name was removed. Continue taking this medication, and follow the directions you see here.   10 mg, Oral, 3 Times Daily      polyethylene glycol 17 g packet  Commonly known as: MIRALAX  What changed: Another medication with the same name was added. Make sure you understand how and when to take each.   17 g, Oral, Daily      polyethylene glycol 17 GM/SCOOP powder  Commonly known as: MIRALAX  What changed: You were already taking a medication with the same name, and this prescription was added. Make sure you understand how and when to take each.   17 g, Oral, Daily PRN             Continue These Medications        Instructions Start Date   albuterol sulfate  (90 Base) MCG/ACT inhaler  Commonly known as: PROVENTIL HFA;VENTOLIN HFA;PROAIR HFA   2 puffs, Inhalation, Every 6 Hours PRN      apixaban 5 MG tablet tablet  Commonly known as: Eliquis   5 mg, Oral, Every 12 Hours      budesonide-formoterol 160-4.5 MCG/ACT inhaler  Commonly known as: SYMBICORT   2 puffs, Inhalation, 2 Times Daily      finasteride 5 MG tablet  Commonly known as: PROSCAR   5 mg, Oral, Daily      furosemide 80 MG tablet  Commonly known as: LASIX   80 mg, Oral, Daily      metFORMIN 500 MG tablet  Commonly known as: GLUCOPHAGE   500 mg, Oral, 2 Times Daily With Meals      mirtazapine 30 MG tablet  Commonly known as: REMERON   30 mg, Oral, Nightly      pantoprazole 40 MG EC tablet  Commonly known as: PROTONIX   40 mg, Oral, Every Early Morning      rosuvastatin 20 MG tablet  Commonly known as: Crestor   20 mg, Oral, Daily      spironolactone 25 MG tablet  Commonly known as: ALDACTONE   1  tablet, Oral, Daily      tiZANidine 4 MG tablet  Commonly known as: ZANAFLEX   4 mg, Oral, 2 Times Daily PRN             Stop These Medications      amoxicillin 500 MG capsule  Commonly known as: AMOXIL     atenolol 25 MG tablet  Commonly known as: TENORMIN     digoxin 250 MCG tablet  Commonly known as: LANOXIN     HYDROcodone-acetaminophen  MG per tablet  Commonly known as: NORCO              No Known Allergies      Discharge Disposition:  Home or Self Care    Discharge Diet:  Diet Order   Procedures    Diet: Cardiac, Diabetic; Healthy Heart (2-3 Na+); Consistent Carbohydrate; Fluid Consistency: Thin (IDDSI 0)       Discharge Activity:   As tolerated    CODE STATUS:    Code Status and Medical Interventions: CPR (Attempt to Resuscitate); Full Support   Ordered at: 11/27/24 1226     Level Of Support Discussed With:    Patient     Code Status (Patient has no pulse and is not breathing):    CPR (Attempt to Resuscitate)     Medical Interventions (Patient has pulse or is breathing):    Full Support       Future Appointments   Date Time Provider Department Center   12/12/2024  3:00 PM Yuliana Flynn DO [facility]  [id number]   12/16/2024 11:00 AM Ron Jara APRN [facility] CD [id number] None      Follow-up Information       Yuliana Flynn DO .    Specialty: Family Medicine  Contact information:  [address]  [place] [address]  [phone]               Hanh Zeng PA-C Follow up.    Specialty: Gastroenterology  Contact information:  [address]  [place] [address]  [phone]               Ron Jara APRN Follow up.    Specialties: Nurse Practitioner, Cardiology  Contact information:  [address]  [address]  [place] [address]  [phone]                             Time Spent on Discharge:  Greater than 30 minutes      Ron Walton MD  [facility]  12/03/24  12:41 EST

## 2024-12-03 NOTE — PROGRESS NOTES
Exercise Oximetry    Patient Name:Sebastien Tang   MRN: [id number]   Date: 12/03/24             ROOM AIR BASELINE   SpO2% 94   Heart Rate 81   Blood Pressure      EXERCISE ON ROOM AIR SpO2% EXERCISE ON O2 @ LPM SpO2%   1 MINUTE 93 1 MINUTE    2 MINUTES 91 2 MINUTES    3 MINUTES 90 3 MINUTES    4 MINUTES 90 4 MINUTES    5 MINUTES 89 5 MINUTES    6 MINUTES 90 6 MINUTES               Distance Walked   Distance Walked   Dyspnea (Na Scale)  5 Dyspnea (Na Scale)   Fatigue (Na Scale) 5 Fatigue (Na Scale)   SpO2% Post Exercise  93 SpO2% Post Exercise   HR Post Exercise  90 HR Post Exercise   Time to Recovery  5 Time to Recovery     Comments: Pt agreeable to ambulating around room. Pt on RA at rest sat 94%, once ambulating, lowest sat 89. Used forehead probe to monitor sat. Pt very SOA following short walk. Pt remains on RA.

## 2024-12-03 NOTE — CASE MANAGEMENT/SOCIAL WORK
Continued Stay Note  [facility]     Patient Name: Sebastien Tang  MRN: [id number]  Today's Date: 12/3/2024    Admit Date: 11/27/2024    Plan: Home, family to transport   Discharge Plan       Row Name 12/03/24 1200       Plan    Plan Home, family to transport    Plan Comments Met briefly with pt at bedside. Denies any dc needs. Passed walking oximetry. Does not want home health at this time. Family to transport home.                   Discharge Codes    No documentation.                 Expected Discharge Date and Time       Expected Discharge Date Expected Discharge Time    Dec 3, 2024               Serena Thompson RN

## 2024-12-04 ENCOUNTER — TRANSITIONAL CARE MANAGEMENT TELEPHONE ENCOUNTER (OUTPATIENT)
Dept: CALL CENTER | Facility: HOSPITAL | Age: 64
End: 2024-12-04
Payer: MEDICARE

## 2024-12-04 NOTE — OUTREACH NOTE
Call Center TCM Note      Flowsheet Row Responses   [facility] patient discharged from? [place]   Does the patient have one of the following disease processes/diagnoses(primary or secondary)? Other   TCM attempt successful? No   Unsuccessful attempts Attempt 1   Call Status Voice mail issues  [MAILBOX FULL]            Gely Mckenna MA    12/4/2024, 10:45 EST

## 2024-12-04 NOTE — OUTREACH NOTE
Call Center TCM Note      Flowsheet Row Responses   [facility] patient discharged from? [place]   Does the patient have one of the following disease processes/diagnoses(primary or secondary)? Other   TCM attempt successful? No   Unsuccessful attempts Attempt 2            Gely Mckenna MA    12/4/2024, 15:31 EST

## 2024-12-05 ENCOUNTER — TRANSITIONAL CARE MANAGEMENT TELEPHONE ENCOUNTER (OUTPATIENT)
Dept: CALL CENTER | Facility: HOSPITAL | Age: 64
End: 2024-12-05
Payer: MEDICARE

## 2024-12-05 NOTE — OUTREACH NOTE
Call Center TCM Note      Flowsheet Row Responses   [facility] patient discharged from? [place]   Does the patient have one of the following disease processes/diagnoses(primary or secondary)? Other   TCM attempt successful? No   Unsuccessful attempts Attempt 3   Call Status Voice mail issues   Comments Cardiology with devicie check 12/16/24 10:30 AM   Does the patient have an appointment with their PCP within 7-14 days of discharge? Yes  [12/12/24 at 3 PM]            Pinky Foreman RN    12/5/2024, 10:41 EST

## 2024-12-06 LAB
QT INTERVAL: 365 MS
QTC INTERVAL: 421 MS

## 2024-12-12 ENCOUNTER — TELEPHONE (OUTPATIENT)
Dept: FAMILY MEDICINE CLINIC | Facility: CLINIC | Age: 64
End: 2024-12-12

## 2024-12-12 NOTE — TELEPHONE ENCOUNTER
Caller: Sebastien Tang    Relationship: Self    Best call back number:     [phone]       What was the call regarding: FYI    PATIENT WANTS YOU TO KNOW THAT HE IS GOING WITH A DIFFERENT PCP FOR HIS MEDICAL CARE.         No PT/OT/ST  Evaluated for early intervention and did not qualify.

## 2024-12-18 ENCOUNTER — TELEPHONE (OUTPATIENT)
Dept: GASTROENTEROLOGY | Facility: CLINIC | Age: 64
End: 2024-12-18
Payer: MEDICARE

## 2024-12-18 NOTE — TELEPHONE ENCOUNTER
Hub to relay and schedule appt w/ Jaz  Called and was unable to leave , patient needs a appt w/ Jaz. He is a Dr Briseno patient.

## 2024-12-18 NOTE — TELEPHONE ENCOUNTER
Can you see if this patient can come in on January 3 at 2:45 or 1:45?     Per aj caldwell above     Ok for the hub to relay

## 2024-12-18 NOTE — TELEPHONE ENCOUNTER
----- Message from Sindy Grady sent at 12/17/2024  5:47 PM EST -----  -this patient will follow Dr. Briseno because he was the original consulting DrJesús at first hospital visit.  He apparently needs a 4-week follow-up with our office.  Please see what Jaz has available.    Natalie originally sent this to me but is not my/Dr. Richard/Dr. Contreras's patient.  ----- Message -----  From: Natalie Richey RegSched Rep  Sent: 12/16/2024   3:31 PM EST  To: EZRA Slaughter patient need a 4 week follow up with you, and you're first available its on March can you please sent a date and time to schedule pt. Thanks

## 2025-01-01 ENCOUNTER — APPOINTMENT (OUTPATIENT)
Dept: GENERAL RADIOLOGY | Facility: HOSPITAL | Age: 65
DRG: 871 | End: 2025-01-01
Payer: MEDICARE

## 2025-01-01 ENCOUNTER — HOSPITAL ENCOUNTER (INPATIENT)
Facility: HOSPITAL | Age: 65
LOS: 5 days | DRG: 871 | End: 2025-03-08
Attending: EMERGENCY MEDICINE | Admitting: INTERNAL MEDICINE
Payer: MEDICARE

## 2025-01-01 ENCOUNTER — APPOINTMENT (OUTPATIENT)
Dept: CT IMAGING | Facility: HOSPITAL | Age: 65
DRG: 871 | End: 2025-01-01
Payer: MEDICARE

## 2025-01-01 ENCOUNTER — ANESTHESIA (OUTPATIENT)
Dept: PERIOP | Facility: HOSPITAL | Age: 65
End: 2025-01-01
Payer: MEDICARE

## 2025-01-01 ENCOUNTER — ANESTHESIA EVENT (OUTPATIENT)
Dept: PERIOP | Facility: HOSPITAL | Age: 65
End: 2025-01-01
Payer: MEDICARE

## 2025-01-01 ENCOUNTER — APPOINTMENT (OUTPATIENT)
Dept: ULTRASOUND IMAGING | Facility: HOSPITAL | Age: 65
DRG: 871 | End: 2025-01-01
Payer: MEDICARE

## 2025-01-01 ENCOUNTER — APPOINTMENT (OUTPATIENT)
Dept: CARDIOLOGY | Facility: HOSPITAL | Age: 65
DRG: 871 | End: 2025-01-01
Payer: MEDICARE

## 2025-01-01 ENCOUNTER — INPATIENT HOSPITAL (OUTPATIENT)
Dept: URBAN - METROPOLITAN AREA HOSPITAL 113 | Facility: HOSPITAL | Age: 65
End: 2025-01-01
Payer: MEDICARE

## 2025-01-01 VITALS
RESPIRATION RATE: 26 BRPM | OXYGEN SATURATION: 100 % | DIASTOLIC BLOOD PRESSURE: 72 MMHG | HEART RATE: 87 BPM | SYSTOLIC BLOOD PRESSURE: 103 MMHG | BODY MASS INDEX: 39.21 KG/M2 | WEIGHT: 244 LBS | TEMPERATURE: 98.2 F | HEIGHT: 66 IN

## 2025-01-01 DIAGNOSIS — K20.80 OTHER ESOPHAGITIS WITHOUT BLEEDING: ICD-10-CM

## 2025-01-01 DIAGNOSIS — I50.9 MULTI-ORGAN FAILURE WITH HEART FAILURE: ICD-10-CM

## 2025-01-01 DIAGNOSIS — N17.9 AKI (ACUTE KIDNEY INJURY): ICD-10-CM

## 2025-01-01 DIAGNOSIS — N17.9 ACUTE KIDNEY INJURY: ICD-10-CM

## 2025-01-01 DIAGNOSIS — K80.50 CALCULUS OF BILE DUCT WITHOUT CHOLANGITIS OR CHOLECYSTITIS W: ICD-10-CM

## 2025-01-01 DIAGNOSIS — J96.01 ACUTE HYPOXIC RESPIRATORY FAILURE: Primary | ICD-10-CM

## 2025-01-01 DIAGNOSIS — R10.9 UNSPECIFIED ABDOMINAL PAIN: ICD-10-CM

## 2025-01-01 DIAGNOSIS — M62.82 NON-TRAUMATIC RHABDOMYOLYSIS: ICD-10-CM

## 2025-01-01 DIAGNOSIS — R74.01 TRANSAMINITIS: ICD-10-CM

## 2025-01-01 DIAGNOSIS — K62.5 BRIGHT RED BLOOD PER RECTUM: ICD-10-CM

## 2025-01-01 DIAGNOSIS — K57.10 DIVERTICULOSIS OF SMALL INTESTINE WITHOUT PERFORATION OR ABS: ICD-10-CM

## 2025-01-01 DIAGNOSIS — R94.5 ABNORMAL RESULTS OF LIVER FUNCTION STUDIES: ICD-10-CM

## 2025-01-01 DIAGNOSIS — K80.50 CHOLEDOCHOLITHIASIS: ICD-10-CM

## 2025-01-01 DIAGNOSIS — I50.9 ACUTE CONGESTIVE HEART FAILURE, UNSPECIFIED HEART FAILURE TYPE: ICD-10-CM

## 2025-01-01 LAB
ABO GROUP BLD: NORMAL
ALBUMIN SERPL-MCNC: 3.2 G/DL (ref 3.5–5.2)
ALBUMIN SERPL-MCNC: 3.3 G/DL (ref 3.5–5.2)
ALBUMIN SERPL-MCNC: 3.4 G/DL (ref 3.5–5.2)
ALBUMIN SERPL-MCNC: 3.6 G/DL (ref 3.5–5.2)
ALBUMIN SERPL-MCNC: 3.7 G/DL (ref 3.5–5.2)
ALBUMIN SERPL-MCNC: 4.2 G/DL (ref 3.5–5.2)
ALBUMIN/GLOB SERPL: 1.1 G/DL
ALBUMIN/GLOB SERPL: 1.3 G/DL
ALBUMIN/GLOB SERPL: 1.4 G/DL
ALP SERPL-CCNC: 148 U/L (ref 39–117)
ALP SERPL-CCNC: 149 U/L (ref 39–117)
ALP SERPL-CCNC: 159 U/L (ref 39–117)
ALP SERPL-CCNC: 161 U/L (ref 39–117)
ALP SERPL-CCNC: 192 U/L (ref 39–117)
ALT SERPL W P-5'-P-CCNC: 1148 U/L (ref 1–41)
ALT SERPL W P-5'-P-CCNC: 1260 U/L (ref 1–41)
ALT SERPL W P-5'-P-CCNC: 1312 U/L (ref 1–41)
ALT SERPL W P-5'-P-CCNC: 1315 U/L (ref 1–41)
ALT SERPL W P-5'-P-CCNC: 1551 U/L (ref 1–41)
AMMONIA BLD-SCNC: 84 UMOL/L (ref 16–60)
ANION GAP SERPL CALCULATED.3IONS-SCNC: 19.4 MMOL/L (ref 5–15)
ANION GAP SERPL CALCULATED.3IONS-SCNC: 21.9 MMOL/L (ref 5–15)
ANION GAP SERPL CALCULATED.3IONS-SCNC: 23 MMOL/L (ref 5–15)
ANION GAP SERPL CALCULATED.3IONS-SCNC: 24 MMOL/L (ref 5–15)
ANION GAP SERPL CALCULATED.3IONS-SCNC: 24 MMOL/L (ref 5–15)
ANION GAP SERPL CALCULATED.3IONS-SCNC: 25.3 MMOL/L (ref 5–15)
ANION GAP SERPL CALCULATED.3IONS-SCNC: 29.4 MMOL/L (ref 5–15)
ANION GAP SERPL CALCULATED.3IONS-SCNC: 30.4 MMOL/L (ref 5–15)
ANION GAP SERPL CALCULATED.3IONS-SCNC: 31 MMOL/L (ref 5–15)
ANISOCYTOSIS BLD QL: ABNORMAL
AORTIC DIMENSIONLESS INDEX: 0.6 (DI)
APAP SERPL-MCNC: 16 MCG/ML (ref 0–30)
APTT PPP: 32.3 SECONDS (ref 22.7–35.4)
ARTERIAL PATENCY WRIST A: ABNORMAL
ARTERIAL PATENCY WRIST A: POSITIVE
AST SERPL-CCNC: 1461 U/L (ref 1–40)
AST SERPL-CCNC: 1840 U/L (ref 1–40)
AST SERPL-CCNC: 2340 U/L (ref 1–40)
AST SERPL-CCNC: 2704 U/L (ref 1–40)
AST SERPL-CCNC: 2994 U/L (ref 1–40)
ATMOSPHERIC PRESS: 731.2 MMHG
ATMOSPHERIC PRESS: 732.5 MMHG
ATMOSPHERIC PRESS: 735.1 MMHG
ATMOSPHERIC PRESS: 737.5 MMHG
ATMOSPHERIC PRESS: 739 MMHG
ATMOSPHERIC PRESS: 743.5 MMHG
ATMOSPHERIC PRESS: 747.7 MMHG
ATMOSPHERIC PRESS: 747.7 MMHG
ATMOSPHERIC PRESS: 748.4 MMHG
AV MEAN PRESS GRAD SYS DOP V1V2: 2.6 MMHG
AV VMAX SYS DOP: 106.8 CM/SEC
BACTERIA UR QL AUTO: ABNORMAL /HPF
BASE EXCESS BLDA CALC-SCNC: -1.3 MMOL/L (ref 0–2)
BASE EXCESS BLDA CALC-SCNC: -1.4 MMOL/L (ref 0–2)
BASE EXCESS BLDA CALC-SCNC: -14.7 MMOL/L (ref 0–2)
BASE EXCESS BLDA CALC-SCNC: -2.4 MMOL/L (ref 0–2)
BASE EXCESS BLDA CALC-SCNC: -2.6 MMOL/L (ref 0–2)
BASE EXCESS BLDA CALC-SCNC: -3.9 MMOL/L (ref 0–2)
BASE EXCESS BLDA CALC-SCNC: -5 MMOL/L (ref -5–5)
BASE EXCESS BLDA CALC-SCNC: -6.3 MMOL/L (ref 0–2)
BASE EXCESS BLDA CALC-SCNC: -6.5 MMOL/L (ref 0–2)
BASE EXCESS BLDV CALC-SCNC: -6.3 MMOL/L (ref -2–2)
BASOPHILS # BLD AUTO: 0.01 10*3/MM3 (ref 0–0.2)
BASOPHILS # BLD MANUAL: 0 10*3/MM3 (ref 0–0.2)
BASOPHILS NFR BLD AUTO: 0.1 % (ref 0–1.5)
BASOPHILS NFR BLD MANUAL: 0 % (ref 0–1.5)
BDY SITE: ABNORMAL
BH CV ECHO MEAS - AO MAX PG: 4.6 MMHG
BH CV ECHO MEAS - AO ROOT DIAM: 3.4 CM
BH CV ECHO MEAS - AO V2 VTI: 12.8 CM
BH CV ECHO MEAS - AVA(I,D): 2.45 CM2
BH CV ECHO MEAS - EDV(CUBED): 158.7 ML
BH CV ECHO MEAS - ESV(CUBED): 105.6 ML
BH CV ECHO MEAS - FS: 12.7 %
BH CV ECHO MEAS - IVS/LVPW: 0.88 CM
BH CV ECHO MEAS - IVSD: 0.83 CM
BH CV ECHO MEAS - LAT PEAK E' VEL: 13.5 CM/SEC
BH CV ECHO MEAS - LV MASS(C)D: 175.8 GRAMS
BH CV ECHO MEAS - LV MAX PG: 1.94 MMHG
BH CV ECHO MEAS - LV MEAN PG: 1.17 MMHG
BH CV ECHO MEAS - LV V1 MAX: 69.7 CM/SEC
BH CV ECHO MEAS - LV V1 VTI: 8.7 CM
BH CV ECHO MEAS - LVIDD: 5.4 CM
BH CV ECHO MEAS - LVIDS: 4.7 CM
BH CV ECHO MEAS - LVOT AREA: 3.6 CM2
BH CV ECHO MEAS - LVOT DIAM: 2.14 CM
BH CV ECHO MEAS - LVPWD: 0.93 CM
BH CV ECHO MEAS - MED PEAK E' VEL: 11 CM/SEC
BH CV ECHO MEAS - MR MAX PG: 51.3 MMHG
BH CV ECHO MEAS - MR MAX VEL: 358 CM/SEC
BH CV ECHO MEAS - MV DEC SLOPE: 239.4 CM/SEC2
BH CV ECHO MEAS - MV DEC TIME: 211 SEC
BH CV ECHO MEAS - MV E MAX VEL: 85.4 CM/SEC
BH CV ECHO MEAS - MV MAX PG: 1.99 MMHG
BH CV ECHO MEAS - MV MEAN PG: 0.93 MMHG
BH CV ECHO MEAS - MV P1/2T: 88.6 MSEC
BH CV ECHO MEAS - MV V2 VTI: 17.3 CM
BH CV ECHO MEAS - MVA(P1/2T): 2.48 CM2
BH CV ECHO MEAS - MVA(VTI): 1.82 CM2
BH CV ECHO MEAS - PA ACC TIME: 0.08 SEC
BH CV ECHO MEAS - PA V2 MAX: 59.4 CM/SEC
BH CV ECHO MEAS - RAP SYSTOLE: 15 MMHG
BH CV ECHO MEAS - RV MAX PG: 0.85 MMHG
BH CV ECHO MEAS - RV V1 MAX: 46.1 CM/SEC
BH CV ECHO MEAS - RV V1 VTI: 6.7 CM
BH CV ECHO MEAS - RVSP: 33.5 MMHG
BH CV ECHO MEAS - SV(LVOT): 31.5 ML
BH CV ECHO MEAS - SVI(LVOT): 14.5 ML/M2
BH CV ECHO MEAS - TAPSE (>1.6): 1.24 CM
BH CV ECHO MEAS - TR MAX PG: 18.5 MMHG
BH CV ECHO MEAS - TR MAX VEL: 215.3 CM/SEC
BH CV ECHO MEASUREMENTS AVERAGE E/E' RATIO: 6.97
BH CV XLRA - RV BASE: 5.2 CM
BH CV XLRA - RV LENGTH: 10.2 CM
BH CV XLRA - RV MID: 4.9 CM
BH CV XLRA - TDI S': 10.9 CM/SEC
BILIRUB SERPL-MCNC: 1 MG/DL (ref 0–1.2)
BILIRUB SERPL-MCNC: 1.1 MG/DL (ref 0–1.2)
BILIRUB SERPL-MCNC: 1.2 MG/DL (ref 0–1.2)
BILIRUB SERPL-MCNC: 1.3 MG/DL (ref 0–1.2)
BILIRUB SERPL-MCNC: 1.7 MG/DL (ref 0–1.2)
BILIRUB UR QL STRIP: NEGATIVE
BLD GP AB SCN SERPL QL: NEGATIVE
BUN BLDA-MCNC: >118 MG/DL (ref 8–26)
BUN SERPL-MCNC: 110 MG/DL (ref 8–23)
BUN SERPL-MCNC: 110 MG/DL (ref 8–23)
BUN SERPL-MCNC: 115 MG/DL (ref 8–23)
BUN SERPL-MCNC: 35 MG/DL (ref 8–23)
BUN SERPL-MCNC: 53 MG/DL (ref 8–23)
BUN SERPL-MCNC: 75 MG/DL (ref 8–23)
BUN SERPL-MCNC: 81 MG/DL (ref 8–23)
BUN SERPL-MCNC: 86 MG/DL (ref 8–23)
BUN SERPL-MCNC: 91 MG/DL (ref 8–23)
BUN/CREAT SERPL: 14.3 (ref 7–25)
BUN/CREAT SERPL: 14.6 (ref 7–25)
BUN/CREAT SERPL: 14.9 (ref 7–25)
BUN/CREAT SERPL: 15.6 (ref 7–25)
BUN/CREAT SERPL: 17.4 (ref 7–25)
BUN/CREAT SERPL: 17.7 (ref 7–25)
BUN/CREAT SERPL: 20 (ref 7–25)
BUN/CREAT SERPL: 20.1 (ref 7–25)
BUN/CREAT SERPL: 20.4 (ref 7–25)
BURR CELLS BLD QL SMEAR: ABNORMAL
CA-I BLDA-SCNC: 0.81 MMOL/L (ref 2.2–2.55)
CA-I SERPL ISE-MCNC: 0.87 MMOL/L (ref 1.15–1.35)
CA-I SERPL ISE-MCNC: 0.9 MMOL/L (ref 1.15–1.35)
CA-I SERPL ISE-MCNC: 0.91 MMOL/L (ref 1.15–1.35)
CALCIUM SPEC-SCNC: 6.9 MG/DL (ref 8.6–10.5)
CALCIUM SPEC-SCNC: 7 MG/DL (ref 8.6–10.5)
CALCIUM SPEC-SCNC: 7 MG/DL (ref 8.6–10.5)
CALCIUM SPEC-SCNC: 7.1 MG/DL (ref 8.6–10.5)
CALCIUM SPEC-SCNC: 7.2 MG/DL (ref 8.6–10.5)
CALCIUM SPEC-SCNC: 7.3 MG/DL (ref 8.6–10.5)
CALCIUM SPEC-SCNC: 7.5 MG/DL (ref 8.6–10.5)
CALCIUM SPEC-SCNC: 7.8 MG/DL (ref 8.6–10.5)
CALCIUM SPEC-SCNC: 8.7 MG/DL (ref 8.6–10.5)
CHLORIDE BLDA-SCNC: 92 MMOL/L (ref 98–107)
CHLORIDE SERPL-SCNC: 100 MMOL/L (ref 98–107)
CHLORIDE SERPL-SCNC: 100 MMOL/L (ref 98–107)
CHLORIDE SERPL-SCNC: 80 MMOL/L (ref 98–107)
CHLORIDE SERPL-SCNC: 83 MMOL/L (ref 98–107)
CHLORIDE SERPL-SCNC: 84 MMOL/L (ref 98–107)
CHLORIDE SERPL-SCNC: 86 MMOL/L (ref 98–107)
CHLORIDE SERPL-SCNC: 88 MMOL/L (ref 98–107)
CHLORIDE SERPL-SCNC: 92 MMOL/L (ref 98–107)
CHLORIDE SERPL-SCNC: 94 MMOL/L (ref 98–107)
CK SERPL-CCNC: 5096 U/L (ref 20–200)
CK SERPL-CCNC: 8174 U/L (ref 20–200)
CK SERPL-CCNC: ABNORMAL U/L (ref 20–200)
CK SERPL-CCNC: ABNORMAL U/L (ref 20–200)
CLARITY UR: CLEAR
CO2 BLDA-SCNC: 20.2 MMOL/L (ref 23–27)
CO2 BLDA-SCNC: 20.9 MMOL/L (ref 23–27)
CO2 BLDA-SCNC: 23.3 MMOL/L (ref 23–27)
CO2 BLDA-SCNC: 26 MMOL/L (ref 24–29)
CO2 BLDA-SCNC: 26.3 MMOL/L (ref 23–27)
CO2 BLDA-SCNC: 27.5 MMOL/L (ref 23–27)
CO2 BLDA-SCNC: 28.3 MMOL/L (ref 23–27)
CO2 SERPL-SCNC: 14.7 MMOL/L (ref 22–29)
CO2 SERPL-SCNC: 15 MMOL/L (ref 22–29)
CO2 SERPL-SCNC: 15.6 MMOL/L (ref 22–29)
CO2 SERPL-SCNC: 17 MMOL/L (ref 22–29)
CO2 SERPL-SCNC: 17.6 MMOL/L (ref 22–29)
CO2 SERPL-SCNC: 18 MMOL/L (ref 22–29)
CO2 SERPL-SCNC: 20.6 MMOL/L (ref 22–29)
CO2 SERPL-SCNC: 22.1 MMOL/L (ref 22–29)
CO2 SERPL-SCNC: 23 MMOL/L (ref 22–29)
COLOR UR: YELLOW
CREAT BLDA-MCNC: 7.62 MG/DL (ref 0.6–1.3)
CREAT SERPL-MCNC: 2.45 MG/DL (ref 0.76–1.27)
CREAT SERPL-MCNC: 3.39 MG/DL (ref 0.76–1.27)
CREAT SERPL-MCNC: 4.3 MG/DL (ref 0.76–1.27)
CREAT SERPL-MCNC: 4.53 MG/DL (ref 0.76–1.27)
CREAT SERPL-MCNC: 5.02 MG/DL (ref 0.76–1.27)
CREAT SERPL-MCNC: 5.39 MG/DL (ref 0.76–1.27)
CREAT SERPL-MCNC: 5.54 MG/DL (ref 0.76–1.27)
CREAT SERPL-MCNC: 6.22 MG/DL (ref 0.76–1.27)
CREAT SERPL-MCNC: 6.61 MG/DL (ref 0.76–1.27)
CREAT UR-MCNC: 18.6 MG/DL
D-LACTATE SERPL-SCNC: 1.2 MMOL/L (ref 0.5–2)
D-LACTATE SERPL-SCNC: 1.9 MMOL/L (ref 0.5–2)
D-LACTATE SERPL-SCNC: 2.1 MMOL/L
D-LACTATE SERPL-SCNC: 2.3 MMOL/L (ref 0.5–2)
D-LACTATE SERPL-SCNC: 2.4 MMOL/L (ref 0.5–2)
D-LACTATE SERPL-SCNC: 2.7 MMOL/L (ref 0.5–2)
D-LACTATE SERPL-SCNC: 4.2 MMOL/L (ref 0.5–2)
D-LACTATE SERPL-SCNC: 4.4 MMOL/L
D-LACTATE SERPL-SCNC: 4.8 MMOL/L (ref 0.5–2)
D-LACTATE SERPL-SCNC: 5.4 MMOL/L (ref 0.5–2)
D-LACTATE SERPL-SCNC: 7.8 MMOL/L (ref 0.5–2)
D-LACTATE SERPL-SCNC: 7.9 MMOL/L (ref 0.5–2)
D-LACTATE SERPL-SCNC: 8.5 MMOL/L (ref 0.5–2)
DEPRECATED RDW RBC AUTO: 44.2 FL (ref 37–54)
DEPRECATED RDW RBC AUTO: 44.3 FL (ref 37–54)
DEPRECATED RDW RBC AUTO: 44.7 FL (ref 37–54)
DEPRECATED RDW RBC AUTO: 46.1 FL (ref 37–54)
DEPRECATED RDW RBC AUTO: 48.5 FL (ref 37–54)
DEPRECATED RDW RBC AUTO: 48.6 FL (ref 37–54)
DEVICE COMMENT: ABNORMAL
EGFRCR SERPLBLD CKD-EPI 2021: 10.6 ML/MIN/1.73
EGFRCR SERPLBLD CKD-EPI 2021: 10.9 ML/MIN/1.73
EGFRCR SERPLBLD CKD-EPI 2021: 11.9 ML/MIN/1.73
EGFRCR SERPLBLD CKD-EPI 2021: 13.5 ML/MIN/1.73
EGFRCR SERPLBLD CKD-EPI 2021: 14.3 ML/MIN/1.73
EGFRCR SERPLBLD CKD-EPI 2021: 19.1 ML/MIN/1.73
EGFRCR SERPLBLD CKD-EPI 2021: 28.1 ML/MIN/1.73
EGFRCR SERPLBLD CKD-EPI 2021: 8.6 ML/MIN/1.73
EGFRCR SERPLBLD CKD-EPI 2021: 9.2 ML/MIN/1.73
ELLIPTOCYTES BLD QL SMEAR: ABNORMAL
ELLIPTOCYTES BLD QL SMEAR: ABNORMAL
EOSINOPHIL # BLD AUTO: 0 10*3/MM3 (ref 0–0.4)
EOSINOPHIL # BLD MANUAL: 0 10*3/MM3 (ref 0–0.4)
EOSINOPHIL # BLD MANUAL: 0.19 10*3/MM3 (ref 0–0.4)
EOSINOPHIL NFR BLD AUTO: 0 % (ref 0.3–6.2)
EOSINOPHIL NFR BLD MANUAL: 0 % (ref 0.3–6.2)
EOSINOPHIL NFR BLD MANUAL: 1 % (ref 0.3–6.2)
ERYTHROCYTE [DISTWIDTH] IN BLOOD BY AUTOMATED COUNT: 17 % (ref 12.3–15.4)
ERYTHROCYTE [DISTWIDTH] IN BLOOD BY AUTOMATED COUNT: 17.1 % (ref 12.3–15.4)
ERYTHROCYTE [DISTWIDTH] IN BLOOD BY AUTOMATED COUNT: 17.2 % (ref 12.3–15.4)
ERYTHROCYTE [DISTWIDTH] IN BLOOD BY AUTOMATED COUNT: 17.3 % (ref 12.3–15.4)
ERYTHROCYTE [DISTWIDTH] IN BLOOD BY AUTOMATED COUNT: 17.4 % (ref 12.3–15.4)
ERYTHROCYTE [DISTWIDTH] IN BLOOD BY AUTOMATED COUNT: 17.8 % (ref 12.3–15.4)
EXPIRATORY TIME: 33
GAS FLOW AIRWAY: 2 LPM
GAS FLOW AIRWAY: 6 LPM
GEN 5 1HR TROPONIN T REFLEX: 106 NG/L
GIANT PLATELETS: ABNORMAL
GLOBULIN UR ELPH-MCNC: 2.4 GM/DL
GLOBULIN UR ELPH-MCNC: 2.8 GM/DL
GLOBULIN UR ELPH-MCNC: 2.8 GM/DL
GLOBULIN UR ELPH-MCNC: 2.9 GM/DL
GLOBULIN UR ELPH-MCNC: 3.3 GM/DL
GLUCOSE BLDC GLUCOMTR-MCNC: 100 MG/DL (ref 70–130)
GLUCOSE BLDC GLUCOMTR-MCNC: 101 MG/DL (ref 70–130)
GLUCOSE BLDC GLUCOMTR-MCNC: 101 MG/DL (ref 70–130)
GLUCOSE BLDC GLUCOMTR-MCNC: 105 MG/DL (ref 65–99)
GLUCOSE BLDC GLUCOMTR-MCNC: 106 MG/DL (ref 70–130)
GLUCOSE BLDC GLUCOMTR-MCNC: 107 MG/DL (ref 70–130)
GLUCOSE BLDC GLUCOMTR-MCNC: 110 MG/DL (ref 70–130)
GLUCOSE BLDC GLUCOMTR-MCNC: 121 MG/DL (ref 70–130)
GLUCOSE BLDC GLUCOMTR-MCNC: 132 MG/DL (ref 70–130)
GLUCOSE BLDC GLUCOMTR-MCNC: 133 MG/DL (ref 70–130)
GLUCOSE BLDC GLUCOMTR-MCNC: 140 MG/DL (ref 70–130)
GLUCOSE BLDC GLUCOMTR-MCNC: 142 MG/DL (ref 70–130)
GLUCOSE BLDC GLUCOMTR-MCNC: 147 MG/DL (ref 70–130)
GLUCOSE BLDC GLUCOMTR-MCNC: 189 MG/DL (ref 70–130)
GLUCOSE BLDC GLUCOMTR-MCNC: 190 MG/DL (ref 70–130)
GLUCOSE BLDC GLUCOMTR-MCNC: 205 MG/DL (ref 70–130)
GLUCOSE BLDC GLUCOMTR-MCNC: 235 MG/DL (ref 70–130)
GLUCOSE BLDC GLUCOMTR-MCNC: 241 MG/DL (ref 70–130)
GLUCOSE BLDC GLUCOMTR-MCNC: 266 MG/DL (ref 70–130)
GLUCOSE BLDC GLUCOMTR-MCNC: 56 MG/DL (ref 70–130)
GLUCOSE BLDC GLUCOMTR-MCNC: 56 MG/DL (ref 70–130)
GLUCOSE BLDC GLUCOMTR-MCNC: 77 MG/DL (ref 70–130)
GLUCOSE BLDC GLUCOMTR-MCNC: 86 MG/DL (ref 70–130)
GLUCOSE BLDC GLUCOMTR-MCNC: 88 MG/DL (ref 70–130)
GLUCOSE BLDC GLUCOMTR-MCNC: 95 MG/DL (ref 70–130)
GLUCOSE SERPL-MCNC: 100 MG/DL (ref 65–99)
GLUCOSE SERPL-MCNC: 120 MG/DL (ref 65–99)
GLUCOSE SERPL-MCNC: 134 MG/DL (ref 65–99)
GLUCOSE SERPL-MCNC: 196 MG/DL (ref 65–99)
GLUCOSE SERPL-MCNC: 223 MG/DL (ref 65–99)
GLUCOSE SERPL-MCNC: 61 MG/DL (ref 65–99)
GLUCOSE SERPL-MCNC: 78 MG/DL (ref 65–99)
GLUCOSE SERPL-MCNC: 84 MG/DL (ref 65–99)
GLUCOSE SERPL-MCNC: 88 MG/DL (ref 65–99)
GLUCOSE UR STRIP-MCNC: NEGATIVE MG/DL
HAV IGM SERPL QL IA: NORMAL
HBV CORE IGM SERPL QL IA: NORMAL
HBV SURFACE AG SERPL QL IA: NORMAL
HCO3 BLDA-SCNC: 16.2 MMOL/L (ref 22–28)
HCO3 BLDA-SCNC: 19.1 MMOL/L (ref 22–28)
HCO3 BLDA-SCNC: 20.8 MMOL/L (ref 22–28)
HCO3 BLDA-SCNC: 22 MMOL/L (ref 22–28)
HCO3 BLDA-SCNC: 23.6 MMOL/L (ref 22–26)
HCO3 BLDA-SCNC: 24.6 MMOL/L (ref 22–28)
HCO3 BLDA-SCNC: 25.8 MMOL/L (ref 22–28)
HCO3 BLDA-SCNC: 26.3 MMOL/L (ref 22–28)
HCO3 BLDA-SCNC: 27 MMOL/L (ref 22–28)
HCO3 BLDV-SCNC: 21.3 MMOL/L (ref 22–28)
HCT VFR BLD AUTO: 29.3 % (ref 37.5–51)
HCT VFR BLD AUTO: 29.7 % (ref 37.5–51)
HCT VFR BLD AUTO: 30.6 % (ref 37.5–51)
HCT VFR BLD AUTO: 31.3 % (ref 37.5–51)
HCT VFR BLD AUTO: 35.4 % (ref 37.5–51)
HCT VFR BLD AUTO: 35.7 % (ref 37.5–51)
HCT VFR BLDA CALC: 29 % (ref 38–51)
HCT VFR BLDA CALC: 31 % (ref 38–51)
HCT VFR BLDA CALC: 35 % (ref 38–51)
HCV AB SER QL: NORMAL
HEMODILUTION: NO
HGB BLD-MCNC: 10.6 G/DL (ref 13–17.7)
HGB BLD-MCNC: 8.7 G/DL (ref 13–17.7)
HGB BLD-MCNC: 8.7 G/DL (ref 13–17.7)
HGB BLD-MCNC: 9.1 G/DL (ref 13–17.7)
HGB BLD-MCNC: 9.3 G/DL (ref 13–17.7)
HGB BLD-MCNC: 9.9 G/DL (ref 13–17.7)
HGB BLDA-MCNC: 10.7 G/DL (ref 12–17)
HGB BLDA-MCNC: 11.9 G/DL (ref 12–17)
HGB BLDA-MCNC: 9.8 G/DL (ref 12–17)
HGB UR QL STRIP.AUTO: ABNORMAL
HYALINE CASTS UR QL AUTO: ABNORMAL /LPF
HYPOCHROMIA BLD QL: ABNORMAL
HYPOCHROMIA BLD QL: ABNORMAL
IMM GRANULOCYTES # BLD AUTO: 0.25 10*3/MM3 (ref 0–0.05)
IMM GRANULOCYTES NFR BLD AUTO: 2.2 % (ref 0–0.5)
INHALED O2 CONCENTRATION: 100 %
INHALED O2 CONCENTRATION: 100 %
INHALED O2 CONCENTRATION: 30 %
INHALED O2 CONCENTRATION: 40 %
INR PPP: 1.81 (ref 0.9–1.1)
INR PPP: 1.84 (ref 0.9–1.1)
INR PPP: 2.11 (ref 0.9–1.1)
INR PPP: 3.16 (ref 0.9–1.1)
KETONES UR QL STRIP: NEGATIVE
LEFT ATRIUM VOLUME INDEX: 13 ML/M2
LEUKOCYTE ESTERASE UR QL STRIP.AUTO: NEGATIVE
LIPASE SERPL-CCNC: 137 U/L (ref 13–60)
LIPASE SERPL-CCNC: 257 U/L (ref 13–60)
LYMPHOCYTES # BLD AUTO: 0.37 10*3/MM3 (ref 0.7–3.1)
LYMPHOCYTES # BLD MANUAL: 0.31 10*3/MM3 (ref 0.7–3.1)
LYMPHOCYTES # BLD MANUAL: 0.64 10*3/MM3 (ref 0.7–3.1)
LYMPHOCYTES # BLD MANUAL: 0.65 10*3/MM3 (ref 0.7–3.1)
LYMPHOCYTES # BLD MANUAL: 1.37 10*3/MM3 (ref 0.7–3.1)
LYMPHOCYTES NFR BLD AUTO: 3.3 % (ref 19.6–45.3)
LYMPHOCYTES NFR BLD MANUAL: 11.5 % (ref 5–12)
LYMPHOCYTES NFR BLD MANUAL: 13.5 % (ref 5–12)
LYMPHOCYTES NFR BLD MANUAL: 4.1 % (ref 5–12)
LYMPHOCYTES NFR BLD MANUAL: 6.3 % (ref 5–12)
Lab: ABNORMAL
MACROCYTES BLD QL SMEAR: ABNORMAL
MAGNESIUM SERPL-MCNC: 2.7 MG/DL (ref 1.6–2.4)
MAGNESIUM SERPL-MCNC: 3 MG/DL (ref 1.6–2.4)
MCH RBC QN AUTO: 21.7 PG (ref 26.6–33)
MCH RBC QN AUTO: 21.8 PG (ref 26.6–33)
MCH RBC QN AUTO: 21.8 PG (ref 26.6–33)
MCH RBC QN AUTO: 22.1 PG (ref 26.6–33)
MCH RBC QN AUTO: 22.3 PG (ref 26.6–33)
MCH RBC QN AUTO: 22.3 PG (ref 26.6–33)
MCHC RBC AUTO-ENTMCNC: 27.7 G/DL (ref 31.5–35.7)
MCHC RBC AUTO-ENTMCNC: 29.3 G/DL (ref 31.5–35.7)
MCHC RBC AUTO-ENTMCNC: 29.7 G/DL (ref 31.5–35.7)
MCHC RBC AUTO-ENTMCNC: 29.9 G/DL (ref 31.5–35.7)
MCV RBC AUTO: 73.3 FL (ref 79–97)
MCV RBC AUTO: 73.4 FL (ref 79–97)
MCV RBC AUTO: 74.4 FL (ref 79–97)
MCV RBC AUTO: 74.4 FL (ref 79–97)
MCV RBC AUTO: 76 FL (ref 79–97)
MCV RBC AUTO: 78.3 FL (ref 79–97)
METAMYELOCYTES NFR BLD MANUAL: 1 % (ref 0–0)
MICROCYTES BLD QL: ABNORMAL
MODALITY: ABNORMAL
MONOCYTES # BLD AUTO: 1.64 10*3/MM3 (ref 0.1–0.9)
MONOCYTES # BLD: 0.61 10*3/MM3 (ref 0.1–0.9)
MONOCYTES # BLD: 0.97 10*3/MM3 (ref 0.1–0.9)
MONOCYTES # BLD: 1.66 10*3/MM3 (ref 0.1–0.9)
MONOCYTES # BLD: 2.16 10*3/MM3 (ref 0.1–0.9)
MONOCYTES NFR BLD AUTO: 14.4 % (ref 5–12)
MYELOCYTES NFR BLD MANUAL: 1 % (ref 0–0)
NEUTROPHILS # BLD AUTO: 13.66 10*3/MM3 (ref 1.7–7)
NEUTROPHILS # BLD AUTO: 13.77 10*3/MM3 (ref 1.7–7)
NEUTROPHILS # BLD AUTO: 15.05 10*3/MM3 (ref 1.7–7)
NEUTROPHILS # BLD AUTO: 9.76 10*3/MM3 (ref 1.7–7)
NEUTROPHILS NFR BLD AUTO: 80 % (ref 42.7–76)
NEUTROPHILS NFR BLD AUTO: 9.08 10*3/MM3 (ref 1.7–7)
NEUTROPHILS NFR BLD MANUAL: 79.2 % (ref 42.7–76)
NEUTROPHILS NFR BLD MANUAL: 80.2 % (ref 42.7–76)
NEUTROPHILS NFR BLD MANUAL: 88.5 % (ref 42.7–76)
NEUTROPHILS NFR BLD MANUAL: 92.8 % (ref 42.7–76)
NITRITE UR QL STRIP: NEGATIVE
NOTIFIED WHO: ABNORMAL
NRBC BLD AUTO-RTO: 2.3 /100 WBC (ref 0–0.2)
NRBC BLD AUTO-RTO: 3.4 /100 WBC (ref 0–0.2)
NRBC BLD AUTO-RTO: 3.6 /100 WBC (ref 0–0.2)
NRBC BLD AUTO-RTO: 4.2 /100 WBC (ref 0–0.2)
NRBC SPEC MANUAL: 4.2 /100 WBC (ref 0–0.2)
NRBC SPEC MANUAL: 7.3 /100 WBC (ref 0–0.2)
NRBC SPEC MANUAL: 8.2 /100 WBC (ref 0–0.2)
NT-PROBNP SERPL-MCNC: ABNORMAL PG/ML (ref 0–900)
NT-PROBNP SERPL-MCNC: ABNORMAL PG/ML (ref 0–900)
O2 A-A PPRESDIFF RESPIRATORY: 0.5 MMHG
O2 A-A PPRESDIFF RESPIRATORY: 0.6 MMHG
O2 A-A PPRESDIFF RESPIRATORY: 0.7 MMHG
PCO2 BLDA: 36.7 MM HG (ref 35–45)
PCO2 BLDA: 42.6 MM HG (ref 35–45)
PCO2 BLDA: 48.1 MM HG (ref 35–45)
PCO2 BLDA: 52.7 MM HG (ref 35–45)
PCO2 BLDA: 53.5 MM HG (ref 35–45)
PCO2 BLDA: 63.5 MM HG (ref 35–45)
PCO2 BLDA: 63.5 MM HG (ref 35–45)
PCO2 BLDA: 63.9 MM HG (ref 35–45)
PCO2 BLDA: 67.2 MM HG (ref 35–45)
PCO2 BLDV: 51.8 MM HG (ref 41–51)
PEEP RESPIRATORY: 5 CM[H2O]
PEEP RESPIRATORY: 5 CM[H2O]
PEEP RESPIRATORY: 8 CM[H2O]
PH BLDA: 7.01 PH UNITS (ref 7.35–7.45)
PH BLDA: 7.14 PH UNITS (ref 7.35–7.6)
PH BLDA: 7.22 PH UNITS (ref 7.35–7.45)
PH BLDA: 7.24 PH UNITS (ref 7.35–7.45)
PH BLDA: 7.24 PH UNITS (ref 7.35–7.45)
PH BLDA: 7.28 PH UNITS (ref 7.35–7.45)
PH BLDA: 7.29 PH UNITS (ref 7.35–7.45)
PH BLDA: 7.32 PH UNITS (ref 7.35–7.45)
PH BLDA: 7.33 PH UNITS (ref 7.35–7.45)
PH BLDV: 7.22 PH UNITS (ref 7.31–7.41)
PH UR STRIP.AUTO: 7 [PH] (ref 5–8)
PHOSPHATE SERPL-MCNC: 6.2 MG/DL (ref 2.5–4.5)
PHOSPHATE SERPL-MCNC: 7.2 MG/DL (ref 2.5–4.5)
PHOSPHATE SERPL-MCNC: 7.6 MG/DL (ref 2.5–4.5)
PHOSPHATE SERPL-MCNC: 9.9 MG/DL (ref 2.5–4.5)
PLAT MORPH BLD: NORMAL
PLATELET # BLD AUTO: 274 10*3/MM3 (ref 140–450)
PLATELET # BLD AUTO: 284 10*3/MM3 (ref 140–450)
PLATELET # BLD AUTO: 291 10*3/MM3 (ref 140–450)
PLATELET # BLD AUTO: 297 10*3/MM3 (ref 140–450)
PLATELET # BLD AUTO: 332 10*3/MM3 (ref 140–450)
PLATELET # BLD AUTO: 366 10*3/MM3 (ref 140–450)
PMV BLD AUTO: 9 FL (ref 6–12)
PMV BLD AUTO: 9.1 FL (ref 6–12)
PMV BLD AUTO: 9.1 FL (ref 6–12)
PMV BLD AUTO: 9.2 FL (ref 6–12)
PMV BLD AUTO: 9.4 FL (ref 6–12)
PMV BLD AUTO: 9.5 FL (ref 6–12)
PO2 BLD: 259 MM[HG] (ref 0–500)
PO2 BLD: 266 MM[HG] (ref 0–500)
PO2 BLD: 281 MM[HG] (ref 0–500)
PO2 BLD: 315 MM[HG] (ref 0–500)
PO2 BLD: 365 MM[HG] (ref 0–500)
PO2 BLD: 432 MM[HG] (ref 0–500)
PO2 BLD: 451 MM[HG] (ref 0–500)
PO2 BLDA: 106.4 MM HG (ref 80–100)
PO2 BLDA: 112.5 MM HG (ref 80–100)
PO2 BLDA: 125.9 MM HG (ref 80–100)
PO2 BLDA: 146 MM HG (ref 80–100)
PO2 BLDA: 165 MMHG (ref 80–105)
PO2 BLDA: 431.5 MM HG (ref 80–100)
PO2 BLDA: 451 MM HG (ref 80–100)
PO2 BLDA: 68.9 MM HG (ref 80–100)
PO2 BLDA: 77.6 MM HG (ref 80–100)
PO2 BLDV: 38.7 MM HG (ref 35–45)
POIKILOCYTOSIS BLD QL SMEAR: ABNORMAL
POLYCHROMASIA BLD QL SMEAR: ABNORMAL
POTASSIUM BLDA-SCNC: 4.3 MMOL/L (ref 3.5–4.9)
POTASSIUM BLDA-SCNC: 5 MMOL/L (ref 3.5–5.2)
POTASSIUM SERPL-SCNC: 4.4 MMOL/L (ref 3.5–5.2)
POTASSIUM SERPL-SCNC: 4.6 MMOL/L (ref 3.5–5.2)
POTASSIUM SERPL-SCNC: 4.8 MMOL/L (ref 3.5–5.2)
POTASSIUM SERPL-SCNC: 4.9 MMOL/L (ref 3.5–5.2)
POTASSIUM SERPL-SCNC: 5 MMOL/L (ref 3.5–5.2)
POTASSIUM SERPL-SCNC: 5 MMOL/L (ref 3.5–5.2)
POTASSIUM SERPL-SCNC: 5.3 MMOL/L (ref 3.5–5.2)
POTASSIUM SERPL-SCNC: 5.6 MMOL/L (ref 3.5–5.2)
POTASSIUM SERPL-SCNC: 5.8 MMOL/L (ref 3.5–5.2)
POTASSIUM SERPL-SCNC: 5.9 MMOL/L (ref 3.5–5.2)
POTASSIUM SERPL-SCNC: 6.3 MMOL/L (ref 3.5–5.2)
PROCALCITONIN SERPL-MCNC: 1.81 NG/ML (ref 0–0.25)
PROT SERPL-MCNC: 5.8 G/DL (ref 6–8.5)
PROT SERPL-MCNC: 6.2 G/DL (ref 6–8.5)
PROT SERPL-MCNC: 6.4 G/DL (ref 6–8.5)
PROT SERPL-MCNC: 6.5 G/DL (ref 6–8.5)
PROT SERPL-MCNC: 7.5 G/DL (ref 6–8.5)
PROT UR QL STRIP: ABNORMAL
PROTHROMBIN TIME: 21.1 SECONDS (ref 11.7–14.2)
PROTHROMBIN TIME: 21.3 SECONDS (ref 11.7–14.2)
PROTHROMBIN TIME: 23.8 SECONDS (ref 11.7–14.2)
PROTHROMBIN TIME: 32.9 SECONDS (ref 11.7–14.2)
QT INTERVAL: 327 MS
QT INTERVAL: 416 MS
QTC INTERVAL: 457 MS
QTC INTERVAL: 533 MS
RBC # BLD AUTO: 3.91 10*6/MM3 (ref 4.14–5.8)
RBC # BLD AUTO: 3.94 10*6/MM3 (ref 4.14–5.8)
RBC # BLD AUTO: 4.17 10*6/MM3 (ref 4.14–5.8)
RBC # BLD AUTO: 4.27 10*6/MM3 (ref 4.14–5.8)
RBC # BLD AUTO: 4.56 10*6/MM3 (ref 4.14–5.8)
RBC # BLD AUTO: 4.76 10*6/MM3 (ref 4.14–5.8)
RBC # UR STRIP: ABNORMAL /HPF
READ BACK: YES
REF LAB TEST METHOD: ABNORMAL
RH BLD: POSITIVE
SAO2 % BLDA: 99 % (ref 95–98)
SAO2 % BLDCOA: 88.9 % (ref 92–98.5)
SAO2 % BLDCOA: 92.7 % (ref 92–98.5)
SAO2 % BLDCOA: 97.2 % (ref 92–98.5)
SAO2 % BLDCOA: 98.1 % (ref 92–98.5)
SAO2 % BLDCOA: 98.4 % (ref 92–98.5)
SAO2 % BLDCOA: 99.1 % (ref 92–98.5)
SAO2 % BLDCOA: 99.9 % (ref 92–98.5)
SAO2 % BLDCOA: 99.9 % (ref 92–98.5)
SAO2 % BLDCOV: 62 % (ref 45–75)
SET MECH RESP RATE: 12
SET MECH RESP RATE: 16
SET MECH RESP RATE: 18
SET MECH RESP RATE: 20
SET MECH RESP RATE: 20
SET MECH RESP RATE: 22
SMA IGG SER-ACNC: 12 UNITS (ref 0–19)
SODIUM BLD-SCNC: 121 MMOL/L (ref 136–145)
SODIUM SERPL-SCNC: 129 MMOL/L (ref 136–145)
SODIUM SERPL-SCNC: 130 MMOL/L (ref 136–145)
SODIUM SERPL-SCNC: 131 MMOL/L (ref 136–145)
SODIUM SERPL-SCNC: 132 MMOL/L (ref 136–145)
SODIUM SERPL-SCNC: 134 MMOL/L (ref 136–145)
SODIUM SERPL-SCNC: 134 MMOL/L (ref 136–145)
SODIUM SERPL-SCNC: 135 MMOL/L (ref 136–145)
SODIUM SERPL-SCNC: 135 MMOL/L (ref 136–145)
SODIUM SERPL-SCNC: 139 MMOL/L (ref 136–145)
SODIUM UR-SCNC: 67 MMOL/L
SP GR UR STRIP: 1.01 (ref 1–1.03)
SQUAMOUS #/AREA URNS HPF: ABNORMAL /HPF
T&S EXPIRATION DATE: NORMAL
TOTAL RATE: 16 BREATHS/MINUTE
TOTAL RATE: 18 BREATHS/MINUTE
TOTAL RATE: 21 BREATHS/MINUTE
TOTAL RATE: 22 BREATHS/MINUTE
TOTAL RATE: 23 BREATHS/MINUTE
TOTAL RATE: 25 BREATHS/MINUTE
TOTAL RATE: 27 BREATHS/MINUTE
TOTAL RATE: 28 BREATHS/MINUTE
TROPONIN T % DELTA: -5
TROPONIN T NUMERIC DELTA: -6 NG/L
TROPONIN T SERPL HS-MCNC: 112 NG/L
URATE SERPL-MCNC: 18.7 MG/DL (ref 3.4–7)
UROBILINOGEN UR QL STRIP: ABNORMAL
VARIANT LYMPHS NFR BLD MANUAL: 2.1 % (ref 19.6–45.3)
VARIANT LYMPHS NFR BLD MANUAL: 4.2 % (ref 19.6–45.3)
VARIANT LYMPHS NFR BLD MANUAL: 5.2 % (ref 19.6–45.3)
VARIANT LYMPHS NFR BLD MANUAL: 7.3 % (ref 19.6–45.3)
VENTILATOR MODE: ABNORMAL
VENTILATOR MODE: AC
VENTILATOR MODE: AC
VT ON VENT VENT: 500 ML
VT ON VENT VENT: 697 ML
WBC # UR STRIP: ABNORMAL /HPF
WBC MORPH BLD: NORMAL
WBC NRBC COR # BLD AUTO: 11.35 10*3/MM3 (ref 3.4–10.8)
WBC NRBC COR # BLD AUTO: 11.66 10*3/MM3 (ref 3.4–10.8)
WBC NRBC COR # BLD AUTO: 12.32 10*3/MM3 (ref 3.4–10.8)
WBC NRBC COR # BLD AUTO: 14.84 10*3/MM3 (ref 3.4–10.8)
WBC NRBC COR # BLD AUTO: 15.44 10*3/MM3 (ref 3.4–10.8)
WBC NRBC COR # BLD AUTO: 18.77 10*3/MM3 (ref 3.4–10.8)

## 2025-01-01 PROCEDURE — 83605 ASSAY OF LACTIC ACID: CPT | Performed by: EMERGENCY MEDICINE

## 2025-01-01 PROCEDURE — 25010000002 CEFEPIME PER 500 MG: Performed by: INTERNAL MEDICINE

## 2025-01-01 PROCEDURE — 94799 UNLISTED PULMONARY SVC/PX: CPT

## 2025-01-01 PROCEDURE — 25010000002 EPINEPHRINE 1 MG/ML SOLUTION 30 ML VIAL: Performed by: INTERNAL MEDICINE

## 2025-01-01 PROCEDURE — 36558 INSERT TUNNELED CV CATH: CPT | Performed by: SURGERY

## 2025-01-01 PROCEDURE — 83880 ASSAY OF NATRIURETIC PEPTIDE: CPT | Performed by: EMERGENCY MEDICINE

## 2025-01-01 PROCEDURE — 82803 BLOOD GASES ANY COMBINATION: CPT

## 2025-01-01 PROCEDURE — 82550 ASSAY OF CK (CPK): CPT | Performed by: SURGERY

## 2025-01-01 PROCEDURE — 84132 ASSAY OF SERUM POTASSIUM: CPT | Performed by: INTERNAL MEDICINE

## 2025-01-01 PROCEDURE — 93005 ELECTROCARDIOGRAM TRACING: CPT | Performed by: EMERGENCY MEDICINE

## 2025-01-01 PROCEDURE — 25010000002 METRONIDAZOLE 500 MG/100ML SOLUTION: Performed by: INTERNAL MEDICINE

## 2025-01-01 PROCEDURE — 85018 HEMOGLOBIN: CPT

## 2025-01-01 PROCEDURE — 80053 COMPREHEN METABOLIC PANEL: CPT | Performed by: SURGERY

## 2025-01-01 PROCEDURE — 87340 HEPATITIS B SURFACE AG IA: CPT | Performed by: INTERNAL MEDICINE

## 2025-01-01 PROCEDURE — 25810000003 SODIUM CHLORIDE 0.9 % SOLUTION: Performed by: INTERNAL MEDICINE

## 2025-01-01 PROCEDURE — 80047 BASIC METABLC PNL IONIZED CA: CPT

## 2025-01-01 PROCEDURE — 83735 ASSAY OF MAGNESIUM: CPT | Performed by: INTERNAL MEDICINE

## 2025-01-01 PROCEDURE — 25010000002 VASOPRESSIN 20 UNIT/ML SOLUTION: Performed by: INTERNAL MEDICINE

## 2025-01-01 PROCEDURE — 94660 CPAP INITIATION&MGMT: CPT

## 2025-01-01 PROCEDURE — 85730 THROMBOPLASTIN TIME PARTIAL: CPT | Performed by: INTERNAL MEDICINE

## 2025-01-01 PROCEDURE — 25010000002 SUGAMMADEX 200 MG/2ML SOLUTION: Performed by: ANESTHESIOLOGY

## 2025-01-01 PROCEDURE — 93010 ELECTROCARDIOGRAM REPORT: CPT | Performed by: INTERNAL MEDICINE

## 2025-01-01 PROCEDURE — 94760 N-INVAS EAR/PLS OXIMETRY 1: CPT

## 2025-01-01 PROCEDURE — 94761 N-INVAS EAR/PLS OXIMETRY MLT: CPT

## 2025-01-01 PROCEDURE — 25010000002 EPINEPHRINE 1 MG/ML SOLUTION: Performed by: INTERNAL MEDICINE

## 2025-01-01 PROCEDURE — 0FC98ZZ EXTIRPATION OF MATTER FROM COMMON BILE DUCT, VIA NATURAL OR ARTIFICIAL OPENING ENDOSCOPIC: ICD-10-PCS | Performed by: INTERNAL MEDICINE

## 2025-01-01 PROCEDURE — 80053 COMPREHEN METABOLIC PANEL: CPT | Performed by: INTERNAL MEDICINE

## 2025-01-01 PROCEDURE — 25010000002 ONDANSETRON PER 1 MG: Performed by: ANESTHESIOLOGY

## 2025-01-01 PROCEDURE — 93306 TTE W/DOPPLER COMPLETE: CPT | Performed by: INTERNAL MEDICINE

## 2025-01-01 PROCEDURE — 84300 ASSAY OF URINE SODIUM: CPT | Performed by: INTERNAL MEDICINE

## 2025-01-01 PROCEDURE — 93306 TTE W/DOPPLER COMPLETE: CPT

## 2025-01-01 PROCEDURE — 25010000002 BUMETANIDE PER 0.5 MG: Performed by: INTERNAL MEDICINE

## 2025-01-01 PROCEDURE — 85610 PROTHROMBIN TIME: CPT | Performed by: INTERNAL MEDICINE

## 2025-01-01 PROCEDURE — C1750 CATH, HEMODIALYSIS,LONG-TERM: HCPCS | Performed by: SURGERY

## 2025-01-01 PROCEDURE — 82948 REAGENT STRIP/BLOOD GLUCOSE: CPT

## 2025-01-01 PROCEDURE — 25010000003 DEXTROSE 5 % SOLUTION 1,000 ML FLEX CONT: Performed by: INTERNAL MEDICINE

## 2025-01-01 PROCEDURE — 25010000002 HEPARIN (PORCINE) PER 1000 UNITS: Performed by: SURGERY

## 2025-01-01 PROCEDURE — C1751 CATH, INF, PER/CENT/MIDLINE: HCPCS

## 2025-01-01 PROCEDURE — 63710000001 INSULIN REGULAR HUMAN PER 5 UNITS: Performed by: INTERNAL MEDICINE

## 2025-01-01 PROCEDURE — 82947 ASSAY GLUCOSE BLOOD QUANT: CPT

## 2025-01-01 PROCEDURE — 80053 COMPREHEN METABOLIC PANEL: CPT | Performed by: EMERGENCY MEDICINE

## 2025-01-01 PROCEDURE — 25010000002 DIGOXIN PER 500 MCG: Performed by: INTERNAL MEDICINE

## 2025-01-01 PROCEDURE — 99233 SBSQ HOSP IP/OBS HIGH 50: CPT | Performed by: INTERNAL MEDICINE

## 2025-01-01 PROCEDURE — 25010000002 FENTANYL CITRATE (PF) 50 MCG/ML SOLUTION: Performed by: INTERNAL MEDICINE

## 2025-01-01 PROCEDURE — 83690 ASSAY OF LIPASE: CPT | Performed by: INTERNAL MEDICINE

## 2025-01-01 PROCEDURE — 0F798DZ DILATION OF COMMON BILE DUCT WITH INTRALUMINAL DEVICE, VIA NATURAL OR ARTIFICIAL OPENING ENDOSCOPIC: ICD-10-PCS | Performed by: INTERNAL MEDICINE

## 2025-01-01 PROCEDURE — 85025 COMPLETE CBC W/AUTO DIFF WBC: CPT | Performed by: INTERNAL MEDICINE

## 2025-01-01 PROCEDURE — 85007 BL SMEAR W/DIFF WBC COUNT: CPT | Performed by: SURGERY

## 2025-01-01 PROCEDURE — 25010000002 ACETYLCYSTEINE PER 100 MG: Performed by: INTERNAL MEDICINE

## 2025-01-01 PROCEDURE — 85025 COMPLETE CBC W/AUTO DIFF WBC: CPT | Performed by: EMERGENCY MEDICINE

## 2025-01-01 PROCEDURE — 99222 1ST HOSP IP/OBS MODERATE 55: CPT | Performed by: SURGERY

## 2025-01-01 PROCEDURE — 82330 ASSAY OF CALCIUM: CPT | Performed by: INTERNAL MEDICINE

## 2025-01-01 PROCEDURE — 71250 CT THORAX DX C-: CPT

## 2025-01-01 PROCEDURE — 84100 ASSAY OF PHOSPHORUS: CPT | Performed by: SURGERY

## 2025-01-01 PROCEDURE — 36415 COLL VENOUS BLD VENIPUNCTURE: CPT

## 2025-01-01 PROCEDURE — 25010000003 DEXTROSE 5 % SOLUTION 250 ML FLEX CONT: Performed by: INTERNAL MEDICINE

## 2025-01-01 PROCEDURE — 94003 VENT MGMT INPAT SUBQ DAY: CPT

## 2025-01-01 PROCEDURE — C1726 CATH, BAL DIL, NON-VASCULAR: HCPCS | Performed by: INTERNAL MEDICINE

## 2025-01-01 PROCEDURE — 83605 ASSAY OF LACTIC ACID: CPT | Performed by: INTERNAL MEDICINE

## 2025-01-01 PROCEDURE — 84484 ASSAY OF TROPONIN QUANT: CPT | Performed by: EMERGENCY MEDICINE

## 2025-01-01 PROCEDURE — 82550 ASSAY OF CK (CPK): CPT | Performed by: INTERNAL MEDICINE

## 2025-01-01 PROCEDURE — 5A1945Z RESPIRATORY VENTILATION, 24-96 CONSECUTIVE HOURS: ICD-10-PCS | Performed by: INTERNAL MEDICINE

## 2025-01-01 PROCEDURE — C1889 IMPLANT/INSERT DEVICE, NOC: HCPCS | Performed by: INTERNAL MEDICINE

## 2025-01-01 PROCEDURE — 25010000002 ONDANSETRON PER 1 MG: Performed by: INTERNAL MEDICINE

## 2025-01-01 PROCEDURE — 25010000002 ALBUMIN HUMAN 25% PER 50 ML: Performed by: INTERNAL MEDICINE

## 2025-01-01 PROCEDURE — 94664 DEMO&/EVAL PT USE INHALER: CPT

## 2025-01-01 PROCEDURE — 43277 ERCP EA DUCT/AMPULLA DILATE: CPT | Mod: 59 | Performed by: INTERNAL MEDICINE

## 2025-01-01 PROCEDURE — 84550 ASSAY OF BLOOD/URIC ACID: CPT | Performed by: INTERNAL MEDICINE

## 2025-01-01 PROCEDURE — 83880 ASSAY OF NATRIURETIC PEPTIDE: CPT | Performed by: INTERNAL MEDICINE

## 2025-01-01 PROCEDURE — 80069 RENAL FUNCTION PANEL: CPT | Performed by: INTERNAL MEDICINE

## 2025-01-01 PROCEDURE — 85007 BL SMEAR W/DIFF WBC COUNT: CPT | Performed by: INTERNAL MEDICINE

## 2025-01-01 PROCEDURE — 25010000002 PHYTONADIONE 10 MG/ML SOLUTION 1 ML VIAL: Performed by: INTERNAL MEDICINE

## 2025-01-01 PROCEDURE — 76705 ECHO EXAM OF ABDOMEN: CPT

## 2025-01-01 PROCEDURE — 82803 BLOOD GASES ANY COMBINATION: CPT | Performed by: INTERNAL MEDICINE

## 2025-01-01 PROCEDURE — 80143 DRUG ASSAY ACETAMINOPHEN: CPT | Performed by: EMERGENCY MEDICINE

## 2025-01-01 PROCEDURE — 85007 BL SMEAR W/DIFF WBC COUNT: CPT | Performed by: EMERGENCY MEDICINE

## 2025-01-01 PROCEDURE — 25010000002 METRONIDAZOLE 500 MG/100ML SOLUTION: Performed by: SURGERY

## 2025-01-01 PROCEDURE — 71045 X-RAY EXAM CHEST 1 VIEW: CPT

## 2025-01-01 PROCEDURE — 94002 VENT MGMT INPAT INIT DAY: CPT

## 2025-01-01 PROCEDURE — C1769 GUIDE WIRE: HCPCS | Performed by: INTERNAL MEDICINE

## 2025-01-01 PROCEDURE — 82803 BLOOD GASES ANY COMBINATION: CPT | Performed by: ANESTHESIOLOGY

## 2025-01-01 PROCEDURE — 99222 1ST HOSP IP/OBS MODERATE 55: CPT | Performed by: INTERNAL MEDICINE

## 2025-01-01 PROCEDURE — 25010000002 VANCOMYCIN 10 G RECONSTITUTED SOLUTION: Performed by: INTERNAL MEDICINE

## 2025-01-01 PROCEDURE — 25010000002 PROPOFOL 200 MG/20ML EMULSION: Performed by: ANESTHESIOLOGY

## 2025-01-01 PROCEDURE — 25010000002 FENTANYL CITRATE (PF) 50 MCG/ML SOLUTION: Performed by: SURGERY

## 2025-01-01 PROCEDURE — 36558 INSERT TUNNELED CV CATH: CPT

## 2025-01-01 PROCEDURE — 25010000002 HEPARIN (PORCINE) PER 1000 UNITS: Performed by: INTERNAL MEDICINE

## 2025-01-01 PROCEDURE — 25010000003 DEXTROSE 5 % SOLUTION 500 ML FLEX CONT: Performed by: INTERNAL MEDICINE

## 2025-01-01 PROCEDURE — 99291 CRITICAL CARE FIRST HOUR: CPT

## 2025-01-01 PROCEDURE — 99232 SBSQ HOSP IP/OBS MODERATE 35: CPT | Performed by: INTERNAL MEDICINE

## 2025-01-01 PROCEDURE — 74176 CT ABD & PELVIS W/O CONTRAST: CPT

## 2025-01-01 PROCEDURE — 85014 HEMATOCRIT: CPT

## 2025-01-01 PROCEDURE — 74018 RADEX ABDOMEN 1 VIEW: CPT

## 2025-01-01 PROCEDURE — 25010000002 LIDOCAINE 1 % SOLUTION 20 ML VIAL: Performed by: SURGERY

## 2025-01-01 PROCEDURE — 25010000002 MIDAZOLAM PER 1 MG: Performed by: ANESTHESIOLOGY

## 2025-01-01 PROCEDURE — 25010000002 BUPIVACAINE (PF) 0.25 % SOLUTION 10 ML VIAL: Performed by: SURGERY

## 2025-01-01 PROCEDURE — 25010000002 GLUCAGON (RDNA) PER 1 MG: Performed by: INTERNAL MEDICINE

## 2025-01-01 PROCEDURE — 86900 BLOOD TYPING SEROLOGIC ABO: CPT | Performed by: EMERGENCY MEDICINE

## 2025-01-01 PROCEDURE — 85027 COMPLETE CBC AUTOMATED: CPT | Performed by: INTERNAL MEDICINE

## 2025-01-01 PROCEDURE — 93005 ELECTROCARDIOGRAM TRACING: CPT | Performed by: INTERNAL MEDICINE

## 2025-01-01 PROCEDURE — 36600 WITHDRAWAL OF ARTERIAL BLOOD: CPT | Performed by: INTERNAL MEDICINE

## 2025-01-01 PROCEDURE — 25010000002 PHENYLEPHRINE 10 MG/ML SOLUTION 5 ML VIAL: Performed by: NURSE ANESTHETIST, CERTIFIED REGISTERED

## 2025-01-01 PROCEDURE — 0DJ08ZZ INSPECTION OF UPPER INTESTINAL TRACT, VIA NATURAL OR ARTIFICIAL OPENING ENDOSCOPIC: ICD-10-PCS | Performed by: INTERNAL MEDICINE

## 2025-01-01 PROCEDURE — 86850 RBC ANTIBODY SCREEN: CPT | Performed by: EMERGENCY MEDICINE

## 2025-01-01 PROCEDURE — 25010000002 HYDROCORTISONE SOD SUC (PF) 100 MG RECONSTITUTED SOLUTION: Performed by: INTERNAL MEDICINE

## 2025-01-01 PROCEDURE — 25010000002 PIPERACILLIN SOD-TAZOBACTAM PER 1 G: Performed by: EMERGENCY MEDICINE

## 2025-01-01 PROCEDURE — 83605 ASSAY OF LACTIC ACID: CPT

## 2025-01-01 PROCEDURE — 25810000003 LACTATED RINGERS PER 1000 ML: Performed by: ANESTHESIOLOGY

## 2025-01-01 PROCEDURE — 25510000001 IOPAMIDOL 61 % SOLUTION: Performed by: INTERNAL MEDICINE

## 2025-01-01 PROCEDURE — 25010000002 LIDOCAINE 2% SOLUTION: Performed by: ANESTHESIOLOGY

## 2025-01-01 PROCEDURE — 25010000003 DEXTROSE 5 % SOLUTION: Performed by: INTERNAL MEDICINE

## 2025-01-01 PROCEDURE — 87040 BLOOD CULTURE FOR BACTERIA: CPT | Performed by: INTERNAL MEDICINE

## 2025-01-01 PROCEDURE — 84145 PROCALCITONIN (PCT): CPT | Performed by: EMERGENCY MEDICINE

## 2025-01-01 PROCEDURE — 77001 FLUOROGUIDE FOR VEIN DEVICE: CPT | Performed by: SURGERY

## 2025-01-01 PROCEDURE — 02HV33Z INSERTION OF INFUSION DEVICE INTO SUPERIOR VENA CAVA, PERCUTANEOUS APPROACH: ICD-10-PCS | Performed by: INTERNAL MEDICINE

## 2025-01-01 PROCEDURE — 36600 WITHDRAWAL OF ARTERIAL BLOOD: CPT

## 2025-01-01 PROCEDURE — 25010000002 CALCIUM GLUCONATE 2-0.675 GM/100ML-% SOLUTION: Performed by: INTERNAL MEDICINE

## 2025-01-01 PROCEDURE — 5A1D70Z PERFORMANCE OF URINARY FILTRATION, INTERMITTENT, LESS THAN 6 HOURS PER DAY: ICD-10-PCS | Performed by: SURGERY

## 2025-01-01 PROCEDURE — 85025 COMPLETE CBC W/AUTO DIFF WBC: CPT | Performed by: SURGERY

## 2025-01-01 PROCEDURE — 76937 US GUIDE VASCULAR ACCESS: CPT | Performed by: SURGERY

## 2025-01-01 PROCEDURE — 86015 ACTIN ANTIBODY EACH: CPT | Performed by: INTERNAL MEDICINE

## 2025-01-01 PROCEDURE — C2625 STENT, NON-COR, TEM W/DEL SY: HCPCS | Performed by: INTERNAL MEDICINE

## 2025-01-01 PROCEDURE — 84484 ASSAY OF TROPONIN QUANT: CPT | Performed by: INTERNAL MEDICINE

## 2025-01-01 PROCEDURE — 25010000002 FENTANYL CITRATE (PF) 50 MCG/ML SOLUTION: Performed by: ANESTHESIOLOGY

## 2025-01-01 PROCEDURE — 0BH17EZ INSERTION OF ENDOTRACHEAL AIRWAY INTO TRACHEA, VIA NATURAL OR ARTIFICIAL OPENING: ICD-10-PCS | Performed by: INTERNAL MEDICINE

## 2025-01-01 PROCEDURE — 82140 ASSAY OF AMMONIA: CPT | Performed by: INTERNAL MEDICINE

## 2025-01-01 PROCEDURE — 84100 ASSAY OF PHOSPHORUS: CPT | Performed by: INTERNAL MEDICINE

## 2025-01-01 PROCEDURE — 94640 AIRWAY INHALATION TREATMENT: CPT

## 2025-01-01 PROCEDURE — 25010000002 PHENYLEPHRINE 10 MG/ML SOLUTION: Performed by: NURSE ANESTHETIST, CERTIFIED REGISTERED

## 2025-01-01 PROCEDURE — 81001 URINALYSIS AUTO W/SCOPE: CPT | Performed by: EMERGENCY MEDICINE

## 2025-01-01 PROCEDURE — 25810000003 SODIUM CHLORIDE 0.9 % SOLUTION 250 ML FLEX CONT: Performed by: NURSE ANESTHETIST, CERTIFIED REGISTERED

## 2025-01-01 PROCEDURE — 86901 BLOOD TYPING SEROLOGIC RH(D): CPT | Performed by: EMERGENCY MEDICINE

## 2025-01-01 PROCEDURE — 74328 X-RAY BILE DUCT ENDOSCOPY: CPT

## 2025-01-01 PROCEDURE — 82570 ASSAY OF URINE CREATININE: CPT | Performed by: INTERNAL MEDICINE

## 2025-01-01 PROCEDURE — 43274 ERCP DUCT STENT PLACEMENT: CPT | Mod: 59 | Performed by: INTERNAL MEDICINE

## 2025-01-01 PROCEDURE — 31500 INSERT EMERGENCY AIRWAY: CPT

## 2025-01-01 PROCEDURE — 80074 ACUTE HEPATITIS PANEL: CPT | Performed by: INTERNAL MEDICINE

## 2025-01-01 PROCEDURE — C1894 INTRO/SHEATH, NON-LASER: HCPCS | Performed by: SURGERY

## 2025-01-01 PROCEDURE — 76000 FLUOROSCOPY <1 HR PHYS/QHP: CPT

## 2025-01-01 PROCEDURE — P9047 ALBUMIN (HUMAN), 25%, 50ML: HCPCS | Performed by: INTERNAL MEDICINE

## 2025-01-01 PROCEDURE — 02HV33Z INSERTION OF INFUSION DEVICE INTO SUPERIOR VENA CAVA, PERCUTANEOUS APPROACH: ICD-10-PCS | Performed by: SURGERY

## 2025-01-01 DEVICE — PANCREATIC STENT
Type: IMPLANTABLE DEVICE | Site: PANCREAS | Status: FUNCTIONAL
Brand: ADVANIX™ PANCREATIC STENT

## 2025-01-01 DEVICE — BILIARY STENT WITH NAVIFLEXTM RX DELIVERY SYSTEM
Type: IMPLANTABLE DEVICE | Site: BILE DUCT | Status: FUNCTIONAL
Brand: ADVANIX™ BILIARY

## 2025-01-01 DEVICE — RX PUSHER
Type: IMPLANTABLE DEVICE | Site: PANCREAS | Status: FUNCTIONAL
Brand: NAVIFLEX™ RX PUSHER

## 2025-01-01 RX ORDER — LACTULOSE 10 G/15ML
20 SOLUTION ORAL 3 TIMES DAILY
Status: DISCONTINUED | OUTPATIENT
Start: 2025-01-01 | End: 2025-01-01 | Stop reason: HOSPADM

## 2025-01-01 RX ORDER — SODIUM CHLORIDE 0.9 % (FLUSH) 0.9 %
3-10 SYRINGE (ML) INJECTION AS NEEDED
Status: DISCONTINUED | OUTPATIENT
Start: 2025-01-01 | End: 2025-01-01 | Stop reason: HOSPADM

## 2025-01-01 RX ORDER — INDOMETHACIN 100 MG
SUPPOSITORY, RECTAL RECTAL AS NEEDED
Status: DISCONTINUED | OUTPATIENT
Start: 2025-01-01 | End: 2025-01-01 | Stop reason: HOSPADM

## 2025-01-01 RX ORDER — HEPARIN SODIUM 1000 [USP'U]/ML
INJECTION, SOLUTION INTRAVENOUS; SUBCUTANEOUS AS NEEDED
Status: DISCONTINUED | OUTPATIENT
Start: 2025-01-01 | End: 2025-01-01 | Stop reason: HOSPADM

## 2025-01-01 RX ORDER — ALBUMIN (HUMAN) 12.5 G/50ML
12.5 SOLUTION INTRAVENOUS AS NEEDED
Status: DISPENSED | OUTPATIENT
Start: 2025-01-01 | End: 2025-01-01

## 2025-01-01 RX ORDER — METOPROLOL SUCCINATE 50 MG/1
50 TABLET, EXTENDED RELEASE ORAL DAILY
COMMUNITY

## 2025-01-01 RX ORDER — LIDOCAINE HYDROCHLORIDE 10 MG/ML
0.5 INJECTION, SOLUTION INFILTRATION; PERINEURAL ONCE AS NEEDED
Status: DISCONTINUED | OUTPATIENT
Start: 2025-01-01 | End: 2025-01-01 | Stop reason: HOSPADM

## 2025-01-01 RX ORDER — LACTULOSE 10 G/15ML
20 SOLUTION ORAL 2 TIMES DAILY
Status: DISCONTINUED | OUTPATIENT
Start: 2025-01-01 | End: 2025-01-01

## 2025-01-01 RX ORDER — PHENYLEPHRINE HYDROCHLORIDE 10 MG/ML
INJECTION INTRAVENOUS AS NEEDED
Status: DISCONTINUED | OUTPATIENT
Start: 2025-01-01 | End: 2025-01-01 | Stop reason: SURG

## 2025-01-01 RX ORDER — EPHEDRINE SULFATE 50 MG/ML
5 INJECTION, SOLUTION INTRAVENOUS ONCE AS NEEDED
Status: DISCONTINUED | OUTPATIENT
Start: 2025-01-01 | End: 2025-01-01 | Stop reason: HOSPADM

## 2025-01-01 RX ORDER — CALCIUM GLUCONATE 20 MG/ML
2000 INJECTION, SOLUTION INTRAVENOUS ONCE
Status: COMPLETED | OUTPATIENT
Start: 2025-01-01 | End: 2025-01-01

## 2025-01-01 RX ORDER — BISACODYL 5 MG/1
5 TABLET, DELAYED RELEASE ORAL DAILY PRN
Status: DISCONTINUED | OUTPATIENT
Start: 2025-01-01 | End: 2025-01-01 | Stop reason: HOSPADM

## 2025-01-01 RX ORDER — BUMETANIDE 0.25 MG/ML
4 INJECTION, SOLUTION INTRAMUSCULAR; INTRAVENOUS EVERY 8 HOURS
Status: COMPLETED | OUTPATIENT
Start: 2025-01-01 | End: 2025-01-01

## 2025-01-01 RX ORDER — MIDAZOLAM HYDROCHLORIDE 1 MG/ML
0.5 INJECTION, SOLUTION INTRAMUSCULAR; INTRAVENOUS
Status: DISCONTINUED | OUTPATIENT
Start: 2025-01-01 | End: 2025-01-01 | Stop reason: HOSPADM

## 2025-01-01 RX ORDER — NICOTINE POLACRILEX 4 MG
15 LOZENGE BUCCAL
Status: DISCONTINUED | OUTPATIENT
Start: 2025-01-01 | End: 2025-01-01 | Stop reason: HOSPADM

## 2025-01-01 RX ORDER — ROCURONIUM BROMIDE 10 MG/ML
INJECTION, SOLUTION INTRAVENOUS AS NEEDED
Status: DISCONTINUED | OUTPATIENT
Start: 2025-01-01 | End: 2025-01-01 | Stop reason: SURG

## 2025-01-01 RX ORDER — ONDANSETRON 2 MG/ML
INJECTION INTRAMUSCULAR; INTRAVENOUS AS NEEDED
Status: DISCONTINUED | OUTPATIENT
Start: 2025-01-01 | End: 2025-01-01 | Stop reason: SURG

## 2025-01-01 RX ORDER — BISACODYL 10 MG
10 SUPPOSITORY, RECTAL RECTAL DAILY PRN
Status: DISCONTINUED | OUTPATIENT
Start: 2025-01-01 | End: 2025-01-01 | Stop reason: HOSPADM

## 2025-01-01 RX ORDER — BUSPIRONE HYDROCHLORIDE 30 MG/1
30 TABLET ORAL 2 TIMES DAILY
COMMUNITY

## 2025-01-01 RX ORDER — ALBUMIN (HUMAN) 12.5 G/50ML
12.5 SOLUTION INTRAVENOUS AS NEEDED
Status: DISCONTINUED | OUTPATIENT
Start: 2025-01-01 | End: 2025-01-01 | Stop reason: HOSPADM

## 2025-01-01 RX ORDER — NALOXONE HCL 0.4 MG/ML
0.2 VIAL (ML) INJECTION AS NEEDED
Status: DISCONTINUED | OUTPATIENT
Start: 2025-01-01 | End: 2025-01-01 | Stop reason: HOSPADM

## 2025-01-01 RX ORDER — HEPARIN SODIUM 1000 [USP'U]/ML
3800 INJECTION, SOLUTION INTRAVENOUS; SUBCUTANEOUS ONCE
Status: COMPLETED | OUTPATIENT
Start: 2025-01-01 | End: 2025-01-01

## 2025-01-01 RX ORDER — DULOXETIN HYDROCHLORIDE 30 MG/1
30 CAPSULE, DELAYED RELEASE ORAL DAILY
COMMUNITY

## 2025-01-01 RX ORDER — ONDANSETRON 4 MG/1
4 TABLET, ORALLY DISINTEGRATING ORAL EVERY 8 HOURS PRN
COMMUNITY

## 2025-01-01 RX ORDER — DIPHENHYDRAMINE HYDROCHLORIDE 50 MG/ML
12.5 INJECTION INTRAMUSCULAR; INTRAVENOUS
Status: DISCONTINUED | OUTPATIENT
Start: 2025-01-01 | End: 2025-01-01 | Stop reason: HOSPADM

## 2025-01-01 RX ORDER — SODIUM CHLORIDE 0.9 % (FLUSH) 0.9 %
10 SYRINGE (ML) INJECTION AS NEEDED
Status: DISCONTINUED | OUTPATIENT
Start: 2025-01-01 | End: 2025-01-01 | Stop reason: HOSPADM

## 2025-01-01 RX ORDER — ATROPINE SULFATE 0.4 MG/ML
0.4 INJECTION, SOLUTION INTRAMUSCULAR; INTRAVENOUS; SUBCUTANEOUS ONCE AS NEEDED
Status: DISCONTINUED | OUTPATIENT
Start: 2025-01-01 | End: 2025-01-01 | Stop reason: HOSPADM

## 2025-01-01 RX ORDER — DEXTROSE MONOHYDRATE 50 MG/ML
100 INJECTION, SOLUTION INTRAVENOUS CONTINUOUS
Status: ACTIVE | OUTPATIENT
Start: 2025-01-01 | End: 2025-01-01

## 2025-01-01 RX ORDER — BUDESONIDE AND FORMOTEROL FUMARATE DIHYDRATE 160; 4.5 UG/1; UG/1
2 AEROSOL RESPIRATORY (INHALATION)
COMMUNITY

## 2025-01-01 RX ORDER — CALCIUM CHLORIDE, MAGNESIUM CHLORIDE, SODIUM CHLORIDE, SODIUM BICARBONATE, POTASSIUM CHLORIDE AND SODIUM PHOSPHATE DIBASIC DIHYDRATE 3.68; 3.05; 6.34; 3.09; .314; .187 G/L; G/L; G/L; G/L; G/L; G/L
1500 INJECTION INTRAVENOUS CONTINUOUS
Status: DISCONTINUED | OUTPATIENT
Start: 2025-01-01 | End: 2025-01-01 | Stop reason: HOSPADM

## 2025-01-01 RX ORDER — PROMETHAZINE HYDROCHLORIDE 25 MG/1
25 TABLET ORAL ONCE AS NEEDED
Status: DISCONTINUED | OUTPATIENT
Start: 2025-01-01 | End: 2025-01-01 | Stop reason: HOSPADM

## 2025-01-01 RX ORDER — HYDROCORTISONE SODIUM SUCCINATE 100 MG/2ML
50 INJECTION INTRAMUSCULAR; INTRAVENOUS EVERY 6 HOURS
Status: DISCONTINUED | OUTPATIENT
Start: 2025-01-01 | End: 2025-01-01 | Stop reason: HOSPADM

## 2025-01-01 RX ORDER — MIDODRINE HYDROCHLORIDE 5 MG/1
5 TABLET ORAL
Status: DISCONTINUED | OUTPATIENT
Start: 2025-01-01 | End: 2025-01-01

## 2025-01-01 RX ORDER — FENTANYL CITRATE 50 UG/ML
INJECTION, SOLUTION INTRAMUSCULAR; INTRAVENOUS
Status: COMPLETED | OUTPATIENT
Start: 2025-01-01 | End: 2025-01-01

## 2025-01-01 RX ORDER — DIGOXIN 0.25 MG/ML
250 INJECTION INTRAMUSCULAR; INTRAVENOUS ONCE
Status: COMPLETED | OUTPATIENT
Start: 2025-01-01 | End: 2025-01-01

## 2025-01-01 RX ORDER — SODIUM CHLORIDE 9 MG/ML
100 INJECTION, SOLUTION INTRAVENOUS CONTINUOUS
Status: DISCONTINUED | OUTPATIENT
Start: 2025-01-01 | End: 2025-01-01

## 2025-01-01 RX ORDER — IPRATROPIUM BROMIDE AND ALBUTEROL SULFATE 2.5; .5 MG/3ML; MG/3ML
3 SOLUTION RESPIRATORY (INHALATION) ONCE
Status: COMPLETED | OUTPATIENT
Start: 2025-01-01 | End: 2025-01-01

## 2025-01-01 RX ORDER — ALBUMIN (HUMAN) 12.5 G/50ML
12.5 SOLUTION INTRAVENOUS AS NEEDED
Status: ACTIVE | OUTPATIENT
Start: 2025-01-01 | End: 2025-01-01

## 2025-01-01 RX ORDER — PANTOPRAZOLE SODIUM 40 MG/1
40 TABLET, DELAYED RELEASE ORAL
Status: DISCONTINUED | OUTPATIENT
Start: 2025-01-01 | End: 2025-01-01 | Stop reason: HOSPADM

## 2025-01-01 RX ORDER — METRONIDAZOLE 500 MG/100ML
500 INJECTION, SOLUTION INTRAVENOUS EVERY 8 HOURS
Status: DISCONTINUED | OUTPATIENT
Start: 2025-01-01 | End: 2025-01-01 | Stop reason: HOSPADM

## 2025-01-01 RX ORDER — NOREPINEPHRINE BITARTRATE 0.03 MG/ML
.02-.3 INJECTION, SOLUTION INTRAVENOUS
Status: DISCONTINUED | OUTPATIENT
Start: 2025-01-01 | End: 2025-01-01 | Stop reason: HOSPADM

## 2025-01-01 RX ORDER — POTASSIUM CHLORIDE 1500 MG/1
20 TABLET, EXTENDED RELEASE ORAL DAILY
COMMUNITY

## 2025-01-01 RX ORDER — METOPROLOL SUCCINATE 25 MG/1
50 TABLET, EXTENDED RELEASE ORAL EVERY 12 HOURS SCHEDULED
Status: DISCONTINUED | OUTPATIENT
Start: 2025-01-01 | End: 2025-01-01

## 2025-01-01 RX ORDER — METOPROLOL SUCCINATE 25 MG/1
50 TABLET, EXTENDED RELEASE ORAL ONCE
Status: COMPLETED | OUTPATIENT
Start: 2025-01-01 | End: 2025-01-01

## 2025-01-01 RX ORDER — ONDANSETRON 2 MG/ML
4 INJECTION INTRAMUSCULAR; INTRAVENOUS EVERY 6 HOURS PRN
Status: DISCONTINUED | OUTPATIENT
Start: 2025-01-01 | End: 2025-01-01 | Stop reason: HOSPADM

## 2025-01-01 RX ORDER — IBUPROFEN 600 MG/1
1 TABLET ORAL
Status: DISCONTINUED | OUTPATIENT
Start: 2025-01-01 | End: 2025-01-01 | Stop reason: HOSPADM

## 2025-01-01 RX ORDER — DEXMEDETOMIDINE HYDROCHLORIDE 4 UG/ML
.2-1.5 INJECTION, SOLUTION INTRAVENOUS
Status: DISCONTINUED | OUTPATIENT
Start: 2025-01-01 | End: 2025-01-01 | Stop reason: HOSPADM

## 2025-01-01 RX ORDER — PROPOFOL 10 MG/ML
INJECTION, EMULSION INTRAVENOUS AS NEEDED
Status: DISCONTINUED | OUTPATIENT
Start: 2025-01-01 | End: 2025-01-01 | Stop reason: SURG

## 2025-01-01 RX ORDER — MIDAZOLAM HYDROCHLORIDE 1 MG/ML
INJECTION, SOLUTION INTRAMUSCULAR; INTRAVENOUS
Status: COMPLETED | OUTPATIENT
Start: 2025-01-01 | End: 2025-01-01

## 2025-01-01 RX ORDER — HYDROMORPHONE HYDROCHLORIDE 1 MG/ML
0.5 INJECTION, SOLUTION INTRAMUSCULAR; INTRAVENOUS; SUBCUTANEOUS
Status: DISCONTINUED | OUTPATIENT
Start: 2025-01-01 | End: 2025-01-01 | Stop reason: HOSPADM

## 2025-01-01 RX ORDER — IPRATROPIUM BROMIDE AND ALBUTEROL SULFATE 2.5; .5 MG/3ML; MG/3ML
3 SOLUTION RESPIRATORY (INHALATION)
Status: DISCONTINUED | OUTPATIENT
Start: 2025-01-01 | End: 2025-01-01 | Stop reason: HOSPADM

## 2025-01-01 RX ORDER — AMOXICILLIN 250 MG
2 CAPSULE ORAL 2 TIMES DAILY
Status: DISCONTINUED | OUTPATIENT
Start: 2025-01-01 | End: 2025-01-01 | Stop reason: HOSPADM

## 2025-01-01 RX ORDER — METOPROLOL SUCCINATE 25 MG/1
100 TABLET, EXTENDED RELEASE ORAL EVERY 12 HOURS SCHEDULED
Status: DISCONTINUED | OUTPATIENT
Start: 2025-01-01 | End: 2025-01-01

## 2025-01-01 RX ORDER — FENTANYL CITRATE 50 UG/ML
50 INJECTION, SOLUTION INTRAMUSCULAR; INTRAVENOUS
Status: DISCONTINUED | OUTPATIENT
Start: 2025-01-01 | End: 2025-01-01 | Stop reason: HOSPADM

## 2025-01-01 RX ORDER — POLYETHYLENE GLYCOL 3350 17 G/17G
17 POWDER, FOR SOLUTION ORAL DAILY PRN
Status: DISCONTINUED | OUTPATIENT
Start: 2025-01-01 | End: 2025-01-01 | Stop reason: HOSPADM

## 2025-01-01 RX ORDER — DEXTROSE MONOHYDRATE 25 G/50ML
25 INJECTION, SOLUTION INTRAVENOUS
Status: DISCONTINUED | OUTPATIENT
Start: 2025-01-01 | End: 2025-01-01 | Stop reason: HOSPADM

## 2025-01-01 RX ORDER — NITROGLYCERIN 0.4 MG/1
0.4 TABLET SUBLINGUAL
Status: DISCONTINUED | OUTPATIENT
Start: 2025-01-01 | End: 2025-01-01 | Stop reason: HOSPADM

## 2025-01-01 RX ORDER — FAMOTIDINE 10 MG/ML
20 INJECTION, SOLUTION INTRAVENOUS ONCE
Status: COMPLETED | OUTPATIENT
Start: 2025-01-01 | End: 2025-01-01

## 2025-01-01 RX ORDER — SODIUM CHLORIDE 0.9 % (FLUSH) 0.9 %
10 SYRINGE (ML) INJECTION EVERY 12 HOURS SCHEDULED
Status: DISCONTINUED | OUTPATIENT
Start: 2025-01-01 | End: 2025-01-01 | Stop reason: HOSPADM

## 2025-01-01 RX ORDER — SODIUM CHLORIDE 9 MG/ML
INJECTION, SOLUTION INTRAVENOUS CONTINUOUS PRN
Status: DISCONTINUED | OUTPATIENT
Start: 2025-01-01 | End: 2025-01-01 | Stop reason: SURG

## 2025-01-01 RX ORDER — AMIODARONE HYDROCHLORIDE 200 MG/1
200 TABLET ORAL DAILY
COMMUNITY

## 2025-01-01 RX ORDER — ONDANSETRON 2 MG/ML
4 INJECTION INTRAMUSCULAR; INTRAVENOUS ONCE AS NEEDED
Status: DISCONTINUED | OUTPATIENT
Start: 2025-01-01 | End: 2025-01-01 | Stop reason: HOSPADM

## 2025-01-01 RX ORDER — PROMETHAZINE HYDROCHLORIDE 25 MG/1
25 SUPPOSITORY RECTAL ONCE AS NEEDED
Status: DISCONTINUED | OUTPATIENT
Start: 2025-01-01 | End: 2025-01-01 | Stop reason: HOSPADM

## 2025-01-01 RX ORDER — IOPAMIDOL 612 MG/ML
INJECTION, SOLUTION INTRAVASCULAR AS NEEDED
Status: DISCONTINUED | OUTPATIENT
Start: 2025-01-01 | End: 2025-01-01 | Stop reason: HOSPADM

## 2025-01-01 RX ORDER — LIDOCAINE HYDROCHLORIDE 20 MG/ML
INJECTION, SOLUTION INFILTRATION; PERINEURAL AS NEEDED
Status: DISCONTINUED | OUTPATIENT
Start: 2025-01-01 | End: 2025-01-01 | Stop reason: SURG

## 2025-01-01 RX ORDER — FENTANYL CITRATE 50 UG/ML
50 INJECTION, SOLUTION INTRAMUSCULAR; INTRAVENOUS ONCE AS NEEDED
Status: DISCONTINUED | OUTPATIENT
Start: 2025-01-01 | End: 2025-01-01 | Stop reason: HOSPADM

## 2025-01-01 RX ORDER — IBUPROFEN 600 MG/1
TABLET ORAL AS NEEDED
Status: DISCONTINUED | OUTPATIENT
Start: 2025-01-01 | End: 2025-01-01 | Stop reason: HOSPADM

## 2025-01-01 RX ORDER — HYDRALAZINE HYDROCHLORIDE 20 MG/ML
5 INJECTION INTRAMUSCULAR; INTRAVENOUS
Status: DISCONTINUED | OUTPATIENT
Start: 2025-01-01 | End: 2025-01-01 | Stop reason: HOSPADM

## 2025-01-01 RX ORDER — IPRATROPIUM BROMIDE AND ALBUTEROL SULFATE 2.5; .5 MG/3ML; MG/3ML
3 SOLUTION RESPIRATORY (INHALATION) ONCE AS NEEDED
Status: DISCONTINUED | OUTPATIENT
Start: 2025-01-01 | End: 2025-01-01 | Stop reason: HOSPADM

## 2025-01-01 RX ORDER — SODIUM CHLORIDE, SODIUM LACTATE, POTASSIUM CHLORIDE, CALCIUM CHLORIDE 600; 310; 30; 20 MG/100ML; MG/100ML; MG/100ML; MG/100ML
9 INJECTION, SOLUTION INTRAVENOUS CONTINUOUS
Status: DISCONTINUED | OUTPATIENT
Start: 2025-01-01 | End: 2025-01-01

## 2025-01-01 RX ORDER — MIDODRINE HYDROCHLORIDE 5 MG/1
10 TABLET ORAL
Status: DISCONTINUED | OUTPATIENT
Start: 2025-01-01 | End: 2025-01-01 | Stop reason: HOSPADM

## 2025-01-01 RX ORDER — FLUMAZENIL 0.1 MG/ML
0.2 INJECTION INTRAVENOUS AS NEEDED
Status: DISCONTINUED | OUTPATIENT
Start: 2025-01-01 | End: 2025-01-01 | Stop reason: HOSPADM

## 2025-01-01 RX ORDER — SODIUM CHLORIDE 9 MG/ML
40 INJECTION, SOLUTION INTRAVENOUS AS NEEDED
Status: DISCONTINUED | OUTPATIENT
Start: 2025-01-01 | End: 2025-01-01 | Stop reason: HOSPADM

## 2025-01-01 RX ORDER — SODIUM CHLORIDE 0.9 % (FLUSH) 0.9 %
3 SYRINGE (ML) INJECTION EVERY 12 HOURS SCHEDULED
Status: DISCONTINUED | OUTPATIENT
Start: 2025-01-01 | End: 2025-01-01 | Stop reason: HOSPADM

## 2025-01-01 RX ORDER — FENTANYL CITRATE 50 UG/ML
25 INJECTION, SOLUTION INTRAMUSCULAR; INTRAVENOUS
Status: DISCONTINUED | OUTPATIENT
Start: 2025-01-01 | End: 2025-01-01 | Stop reason: HOSPADM

## 2025-01-01 RX ORDER — PHENYLEPHRINE HCL IN 0.9% NACL 0.5 MG/5ML
.5-3 SYRINGE (ML) INTRAVENOUS
Status: DISCONTINUED | OUTPATIENT
Start: 2025-01-01 | End: 2025-01-01 | Stop reason: HOSPADM

## 2025-01-01 RX ADMIN — IPRATROPIUM BROMIDE AND ALBUTEROL SULFATE 3 ML: .5; 3 SOLUTION RESPIRATORY (INHALATION) at 19:55

## 2025-01-01 RX ADMIN — IPRATROPIUM BROMIDE AND ALBUTEROL SULFATE 3 ML: .5; 3 SOLUTION RESPIRATORY (INHALATION) at 20:51

## 2025-01-01 RX ADMIN — INSULIN HUMAN 2 UNITS: 100 INJECTION, SOLUTION PARENTERAL at 06:38

## 2025-01-01 RX ADMIN — DEXMEDETOMIDINE HYDROCHLORIDE 1.5 MCG/KG/HR: 400 INJECTION INTRAVENOUS at 04:10

## 2025-01-01 RX ADMIN — METOPROLOL SUCCINATE 50 MG: 25 TABLET, EXTENDED RELEASE ORAL at 08:52

## 2025-01-01 RX ADMIN — FENTANYL CITRATE 25 MCG: 50 INJECTION, SOLUTION INTRAMUSCULAR; INTRAVENOUS at 16:17

## 2025-01-01 RX ADMIN — SODIUM CHLORIDE 2250 MG: 9 INJECTION, SOLUTION INTRAVENOUS at 14:18

## 2025-01-01 RX ADMIN — DEXMEDETOMIDINE HYDROCHLORIDE 0.6 MCG/KG/HR: 400 INJECTION INTRAVENOUS at 16:18

## 2025-01-01 RX ADMIN — BUMETANIDE 4 MG: 0.25 INJECTION INTRAMUSCULAR; INTRAVENOUS at 16:05

## 2025-01-01 RX ADMIN — ACETYLCYSTEINE 15000 MG: 200 INJECTION, SOLUTION INTRAVENOUS at 15:43

## 2025-01-01 RX ADMIN — NOREPINEPHRINE BITARTRATE 0.02 MCG/KG/MIN: 0.03 INJECTION, SOLUTION INTRAVENOUS at 10:38

## 2025-01-01 RX ADMIN — ONDANSETRON 4 MG: 2 INJECTION, SOLUTION INTRAMUSCULAR; INTRAVENOUS at 18:15

## 2025-01-01 RX ADMIN — METOPROLOL TARTRATE 5 MG: 1 INJECTION, SOLUTION INTRAVENOUS at 05:14

## 2025-01-01 RX ADMIN — DEXMEDETOMIDINE HYDROCHLORIDE 1 MCG/KG/HR: 400 INJECTION INTRAVENOUS at 00:46

## 2025-01-01 RX ADMIN — METRONIDAZOLE 500 MG: 500 INJECTION, SOLUTION INTRAVENOUS at 20:06

## 2025-01-01 RX ADMIN — EPINEPHRINE 0.3 MCG/KG/MIN: 1 INJECTION INTRAMUSCULAR; INTRAVENOUS; SUBCUTANEOUS at 00:09

## 2025-01-01 RX ADMIN — PHENYLEPHRINE HYDROCHLORIDE 200 MCG: 10 INJECTION INTRAVENOUS at 15:05

## 2025-01-01 RX ADMIN — SODIUM BICARBONATE 100 MEQ: 84 INJECTION INTRAVENOUS at 02:00

## 2025-01-01 RX ADMIN — MUPIROCIN 1 APPLICATION: 20 OINTMENT TOPICAL at 08:52

## 2025-01-01 RX ADMIN — NOREPINEPHRINE BITARTRATE 0.3 MCG/KG/MIN: 0.03 INJECTION, SOLUTION INTRAVENOUS at 00:01

## 2025-01-01 RX ADMIN — IPRATROPIUM BROMIDE AND ALBUTEROL SULFATE 3 ML: .5; 3 SOLUTION RESPIRATORY (INHALATION) at 16:25

## 2025-01-01 RX ADMIN — MUPIROCIN 1 APPLICATION: 20 OINTMENT TOPICAL at 20:43

## 2025-01-01 RX ADMIN — METRONIDAZOLE 500 MG: 500 INJECTION, SOLUTION INTRAVENOUS at 11:09

## 2025-01-01 RX ADMIN — DEXMEDETOMIDINE HYDROCHLORIDE 1.4 MCG/KG/HR: 400 INJECTION INTRAVENOUS at 17:56

## 2025-01-01 RX ADMIN — METRONIDAZOLE 500 MG: 500 INJECTION, SOLUTION INTRAVENOUS at 12:27

## 2025-01-01 RX ADMIN — VASOPRESSIN 0.03 UNITS/MIN: 20 INJECTION INTRAVENOUS at 06:41

## 2025-01-01 RX ADMIN — METRONIDAZOLE 500 MG: 500 INJECTION, SOLUTION INTRAVENOUS at 03:04

## 2025-01-01 RX ADMIN — EPINEPHRINE 0.3 MCG/KG/MIN: 1 INJECTION INTRAMUSCULAR; INTRAVENOUS; SUBCUTANEOUS at 21:26

## 2025-01-01 RX ADMIN — CALCIUM GLUCONATE 2000 MG: 20 INJECTION, SOLUTION INTRAVENOUS at 11:49

## 2025-01-01 RX ADMIN — SODIUM CHLORIDE 1000 ML: 9 INJECTION, SOLUTION INTRAVENOUS at 21:09

## 2025-01-01 RX ADMIN — Medication 100 MEQ: at 09:55

## 2025-01-01 RX ADMIN — METRONIDAZOLE 500 MG: 500 INJECTION, SOLUTION INTRAVENOUS at 03:30

## 2025-01-01 RX ADMIN — Medication 0.5 MCG/KG/MIN: at 23:29

## 2025-01-01 RX ADMIN — ONDANSETRON 4 MG: 2 INJECTION, SOLUTION INTRAMUSCULAR; INTRAVENOUS at 15:29

## 2025-01-01 RX ADMIN — Medication 3 MCG/KG/MIN: at 16:37

## 2025-01-01 RX ADMIN — LACTULOSE 20 G: 10 SOLUTION ORAL at 16:14

## 2025-01-01 RX ADMIN — Medication 3 MCG/KG/MIN: at 21:26

## 2025-01-01 RX ADMIN — DEXMEDETOMIDINE HYDROCHLORIDE 0.5 MCG/KG/HR: 400 INJECTION INTRAVENOUS at 04:37

## 2025-01-01 RX ADMIN — DEXMEDETOMIDINE HYDROCHLORIDE 1.4 MCG/KG/HR: 400 INJECTION INTRAVENOUS at 12:17

## 2025-01-01 RX ADMIN — MUPIROCIN 1 APPLICATION: 20 OINTMENT TOPICAL at 02:48

## 2025-01-01 RX ADMIN — EPINEPHRINE 0.25 MCG/KG/MIN: 1 INJECTION INTRAMUSCULAR; INTRAVENOUS; SUBCUTANEOUS at 12:16

## 2025-01-01 RX ADMIN — CEFEPIME 2000 MG: 2 INJECTION, POWDER, FOR SOLUTION INTRAVENOUS at 11:59

## 2025-01-01 RX ADMIN — Medication 10 ML: at 20:43

## 2025-01-01 RX ADMIN — FAMOTIDINE 20 MG: 10 INJECTION INTRAVENOUS at 14:25

## 2025-01-01 RX ADMIN — DEXMEDETOMIDINE HYDROCHLORIDE 1.5 MCG/KG/HR: 400 INJECTION INTRAVENOUS at 20:22

## 2025-01-01 RX ADMIN — ALBUMIN (HUMAN) 12.5 G: 0.25 INJECTION, SOLUTION INTRAVENOUS at 12:51

## 2025-01-01 RX ADMIN — Medication 3 MCG/KG/MIN: at 11:17

## 2025-01-01 RX ADMIN — DIGOXIN 250 MCG: 0.25 INJECTION INTRAMUSCULAR; INTRAVENOUS at 16:03

## 2025-01-01 RX ADMIN — SODIUM CHLORIDE, POTASSIUM CHLORIDE, SODIUM LACTATE AND CALCIUM CHLORIDE: 600; 310; 30; 20 INJECTION, SOLUTION INTRAVENOUS at 08:16

## 2025-01-01 RX ADMIN — LIDOCAINE HYDROCHLORIDE 60 MG: 20 INJECTION, SOLUTION INFILTRATION; PERINEURAL at 14:55

## 2025-01-01 RX ADMIN — MIDODRINE HYDROCHLORIDE 5 MG: 5 TABLET ORAL at 12:27

## 2025-01-01 RX ADMIN — SENNOSIDES AND DOCUSATE SODIUM 2 TABLET: 50; 8.6 TABLET ORAL at 12:27

## 2025-01-01 RX ADMIN — Medication 10 ML: at 08:52

## 2025-01-01 RX ADMIN — SENNOSIDES AND DOCUSATE SODIUM 2 TABLET: 50; 8.6 TABLET ORAL at 08:52

## 2025-01-01 RX ADMIN — NOREPINEPHRINE BITARTRATE 0.3 MCG/KG/MIN: 0.03 INJECTION, SOLUTION INTRAVENOUS at 02:17

## 2025-01-01 RX ADMIN — Medication 10 ML: at 20:22

## 2025-01-01 RX ADMIN — SODIUM CHLORIDE 1000 ML: 9 INJECTION, SOLUTION INTRAVENOUS at 23:10

## 2025-01-01 RX ADMIN — LACTULOSE 20 G: 10 SOLUTION ORAL at 20:21

## 2025-01-01 RX ADMIN — INSULIN HUMAN 4 UNITS: 100 INJECTION, SOLUTION PARENTERAL at 11:49

## 2025-01-01 RX ADMIN — METRONIDAZOLE 500 MG: 500 INJECTION, SOLUTION INTRAVENOUS at 04:39

## 2025-01-01 RX ADMIN — NOREPINEPHRINE BITARTRATE 0.3 MCG/KG/MIN: 0.03 INJECTION, SOLUTION INTRAVENOUS at 21:26

## 2025-01-01 RX ADMIN — CEFEPIME 2000 MG: 2 INJECTION, POWDER, FOR SOLUTION INTRAVENOUS at 10:27

## 2025-01-01 RX ADMIN — EPINEPHRINE 0.3 MCG/KG/MIN: 1 INJECTION INTRAMUSCULAR; INTRAVENOUS; SUBCUTANEOUS at 15:22

## 2025-01-01 RX ADMIN — NOREPINEPHRINE BITARTRATE 0.3 MCG/KG/MIN: 0.03 INJECTION, SOLUTION INTRAVENOUS at 17:47

## 2025-01-01 RX ADMIN — PHENYLEPHRINE HYDROCHLORIDE 200 MCG: 10 INJECTION INTRAVENOUS at 15:03

## 2025-01-01 RX ADMIN — METOPROLOL SUCCINATE 100 MG: 25 TABLET, EXTENDED RELEASE ORAL at 20:59

## 2025-01-01 RX ADMIN — DEXMEDETOMIDINE HYDROCHLORIDE 0.2 MCG/KG/HR: 400 INJECTION INTRAVENOUS at 21:57

## 2025-01-01 RX ADMIN — SODIUM BICARBONATE 100 MEQ: 84 INJECTION INTRAVENOUS at 15:55

## 2025-01-01 RX ADMIN — HYDROCORTISONE SODIUM SUCCINATE 50 MG: 100 INJECTION, POWDER, FOR SOLUTION INTRAMUSCULAR; INTRAVENOUS at 22:29

## 2025-01-01 RX ADMIN — LACTULOSE 20 G: 10 SOLUTION ORAL at 16:03

## 2025-01-01 RX ADMIN — METRONIDAZOLE 500 MG: 500 INJECTION, SOLUTION INTRAVENOUS at 21:15

## 2025-01-01 RX ADMIN — MUPIROCIN 1 APPLICATION: 20 OINTMENT TOPICAL at 20:05

## 2025-01-01 RX ADMIN — MIDAZOLAM 1 MG: 1 INJECTION INTRAMUSCULAR; INTRAVENOUS at 14:06

## 2025-01-01 RX ADMIN — PHYTONADIONE 5 MG: 10 INJECTION, EMULSION INTRAMUSCULAR; INTRAVENOUS; SUBCUTANEOUS at 15:00

## 2025-01-01 RX ADMIN — MUPIROCIN 1 APPLICATION: 20 OINTMENT TOPICAL at 10:38

## 2025-01-01 RX ADMIN — HEPARIN SODIUM 3800 UNITS: 1000 INJECTION, SOLUTION INTRAVENOUS; SUBCUTANEOUS at 13:15

## 2025-01-01 RX ADMIN — HYDROCORTISONE SODIUM SUCCINATE 50 MG: 100 INJECTION, POWDER, FOR SOLUTION INTRAMUSCULAR; INTRAVENOUS at 16:14

## 2025-01-01 RX ADMIN — CALCIUM GLUCONATE 2000 MG: 20 INJECTION, SOLUTION INTRAVENOUS at 15:55

## 2025-01-01 RX ADMIN — SENNOSIDES AND DOCUSATE SODIUM 2 TABLET: 50; 8.6 TABLET ORAL at 20:21

## 2025-01-01 RX ADMIN — METRONIDAZOLE 500 MG: 500 INJECTION, SOLUTION INTRAVENOUS at 20:48

## 2025-01-01 RX ADMIN — CALCIUM CHLORIDE, MAGNESIUM CHLORIDE, SODIUM CHLORIDE, SODIUM BICARBONATE, POTASSIUM CHLORIDE AND SODIUM PHOSPHATE DIBASIC DIHYDRATE 1500 ML/HR: 3.68; 3.05; 6.34; 3.09; .314; .187 INJECTION INTRAVENOUS at 21:06

## 2025-01-01 RX ADMIN — MIDODRINE HYDROCHLORIDE 5 MG: 5 TABLET ORAL at 16:34

## 2025-01-01 RX ADMIN — VASOPRESSIN 0.03 UNITS/MIN: 20 INJECTION INTRAVENOUS at 13:54

## 2025-01-01 RX ADMIN — ACETYLCYSTEINE 10000 MG: 200 INJECTION, SOLUTION INTRAVENOUS at 23:14

## 2025-01-01 RX ADMIN — CALCIUM CHLORIDE, MAGNESIUM CHLORIDE, SODIUM CHLORIDE, SODIUM BICARBONATE, POTASSIUM CHLORIDE AND SODIUM PHOSPHATE DIBASIC DIHYDRATE 1000 ML/HR: 3.68; 3.05; 6.34; 3.09; .314; .187 INJECTION INTRAVENOUS at 09:48

## 2025-01-01 RX ADMIN — NOREPINEPHRINE BITARTRATE 0.3 MCG/KG/MIN: 0.03 INJECTION, SOLUTION INTRAVENOUS at 20:06

## 2025-01-01 RX ADMIN — ONDANSETRON 4 MG: 2 INJECTION, SOLUTION INTRAMUSCULAR; INTRAVENOUS at 10:33

## 2025-01-01 RX ADMIN — ACETYLCYSTEINE 5000 MG: 200 INJECTION, SOLUTION INTRAVENOUS at 18:05

## 2025-01-01 RX ADMIN — CALCIUM CHLORIDE, MAGNESIUM CHLORIDE, SODIUM CHLORIDE, SODIUM BICARBONATE, POTASSIUM CHLORIDE AND SODIUM PHOSPHATE DIBASIC DIHYDRATE 1000 ML/HR: 3.68; 3.05; 6.34; 3.09; .314; .187 INJECTION INTRAVENOUS at 09:47

## 2025-01-01 RX ADMIN — METRONIDAZOLE 500 MG: 500 INJECTION, SOLUTION INTRAVENOUS at 10:36

## 2025-01-01 RX ADMIN — PHENYLEPHRINE HYDROCHLORIDE 0.5 MCG/KG/MIN: 10 INJECTION, SOLUTION INTRAVENOUS at 15:03

## 2025-01-01 RX ADMIN — DEXMEDETOMIDINE HYDROCHLORIDE 1.5 MCG/KG/HR: 400 INJECTION INTRAVENOUS at 22:42

## 2025-01-01 RX ADMIN — VASOPRESSIN 0.03 UNITS/MIN: 20 INJECTION INTRAVENOUS at 20:19

## 2025-01-01 RX ADMIN — Medication 10 ML: at 21:15

## 2025-01-01 RX ADMIN — DEXTROSE 100 ML/HR: 5 SOLUTION INTRAVENOUS at 11:41

## 2025-01-01 RX ADMIN — CALCIUM CHLORIDE, MAGNESIUM CHLORIDE, SODIUM CHLORIDE, SODIUM BICARBONATE, POTASSIUM CHLORIDE AND SODIUM PHOSPHATE DIBASIC DIHYDRATE 1500 ML/HR: 3.68; 3.05; 6.34; 3.09; .314; .187 INJECTION INTRAVENOUS at 21:07

## 2025-01-01 RX ADMIN — METOPROLOL SUCCINATE 50 MG: 25 TABLET, EXTENDED RELEASE ORAL at 10:26

## 2025-01-01 RX ADMIN — METRONIDAZOLE 500 MG: 500 INJECTION, SOLUTION INTRAVENOUS at 12:00

## 2025-01-01 RX ADMIN — PANTOPRAZOLE SODIUM 40 MG: 40 TABLET, DELAYED RELEASE ORAL at 08:08

## 2025-01-01 RX ADMIN — DEXTROSE MONOHYDRATE 25 G: 25 INJECTION, SOLUTION INTRAVENOUS at 21:08

## 2025-01-01 RX ADMIN — DEXTROSE MONOHYDRATE 25 G: 25 INJECTION, SOLUTION INTRAVENOUS at 10:00

## 2025-01-01 RX ADMIN — IPRATROPIUM BROMIDE AND ALBUTEROL SULFATE 3 ML: .5; 3 SOLUTION RESPIRATORY (INHALATION) at 22:15

## 2025-01-01 RX ADMIN — EPINEPHRINE 0.3 MCG/KG/MIN: 1 INJECTION INTRAMUSCULAR; INTRAVENOUS; SUBCUTANEOUS at 18:39

## 2025-01-01 RX ADMIN — NOREPINEPHRINE BITARTRATE 0.3 MCG/KG/MIN: 0.03 INJECTION, SOLUTION INTRAVENOUS at 12:18

## 2025-01-01 RX ADMIN — SODIUM BICARBONATE 50 MEQ: 84 INJECTION, SOLUTION INTRAVENOUS at 16:47

## 2025-01-01 RX ADMIN — DEXMEDETOMIDINE HYDROCHLORIDE 1 MCG/KG/HR: 400 INJECTION INTRAVENOUS at 22:11

## 2025-01-01 RX ADMIN — HYDROCORTISONE SODIUM SUCCINATE 50 MG: 100 INJECTION, POWDER, FOR SOLUTION INTRAMUSCULAR; INTRAVENOUS at 04:10

## 2025-01-01 RX ADMIN — INSULIN HUMAN 3 UNITS: 100 INJECTION, SOLUTION PARENTERAL at 00:37

## 2025-01-01 RX ADMIN — Medication 10 ML: at 11:21

## 2025-01-01 RX ADMIN — SODIUM CHLORIDE: 9 INJECTION, SOLUTION INTRAVENOUS at 14:46

## 2025-01-01 RX ADMIN — IPRATROPIUM BROMIDE AND ALBUTEROL SULFATE 3 ML: .5; 3 SOLUTION RESPIRATORY (INHALATION) at 10:41

## 2025-01-01 RX ADMIN — BUMETANIDE 4 MG: 0.25 INJECTION INTRAMUSCULAR; INTRAVENOUS at 08:51

## 2025-01-01 RX ADMIN — IPRATROPIUM BROMIDE AND ALBUTEROL SULFATE 3 ML: .5; 3 SOLUTION RESPIRATORY (INHALATION) at 08:31

## 2025-01-01 RX ADMIN — NOREPINEPHRINE BITARTRATE 0.3 MCG/KG/MIN: 0.03 INJECTION, SOLUTION INTRAVENOUS at 08:30

## 2025-01-01 RX ADMIN — MUPIROCIN 1 APPLICATION: 20 OINTMENT TOPICAL at 21:16

## 2025-01-01 RX ADMIN — METRONIDAZOLE 500 MG: 500 INJECTION, SOLUTION INTRAVENOUS at 19:23

## 2025-01-01 RX ADMIN — IPRATROPIUM BROMIDE AND ALBUTEROL SULFATE 3 ML: .5; 3 SOLUTION RESPIRATORY (INHALATION) at 20:17

## 2025-01-01 RX ADMIN — Medication 1 MCG/KG/MIN: at 06:58

## 2025-01-01 RX ADMIN — SENNOSIDES AND DOCUSATE SODIUM 2 TABLET: 50; 8.6 TABLET ORAL at 08:08

## 2025-01-01 RX ADMIN — MIDODRINE HYDROCHLORIDE 10 MG: 5 TABLET ORAL at 17:39

## 2025-01-01 RX ADMIN — DEXMEDETOMIDINE HYDROCHLORIDE 1.5 MCG/KG/HR: 400 INJECTION INTRAVENOUS at 01:17

## 2025-01-01 RX ADMIN — DEXTROSE MONOHYDRATE 150 MEQ: 50 INJECTION, SOLUTION INTRAVENOUS at 02:55

## 2025-01-01 RX ADMIN — IPRATROPIUM BROMIDE AND ALBUTEROL SULFATE 3 ML: .5; 3 SOLUTION RESPIRATORY (INHALATION) at 18:53

## 2025-01-01 RX ADMIN — DEXMEDETOMIDINE HYDROCHLORIDE 1.5 MCG/KG/HR: 400 INJECTION INTRAVENOUS at 15:22

## 2025-01-01 RX ADMIN — DEXTROSE MONOHYDRATE 25 G: 25 INJECTION, SOLUTION INTRAVENOUS at 04:57

## 2025-01-01 RX ADMIN — CEFEPIME 2000 MG: 2 INJECTION, POWDER, FOR SOLUTION INTRAVENOUS at 12:19

## 2025-01-01 RX ADMIN — EPINEPHRINE 0.02 MCG/KG/MIN: 1 INJECTION INTRAMUSCULAR; INTRAVENOUS; SUBCUTANEOUS at 08:55

## 2025-01-01 RX ADMIN — CEFEPIME 2000 MG: 2 INJECTION, POWDER, FOR SOLUTION INTRAVENOUS at 16:03

## 2025-01-01 RX ADMIN — DEXMEDETOMIDINE HYDROCHLORIDE 1.4 MCG/KG/HR: 400 INJECTION INTRAVENOUS at 06:56

## 2025-01-01 RX ADMIN — Medication 3 MCG/KG/MIN: at 13:53

## 2025-01-01 RX ADMIN — IPRATROPIUM BROMIDE AND ALBUTEROL SULFATE 3 ML: .5; 3 SOLUTION RESPIRATORY (INHALATION) at 07:12

## 2025-01-01 RX ADMIN — IPRATROPIUM BROMIDE AND ALBUTEROL SULFATE 3 ML: .5; 3 SOLUTION RESPIRATORY (INHALATION) at 15:02

## 2025-01-01 RX ADMIN — SODIUM CHLORIDE 500 ML: 9 INJECTION, SOLUTION INTRAVENOUS at 02:47

## 2025-01-01 RX ADMIN — PROPOFOL 130 MG: 10 INJECTION, EMULSION INTRAVENOUS at 14:55

## 2025-01-01 RX ADMIN — MIDODRINE HYDROCHLORIDE 10 MG: 5 TABLET ORAL at 08:08

## 2025-01-01 RX ADMIN — METOPROLOL TARTRATE 5 MG: 1 INJECTION, SOLUTION INTRAVENOUS at 12:58

## 2025-01-01 RX ADMIN — ROCURONIUM BROMIDE 50 MG: 10 INJECTION, SOLUTION INTRAVENOUS at 14:55

## 2025-01-01 RX ADMIN — ALBUMIN (HUMAN) 12.5 G: 0.25 INJECTION, SOLUTION INTRAVENOUS at 14:00

## 2025-01-01 RX ADMIN — IPRATROPIUM BROMIDE AND ALBUTEROL SULFATE 3 ML: .5; 3 SOLUTION RESPIRATORY (INHALATION) at 15:30

## 2025-01-01 RX ADMIN — PANTOPRAZOLE SODIUM 40 MG: 40 TABLET, DELAYED RELEASE ORAL at 12:43

## 2025-01-01 RX ADMIN — DEXMEDETOMIDINE HYDROCHLORIDE 1.5 MCG/KG/HR: 400 INJECTION INTRAVENOUS at 09:43

## 2025-01-01 RX ADMIN — Medication 10 ML: at 02:48

## 2025-01-01 RX ADMIN — MIDODRINE HYDROCHLORIDE 10 MG: 5 TABLET ORAL at 11:36

## 2025-01-01 RX ADMIN — Medication 3 MCG/KG/MIN: at 20:21

## 2025-01-01 RX ADMIN — IPRATROPIUM BROMIDE AND ALBUTEROL SULFATE 3 ML: .5; 3 SOLUTION RESPIRATORY (INHALATION) at 10:48

## 2025-01-01 RX ADMIN — LACTULOSE 20 G: 10 SOLUTION ORAL at 20:05

## 2025-01-01 RX ADMIN — FENTANYL CITRATE 25 MCG: 50 INJECTION, SOLUTION INTRAMUSCULAR; INTRAVENOUS at 14:06

## 2025-01-01 RX ADMIN — Medication 10 ML: at 09:00

## 2025-01-01 RX ADMIN — LACTULOSE 20 G: 10 SOLUTION ORAL at 08:05

## 2025-01-01 RX ADMIN — PIPERACILLIN AND TAZOBACTAM 3.38 G: 3; .375 INJECTION, POWDER, FOR SOLUTION INTRAVENOUS at 01:45

## 2025-01-01 RX ADMIN — FENTANYL CITRATE 25 MCG: 50 INJECTION, SOLUTION INTRAMUSCULAR; INTRAVENOUS at 03:04

## 2025-01-01 RX ADMIN — MUPIROCIN 1 APPLICATION: 20 OINTMENT TOPICAL at 10:34

## 2025-01-01 RX ADMIN — SUGAMMADEX 400 MG: 100 INJECTION, SOLUTION INTRAVENOUS at 17:09

## 2025-01-01 RX ADMIN — HYDROCORTISONE SODIUM SUCCINATE 50 MG: 100 INJECTION, POWDER, FOR SOLUTION INTRAMUSCULAR; INTRAVENOUS at 10:34

## 2025-01-01 RX ADMIN — MUPIROCIN 1 APPLICATION: 20 OINTMENT TOPICAL at 20:22

## 2025-01-01 RX ADMIN — IPRATROPIUM BROMIDE AND ALBUTEROL SULFATE 3 ML: .5; 3 SOLUTION RESPIRATORY (INHALATION) at 06:58

## 2025-01-01 RX ADMIN — SENNOSIDES AND DOCUSATE SODIUM 2 TABLET: 50; 8.6 TABLET ORAL at 20:06

## 2025-01-01 RX ADMIN — MUPIROCIN 1 APPLICATION: 20 OINTMENT TOPICAL at 11:00

## 2025-01-01 RX ADMIN — NOREPINEPHRINE BITARTRATE 0.3 MCG/KG/MIN: 0.03 INJECTION, SOLUTION INTRAVENOUS at 04:38

## 2025-01-01 RX ADMIN — Medication 3 MCG/KG/MIN: at 00:43

## 2025-01-01 RX ADMIN — IPRATROPIUM BROMIDE AND ALBUTEROL SULFATE 3 ML: .5; 3 SOLUTION RESPIRATORY (INHALATION) at 10:40

## 2025-01-01 RX ADMIN — CEFEPIME 2000 MG: 2 INJECTION, POWDER, FOR SOLUTION INTRAVENOUS at 20:22

## 2025-01-01 RX ADMIN — SODIUM BICARBONATE 100 MEQ: 84 INJECTION INTRAVENOUS at 09:55

## 2025-01-07 ENCOUNTER — TELEPHONE (OUTPATIENT)
Age: 65
End: 2025-01-07
Payer: MEDICARE

## 2025-01-07 NOTE — TELEPHONE ENCOUNTER
Pt has been a no show to his last couple appts. Can we try calling and getting him scheduled with either Dr. Rosas or Ron and a device check ([employer]uppet)?     Thank you!

## 2025-01-14 RX ORDER — MIRTAZAPINE 30 MG/1
30 TABLET, FILM COATED ORAL NIGHTLY
Qty: 30 TABLET | Refills: 2 | Status: SHIPPED | OUTPATIENT
Start: 2025-01-14

## 2025-03-03 PROBLEM — I50.9 MULTI-ORGAN FAILURE WITH HEART FAILURE: Status: ACTIVE | Noted: 2025-01-01

## 2025-03-04 NOTE — PAYOR COMM NOTE
"Sebastien Prabhakar (65 y.o. Male)     ATTN: NURSE REVIEWER  RE: INITIAL INPT AUTH CLINICALS   REF# [id number]  PLS REPLY TO [facility] [phone] OR FAX# [phone]      Date of Birth   1960    Social Security Number       Address   [address] [facility] [address] [id number]    Home Phone   [phone]    MRN   [id number]       Judaism   None    Marital Status   Single                            Admission Date   3/3/25    Admission Type   Emergency    Admitting Provider   Dana Lane MD    Attending Provider   Dana Lane MD    Department, Room/Bed   [facility] INTENSIVE CARE, I385/1       Discharge Date       Discharge Disposition       Discharge Destination                                 Attending Provider: Dana Lane MD    Allergies: No Known Allergies    Isolation: None   Infection: MRSA/History Only (04/04/21)   Code Status: CPR    Ht: 167.6 cm (66\")   Wt: 105 kg (230 lb 13.2 oz)    Admission Cmt: None   Principal Problem: Multi-organ failure with heart failure [I50.9]                   Active Insurance as of 3/3/2025       Primary Coverage       Payor Plan Insurance Group Employer/Plan Group    HUMANA MEDICARE REPLACEMENT HU[employer] [facility]IC[facility]ANTAGE [facility] [id number]       Payor Plan Address Payor Plan Phone Number Payor Plan Fax Number Effective Dates    [address] [address] [phone]  11/1/2024 - None Entered    [facility] [address]         Subscriber Name Subscriber Birth Date Member ID       SEBASTIEN PRABHAKAR 1960 [id number]                     Emergency Contacts        (Rel.) Home Phone Work Phone Mobile Phone    RadhaZara (Daughter) [phone] -- [phone]    Reyna Álvarez (Other) -- -- [phone]                 History & Physical        Dana Lane MD at 03/04/25 0038          CT abdomen prelim showed dilated gallbladder and distal CBD stone..  Placed empirically on Zosyn.  Check lipase (patient denies abdominal pain).  Consult " GI.    Sepsis but fluid bolus 500 ml only administered due to hypoxia/CHF.    CK significantly elevated consistent with rhabdo.  Placed on bicarb drip.      Electronically signed by Dana Lane MD, 03/04/25, 12:39 AM EST.       Electronically signed by Dana Lane MD at 03/04/25 0213       Dana Lane MD at 03/03/25 1151                        Patient Care Team:  Provider, No Known as PCP - General    Chief complaint:  fatigue    History of present illness:  Subjective    This is a 65-year-old male patient with history of A-fib, on Eliquis.  Cardiomyopathy/AICD.  Recently hospitalized for paroxysmal A-fib in December 2024 because of ICD discharge.    He presented to the hospital today because he has not been feeling well for the last couple of days.  He had nausea, fatigue and decreased p.o. intake.  He also reported episodes of subjective fever but no documented temperature.  He had mild red blood blood per rectum.  But this occurs intermittently and attributed to hemorrhoids.  He has stopped taking his Eliquis couple of days ago because he was not feeling well.  In the ED, he was hemodynamically stable with the exception of tachycardia..    He is on Lasix 80 mg daily at home and he said that he did not stop taking his diuretic.  Patient has been taking Tylenol 2 to 3 pills a day lately.  No incident.    Patient was on NRB mask 15 L/min by EMS.  In ED, he was weaned down to 4 L.    Labs:  Cr = 4.3.  was 1.1 on 12/3/2024. ALT/AST =1551/2994 (chronic mild elevation at baseline); Maciel= 1.3; anion gap 22. WBC 14.  Neutrophils 92%.  Hb 10, at baseline. Lactate 2.7. Trop 112.  proBNP 90214.    Data:  -Echo 3/22/24: EF 50-55%; Mild Biatrial enlargement. RVSP 45-55.    -CXR today showed cardiomegaly and increased pulmonary vascular markings.    Review of Systems:  Constitutional: No fever or chills.   ENMT: No sinus congestion  Cardiovascular: No chest pain, palpitation but legs swelling.    Respiratory: Dyspnea.   No cough.  No wheezing.  Gastrointestinal: Nausea as above.  No vomiting.  Decreased p.o. intake.  No abdominal pain or diarrhea.  Neurology: No headache, numbness or dizziness but generalized weakness.  Musculoskeletal: No joints pain, stiffness or swelling.   Psychiatry: No depression.  Genitourinary: No dysuria or frequent urination  Endo: No weight changes. No cold or warm intolerance.  Lymphatic: No swollen glands.  Integumentary: No rash.    History  Past Medical History:   Diagnosis Date    Acidosis     mixed resp/metabolic acidosis    Acute congestive heart failure     Acute respiratory failure     hypoxic    Anemia     Arthritis     Asthma     Atrial flutter     Azotemia     Cardiac arrest 04/2021    Chronic coronary artery disease     Constipation     COPD (chronic obstructive pulmonary disease)     Depression     Enlarged prostate     Hypertension     Hypokalemia     severe    ICD (implantable cardioverter-defibrillator) in place     Ischemic cardiomyopathy     MRSA nasal colonization     Obesity (BMI 30-39.9)     Orthopnea     PAF (paroxysmal atrial fibrillation)     Persistent atrial fibrillation     Pulmonary edema     Tobacco abuse     Transaminitis     Trouble in sleeping     Ventricular fibrillation      Past Surgical History:   Procedure Laterality Date    CARDIAC CATHETERIZATION Left 4/1/2021    Procedure: Coronary Angiography;  Surgeon: Anna Puga MD;  Location: [facility] CATH INVASIVE LOCATION;  Service: Cardiology;  Laterality: Left;    CARDIAC CATHETERIZATION N/A 4/1/2021    Procedure: Left ventriculography;  Surgeon: Anna Puga MD;  [facility];  Service: Cardiology;  Laterality: N/A;    CARDIAC CATHETERIZATION N/A 4/1/2021    Procedure: Left Heart Cath;  Surgeon: Anna Puga MD;  Location: [facility] CATH INVASIVE LOCATION;  Service: Cardiology;  Laterality: N/A;    CARDIAC ELECTROPHYSIOLOGY PROCEDURE N/A 4/7/2021    Procedure: IMPLANTABLE CARDIOVERTER  DEFIBRILLATOR- SC BIOTRONIK;  Surgeon: Nik Rosas MD;  Location: [facility] INVASIVE LOC[facility];  Service: Cardiovascular;  Laterality: N/A;    CARDIOVERSION  05/19/2021    Dr. Rosas    CARDIOVERSION  07/26/2021    Dr. Rosas    TONSILLECTOMY       History reviewed. No pertinent family history.  Social History     Tobacco Use    Smoking status: Former     Current packs/day: 0.00     Average packs/day: 1.5 packs/day for 10.0 years (15.0 ttl pk-yrs)     Types: Cigarettes     Start date: 4/22/2011     Quit date: 4/22/2021     Years since quitting: 3.8     Passive exposure: Past    Smokeless tobacco: Never    Tobacco comments:     4/2021   Vaping Use    Vaping status: Never Used   Substance Use Topics    Alcohol use: Yes     Alcohol/week: 1.0 standard drink of alcohol     Comment: 1 BEER DAILY    Drug use: Not Currently     Types: Hydrocodone     E-cigarette/Vaping    E-cigarette/Vaping Use Never User     Passive Exposure No     Counseling Given No      E-cigarette/Vaping Substances    Nicotine No     THC No     CBD No     Flavoring No      (Not in a hospital admission)   Allergies:  Patient has no known allergies.    Objective  Vital Signs  Temp:  [98.5 °F (36.9 °C)] 98.5 °F (36.9 °C)  Heart Rate:  [115-120] 115  Resp:  [14-22] 14  BP: (125-145)/() 131/102    PPE used per hospital policy    Physical Exam:  Constitutional: Not in acute distress.  Eyes: Injected conjunctivae, EOMI. Pupils equal and reactive to light.   ENMT: Dougherty 3.  Dry tongue and lips.  Neck: No thyromegaly.  Trachea midline  Heart: Tachycardia with regular rhythm.  No audible murmur.  Grade 2 pitting edema in legs.  Lungs: Equal but slightly diminished air entry at the bases with coarse breath sounds.  No wheezing.  No use of accessory muscles.  Abdomen: Obese. Soft. No tenderness or dullness.  Positive bowel sounds  Extremities: No cyanosis, clubbing. Moves all extremities.  Warm extremities and well-perfused  Neuro: Conscious,  alert, oriented x3.  Strength 5/5 overall  Psych: Appropriate mood and affect.    Integumentary: No rash or ecchymosis  Lymphatic: No palpable cervical or supraclavicular lymph nodes.            Diagnostic imaging:  I personally and independently reviewed the following images:   CXR 3/4/2025: Cardiomegaly.  Vascular congestion ?      Laboratory workup:  Results from last 7 days   Lab Units 03/03/25  2200   SODIUM mmol/L 130*   POTASSIUM mmol/L 4.8   CHLORIDE mmol/L 86*   CO2 mmol/L 22.1   BUN mg/dL 86*   CREATININE mg/dL 4.30*   GLUCOSE mg/dL 100*   CALCIUM mg/dL 8.7     Results from last 7 days   Lab Units 03/03/25  2200   HSTROP T ng/L 112*     Results from last 7 days   Lab Units 03/03/25  2200   WBC 10*3/mm3 14.84*   HEMOGLOBIN g/dL 10.6*   HEMATOCRIT % 35.4*   PLATELETS 10*3/mm3 366         Results from last 7 days   Lab Units 03/03/25  2200   PROBNP pg/mL 18,364.0*       Assessment  Acute liver injury, unclear etiology.  Could be precipitated by A-fib and decompensated CHF.  Acute hypoxic respiratory failure  Acute on chronic diastolic CHF  ANDIE  Hyponatremia  Pulmonary hypertension, moderate per echo 3/22/2024.  Possibly group 2 due to dilated LA.  Mild lactic acidosis.,  Secondary to liver injury.    Chronic anemia  A-fib, on Eliquis  AICD    Plan:    ICD interrogation performed in the ED.  Patient has been in A-fib over the last 2 days but no ICD shocks since November 2024.  Check Tylenol level, Hepatitis panel and coags  Check CK and urine lites.  Uric acid.  Renal consult in a.m.  Check echocardiogram  CT abdomen pelvis W/O contrast.  Evaluate for urinary obstruction.  Ultrasound liver.  Gentle IV fluid despite some signs of extravascular volume overload.  He appears to be intravascular volume depleted.  PRN Lopressor  Oxygen by NC and titrate to keep SpO2 >90%      Dana Lane MD  03/03/25  23:35 EST    Time: Critical care 45 min      This note was dictated utilizing Dragon dictation     Electronically  signed by Dana Lane MD at 03/04/25 0036       Facility-Administered Medications as of 3/4/2025   Medication Dose Route Frequency Provider Last Rate Last Admin    sennosides-docusate (PERICOLACE) 8.6-50 MG per tablet 2 tablet  2 tablet Oral BID Dana Lane MD        And    polyethylene glycol (MIRALAX) packet 17 g  17 g Oral Daily PRN Dana Lane MD        And    bisacodyl (DULCOLAX) EC tablet 5 mg  5 mg Oral Daily PRN Dana Lane MD        And    bisacodyl (DULCOLAX) suppository 10 mg  10 mg Rectal Daily PRN Dana Lane MD        cefepime 2000 mg IVPB in 100 mL NS (MBP)  2,000 mg Intravenous Q24H Yanely Roth MD        [COMPLETED] ipratropium-albuterol (DUO-NEB) nebulizer solution 3 mL  3 mL Nebulization Once Jorge Luis Rodriguez MD   3 mL at 03/03/25 2215    metoprolol tartrate (LOPRESSOR) injection 5 mg  5 mg Intravenous Q4H PRN Dana Lane MD   5 mg at 03/04/25 0514    metroNIDAZOLE (FLAGYL) IVPB 500 mg  500 mg Intravenous Q8H Yanely Roth MD        mupirocin (BACTROBAN) 2 % nasal ointment 1 Application  1 Application Each Nare BID Dana Lane MD   1 Application at 03/04/25 0248    nitroglycerin (NITROSTAT) SL tablet 0.4 mg  0.4 mg Sublingual Q5 Min PRN Dana Lane MD        ondansetron (ZOFRAN) injection 4 mg  4 mg Intravenous Q6H PRN Dana Lane MD        [COMPLETED] piperacillin-tazobactam (ZOSYN) 3.375 g IVPB in 100 mL NS MBP (CD)  3.375 g Intravenous Once Jorge Luis Rodriguez MD   3.375 g at 03/04/25 0145    sodium bicarbonate 8.4 % 150 mEq in dextrose (D5W) 5 % 1,000 mL infusion (greater than 100 mEq)  150 mEq Intravenous Continuous Sang Stout MD        [COMPLETED] sodium chloride 0.9 % bolus 500 mL  500 mL Intravenous Once Dana Lane MD 1,000 mL/hr at 03/04/25 0247 500 mL at 03/04/25 0247    sodium chloride 0.9 % flush 10 mL  10 mL Intravenous PRN Dana Lane MD        sodium chloride 0.9 % flush 10 mL  10 mL Intravenous Q12H Dana Lane MD    10 mL at 03/04/25 0248    sodium chloride 0.9 % flush 10 mL  10 mL Intravenous PRN Dana Lane MD        sodium chloride 0.9 % infusion 40 mL  40 mL Intravenous PRN Dana Lane MD         Lab Results (last 24 hours)       Procedure Component Value Units Date/Time    Manual Differential [[id number]]  (Abnormal) Collected: 03/04/25 0607    Specimen: Blood Updated: 03/04/25 0846     Neutrophil % 88.5 %      Lymphocyte % 4.2 %      Monocyte % 6.3 %      Eosinophil % 0.0 %      Basophil % 0.0 %      Myelocyte % 1.0 %      Neutrophils Absolute 13.66 10*3/mm3      Lymphocytes Absolute 0.65 10*3/mm3      Monocytes Absolute 0.97 10*3/mm3      Eosinophils Absolute 0.00 10*3/mm3      Basophils Absolute 0.00 10*3/mm3      nRBC 4.2 /100 WBC      Anisocytosis Mod/2+     Elliptocytes Mod/2+     Hypochromia Mod/2+     Microcytes Mod/2+     Poikilocytes Mod/2+     Polychromasia Large/3+     WBC Morphology Normal     Platelet Morphology Normal    Comprehensive Metabolic Panel [[id number]]  (Abnormal) Collected: 03/04/25 0639    Specimen: Blood Updated: 03/04/25 0806     Glucose 134 mg/dL      BUN 91 mg/dL      Creatinine 4.53 mg/dL      Sodium 134 mmol/L      Potassium 4.4 mmol/L      Chloride 88 mmol/L      CO2 23.0 mmol/L      Calcium 7.8 mg/dL      Total Protein 6.5 g/dL      Albumin 3.7 g/dL      ALT (SGPT) 1,315 U/L      AST (SGOT) 2,340 U/L      Alkaline Phosphatase 148 U/L      Total Bilirubin 1.2 mg/dL      Globulin 2.8 gm/dL      A/G Ratio 1.3 g/dL      BUN/Creatinine Ratio 20.1     Anion Gap 23.0 mmol/L      eGFR 13.5 mL/min/1.73     Narrative:      Unable to Calculate GFR result due to patient's height not being entered.  GFR Categories in Chronic Kidney Disease (CKD)      GFR Category          GFR (mL/min/1.73)    Interpretation  G1                     90 or greater         Normal or high (1)  G2                      60-89                Mild decrease (1)  G3a                   45-59                Mild to moderate  decrease  G3b                   30-44                Moderate to severe decrease  G4                    15-29                Severe decrease  G5                    14 or less           Kidney failure          (1)In the absence of evidence of kidney disease, neither GFR category G1 or G2 fulfill the criteria for CKD.    eGFR calculation 2021 CKD-EPI creatinine equation, which does not include race as a factor    STAT Lactic Acid, Reflex [[id number]]  (Normal) Collected: 03/04/25 0607    Specimen: Blood Updated: 03/04/25 0642     Lactate 1.9 mmol/L     CBC & Differential [[id number]]  (Abnormal) Collected: 03/04/25 0607    Specimen: Blood Updated: 03/04/25 0634    Narrative:      The following orders were created for panel order CBC & Differential.  Procedure                               Abnormality         Status                     ---------                               -----------         ------                     CBC Auto Differential[[id number]]        Abnormal            Final result                 Please view results for these tests on the individual orders.    CBC Auto Differential [[id number]]  (Abnormal) Collected: 03/04/25 0607    Specimen: Blood Updated: 03/04/25 0634     WBC 15.44 10*3/mm3      RBC 4.56 10*6/mm3      Hemoglobin 9.9 g/dL      Hematocrit 35.7 %      MCV 78.3 fL      MCH 21.7 pg      MCHC 27.7 g/dL      RDW 17.3 %      RDW-SD 48.6 fl      MPV 9.5 fL      Platelets 332 10*3/mm3      nRBC 2.3 /100 WBC     STAT Lactic Acid, Reflex [[id number]]  (Abnormal) Collected: 03/04/25 0330    Specimen: Blood Updated: 03/04/25 0406     Lactate 2.3 mmol/L     Lipase [[id number]]  (Abnormal) Collected: 03/04/25 0035    Specimen: Blood from Arm, Right Updated: 03/04/25 0241     Lipase 137 U/L     POC Glucose Once [[id number]]  (Normal) Collected: 03/04/25 0240    Specimen: Blood Updated: 03/04/25 0241     Glucose 110 mg/dL     Hepatitis Panel, Acute [[id number]]  (Normal) Collected: 03/04/25 0204    Specimen:  Blood from Arm, Right Updated: 03/04/25 0240     Hepatitis B Surface Ag Non-Reactive     Hep A IgM Non-Reactive     Hep B C IgM Non-Reactive     Hepatitis C Ab Non-Reactive    Narrative:      Results may be falsely decreased if patient taking Biotin.     Sodium, Urine, Random - Urine, Clean Catch [[id number]] Collected: 03/04/25 0006    Specimen: Urine, Clean Catch Updated: 03/04/25 0131     Sodium, Urine 67 mmol/L     Narrative:      Reference intervals for random urine have not been established.  Clinical usage is dependent upon physician's interpretation in combination with other laboratory tests.       Creatinine Urine Random (kidney function) GFR component - Urine, Clean Catch [[id number]] Collected: 03/04/25 0006    Specimen: Urine, Clean Catch Updated: 03/04/25 0131     Creatinine, Urine 18.6 mg/dL     Narrative:      Reference intervals for random urine have not been established.  Clinical usage is dependent upon physician's interpretation in combination with other laboratory tests.       CK [[id number]]  (Abnormal) Collected: 03/04/25 0035    Specimen: Blood from Arm, Right Updated: 03/04/25 0123     Creatine Kinase 16,749 U/L     STAT Lactic Acid, Reflex [[id number]]  (Abnormal) Collected: 03/04/25 0035    Specimen: Blood from Arm, Right Updated: 03/04/25 0112     Lactate 2.4 mmol/L     High Sensitivity Troponin T 1Hr [[id number]]  (Abnormal) Collected: 03/04/25 0035    Specimen: Blood from Arm, Right Updated: 03/04/25 0112     HS Troponin T 106 ng/L      Troponin T Numeric Delta -6 ng/L      Troponin T % Delta -5    Narrative:      High Sensitive Troponin T Reference Range:  <14.0 ng/L- Negative Female for AMI  <22.0 ng/L- Negative Male for AMI  >=14 - Abnormal Female indicating possible myocardial injury.  >=22 - Abnormal Male indicating possible myocardial injury.   Clinicians would have to utilize clinical acumen, EKG, Troponin, and serial changes to determine if it is an Acute Myocardial Infarction or  myocardial injury due to an underlying chronic condition.         Uric Acid [[id number]]  (Abnormal) Collected: 03/04/25 0035    Specimen: Blood from Arm, Right Updated: 03/04/25 0110     Uric Acid 18.7 mg/dL     Protime-INR [[id number]]  (Abnormal) Collected: 03/04/25 0035    Specimen: Blood from Arm, Right Updated: 03/04/25 0100     Protime 23.8 Seconds      INR 2.11    aPTT [[id number]]  (Normal) Collected: 03/04/25 0035    Specimen: Blood from Arm, Right Updated: 03/04/25 0100     PTT 32.3 seconds     Blood Culture - Blood, Arm, Right [[id number]] Collected: 03/04/25 0035    Specimen: Blood from Arm, Right Updated: 03/04/25 0040    Urinalysis With Microscopic If Indicated (No Culture) - Urine, Clean Catch [[id number]]  (Abnormal) Collected: 03/04/25 0006    Specimen: Urine, Clean Catch Updated: 03/04/25 0026     Color, UA Yellow     Appearance, UA Clear     pH, UA 7.0     Specific Gravity, UA 1.012     Glucose, UA Negative     Ketones, UA Negative     Bilirubin, UA Negative     Blood, UA Large (3+)     Protein, UA >=300 mg/dL (3+)     Leuk Esterase, UA Negative     Nitrite, UA Negative     Urobilinogen, UA 1.0 E.U./dL    Urinalysis, Microscopic Only - Urine, Clean Catch [[id number]]  (Abnormal) Collected: 03/04/25 0006    Specimen: Urine, Clean Catch Updated: 03/04/25 0026     RBC, UA 0-2 /HPF      WBC, UA 0-2 /HPF      Bacteria, UA None Seen /HPF      Squamous Epithelial Cells, UA 3-6 /HPF      Hyaline Casts, UA 3-6 /LPF      Methodology Automated Microscopy    Procalcitonin [[id number]]  (Abnormal) Collected: 03/03/25 2200    Specimen: Blood Updated: 03/04/25 0015     Procalcitonin 1.81 ng/mL     Narrative:      As a Marker for Sepsis (Non-Neonates):    1. <0.5 ng/mL represents a low risk of severe sepsis and/or septic shock.  2. >2 ng/mL represents a high risk of severe sepsis and/or septic shock.    As a Marker for Lower Respiratory Tract Infections that require antibiotic therapy:    PCT on Admission    Antibiotic  "Therapy       6-12 Hrs later    >0.5                Strongly Recommended  >0.25 - <0.5        Recommended   0.1 - 0.25          Discouraged              Remeasure/reassess PCT  <0.1                Strongly Discouraged     Remeasure/reassess PCT    As 28 day mortality risk marker: \"Change in Procalcitonin Result\" (>80% or <=80%) if Day 0 (or Day 1) and Day 4 values are available. Refer to [email or web address]-[email or web address]    Change in PCT <=80%  A decrease of PCT levels below or equal to 80% defines a positive change in PCT test result representing a higher risk for 28-day all-cause mortality of patients diagnosed with severe sepsis for septic shock.    Change in PCT >80%  A decrease of PCT levels of more than 80% defines a negative change in PCT result representing a lower risk for 28-day all-cause mortality of patients diagnosed with severe sepsis or septic shock.       Acetaminophen Level [[id number]]  (Normal) Collected: 03/03/25 2200    Specimen: Blood Updated: 03/04/25 0005     Acetaminophen 16.0 mcg/mL     Comprehensive Metabolic Panel [[id number]]  (Abnormal) Collected: 03/03/25 2200    Specimen: Blood Updated: 03/03/25 2301     Glucose 100 mg/dL      BUN 86 mg/dL      Creatinine 4.30 mg/dL      Sodium 130 mmol/L      Potassium 4.8 mmol/L      Chloride 86 mmol/L      CO2 22.1 mmol/L      Calcium 8.7 mg/dL      Total Protein 7.5 g/dL      Albumin 4.2 g/dL      ALT (SGPT) 1,551 U/L      AST (SGOT) 2,994 U/L      Alkaline Phosphatase 161 U/L      Total Bilirubin 1.3 mg/dL      Globulin 3.3 gm/dL      A/G Ratio 1.3 g/dL      BUN/Creatinine Ratio 20.0     Anion Gap 21.9 mmol/L      eGFR 14.3 mL/min/1.73     Narrative:      GFR Categories in Chronic Kidney Disease (CKD)      GFR Category          GFR (mL/min/1.73)    Interpretation  G1                     90 or greater         Normal or high (1)  G2                      60-89                Mild decrease (1)  G3a                   45-59                Mild to " moderate decrease  G3b                   30-44                Moderate to severe decrease  G4                    15-29                Severe decrease  G5                    14 or less           Kidney failure          (1)In the absence of evidence of kidney disease, neither GFR category G1 or G2 fulfill the criteria for CKD.    eGFR calculation 2021 CKD-EPI creatinine equation, which does not include race as a factor    Manual Differential [[id number]]  (Abnormal) Collected: 03/03/25 2200    Specimen: Blood Updated: 03/03/25 2301     Neutrophil % 92.8 %      Lymphocyte % 2.1 %      Monocyte % 4.1 %      Eosinophil % 0.0 %      Basophil % 0.0 %      Myelocyte % 1.0 %      Neutrophils Absolute 13.77 10*3/mm3      Lymphocytes Absolute 0.31 10*3/mm3      Monocytes Absolute 0.61 10*3/mm3      Eosinophils Absolute 0.00 10*3/mm3      Basophils Absolute 0.00 10*3/mm3      nRBC 8.2 /100 WBC      Anisocytosis Slight/1+     Microcytes Slight/1+     Poikilocytes Slight/1+     WBC Morphology Normal     Platelet Morphology Normal    High Sensitivity Troponin T [[id number]]  (Abnormal) Collected: 03/03/25 2200    Specimen: Blood Updated: 03/03/25 2244     HS Troponin T 112 ng/L     Narrative:      High Sensitive Troponin T Reference Range:  <14.0 ng/L- Negative Female for AMI  <22.0 ng/L- Negative Male for AMI  >=14 - Abnormal Female indicating possible myocardial injury.  >=22 - Abnormal Male indicating possible myocardial injury.   Clinicians would have to utilize clinical acumen, EKG, Troponin, and serial changes to determine if it is an Acute Myocardial Infarction or myocardial injury due to an underlying chronic condition.         BNP [[id number]]  (Abnormal) Collected: 03/03/25 2200    Specimen: Blood Updated: 03/03/25 2241     proBNP 18,364.0 pg/mL     Narrative:      This assay is used as an aid in the diagnosis of individuals suspected of having heart failure. It can be used as an aid in the diagnosis of acute decompensated  heart failure (ADHF) in patients presenting with signs and symptoms of ADHF to the emergency department (ED). In addition, NT-proBNP of <300 pg/mL indicates ADHF is not likely.    Age Range Result Interpretation  NT-proBNP Concentration (pg/mL:      <50             Positive            >450                   Gray                 300-450                    Negative             <300    50-75           Positive            >900                  Gray                300-900                  Negative            <300      >75             Positive            >1800                  Gray                300-1800                  Negative            <300    Lactic Acid, Plasma [[id number]]  (Abnormal) Collected: 03/03/25 2200    Specimen: Blood Updated: 03/03/25 2238     Lactate 2.7 mmol/L     CBC & Differential [[id number]]  (Abnormal) Collected: 03/03/25 2200    Specimen: Blood Updated: 03/03/25 2237    Narrative:      The following orders were created for panel order CBC & Differential.  Procedure                               Abnormality         Status                     ---------                               -----------         ------                     CBC Auto Differential[[id number]]        Abnormal            Final result                 Please view results for these tests on the individual orders.    CBC Auto Differential [[id number]]  (Abnormal) Collected: 03/03/25 2200    Specimen: Blood Updated: 03/03/25 2237     WBC 14.84 10*3/mm3      RBC 4.76 10*6/mm3      Hemoglobin 10.6 g/dL      Hematocrit 35.4 %      MCV 74.4 fL      MCH 22.3 pg      MCHC 29.9 g/dL      RDW 17.2 %      RDW-SD 44.7 fl      MPV 9.4 fL      Platelets 366 10*3/mm3      nRBC 3.4 /100 WBC           Imaging Results (Last 24 Hours)       Procedure Component Value Units Date/Time     Liver [[id number]] Collected: 03/04/25 0515     Updated: 03/04/25 0531    Narrative:      LIVER ULTRASOUND     HISTORY: Hepatitis     COMPARISON: March 3, 2025      TECHNIQUE: Grayscale and color Doppler sonographic images were obtained  through the right upper quadrant.     FINDINGS:  The patient appears to have some fatty atrophy of the pancreas. Full  assessment is limited, due to significant overlying bowel at motion  artifact. No focal hepatic lesions are seen. Main portal vein appears to  be patent. Common bile duct measures up to 6 mm, but is poorly imaged,  due to overlying bowel gas. There is no intrahepatic biliary dilatation.  Gallbladder is distended and contains sludge. There is some mild  gallbladder wall thickening, measuring up to 4 mm, but there is no  pericholecystic fluid, and no sonographic Grady's sign was elicited.  Right kidney measures 10.4 x 4.9 x 4.8 cm. There is no hydronephrosis.  Renal echotexture appears normal.       Impression:         1. The patient's visualized common bile duct measures up to 6 mm. The  distal common bile duct is not well seen. No choledocholithiasis is seen  on this exam. However, again, the exam is limited by overlying bowel  gas.  2. There is sludge identified within the distended gallbladder. There is  some nonspecific gallbladder wall thickening, although there is no  pericholecystic fluid, and no sonographic Grady's sign was elicited.     This report was finalized on 3/4/2025 5:18 AM by Dr. Judi Chow M.D on Workstation: [id number]       CT Chest Without Contrast Diagnostic [[id number]] Collected: 03/04/25 0502     Updated: 03/04/25 0510    Narrative:      CT OF THE CHEST WITHOUT CONTRAST     HISTORY: Hypoxia     COMPARISON: March 16, 2024     TECHNIQUE: Axial CT imaging was obtained through the thorax. No IV  contrast was administered.     FINDINGS:  There are background emphysematous changes. There is interlobular septal  thickening, as well as some mild groundglass attenuation, favored to  reflect vascular congestion. The heart is enlarged. There is a  left-sided pacemaker. Thyroid gland, trachea, and  esophagus appear  unremarkable. There is enlargement of the main pulmonary artery, which  can be seen in setting of pulmonary arterial hypertension. There is  dilatation of the aortic root, measuring up to 4 cm. Ascending thoracic  aorta is also dilated, measuring up to 4 cm. Descending thoracic aorta  measures 2.8 cm proximally, and up to 3.3 cm distally. Mediastinal lymph  nodes do not appear pathologically enlarged. There are coronary artery  calcifications. Images through the upper abdomen again demonstrate  distention of the gallbladder and probable stones within the distal  common bile duct. No acute osseous abnormalities are seen.       Impression:         1. There are advanced background emphysematous changes  2. Interlobular septal thickening, as well as some groundglass  attenuation, suggesting some edema.  3. Patient again appears to have stones within the distal common bile  duct and distention of the gallbladder.     Radiation dose reduction techniques were utilized, including automated  exposure control and exposure modulation based on body size.        This report was finalized on 3/4/2025 5:06 AM by Dr. Judi Chow M.D on Workstation: [id number]       CT Abdomen Pelvis Without Contrast [[id number]] Collected: 03/04/25 0004     Updated: 03/04/25 0014    Narrative:      CT OF THE ABDOMEN PELVIS NO CONTRAST     HISTORY: Abnormal elevated liver function tests and renal failure     COMPARISON: April 1, 2021     TECHNIQUE: Axial CT imaging was obtained through the abdomen and pelvis.  No IV contrast was administered.     FINDINGS:  Images through the lung bases demonstrate background emphysematous  changes. The heart is enlarged. Images are degraded by motion artifact.  No suspicious hepatic lesions are seen. There is minimal dilatation of  the common bile duct, measuring up to 7 mm, and the patient appears to  have 2 stones within the common bile duct distally, measuring 5 to 7 mm.  Stomach,  adrenal glands, and spleen are normal. There is a small  duodenal diverticulum. Gallbladder appears distended. Clinical  significance is uncertain. The pancreas is atrophic. No hydronephrosis  is seen on either side. There is a 4 mm nonobstructing stone within the  left kidney. There is a tiny simple appearing left renal cyst. There is  mild aneurysmal dilatation of the infrarenal abdominal aorta, measuring  up to 3.0 x 2.8 cm. There are aortoiliac calcifications. Prostate gland  is enlarged and protrudes into the base the bladder. There is colonic  diverticulosis. There is no bowel obstruction. The appendix is normal.  No acute osseous abnormalities are seen. There is a fat-containing  umbilical hernia.       Impression:         1. Mild dilatation of the common bile duct. The patient does appear to  have stones within the distal common bile duct.  2. There is distention of the gallbladder.  3. Punctate nonobstructing left renal stone.     Radiation dose reduction techniques were utilized, including automated  exposure control and exposure modulation based on body size.        This report was finalized on 3/4/2025 12:11 AM by Dr. Judi Chow M.D on Workstation: [id number]       XR Chest 1 View [[id number]] Collected: 03/03/25 2244     Updated: 03/03/25 2248    Narrative:      SINGLE VIEW OF THE CHEST     HISTORY: Shortness of air     COMPARISON: November 27, 2024     FINDINGS:  Cardiomegaly is present. There is vascular congestion. Left-sided  pacemaker is noted. No pneumothorax or pleural effusion is seen.       Impression:      Cardiomegaly with vascular congestion, suggesting congestive heart  failure.     This report was finalized on 3/3/2025 10:45 PM by Dr. Juid Chow M.D on Workstation: [id number]             ECG/EMG Results (last 24 hours)       Procedure Component Value Units Date/Time    ECG 12 Lead Tachycardia [[id number]] Collected: 03/03/25 2203     Updated: 03/03/25 2204     QT Interval  327 ms      QTC Interval 457 ms     Narrative:      HEART SUOZ=621  bpm  RR Bezyxlhv=171  ms  WI Interval=  ms  P Horizontal Axis=  deg  P Front Axis=  deg  QRSD Huedlekx=988  ms  QT Ykzzafwg=964  ms  MMvG=643  ms  QRS Axis=-73  deg  T Wave Axis=52  deg  - ABNORMAL ECG -  Atrial flutter with predominant 2:1 AV block  Abnormal R-wave progression, late transition  Inferior infarct, old  Date and Time of Study:2025-03-03 22:03:16          Orders (last 24 hrs)        Start     Ordered    03/05/25 0600  Comprehensive Metabolic Panel  Daily       03/04/25 0856    03/05/25 0600  Phosphorus  Morning Draw         03/04/25 0856    03/05/25 0600  Hepatitis B Surface Antigen  Morning Draw         03/04/25 0856    03/05/25 0600  CK  Daily       03/04/25 0856    03/04/25 1130  metroNIDAZOLE (FLAGYL) IVPB 500 mg  Every 8 Hours         03/04/25 1008    03/04/25 1100  cefepime 2000 mg IVPB in 100 mL NS (MBP)  Every 24 Hours         03/04/25 1007    03/04/25 1013  Inpatient Nephrology Consult  Once        Specialty:  Nephrology  Provider:  Nicolle Bejarano MD    03/04/25 1015    03/04/25 1013  Inpatient Cardiology Consult  Once        Specialty:  Cardiology  Provider:  Tonia Gaytan MD    03/04/25 1015    03/04/25 0945  sodium bicarbonate 8.4 % 150 mEq in dextrose (D5W) 5 % 1,000 mL infusion (greater than 100 mEq)  Continuous         03/04/25 0845    03/04/25 0846  Bladder Scan  Once        Comments: Random, please msg me with result thanks    03/04/25 0845    03/04/25 0702  Inpatient Nephrology Consult  IN AM        Specialty:  Nephrology  Provider:  Manav Olmos MD    03/04/25 0049    03/04/25 0702  Inpatient Gastroenterology Consult  IN AM        Specialty:  Gastroenterology  Provider:  Ruddy Contreras MD    03/04/25 0212    03/04/25 0631  Basic Metabolic Panel  STAT,   Status:  Canceled         03/04/25 0630    03/04/25 0631  Comprehensive Metabolic Panel  STAT         03/04/25 0631    03/04/25 0630  STAT Lactic  Acid, Reflex  PROCEDURE ONCE         03/04/25 0406    03/04/25 0630  Nephrology consult noted, will see this AM  Nursing Communication  Once        Comments: Nephrology consult noted, will see this AM    03/04/25 0629    03/04/25 0625  Manual Differential  Once         03/04/25 0624    03/04/25 0614  Inpatient Gastroenterology Consult  Once        Specialty:  Gastroenterology  Provider:  Chi Sampson MD    03/04/25 0614    03/04/25 0613  NPO Diet NPO Type: Strict NPO  Diet Effective Now         03/04/25 0613    03/04/25 0600  CBC & Differential  Daily       03/04/25 0036    03/04/25 0600  CBC Auto Differential  PROCEDURE ONCE         03/04/25 0036    03/04/25 0335  STAT Lactic Acid, Reflex  PROCEDURE ONCE         03/04/25 0112    03/04/25 0253  Inpatient Case Management  Consult  Once        Comments: Home accessibility/ discharge home transportation   Provider:  (Not yet assigned)    03/04/25 0253    03/04/25 0242  POC Glucose Once  PROCEDURE ONCE        Comments: Complete no more than 45 minutes prior to patient eating      03/04/25 0240    03/04/25 0225  sodium bicarbonate 8.4 % 150 mEq in dextrose (D5W) 5 % 1,000 mL infusion (greater than 100 mEq)  Continuous,   Status:  Discontinued         03/04/25 0209    03/04/25 0214  Lipase  Once         03/04/25 0213    03/04/25 0210  Blood Gas, Venous -  Once         03/04/25 0209    03/04/25 0105  sodium chloride 0.9 % bolus 500 mL  Once         03/04/25 0049    03/04/25 0100  STAT Lactic Acid, Reflex  PROCEDURE ONCE         03/03/25 2238    03/04/25 0100  Vital Signs Every Hour and Per Hospital Policy Based on Patient Condition  Every Hour       03/04/25 0036    03/04/25 0100  Intake & Output  Every Hour       03/04/25 0036    03/04/25 0056  sodium chloride 0.9 % infusion  Continuous,   Status:  Discontinued         03/04/25 0040    03/04/25 0052  sodium chloride 0.9 % flush 10 mL  Every 12 Hours Scheduled         03/04/25 0036    03/04/25 0052   "mupirocin (BACTROBAN) 2 % nasal ointment 1 Application  2 Times Daily         03/04/25 0036    03/04/25 0052  sennosides-docusate (PERICOLACE) 8.6-50 MG per tablet 2 tablet  2 Times Daily        Placed in \"And\" Linked Group    03/04/25 0036    03/04/25 0052  sodium chloride 0.9 % infusion  Continuous,   Status:  Discontinued         03/04/25 0036    03/04/25 0048  metoprolol tartrate (LOPRESSOR) injection 5 mg  Every 4 Hours PRN         03/04/25 0049    03/04/25 0048  piperacillin-tazobactam (ZOSYN) 3.375 g IVPB in 100 mL NS MBP (CD)  Once         03/04/25 0032    03/04/25 0041  Insert 2 Large Bore (18G) Peripheral IVs  Once         03/04/25 0040    03/04/25 0040  Sepsis Fluid Exclusion: Concern For Fluid Overload; Bolus Volume Given / Ordered (mL): 100  Once        Comments: The patient was ordered 100 ml of fluids.    03/04/25 0040    03/04/25 0038  CK  Once         03/04/25 0037    03/04/25 0038  Uric Acid  Once         03/04/25 0037    03/04/25 0038  Sodium, Urine, Random - Urine, Clean Catch  Once         03/04/25 0037    03/04/25 0038  Creatinine Urine Random (kidney function) GFR component - Urine, Clean Catch  Once         03/04/25 0037    03/04/25 0037  Daily Weights  Daily       03/04/25 0036    03/04/25 0037  Daily CHG Bath While in Critical Care  Daily      Comments: Use for admission bath and length of critical care stay.    03/04/25 0036    03/04/25 0036  US Liver  1 Time Imaging         03/04/25 0036    03/04/25 0035  ondansetron (ZOFRAN) injection 4 mg  Every 6 Hours PRN         03/04/25 0036    03/04/25 0035  Continuous Cardiac Monitoring  Continuous        Comments: Follow Standing Orders As Outlined in Process Instructions (Open Order Report to View Full Instructions)    03/04/25 0036    03/04/25 0035  Maintain IV Access  Continuous,   Status:  Canceled         03/04/25 0036    03/04/25 0035  Telemetry - Place Orders & Notify Provider of Results When Patient Experiences Acute Chest Pain, " "Dysrhythmia or Respiratory Distress  Continuous        Comments: Open Order Report to View Parameters Requiring Provider Notification    03/04/25 0036    03/04/25 0035  Continuous Pulse Oximetry  Continuous         03/04/25 0036    03/04/25 0035  Height & Weight  Once         03/04/25 0036    03/04/25 0035  Oral Care - Patient Not on NPPV & Not Intubated  Every Shift       03/04/25 0036    03/04/25 0035  Target Arousal Level RASS 0 to -2  Continuous         03/04/25 0036    03/04/25 0035  Use Mobility Guidelines for Advancement of Activity  Continuous         03/04/25 0036    03/04/25 0035  Insert Peripheral IV  Once         03/04/25 0036    03/04/25 0035  Saline Lock & Maintain IV Access  Continuous         03/04/25 0036    03/04/25 0035  Code Status and Medical Interventions: CPR (Attempt to Resuscitate); Full Support  Continuous         03/04/25 0036    03/04/25 0035  Place Sequential Compression Device  Once         03/04/25 0036    03/04/25 0035  Maintain Sequential Compression Device  Continuous         03/04/25 0036    03/04/25 0035  Diet: Cardiac; Healthy Heart (2-3 Na+); Fluid Consistency: Thin (IDDSI 0)  Diet Effective Now,   Status:  Canceled         03/04/25 0036    03/04/25 0034  nitroglycerin (NITROSTAT) SL tablet 0.4 mg  Every 5 Minutes PRN         03/04/25 0036    03/04/25 0034  sodium chloride 0.9 % flush 10 mL  As Needed         03/04/25 0036    03/04/25 0034  sodium chloride 0.9 % infusion 40 mL  As Needed         03/04/25 0036    03/04/25 0034  polyethylene glycol (MIRALAX) packet 17 g  Daily PRN        Placed in \"And\" Linked Group    03/04/25 0036    03/04/25 0034  bisacodyl (DULCOLAX) EC tablet 5 mg  Daily PRN        Placed in \"And\" Linked Group    03/04/25 0036    03/04/25 0034  bisacodyl (DULCOLAX) suppository 10 mg  Daily PRN        Placed in \"And\" Linked Group    03/04/25 0036    03/04/25 0032  CT Chest Without Contrast Diagnostic  1 Time Imaging         03/04/25 0032    03/04/25 0024  " "Urinalysis, Microscopic Only - Urine, Clean Catch  Once         03/04/25 0023    03/03/25 2341  Urinalysis With Microscopic If Indicated (No Culture) - Urine, Clean Catch  Once         03/03/25 2340    03/03/25 2341  Inpatient Admission  Once         03/03/25 2341    03/03/25 2340  Blood Culture - Blood, Arm, Right  Once         03/03/25 2339    03/03/25 2340  Blood Culture - Blood,  Once         03/03/25 2339    03/03/25 2340  Procalcitonin  Once         03/03/25 2339    03/03/25 2326  Pulmonology (on-call MD unless specified)  Once        Specialty:  Pulmonary Disease  Provider:  Dana Lane MD    03/03/25 2325    03/03/25 2322  Hepatitis Panel, Acute  Once         03/03/25 2321    03/03/25 2322  CT Abdomen Pelvis Without Contrast  1 Time Imaging        Comments: NON-CONTRASTED STUDY      03/03/25 2321    03/03/25 2321  Acetaminophen Level  Once         03/03/25 2321    03/03/25 2321  Protime-INR  Once         03/03/25 2321    03/03/25 2321  aPTT  Once         03/03/25 2321    03/03/25 2321  Device Interrogation. Device type? Pacemaker; Company to contact? Unknown (Staff to obtain from patient)  Once         03/03/25 2321    03/03/25 2300  High Sensitivity Troponin T 1Hr  PROCEDURE ONCE         03/03/25 2243    03/03/25 2224  Manual Differential  Once         03/03/25 2223    03/03/25 2222  ipratropium-albuterol (DUO-NEB) nebulizer solution 3 mL  Once         03/03/25 2206 03/03/25 2158  BNP  Once         03/03/25 2157    03/03/25 2158  High Sensitivity Troponin T  Once         03/03/25 2157    03/03/25 2158  Type & Screen  Once         03/03/25 2157 03/03/25 2158  Insert Peripheral IV  Once        Placed in \"And\" Linked Group    03/03/25 2157 03/03/25 2158  Cardiac Monitoring  Continuous,   Status:  Canceled        Comments: Follow Standing Orders As Outlined in Process Instructions (Open Order Report to View Full Instructions)    03/03/25 2157 03/03/25 2158  Continuous Pulse Oximetry  Continuous,  " " Status:  Canceled         03/03/25 2157 03/03/25 2158  Monitor Blood Pressure  Per Hospital Policy/Protocol         03/03/25 2157 03/03/25 2158  ECG 12 Lead Tachycardia  Once         03/03/25 2157 03/03/25 2158  XR Chest 1 View  1 Time Imaging         03/03/25 2157 03/03/25 2158  Lactic Acid, Plasma  Once         03/03/25 2157 03/03/25 2157  sodium chloride 0.9 % flush 10 mL  As Needed        Placed in \"And\" Linked Group    03/03/25 2157 03/03/25 2157  CBC & Differential  Once         03/03/25 2157 03/03/25 2157  Comprehensive Metabolic Panel  Once         03/03/25 2157 03/03/25 2157  CBC Auto Differential  PROCEDURE ONCE         03/03/25 2157                  Operative/Procedure Notes (last 24 hours)  Notes from 03/03/25 1126 through 03/04/25 1126   No notes of this type exist for this encounter.          Physician Progress Notes (last 24 hours)        Yanely Roth MD at 03/04/25 1004                [facility]         Dr Yanely Roth   LOS: 1 day   Patient Care Team:  Provider, No Known as PCP - General    Chief Complaint: Acute liver injury with stone in the common bile duct and acute hypoxemic respiratory failure acute on chronic diastolic CHF other issues as listed below    Interval History: Resting comfortably on nasal cannula oxygen.  Currently on bicarb drip.  Awake denies any complaints.  No history of any drug overdose or Tylenol overdosage    REVIEW OF SYSTEMS:   CARDIOVASCULAR: No chest pain, chest pressure or chest discomfort. No palpitations or edema.   RESPIRATORY: No shortness of breath, cough or sputum.   GASTROINTESTINAL: No anorexia, nausea, vomiting or diarrhea. No abdominal pain or blood.   HEMATOLOGIC: No bleeding or bruising.     Ventilator/Non-Invasive Ventilation Settings (From admission, onward)      None              Vital Signs  Temp:  [97.5 °F (36.4 °C)-98.5 °F (36.9 °C)] 97.5 °F (36.4 °C)  Heart Rate:  [112-130] 117  Resp:  [14-22] 22  BP: " "(383-157)/56118 104/56    Intake/Output Summary (Last 24 hours) at 3/4/2025 1004  Last data filed at 3/4/2025 0600  Gross per 24 hour   Intake 1118 ml   Output --   Net 1118 ml     Flowsheet Rows      Flowsheet Row First Filed Value   Admission Height 170.2 cm (67\") Documented at 03/03/2025 2154   Admission Weight 113 kg (250 lb) Documented at 03/03/2025 2154                  Physical Exam:  Patient is examined using the personal protective equipment as per guidelines from infection control for this particular patient as enacted.  Hand hygiene was performed before and after patient interaction.   General Appearance:    Alert, cooperative, in no acute distress.  Following simple commands  ENT Mallampati between 3 and 4 no nasal congestion  Neck midline trachea, no thyromegaly   Lungs:     Clear to auscultation, respirations regular, even and                  unlabored    Heart:    Regular rhythm and normal rate, normal S1 and S2, no            murmur, no gallop, no rub, no click   Chest Wall:    No abnormalities observed   Abdomen:   Obese soft mild tenderness right upper quadrant no rebound no guarding   Extremities:   Moves all extremities well, no edema, no cyanosis, no             redness  CNS no focal neurological deficits normal sensory exam  Skin no rashes no nodules  Musculoskeletal no cyanosis no clubbing normal range of motion     Results Review:        Results from last 7 days   Lab Units 03/04/25  0639 03/03/25 2200   SODIUM mmol/L 134* 130*   POTASSIUM mmol/L 4.4 4.8   CHLORIDE mmol/L 88* 86*   CO2 mmol/L 23.0 22.1   BUN mg/dL 91* 86*   CREATININE mg/dL 4.53* 4.30*   GLUCOSE mg/dL 134* 100*   CALCIUM mg/dL 7.8* 8.7     Results from last 7 days   Lab Units 03/04/25  0035 03/03/25  2200   CK TOTAL U/L 16,749*  --    HSTROP T ng/L 106* 112*     Results from last 7 days   Lab Units 03/04/25  0607 03/03/25  2200   WBC 10*3/mm3 15.44* 14.84*   HEMOGLOBIN g/dL 9.9* 10.6*   HEMATOCRIT % 35.7* 35.4* "   PLATELETS 10*3/mm3 332 366     Results from last 7 days   Lab Units 25  0035   INR  2.11*   APTT seconds 32.3                       I reviewed the patient's new clinical results.  I personally viewed and interpreted the patient's chest x-ray.        Medication Review:   mupirocin, 1 Application, Each Nare, BID  senna-docusate sodium, 2 tablet, Oral, BID  sodium chloride, 10 mL, Intravenous, Q12H        sodium bicarbonate 8.4 % 150 mEq in dextrose (D5W) 5 % 1,000 mL infusion (greater than 100 mEq), 150 mEq        ASSESSMENT:   Acute liver injury  Sepsis  Stone in the distal common bile duct  Coagulopathy  Acute hypoxic respiratory failure  Acute on chronic diastolic CHF  ANDIE  Hyponatremia  Pulmonary hypertension, moderate per echo 3/22/2024.  Possibly group 2 due to dilated LA.  Mild lactic acidosis.,  Secondary to liver injury.     Chronic anemia  A-fib, on Eliquis  AICD    PLAN:  As noted chart reviewed  Shock liver could be related to gallbladder stone.  GI has been consulted awaiting evaluation.   Tylenol and talk screen negative.  Awaiting hepatitis profile.  Will cover with antibiotic cefepime and Flagyl for now  Keep volume status on the dry side  Worsening creatinine will consult nephrology.  Likely from sepsis.  Further bicarb drip management per nephrology  Lactic acidosis resolved  Will consult cardiology with history of A-fib and ICD  ICU core measures  Trend LFTs      Critical care time 35-minute    Yanely Roth MD  25  10:04 EST            Electronically signed by Yanely Roth MD at 25 1017          Consult Notes (last 24 hours)        Sang Stout MD at 25 0751        Consult Orders    1. Inpatient Nephrology Consult [[id number]] ordered by Dana Lane MD at 25 0049                   [facility] Consult Note      Patient Name: Sebastien Tang  : 1960  MRN: [id number]  Primary Care Physician:  Provider, No  Known  Referring Physician: Dana Lane MD  Date of admission: 3/3/2025    Subjective     Reason for Consult:  ANDIE     HPI:   Sebastien Tang is a 65 y.o. male with diastolic CHF (EF 55%) and VT s/p AICD, PAF on AC, DM2, HTN, GERD who presented yesterday with nausea, fatigue and poor oral intake.  Found to have ANDIE, lactic acidosis, rhabdomyolysis, and marked transaminitis.  BUN/Cr 86/4.3 with K 4.8 and bicarb 22 (AG 22).  CK ~ 16K and UA shows discrepant large blood but no RBCs.  ALT/AST ~ 1500/3000.  CXR showed some vascular congestion, while CT abd demonstrated dilatation of CBD + choledocholithiasis (and punctate left renal stone).  Not hypotensive but tachycardic with HR up to 120s.  He denies dyspnea but is on 4L O2.  He reports a fall and minor Lt knee injury but that was a month ago.  Home meds notable for lasix/aldactone, statin PPI.  BP generous ~ 150/100 currently.  He's NPO with plan for ERCP this afternoon.  BL Cr 1.1 to 1.2.  Was hospitalized 11/27 to 12/3/24 for ICD discharges.  Also had ANDIE (peak Cr 2) and rectal bleeding.  Was given  cc bolus and currently on bicarb drip.  Zosyn started.  Prior LILA March 2024 did suggest moderate pulmonary HTN too, with RVSP 45 to 55 mm Hg    Review of Systems:   14 point review of systems is otherwise negative except for mentioned above on HPI    Personal History     Past Medical History:   Diagnosis Date    Acidosis     mixed resp/metabolic acidosis    Acute congestive heart failure     Acute respiratory failure     hypoxic    Anemia     Arthritis     Asthma     Atrial flutter     Azotemia     Cardiac arrest 04/2021    Chronic coronary artery disease     Constipation     COPD (chronic obstructive pulmonary disease)     Depression     Enlarged prostate     Hypertension     Hypokalemia     severe    ICD (implantable cardioverter-defibrillator) in place     Ischemic cardiomyopathy     MRSA nasal colonization     Obesity (BMI 30-39.9)     Orthopnea     PAF  (paroxysmal atrial fibrillation)     Persistent atrial fibrillation     Pulmonary edema     Tobacco abuse     Transaminitis     Trouble in sleeping     Ventricular fibrillation        Past Surgical History:   Procedure Laterality Date    CARDIAC CATHETERIZATION Left 4/1/2021    Procedure: Coronary Angiography;  Surgeon: Anna Puga MD;  Location:  [facility];  Service: Cardiology;  Laterality: Left;    CARDIAC CATHETERIZATION N/A 4/1/2021    Procedure: Left ventriculography;  Surgeon: Anna Puga MD;  Location:  [facility];  Service: Cardiology;  Laterality: N/A;    CARDIAC CATHETERIZATION N/A 4/1/2021    Procedure: Left Heart Cath;  Surgeon: Anna Puga MD;  Location:  [facility];  Service: Cardiology;  Laterality: N/A;    CARDIAC ELECTROPHYSIOLOGY PROCEDURE N/A 4/7/2021    Procedure: IMPLANTABLE CARDIOVERTER DEFIBRILLATOR- SC BIOTRONIK;  Surgeon: Nik Rosas MD;  Location:  [facility];  Service: Cardiovascular;  Laterality: N/A;    CARDIOVERSION  05/19/2021    Dr. Rosas    CARDIOVERSION  07/26/2021    Dr. Rosas    TONSILLECTOMY         Family History: family history is not on file.    Social History:  reports that he quit smoking about 3 years ago. His smoking use included cigarettes. He started smoking about 13 years ago. He has a 15 pack-year smoking history. He has been exposed to tobacco smoke. He has never used smokeless tobacco. He reports current alcohol use of about 1.0 standard drink of alcohol per week. He reports that he does not currently use drugs after having used the following drugs: Hydrocodone.    Home Medications:  Prior to Admission medications    Medication Sig Start Date End Date Taking? Authorizing Provider   albuterol sulfate  (90 Base) MCG/ACT inhaler INHALE 2 PUFFS BY MOUTH EVERY 6 HOURS AS NEEDED FOR WHEEZING 5/28/24  Yes Yuliana Flynn R, DO   apixaban (Eliquis) 5 MG tablet tablet Take 1  tablet by mouth Every 12 (Twelve) Hours. 4/23/24  Yes Regine Causey APRN   busPIRone (BUSPAR) 10 MG tablet Take 1 tablet by mouth 3 (Three) Times a Day. 12/3/24  Yes Ron Walton MD   finasteride (PROSCAR) 5 MG tablet Take 1 tablet by mouth Daily. 10/30/24  Yes Yuliana Flynn,    furosemide (LASIX) 80 MG tablet Take 1 tablet by mouth Daily. 9/17/24  Yes Orlin Beatty MD   metFORMIN (GLUCOPHAGE) 500 MG tablet Take 1 tablet by mouth 2 (Two) Times a Day With Meals.   Yes ProviderJanelle MD   mirtazapine (REMERON) 30 MG tablet TAKE 1 TABLET BY MOUTH EVERY NIGHT 1/14/25  Yes Yuliana Flynn DO   pantoprazole (PROTONIX) 40 MG EC tablet TAKE 1 TABLET BY MOUTH EVERY MORNING 10/18/24  Yes Yuliana Flynn DO   temazepam (RESTORIL) 15 MG capsule Take 1 capsule by mouth At Night As Needed for Sleep for up to 5 doses. 12/3/24  Yes Ron Walton MD   metoprolol succinate XL (TOPROL-XL) 50 MG 24 hr tablet Take 1 tablet by mouth Every 12 (Twelve) Hours for 30 days. 12/3/24 1/2/25  Ron Walton MD   polyethylene glycol (MIRALAX) 17 g packet Take 17 g by mouth Daily. 8/12/24   Kelley Thakur APRN   rosuvastatin (Crestor) 20 MG tablet Take 1 tablet by mouth Daily. 3/29/24   Orlin Beatty MD   spironolactone (ALDACTONE) 25 MG tablet Take 1 tablet by mouth Daily. 5/1/24   ProviderJanelle MD   tiZANidine (ZANAFLEX) 4 MG tablet Take 1 tablet by mouth 2 (Two) Times a Day As Needed for Muscle Spasms.    ProviderJanelle MD   budesonide-formoterol (SYMBICORT) 160-4.5 MCG/ACT inhaler Inhale 2 puffs 2 (Two) Times a Day. 3/23/24 3/4/25  Abdoulaye Gómez MD       Allergies:  No Known Allergies    Objective     Vitals:   Temp:  [97.7 °F (36.5 °C)-98.5 °F (36.9 °C)] 98 °F (36.7 °C)  Heart Rate:  [112-130] 117  Resp:  [14-22] 22  BP: (104-157)/() 104/56  Flow (L/min) (Oxygen Therapy):  [4-5] 5    Intake/Output Summary (Last 24 hours) at 3/4/2025 0751  Last data filed at 3/4/2025  0600  Gross per 24 hour   Intake 1118 ml   Output --   Net 1118 ml       Physical Exam:    General Appearance: pleasant WF comfortable alert on NC O2 (4L)  Skin: warm and dry  HEENT: oral mucosa normal, nonicteric sclera  Neck: supple, no JVD  Lungs: Dec BS bilat no rales  Heart: RRR, normal S1 and S2  Abdomen: soft, nontender, nondistended  : no palpable bladder  Extremities: no edema, cyanosis or clubbing  Neuro: normal speech and mental status     Scheduled Meds:     mupirocin, 1 Application, Each Nare, BID  senna-docusate sodium, 2 tablet, Oral, BID  sodium chloride, 10 mL, Intravenous, Q12H      IV Meds:   sodium bicarbonate 8.4 % 150 mEq in dextrose (D5W) 5 % 1,000 mL infusion (greater than 100 mEq), 150 mEq, Last Rate: 150 mEq (03/04/25 0255)        Results Reviewed:   I have personally reviewed the results from the time of this admission to 3/4/2025 07:51 EST     Lab Results   Component Value Date    GLUCOSE 100 (H) 03/03/2025    CALCIUM 8.7 03/03/2025     (L) 03/03/2025    K 4.8 03/03/2025    CO2 22.1 03/03/2025    CL 86 (L) 03/03/2025    BUN 86 (H) 03/03/2025    CREATININE 4.30 (H) 03/03/2025    EGFRIFAFRI 115 06/14/2021    EGFRIFNONA 91 07/20/2021    BCR 20.0 03/03/2025    ANIONGAP 21.9 (H) 03/03/2025      Lab Results   Component Value Date    MG 2.3 11/30/2024    PHOS 3.9 03/23/2024    ALBUMIN 4.2 03/03/2025     US Liver    Result Date: 3/4/2025  LIVER ULTRASOUND  HISTORY: Hepatitis  COMPARISON: March 3, 2025  TECHNIQUE: Grayscale and color Doppler sonographic images were obtained through the right upper quadrant.  FINDINGS: The patient appears to have some fatty atrophy of the pancreas. Full assessment is limited, due to significant overlying bowel at motion artifact. No focal hepatic lesions are seen. Main portal vein appears to be patent. Common bile duct measures up to 6 mm, but is poorly imaged, due to overlying bowel gas. There is no intrahepatic biliary dilatation. Gallbladder is distended  and contains sludge. There is some mild gallbladder wall thickening, measuring up to 4 mm, but there is no pericholecystic fluid, and no sonographic Grady's sign was elicited. Right kidney measures 10.4 x 4.9 x 4.8 cm. There is no hydronephrosis. Renal echotexture appears normal.      Impression:  1. The patient's visualized common bile duct measures up to 6 mm. The distal common bile duct is not well seen. No choledocholithiasis is seen on this exam. However, again, the exam is limited by overlying bowel gas. 2. There is sludge identified within the distended gallbladder. There is some nonspecific gallbladder wall thickening, although there is no pericholecystic fluid, and no sonographic Grady's sign was elicited.  This report was finalized on 3/4/2025 5:18 AM by Dr. Judi Chow M.D on Workstation: [id number]        Assessment / Plan     ASSESSMENT:  ANDIE - unclear if olig.  Rhabdo contributory, with CK ~ 16K and e/o myoglobinuria (large blood but no RBCs on UA) along with poss vol depletion in relation.  We need to exercise caution though re: IVF admin given hx CHF, vasc mitch on CXR, and 4L O2 requirements.  Also extent of UOP unclear.  CT: no hydro (punctate L stone).  BUN/Cr up slightly 91/4.5 despite IVF.  K/Hco3 normal.  Elvia = 67.  Rhabdomyolysis - mechanism unclear.  CK ~ 16K. Fall was month ago.  Is on statin.  Transaminitis - multifactorial, CBD stone, rhabdo; ALT/AST down slightly 1315/2340  Lactic acidosis, improved with IVF  Choledocholithiasis, GI on board, plan ERCP.  On zosyn.  WBC 15K.    Diastolic CHF + pulm HTN - prior echo noted   Hx VT/VF/AFIB on AC, eliquis on hold)  HTN - BP labile, 150/100 currently but was 104/56 earlier this AM  Dm2, mgmt per primary team, holding metformin due to ANDIE  Hx GERD on PPI, doubt contributory to ANDIE (ie AIN)  Hx BPH on finasteride - check random bladder scan  Nutrition: NPO currently for ERCP    PLAN:  No acute indication for HD but discussed this  possibility with patient  Dec bicarb drip rate 100 cc/hr and will closely monitor vol status  Check random bladder scan  Note plan for ERCP today  Trend CK & LFTs  Hold diuretics, statin, metformin    Thank you for involving us in the care of Sebastien Tang.  Please feel free to call with any questions.    Sang Stout MD  03/04/25  07:51 EST    [facility] \[facility]\""  [phone]    Electronically signed by Sang Stout MD at 03/04/25 1291

## 2025-03-04 NOTE — H&P
CT abdomen prelim showed dilated gallbladder and distal CBD stone..  Placed empirically on Zosyn.  Check lipase (patient denies abdominal pain).  Consult GI.    Sepsis but fluid bolus 500 ml only administered due to hypoxia/CHF.    CK significantly elevated consistent with rhabdo.  Placed on bicarb drip.      Electronically signed by Dana Lane MD, 03/04/25, 12:39 AM EST.

## 2025-03-04 NOTE — ANESTHESIA PREPROCEDURE EVALUATION
Anesthesia Evaluation     Patient summary reviewed and Nursing notes reviewed   NPO Solid Status: > 8 hours  NPO Liquid Status: > 2 hours           Airway   Mallampati: III  TM distance: <3 FB  Neck ROM: full  Possible difficult intubation  Comment: Full beard  Dental    (+) poor dentition    Comment: Many missing teeth, none loose.    Pulmonary - normal exam    breath sounds clear to auscultation  (+) a smoker Former, COPD, asthma,shortness of breath    ROS comment: Hx acute hypoxic respiratory failure  Cardiovascular     ECG reviewed  Rhythm: irregular  Rate: abnormal    (+) pacemaker ICD, hypertension 2 medications or greater, CAD, dysrhythmias Atrial Fib, Atrial Flutter, Tachycardia, CHF , orthopnea, PVD, hyperlipidemia    ROS comment: Persistent afib/aflutter/hx NSVT and VF, has AICD/ischemic cardiomyopathy/EF 55%, mod TR, moderate pulmonary HTN by LILA 3/24/last ECG from yesterday shows aflutter with 2:1 block ()  PE comment: Aflutter/VR  120    Neuro/Psych  (+) numbness, psychiatric history Anxiety and Depression    ROS Comment: Diabetic polyneuropathy  GI/Hepatic/Renal/Endo    (+) obesity, morbid obesity, liver disease history of elevated LFT, renal disease- ARF, diabetes mellitus type 2    Musculoskeletal     Abdominal   (+) obese   Substance History      OB/GYN          Other   arthritis, blood dyscrasia anemia,       Other Comment: Hgb 9.9      Phys Exam Other: ICD left chest              Anesthesia Plan    ASA 4     general and Mela     (Art line needed due to cardiac and respiratory issues/GETA for prone positioning/CMAC for intubation/magnet for ICD/possible need for postop vent.)  intravenous induction     Anesthetic plan, risks, benefits, and alternatives have been provided, discussed and informed consent has been obtained with: patient.      CODE STATUS:    Code Status (Patient has no pulse and is not breathing): CPR (Attempt to Resuscitate)  Medical Interventions (Patient has pulse or is  breathing): Full Support

## 2025-03-04 NOTE — ED NOTES
Nursing report ED to floor  Sebastien Tang  65 y.o.  male    HPI :  HPI  Stated Reason for Visit: rectal bleeding  History Obtained From: patient, EMS    Chief Complaint  Chief Complaint   Patient presents with    Rectal Bleeding       Admitting doctor:   Dana Lane MD    Admitting diagnosis:   The primary encounter diagnosis was Acute hypoxic respiratory failure. Diagnoses of Acute kidney injury, Acute congestive heart failure, unspecified heart failure type, Transaminitis, Bright red blood per rectum, and Multi-organ failure with heart failure were also pertinent to this visit.    Code status:   Current Code Status       Date Active Code Status Order ID Comments User Context       Prior            Allergies:   Patient has no known allergies.    Isolation:   No active isolations    Intake and Output  No intake or output data in the 24 hours ending 03/04/25 0028    Weight:       03/03/25  2154   Weight: 113 kg (250 lb)       Most recent vitals:   Vitals:    03/03/25 2219 03/03/25 2231 03/04/25 0004 03/04/25 0004   BP:  (!) 131/102 130/90 130/90   BP Location:    Right arm   Patient Position:    Sitting   Pulse:  115 (!) 128 (!) 126   Resp: 14   18   Temp:       TempSrc:       SpO2:  97% 95% 98%   Weight:       Height:           Active LDAs/IV Access:   Lines, Drains & Airways       Active LDAs       Name Placement date Placement time Site Days    Peripheral IV 03/03/25 2206 Left Antecubital 03/03/25 2206  Antecubital  less than 1                    Labs (abnormal labs have a star):   Labs Reviewed   COMPREHENSIVE METABOLIC PANEL - Abnormal; Notable for the following components:       Result Value    Glucose 100 (*)     BUN 86 (*)     Creatinine 4.30 (*)     Sodium 130 (*)     Chloride 86 (*)     ALT (SGPT) 1,551 (*)     AST (SGOT) 2,994 (*)     Alkaline Phosphatase 161 (*)     Total Bilirubin 1.3 (*)     Anion Gap 21.9 (*)     eGFR 14.3 (*)     All other components within normal limits    Narrative:     GFR  Categories in Chronic Kidney Disease (CKD)      GFR Category          GFR (mL/min/1.73)    Interpretation  G1                     90 or greater         Normal or high (1)  G2                      60-89                Mild decrease (1)  G3a                   45-59                Mild to moderate decrease  G3b                   30-44                Moderate to severe decrease  G4                    15-29                Severe decrease  G5                    14 or less           Kidney failure          (1)In the absence of evidence of kidney disease, neither GFR category G1 or G2 fulfill the criteria for CKD.    eGFR calculation 2021 CKD-EPI creatinine equation, which does not include race as a factor   BNP (IN-HOUSE) - Abnormal; Notable for the following components:    proBNP 18,364.0 (*)     All other components within normal limits    Narrative:     This assay is used as an aid in the diagnosis of individuals suspected of having heart failure. It can be used as an aid in the diagnosis of acute decompensated heart failure (ADHF) in patients presenting with signs and symptoms of ADHF to the emergency department (ED). In addition, NT-proBNP of <300 pg/mL indicates ADHF is not likely.    Age Range Result Interpretation  NT-proBNP Concentration (pg/mL:      <50             Positive            >450                   Gray                 300-450                    Negative             <300    50-75           Positive            >900                  Gray                300-900                  Negative            <300      >75             Positive            >1800                  Gray                300-1800                  Negative            <300   TROPONIN - Abnormal; Notable for the following components:    HS Troponin T 112 (*)     All other components within normal limits    Narrative:     High Sensitive Troponin T Reference Range:  <14.0 ng/L- Negative Female for AMI  <22.0 ng/L- Negative Male for AMI  >=14 -  "Abnormal Female indicating possible myocardial injury.  >=22 - Abnormal Male indicating possible myocardial injury.   Clinicians would have to utilize clinical acumen, EKG, Troponin, and serial changes to determine if it is an Acute Myocardial Infarction or myocardial injury due to an underlying chronic condition.        LACTIC ACID, PLASMA - Abnormal; Notable for the following components:    Lactate 2.7 (*)     All other components within normal limits   CBC WITH AUTO DIFFERENTIAL - Abnormal; Notable for the following components:    WBC 14.84 (*)     Hemoglobin 10.6 (*)     Hematocrit 35.4 (*)     MCV 74.4 (*)     MCH 22.3 (*)     MCHC 29.9 (*)     RDW 17.2 (*)     nRBC 3.4 (*)     All other components within normal limits   MANUAL DIFFERENTIAL - Abnormal; Notable for the following components:    Neutrophil % 92.8 (*)     Lymphocyte % 2.1 (*)     Monocyte % 4.1 (*)     Eosinophil % 0.0 (*)     Myelocyte % 1.0 (*)     Neutrophils Absolute 13.77 (*)     Lymphocytes Absolute 0.31 (*)     nRBC 8.2 (*)     All other components within normal limits   PROCALCITONIN - Abnormal; Notable for the following components:    Procalcitonin 1.81 (*)     All other components within normal limits    Narrative:     As a Marker for Sepsis (Non-Neonates):    1. <0.5 ng/mL represents a low risk of severe sepsis and/or septic shock.  2. >2 ng/mL represents a high risk of severe sepsis and/or septic shock.    As a Marker for Lower Respiratory Tract Infections that require antibiotic therapy:    PCT on Admission    Antibiotic Therapy       6-12 Hrs later    >0.5                Strongly Recommended  >0.25 - <0.5        Recommended   0.1 - 0.25          Discouraged              Remeasure/reassess PCT  <0.1                Strongly Discouraged     Remeasure/reassess PCT    As 28 day mortality risk marker: \"Change in Procalcitonin Result\" (>80% or <=80%) if Day 0 (or Day 1) and Day 4 values are available. Refer to " [email or web address]-pct-[email or web address]    Change in PCT <=80%  A decrease of PCT levels below or equal to 80% defines a positive change in PCT test result representing a higher risk for 28-day all-cause mortality of patients diagnosed with severe sepsis for septic shock.    Change in PCT >80%  A decrease of PCT levels of more than 80% defines a negative change in PCT result representing a lower risk for 28-day all-cause mortality of patients diagnosed with severe sepsis or septic shock.      URINALYSIS W/ MICROSCOPIC IF INDICATED (NO CULTURE) - Abnormal; Notable for the following components:    Blood, UA Large (3+) (*)     Protein, UA >=300 mg/dL (3+) (*)     All other components within normal limits   URINALYSIS, MICROSCOPIC ONLY - Abnormal; Notable for the following components:    Squamous Epithelial Cells, UA 3-6 (*)     All other components within normal limits   ACETAMINOPHEN LEVEL - Normal   BLOOD CULTURE   BLOOD CULTURE   LACTIC ACID, REFLEX   HIGH SENSITIVITIY TROPONIN T 1HR   PROTIME-INR   APTT   HEPATITIS PANEL, ACUTE   TYPE AND SCREEN   CBC AND DIFFERENTIAL    Narrative:     The following orders were created for panel order CBC & Differential.  Procedure                               Abnormality         Status                     ---------                               -----------         ------                     CBC Auto Differential[[id number]]        Abnormal            Final result                 Please view results for these tests on the individual orders.       EKG:   ECG 12 Lead Tachycardia   Preliminary Result   HEART HQXZ=179  bpm   RR Jrmlwbbr=836  ms   ME Interval=  ms   P Horizontal Axis=  deg   P Front Axis=  deg   QRSD Belqqgpf=249  ms   QT Hymrmkhq=220  ms   PIoW=783  ms   QRS Axis=-73  deg   T Wave Axis=52  deg   - ABNORMAL ECG -   Atrial flutter with predominant 2:1 AV block   Abnormal R-wave progression, late transition   Inferior infarct, old   Date and Time of Study:2025-03-03 22:03:16           Meds given in ED:   Medications   sodium chloride 0.9 % flush 10 mL (has no administration in time range)   ipratropium-albuterol (DUO-NEB) nebulizer solution 3 mL (3 mL Nebulization Given 3/3/25 2215)       Imaging results:  CT Abdomen Pelvis Without Contrast    Result Date: 3/4/2025   1. Mild dilatation of the common bile duct. The patient does appear to have stones within the distal common bile duct. 2. There is distention of the gallbladder. 3. Punctate nonobstructing left renal stone.  Radiation dose reduction techniques were utilized, including automated exposure control and exposure modulation based on body size.   This report was finalized on 3/4/2025 12:11 AM by Dr. Judi Chow M.D on Workstation: [email or web address]      XR Chest 1 View    Result Date: 3/3/2025  Cardiomegaly with vascular congestion, suggesting congestive heart failure.  This report was finalized on 3/3/2025 10:45 PM by Dr. Judi Chow M.D on Workstation: [email or web address]       Ambulatory status:   - with assist    Social issues:   Social History     Socioeconomic History    Marital status: Single   Tobacco Use    Smoking status: Former     Current packs/day: 0.00     Average packs/day: 1.5 packs/day for 10.0 years (15.0 ttl pk-yrs)     Types: Cigarettes     Start date: 4/22/2011     Quit date: 4/22/2021     Years since quitting: 3.8     Passive exposure: Past    Smokeless tobacco: Never    Tobacco comments:     4/2021   Vaping Use    Vaping status: Never Used   Substance and Sexual Activity    Alcohol use: Yes     Alcohol/week: 1.0 standard drink of alcohol     Comment: 1 BEER DAILY    Drug use: Not Currently     Types: Hydrocodone    Sexual activity: Not Currently     Partners: Female       Peripheral Neurovascular  Peripheral Neurovascular (Adult)  Peripheral Neurovascular WDL: WDL    Neuro Cognitive  Neuro Cognitive (Adult)  Cognitive/Neuro/Behavioral WDL: WDL    Learning  Learning Assessment  Learning Readiness and Ability:  no barriers identified    Respiratory  Respiratory WDL  Respiratory WDL: .WDL except, all  Rhythm/Pattern, Respiratory: tachypneic  Breath Sounds  All Lung Fields Breath Sounds: All Fields  All Lung Fields Breath Sounds: Anterior:, Lateral:, clear, diminished  Breath Sounds Post-Respiratory Treatment  Breath Sounds Posttreatment All Fields: All Fields  Breath Sounds Posttreatment All Fields: Anterior:, Lateral:, aeration increased    Abdominal Pain       Pain Assessments  Pain (Adult)  (0-10) Pain Rating: Rest: 0    NIH Stroke Scale       Carla Darby RN  03/04/25 00:28 EST

## 2025-03-04 NOTE — ANESTHESIA PROCEDURE NOTES
Arterial Line      Patient reassessed immediately prior to procedure    Patient location during procedure: pre-op  Start time: 3/4/2025 2:09 PM  Stop Time:3/4/2025 2:12 PM       Line placed for hemodynamic monitoring and ABGs/Labs/ISTAT.  Performed By   Anesthesiologist: Gavino Neil MD   Preanesthetic Checklist  Completed: patient identified, IV checked, site marked, risks and benefits discussed, surgical consent, monitors and equipment checked, pre-op evaluation and timeout performed  Arterial Line Prep    Sterile Tech: cap, gloves and mask  Prep: ChloraPrep  Patient monitoring: blood pressure monitoring, continuous pulse oximetry and EKG  Arterial Line Procedure   Laterality:right  Location:  radial artery  Catheter size: 20 G   Guidance: ultrasound guided  PROCEDURE NOTE/ULTRASOUND INTERPRETATION.  Using ultrasound guidance the potential vascular sites for insertion of the catheter were visualized to determine the patency of the vessel to be used for vascular access.  After selecting the appropriate site for insertion, the needle was visualized under ultrasound being inserted into the radial artery, followed by ultrasound confirmation of wire and catheter placement. There were no abnormalities seen on ultrasound; an image was taken; and the patient tolerated the procedure with no complications.   Number of attempts: 1  Successful placement: yes Images: still images not obtained  Post Assessment   Dressing Type: biopatch applied, occlusive dressing applied, secured with tape and wrist guard applied.   Complications no  Circ/Move/Sens Assessment: normal and unchanged.   Patient Tolerance: patient tolerated the procedure well with no apparent complications

## 2025-03-04 NOTE — CONSULTS
GI CONSULT  NOTE:    Referring Provider: Dr. Cortez    Chief complaint: Abnormal liver function test and abdominal pain    Subjective .     History of present illness:    65 years old male with a past medical history significant for coronary artery disease, COPD, hypertension status post of ICD as well as ischemic cardiomyopathy along with obesity and paroxysmal atrial fibrillation who has been on Eliquis, off for last few days came to the emergency room due to generalized weakness for last couple of days feeling very poor weak and tired and sleepy as per patient.  He was also taking a Tylenol at least 2 extra strength to 3 every day.  He denying any vomiting does feel nauseous does have some upper abdominal discomfort epigastric and right upper quadrant area.  He denying any hematemesis melena.  In the emergency room, patient was in A-fib with RVR around 140s as per chart note currently his heart rate is around 122.        In the emergency room, his AST was 2994 which is down to 2340 today.  In December last year it was 47.  ALT was 1551 which is 1315 today.  In December last year was around 50.  Total bilirubin was 1.3 which is 1.2 today.  Alkaline phos is 148.  Lactic acid was 2.3 which is down to 1.8.  His creatinine which has been baseline normal is 4.3.  Patient also have elevated lactic acid, he also have elevated white cell count of 14.8 which is now 15 with significant left shift.    He had a CT scan abdomen and pelvis performed in the emergency room which showed a mild dilation of common bile duct and there was a stone in the distal common bile duct noticed as well as distended gallbladder along with punctate left renal stones.  CT was performed without contrast.  He also have a CT chest without contrast showing advanced emphysematous changes as well as possible CHF.  Again noted is a stone in common bile duct on CT chest as well.  Right upper quadrant ultrasound with poor study due to his body  habitus.      Past Medical History:  Past Medical History:   Diagnosis Date    Acidosis     mixed resp/metabolic acidosis    Acute congestive heart failure     Acute respiratory failure     hypoxic    Anemia     Arthritis     Asthma     Atrial flutter     Azotemia     Cardiac arrest 04/2021    Chronic coronary artery disease     Constipation     COPD (chronic obstructive pulmonary disease)     Depression     Enlarged prostate     Hypertension     Hypokalemia     severe    ICD (implantable cardioverter-defibrillator) in place     Ischemic cardiomyopathy     MRSA nasal colonization     Obesity (BMI 30-39.9)     Orthopnea     PAF (paroxysmal atrial fibrillation)     Persistent atrial fibrillation     Pulmonary edema     Tobacco abuse     Transaminitis     Trouble in sleeping     Ventricular fibrillation        Past Surgical History:  Past Surgical History:   Procedure Laterality Date    CARDIAC CATHETERIZATION Left 4/1/2021    Procedure: Coronary Angiography;  Surgeon: Anna Puga MD;  Location:  [facility];  Service: Cardiology;  Laterality: Left;    CARDIAC CATHETERIZATION N/A 4/1/2021    Procedure: Left ventriculography;  Surgeon: Anna Puga MD;  Location:  [facility];  Service: Cardiology;  Laterality: N/A;    CARDIAC CATHETERIZATION N/A 4/1/2021    Procedure: Left Heart Cath;  Surgeon: Anna Puga MD;  Location:  [facility];  Service: Cardiology;  Laterality: N/A;    CARDIAC ELECTROPHYSIOLOGY PROCEDURE N/A 4/7/2021    Procedure: IMPLANTABLE CARDIOVERTER DEFIBRILLATOR- SC BIOTRONIK;  Surgeon: Nik Rosas MD;  Location:  [facility];  Service: Cardiovascular;  Laterality: N/A;    CARDIOVERSION  05/19/2021    Dr. Rosas    CARDIOVERSION  07/26/2021    Dr. Rosas    TONSILLECTOMY         Social History:  Social History     Tobacco Use    Smoking status: Former     Current packs/day: 0.00     Average packs/day: 1.5 packs/day  for 10.0 years (15.0 ttl pk-yrs)     Types: Cigarettes     Start date: 4/22/2011     Quit date: 4/22/2021     Years since quitting: 3.8     Passive exposure: Past    Smokeless tobacco: Never    Tobacco comments:     4/2021   Vaping Use    Vaping status: Never Used   Substance Use Topics    Alcohol use: Yes     Alcohol/week: 1.0 standard drink of alcohol     Comment: 1 BEER DAILY    Drug use: Not Currently     Types: Hydrocodone       Family History:  History reviewed. No pertinent family history.    Medications:  Medications Prior to Admission   Medication Sig Dispense Refill Last Dose/Taking    albuterol sulfate  (90 Base) MCG/ACT inhaler INHALE 2 PUFFS BY MOUTH EVERY 6 HOURS AS NEEDED FOR WHEEZING 18 g 3 3/3/2025 Morning    apixaban (Eliquis) 5 MG tablet tablet Take 1 tablet by mouth Every 12 (Twelve) Hours. 60 tablet 3 Past Week    busPIRone (BUSPAR) 10 MG tablet Take 1 tablet by mouth 3 (Three) Times a Day. 90 tablet 0 3/3/2025 Evening    finasteride (PROSCAR) 5 MG tablet Take 1 tablet by mouth Daily. 90 tablet 0 3/3/2025 Morning    furosemide (LASIX) 80 MG tablet Take 1 tablet by mouth Daily. 90 tablet 3 3/3/2025 Evening    metFORMIN (GLUCOPHAGE) 500 MG tablet Take 1 tablet by mouth 2 (Two) Times a Day With Meals.   3/3/2025 Morning    mirtazapine (REMERON) 30 MG tablet TAKE 1 TABLET BY MOUTH EVERY NIGHT 30 tablet 2 3/3/2025 Evening    pantoprazole (PROTONIX) 40 MG EC tablet TAKE 1 TABLET BY MOUTH EVERY MORNING 30 tablet 5 3/3/2025 Morning    temazepam (RESTORIL) 15 MG capsule Take 1 capsule by mouth At Night As Needed for Sleep for up to 5 doses. 5 capsule 0 Past Week Evening    metoprolol succinate XL (TOPROL-XL) 50 MG 24 hr tablet Take 1 tablet by mouth Every 12 (Twelve) Hours for 30 days. 60 tablet 0     polyethylene glycol (MIRALAX) 17 g packet Take 17 g by mouth Daily. 30 each 3 More than a month    rosuvastatin (Crestor) 20 MG tablet Take 1 tablet by mouth Daily. 90 tablet 3 Unknown     spironolactone (ALDACTONE) 25 MG tablet Take 1 tablet by mouth Daily.   Unknown    tiZANidine (ZANAFLEX) 4 MG tablet Take 1 tablet by mouth 2 (Two) Times a Day As Needed for Muscle Spasms.          Scheduled Meds:cefepime, 2,000 mg, Intravenous, Q24H  metroNIDAZOLE, 500 mg, Intravenous, Q8H  [Transfer Hold] mupirocin, 1 Application, Each Nare, BID  phytonadione (AQUA-MEPHYTON, VITAMIN K) 5 mg in sodium chloride 0.9 % 50 mL IVPB, 5 mg, Intravenous, Once  [Transfer Hold] senna-docusate sodium, 2 tablet, Oral, BID  [Transfer Hold] sodium chloride, 10 mL, Intravenous, Q12H  sodium chloride, 3 mL, Intravenous, Q12H      Continuous Infusions:lactated ringers, 9 mL/hr      PRN Meds:.  [Transfer Hold] senna-docusate sodium **AND** [Transfer Hold] polyethylene glycol **AND** [Transfer Hold] bisacodyl **AND** [Transfer Hold] bisacodyl    fentanyl    lidocaine    metoprolol tartrate    midazolam    [Transfer Hold] nitroglycerin    [Transfer Hold] ondansetron    [COMPLETED] Insert Peripheral IV **AND** sodium chloride    [Transfer Hold] sodium chloride    sodium chloride    [Transfer Hold] sodium chloride    ALLERGIES:  Patient has no known allergies.    ROS:  Review of Systems  Generalized weakness fatigue tiredness feels thirsty, wants to eat denies any hematemesis melena.  Complaining of some upper abdominal discomfort no nausea vomiting at this point.  Objective     Vital Signs:   Temp:  [97.5 °F (36.4 °C)-98.5 °F (36.9 °C)] 98.5 °F (36.9 °C)  Heart Rate:  [112-130] 126  Resp:  [14-22] 20  BP: (104-159)/() 117/90  Arterial Line BP: (158-175)/(78-83) 170/81    Physical Exam:      General Appearance:    Awake and alert, in no acute distress   Head:    Normocephalic, without obvious abnormality, atraumatic   Eyes:            Conjunctivae normal, anicteric sclera   Ears:    Ears appear intact with no abnormalities noted   Throat:   No oral lesions, no thrush, oral mucosa moist   Neck:   No adenopathy, supple, no  thyromegaly, no JVD   Lungs:   Decreased breath sound bilaterally    Heart:  Regular rate and rhythm   Chest Wall:    No abnormalities observed   Abdomen:   Abdomen obese otherwise soft tender right upper quadrant and epigastric area, on deep palpation he is somewhat tender in the right upper quadrant area.   Rectal:     Deferred   Extremities:   Moves all extremities well, no edema, no cyanosis, no             redness   Pulses:   Pulses palpable and equal bilaterally   Skin:   No bleeding, bruising or rash, no jaundice   Lymph nodes:   No palpable adenopathy   Neurologic:   Cranial nerves 2 - 12 grossly intact, no asterixis, sensation intact       Results Review:   I reviewed the patient's labs and imaging.  CBC  Results from last 7 days   Lab Units 03/04/25  0607 03/03/25  2200   RBC 10*6/mm3 4.56 4.76   WBC 10*3/mm3 15.44* 14.84*   HEMOGLOBIN g/dL 9.9* 10.6*   PLATELETS 10*3/mm3 332 366       CMP  Results from last 7 days   Lab Units 03/04/25  0639 03/03/25  2200   SODIUM mmol/L 134* 130*   POTASSIUM mmol/L 4.4 4.8   CHLORIDE mmol/L 88* 86*   CO2 mmol/L 23.0 22.1   BUN mg/dL 91* 86*   CREATININE mg/dL 4.53* 4.30*   GLUCOSE mg/dL 134* 100*   ALBUMIN g/dL 3.7 4.2   BILIRUBIN mg/dL 1.2 1.3*   ALK PHOS U/L 148* 161*   AST (SGOT) U/L 2,340* 2,994*   ALT (SGPT) U/L 1,315* 1,551*       Amylase and Lipase  Results from last 7 days   Lab Units 03/04/25  0035   LIPASE U/L 137*       CRP         Imaging Results (Last 24 Hours)       Procedure Component Value Units Date/Time    US Liver [[id number]] Collected: 03/04/25 0515     Updated: 03/04/25 0531    Narrative:      LIVER ULTRASOUND     HISTORY: Hepatitis     COMPARISON: March 3, 2025     TECHNIQUE: Grayscale and color Doppler sonographic images were obtained  through the right upper quadrant.     FINDINGS:  The patient appears to have some fatty atrophy of the pancreas. Full  assessment is limited, due to significant overlying bowel at motion  artifact. No focal hepatic  lesions are seen. Main portal vein appears to  be patent. Common bile duct measures up to 6 mm, but is poorly imaged,  due to overlying bowel gas. There is no intrahepatic biliary dilatation.  Gallbladder is distended and contains sludge. There is some mild  gallbladder wall thickening, measuring up to 4 mm, but there is no  pericholecystic fluid, and no sonographic Grady's sign was elicited.  Right kidney measures 10.4 x 4.9 x 4.8 cm. There is no hydronephrosis.  Renal echotexture appears normal.       Impression:         1. The patient's visualized common bile duct measures up to 6 mm. The  distal common bile duct is not well seen. No choledocholithiasis is seen  on this exam. However, again, the exam is limited by overlying bowel  gas.  2. There is sludge identified within the distended gallbladder. There is  some nonspecific gallbladder wall thickening, although there is no  pericholecystic fluid, and no sonographic Grady's sign was elicited.     This report was finalized on 3/4/2025 5:18 AM by Dr. Judi Chow M.D on Workstation: [id number]       CT Chest Without Contrast Diagnostic [[id number]] Collected: 03/04/25 0502     Updated: 03/04/25 0510    Narrative:      CT OF THE CHEST WITHOUT CONTRAST     HISTORY: Hypoxia     COMPARISON: March 16, 2024     TECHNIQUE: Axial CT imaging was obtained through the thorax. No IV  contrast was administered.     FINDINGS:  There are background emphysematous changes. There is interlobular septal  thickening, as well as some mild groundglass attenuation, favored to  reflect vascular congestion. The heart is enlarged. There is a  left-sided pacemaker. Thyroid gland, trachea, and esophagus appear  unremarkable. There is enlargement of the main pulmonary artery, which  can be seen in setting of pulmonary arterial hypertension. There is  dilatation of the aortic root, measuring up to 4 cm. Ascending thoracic  aorta is also dilated, measuring up to 4 cm. Descending  thoracic aorta  measures 2.8 cm proximally, and up to 3.3 cm distally. Mediastinal lymph  nodes do not appear pathologically enlarged. There are coronary artery  calcifications. Images through the upper abdomen again demonstrate  distention of the gallbladder and probable stones within the distal  common bile duct. No acute osseous abnormalities are seen.       Impression:         1. There are advanced background emphysematous changes  2. Interlobular septal thickening, as well as some groundglass  attenuation, suggesting some edema.  3. Patient again appears to have stones within the distal common bile  duct and distention of the gallbladder.     Radiation dose reduction techniques were utilized, including automated  exposure control and exposure modulation based on body size.        This report was finalized on 3/4/2025 5:06 AM by Dr. Judi Chow M.D on Workstation: [id number]       CT Abdomen Pelvis Without Contrast [[id number]] Collected: 03/04/25 0004     Updated: 03/04/25 0014    Narrative:      CT OF THE ABDOMEN PELVIS NO CONTRAST     HISTORY: Abnormal elevated liver function tests and renal failure     COMPARISON: April 1, 2021     TECHNIQUE: Axial CT imaging was obtained through the abdomen and pelvis.  No IV contrast was administered.     FINDINGS:  Images through the lung bases demonstrate background emphysematous  changes. The heart is enlarged. Images are degraded by motion artifact.  No suspicious hepatic lesions are seen. There is minimal dilatation of  the common bile duct, measuring up to 7 mm, and the patient appears to  have 2 stones within the common bile duct distally, measuring 5 to 7 mm.  Stomach, adrenal glands, and spleen are normal. There is a small  duodenal diverticulum. Gallbladder appears distended. Clinical  significance is uncertain. The pancreas is atrophic. No hydronephrosis  is seen on either side. There is a 4 mm nonobstructing stone within the  left kidney. There is a  tiny simple appearing left renal cyst. There is  mild aneurysmal dilatation of the infrarenal abdominal aorta, measuring  up to 3.0 x 2.8 cm. There are aortoiliac calcifications. Prostate gland  is enlarged and protrudes into the base the bladder. There is colonic  diverticulosis. There is no bowel obstruction. The appendix is normal.  No acute osseous abnormalities are seen. There is a fat-containing  umbilical hernia.       Impression:         1. Mild dilatation of the common bile duct. The patient does appear to  have stones within the distal common bile duct.  2. There is distention of the gallbladder.  3. Punctate nonobstructing left renal stone.     Radiation dose reduction techniques were utilized, including automated  exposure control and exposure modulation based on body size.        This report was finalized on 3/4/2025 12:11 AM by Dr. Jdui Chow M.D on Workstation: [id number]       XR Chest 1 View [[id number]] Collected: 03/03/25 2244     Updated: 03/03/25 2248    Narrative:      SINGLE VIEW OF THE CHEST     HISTORY: Shortness of air     COMPARISON: November 27, 2024     FINDINGS:  Cardiomegaly is present. There is vascular congestion. Left-sided  pacemaker is noted. No pneumothorax or pleural effusion is seen.       Impression:      Cardiomegaly with vascular congestion, suggesting congestive heart  failure.     This report was finalized on 3/3/2025 10:45 PM by Dr. Judi Chow M.D on Workstation: [id number]                  ASSESSMENT:  65 years old male with a multiple medical problem including ischemic cardiomyopathy status post of AICD as well as history of A-fib got admitted due to generalized weakness tiredness as well as some nausea noticed to have markedly abnormal liver function test, lactic acidosis as well as CT scan of abdomen and pelvis and CT chest both raising possibility of CBD stone.    Abnormal liver function test.  Given markedly elevated AST and ALT with acetaminophen  level around 16 in the ER as well as history of acetaminophen intake suspect he may have Tylenol toxicity as another contributing factor to his markedly elevated liver function test.  Amiodarone hepatotoxicity is another possibility for hepatocellular disease with markedly elevated AST and ALT  Choledocholithiasis noticed on CT chest as well as CT abdominal pelvis may be another contributing factor to his sepsis however his AST and ALT are not suggestive of typical pattern for choledocholithiasis.  They are more suggestive of hepatocellular disease.  Possibility of Dili related to acetaminophen along with amiodarone is strongly in the differential.   Sepsis  Coagulopathy likely due to severe hepatocellular disease rule out Tylenol toxicity.  Pulmonary hypertension  Acute renal failure    Principal Problem:    Multi-organ failure with heart failure       PLAN:  Patient will be immediately started on Acetadote, personally called pharmacy and Acetadote was started.  He will also be given vitamin K 5 mg.  Follow-up with the liver function test.  Follow PT/INR as well as ammonia level and any sign of hepatic encephalopathy.  Patient will also be placed on lactulose and Xifaxan.  Hold off on amiodarone as well given significant hepatotoxicity with hepatocellular disease.  Because of the CBD stone as well as possibility of sepsis, will proceed with ERCP and possible stent placement.  Repeat CMP PT/INR later this evening.  Continue with the supportive care.  Add PPI.      I discussed the patients findings and my recommendations with the patient.  Sultana Valentino MD  03/04/25  14:44 EST

## 2025-03-04 NOTE — H&P
Patient Care Team:  Provider, No Known as PCP - General    Chief complaint:  fatigue    History of present illness:  Subjective     This is a 65-year-old male patient with history of A-fib, on Eliquis.  Cardiomyopathy/AICD.  Recently hospitalized for paroxysmal A-fib in December 2024 because of ICD discharge.    He presented to the hospital today because he has not been feeling well for the last couple of days.  He had nausea, fatigue and decreased p.o. intake.  He also reported episodes of subjective fever but no documented temperature.  He had mild red blood blood per rectum.  But this occurs intermittently and attributed to hemorrhoids.  He has stopped taking his Eliquis couple of days ago because he was not feeling well.  In the ED, he was hemodynamically stable with the exception of tachycardia..    He is on Lasix 80 mg daily at home and he said that he did not stop taking his diuretic.  Patient has been taking Tylenol 2 to 3 pills a day lately.  No incident.    Patient was on NRB mask 15 L/min by EMS.  In ED, he was weaned down to 4 L.    Labs:  Cr = 4.3.  was 1.1 on 12/3/2024. ALT/AST =1551/2994 (chronic mild elevation at baseline); Maciel= 1.3; anion gap 22. WBC 14.  Neutrophils 92%.  Hb 10, at baseline. Lactate 2.7. Trop 112.  proBNP 32045.    Data:  -Echo 3/22/24: EF 50-55%; Mild Biatrial enlargement. RVSP 45-55.    -CXR today showed cardiomegaly and increased pulmonary vascular markings.    Review of Systems:  Constitutional: No fever or chills.   ENMT: No sinus congestion  Cardiovascular: No chest pain, palpitation but legs swelling.    Respiratory: Dyspnea.  No cough.  No wheezing.  Gastrointestinal: Nausea as above.  No vomiting.  Decreased p.o. intake.  No abdominal pain or diarrhea.  Neurology: No headache, numbness or dizziness but generalized weakness.  Musculoskeletal: No joints pain, stiffness or swelling.   Psychiatry: No depression.  Genitourinary: No dysuria or frequent  urination  Endo: No weight changes. No cold or warm intolerance.  Lymphatic: No swollen glands.  Integumentary: No rash.    History  Past Medical History:   Diagnosis Date    Acidosis     mixed resp/metabolic acidosis    Acute congestive heart failure     Acute respiratory failure     hypoxic    Anemia     Arthritis     Asthma     Atrial flutter     Azotemia     Cardiac arrest 04/2021    Chronic coronary artery disease     Constipation     COPD (chronic obstructive pulmonary disease)     Depression     Enlarged prostate     Hypertension     Hypokalemia     severe    ICD (implantable cardioverter-defibrillator) in place     Ischemic cardiomyopathy     MRSA nasal colonization     Obesity (BMI 30-39.9)     Orthopnea     PAF (paroxysmal atrial fibrillation)     Persistent atrial fibrillation     Pulmonary edema     Tobacco abuse     Transaminitis     Trouble in sleeping     Ventricular fibrillation      Past Surgical History:   Procedure Laterality Date    CARDIAC CATHETERIZATION Left 4/1/2021    Procedure: Coronary Angiography;  Surgeon: Anna Puga MD;  Location:  [facility];  Service: Cardiology;  Laterality: Left;    CARDIAC CATHETERIZATION N/A 4/1/2021    Procedure: Left ventriculography;  Surgeon: Anna Puga MD;  Location:  [facility];  Service: Cardiology;  Laterality: N/A;    CARDIAC CATHETERIZATION N/A 4/1/2021    Procedure: Left Heart Cath;  Surgeon: Anna Puga MD;  Location:  [facility];  Service: Cardiology;  Laterality: N/A;    CARDIAC ELECTROPHYSIOLOGY PROCEDURE N/A 4/7/2021    Procedure: IMPLANTABLE CARDIOVERTER DEFIBRILLATOR- SC BIOTRONIK;  Surgeon: Nik Rosas MD;  Location:  [facility];  Service: Cardiovascular;  Laterality: N/A;    CARDIOVERSION  05/19/2021    Dr. Rosas    CARDIOVERSION  07/26/2021    Dr. Rosas    TONSILLECTOMY       History reviewed. No pertinent family history.  Social History      Tobacco Use    Smoking status: Former     Current packs/day: 0.00     Average packs/day: 1.5 packs/day for 10.0 years (15.0 ttl pk-yrs)     Types: Cigarettes     Start date: 4/22/2011     Quit date: 4/22/2021     Years since quitting: 3.8     Passive exposure: Past    Smokeless tobacco: Never    Tobacco comments:     4/2021   Vaping Use    Vaping status: Never Used   Substance Use Topics    Alcohol use: Yes     Alcohol/week: 1.0 standard drink of alcohol     Comment: 1 BEER DAILY    Drug use: Not Currently     Types: Hydrocodone     E-cigarette/Vaping    E-cigarette/Vaping Use Never User     Passive Exposure No     Counseling Given No      E-cigarette/Vaping Substances    Nicotine No     THC No     CBD No     Flavoring No      (Not in a hospital admission)   Allergies:  Patient has no known allergies.    Objective   Vital Signs  Temp:  [98.5 °F (36.9 °C)] 98.5 °F (36.9 °C)  Heart Rate:  [115-120] 115  Resp:  [14-22] 14  BP: (125-145)/() 131/102    PPE used per hospital policy    Physical Exam:  Constitutional: Not in acute distress.  Eyes: Injected conjunctivae, EOMI. Pupils equal and reactive to light.   ENMT: Dougherty 3.  Dry tongue and lips.  Neck: No thyromegaly.  Trachea midline  Heart: Tachycardia with regular rhythm.  No audible murmur.  Grade 2 pitting edema in legs.  Lungs: Equal but slightly diminished air entry at the bases with coarse breath sounds.  No wheezing.  No use of accessory muscles.  Abdomen: Obese. Soft. No tenderness or dullness.  Positive bowel sounds  Extremities: No cyanosis, clubbing. Moves all extremities.  Warm extremities and well-perfused  Neuro: Conscious, alert, oriented x3.  Strength 5/5 overall  Psych: Appropriate mood and affect.    Integumentary: No rash or ecchymosis  Lymphatic: No palpable cervical or supraclavicular lymph nodes.            Diagnostic imaging:  I personally and independently reviewed the following images:   CXR 3/4/2025: Cardiomegaly.  Vascular  congestion ?      Laboratory workup:  Results from last 7 days   Lab Units 03/03/25  2200   SODIUM mmol/L 130*   POTASSIUM mmol/L 4.8   CHLORIDE mmol/L 86*   CO2 mmol/L 22.1   BUN mg/dL 86*   CREATININE mg/dL 4.30*   GLUCOSE mg/dL 100*   CALCIUM mg/dL 8.7     Results from last 7 days   Lab Units 03/03/25  2200   HSTROP T ng/L 112*     Results from last 7 days   Lab Units 03/03/25  2200   WBC 10*3/mm3 14.84*   HEMOGLOBIN g/dL 10.6*   HEMATOCRIT % 35.4*   PLATELETS 10*3/mm3 366         Results from last 7 days   Lab Units 03/03/25  2200   PROBNP pg/mL 18,364.0*       Assessment   Acute liver injury, unclear etiology.  Could be precipitated by A-fib and decompensated CHF.  Acute hypoxic respiratory failure  Acute on chronic diastolic CHF  ANDIE  Hyponatremia  Pulmonary hypertension, moderate per echo 3/22/2024.  Possibly group 2 due to dilated LA.  Mild lactic acidosis.,  Secondary to liver injury.    Chronic anemia  A-fib, on Eliquis  AICD    Plan:    ICD interrogation performed in the ED.  Patient has been in A-fib over the last 2 days but no ICD shocks since November 2024.  Check Tylenol level, Hepatitis panel and coags  Check CK and urine lites.  Uric acid.  Renal consult in a.m.  Check echocardiogram  CT abdomen pelvis W/O contrast.  Evaluate for urinary obstruction.  Ultrasound liver.  Gentle IV fluid despite some signs of extravascular volume overload.  He appears to be intravascular volume depleted.  PRN Lopressor  Oxygen by NC and titrate to keep SpO2 >90%      Dana Lane MD  03/03/25  23:35 EST    Time: Critical care 45 min      This note was dictated utilizing [employer] dictation

## 2025-03-04 NOTE — ED PROVIDER NOTES
EMERGENCY DEPARTMENT ENCOUNTER  Room Number:  21/21  PCP: Provider, No Known  Independent Historians: Patient, EMS      HPI:  Chief Complaint: had concerns including Rectal Bleeding.     A complete HPI/ROS/PMH/PSH/SH/FH are unobtainable due to: Nothing      Context: Sebastien Tang is a 65 y.o. male with a medical history of COPD, abdominal aneurysm, atrial fibrillation on chronic anticoagulation, CHF, who presents to the ED c/o acute generalized weakness for the last couple of days.  He also called EMS tonight for bright red blood per rectum.  He denies any black or tarry stool.  He reports some nausea but denies vomiting or hematemesis.  EMS states that he was in A-fib with RVR prior to arrival but normotensive, 140s over 80s.  He was also short of breath and they placed him on a nonrebreather prior to arrival.  He has a history of COPD but does not wear home oxygen.  He denies chest pain.  He denies known fever or chills.  He does have a history of hemorrhoids.  He believes that his last dose of Eliquis was yesterday the day before as he had not been feeling well therefore did not take it.      Review of prior external notes (non-ED) -and- Review of prior external test results outside of this encounter: I reviewed discharge summary from 12/3/2024.  Patient had presented with multiple ICD charges.  He was placed on amiodarone infusion and digoxin was discontinued.  He also had some rectal bleeding consistent with his chronic hemorrhoids.  GI was consulted and his hemoglobin was stable.  They were going to consider outpatient colonoscopy once his heart condition to stabilize.        PAST MEDICAL HISTORY  Active Ambulatory Problems     Diagnosis Date Noted    Cardiac arrest 04/01/2021    Atrial flutter 04/05/2021    Tobacco abuse 04/05/2021    Azotemia 04/05/2021    COPD (chronic obstructive pulmonary disease) 04/05/2021    MRSA nasal colonization 04/05/2021    Transaminitis 04/05/2021    Obesity (BMI 30-39.9)  04/05/2021    Anemia 04/05/2021    Constipation 04/06/2021    Acute congestive heart failure 04/26/2021    PSA elevation 04/26/2021    Wellness examination 04/26/2021    High risk medication use 04/26/2021    Abnormal CXR 04/26/2021    Abdominal aneurysm 04/26/2021    Iliac aneurysm 04/26/2021    Coronary artery disease involving coronary bypass graft of native heart without angina pectoris 05/11/2021    Atrial fibrillation, persistent 05/11/2021    ICD (implantable cardioverter-defibrillator) in place 05/11/2021    Anxiety and depression 06/14/2021    Shortness of breath 06/14/2021    Primary insomnia 06/14/2021    Close exposure to COVID-19 virus 08/19/2021    Iliac artery stenosis, right 10/22/2021    Dysuria 10/22/2021    Gross hematuria 10/22/2021    Essential hypertension 11/23/2021    High cholesterol 02/07/2022    Screening PSA (prostate specific antigen) 09/01/2022    Anxiety 09/22/2022    Neuropathy 12/15/2022    Heart failure 03/15/2024    Iron deficiency anemia 03/16/2024    Diabetes mellitus, type 2 03/16/2024    Hypoxemia 03/17/2024    Acute on chronic systolic CHF (congestive heart failure) 03/21/2024    Acute on chronic diastolic CHF (congestive heart failure) 03/29/2024    Depression 08/13/2024    Adjustment disorder 08/13/2024    Paroxysmal ventricular fibrillation 11/27/2024    Digitalis toxicity 11/27/2024    ICD (implantable cardioverter-defibrillator) discharge 11/27/2024    Ventricular tachycardia (paroxysmal) 11/27/2024    Morbid obesity 11/27/2024    Diabetic polyneuropathy associated with type 2 diabetes mellitus 11/27/2024    Hypokalemia 11/27/2024    ANDIE (acute kidney injury) 11/27/2024     Resolved Ambulatory Problems     Diagnosis Date Noted    Ventricular fibrillation 04/01/2021    Ischemic cardiomyopathy 04/05/2021    Paroxysmal atrial fibrillation 04/08/2021    Cough 04/26/2021    Trouble in sleeping 04/26/2021    Atrial fibrillation 06/14/2021    Upper respiratory tract infection  06/14/2021    Elevated blood pressure reading 10/12/2021    Chronic coronary artery disease     COPD with exacerbation 09/01/2022    Insomnia 09/22/2022     Past Medical History:   Diagnosis Date    Acidosis     Acute respiratory failure     Arthritis     Asthma     Enlarged prostate     Hypertension     Orthopnea     PAF (paroxysmal atrial fibrillation)     Persistent atrial fibrillation     Pulmonary edema          PAST SURGICAL HISTORY  Past Surgical History:   Procedure Laterality Date    CARDIAC CATHETERIZATION Left 4/1/2021    Procedure: Coronary Angiography;  Surgeon: Anna Puga MD;  Location:  [facility];  Service: Cardiology;  Laterality: Left;    CARDIAC CATHETERIZATION N/A 4/1/2021    Procedure: Left ventriculography;  Surgeon: Anna Puga MD;  Location:  [facility];  Service: Cardiology;  Laterality: N/A;    CARDIAC CATHETERIZATION N/A 4/1/2021    Procedure: Left Heart Cath;  Surgeon: Anna Puga MD;  Location:  [facility];  Service: Cardiology;  Laterality: N/A;    CARDIAC ELECTROPHYSIOLOGY PROCEDURE N/A 4/7/2021    Procedure: IMPLANTABLE CARDIOVERTER DEFIBRILLATOR- SC BIOTRONIK;  Surgeon: Nik Rosas MD;  Location:  [facility];  Service: Cardiovascular;  Laterality: N/A;    CARDIOVERSION  05/19/2021    Dr. Rosas    CARDIOVERSION  07/26/2021    Dr. Rosas    TONSILLECTOMY           FAMILY HISTORY  History reviewed. No pertinent family history.      SOCIAL HISTORY  Social History     Socioeconomic History    Marital status: Single   Tobacco Use    Smoking status: Former     Current packs/day: 0.00     Average packs/day: 1.5 packs/day for 10.0 years (15.0 ttl pk-yrs)     Types: Cigarettes     Start date: 4/22/2011     Quit date: 4/22/2021     Years since quitting: 3.8     Passive exposure: Past    Smokeless tobacco: Never    Tobacco comments:     4/2021   Vaping Use    Vaping status: Never Used   Substance and  Sexual Activity    Alcohol use: Yes     Alcohol/week: 1.0 standard drink of alcohol     Comment: 1 BEER DAILY    Drug use: Not Currently     Types: Hydrocodone    Sexual activity: Not Currently     Partners: Female         ALLERGIES  Patient has no known allergies.      REVIEW OF SYSTEMS  Review of all 14 systems is negative other than stated in the HPI above.      PHYSICAL EXAM    I have reviewed the triage vital signs and nursing notes.    ED Triage Vitals [03/03/25 2154]   Temp Heart Rate Resp BP SpO2   98.5 °F (36.9 °C) 120 22 145/81 97 %      Temp src Heart Rate Source Patient Position BP Location FiO2 (%)   Oral -- -- -- --         GENERAL: awake and alert, chronically ill-appearing, no acute distress  HENT: Normocephalic, atraumatic  EYES: no scleral icterus, EOMI  CV: Tachycardic, irregular rhythm, no peripheral edema  RESPIRATORY: normal effort, diminished breath sounds bilaterally, 5 L nasal cannula oxygen in place  ABDOMEN: soft, nondistended, nontender throughout.  There are multiple superficial wounds present on the anterior abdominal wall without erythema or warmth.  MUSCULOSKELETAL: no deformity  NEURO: alert, moves all extremities, follows commands, cranial nerves II through XII intact, speech fluent and clear  PSYCH: calm, cooperative  SKIN: Warm, dry          LAB RESULTS  Recent Results (from the past 24 hours)   Comprehensive Metabolic Panel    Collection Time: 03/03/25 10:00 PM    Specimen: Blood   Result Value Ref Range    Glucose 100 (H) 65 - 99 mg/dL    BUN 86 (H) 8 - 23 mg/dL    Creatinine 4.30 (H) 0.76 - 1.27 mg/dL    Sodium 130 (L) 136 - 145 mmol/L    Potassium 4.8 3.5 - 5.2 mmol/L    Chloride 86 (L) 98 - 107 mmol/L    CO2 22.1 22.0 - 29.0 mmol/L    Calcium 8.7 8.6 - 10.5 mg/dL    Total Protein 7.5 6.0 - 8.5 g/dL    Albumin 4.2 3.5 - 5.2 g/dL    ALT (SGPT) 1,551 (H) 1 - 41 U/L    AST (SGOT) 2,994 (H) 1 - 40 U/L    Alkaline Phosphatase 161 (H) 39 - 117 U/L    Total Bilirubin 1.3 (H) 0.0 -  1.2 mg/dL    Globulin 3.3 gm/dL    A/G Ratio 1.3 g/dL    BUN/Creatinine Ratio 20.0 7.0 - 25.0    Anion Gap 21.9 (H) 5.0 - 15.0 mmol/L    eGFR 14.3 (L) >60.0 mL/min/1.73   BNP    Collection Time: 03/03/25 10:00 PM    Specimen: Blood   Result Value Ref Range    proBNP 18,364.0 (H) 0.0 - 900.0 pg/mL   High Sensitivity Troponin T    Collection Time: 03/03/25 10:00 PM    Specimen: Blood   Result Value Ref Range    HS Troponin T 112 (C) <22 ng/L   Type & Screen    Collection Time: 03/03/25 10:00 PM    Specimen: Blood   Result Value Ref Range    ABO Type O     RH type Positive     Antibody Screen Negative     T&S Expiration Date 3/6/2025 11:59:59 PM    Lactic Acid, Plasma    Collection Time: 03/03/25 10:00 PM    Specimen: Blood   Result Value Ref Range    Lactate 2.7 (C) 0.5 - 2.0 mmol/L   CBC Auto Differential    Collection Time: 03/03/25 10:00 PM    Specimen: Blood   Result Value Ref Range    WBC 14.84 (H) 3.40 - 10.80 10*3/mm3    RBC 4.76 4.14 - 5.80 10*6/mm3    Hemoglobin 10.6 (L) 13.0 - 17.7 g/dL    Hematocrit 35.4 (L) 37.5 - 51.0 %    MCV 74.4 (L) 79.0 - 97.0 fL    MCH 22.3 (L) 26.6 - 33.0 pg    MCHC 29.9 (L) 31.5 - 35.7 g/dL    RDW 17.2 (H) 12.3 - 15.4 %    RDW-SD 44.7 37.0 - 54.0 fl    MPV 9.4 6.0 - 12.0 fL    Platelets 366 140 - 450 10*3/mm3    nRBC 3.4 (H) 0.0 - 0.2 /100 WBC   Manual Differential    Collection Time: 03/03/25 10:00 PM    Specimen: Blood   Result Value Ref Range    Neutrophil % 92.8 (H) 42.7 - 76.0 %    Lymphocyte % 2.1 (L) 19.6 - 45.3 %    Monocyte % 4.1 (L) 5.0 - 12.0 %    Eosinophil % 0.0 (L) 0.3 - 6.2 %    Basophil % 0.0 0.0 - 1.5 %    Myelocyte % 1.0 (H) 0.0 - 0.0 %    Neutrophils Absolute 13.77 (H) 1.70 - 7.00 10*3/mm3    Lymphocytes Absolute 0.31 (L) 0.70 - 3.10 10*3/mm3    Monocytes Absolute 0.61 0.10 - 0.90 10*3/mm3    Eosinophils Absolute 0.00 0.00 - 0.40 10*3/mm3    Basophils Absolute 0.00 0.00 - 0.20 10*3/mm3    nRBC 8.2 (H) 0.0 - 0.2 /100 WBC    Anisocytosis Slight/1+ None Seen     Microcytes Slight/1+ None Seen    Poikilocytes Slight/1+ None Seen    WBC Morphology Normal Normal    Platelet Morphology Normal Normal   Acetaminophen Level    Collection Time: 03/03/25 10:00 PM    Specimen: Blood   Result Value Ref Range    Acetaminophen 16.0 0.0 - 30.0 mcg/mL   Procalcitonin    Collection Time: 03/03/25 10:00 PM    Specimen: Blood   Result Value Ref Range    Procalcitonin 1.81 (H) 0.00 - 0.25 ng/mL   ECG 12 Lead Tachycardia    Collection Time: 03/03/25 10:03 PM   Result Value Ref Range    QT Interval 327 ms    QTC Interval 457 ms   Urinalysis With Microscopic If Indicated (No Culture) - Urine, Clean Catch    Collection Time: 03/04/25 12:06 AM    Specimen: Urine, Clean Catch   Result Value Ref Range    Color, UA Yellow Yellow, Straw    Appearance, UA Clear Clear    pH, UA 7.0 5.0 - 8.0    Specific Gravity, UA 1.012 1.005 - 1.030    Glucose, UA Negative Negative    Ketones, UA Negative Negative    Bilirubin, UA Negative Negative    Blood, UA Large (3+) (A) Negative    Protein, UA >=300 mg/dL (3+) (A) Negative    Leuk Esterase, UA Negative Negative    Nitrite, UA Negative Negative    Urobilinogen, UA 1.0 E.U./dL 0.2 - 1.0 E.U./dL   Urinalysis, Microscopic Only - Urine, Clean Catch    Collection Time: 03/04/25 12:06 AM    Specimen: Urine, Clean Catch   Result Value Ref Range    RBC, UA 0-2 None Seen, 0-2 /HPF    WBC, UA 0-2 None Seen, 0-2 /HPF    Bacteria, UA None Seen None Seen /HPF    Squamous Epithelial Cells, UA 3-6 (A) None Seen, 0-2 /HPF    Hyaline Casts, UA 3-6 None Seen /LPF    Methodology Automated Microscopy    Sodium, Urine, Random - Urine, Clean Catch    Collection Time: 03/04/25 12:06 AM    Specimen: Urine, Clean Catch   Result Value Ref Range    Sodium, Urine 67 mmol/L   Creatinine Urine Random (kidney function) GFR component - Urine, Clean Catch    Collection Time: 03/04/25 12:06 AM    Specimen: Urine, Clean Catch   Result Value Ref Range    Creatinine, Urine 18.6 mg/dL   STAT Lactic  Acid, Reflex    Collection Time: 03/04/25 12:35 AM    Specimen: Arm, Right; Blood   Result Value Ref Range    Lactate 2.4 (C) 0.5 - 2.0 mmol/L   High Sensitivity Troponin T 1Hr    Collection Time: 03/04/25 12:35 AM    Specimen: Arm, Right; Blood   Result Value Ref Range    HS Troponin T 106 (C) <22 ng/L    Troponin T Numeric Delta -6 ng/L    Troponin T % Delta -5 Abnormal if >/= 20%   Protime-INR    Collection Time: 03/04/25 12:35 AM    Specimen: Arm, Right; Blood   Result Value Ref Range    Protime 23.8 (H) 11.7 - 14.2 Seconds    INR 2.11 (H) 0.90 - 1.10   aPTT    Collection Time: 03/04/25 12:35 AM    Specimen: Arm, Right; Blood   Result Value Ref Range    PTT 32.3 22.7 - 35.4 seconds   CK    Collection Time: 03/04/25 12:35 AM    Specimen: Arm, Right; Blood   Result Value Ref Range    Creatine Kinase 16,749 (H) 20 - 200 U/L   Uric Acid    Collection Time: 03/04/25 12:35 AM    Specimen: Arm, Right; Blood   Result Value Ref Range    Uric Acid 18.7 (H) 3.4 - 7.0 mg/dL       The above labs were ordered by me and independently reviewed by me.     RADIOLOGY  CT Abdomen Pelvis Without Contrast    Result Date: 3/4/2025  CT OF THE ABDOMEN PELVIS NO CONTRAST  HISTORY: Abnormal elevated liver function tests and renal failure  COMPARISON: April 1, 2021  TECHNIQUE: Axial CT imaging was obtained through the abdomen and pelvis. No IV contrast was administered.  FINDINGS: Images through the lung bases demonstrate background emphysematous changes. The heart is enlarged. Images are degraded by motion artifact. No suspicious hepatic lesions are seen. There is minimal dilatation of the common bile duct, measuring up to 7 mm, and the patient appears to have 2 stones within the common bile duct distally, measuring 5 to 7 mm. Stomach, adrenal glands, and spleen are normal. There is a small duodenal diverticulum. Gallbladder appears distended. Clinical significance is uncertain. The pancreas is atrophic. No hydronephrosis is seen on  either side. There is a 4 mm nonobstructing stone within the left kidney. There is a tiny simple appearing left renal cyst. There is mild aneurysmal dilatation of the infrarenal abdominal aorta, measuring up to 3.0 x 2.8 cm. There are aortoiliac calcifications. Prostate gland is enlarged and protrudes into the base the bladder. There is colonic diverticulosis. There is no bowel obstruction. The appendix is normal. No acute osseous abnormalities are seen. There is a fat-containing umbilical hernia.       1. Mild dilatation of the common bile duct. The patient does appear to have stones within the distal common bile duct. 2. There is distention of the gallbladder. 3. Punctate nonobstructing left renal stone.  Radiation dose reduction techniques were utilized, including automated exposure control and exposure modulation based on body size.   This report was finalized on 3/4/2025 12:11 AM by Dr. Judi Chow M.D on Workstation: [email or web address]      XR Chest 1 View    Result Date: 3/3/2025  SINGLE VIEW OF THE CHEST  HISTORY: Shortness of air  COMPARISON: November 27, 2024  FINDINGS: Cardiomegaly is present. There is vascular congestion. Left-sided pacemaker is noted. No pneumothorax or pleural effusion is seen.      Cardiomegaly with vascular congestion, suggesting congestive heart failure.  This report was finalized on 3/3/2025 10:45 PM by Dr. Judi Chow M.D on Workstation: [email or web address]       The above radiology studies were ordered by me.  See ED course for independent interpretations.     MEDICATIONS GIVEN IN ER  Medications   sodium chloride 0.9 % flush 10 mL (has no administration in time range)   piperacillin-tazobactam (ZOSYN) 3.375 g IVPB in 100 mL NS MBP (CD) (has no administration in time range)   nitroglycerin (NITROSTAT) SL tablet 0.4 mg (has no administration in time range)   sodium chloride 0.9 % flush 10 mL (has no administration in time range)   sodium chloride 0.9 % flush 10 mL (has no  administration in time range)   sodium chloride 0.9 % infusion 40 mL (has no administration in time range)   mupirocin (BACTROBAN) 2 % nasal ointment 1 Application (has no administration in time range)   sennosides-docusate (PERICOLACE) 8.6-50 MG per tablet 2 tablet (has no administration in time range)     And   polyethylene glycol (MIRALAX) packet 17 g (has no administration in time range)     And   bisacodyl (DULCOLAX) EC tablet 5 mg (has no administration in time range)     And   bisacodyl (DULCOLAX) suppository 10 mg (has no administration in time range)   ondansetron (ZOFRAN) injection 4 mg (has no administration in time range)   sodium chloride 0.9 % infusion (has no administration in time range)   metoprolol tartrate (LOPRESSOR) injection 5 mg (has no administration in time range)   sodium chloride 0.9 % bolus 500 mL (has no administration in time range)   ipratropium-albuterol (DUO-NEB) nebulizer solution 3 mL (3 mL Nebulization Given 3/3/25 9937)         ORDERS PLACED DURING THIS VISIT:  Orders Placed This Encounter   Procedures    Blood Culture - Blood,    Blood Culture - Blood,    XR Chest 1 View    CT Abdomen Pelvis Without Contrast    CT Chest Without Contrast Diagnostic    US Liver    Comprehensive Metabolic Panel    BNP    High Sensitivity Troponin T    Lactic Acid, Plasma    CBC Auto Differential    Manual Differential    STAT Lactic Acid, Reflex    High Sensitivity Troponin T 1Hr    Acetaminophen Level    Protime-INR    aPTT    Hepatitis Panel, Acute    Procalcitonin    Urinalysis With Microscopic If Indicated (No Culture) - Urine, Catheter    Urinalysis, Microscopic Only - Urine, Clean Catch    CBC Auto Differential    CK    Uric Acid    Sodium, Urine, Random - Urine, Clean Catch    Creatinine Urine Random (kidney function) GFR component - Urine, Clean Catch    STAT Lactic Acid, Reflex    Diet: Cardiac; Healthy Heart (2-3 Na+); Fluid Consistency: Thin (IDDSI 0)    Monitor Blood Pressure    Device  Interrogation. Device type? Pacemaker; Company to contact? Unknown (Staff to obtain from patient)    Vital Signs Every Hour and Per Hospital Policy Based on Patient Condition    Telemetry - Place Orders & Notify Provider of Results When Patient Experiences Acute Chest Pain, Dysrhythmia or Respiratory Distress    Continuous Pulse Oximetry    Height & Weight    Daily Weights    Intake & Output    Oral Care - Patient Not on NPPV & Not Intubated    Target Arousal Level RASS 0 to -2    Use Mobility Guidelines for Advancement of Activity    Saline Lock & Maintain IV Access    Daily CHG Bath While in Critical Care    Place Sequential Compression Device    Maintain Sequential Compression Device    Code Status and Medical Interventions: CPR (Attempt to Resuscitate); Full Support    Pulmonology (on-call MD unless specified)    Inpatient Nephrology Consult    ECG 12 Lead Tachycardia    Type & Screen    Insert Peripheral IV    Insert Peripheral IV    Insert 2 Large Bore (18G) Peripheral IVs    Inpatient Admission    CBC & Differential    CBC & Differential         OUTPATIENT MEDICATION MANAGEMENT:  Current Facility-Administered Medications Ordered in Epic   Medication Dose Route Frequency Provider Last Rate Last Admin    sennosides-docusate (PERICOLACE) 8.6-50 MG per tablet 2 tablet  2 tablet Oral BID Dana Lane MD        And    polyethylene glycol (MIRALAX) packet 17 g  17 g Oral Daily PRN Dana Lane MD        And    bisacodyl (DULCOLAX) EC tablet 5 mg  5 mg Oral Daily PRN Dana Lane MD        And    bisacodyl (DULCOLAX) suppository 10 mg  10 mg Rectal Daily PRN Dana Lane MD        metoprolol tartrate (LOPRESSOR) injection 5 mg  5 mg Intravenous Q4H PRN Dana Lane MD        mupirocin (BACTROBAN) 2 % nasal ointment 1 Application  1 Application Each Nare BID Dana Lane MD        nitroglycerin (NITROSTAT) SL tablet 0.4 mg  0.4 mg Sublingual Q5 Min PRN Dana Lane MD        ondansetron (ZOFRAN)  injection 4 mg  4 mg Intravenous Q6H PRN Dana Lane MD        piperacillin-tazobactam (ZOSYN) 3.375 g IVPB in 100 mL NS MBP (CD)  3.375 g Intravenous Once Jorge Luis Rodriguez MD        sodium chloride 0.9 % bolus 500 mL  500 mL Intravenous Once Dana Lane MD        sodium chloride 0.9 % flush 10 mL  10 mL Intravenous PRN Dana Lane MD        sodium chloride 0.9 % flush 10 mL  10 mL Intravenous Q12H Dana Lane MD        sodium chloride 0.9 % flush 10 mL  10 mL Intravenous PRN Dana Lane MD        sodium chloride 0.9 % infusion 40 mL  40 mL Intravenous PRN Dana Lane MD        sodium chloride 0.9 % infusion  100 mL/hr Intravenous Continuous Dana Lane MD         Current Outpatient Medications Ordered in Epic   Medication Sig Dispense Refill    albuterol sulfate  (90 Base) MCG/ACT inhaler INHALE 2 PUFFS BY MOUTH EVERY 6 HOURS AS NEEDED FOR WHEEZING 18 g 3    apixaban (Eliquis) 5 MG tablet tablet Take 1 tablet by mouth Every 12 (Twelve) Hours. 60 tablet 3    busPIRone (BUSPAR) 10 MG tablet Take 1 tablet by mouth 3 (Three) Times a Day. 90 tablet 0    finasteride (PROSCAR) 5 MG tablet Take 1 tablet by mouth Daily. 90 tablet 0    metFORMIN (GLUCOPHAGE) 500 MG tablet Take 1 tablet by mouth 2 (Two) Times a Day With Meals.      mirtazapine (REMERON) 30 MG tablet TAKE 1 TABLET BY MOUTH EVERY NIGHT 30 tablet 2    pantoprazole (PROTONIX) 40 MG EC tablet TAKE 1 TABLET BY MOUTH EVERY MORNING 30 tablet 5    polyethylene glycol (MIRALAX) 17 g packet Take 17 g by mouth Daily. 30 each 3    temazepam (RESTORIL) 15 MG capsule Take 1 capsule by mouth At Night As Needed for Sleep for up to 5 doses. 5 capsule 0    budesonide-formoterol (SYMBICORT) 160-4.5 MCG/ACT inhaler Inhale 2 puffs 2 (Two) Times a Day. 10.2 g 0    furosemide (LASIX) 80 MG tablet Take 1 tablet by mouth Daily. 90 tablet 3    metoprolol succinate XL (TOPROL-XL) 50 MG 24 hr tablet Take 1 tablet by mouth Every 12 (Twelve) Hours for 30  days. 60 tablet 0    rosuvastatin (Crestor) 20 MG tablet Take 1 tablet by mouth Daily. 90 tablet 3    spironolactone (ALDACTONE) 25 MG tablet Take 1 tablet by mouth Daily.      tiZANidine (ZANAFLEX) 4 MG tablet Take 1 tablet by mouth 2 (Two) Times a Day As Needed for Muscle Spasms.           PROCEDURES  Procedures      Critical care provider statement:    Critical care time (minutes): 50.   Critical care time was exclusive of:  Separately billable procedures and treating other patients   Critical care was necessary to treat or prevent imminent or life-threatening deterioration of the following conditions:  Cardiac Failure, Multi-Organ Failure, Renal Failure, and Respiratory Failure   Critical care was time spent personally by me on the following activities:  Development of treatment plan with patient or surrogate, discussions with consultants, evaluation of patient's response to treatment, examination of patient, obtaining history from patient or surrogate, ordering and performing treatments and interventions, ordering and review of laboratory studies, ordering and review of radiographic studies, pulse oximetry, re-evaluation of patient's condition and review of old charts. Critical Care indicators:        PROGRESS, DATA ANALYSIS, CONSULTS, AND MEDICAL DECISION MAKING  All labs have been independently interpreted by me.  All radiology studies have been reviewed by me. All EKG's have been independently viewed and interpreted by me.  Discussion below represents my analysis of pertinent findings related to patient's condition, differential diagnosis, treatment plan and final disposition.    Differential diagnosis includes but is not limited to:  Acute CHF  Pneumonia  Lower GI bleed  Pulmonary embolism  Sepsis  Bladder outlet obstruction  Shock liver  Viral hepatitis      Clinical Scores:                  ED Course as of 03/04/25 0142   Mon Mar 03, 2025   2208 EKG          EKG time: 10:03 PM  Rhythm/Rate: Atrial  flutter, rate 117  P waves and OK: N/A  QRS, axis: Left axis deviation  ST and T waves: No acute ischemic changes    Interpreted Contemporaneously by me, independently viewed  Similar compared to prior 12/1/2024       [JR]   2213 Chest x-ray dependently interpreted PACS.  There is cardiomegaly with ICD in place, minimal vascular congestion. [JR]   2321 ALT (SGPT)(!): 1,551 [JR]   2322 AST (SGOT)(!): 2,994 [JR]   2322 Creatinine(!): 4.30 [JR]   2322 Patient's liver enzymes are very elevated and he also has acute renal failure with creatinine up to 4.3, previously closer to 1.  Have ordered a noncontrasted CT abdomen to evaluate for possible structural pathology.  He may have had a period of low blood pressure causing ANDIE and shock liver.  I have ordered his ICD to be interrogated. [JR]   2323 I reviewed previous echo from March 22, 2024.  Patient had an EF of 51 to 55%.  Moderate pulmonary hypertension is present. [JR]   2323 HS Troponin T(!!): 112  Troponin is elevated.  It was elevated previously however that was in the setting of ICD discharge.  He denies chest pain.  Possibly secondary to his acute renal failure. [JR]   2336 Discussed with Dr. Lane, pulmonary and critical care, who agrees to admit to the intensive care unit. [JR]   Tue Mar 04, 2025   0009 ICD interrogated.  He has had no shock delivered since November.  The last 48 hours he has remained in atrial fibrillation. [JR]   0009 CT abdomen pelvis independently interpreted in PACS.  There is no hydronephrosis bilaterally.  There is an enlarged prostate.  Gallbladder is mildly distended. [JR]   0030 WBC, UA: 0-2 [JR]   0030 Blood, UA(!): Large (3+) [JR]   0030 Protein, UA(!): >=300 mg/dL (3+) [JR]   0031 CT abdomen demonstrates dilatation of the common bile duct with possible choledocholithiasis.  I will add empiric antibiotics. [JR]   0031 The patient did not receive the 30cc/kg sepsis fluid bolus as this volume of fluid may be detrimental to their  condition due to: Heart Failure  The amount of fluid to be administered is 0cc.    [JR]   0141 CK level had been added by Dr. Lane and is greater than 16,000. [JR]      ED Course User Index  [JR] Jorge Luis Rodriguez MD             AS OF 01:42 EST VITALS:    BP - 113/90  HR - (!) 124  TEMP - 97.7 °F (36.5 °C) (Oral)  O2 SATS - 95%    COMPLEXITY OF CARE  The patient requires admission.      Chronic or social conditions impacting patient care (Social Determinants of Health):     DIAGNOSIS  Final diagnoses:   Acute hypoxic respiratory failure   Acute kidney injury   Acute congestive heart failure, unspecified heart failure type   Transaminitis   Bright red blood per rectum   Multi-organ failure with heart failure   Choledocholithiasis   Non-traumatic rhabdomyolysis           DISPOSITION  Admit to ICU      Prescription drug monitoring program review:           Please note that portions of this document were completed with a voice recognition program.    Note Disclaimer: At [facility], we believe that sharing information builds trust and better relationships. You are receiving this note because you recently visited [facility]. It is possible you will see health information before a provider has talked with you about it. This kind of information can be easy to misunderstand. To help you fully understand what it means for your health, we urge you to discuss this note with your provider.         Jorge Luis Rodriguez MD  03/04/25 0144

## 2025-03-04 NOTE — OP NOTE
ENDOSCOPIC RETROGRADE CHOLANGIOPANCREATOGRAPHY Procedure Report    Patient Name:  Andrew Valles  YOB: 1957    Date of Surgery:  3/3/2025     Pre-Op Diagnosis:  65 years old male who came in with Lasix and pain [place] liver function test CT chest and CT abdomen showing a CBD stone        Procedure/CPT® Codes:  [place] ERCP DX COLLECTION SPECIMEN BRUSHING/WASHING [44886]    Procedure(s):  ENDOSCOPIC RETROGRADE CHOLANGIOPANCREATOGRAPHY with stone extraction, brushing, and stent placent    Staff:  Surgeon(s):  Chi Sampson MD      Anesthesia: Monitored Anesthesia Care    Description of Procedure:  A description of the procedure as well as risks, benefits and alternative methods were explained to the patient who voiced understanding and signed the corresponding consent form.Specifically risks of post-ERCP pancreatitis, bleeding, perforation, failure to canulate and adverse reaction to sedation were discussed. A physical exam was performed and vital signs were monitored throughout the procedure.    A  film was performed which was normal. With the patient in the semi-prone position, an Olympus side viewing endoscope was placed into the mouth and proceeded through the esophagus, stomach and second portion of the duodenum without difficulty. Limited views of the esophagus and stomach were normal. The ampulla was visualized and appeared normal. A [employer] hydrotome was used to cannulate the ampulla using wire guided technique. Bile was aspirated to ensure this was the duct of interest. Contrast was injected into the bile duct.      The scope was then retroflexed and the fundus was visualized. The procedure was not difficult and there were no immediate complications.    Findings:   EGD findings  Esophagitis was noted LA grade A, gastric duodenal mucosa grossly looks normal, large apparently diverticulum was noticed with ampulla located in the inferior wall on the left side.    ERCP  finding  Initially Hydratome preloaded a wire was attempted however because it was not successful subsequently Jagtome was used, due to large periumbilical diverticulum and very redundant long intraduodenal segment, wire keep going into the wall of the duodenum subsequent that with a slight manipulation wire was advanced into the pancreas duct and was left in place.  After that, second wire was attempted despite using a different angulation unable to deeply cannulate common bile duct, no bile was flowing.  At this stage we did place PD stent 5 Indonesian 7 cm.  After that, precut sphincterotomy was performed and still no bile was noticed. Subsequently, we were able to access common bile duct which was located more on the right side of the PD.  Sphincterotomy was completed and sphincteroplasty was performed with removal of impacted stone close to 8/9 mm, it was removed 912 mm balloon.  Another stone close to 7 mm was also removed after sphincteroplasty using 9 to 12 mm balloon.  There was a minimal oozing noticed at sphincterotomy/sphincteroplasty area, we opted to placed 10 Indonesian 9 cm stent in common bile duct.  Patient tolerated procedure very well no immediate complication were noticed    Impression:  Choledocholithiasis with impacted stone status post of sphincterotomy and sphincteroplasty with removal of 2 large stones and placement of 10 Indonesian 9 cm CBD stent.  5 Indonesian 7 cm PD stent was placed to prevent post ERCP pancreatitis.  Large periampullary diverticulum with ampulla located in inferior left wall of the diverticulum making cannulation difficult requiring precut and PD stent placement.  LA grade A/B erosive esophagitis    Recommendations:  Follow with liver function test  Clear liquids today.  Continue with antibiotic.  KUB in 7 to 10 days to make sure PD stent have self migrated out.  ERCP with removal of CBD stent in 8 to 10 weeks.  Patient does have markedly elevated AST and ALT with relatively minimal  elevation of alkaline phosphatase and normal bilirubin, possibility of superimposed drug-induced liver disease (DILI) either due to amiodarone versus acetaminophen toxicity in the differential.  Patient is being given Acetadote which should help with either case.  Follow-up with the PT/INR.  Follow clinical response.  Hold off anticoagulation at least for 24 to 48-hour if possible      Sultana Valentino MD     Date: 3/4/2025    Time: 17:18 EST

## 2025-03-04 NOTE — ED NOTES
Ultrasound Inserted IV Site:Right upper arm 20G    Catheter Length:2.55    Diameter:1    Depth:1      Vascular Access Score=5  1) Palpable / Visible / Dis  2) Palpable / Viasible / Not Distended  3) Easily Palpable / Not Visibile  4) Poorly Palpable / Visible  5) Poorly / Nonpalpable / NV    Angiocath visualized within the venous lumen, flush visualized superior to insertion site. Blood return noted.

## 2025-03-04 NOTE — ANESTHESIA PROCEDURE NOTES
Airway  Urgency: elective    Date/Time: 3/4/2025 2:57 PM  Airway not difficult    General Information and Staff    Patient location during procedure: OR  Anesthesiologist: Gavino Neil MD    Indications and Patient Condition  Indications for airway management: airway protection    Preoxygenated: yes  MILS maintained throughout  Mask difficulty assessment: 1 - vent by mask    Final Airway Details  Final airway type: endotracheal airway      Successful airway: ETT  Cuffed: yes   Successful intubation technique: direct laryngoscopy  Facilitating devices/methods: intubating stylet  Endotracheal tube insertion site: oral  Blade: CMAC  Blade size: D  ETT size (mm): 7.5  Cormack-Lehane Classification: grade I - full view of glottis  Placement verified by: chest auscultation and capnometry   Measured from: lips  ETT/EBT  to lips (cm): 23  Number of attempts at approach: 1

## 2025-03-04 NOTE — ED NOTES
Pt arrived via [facility] for rectal bleeding that started this afternoon. Pt reports he takes eliquis for afib.

## 2025-03-04 NOTE — PROGRESS NOTES
"      [facility]         Dr Ny Sayied   LOS: 1 day   Patient Care Team:  Provider, No Known as PCP - General    Chief Complaint: Acute liver injury with stone in the common bile duct and acute hypoxemic respiratory failure acute on chronic diastolic CHF other issues as listed below    Interval History: Resting comfortably on nasal cannula oxygen.  Currently on bicarb drip.  Awake denies any complaints.  No history of any drug overdose or Tylenol overdosage    REVIEW OF SYSTEMS:   CARDIOVASCULAR: No chest pain, chest pressure or chest discomfort. No palpitations or edema.   RESPIRATORY: No shortness of breath, cough or sputum.   GASTROINTESTINAL: No anorexia, nausea, vomiting or diarrhea. No abdominal pain or blood.   HEMATOLOGIC: No bleeding or bruising.     Ventilator/Non-Invasive Ventilation Settings (From admission, onward)      None              Vital Signs  Temp:  [97.5 °F (36.4 °C)-98.5 °F (36.9 °C)] 97.5 °F (36.4 °C)  Heart Rate:  [112-130] 117  Resp:  [14-22] 22  BP: (104-157)/() 104/56    Intake/Output Summary (Last 24 hours) at 3/4/2025 1004  Last data filed at 3/4/2025 0600  Gross per 24 hour   Intake 1118 ml   Output --   Net 1118 ml     Flowsheet Rows      Flowsheet Row First Filed Value   Admission Height 170.2 cm (67\") Documented at 03/03/2025 2154   Admission Weight 113 kg (250 lb) Documented at 03/03/2025 2154                  Physical Exam:  Patient is examined using the personal protective equipment as per guidelines from infection control for this particular patient as enacted.  Hand hygiene was performed before and after patient interaction.   General Appearance:    Alert, cooperative, in no acute distress.  Following simple commands  ENT Mallampati between 3 and 4 no nasal congestion  Neck midline trachea, no thyromegaly   Lungs:     Clear to auscultation, respirations regular, even and                  unlabored    Heart:    Regular rhythm and normal rate, normal S1 " and S2, no            murmur, no gallop, no rub, no click   Chest Wall:    No abnormalities observed   Abdomen:   Obese soft mild tenderness right upper quadrant no rebound no guarding   Extremities:   Moves all extremities well, no edema, no cyanosis, no             redness  CNS no focal neurological deficits normal sensory exam  Skin no rashes no nodules  Musculoskeletal no cyanosis no clubbing normal range of motion     Results Review:        Results from last 7 days   Lab Units 03/04/25  0639 03/03/25  2200   SODIUM mmol/L 134* 130*   POTASSIUM mmol/L 4.4 4.8   CHLORIDE mmol/L 88* 86*   CO2 mmol/L 23.0 22.1   BUN mg/dL 91* 86*   CREATININE mg/dL 4.53* 4.30*   GLUCOSE mg/dL 134* 100*   CALCIUM mg/dL 7.8* 8.7     Results from last 7 days   Lab Units 03/04/25  0035 03/03/25  2200   CK TOTAL U/L 16,749*  --    HSTROP T ng/L 106* 112*     Results from last 7 days   Lab Units 03/04/25  0607 03/03/25  2200   WBC 10*3/mm3 15.44* 14.84*   HEMOGLOBIN g/dL 9.9* 10.6*   HEMATOCRIT % 35.7* 35.4*   PLATELETS 10*3/mm3 332 366     Results from last 7 days   Lab Units 03/04/25  0035   INR  2.11*   APTT seconds 32.3                       I reviewed the patient's new clinical results.  I personally viewed and interpreted the patient's chest x-ray.        Medication Review:   mupirocin, 1 Application, Each Nare, BID  senna-docusate sodium, 2 tablet, Oral, BID  sodium chloride, 10 mL, Intravenous, Q12H        sodium bicarbonate 8.4 % 150 mEq in dextrose (D5W) 5 % 1,000 mL infusion (greater than 100 mEq), 150 mEq        ASSESSMENT:   Acute liver injury  Sepsis  Stone in the distal common bile duct  Coagulopathy  Acute hypoxic respiratory failure  Acute on chronic diastolic CHF  ANDIE  Hyponatremia  Pulmonary hypertension, moderate per echo 3/22/2024.  Possibly group 2 due to dilated LA.  Mild lactic acidosis.,  Secondary to liver injury.     Chronic anemia  A-fib, on Eliquis  AICD    PLAN:  As noted chart reviewed  Shock liver could be  related to gallbladder stone.  GI has been consulted awaiting evaluation.   Tylenol and talk screen negative.  Awaiting hepatitis profile.  Will cover with antibiotic cefepime and Flagyl for now  Keep volume status on the dry side  Worsening creatinine will consult nephrology.  Likely from sepsis.  Further bicarb drip management per nephrology  Lactic acidosis resolved  Will consult cardiology with history of A-fib and ICD  ICU core measures  Trend LFTs      Critical care time 35-minute    Yanely Roth MD  03/04/25  10:04 EST

## 2025-03-04 NOTE — CONSULTS
[facility] Consult Note      Patient Name: Sebastien Tang  : 1960  MRN: [id number]  Primary Care Physician:  Provider, No Known  Referring Physician: Dana Lane MD  Date of admission: 3/3/2025    Subjective     Reason for Consult:  ANDIE     HPI:   Sebastien Tang is a 65 y.o. male with diastolic CHF (EF 55%) and VT s/p AICD, PAF on AC, DM2, HTN, GERD who presented yesterday with nausea, fatigue and poor oral intake.  Found to have ANDIE, lactic acidosis, rhabdomyolysis, and marked transaminitis.  BUN/Cr 86/4.3 with K 4.8 and bicarb 22 (AG 22).  CK ~ 16K and UA shows discrepant large blood but no RBCs.  ALT/AST ~ 1500/3000.  CXR showed some vascular congestion, while CT abd demonstrated dilatation of CBD + choledocholithiasis (and punctate left renal stone).  Not hypotensive but tachycardic with HR up to 120s.  He denies dyspnea but is on 4L O2.  He reports a fall and minor Lt knee injury but that was a month ago.  Home meds notable for lasix/aldactone, statin PPI.  BP generous ~ 150/100 currently.  He's NPO with plan for ERCP this afternoon.  BL Cr 1.1 to 1.2.  Was hospitalized  to 12/3/24 for ICD discharges.  Also had ANDIE (peak Cr 2) and rectal bleeding.  Was given  cc bolus and currently on bicarb drip.  Zosyn started.  Prior LILA 2024 did suggest moderate pulmonary HTN too, with RVSP 45 to 55 mm Hg    Review of Systems:   14 point review of systems is otherwise negative except for mentioned above on HPI    Personal History     Past Medical History:   Diagnosis Date    Acidosis     mixed resp/metabolic acidosis    Acute congestive heart failure     Acute respiratory failure     hypoxic    Anemia     Arthritis     Asthma     Atrial flutter     Azotemia     Cardiac arrest 2021    Chronic coronary artery disease     Constipation     COPD (chronic obstructive pulmonary disease)     Depression     Enlarged prostate     Hypertension     Hypokalemia     severe    ICD  (implantable cardioverter-defibrillator) in place     Ischemic cardiomyopathy     MRSA nasal colonization     Obesity (BMI 30-39.9)     Orthopnea     PAF (paroxysmal atrial fibrillation)     Persistent atrial fibrillation     Pulmonary edema     Tobacco abuse     Transaminitis     Trouble in sleeping     Ventricular fibrillation        Past Surgical History:   Procedure Laterality Date    CARDIAC CATHETERIZATION Left 4/1/2021    Procedure: Coronary Angiography;  Surgeon: Anna Puga MD;  Location:  [facility];  Service: Cardiology;  Laterality: Left;    CARDIAC CATHETERIZATION N/A 4/1/2021    Procedure: Left ventriculography;  Surgeon: Anna Puga MD;  Location:  [facility];  Service: Cardiology;  Laterality: N/A;    CARDIAC CATHETERIZATION N/A 4/1/2021    Procedure: Left Heart Cath;  Surgeon: nAna Puga MD;  Location:  [facility];  Service: Cardiology;  Laterality: N/A;    CARDIAC ELECTROPHYSIOLOGY PROCEDURE N/A 4/7/2021    Procedure: IMPLANTABLE CARDIOVERTER DEFIBRILLATOR- SC BIOTRONIK;  Surgeon: Nik Rosas MD;  Location:  [facility];  Service: Cardiovascular;  Laterality: N/A;    CARDIOVERSION  05/19/2021    Dr. Rosas    CARDIOVERSION  07/26/2021    Dr. Rosas    TONSILLECTOMY         Family History: family history is not on file.    Social History:  reports that he quit smoking about 3 years ago. His smoking use included cigarettes. He started smoking about 13 years ago. He has a 15 pack-year smoking history. He has been exposed to tobacco smoke. He has never used smokeless tobacco. He reports current alcohol use of about 1.0 standard drink of alcohol per week. He reports that he does not currently use drugs after having used the following drugs: Hydrocodone.    Home Medications:  Prior to Admission medications    Medication Sig Start Date End Date Taking? Authorizing Provider   albuterol sulfate  (90 Base)  MCG/ACT inhaler INHALE 2 PUFFS BY MOUTH EVERY 6 HOURS AS NEEDED FOR WHEEZING 5/28/24  Yes Yuliana Flynn DO   apixaban (Eliquis) 5 MG tablet tablet Take 1 tablet by mouth Every 12 (Twelve) Hours. 4/23/24  Yes Regine Causey APRN   busPIRone (BUSPAR) 10 MG tablet Take 1 tablet by mouth 3 (Three) Times a Day. 12/3/24  Yes Ron Walton MD   finasteride (PROSCAR) 5 MG tablet Take 1 tablet by mouth Daily. 10/30/24  Yes Yuliana Flynn DO   furosemide (LASIX) 80 MG tablet Take 1 tablet by mouth Daily. 9/17/24  Yes Orlin Beatty MD   metFORMIN (GLUCOPHAGE) 500 MG tablet Take 1 tablet by mouth 2 (Two) Times a Day With Meals.   Yes ProviderJanelle MD   mirtazapine (REMERON) 30 MG tablet TAKE 1 TABLET BY MOUTH EVERY NIGHT 1/14/25  Yes Yuliana Flynn DO   pantoprazole (PROTONIX) 40 MG EC tablet TAKE 1 TABLET BY MOUTH EVERY MORNING 10/18/24  Yes Yuliana Flynn DO   temazepam (RESTORIL) 15 MG capsule Take 1 capsule by mouth At Night As Needed for Sleep for up to 5 doses. 12/3/24  Yes Ron Walton MD   metoprolol succinate XL (TOPROL-XL) 50 MG 24 hr tablet Take 1 tablet by mouth Every 12 (Twelve) Hours for 30 days. 12/3/24 1/2/25  Ron Walton MD   polyethylene glycol (MIRALAX) 17 g packet Take 17 g by mouth Daily. 8/12/24   Kelley Thakur APRN   rosuvastatin (Crestor) 20 MG tablet Take 1 tablet by mouth Daily. 3/29/24   Orlin Beatty MD   spironolactone (ALDACTONE) 25 MG tablet Take 1 tablet by mouth Daily. 5/1/24   Janelle Gastelum MD   tiZANidine (ZANAFLEX) 4 MG tablet Take 1 tablet by mouth 2 (Two) Times a Day As Needed for Muscle Spasms.    Janelle Gastelum MD   budesonide-formoterol (SYMBICORT) 160-4.5 MCG/ACT inhaler Inhale 2 puffs 2 (Two) Times a Day. 3/23/24 3/4/25  Abdoulaye Gómez MD       Allergies:  No Known Allergies    Objective     Vitals:   Temp:  [97.7 °F (36.5 °C)-98.5 °F (36.9 °C)] 98 °F (36.7 °C)  Heart Rate:  [112-130] 117  Resp:  [14-22] 22  BP:  (104-157)/() 104/56  Flow (L/min) (Oxygen Therapy):  [4-5] 5    Intake/Output Summary (Last 24 hours) at 3/4/2025 0751  Last data filed at 3/4/2025 0600  Gross per 24 hour   Intake 1118 ml   Output --   Net 1118 ml       Physical Exam:    General Appearance: pleasant WF comfortable alert on NC O2 (4L)  Skin: warm and dry  HEENT: oral mucosa normal, nonicteric sclera  Neck: supple, no JVD  Lungs: Dec BS bilat no rales  Heart: RRR, normal S1 and S2  Abdomen: soft, nontender, nondistended  : no palpable bladder  Extremities: no edema, cyanosis or clubbing  Neuro: normal speech and mental status     Scheduled Meds:     mupirocin, 1 Application, Each Nare, BID  senna-docusate sodium, 2 tablet, Oral, BID  sodium chloride, 10 mL, Intravenous, Q12H      IV Meds:   sodium bicarbonate 8.4 % 150 mEq in dextrose (D5W) 5 % 1,000 mL infusion (greater than 100 mEq), 150 mEq, Last Rate: 150 mEq (03/04/25 0255)        Results Reviewed:   I have personally reviewed the results from the time of this admission to 3/4/2025 07:51 EST     Lab Results   Component Value Date    GLUCOSE 100 (H) 03/03/2025    CALCIUM 8.7 03/03/2025     (L) 03/03/2025    K 4.8 03/03/2025    CO2 22.1 03/03/2025    CL 86 (L) 03/03/2025    BUN 86 (H) 03/03/2025    CREATININE 4.30 (H) 03/03/2025    EGFRIFAFRI 115 06/14/2021    EGFRIFNONA 91 07/20/2021    BCR 20.0 03/03/2025    ANIONGAP 21.9 (H) 03/03/2025      Lab Results   Component Value Date    MG 2.3 11/30/2024    PHOS 3.9 03/23/2024    ALBUMIN 4.2 03/03/2025     US Liver    Result Date: 3/4/2025  LIVER ULTRASOUND  HISTORY: Hepatitis  COMPARISON: March 3, 2025  TECHNIQUE: Grayscale and color Doppler sonographic images were obtained through the right upper quadrant.  FINDINGS: The patient appears to have some fatty atrophy of the pancreas. Full assessment is limited, due to significant overlying bowel at motion artifact. No focal hepatic lesions are seen. Main portal vein appears to be patent.  Common bile duct measures up to 6 mm, but is poorly imaged, due to overlying bowel gas. There is no intrahepatic biliary dilatation. Gallbladder is distended and contains sludge. There is some mild gallbladder wall thickening, measuring up to 4 mm, but there is no pericholecystic fluid, and no sonographic Grady's sign was elicited. Right kidney measures 10.4 x 4.9 x 4.8 cm. There is no hydronephrosis. Renal echotexture appears normal.      Impression:  1. The patient's visualized common bile duct measures up to 6 mm. The distal common bile duct is not well seen. No choledocholithiasis is seen on this exam. However, again, the exam is limited by overlying bowel gas. 2. There is sludge identified within the distended gallbladder. There is some nonspecific gallbladder wall thickening, although there is no pericholecystic fluid, and no sonographic Grady's sign was elicited.  This report was finalized on 3/4/2025 5:18 AM by Dr. Judi Chow M.D on Workstation: [id number]        Assessment / Plan     ASSESSMENT:  ANDIE - unclear if olig.  Rhabdo contributory, with CK ~ 16K and e/o myoglobinuria (large blood but no RBCs on UA) along with poss vol depletion in relation.  We need to exercise caution though re: IVF admin given hx CHF, vasc mitch on CXR, and 4L O2 requirements.  Also extent of UOP unclear.  CT: no hydro (punctate L stone).  BUN/Cr up slightly 91/4.5 despite IVF.  K/Hco3 normal.  Elvia = 67.  Rhabdomyolysis - mechanism unclear.  CK ~ 16K. Fall was month ago.  Is on statin.  Transaminitis - multifactorial, CBD stone, rhabdo; ALT/AST down slightly 1315/2340  Lactic acidosis, improved with IVF  Choledocholithiasis, GI on board, plan ERCP.  On zosyn.  WBC 15K.    Diastolic CHF + pulm HTN - prior echo noted   Hx VT/VF/AFIB on AC, eliquis on hold)  HTN - BP labile, 150/100 currently but was 104/56 earlier this AM  Dm2, mgmt per primary team, holding metformin due to ANDIE  Hx GERD on PPI, doubt contributory to ANDIE  (ie AIN)  Hx BPH on finasteride - check random bladder scan  Nutrition: NPO currently for ERCP    PLAN:  No acute indication for HD but discussed this possibility with patient  Dec bicarb drip rate 100 cc/hr and will closely monitor vol status  Check random bladder scan  Note plan for ERCP today  Trend CK & LFTs  Hold diuretics, statin, metformin    Thank you for involving us in the care of Sebastien Tang.  Please feel free to call with any questions.    Sang Stout MD  03/04/25  07:51 [facility]    [facility]  [phone]

## 2025-03-05 NOTE — CONSULTS
Patient Name: Sebastien Tang  :1960  65 y.o.    Date of Admission: 3/3/2025  Encounter Provider: Tameka Young MD  Date of Encounter Visit: 25  Place of Service: [facility] CARDIOLOGY  Referring Provider: Dana Lane MD  Patient Care Team:  Provider, No Known as PCP - General      Chief complaint: Atrial fibrillation      History of Present Illness:  Gely Tang is a 65-year-old male followed by Dr. Beatty and Dr. Rosas in office.  He has a pertinent medical history of's congestive heart failure, atrial fibrillation, and peripheral artery disease.  In 2021 he was at work, and had a cardiac arrest.  He was resuscitated and taken to the Cath Lab and found to have severe multivessel disease with a large caliber RCA to be occluded in the proximal segment, although it was filling via bridging collaterals and left-to-right collaterals. There was also an occluded branch of OM2. He underwent an attempted PCI of OM 2, although wires were unable to cross the lesion.  An echo at that time showed an EF of 38.9% with mild concentric hypertrophy and no significant valvular abnormalities. He underwent implantation of single-chamber ICD prior to discharge.  In 2024 he presented to the hospital for shortness of breath and was found to have pulmonary edema consistent with CHF.  He was also found to be in atrial flutter.  He underwent LILA cardioversion, as he had only been taking his medications intermittently due to affordability and lower GI bleeding issues.    He again presented to the ER complaining of acute generalized weakness and bright red blood per rectum that has been attributed to hemorrhoids.  Upon EMS arrival he was in A-fib with RVR with a rate in the 140s to 180s.    A chest x-ray shows cardiomegaly with vascular congestion suggestive of CHF.  CT of the abdomen showed mild dilation of the common bile duct, stones within the distal common bile duct, and  distention of the gallbladder. S/P ERCP with stone extraction and stent.      Past Medical History:   Diagnosis Date    Acidosis     mixed resp/metabolic acidosis    Acute congestive heart failure     Acute respiratory failure     hypoxic    Anemia     Arthritis     Asthma     Atrial flutter     Azotemia     Cardiac arrest 04/2021    Chronic coronary artery disease     Constipation     COPD (chronic obstructive pulmonary disease)     Depression     Enlarged prostate     Hypertension     Hypokalemia     severe    ICD (implantable cardioverter-defibrillator) in place     Ischemic cardiomyopathy     MRSA nasal colonization     Obesity (BMI 30-39.9)     Orthopnea     PAF (paroxysmal atrial fibrillation)     Persistent atrial fibrillation     Pulmonary edema     Tobacco abuse     Transaminitis     Trouble in sleeping     Ventricular fibrillation        Past Surgical History:   Procedure Laterality Date    CARDIAC CATHETERIZATION Left 4/1/2021    Procedure: Coronary Angiography;  Surgeon: Anna Puga MD;  Location:  [facility] C[facility]AS[facility];  Service: Cardiology;  Laterality: Left;    CARDIAC CATHETERIZATION N/A 4/1/2021    Procedure: Left ventriculography;  Surgeon: Anna Puga MD;  Location:  [facility];  Service: Cardiology;  Laterality: N/A;    CARDIAC CATHETERIZATION N/A 4/1/2021    Procedure: Left Heart Cath;  Surgeon: Anna Puga MD;  Location:  [facility];  Service: Cardiology;  Laterality: N/A;    CARDIAC ELECTROPHYSIOLOGY PROCEDURE N/A 4/7/2021    Procedure: IMPLANTABLE CARDIOVERTER DEFIBRILLATOR- SC BIOTRONIK;  Surgeon: Nik Rosas MD;  Location:  [facility] CATH INVAS[facility] LOCATION;  Service: Cardiovascular;  Laterality: N/A;    CARDIOVERSION  05/19/2021    Dr. Rosas    CARDIOVERSION  07/26/2021    Dr. Rosas    ERCP N/A 3/4/2025    Procedure: ENDOSCOPIC RETROGRADE CHOLANGIOPANCREATOGRAPHY with sphincterotomy, stone removal, and pd and cbd stent placement;   Surgeon: Sultana Valentino MD;  Location: [facility] MAIN OR;  Service: Gastroenterology;  Laterality: N/A;    TONSILLECTOMY           Prior to Admission medications    Medication Sig Start Date End Date Taking? Authorizing Provider   albuterol sulfate  (90 Base) MCG/ACT inhaler INHALE 2 PUFFS BY MOUTH EVERY 6 HOURS AS NEEDED FOR WHEEZING 5/28/24  Yes Yuliana Flynn,    apixaban (Eliquis) 5 MG tablet tablet Take 1 tablet by mouth Every 12 (Twelve) Hours. 4/23/24  Yes Regine Causey APRN   busPIRone (BUSPAR) 10 MG tablet Take 1 tablet by mouth 3 (Three) Times a Day. 12/3/24  Yes Ron Walton MD   finasteride (PROSCAR) 5 MG tablet Take 1 tablet by mouth Daily. 10/30/24  Yes Yuliana Flynn DO   furosemide (LASIX) 80 MG tablet Take 1 tablet by mouth Daily. 9/17/24  Yes Orlin Beatty MD   metFORMIN (GLUCOPHAGE) 500 MG tablet Take 1 tablet by mouth 2 (Two) Times a Day With Meals.   Yes Janelle Gastelum MD   mirtazapine (REMERON) 30 MG tablet TAKE 1 TABLET BY MOUTH EVERY NIGHT 1/14/25  Yes Yuliana Flynn DO   pantoprazole (PROTONIX) 40 MG EC tablet TAKE 1 TABLET BY MOUTH EVERY MORNING 10/18/24  Yes Yuliana Flynn DO   temazepam (RESTORIL) 15 MG capsule Take 1 capsule by mouth At Night As Needed for Sleep for up to 5 doses. 12/3/24  Yes Ron Walton MD   metoprolol succinate XL (TOPROL-XL) 50 MG 24 hr tablet Take 1 tablet by mouth Every 12 (Twelve) Hours for 30 days. 12/3/24 1/2/25  Ron Walton MD   polyethylene glycol (MIRALAX) 17 g packet Take 17 g by mouth Daily. 8/12/24   Kelley Thakur APRN   rosuvastatin (Crestor) 20 MG tablet Take 1 tablet by mouth Daily. 3/29/24   Orlin Beatty MD   spironolactone (ALDACTONE) 25 MG tablet Take 1 tablet by mouth Daily. 5/1/24   Janelle Gastelum MD   tiZANidine (ZANAFLEX) 4 MG tablet Take 1 tablet by mouth 2 (Two) Times a Day As Needed for Muscle Spasms.    Provider, Historical, MD       No Known Allergies    Social History      Socioeconomic History    Marital status: Single   Tobacco Use    Smoking status: Former     Current packs/day: 0.00     Average packs/day: 1.5 packs/day for 10.0 years (15.0 ttl pk-yrs)     Types: Cigarettes     Start date: 4/22/2011     Quit date: 4/22/2021     Years since quitting: 3.8     Passive exposure: Past    Smokeless tobacco: Never    Tobacco comments:     4/2021   Vaping Use    Vaping status: Never Used   Substance and Sexual Activity    Alcohol use: Yes     Alcohol/week: 1.0 standard drink of alcohol     Comment: 1 BEER DAILY    Drug use: Not Currently     Types: Hydrocodone    Sexual activity: Not Currently     Partners: Female       History reviewed. No pertinent family history.    REVIEW OF SYSTEMS:   All other systems reviewed and negative.        Objective:     Vitals:    03/05/25 0730 03/05/25 0831 03/05/25 0835 03/05/25 0851   BP:    161/77   BP Location:       Patient Position:       Pulse: (!) 121 (!) 127 (!) 127 (!) 128   Resp:  28 (!) 32    Temp:       TempSrc:       SpO2: 98% 96% 95%    Weight:       Height:         Body mass index is 39.35 kg/m².    Intake/Output Summary (Last 24 hours) at 3/5/2025 1003  Last data filed at 3/5/2025 0930  Gross per 24 hour   Intake 9493 ml   Output 8970 ml   Net 523 ml         Lab Review:     Results from last 7 days   Lab Units 03/05/25  0129   SODIUM mmol/L 134*   POTASSIUM mmol/L 4.9   CHLORIDE mmol/L 84*   CO2 mmol/L 20.6*   BUN mg/dL 110*   CREATININE mg/dL 5.39*   CALCIUM mg/dL 7.1*   BILIRUBIN mg/dL 1.0   ALK PHOS U/L 149*   ALT (SGPT) U/L 1,260*   AST (SGOT) U/L 1,840*   GLUCOSE mg/dL 196*     Results from last 7 days   Lab Units 03/05/25  0129 03/04/25  0035 03/03/25  2200   CK TOTAL U/L 12,163* 16,749*  --    HSTROP T ng/L  --  106* 112*     Results from last 7 days   Lab Units 03/05/25  0552   WBC 10*3/mm3 11.66*   HEMOGLOBIN g/dL 9.1*   HEMATOCRIT % 30.6*   PLATELETS 10*3/mm3 284     Results from last 7 days   Lab Units 03/05/25  2099  03/04/25  0035   INR  1.81* 2.11*   APTT seconds  --  32.3                   I personally viewed and interpreted the patient's EKG/Telemetry data.        Assessment and Plan:       1.  Persistent atrial fibrillation.  Status post LILA cardioversion in March 2024 but went back into atrial fibrillation.  At home treated with rate control on digoxin and atenolol and Eliquis for stroke prevention.  2.  Chronic diastolic heart failure.  3.  History of ventricular fibrillation arrest April 2021 status post Biotronik single-chamber ICD.  4.  Coronary artery disease with proximal chronic total occlusion of the RCA chronic total occlusion of the OM 2.  Unsuccessful PCI in the past.  4.  COPD  5.  Obesity  6.  Diabetes  7.  Hypertension  8.  Dilated aorta at 4.0 cm    He is here currently with stone of the common bile duct status post ERCP.  He remains tachycardic.    He was on atenolol and digoxin at home according to our last office note (4/30/24).    He is on metoprolol succinate 50 mg twice daily.  He has plenty of blood pressure so I recommend we increase it for better heart rate control.    He is in acute renal failure and so that limits our ability to use digoxin.    Anticoagulation is on hold.    Increase metoprolol succ to 100 mg twice daily.  Probably his heart rates not going to get a lot better until his other medical issues are improved.    Tameka Young MD  03/05/25  10:03 EST

## 2025-03-05 NOTE — PROGRESS NOTES
[facility] Progress Note      Patient Name: Sebastien Tang  : 1960  MRN: [id number]  Primary Care Physician:  Provider, No Known  Date of admission: 3/3/2025    Subjective     Interval History:   F/u rosy    Review of Systems:   Taylor placed for retention and had bloody urine with clots  - CBI initiated   On bipap ; SOA  SBP 160s ; HR 120s  Underwent ERCP + stone extraction/stent yesterday  I/O 6.5/5.1     Objective     Vitals:   Temp:  [97.5 °F (36.4 °C)-99.5 °F (37.5 °C)] 98.7 °F (37.1 °C)  Heart Rate:  [112-127] 121  Resp:  [16-31] 23  BP: (102-155)/() 155/99  Arterial Line BP: (122-175)/(63-86) 144/74  Flow (L/min) (Oxygen Therapy):  [2-15] 4    Intake/Output Summary (Last 24 hours) at 3/5/2025 0805  Last data filed at 3/5/2025 0700  Gross per 24 hour   Intake 6493 ml   Output 5170 ml   Net 1323 ml       Physical Exam:    General Appearance: mod resp distress on bipap   Neck supple no JVD  Lungs: bilateral rales present  Heart: RRR, normal S1 and S2  Abdomen: soft, nontender, nondistended  : taylor with CBI, pink urine  Extremities: 1+ BLE  edema, cyanosis or clubbing  Neuro: normal speech and mental status     Scheduled Meds:     acetylcysteine, 10,000 mg, Intravenous, Once  cefepime, 2,000 mg, Intravenous, Q24H  insulin regular, 2-7 Units, Subcutaneous, Q6H  ipratropium-albuterol, 3 mL, Nebulization, 4x Daily - RT  metroNIDAZOLE, 500 mg, Intravenous, Q8H  mupirocin, 1 Application, Each Nare, BID  senna-docusate sodium, 2 tablet, Oral, BID  sodium chloride, 10 mL, Intravenous, Q12H      IV Meds:   dexmedetomidine, 0.2-1.5 mcg/kg/hr, Last Rate: 0.5 mcg/kg/hr (25 0437)  lactated ringers, 9 mL/hr        Results Reviewed:   I have personally reviewed the results from the time of this admission to 3/5/2025 08:05 EST     Results from last 7 days   Lab Units 25  0129 25  0639 25  2200   SODIUM mmol/L 134* 134* 130*   POTASSIUM mmol/L 4.9 4.4 4.8    CHLORIDE mmol/L 84* 88* 86*   CO2 mmol/L 20.6* 23.0 22.1   BUN mg/dL 110* 91* 86*   CREATININE mg/dL 5.39* 4.53* 4.30*   CALCIUM mg/dL 7.1* 7.8* 8.7   BILIRUBIN mg/dL 1.0 1.2 1.3*   ALK PHOS U/L 149* 148* 161*   ALT (SGPT) U/L 1,260* 1,315* 1,551*   AST (SGOT) U/L 1,840* 2,340* 2,994*   GLUCOSE mg/dL 196* 134* 100*     Estimated Creatinine Clearance: 15.6 mL/min (A) (by C-G formula based on SCr of 5.39 mg/dL (H)).  Results from last 7 days   Lab Units 03/05/25  0129   PHOSPHORUS mg/dL 7.6*     Results from last 7 days   Lab Units 03/04/25  0035   URIC ACID mg/dL 18.7*     Results from last 7 days   Lab Units 03/05/25  0552 03/05/25  0129 03/04/25  1644 03/04/25  0607 03/03/25  2200   WBC 10*3/mm3 11.66* 11.35*  --  15.44* 14.84*   HEMOGLOBIN g/dL 9.1* 9.3*  --  9.9* 10.6*   HEMOGLOBIN, POC g/dL  --   --  11.9*  --   --    PLATELETS 10*3/mm3 284 291  --  332 366     Results from last 7 days   Lab Units 03/05/25  0552 03/04/25  0035   INR  1.81* 2.11*       Assessment / Plan     ASSESSMENT:  Non olig ANDIE - rhabdomyolysis, with CK ~ 16K and e/o myoglobinuria (large blood but no RBCs on UA).  CT: no hydro (punctate L stone).  BUN/Cr up 110/5.4 despite IVF - now with signs of vol overload.  K normal.  Off bicarb drip, needs diuresis   Rhabdomyolysis - mechanism unclear.  CK ~ 16K -> 12K. Fall was month ago.  Been on statin.  Transaminitis - multifactorial, CBD stone, rhabdo; ALT/AST down slowly  Lactic acidosis, resolved, bicarb 20 with AG 30 due to ANDIE/uremia  Choledocholithiasis, POD1 ERCP with stone extraction and stent  Diastolic CHF + pulm HTN    Hx VT/VF/AFIB on AC, eliquis on hold  HTN - BP high, 160s systolic, metop XL been on hold, HR 120s  Dm2, mgmt per primary team, holding metformin due to ANDIE  Hx GERD on PPI, doubt contributory to ANDIE (ie AIN)  Gross hematuria + retention - CBI in progress    PLAN:  Bumex 4mg IV q8h x 2 doses  Repeat BMP 11 AM   May require HD within next 24h; will reassess diuretic  response in few hours   Restart toprol XL 50 BID  CBI + UROL eval  D/W RN    Addendum: waste products still rising, BUN/Cr 110/6.2.  K 5, bicarb 17.  Ca low, will replace.  Give 2 amps of bicarb.  Anticipate need to start dialysis tomorrow.  Spoke to daughter about this and she's agreeable.  Patient now encephalopathic and confused (uremia likely contributory).  D/W RN.  Discussed with PULM, prefer vascular place line.  Does have some mild coagulopathy (INR 1.8), given vitamin D yesterday.  Tunnel catheter may be preferable anyway in case needs dialysis outside hospital.  I'll make him NPO after MN.      Sang Stout MD  03/05/25  08:05 [facility]    [facility]  [phone]

## 2025-03-05 NOTE — PERIOPERATIVE NURSING NOTE
Dr. De Los Santos called back ABGs given of 7.29 PCO2 53.5, PO2 125.9 settings on BiPAP given, stated we will watch him for now.

## 2025-03-05 NOTE — ANESTHESIA POSTPROCEDURE EVALUATION
"Patient: Sebastien Tang    Procedure Summary       Date: 03/04/25 Room / Location: [facility] OR  / [facility] MAIN OR    Anesthesia Start: 1446 Anesthesia Stop: 1739    Procedure: ENDOSCOPIC RETROGRADE CHOLANGIOPANCREATOGRAPHY with sphincterotomy, stone removal, and pd and cbd stent placement Diagnosis:     Surgeons: Sultana Valentino MD Provider: Gavino Neil MD    Anesthesia Type: general, Mela ASA Status: 4            Anesthesia Type: general, Mela    Vitals  Vitals Value Taken Time   /98 03/04/25 1900   Temp 36.4 °C (97.5 °F) 03/04/25 1735   Pulse 116 03/04/25 1916   Resp 16 03/04/25 1900   SpO2 99 % 03/04/25 1916   Vitals shown include unfiled device data.        Post Anesthesia Care and Evaluation    Patient location during evaluation: bedside  Patient participation: complete - patient participated  Level of consciousness: awake  Pain management: adequate    Airway patency: patent  Anesthetic complications: No anesthetic complications  PONV Status: controlled  Cardiovascular status: acceptable  Respiratory status: acceptable  Hydration status: acceptable    Comments: /98   Pulse 116   Temp 36.4 °C (97.5 °F) (Oral)   Resp 16   Ht 167.6 cm (66\")   Wt 105 kg (230 lb 13.2 oz)   SpO2 97%   BMI 37.26 kg/m²       "

## 2025-03-05 NOTE — PROGRESS NOTES
Discharge Planning Assessment  [facility]     Patient Name: Sebastien Tang  MRN: [id number]  Today's Date: 3/5/2025    Admit Date: 3/3/2025    Plan: DCP pending clinical progress   Discharge Needs Assessment       Row Name 03/05/25 1315       Living Environment    People in Home alone    Current Living Arrangements apartment    Primary Care Provided by self    Provides Primary Care For no one    Family Caregiver if Needed none       Transition Planning    Patient/Family Anticipated Services at Transition home health care;skilled nursing    Transportation Anticipated car, drives self       Discharge Needs Assessment    Equipment Currently Used at Home none                   Discharge Plan       Row Name 03/05/25 1317       Plan    Plan DCP pending clinical progress    Plan Comments Return call recieved from pt's daughter Zara who stated that she reside in [place] 2 [place]2 hours away from [facility].    pt's daughter did confirm that pt fuentes slive in a [employer] independent apt at [address].  per pt's daughter she thinks apt is  on the 9th floor  and the building does have an elevator.   She was not aware that pt has any services in his home. Pt's daughter did state that pt's wife did pass away about  6 month ago and that thier relationship has been complicated. Inforemd pt's daughter that CCP will plan to follow up with pt as he does become clincial more stable to discuss pt's dCp/needs.   Informed pt's daughter pt may need an inp trehab stay prior to being able to return back home whihc will be pending PT/OT eval and rec.  Pt's other listed next of kin Reyna Álavrez is pt's sister rose marie.  per pt's daughter she does not feel she will have any additonal information about pt or have been any more involved with pt since the passing of pt's wife.   CCP will need to follow pt's progress with tx team and follow up with pt to discuss DCP/options.      Row Name 03/05/25 1138       Plan    Plan DCP  pending assessment and pt's progress    Plan Comments CCP did attempt to meet wt pt at bedside to screen for DCP/needs.  Pt having a hard time with converstion and did request CCP call his daughter.  Call Placed and HIPPA compliant VML for pt's daughter Zara Garcia [phone] requesting return call to ICU CCP to screen for pt's DCP/needs.                  Continued Care and Services - Admitted Since 3/3/2025    No active coordination exists for this encounter.       Expected Discharge Date and Time       Expected Discharge Date Expected Discharge Time    Mar 7, 2025            Demographic Summary       Row Name 03/05/25 1314       General Information    Admission Type inpatient    Arrived From home    Referral Source admission list    Reason for Consult discharge planning    Preferred Language English                   Functional Status       Row Name 03/05/25 1315       Functional Status    Usual Activity Tolerance excellent    Current Activity Tolerance poor       Functional Status, IADL    Medications independent    Meal Preparation independent    Housekeeping independent    Laundry independent    Shopping independent                   Psychosocial    No documentation.                  Abuse/Neglect    No documentation.                  Legal    No documentation.                  Substance Abuse    No documentation.                  Patient Forms    No documentation.                     DINA Orlando

## 2025-03-05 NOTE — PROGRESS NOTES
Continued Stay Note  [facility]     Patient Name: Sebastien Tang  MRN: [id number]  Today's Date: 3/5/2025    Admit Date: 3/3/2025    Plan: DCP pending assessment and pt's progress   Discharge Plan       Row Name 03/05/25 1144       Plan    Plan DCP pending assessment and pt's progress    Plan Comments CCP did attempt to meet [facility] pt at bedside to screen for DCP/needs.  Pt having a hard time with converstion and did request CCP call his daughter.  Call Placed and HIPPA compliant VML for pt's daughter Zara Garcia [phone] requesting return call to ICU CCP to screen for pt's DCP/needs.                   Discharge Codes    No documentation.                       DINA Orlando

## 2025-03-05 NOTE — PROGRESS NOTES
Full consult to follow tomorrow.  Chart reviewed case discussed with nurse and patient's daughter via telephone.  Will plan on tunneled catheter placement tomorrow at 8 AM in the operating room under MAC anesthesia.

## 2025-03-05 NOTE — PROGRESS NOTES
"      [facility]         Dr Ny Sayied   LOS: 2 days   Patient Care Team:  Provider, No Known as PCP - General    Chief Complaint: Acute liver injury with stone in the common bile duct and acute hypoxemic respiratory failure acute on chronic diastolic CHF other issues as listed below    Interval History: Resting comfortably on nasal cannula oxygen.  Currently on bicarb drip.  Awake denies any complaints.  No history of any drug overdose or Tylenol overdosage    REVIEW OF SYSTEMS:   CARDIOVASCULAR: No chest pain, chest pressure or chest discomfort. No palpitations or edema.   RESPIRATORY: No shortness of breath, cough or sputum.   GASTROINTESTINAL: No anorexia, nausea, vomiting or diarrhea. No abdominal pain or blood.   HEMATOLOGIC: No bleeding or bruising.     Ventilator/Non-Invasive Ventilation Settings (From admission, onward)      None              Vital Signs  Temp:  [97.5 °F (36.4 °C)-99.5 °F (37.5 °C)] 98.7 °F (37.1 °C)  Heart Rate:  [112-128] 128  Resp:  [16-32] 32  BP: (102-161)/() 161/77  Arterial Line BP: (122-175)/(63-86) 144/74    Intake/Output Summary (Last 24 hours) at 3/5/2025 1019  Last data filed at 3/5/2025 0930  Gross per 24 hour   Intake 9493 ml   Output 8970 ml   Net 523 ml     Flowsheet Rows      Flowsheet Row First Filed Value   Admission Height 170.2 cm (67\") Documented at 03/03/2025 2154   Admission Weight 113 kg (250 lb) Documented at 03/03/2025 2154                  Physical Exam:  Patient is examined using the personal protective equipment as per guidelines from infection control for this particular patient as enacted.  Hand hygiene was performed before and after patient interaction.   General Appearance:    Alert, cooperative, in no acute distress.  Following simple commands  ENT Mallampati between 3 and 4 no nasal congestion  Neck midline trachea, no thyromegaly   Lungs:     Clear to auscultation, respirations regular, even and                  unlabored    " Heart:    Regular rhythm and normal rate, normal S1 and S2, no            murmur, no gallop, no rub, no click   Chest Wall:    No abnormalities observed   Abdomen:   Obese soft mild tenderness right upper quadrant no rebound no guarding   Extremities:   Moves all extremities well, no edema, no cyanosis, no             redness  CNS no focal neurological deficits normal sensory exam  Skin no rashes no nodules  Musculoskeletal no cyanosis no clubbing normal range of motion     Results Review:        Results from last 7 days   Lab Units 03/05/25  0129 03/04/25  0639 03/03/25  2200   SODIUM mmol/L 134* 134* 130*   POTASSIUM mmol/L 4.9 4.4 4.8   CHLORIDE mmol/L 84* 88* 86*   CO2 mmol/L 20.6* 23.0 22.1   BUN mg/dL 110* 91* 86*   CREATININE mg/dL 5.39* 4.53* 4.30*   GLUCOSE mg/dL 196* 134* 100*   CALCIUM mg/dL 7.1* 7.8* 8.7     Results from last 7 days   Lab Units 03/05/25  0129 03/04/25  0035 03/03/25  2200   CK TOTAL U/L 12,163* 16,749*  --    HSTROP T ng/L  --  106* 112*     Results from last 7 days   Lab Units 03/05/25  0552 03/05/25  0129 03/04/25  1644 03/04/25  0607   WBC 10*3/mm3 11.66* 11.35*  --  15.44*   HEMOGLOBIN g/dL 9.1* 9.3*  --  9.9*   HEMOGLOBIN, POC g/dL  --   --  11.9*  --    HEMATOCRIT % 30.6* 31.3*  --  35.7*   HEMATOCRIT POC %  --   --  35*  --    PLATELETS 10*3/mm3 284 291  --  332     Results from last 7 days   Lab Units 03/05/25  0552 03/04/25  0035   INR  1.81* 2.11*   APTT seconds  --  32.3                 Results from last 7 days   Lab Units 03/05/25  0337   PH, ARTERIAL pH units 7.325*   PO2 ART mm Hg 112.5*   PCO2, ARTERIAL mm Hg 36.7   HCO3 ART mmol/L 19.1*       I reviewed the patient's new clinical results.  I personally viewed and interpreted the patient's chest x-ray.        Medication Review:   acetylcysteine, 10,000 mg, Intravenous, Once  bumetanide, 4 mg, Intravenous, Q8H  calcium gluconate, 2,000 mg, Intravenous, Once  cefepime, 2,000 mg, Intravenous, Q24H  insulin regular, 2-7 Units,  Subcutaneous, Q6H  ipratropium-albuterol, 3 mL, Nebulization, 4x Daily - RT  metoprolol succinate XL, 100 mg, Oral, Q12H  metoprolol succinate XL, 50 mg, Oral, Once  metroNIDAZOLE, 500 mg, Intravenous, Q8H  mupirocin, 1 Application, Each Nare, BID  senna-docusate sodium, 2 tablet, Oral, BID  sodium chloride, 10 mL, Intravenous, Q12H        dexmedetomidine, 0.2-1.5 mcg/kg/hr, Last Rate: 0.5 mcg/kg/hr (03/05/25 0437)  lactated ringers, 9 mL/hr        ASSESSMENT:   Acute hypercapnic respiratory failure  Acute liver injury  Sepsis  Stone in the distal common bile duct  Coagulopathy  Acute hypoxic respiratory failure  Acute on chronic diastolic CHF  ANDIE  Hyponatremia  Pulmonary hypertension, moderate per echo 3/22/2024.  Possibly group 2 due to dilated LA.  Mild lactic acidosis.,  Secondary to liver injury.     Chronic anemia  A-fib, on Eliquis  AICD    PLAN:  Events noted chart reviewed  Attempt to wean noninvasive ventilation as tolerated   Status post ERCP.  Appreciate input from GI.  Acetylcysteine protocol for Tylenol toxicity initiated initiated per GI.  LFTs improving.  Continue to monitor LFT   Tylenol and tox screen negative.  Awaiting hepatitis profile.  Will cover with antibiotic cefepime and Flagyl for now for concerns of cholangitis.  Improving leukocytosis  Creatinine worse.  Diuresis with Bumex today.  May require HD in the next 24 hours  Lactic acidosis resolved  Cardiology managing A-fib RVR.  ICU core measures        Critical care time 35-minutes    Yanely Roth MD  03/05/25  10:19 EST

## 2025-03-06 NOTE — ANESTHESIA POSTPROCEDURE EVALUATION
"Patient: Sebastien Tang    Procedure Summary       Date: 03/06/25 Room / Location: [facility] OR [address] [place] [address] MAIN OR    Anesthesia Start: 0817 Anesthesia Stop: 0921    Procedure: HEMODIALYSIS CATHETER INSERTION Diagnosis:       ANDIE (acute kidney injury)      (ANDIE (acute kidney injury) [N17.9])    Surgeons: Garry Huff MD Provider: Lilly Garrett MD    Anesthesia Type: Not recorded ASA Status: Not recorded            Anesthesia Type: No value filed.    Vitals  Vitals Value Taken Time   BP 72/55 03/06/25 1037   Temp 37.8 °C (100.04 °F) 03/06/25 1039   Pulse 98 03/06/25 1039   Resp     SpO2 91 % 03/06/25 1039   Vitals shown include unfiled device data.        Post Anesthesia Care and Evaluation    Patient location during evaluation: bedside  Patient participation: complete - patient participated  Level of consciousness: awake  Pain management: adequate    Airway patency: patent  Anesthetic complications: No anesthetic complications    Cardiovascular status: acceptable  Respiratory status: acceptable  Hydration status: acceptable    Comments: BP 93/68   Pulse 106   Temp 36.8 °C (98.3 °F) (Axillary)   Resp (!) 30   Ht 167.6 cm (66\")   Wt 111 kg (244 lb 7.8 oz)   SpO2 100%   BMI 39.46 kg/m²     "

## 2025-03-06 NOTE — CONSULTS
[facility]   Consult Note    Patient Name: Sebastien Tang  : 1960  MRN: [id number]  Primary Care Physician:  Provider, No Known  Referring Physician: Dana Lane MD  Date of admission: 3/3/2025    Inpatient Vascular Surgery Consult  Consult performed by: Garry Huff MD  Consult ordered by: Sang Stout MD        Subjective   Subjective     Reason for Consult/ Chief Complaint: Hemodialysis access evaluation.    Rectal Bleeding    Sebastien Tang is a 65 y.o. male patient sedated on Precedex infusion requiring BiPAP.    Review of Systems   Gastrointestinal:  Positive for hematochezia.        Personal History     Past Medical History:   Diagnosis Date    Acidosis     mixed resp/metabolic acidosis    Acute congestive heart failure     Acute respiratory failure     hypoxic    Anemia     Arthritis     Asthma     Atrial flutter     Azotemia     Cardiac arrest 2021    Chronic coronary artery disease     Constipation     COPD (chronic obstructive pulmonary disease)     Depression     Enlarged prostate     Hypertension     Hypokalemia     severe    ICD (implantable cardioverter-defibrillator) in place     Ischemic cardiomyopathy     MRSA nasal colonization     Obesity (BMI 30-39.9)     Orthopnea     PAF (paroxysmal atrial fibrillation)     Persistent atrial fibrillation     Pulmonary edema     Tobacco abuse     Transaminitis     Trouble in sleeping     Ventricular fibrillation        Past Surgical History:   Procedure Laterality Date    CARDIAC CATHETERIZATION Left 2021    Procedure: Coronary Angiography;  Surgeon: Anna Puga MD;  Location:  [facility] CATH INVASIVE LOCATION;  Service: Cardiology;  Laterality: Left;    CARDIAC CATHETERIZATION N/A 2021    Procedure: Left ventriculography;  Surgeon: Anna Puga MD;  Location:  [facility] CATH INVASIVE LOCATION;  Service: Cardiology;  Laterality: N/A;    CARDIAC CATHETERIZATION N/A 2021    Procedure: Left Heart Cath;  Surgeon: Vivien  MD Anna;  Location: [facility] CATH INVASIVE LOCATION;  Service: Cardiology;  Laterality: N/A;    CARDIAC ELECTROPHYSIOLOGY PROCEDURE N/A 4/7/2021    Procedure: IMPLANTABLE CARDIOVERTER DEFIBRILLATOR- SC BIOTRONIK;  Surgeon: Nik Rosas MD;  Location: [facility] CATH INVASIVE LOCATION;  Service: Cardiovascular;  Laterality: N/A;    CARDIOVERSION  05/19/2021    Dr. Rosas    CARDIOVERSION  07/26/2021    Dr. Rosas    ERCP N/A 3/4/2025    Procedure: ENDOSCOPIC RETROGRADE CHOLANGIOPANCREATOGRAPHY with sphincterotomy, stone removal, and pd and cbd stent placement;  Surgeon: Sultana Valentino MD;  Location: [facility] MAIN OR;  Service: Gastroenterology;  Laterality: N/A;    TONSILLECTOMY         Family History: His family history is not on file.     Social History: He  reports that he quit smoking about 3 years ago. His smoking use included cigarettes. He started smoking about 13 years ago. He has a 15 pack-year smoking history. He has been exposed to tobacco smoke. He has never used smokeless tobacco. He reports current alcohol use of about 1.0 standard drink of alcohol per week. He reports that he does not currently use drugs after having used the following drugs: Hydrocodone.    Home Medications:  albuterol sulfate HFA, apixaban, busPIRone, finasteride, furosemide, metFORMIN, metoprolol succinate XL, mirtazapine, ondansetron ODT, pantoprazole, polyethylene glycol, rosuvastatin, spironolactone, temazepam, and tiZANidine    Allergies:  He has No Known Allergies.    Objective    Objective     Vitals:    Temp:  [97.7 °F (36.5 °C)-98.7 °F (37.1 °C)] 98.3 °F (36.8 °C)  Heart Rate:  [105-128] 105  Resp:  [16-32] 30  BP: ()/(62-97) 92/66  Arterial Line BP: (115-173)/() 130/68  Flow (L/min) (Oxygen Therapy):  [2-4] 2  Output by Drain (mL) 03/05/25 0701 - 03/05/25 1900 03/05/25 1901 - 03/06/25 0700 03/06/25 0701 - 03/06/25 0720 Range Total   Urethral Catheter Coude;Other (Comment) 22 Fr. [id number] [id number]        Physical Exam  Constitutional:       Appearance: He is well-developed.   Pulmonary:      Effort: Pulmonary effort is normal. No respiratory distress.   Abdominal:      General: There is no distension.      Palpations: Abdomen is soft.     Nonlabored respirations on BiPAP sedated.    Result Review    Result Review:  I have personally reviewed the results from the time of this admission to 3/6/2025 07:20 EST and agree with these findings:  [x]  Laboratory list / accordion  []  Microbiology  []  Radiology  []  EKG/Telemetry   []  Cardiology/Vascular   []  Pathology  []  Old records  []  Other:  Most notable findings include:   Results from last 7 days   Lab Units 03/06/25  0348 03/06/25  0322 03/05/25  0552 03/05/25  0129 03/04/25  1644 03/04/25  0607 03/03/25  2200   WBC 10*3/mm3  --  12.32* 11.66* 11.35*  --  15.44* 14.84*   HEMOGLOBIN g/dL  --  8.7* 9.1* 9.3*  --  9.9* 10.6*   HEMOGLOBIN, POC g/dL 9.8*  --   --   --  11.9*  --   --    PLATELETS 10*3/mm3  --  297 284 291  --  332 366     Results from last 7 days   Lab Units 03/06/25  0348 03/06/25  0322 03/05/25  1156 03/05/25  0129 03/04/25  0639 03/03/25  2200   SODIUM mmol/L  --  129* 131* 134* 134* 130*   POTASSIUM mmol/L  --  5.3* 5.0 4.9 4.4 4.8   CHLORIDE mmol/L  --  80* 83* 84* 88* 86*   CO2 mmol/L  --  18.0* 17.6* 20.6* 23.0 22.1   BUN mg/dL  --  115* 110* 110* 91* 86*   CREATININE mg/dL 7.62* 6.61* 6.22* 5.39* 4.53* 4.30*   GLUCOSE mg/dL  --  120* 223* 196* 134* 100*   PHOSPHORUS mg/dL  --   --   --  7.6*  --   --    Estimated Creatinine Clearance: 11 mL/min (A) (by C-G formula based on SCr of 7.62 mg/dL (H)).  Results from last 7 days   Lab Units 03/06/25  0322 03/05/25  0552 03/04/25  0035   PROTIME Seconds 21.3* 21.1* 23.8*   INR  1.84* 1.81* 2.11*       Lab Results   Component Value Date    HGBA1C 10.10 (H) 11/28/2024    HGBA1C 6.60 (H) 03/16/2024    HGBA1C 6.20 (H) 12/15/2022     Glucose   Date/Time Value Ref Range Status   03/06/2025 0614 132  (H) 70 - 130 mg/dL Final   03/06/2025 0348 105 (H) 65 - 99 mg/dL Final     Comment:     Serial Number: [id number]:  [id number]   03/06/2025 0011 133 (H) 70 - 130 mg/dL Final   03/05/2025 1742 142 (H) 70 - 130 mg/dL Final   03/05/2025 1135 266 (H) 70 - 130 mg/dL Final   03/05/2025 0626 189 (H) 70 - 130 mg/dL Final   03/04/2025 2357 241 (H) 70 - 130 mg/dL Final   03/04/2025 2052 235 (H) 70 - 130 mg/dL Final         Assessment & Plan   Assessment / Plan     Brief Patient Summary:  Sebastien Tang is a 65 y.o. male acute kidney injury in the setting of multiple other severe acute and chronic comorbidities.  Currently sedated on Precedex infusion and on noninvasive mechanical ventilation.    Active Hospital Problems:  Active Hospital Problems    Diagnosis     **Multi-organ failure with heart failure     ANDIE (acute kidney injury)        Plan:   3/6/2025: Will plan tunneled catheter placement this morning for dialysis initiation.  High risk procedure given patient's current acute on chronic medical conditions.    Patient has been recommended for tunneled dialysis catheter placement.  Patient understands the inherent risks associated with catheter placement.  These include but not are not limited to stroke, heart attack, bleeding, infection, need for secondary surgery/intervention, pneumothorax, respiratory failure.        VTE Prophylaxis:  Mechanical VTE prophylaxis orders are present.         Garry Huff MD

## 2025-03-06 NOTE — PROGRESS NOTES
LOS: 3 days   Patient Care Team:  Provider, No Known as PCP - General    Chief Complaint:     Follow-up A-fib    Interval History:     He is sedate on positive pressure ventilation-unable to answer questions    Objective   Vital Signs  Temp:  [97.7 °F (36.5 °C)-98.3 °F (36.8 °C)] 98.3 °F (36.8 °C)  Heart Rate:  [105-126] 106  Resp:  [16-30] 30  BP: ()/(62-97) 93/68  Arterial Line BP: (115-173)/() 130/68  FiO2 (%):  [100 %] 100 %    Intake/Output Summary (Last 24 hours) at 3/6/2025 1038  Last data filed at 3/6/2025 1006  Gross per 24 hour   Intake 38605.19 ml   Output 23435 ml   Net -2428.81 ml       Comfortable NAD  Neck supple, no JVD or thyromegaly appreciated  S1/S2 irregular with mild tachycardia, no m/r/g  Lungs CTA B difficult exam but decreased to bases, normal effort  Abdomen S/NT/ND (+) BS, no HSM appreciated  Extremities warm, no clubbing, cyanosis, or edema  Extremities cool and mottled skin      Results Review:      Results from last 7 days   Lab Units 03/06/25  0348 03/06/25 0322 03/05/25  1156 03/05/25  0129   SODIUM mmol/L  --  129* 131* 134*   POTASSIUM mmol/L  --  5.3* 5.0 4.9   CHLORIDE mmol/L  --  80* 83* 84*   CO2 mmol/L  --  18.0* 17.6* 20.6*   BUN mg/dL  --  115* 110* 110*   CREATININE mg/dL 7.62* 6.61* 6.22* 5.39*   GLUCOSE mg/dL  --  120* 223* 196*   CALCIUM mg/dL  --  7.5* 7.2* 7.1*     Results from last 7 days   Lab Units 03/06/25  0322 03/05/25  0129 03/04/25  0035 03/03/25  2200   CK TOTAL U/L 8,174* 12,163* 16,749*  --    HSTROP T ng/L  --   --  106* 112*     Results from last 7 days   Lab Units 03/06/25  0348 03/06/25 0322 03/05/25  0552 03/05/25  0129   WBC 10*3/mm3  --  12.32* 11.66* 11.35*   HEMOGLOBIN g/dL  --  8.7* 9.1* 9.3*   HEMOGLOBIN, POC g/dL 9.8*  --   --   --    HEMATOCRIT %  --  29.3* 30.6* 31.3*   HEMATOCRIT POC % 29*  --   --   --    PLATELETS 10*3/mm3  --  297 345 291     Results from last 7 days   Lab Units 03/06/25 0322 03/05/25 0552 03/04/25  0035    INR  1.84* 1.81* 2.11*   APTT seconds  --   --  32.3                   I reviewed the patient's new clinical results.  I personally viewed and interpreted the patient's EKG/Telemetry data        Medication Review:   cefepime, 2,000 mg, Intravenous, Q24H  insulin regular, 2-7 Units, Subcutaneous, Q6H  ipratropium-albuterol, 3 mL, Nebulization, 4x Daily - RT  metoprolol succinate XL, 50 mg, Oral, Q12H  metroNIDAZOLE, 500 mg, Intravenous, Q8H  midodrine, 5 mg, Oral, TID AC  mupirocin, 1 Application, Each Nare, BID  pantoprazole, 40 mg, Oral, QAM AC  senna-docusate sodium, 2 tablet, Oral, BID  sodium chloride, 10 mL, Intravenous, Q12H        dexmedetomidine, 0.2-1.5 mcg/kg/hr, Last Rate: 0.3 mcg/kg/hr (03/06/25 0817)  norepinephrine, 0.02-0.3 mcg/kg/min        Assessment & Plan       Multi-organ failure with heart failure    ANDIE (acute kidney injury)    Acute hypoxic respiratory failure-on noninvasive positive pressure ventilation  Acute liver injury with stone in common bile duct - s/p ERCP.  Did receive acetylcysteine protocol for Tylenol toxicity though his talk screen was negative.  Sepsis, likely due to acute liver injury.  Is on Levophed and midodrine.  ANDIE status post tunneled catheter placed 3/6/2025   atrial fibrillation, persistent-heart rate slightly high but improved from 3/5, blood pressure below.  Metoprolol ordered but he is not receiving this because of his low blood pressure.   Chronic HFpEF  History of ventricular fibrillation arrest 4/2021-status post Biotronik single-chamber ICD  CAD with history of proximal chronic occlusion RCA and occlusion OM 2-unsuccessful PCI in past  COPD  Diabetes mellitus  Hypertension  Dilated aorta  Obesity    He still have severe sepsis.  His skin is cool and mottled and his blood pressure is low on Levophed.  He is about to start hemodialysis.  His heart rate is running on the faster side but his metoprolol has not been dose since 3/5/2025 at 10:30 AM.  Will  discontinue metoprolol right now.  Once dialysis is complete, I have asked the nurses to give him 1 dose of digoxin 250 mcg IV      Will follow-guarded prognosis.    Blanca Gorman MD  03/06/25  10:38 EST

## 2025-03-06 NOTE — PLAN OF CARE
Goal Outcome Evaluation:  Plan of Care Reviewed With: patient, child        Progress: no change        Pt in ICU for CHF exacerbation. Pt weaned off BiPAP to 2L NC orders to keep 88-92%. Pt waxing and waning neuro status, but always oriented to self. Dr. Stout updated on 1100 BMP, plan for tunnel cath placement with vascular surgery in AM. GI updated asked to see pt. Attempted to changed CBI drain for one with roller clamp per urology, distribution does not have piece, attempted to update urology. PRN zofran and fentanyl given to pt. Pt and daughter updated on plan of care.

## 2025-03-06 NOTE — NURSING NOTE
Hemodialysis treatment completed.  Net Uf removed 1500mL.  Post TX CVC care complete, both lumens locked with heparin per HD orders.  Post TX VS: , /69, RR 23.  Verbal report given to primary RN Lu Rhodes.

## 2025-03-06 NOTE — PROCEDURES
Ultrasound Guided Central Venous Catheter Insertion Procedure Note      Indications:  vascular access    Procedure Details   Informed consent was obtained for the procedure, including sedation.  Risks of lung perforation, hemorrhage, arrhythmia, and adverse drug reaction were discussed.     Maximum sterile technique was used including usual patient drapes, antiseptics and physician sterile garments.    Under sterile conditions the skin above the at base of throat, on the left internal jugular vein was prepped with chlorhexidine and covered with a sterile drape. A sterile ultrasound probe was used to localize the target vein. It was clearly visualized. Local anesthesia was applied to the skin and subcutaneous tissues with lidocaine 1%. An 18-gauge needle was then inserted into the vein under ultrasound guidance. A guide wire was then threaded into the vein. A central venous catheter was then inserted into the vessel over the guide wire. The catheter was sutured into place and dressed following sterile protocol with Biopatch placed. All 4 ports were flushed and deemed patent.    Findings:  There were no changes to vital signs. All catheter ports were flushed with saline. Patient did tolerate procedure well.    Recommendations:  CXR ordered to verify placement.     Yanely Roth MD  3/6/2025

## 2025-03-06 NOTE — PROCEDURES
Endotracheal Intubation Procedure Note    Indication for endotracheal intubation: airway compromise and impending respiratory failure.  Sedation:  Propofol 8 cc rapid bolus .  Equipment: A video GlideScope was used and Ariana 4 laryngoscope blade.  Number of attempts: 1.  ETT location confirmed by by auscultation and ETCO2 monitor.  ET tube secured 24 cm at the lip  Postintubation sats 100%  Patient tolerated procedure well      Yanely Roth MD  3/6/2025

## 2025-03-06 NOTE — OP NOTE
Date of Admission:  3/3/2025  03/06/25  Garry Huff MD  [facility]    Preoperative Diagnosis: Acute renal failure    Postoperative Diagnosis: same as above    Procedure Performed:     1)  Tunneled catheter insertion into the Right internal jugular vein  2)  Ultrasound guided vascular access  3)  Radiologic supervision of catheter tip placement    Surgeon: Garry Huff MD    Assistant:  Monse MAZA, Provided critical assistance in exposure, retraction, and suction that overall decrease blood loss and operative time.    Anesthesia: MAC with local    Estimated Blood Loss:  Minimal    Findings:     PatentRight internal jugular vein vein on ultrasound.  Under real time ultrasound visualization needle accessed of the vein was performed.  Ultrasound image of access printed and stored in the medical record.    Both blue and red ports draw and flush without resistance    Successful  placement of a 23 cm Palindrome catheter in the Right internal jugular vein.    Implants:    Nothing was implanted during the procedure    Specimen: none    Complications: none    Dispo: to PACU    Indication for procedure: 65 y.o. male with acute renal failure.  On BiPAP noninvasive mechanical ventilation.  Increased risk of pulmonary complication.    Description of procedure:     The patient was taken to the operating room. Vein was interrogated with an ultrasound which appeared to be adequate for catheter placement. Surgical sites were prepped and draped in the usual sterile fashion. A full surgical timeout was performed.  The skin overlying the vein was infiltrated with local. Under ultrasound guidance, the vein was accessed and wire was placed under fluoroscopic guidance into the cava.  11 bladed scalpel was used to make a half centimeter skin neck at the vascular insertion site.  Local anesthetic was used to anesthetize the tunneling tract of the palindrome catheter.  Half centimeters skin neck was made at the planned  exit site of the catheter.  Serial dilation was performed under fluoroscopic guidance to vascular access site with the supplied vascular dilators.  Peel-away sheath and dilator were placed under fluoroscopic guidance over the guidewire.  Palindrome catheter was tunneled from the exit site to the vascular insertion site.  Wire and dilator were removed from the peel-away sheath.  Catheter was placed down into the peel-away sheath into the vena cava.  Catheter was withdrawn until adequate tip placement under fluoroscopic guidance.  Fluoroscopy of the apex of the catheter was performed to ensure no kinking.  Catheter was secured in place with nylon sutures.  Thre vascular access site was closed using Vicryl suture.  Sterile dressings were applied throughout.    Garry Huff MD  03/06/25    Active Hospital Problems    Diagnosis  POA    **Multi-organ failure with heart failure [I50.9]  Yes    ANDIE (acute kidney injury) [N17.9]  Unknown      Resolved Hospital Problems   No resolved problems to display.

## 2025-03-06 NOTE — NURSING NOTE
Pt began having suicidal ideation with a plan this shift. MD Guerra notified-order for sitter and consult to access center placed. Patient agitated/restless. Placed on bipap around 0030. Precedex restarted to keep patient from pulling bipap mask off. Patient given a break from bipap from 3842-8247, placed on 2L nasal cannula. VBG drawn during that time. Critical results called to Ariana.Orders  received to increase rate on bipap to 26. Also discussed all morning lab results with MD Lane, Cr 6.61 and potassium 5.3. No new orders received.

## 2025-03-06 NOTE — ADDENDUM NOTE
Addendum  created 03/06/25 1425 by Lilly Garrett MD    Attestation recorded in Intraprocedure, Intraprocedure Attestations filed

## 2025-03-06 NOTE — PROGRESS NOTES
[facility] Progress Note      Patient Name: Sebastien Tang  : 1960  MRN: [id number]  Primary Care Physician:  Provider, No Known  Date of admission: 3/3/2025    Subjective     Interval History:   F/u ANDIE    Review of Systems:   Urine clearing with CBI  BP 90s/60s w/o pressor  On bipap  Remains confused, on precedex     Objective     Vitals:   Temp:  [97.7 °F (36.5 °C)-98.7 °F (37.1 °C)] 98.3 °F (36.8 °C)  Heart Rate:  [105-128] 105  Resp:  [16-32] 30  BP: ()/(62-97) 92/66  Arterial Line BP: (115-173)/() 130/68  Flow (L/min) (Oxygen Therapy):  [2-4] 2    Intake/Output Summary (Last 24 hours) at 3/6/2025 0716  Last data filed at 3/6/2025 0606  Gross per 24 hour   Intake 12540.2 ml   Output 52240 ml   Net -1395.8 ml       Physical Exam:    General Appearance: ill appearing confused WF on bipap  Neck: supple, no JVD  Lungs: Coarse BS bila   Heart: RRR, normal S1 and S2  Abdomen: soft, nontender, nondistended  : Coude with CBI  Extremities: trace BLE edema    Scheduled Meds:     cefepime, 2,000 mg, Intravenous, Q24H  insulin regular, 2-7 Units, Subcutaneous, Q6H  ipratropium-albuterol, 3 mL, Nebulization, 4x Daily - RT  metoprolol succinate XL, 100 mg, Oral, Q12H  metroNIDAZOLE, 500 mg, Intravenous, Q8H  mupirocin, 1 Application, Each Nare, BID  pantoprazole, 40 mg, Oral, QAM AC  senna-docusate sodium, 2 tablet, Oral, BID  sodium chloride, 10 mL, Intravenous, Q12H      IV Meds:   dexmedetomidine, 0.2-1.5 mcg/kg/hr, Last Rate: 0.3 mcg/kg/hr (25 0606)        Results Reviewed:   I have personally reviewed the results from the time of this admission to 3/6/2025 07:16 EST     Results from last 7 days   Lab Units 25  0348 25  0322 25  1156 25  0129 25  0639   SODIUM mmol/L  --  129* 131* 134* 134*   POTASSIUM mmol/L  --  5.3* 5.0 4.9 4.4   CHLORIDE mmol/L  --  80* 83* 84* 88*   CO2 mmol/L  --  18.0* 17.6* 20.6* 23.0   BUN mg/dL  --  115*  110* 110* 91*   CREATININE mg/dL 7.62* 6.61* 6.22* 5.39* 4.53*   CALCIUM mg/dL  --  7.5* 7.2* 7.1* 7.8*   BILIRUBIN mg/dL  --  1.1  --  1.0 1.2   ALK PHOS U/L  --  159*  --  149* 148*   ALT (SGPT) U/L  --  1,148*  --  1,260* 1,315*   AST (SGOT) U/L  --  1,461*  --  1,840* 2,340*   GLUCOSE mg/dL  --  120* 223* 196* 134*     Estimated Creatinine Clearance: 11 mL/min (A) (by C-G formula based on SCr of 7.62 mg/dL (H)).  Results from last 7 days   Lab Units 03/05/25  0129   PHOSPHORUS mg/dL 7.6*     Results from last 7 days   Lab Units 03/04/25  0035   URIC ACID mg/dL 18.7*     Results from last 7 days   Lab Units 03/06/25  0348 03/06/25  0322 03/05/25  0552 03/05/25  0129 03/04/25  1644 03/04/25  0607 03/03/25  2200   WBC 10*3/mm3  --  12.32* 11.66* 11.35*  --  15.44* 14.84*   HEMOGLOBIN g/dL  --  8.7* 9.1* 9.3*  --  9.9* 10.6*   HEMOGLOBIN, POC g/dL 9.8*  --   --   --  11.9*  --   --    PLATELETS 10*3/mm3  --  297 284 291  --  332 366     Results from last 7 days   Lab Units 03/06/25  0322 03/05/25  0552 03/04/25  0035   INR  1.84* 1.81* 2.11*       Assessment / Plan     ASSESSMENT:  Olig ANDIE - rhabdomyolysis, with CK ~ 16K and e/o myoglobinuria (large blood but no RBCs on UA).  CT: no hydro (punctate L stone).  BUN/Cr up 115/6.6 and uremic/encephalopathci.  K 5.3 and bicarb 18 with AG 21.  Vol excess present.  Needs HD  Rhabdomyolysis - mechanism unclear.  CK trending down, now 8K  Transaminitis - multifactorial, CBD stone, rhabdo; ALT/AST down slowly  Acute encephalopathy - uremia likely contributory  Choledocholithiasis, s/p ERCP with stone extraction and stent  Diastolic CHF + pulm HTN    Hx VT/VF/AFIB on AC, eliquis on hold  HTN - BP now low with sedation; restarted metop XL yesterday when 160s systolic; CARD inc'd dose 100 BID  Dm2, mgmt per primary team, holding metformin due to ANDIE  Hx GERD on PPI, doubt contributory to ANDIE (ie AIN)  Gross hematuria + retention - CBI in progress    PLAN:  TDC followed by HD#1;  BP may not permit much UF   D/w Dr Huff  HD#2 tomorrow  Dec metop XL back to 50 BID and add midodrine for BP support  D/W TOBIAS Stout MD  03/06/25  07:16 [facility]    [facility]  [phone]

## 2025-03-06 NOTE — ANESTHESIA PREPROCEDURE EVALUATION
Anesthesia Evaluation                  Airway   Dental      Pulmonary    (+) COPD, asthma,shortness of breath    ROS comment: Hypercapnia, on bipap with precedex  Cardiovascular     (+) pacemaker ICD, pacemaker, hypertension, CAD, dysrhythmias Tachycardia, Atrial Fib, CHF , orthopnea, PVD, hyperlipidemia      Neuro/Psych  (+) numbness, psychiatric history  GI/Hepatic/Renal/Endo    (+) obesity, morbid obesity, renal disease- CRI, diabetes mellitus    Musculoskeletal     Abdominal    Substance History      OB/GYN          Other                      Anesthesia Plan    ASA 4     MAC         CODE STATUS:    Code Status (Patient has no pulse and is not breathing): CPR (Attempt to Resuscitate)  Medical Interventions (Patient has pulse or is breathing): Full Support

## 2025-03-06 NOTE — ANESTHESIA POSTPROCEDURE EVALUATION
Patient: Sebastien Tang    Procedure Summary       Date: 03/06/25 Room / Location: [facility] OR [address] [place] [address] MAIN OR    Anesthesia Start: 0817 Anesthesia Stop: 0921    Procedure: HEMODIALYSIS CATHETER INSERTION Diagnosis:       ANDIE (acute kidney injury)      (ANDIE (acute kidney injury) [N17.9])    Surgeons: Garry Huff MD Provider: Lilly Garrett MD    Anesthesia Type: Not recorded ASA Status: Not recorded            Anesthesia Type: No value filed.    Vitals  Vitals Value Taken Time   BP 72/55 03/06/25 1037   Temp 37.8 °C (100.04 °F) 03/06/25 1040   Pulse 98 03/06/25 1040   Resp     SpO2 90 % 03/06/25 1040   Vitals shown include unfiled device data.        Anesthesia Post Evaluation

## 2025-03-06 NOTE — PROGRESS NOTES
"      [facility]         Dr Ny Sayied   LOS: 3 days   Patient Care Team:  Provider, No Known as PCP - General    Chief Complaint: Acute liver injury with stone in the common bile duct and acute hypoxemic respiratory failure acute on chronic diastolic CHF other issues as listed below    Interval History: Status post tunnel catheter placement.  Currently on noninvasive ventilation somewhat sleepy.  Overnight had to stay on noninvasive ventilation.  He was on Precedex drip which we have stopped.  Opens eyes but not really following commands for me.    REVIEW OF SYSTEMS:   Sleepy confused    Ventilator/Non-Invasive Ventilation Settings (From admission, onward)      None              Vital Signs  Temp:  [97.7 °F (36.5 °C)-98.3 °F (36.8 °C)] 98.3 °F (36.8 °C)  Heart Rate:  [105-126] 106  Resp:  [16-30] 30  BP: ()/(62-97) 93/68  Arterial Line BP: (115-173)/() 130/68  FiO2 (%):  [100 %] 100 %    Intake/Output Summary (Last 24 hours) at 3/6/2025 1003  Last data filed at 3/6/2025 0946  Gross per 24 hour   Intake 52799.19 ml   Output 67551 ml   Net -428.81 ml     Flowsheet Rows      Flowsheet Row First Filed Value   Admission Height 170.2 cm (67\") Documented at 03/03/2025 2154   Admission Weight 113 kg (250 lb) Documented at 03/03/2025 2154                  Physical Exam:  Patient is examined using the personal protective equipment as per guidelines from infection control for this particular patient as enacted.  Hand hygiene was performed before and after patient interaction.   General Appearance:  Sleepy confused on noninvasive ventilation.  Not following commands for me.  ENT normocephalic atraumatic  Neck midline trachea, no thyromegaly   Lungs:   Diminished breath sounds bilaterally    Heart:    Regular rhythm and normal rate, normal S1 and S2, no            murmur, no gallop, no rub, no click   Chest Wall:    No abnormalities observed   Abdomen:   Obese soft mild tenderness right upper " quadrant no rebound no guarding   Extremities:   Moves all extremities well, no edema, no cyanosis, no             redness  CNS Sleepy confused moving all extremities  Skin no rashes no nodules  Musculoskeletal no cyanosis no clubbing normal range of motion     Results Review:        Results from last 7 days   Lab Units 03/06/25 0348 03/06/25 0322 03/05/25  1156 03/05/25  0129   SODIUM mmol/L  --  129* 131* 134*   POTASSIUM mmol/L  --  5.3* 5.0 4.9   CHLORIDE mmol/L  --  80* 83* 84*   CO2 mmol/L  --  18.0* 17.6* 20.6*   BUN mg/dL  --  115* 110* 110*   CREATININE mg/dL 7.62* 6.61* 6.22* 5.39*   GLUCOSE mg/dL  --  120* 223* 196*   CALCIUM mg/dL  --  7.5* 7.2* 7.1*     Results from last 7 days   Lab Units 03/06/25 0322 03/05/25  0129 03/04/25  0035 03/03/25  2200   CK TOTAL U/L 8,174* 12,163* 16,749*  --    HSTROP T ng/L  --   --  106* 112*     Results from last 7 days   Lab Units 03/06/25 0348 03/06/25 0322 03/05/25  0552 03/05/25  0129   WBC 10*3/mm3  --  12.32* 11.66* 11.35*   HEMOGLOBIN g/dL  --  8.7* 9.1* 9.3*   HEMOGLOBIN, POC g/dL 9.8*  --   --   --    HEMATOCRIT %  --  29.3* 30.6* 31.3*   HEMATOCRIT POC % 29*  --   --   --    PLATELETS 10*3/mm3  --  297 284 291     Results from last 7 days   Lab Units 03/06/25 0322 03/05/25  0552 03/04/25  0035   INR  1.84* 1.81* 2.11*   APTT seconds  --   --  32.3                 Results from last 7 days   Lab Units 03/05/25  0337   PH, ARTERIAL pH units 7.325*   PO2 ART mm Hg 112.5*   PCO2, ARTERIAL mm Hg 36.7   HCO3 ART mmol/L 19.1*       I reviewed the patient's new clinical results.  I personally viewed and interpreted the patient's chest x-ray.        Medication Review:   cefepime, 2,000 mg, Intravenous, Q24H  insulin regular, 2-7 Units, Subcutaneous, Q6H  ipratropium-albuterol, 3 mL, Nebulization, 4x Daily - RT  metoprolol succinate XL, 50 mg, Oral, Q12H  metroNIDAZOLE, 500 mg, Intravenous, Q8H  midodrine, 5 mg, Oral, TID AC  mupirocin, 1 Application, Each Nare,  BID  pantoprazole, 40 mg, Oral, QAM AC  senna-docusate sodium, 2 tablet, Oral, BID  sodium chloride, 10 mL, Intravenous, Q12H        dexmedetomidine, 0.2-1.5 mcg/kg/hr, Last Rate: 0.3 mcg/kg/hr (03/06/25 0817)  norepinephrine, 0.02-0.3 mcg/kg/min        ASSESSMENT:   Acute hypercapnic respiratory failure  Acute liver injury  Transaminitis  Sepsis  Stone in the distal common bile duct  Coagulopathy  Rhabdomyolysis  Acute hypoxic respiratory failure  Acute on chronic diastolic CHF  Gross hematuria  ANDIE  Hyponatremia  Pulmonary hypertension, moderate per echo 3/22/2024.  Possibly group 2 due to dilated LA.  Mild lactic acidosis.,  Secondary to liver injury.     Chronic anemia  A-fib, on Eliquis  AICD    PLAN:  Status post tunnel catheter placement.  Plans for dialysis today.  Fluid diuretic and electrolyte management per nephrology  Attempt to wean noninvasive ventilation as tolerated.  Postdialysis will attempt to try to transition to nasal cannula oxygen maintain sats between 89 to 91%.   Status post ERCP.  Appreciate input from GI.  Acetylcysteine protocol for Tylenol toxicity completed per GI.  LFTs improving.  Continue to monitor LFT   Tylenol and tox screen negative.  Awaiting hepatitis profile.  Will cover with antibiotic cefepime and Flagyl for now for concerns of cholangitis.  Improving leukocytosis  Lactic acidosis resolved  Cardiology managing A-fib RVR.  ICU core measures        Critical care time 35-minutes    Yanely Roth MD  03/06/25  10:03 EST

## 2025-03-06 NOTE — PROGRESS NOTES
Called to evaluate patient with worsening mental status.  Dobbhoff tube placed and plans to start lactulose.  Patient pretty much obtunded on noninvasive ventilation.  Ongoing dialysis.  He will likely need intubation mechanical ventilation.  He is unable to protect his airway.  I spoke to his daughter Zara and updated his current clinical condition.  Apparently daughter has been somewhat estranged from the patient and not much in contact with him.  She reported that he is generally pretty sedentary and not doing much.  I informed the daughter that he will likely require intubation mechanical ventilation to which she was agreeable.  Patient currently in multiorgan system failure with his skin and lower extremity still cool and mottled.  Overall prognosis extremely poor.  Have reconsulted GI and awaiting their input.    Critical care time 35 minutes additional spent coordinating care.

## 2025-03-06 NOTE — CONSULTS
[facility]  Initial Inpatient Consult Note    Referring Provider: Dr Roth    Reason for Consultation: Elevated LFTs    Subjective     History of present illness:    65 y.o. male we are asked to see for elevated LFTs.  Patient presented with generalized weakness.  History taken from the chart.  Patient currently on BiPAP with worsening mental status over the course of the day and unable to provide additional history.  No family at bedside.      Had been taking Tylenol at least 2 extra strength Tylenol 2-3 times a day for a few days.  He was noted to be in A-fib with RVR with markedly elevated transaminases.  Mildly elevated total bili and alk phos.  He was noted to have renal failure secondary to rhabdomyolysis with elevated CK and leukocytosis.  CT the abdomen at that time showed mild dilation of the CBD with a stone in the distal common bile duct as well as distended gallbladder.    Hepatitis B surface antigen is negative.  Normal acute hepatitis panel.  Acetaminophen level on admission.  LFTs still markedly elevated with ALT of 1148 and AST of 1461 today.  These have been progressively downtrending since admission.    His nurse reports that he has clinically worsened over the course of today.  He is on Levophed.  Currently on dialysis.  Blood pressure has gotten a little bit better since he started dialysis.  He is on BiPAP.    Patient underwent placement of tunneled cath for dialysis access today.  Underwent ERCP 3/4 with sphincterotomy, stone extraction brushing and PD stent placement-LA grade a esophagitis was noted    Past Medical History:  Past Medical History:   Diagnosis Date    Acidosis     mixed resp/metabolic acidosis    Acute congestive heart failure     Acute respiratory failure     hypoxic    Anemia     Arthritis     Asthma     Atrial flutter     Azotemia     Cardiac arrest 04/2021    Chronic coronary artery disease     Constipation     COPD (chronic obstructive pulmonary  disease)     Depression     Enlarged prostate     Hypertension     Hypokalemia     severe    ICD (implantable cardioverter-defibrillator) in place     Ischemic cardiomyopathy     MRSA nasal colonization     Obesity (BMI 30-39.9)     Orthopnea     PAF (paroxysmal atrial fibrillation)     Persistent atrial fibrillation     Pulmonary edema     Tobacco abuse     Transaminitis     Trouble in sleeping     Ventricular fibrillation      Past Surgical History:  Past Surgical History:   Procedure Laterality Date    CARDIAC CATHETERIZATION Left 4/1/2021    Procedure: Coronary Angiography;  Surgeon: Anna Puga MD;  Location:  [facility];  Service: Cardiology;  Laterality: Left;    CARDIAC CATHETERIZATION N/A 4/1/2021    Procedure: Left ventriculography;  Surgeon: Anna Puga MD;  Location:  [facility];  Service: Cardiology;  Laterality: N/A;    CARDIAC CATHETERIZATION N/A 4/1/2021    Procedure: Left Heart Cath;  Surgeon: Anna Puga MD;  Location:  [facility];  Service: Cardiology;  Laterality: N/A;    CARDIAC ELECTROPHYSIOLOGY PROCEDURE N/A 4/7/2021    Procedure: IMPLANTABLE CARDIOVERTER DEFIBRILLATOR- SC BIOTRONIK;  Surgeon: Nik Rosas MD;  Location:  [facility];  Service: Cardiovascular;  Laterality: N/A;    CARDIOVERSION  05/19/2021    Dr. Rosas    CARDIOVERSION  07/26/2021    Dr. Rosas    ERCP N/A 3/4/2025    Procedure: ENDOSCOPIC RETROGRADE CHOLANGIOPANCREATOGRAPHY with sphincterotomy, stone removal, and pd and cbd stent placement;  Surgeon: Sultana Valentino MD;  Location: [facility] MAIN OR;  Service: Gastroenterology;  Laterality: N/A;    TONSILLECTOMY        Social History:   Social History     Tobacco Use    Smoking status: Former     Current packs/day: 0.00     Average packs/day: 1.5 packs/day for 10.0 years (15.0 ttl pk-yrs)     Types: Cigarettes     Start date: 4/22/2011     Quit date: 4/22/2021     Years since quitting: 3.8      Passive exposure: Past    Smokeless tobacco: Never    Tobacco comments:     4/2021   Substance Use Topics    Alcohol use: Yes     Alcohol/week: 1.0 standard drink of alcohol     Comment: 1 BEER DAILY      Family History:  History reviewed. No pertinent family history.    Home Meds:  Medications Prior to Admission   Medication Sig Dispense Refill Last Dose/Taking    albuterol sulfate  (90 Base) MCG/ACT inhaler INHALE 2 PUFFS BY MOUTH EVERY 6 HOURS AS NEEDED FOR WHEEZING 18 g 3 3/3/2025 Morning    apixaban (Eliquis) 5 MG tablet tablet Take 1 tablet by mouth Every 12 (Twelve) Hours. 60 tablet 3 Past Week    busPIRone (BUSPAR) 10 MG tablet Take 1 tablet by mouth 3 (Three) Times a Day. 90 tablet 0 3/3/2025 Evening    finasteride (PROSCAR) 5 MG tablet Take 1 tablet by mouth Daily. 90 tablet 0 3/3/2025 Morning    furosemide (LASIX) 80 MG tablet Take 1 tablet by mouth Daily. 90 tablet 3 3/3/2025 Evening    metFORMIN (GLUCOPHAGE) 500 MG tablet Take 1 tablet by mouth 2 (Two) Times a Day With Meals.   3/3/2025 Morning    mirtazapine (REMERON) 30 MG tablet TAKE 1 TABLET BY MOUTH EVERY NIGHT 30 tablet 2 3/3/2025 Evening    pantoprazole (PROTONIX) 40 MG EC tablet TAKE 1 TABLET BY MOUTH EVERY MORNING 30 tablet 5 3/3/2025 Morning    temazepam (RESTORIL) 15 MG capsule Take 1 capsule by mouth At Night As Needed for Sleep for up to 5 doses. 5 capsule 0 Past Week Evening    metoprolol succinate XL (TOPROL-XL) 50 MG 24 hr tablet Take 1 tablet by mouth Every 12 (Twelve) Hours for 30 days. 60 tablet 0     ondansetron ODT (ZOFRAN-ODT) 4 MG disintegrating tablet Place 1 tablet on the tongue Every 8 (Eight) Hours As Needed for Nausea or Vomiting.       polyethylene glycol (MIRALAX) 17 g packet Take 17 g by mouth Daily. 30 each 3 More than a month    rosuvastatin (Crestor) 20 MG tablet Take 1 tablet by mouth Daily. 90 tablet 3 Unknown    spironolactone (ALDACTONE) 25 MG tablet Take 1 tablet by mouth Daily.   Unknown    tiZANidine  (ZANAFLEX) 2 MG tablet Take 1 tablet by mouth 2 (Two) Times a Day As Needed for Muscle Spasms.        Current Meds:   cefepime, 2,000 mg, Intravenous, Q24H  digoxin, 250 mcg, Intravenous, Once  insulin regular, 2-7 Units, Subcutaneous, Q6H  ipratropium-albuterol, 3 mL, Nebulization, 4x Daily - RT  lactulose, 20 g, Oral, BID  metroNIDAZOLE, 500 mg, Intravenous, Q8H  midodrine, 5 mg, Oral, TID AC  mupirocin, 1 Application, Each Nare, BID  pantoprazole, 40 mg, Oral, QAM AC  senna-docusate sodium, 2 tablet, Oral, BID  sodium chloride, 10 mL, Intravenous, Q12H      Allergies:  No Known Allergies  Review of Systems  Pertinent items are noted in HPI     Objective     Vital Signs  Temp:  [96.4 °F (35.8 °C)-100.6 °F (38.1 °C)] 99.9 °F (37.7 °C)  Heart Rate:  [] 106  Resp:  [16-30] 26  BP: ()/(41-97) 102/73  FiO2 (%):  [100 %] 100 %  Physical Exam:  General Appearance:  Sedated, on BiPAP and dialysis   Head:    Normocephalic, without obvious abnormality, atraumatic   Eyes:          conjunctivae and sclerae normal, no   icterus   Throat:   no thrush, oral mucosa moist   Neck:   Supple, no adenopathy   Lungs:   Clear to auscultation bilaterally anteriorly    Heart:  Irregularly irregular, tachycardic   Chest Wall:    No abnormalities observed   Abdomen:     Soft, nondistended, right upper quadrant tenderness exam normal bowel sounds   Extremities:   no edema, no redness           Results Review:   I reviewed the patient's new clinical results.    Results from last 7 days   Lab Units 03/06/25  0348 03/06/25  0322 03/05/25  0552 03/05/25  0129   WBC 10*3/mm3  --  12.32* 11.66* 11.35*   HEMOGLOBIN g/dL  --  8.7* 9.1* 9.3*   HEMOGLOBIN, POC g/dL 9.8*  --   --   --    HEMATOCRIT %  --  29.3* 30.6* 31.3*   HEMATOCRIT POC % 29*  --   --   --    PLATELETS 10*3/mm3  --  297 284 291     Results from last 7 days   Lab Units 03/06/25  0348 03/06/25  0322 03/05/25  1156 03/05/25  0129 03/04/25  0639   SODIUM mmol/L  --  129*  131* 134* 134*   POTASSIUM mmol/L  --  5.3* 5.0 4.9 4.4   CHLORIDE mmol/L  --  80* 83* 84* 88*   CO2 mmol/L  --  18.0* 17.6* 20.6* 23.0   BUN mg/dL  --  115* 110* 110* 91*   CREATININE mg/dL 7.62* 6.61* 6.22* 5.39* 4.53*   CALCIUM mg/dL  --  7.5* 7.2* 7.1* 7.8*   BILIRUBIN mg/dL  --  1.1  --  1.0 1.2   ALK PHOS U/L  --  159*  --  149* 148*   ALT (SGPT) U/L  --  1,148*  --  1,260* 1,315*   AST (SGOT) U/L  --  1,461*  --  1,840* 2,340*   GLUCOSE mg/dL  --  120* 223* 196* 134*     Results from last 7 days   Lab Units 03/06/25  0322 03/05/25  0552 03/04/25  0035   INR  1.84* 1.81* 2.11*     Lab Results   Lab Value Date/Time    LIPASE 257 (H) 03/05/2025 0129    LIPASE 137 (H) 03/04/2025 0035    LIPASE 20 03/08/2021 1108       Radiology:  XR Abdomen KUB   Final Result   1. Placement of a dual-lumen right central venous catheter with tip   extending to right atrium. No pneumothorax or effusion.   2. Cardiomegaly with pulmonary vascular engorgement.    3. Feeding tube is in place and the tip extends barely into the stomach   and this could be advanced for better positioning with recheck position.               This report was finalized on 3/6/2025 11:11 AM by Frandy Skelton M.D   on Workstation: [id number]          XR Chest Post CVA Port   Final Result   1. Placement of a dual-lumen right central venous catheter with tip   extending to right atrium. No pneumothorax or effusion.   2. Cardiomegaly with pulmonary vascular engorgement.    3. Feeding tube is in place and the tip extends barely into the stomach   and this could be advanced for better positioning with recheck position.               This report was finalized on 3/6/2025 11:11 AM by Frandy Skelton M.D   on Workstation: [id number]          FL C Arm During Surgery   Final Result      XR Chest 1 View   Final Result   Cardiomegaly with suspected vascular congestion.       This report was finalized on 3/4/2025 11:09 PM by Dr. Judi Chow M.D on  Workstation: [id number]          FL ercp biliary duct only   Final Result      US Liver   Final Result       1. The patient's visualized common bile duct measures up to 6 mm. The   distal common bile duct is not well seen. No choledocholithiasis is seen   on this exam. However, again, the exam is limited by overlying bowel   gas.   2. There is sludge identified within the distended gallbladder. There is   some nonspecific gallbladder wall thickening, although there is no   pericholecystic fluid, and no sonographic Grady's sign was elicited.       This report was finalized on 3/4/2025 5:18 AM by Dr. Judi Chow M.D on Workstation: [id number]          CT Chest Without Contrast Diagnostic   Final Result       1. There are advanced background emphysematous changes   2. Interlobular septal thickening, as well as some groundglass   attenuation, suggesting some edema.   3. Patient again appears to have stones within the distal common bile   duct and distention of the gallbladder.       Radiation dose reduction techniques were utilized, including automated   exposure control and exposure modulation based on body size.           This report was finalized on 3/4/2025 5:06 AM by Dr. Judi Chow M.D on Workstation: [id number]          CT Abdomen Pelvis Without Contrast   Final Result       1. Mild dilatation of the common bile duct. The patient does appear to   have stones within the distal common bile duct.   2. There is distention of the gallbladder.   3. Punctate nonobstructing left renal stone.       Radiation dose reduction techniques were utilized, including automated   exposure control and exposure modulation based on body size.           This report was finalized on 3/4/2025 12:11 AM by Dr. Judi Chow M.D on Workstation: [id number]          XR Chest 1 View   Final Result   Cardiomegaly with vascular congestion, suggesting congestive heart   failure.       This report was finalized on  3/3/2025 10:45 PM by Dr. Judi Chow M.D on Workstation: [id number]              Assessment & Plan   Active Hospital Problems    Diagnosis     **Multi-organ failure with heart failure     ANDIE (acute kidney injury)        Assessment:  Transaminitis  Choledocholithiasis status post ERCP with stone extraction 3/4  Acute kidney injury  Acute on chronic diastolic CHF  A-fib with RVR    Plan:  Lfts trending down-suspect hepatic ischemia which may have been precipitated by A-fib with RVR and hypotension.  Currently requiring pressor support.  He was on acetaminophen prior to admission but not an excessive amount.  He did receive Acetadote.  Continue to follow trend and INR - continue medical management of his comorbidities, BP support    I discussed the patients findings and my recommendations with patient and nursing staff.          Susanne Kee M.D.  [facility]  [phone]

## 2025-03-07 NOTE — PROGRESS NOTES
[facility] Clinical [facility]acy Services: Dose Adjustment    Cefepime has been appropriately dose adjusted based on our System P&T approved policy. Pharmacy will continue to monitor patient peripherally while in-house.     Madelyn Naranjo, PharmD  Clinical Pharmacist

## 2025-03-07 NOTE — PROGRESS NOTES
[facility] Progress Note      Patient Name: Sebastien Tang  : 1960  MRN: [id number]  Primary Care Physician:  Provider, No Known  Date of admission: 3/3/2025    Subjective     Interval History:   F/u ANDIE    Review of Systems:   Dialyzed yesterday via RIJ TDC.  1.5L removed.  Also was intubated , became severely hypotensive, was bolused  Now on 3 pressors  FIO2 40%  CBI continues     Objective     Vitals:   Temp:  [96.4 °F (35.8 °C)-102.7 °F (39.3 °C)] 98 °F (36.7 °C)  Heart Rate:  [] 86  Resp:  [20-30] 25  BP: ()/(41-78) 87/72  FiO2 (%):  [30 %-100 %] 40 %    Intake/Output Summary (Last 24 hours) at 3/7/2025 0642  Last data filed at 3/7/2025 0545  Gross per 24 hour   Intake 95167.93 ml   Output 69592 ml   Net 4295.93 ml       Physical Exam:    General Appearance: ill appearing WM sedated on ventilator  Neck: RIJ TDC, no JVD  Lungs: bilat rhonchi  Heart: RRR, normal S1 and S2  Abdomen: soft, nontender, nondistended  : Coude with CBI, pink urine  Extremities: 1+ BLE edema    Scheduled Meds:     cefepime, 2,000 mg, Intravenous, Q24H  hydrocortisone sodium succinate, 50 mg, Intravenous, Q6H  insulin regular, 2-7 Units, Subcutaneous, Q6H  ipratropium-albuterol, 3 mL, Nebulization, 4x Daily - RT  lactulose, 20 g, Oral, BID  metroNIDAZOLE, 500 mg, Intravenous, Q8H  midodrine, 5 mg, Oral, TID AC  mupirocin, 1 Application, Each Nare, BID  pantoprazole, 40 mg, Oral, QAM AC  senna-docusate sodium, 2 tablet, Oral, BID  sodium chloride, 10 mL, Intravenous, Q12H      IV Meds:   dexmedetomidine, 0.2-1.5 mcg/kg/hr, Last Rate: 1.5 mcg/kg/hr (25 0410)  norepinephrine, 0.02-0.3 mcg/kg/min, Last Rate: 0.3 mcg/kg/min (25 0438)  phenylephrine, 0.5-3 mcg/kg/min, Last Rate: 1 mcg/kg/min (25 0001)  vasopressin, 0.03 Units/min, Last Rate: 0.03 Units/min (25 0641)        Results Reviewed:   I have personally reviewed the results from the time of this admission to  3/7/2025 06:42 EST     Results from last 7 days   Lab Units 03/07/25  0332 03/06/25  1743 03/06/25  1613 03/06/25  0348 03/06/25  0322 03/05/25  1156 03/05/25  0129   SODIUM mmol/L 135*  --   --   --  129* 131* 134*   POTASSIUM mmol/L 5.9* 5.0 4.6  --  5.3* 5.0 4.9   CHLORIDE mmol/L 94*  --   --   --  80* 83* 84*   CO2 mmol/L 17.0*  --   --   --  18.0* 17.6* 20.6*   BUN mg/dL 81*  --   --   --  115* 110* 110*   CREATININE mg/dL 5.54*  --   --  7.62* 6.61* 6.22* 5.39*   CALCIUM mg/dL 7.3*  --   --   --  7.5* 7.2* 7.1*   BILIRUBIN mg/dL 1.7*  --   --   --  1.1  --  1.0   ALK PHOS U/L 192*  --   --   --  159*  --  149*   ALT (SGPT) U/L 1,312*  --   --   --  1,148*  --  1,260*   AST (SGOT) U/L 2,704*  --   --   --  1,461*  --  1,840*   GLUCOSE mg/dL 61*  --   --   --  120* 223* 196*     Estimated Creatinine Clearance: 15.1 mL/min (A) (by C-G formula based on SCr of 5.54 mg/dL (H)).  Results from last 7 days   Lab Units 03/06/25  1613 03/05/25  0129   MAGNESIUM mg/dL 2.7*  --    PHOSPHORUS mg/dL  --  7.6*     Results from last 7 days   Lab Units 03/04/25  0035   URIC ACID mg/dL 18.7*     Results from last 7 days   Lab Units 03/07/25  0332 03/06/25 2012 03/06/25 0348 03/06/25  0322 03/05/25  0552 03/05/25  0129 03/04/25  1644 03/04/25  0607   WBC 10*3/mm3 18.77*  --   --  12.32* 11.66* 11.35*  --  15.44*   HEMOGLOBIN g/dL 8.7*  --   --  8.7* 9.1* 9.3*  --  9.9*   HEMOGLOBIN, POC g/dL  --  10.7* 9.8*  --   --   --    < >  --    PLATELETS 10*3/mm3 274  --   --  297 284 291  --  332    < > = values in this interval not displayed.     Results from last 7 days   Lab Units 03/06/25  0322 03/05/25  0552 03/04/25  0035   INR  1.84* 1.81* 2.11*       Assessment / Plan     ASSESSMENT:  Olig ANDIE - due largely to Rhabdomyolysis.  HD initiated 3/6/25 via [facility] TDC.  BUN/cr down to 81/5.5, hyperkalemic (5.9) and acidotic (bicarb 17, AG 24).  Now too unstable for HD (on 3 pressors) and needs CRRT  Vol overload, removed 1.5L yesterday  but hypotension worse post intubation with sedation and won't attempt any UF right now.  On minimal FIO2  Septic shock - on 3 pressors now; abx per PULM  Rhabdomyolysis - mechanism unclear.  CK trending down, now ~ 5K  Transaminitis - multifactorial, CBD stone, rhabdo; LFTs worse, likely due to shock  Acute resp failure, intubated 3/6, on minimal FIo2  Choledocholithiasis, s/p ERCP with stone extraction and stent  Diastolic CHF + pulm HTN    AFIB - HR 80s to 90s; CARD following   Dm2, mgmt per primary team   Gross hematuria + retention - CBI in progress  Coagulopathy     PLAN:  Start CRRT, no net UF to start  Serial chemistries while on CRRT   Inc midodrine 10 TID  Prognosis guarded  D/W RN      Sang Stout MD  03/07/25  06:42 EST    [facility]  [phone]

## 2025-03-07 NOTE — PLAN OF CARE
Goal Outcome Evaluation:      Patient declined throughout shift. LOC declined to minimal responsiveness and unable to protect airway, leading to intubation. Tunneled cath inserted and dialysis completed. 1.5 L removed during tx. Patient did require levophed, albumin and midodrine dosing during the treatment. Levophed nearly weaned off by end of treatment, but quickly maxed after intubation. MD notified of decline in status, worsening hemodynamics and temperature. Temp steadily increasing this evening, max of 102.2. Ice packs applied. CVL placed this evening and pending x-ray confirmation. CBI continues with low-flow rate. GI re-consulted. Family updated on patient status.

## 2025-03-07 NOTE — PROGRESS NOTES
"Patient Name: Sebastien Tang  :1960  65 y.o.      Patient Care Team:  Provider, No Known as PCP - General    Interval History:   Maxed on 4 pressors    Subjective:  Following for arrhythmia    Objective   Vital Signs  Temp:  [96.4 °F (35.8 °C)-102.7 °F (39.3 °C)] 98 °F (36.7 °C)  Heart Rate:  [] 103  Resp:  [20-26] 25  BP: ()/(14-79) 104/77  FiO2 (%):  [30 %-99 %] 40 %    Intake/Output Summary (Last 24 hours) at 3/7/2025 1007  Last data filed at 3/7/2025 1000  Gross per 24 hour   Intake 75546.94 ml   Output 16528 ml   Net 5528.94 ml     Flowsheet Rows      Flowsheet Row First Filed Value   Admission Height 170.2 cm (67\") Documented at 2025   Admission Weight 113 kg (250 lb) Documented at 2025            Results Review:    Results from last 7 days   Lab Units 25  0332   SODIUM mmol/L 135*   POTASSIUM mmol/L 5.9*   CHLORIDE mmol/L 94*   CO2 mmol/L 17.0*   BUN mg/dL 81*   CREATININE mg/dL 5.54*   GLUCOSE mg/dL 61*   CALCIUM mg/dL 7.3*     Results from last 7 days   Lab Units 25  0333 25  0322 25  0129 25  0035 25  0035 25  2200   CK TOTAL U/L 5,096* 8,174* 12,163*   < > 16,749*  --    HSTROP T ng/L  --   --   --   --  106* 112*    < > = values in this interval not displayed.     Results from last 7 days   Lab Units 25  0332   WBC 10*3/mm3 18.77*   HEMOGLOBIN g/dL 8.7*   HEMATOCRIT % 29.7*   PLATELETS 10*3/mm3 274     Results from last 7 days   Lab Units 25  0322 25  0552 25  0035   INR  1.84* 1.81* 2.11*   APTT seconds  --   --  32.3         Results from last 7 days   Lab Units 25  1613   MAGNESIUM mg/dL 2.7*             Medication Review:   cefepime, 2,000 mg, Intravenous, Q24H  hydrocortisone sodium succinate, 50 mg, Intravenous, Q6H  insulin regular, 2-7 Units, Subcutaneous, Q6H  ipratropium-albuterol, 3 mL, Nebulization, 4x Daily - RT  lactulose, 20 g, Oral, TID  metroNIDAZOLE, 500 mg, Intravenous, " Q8H  midodrine, 10 mg, Oral, TID AC  mupirocin, 1 Application, Each Nare, BID  pantoprazole, 40 mg, Oral, QAM AC  senna-docusate sodium, 2 tablet, Oral, BID  sodium chloride, 10 mL, Intravenous, Q12H         dexmedetomidine, 0.2-1.5 mcg/kg/hr, Last Rate: 1.5 mcg/kg/hr (03/07/25 0943)  EPINEPHrine, 0.02-0.3 mcg/kg/min, Last Rate: 0.1 mcg/kg/min (03/07/25 0932)  norepinephrine, 0.02-0.3 mcg/kg/min, Last Rate: 0.3 mcg/kg/min (03/07/25 0830)  phenylephrine, 0.5-3 mcg/kg/min, Last Rate: 3 mcg/kg/min (03/07/25 0845)  Phoxillum BK4/2.5, 1,000 mL/hr, Last Rate: 1,000 mL/hr (03/07/25 0948)  Phoxillum BK4/2.5, 1,000 mL/hr, Last Rate: 1,000 mL/hr (03/07/25 0948)  Phoxillum BK4/2.5, 1,000 mL/hr, Last Rate: 1,000 mL/hr (03/07/25 0947)  vasopressin, 0.03 Units/min, Last Rate: 0.03 Units/min (03/07/25 0641)        Assessment & Plan     1.  Severe right heart failure.  I reviewed the echocardiogram and the right ventricle is dilated and severely hypokinetic with right atrial dilation as well.  2.  Acute respiratory failure.  Followed by pulmonary.  3.  Septic shock.  Maxed on 4 pressors.  4.  Multiorgan system failure.  5.  Acute kidney injury.  Followed by nephrology.  6.  Atrial fibrillation.    Prognosis is poor.    Tameka Young MD, [facility]  03/07/25  10:07 EST

## 2025-03-07 NOTE — PROGRESS NOTES
"[facility] Clinical [facility]acy Services: Vancomycin Pharmacokinetic Initial Consult Note    Sebastien Tang is a 65 y.o. male who is on day 1 of pharmacy to dose vancomycin.    Indication: Empiric  Consulting Provider: Dr. Winters  Planned Duration of Therapy: TBD  Culture/Source:   3/4 bld cx NGTD  3/7 repeat blood cultures pending  Target: Dose by Levels  Pertinent Vanc Dosing History: none this admission  Other Antimicrobials:     Vitals/Labs  Ht: 167.6 cm (66\"); Wt: 111 kg (244 lb)  Temp Readings from Last 1 Encounters:   03/07/25 98 °F (36.7 °C) (Rectal)         Results from last 7 days   Lab Units 03/07/25  0332 03/06/25  0348 03/06/25  0322 03/05/25  1156 03/05/25  0552   CREATININE mg/dL 5.54* 7.62* 6.61*   < >  --    WBC 10*3/mm3 18.77*  --  12.32*  --  11.66*    < > = values in this interval not displayed.     Assessment/Plan:  Pt with ANDIE. REceived HD yesterday and initiated on CRRT today.   Vancomycin Dose:   Give vancomycin loading dose 2250mg IV x1. Dose intermittently for now.   Vanc Random has been ordered for 3/8 with AM labs     Pharmacy will follow patient's kidney function and will adjust doses and obtain levels as necessary. Thank you for involving pharmacy in this patient's care. Please contact pharmacy with any questions or concerns.                           Madelyn Naranjo, PharmD  Clinical Pharmacist    "

## 2025-03-07 NOTE — CONSULTS
Referring Provider: Dana Lane MD      Subjective   History of present illness: 65-year-old with a history of ischemic cardiomyopathy ejection fraction of about 40% admitted on 3/3/2025 with acute weakness and bright red blood per rectum.  He was found to have A-fib with RVR and CT abdomen pelvis revealed choledocholithiasis and gallbladder distention.  He underwent ERCP with stone extraction, brushing and stent placement on 3/4.  Fortunately his course has been complicated by acute kidney injury requiring initiation of dialysis, now on CRRT via a right tunneled dialysis catheter line.  He has had refractory shock and is on multiple pressors and continues to have fever and leukocytosis.  Too ill for transfer to radiology.  Received a dose of Zosyn on admission but since then has been on cefepime and metronidazole.  He is required intubation and now on 40% FiO2    Physical Exam:   Vital Signs   Temp:  [98 °F (36.7 °C)-102.7 °F (39.3 °C)] 98 °F (36.7 °C)  Heart Rate:  [] 111  Resp:  [20-26] 26  BP: ()/(14-79) 103/70  FiO2 (%):  [30 %-99 %] 40 %    GENERAL: Ill-appearing  HEENT: Oropharynx with ET tube in place. Hearing is unable to assess  EYES: PERRL. No conjunctival injection. No lid lag.   LYMPHATICS: No lymphadenopathy of the neck or inguinal regions.   HEART: +peripheral edema.   LUNGS: Clear to auscultation anteriorly with normal respiratory effort.   GI: Soft, nontender, distended. No appreciable organomegaly.   SKIN: Cool and dry with mottling/ecchymoses  PSYCHIATRIC: Sedated, no purposeful movements over sedation for me      Results Review:  Creatinine 5.54  CO2 17, anion gap 24 5  Alk phos 192, ALT 1312, AST 2704  White count 18.77, hemoglobin 8.7, platelets 274  3/4 blood culture no growth  Chest x-ray independently interpreted with vascular congestion.  No lobar pneumonia      A/p  Septic shock  Choledocholithiasis with cholangitis  Acute kidney injury on CRRT, antibiotics dosed  appropriately  Acute hypoxic respiratory failure     Patient with refractory shock despite ERCP on 3/4.  I agree with cefepime and metronidazole which we will continue.  Repeat blood cultures and add vancomycin given all indwelling lines.  Too ill for transfer to radiology but we will check a repeat right upper quadrant ultrasound to assess for ongoing choledocholithiasis went to visualize the gallbladder again.  Very poor prognosis.  Explained this to daughter and family at bedside today.      Thank you for this consult.  We will continue to follow along and tailor antibiotics as the patient's clinical course evolves.

## 2025-03-07 NOTE — CASE MANAGEMENT/SOCIAL WORK
Continued Stay Note  [facility]     Patient Name: Sebastien Tang  MRN: [id number]  Today's Date: 3/7/2025    Admit Date: 3/3/2025    Plan: Pending   Discharge Plan       Row Name 03/07/25 0758       Plan    Plan Pending    Plan Comments Patient now on ventilator. CCP will follow for discharge planning needs pending clinical course. MALATHI SINGLETON                   Discharge Codes    No documentation.                 Expected Discharge Date and Time       Expected Discharge Date Expected Discharge Time    Mar 7, 2025               MALATHI Henderson

## 2025-03-07 NOTE — PROGRESS NOTES
"      [facility]         Dr Ny Sayied   LOS: 4 days   Patient Care Team:  Provider, No Known as PCP - General    Chief Complaint: Acute liver injury with stone in the common bile duct and acute hypoxemic respiratory failure acute on chronic diastolic CHF other issues as listed below    Interval History: Sedated intubated on the vent.  Current drips include Terrence-Synephrine Levophed vasopressin.  Precedex drip for sedation.  On the vent 40% FiO2 PEEP of 5.  Pretty much obtunded with ongoing mottling of his lower extremities up to the hip.  No bowel movement reported per nursing staff.  Dialysis/CRRT to be initiated    REVIEW OF SYSTEMS:   Sedated intubated pretty much obtunded    Ventilator/Non-Invasive Ventilation Settings (From admission, onward)      None              Vital Signs  Temp:  [96.4 °F (35.8 °C)-102.7 °F (39.3 °C)] 98 °F (36.7 °C)  Heart Rate:  [] 93  Resp:  [20-26] 25  BP: ()/(41-78) 101/70  FiO2 (%):  [30 %-100 %] 40 %    Intake/Output Summary (Last 24 hours) at 3/7/2025 0833  Last data filed at 3/7/2025 0545  Gross per 24 hour   Intake 44106.93 ml   Output 08480 ml   Net 4295.93 ml     Flowsheet Rows      Flowsheet Row First Filed Value   Admission Height 170.2 cm (67\") Documented at 03/03/2025 2154   Admission Weight 113 kg (250 lb) Documented at 03/03/2025 2154                  Physical Exam:  Patient is examined using the personal protective equipment as per guidelines from infection control for this particular patient as enacted.  Hand hygiene was performed before and after patient interaction.   General Appearance:  Obtunded sedated and confused.  Currently synchronizing with the vent  ENT normocephalic atraumatic  Neck midline trachea, no thyromegaly   Lungs:   Diminished breath sounds bilaterally    Heart:    Regular rhythm and normal rate, normal S1 and S2, no            murmur, no gallop, no rub, no click   Chest Wall:    No abnormalities observed   Abdomen:   " Obese soft mild tenderness right upper quadrant no rebound no guarding   Extremities: Mottling of the lower extremities up to the hip.  Trace to 1+ edema  CNS sedated intubated obtunded  Skin no rashes no nodules  Musculoskeletal mottling of the lower extremities as above     Results Review:        Results from last 7 days   Lab Units 03/07/25  0332 03/06/25  1743 03/06/25  1613 03/06/25  0348 03/06/25  0322 03/05/25  1156   SODIUM mmol/L 135*  --   --   --  129* 131*   POTASSIUM mmol/L 5.9* 5.0 4.6  --  5.3* 5.0   CHLORIDE mmol/L 94*  --   --   --  80* 83*   CO2 mmol/L 17.0*  --   --   --  18.0* 17.6*   BUN mg/dL 81*  --   --   --  115* 110*   CREATININE mg/dL 5.54*  --   --  7.62* 6.61* 6.22*   GLUCOSE mg/dL 61*  --   --   --  120* 223*   CALCIUM mg/dL 7.3*  --   --   --  7.5* 7.2*     Results from last 7 days   Lab Units 03/07/25  0333 03/06/25  0322 03/05/25  0129 03/04/25  0035 03/04/25  0035 03/03/25  2200   CK TOTAL U/L 5,096* 8,174* 12,163*   < > 16,749*  --    HSTROP T ng/L  --   --   --   --  106* 112*    < > = values in this interval not displayed.     Results from last 7 days   Lab Units 03/07/25 0332 03/06/25 2012 03/06/25  0348 03/06/25  0322 03/05/25  0552   WBC 10*3/mm3 18.77*  --   --  12.32* 11.66*   HEMOGLOBIN g/dL 8.7*  --   --  8.7* 9.1*   HEMOGLOBIN, POC g/dL  --  10.7* 9.8*  --   --    HEMATOCRIT % 29.7*  --   --  29.3* 30.6*   HEMATOCRIT POC %  --  31* 29*  --   --    PLATELETS 10*3/mm3 274  --   --  297 284     Results from last 7 days   Lab Units 03/06/25  0322 03/05/25  0552 03/04/25  0035   INR  1.84* 1.81* 2.11*   APTT seconds  --   --  32.3         Results from last 7 days   Lab Units 03/06/25  1613   MAGNESIUM mg/dL 2.7*         Results from last 7 days   Lab Units 03/06/25 2012   PH, ARTERIAL pH units 7.243*   PO2 ART mm Hg 77.6*   PCO2, ARTERIAL mm Hg 48.1*   HCO3 ART mmol/L 20.8*       I reviewed the patient's new clinical results.  I personally viewed and interpreted the  patient's chest x-ray.        Medication Review:   cefepime, 2,000 mg, Intravenous, Q24H  hydrocortisone sodium succinate, 50 mg, Intravenous, Q6H  insulin regular, 2-7 Units, Subcutaneous, Q6H  ipratropium-albuterol, 3 mL, Nebulization, 4x Daily - RT  lactulose, 20 g, Oral, BID  metroNIDAZOLE, 500 mg, Intravenous, Q8H  midodrine, 10 mg, Oral, TID AC  mupirocin, 1 Application, Each Nare, BID  pantoprazole, 40 mg, Oral, QAM AC  senna-docusate sodium, 2 tablet, Oral, BID  sodium chloride, 10 mL, Intravenous, Q12H        dexmedetomidine, 0.2-1.5 mcg/kg/hr, Last Rate: 1.4 mcg/kg/hr (03/07/25 0656)  norepinephrine, 0.02-0.3 mcg/kg/min, Last Rate: 0.3 mcg/kg/min (03/07/25 0830)  phenylephrine, 0.5-3 mcg/kg/min, Last Rate: 1 mcg/kg/min (03/07/25 0658)  Phoxillum BK4/2.5, 1,000 mL/hr  Phoxillum BK4/2.5, 1,000 mL/hr  Phoxillum BK4/2.5, 1,000 mL/hr  vasopressin, 0.03 Units/min, Last Rate: 0.03 Units/min (03/07/25 0641)        ASSESSMENT:   Acute hypoxemic and hypercapnic respiratory failure  Septic shock  Multiorgan system failure  Pancreatitis  Acute liver injury  Transaminitis  Lactic acidosis  Sepsis  Stone in the distal common bile duct status post ERCP  Coagulopathy  Rhabdomyolysis  Gross hematuria  ANDIE on dialysis  A-fib RVR  Hyponatremia  Pulmonary hypertension, moderate per echo 3/22/2024.  Possibly group 2 due to dilated LA.     Chronic anemia  A-fib, on Eliquis  AICD    PLAN:  Continue current vent support.  Will check ABG this morning.  Synchronizing with the vent.  Severe sepsis with septic shock currently on 3 pressors.  Will attempt to wean pressors maintain MAP greater than 65.  Patient now in multiorgan system failure likely due to worsening sepsis.  Will consult infectious diseases.  He is too sick to go down for repeat CT abdomen pelvis.  His abdominal exam does not reflect any surgical abdomen as such although difficult to  with his mental status.  Continue cefepime and Flagyl for now.  Suspect  intra-abdominal source.  Noted elevated lipase with pancreatitis.  GI following.  Supportive care  LFTs worse likely due to worsening sepsis and hypotension. Status post ERCP.  Appreciate input from GI.  Acetylcysteine protocol for Tylenol toxicity completed per GI. Continue to monitor LFT. Tylenol and tox screen negative.  Hepatitis viral profile negative  Lactic acidosis getting worse likely due to worsening sepsis and hypotension.  Continue to follow lactate  Status post ERCP per GI  Ongoing coagulopathy due to liver injury.  Will repeat coags daily.  CK improving.  Ongoing dialysis  Bladder irrigation per urology.  Status oh post tunnel catheter placement.  Plans for dialysis/CRRT today.  Fluid diuretic and electrolyte management per nephrology  Cardiology managing A-fib RVR.  Echo ordered  ICU core measures  I had a detailed discussion with patient's daughter at bedside and updated patient's current clinical condition.  He seems to be declining with worsening lactic acidosis increased pressor requirement now on the ventilator.  Daughter at bedside.  At this time she will discuss further goals of care with her family and let us know.  His prognosis is extremely poor.      Critical care time 75 minutes    Yanely Roth MD  03/07/25  08:33 EST

## 2025-03-07 NOTE — CONSULTS
Chp met with family at bedside. Family expressed no needs at this time and Chp remains available as requested.

## 2025-03-07 NOTE — PROGRESS NOTES
[facility]  Inpatient Progress Note    Reason for Follow Up: Elevated LFTs    Subjective     Interval History:   Intubated since yesterday, now maxed on 4 pressors.    Current Facility-Administered Medications:     albumin human 25 % IV SOLN 12.5 g, 12.5 g, Intravenous, PRN, Sang Stout MD, 12.5 g at 03/06/25 1400    albumin human 25 % IV SOLN 12.5 g, 12.5 g, Intravenous, PRN, Sang Stout MD    albumin human 25 % IV SOLN 12.5 g, 12.5 g, Intravenous, PRN, Sang Stout MD    sennosides-docusate (PERICOLACE) 8.6-50 MG per tablet 2 tablet, 2 tablet, Oral, BID, 2 tablet at 03/07/25 0808 **AND** polyethylene glycol (MIRALAX) packet 17 g, 17 g, Oral, Daily PRN **AND** bisacodyl (DULCOLAX) EC tablet 5 mg, 5 mg, Oral, Daily PRN **AND** bisacodyl (DULCOLAX) suppository 10 mg, 10 mg, Rectal, Daily PRN, Garry Huff MD    Calcium Replacement - Follow Nurse / BPA Driven Protocol, , Not Applicable, PRN, Sang Stout MD    cefepime 2000 mg IVPB in 100 mL NS (MBP), 2,000 mg, Intravenous, Q8H, Yanely Roth MD    dexmedetomidine (PRECEDEX) 400 mcg in 100 mL NS infusion, 0.2-1.5 mcg/kg/hr, Intravenous, Titrated, Garry Huff MD, Last Rate: 39.4 mL/hr at 03/07/25 0943, 1.5 mcg/kg/hr at 03/07/25 0943    dextrose (D50W) (25 g/50 mL) IV injection 25 g, 25 g, Intravenous, Q15 Min PRN, Garry Huff MD, 25 g at 03/07/25 1000    dextrose (D5W) 5 % infusion, 100 mL/hr, Intravenous, Continuous, Yanely Roth MD, Last Rate: 100 mL/hr at 03/07/25 1141, 100 mL/hr at 03/07/25 1141    dextrose (GLUTOSE) oral gel 15 g, 15 g, Oral, Q15 Min PRN, Garry Huff MD    EPINEPHrine 5 mg in 250 mL NS infusion, 0.02-0.3 mcg/kg/min, Intravenous, Titrated, Yanely Roth MD, Last Rate: 83.3 mL/hr at 03/07/25 1033, 0.25 mcg/kg/min at 03/07/25 1033    fentaNYL citrate (PF) (SUBLIMAZE) injection 25 mcg, 25 mcg, Intravenous, Q1H PRN, Garry Huff MD, 25 mcg  at 03/05/25 1617    glucagon (GLUCAGEN) injection 1 mg, 1 mg, Intramuscular, Q15 Min PRN, Garry Huff MD    heparin (porcine) injection 1,000-2,000 Units, 1,000-2,000 Units, Intravenous, PRN, Sang Stout MD    Hydrocortisone Sod Suc (PF) (Solu-CORTEF) injection 50 mg, 50 mg, Intravenous, Q6H, Dana Lane MD, 50 mg at 03/07/25 1034    insulin regular (humuLIN R,novoLIN R) injection 2-7 Units, 2-7 Units, Subcutaneous, Q6H, Garry Huff MD, 4 Units at 03/05/25 1149    ipratropium-albuterol (DUO-NEB) nebulizer solution 3 mL, 3 mL, Nebulization, 4x Daily - RT, Garry Huff MD, 3 mL at 03/07/25 1041    lactulose (CHRONULAC) 10 GM/15ML solution 20 g, 20 g, Oral, TID, Yanely Roth MD    Magnesium Standard Dose Replacement - Follow Nurse / BPA Driven Protocol, , Not Applicable, PRN, Sang Stout MD    metoprolol tartrate (LOPRESSOR) injection 5 mg, 5 mg, Intravenous, Q4H PRN, Garry Huff MD, 5 mg at 03/04/25 1258    metroNIDAZOLE (FLAGYL) IVPB 500 mg, 500 mg, Intravenous, Q8H, Garry Huff MD, Last Rate: 200 mL/hr at 03/07/25 1109, 500 mg at 03/07/25 1109    midodrine (PROAMATINE) tablet 10 mg, 10 mg, Oral, TID AC, Sang Stout MD, 10 mg at 03/07/25 1136    mupirocin (BACTROBAN) 2 % nasal ointment 1 Application, 1 Application, Each Nare, BID, Garry Huff MD, 1 Application at 03/07/25 1034    nitroglycerin (NITROSTAT) SL tablet 0.4 mg, 0.4 mg, Sublingual, Q5 Min PRN, Garry Huff MD    norepinephrine (LEVOPHED) 8 mg in 250 mL NS infusion (premix), 0.02-0.3 mcg/kg/min, Intravenous, Titrated, Yanely Roth MD, Last Rate: 62.4 mL/hr at 03/07/25 0830, 0.3 mcg/kg/min at 03/07/25 0830    ondansetron (ZOFRAN) injection 4 mg, 4 mg, Intravenous, Q6H PRN, Garry Huff MD, 4 mg at 03/05/25 1815    pantoprazole (PROTONIX) EC tablet 40 mg, 40 mg, Oral, Pending sale to [facility], Garry Huff MD, 40 mg at 03/07/25 0808    Pharmacy to dose vancomycin, , Not  Applicable, Continuous PRN, Elbert Winters MD    phenylephrine (BENITEZ-SYNEPHRINE) 50 mg in 250 mL NS infusion, 0.5-3 mcg/kg/min, Intravenous, Titrated, Dana Lane MD, Last Rate: 99.9 mL/hr at 03/07/25 1117, 3 mcg/kg/min at 03/07/25 1117    Phosphorus Replacement - Follow Nurse / BPA Driven Protocol, , Not Applicable, PRN, Sang Stout MD    Phoxillum BK4/2.5 dialysis solution, 1,000 mL/hr, CRRT, Continuous, Sang Stout MD, Last Rate: 1,000 mL/hr at 03/07/25 0948, 1,000 mL/hr at 03/07/25 0948    Phoxillum BK4/2.5 dialysis solution, 1,000 mL/hr, CRRT, Continuous, Sang Stout MD, Last Rate: 1,000 mL/hr at 03/07/25 0948, 1,000 mL/hr at 03/07/25 0948    Phoxillum BK4/2.5 dialysis solution, 1,000 mL/hr, CRRT, Continuous, Sang Stout MD, Last Rate: 1,000 mL/hr at 03/07/25 0947, 1,000 mL/hr at 03/07/25 0947    Potassium Replacement - Follow Nurse / BPA Driven Protocol, , Not Applicable, Girish RODRÍGUEZ Michael McKay, MD    sodium chloride 0.9 % bolus 200 mL, 200 mL, Intravenous, PRN, Sang Stout MD    [COMPLETED] Insert Peripheral IV, , , Once **AND** sodium chloride 0.9 % flush 10 mL, 10 mL, Intravenous, PRN, Garry Huff MD    sodium chloride 0.9 % flush 10 mL, 10 mL, Intravenous, Q12H, Garry Huff MD, 10 mL at 03/07/25 1121    sodium chloride 0.9 % flush 10 mL, 10 mL, Intravenous, Daria RODRÍGUEZ Noah T, MD    sodium chloride 0.9 % infusion 40 mL, 40 mL, Intravenous, Daria RODRÍGUEZ Noah T, MD    vancomycin 2250 mg/500 mL 0.9% NS IVPB ([facility]), 20 mg/kg, Intravenous, Once, Elbert Winters MD    Vancomycin Pharmacy Intermittent/Pulse Dosing, , Not Applicable, Daily, Elbert Winters MD    vasopressin (PITRESSIN) 20 units in 100 mL NS infusion, 0.03 Units/min, Intravenous, Continuous, Yanely Roth MD, Last Rate: 9 mL/hr at 03/07/25 0641, 0.03 Units/min at 03/07/25 0641  Review of Systems:    Positive for fever,  no vomiting or diarrhea    Objective     Vital Signs  Temp:  [98 °F (36.7 °C)-102.7 °F (39.3 °C)] 98 °F (36.7 °C)  Heart Rate:  [] 111  Resp:  [20-26] 26  BP: ()/(14-79) 103/70  FiO2 (%):  [30 %-99 %] 40 %  Body mass index is 39.38 kg/m².    Intake/Output Summary (Last 24 hours) at 3/7/2025 1201  Last data filed at 3/7/2025 1100  Gross per 24 hour   Intake 61962.94 ml   Output 06179 ml   Net 128.94 ml     I/O this shift:  In: 7600 [Other:7600]  Out: 8300 [Urine:8300]     Physical Exam:   General: patient debated and sedated   Eyes: Normal lids and lashes, no scleral icterus   Neck: supple, normal ROM   Skin: warm and dry, not jaundiced   Cardiovascular: Tachycardic   Pulm:  regular and unlabored   Abdomen: soft, nontender, nondistended, bowel sounds are present   Extremities: Feet appear dusky   Psychiatric: Normal mood and behavior; memory intact     Results Review:     I reviewed the patient's new clinical results.    Results from last 7 days   Lab Units 03/07/25 0332 03/06/25 2012 03/06/25 0348 03/06/25 0322 03/05/25  0552   WBC 10*3/mm3 18.77*  --   --  12.32* 11.66*   HEMOGLOBIN g/dL 8.7*  --   --  8.7* 9.1*   HEMOGLOBIN, POC g/dL  --  10.7* 9.8*  --   --    HEMATOCRIT % 29.7*  --   --  29.3* 30.6*   HEMATOCRIT POC %  --  31* 29*  --   --    PLATELETS 10*3/mm3 274  --   --  297 284     Results from last 7 days   Lab Units 03/07/25 0332 03/06/25  1743 03/06/25  1613 03/06/25  0348 03/06/25  0322 03/05/25  1156 03/05/25  0129   SODIUM mmol/L 135*  --   --   --  129* 131* 134*   POTASSIUM mmol/L 5.9* 5.0 4.6  --  5.3* 5.0 4.9   CHLORIDE mmol/L 94*  --   --   --  80* 83* 84*   CO2 mmol/L 17.0*  --   --   --  18.0* 17.6* 20.6*   BUN mg/dL 81*  --   --   --  115* 110* 110*   CREATININE mg/dL 5.54*  --   --  7.62* 6.61* 6.22* 5.39*   CALCIUM mg/dL 7.3*  --   --   --  7.5* 7.2* 7.1*   BILIRUBIN mg/dL 1.7*  --   --   --  1.1  --  1.0   ALK PHOS U/L 192*  --   --   --  159*  --  149*   ALT (SGPT) U/L  1,312*  --   --   --  1,148*  --  1,260*   AST (SGOT) U/L 2,704*  --   --   --  1,461*  --  1,840*   GLUCOSE mg/dL 61*  --   --   --  120* 223* 196*     Results from last 7 days   Lab Units 03/06/25  0322 03/05/25  0552 03/04/25  0035   INR  1.84* 1.81* 2.11*     Lab Results   Lab Value Date/Time    LIPASE 257 (H) 03/05/2025 0129    LIPASE 137 (H) 03/04/2025 0035    LIPASE 20 03/08/2021 1108       Radiology:  XR Chest Post CVA Port         XR Chest 1 View   Final Result   Endotracheal intubation.       This report was finalized on 3/6/2025 4:14 PM by Dr. Librado Orantes M.D on Workstation: [id number]          XR Abdomen KUB   Final Result   1. Placement of a dual-lumen right central venous catheter with tip   extending to right atrium. No pneumothorax or effusion.   2. Cardiomegaly with pulmonary vascular engorgement.    3. Feeding tube is in place and the tip extends barely into the stomach   and this could be advanced for better positioning with recheck position.               This report was finalized on 3/6/2025 11:11 AM by Frandy Skelton M.D   on Workstation: [id number]          XR Chest Post CVA Port   Final Result   1. Placement of a dual-lumen right central venous catheter with tip   extending to right atrium. No pneumothorax or effusion.   2. Cardiomegaly with pulmonary vascular engorgement.    3. Feeding tube is in place and the tip extends barely into the stomach   and this could be advanced for better positioning with recheck position.               This report was finalized on 3/6/2025 11:11 AM by Frandy Skelton M.D   on Workstation: [id number]          FL C Arm During Surgery   Final Result      XR Chest 1 View   Final Result   Cardiomegaly with suspected vascular congestion.       This report was finalized on 3/4/2025 11:09 PM by Dr. Judi Chow M.D on Workstation: [id number]          FL ercp biliary duct only   Final Result      US Liver   Final Result       1. The patient's  visualized common bile duct measures up to 6 mm. The   distal common bile duct is not well seen. No choledocholithiasis is seen   on this exam. However, again, the exam is limited by overlying bowel   gas.   2. There is sludge identified within the distended gallbladder. There is   some nonspecific gallbladder wall thickening, although there is no   pericholecystic fluid, and no sonographic Grady's sign was elicited.       This report was finalized on 3/4/2025 5:18 AM by Dr. Judi Chow M.D on Workstation: [id number]          CT Chest Without Contrast Diagnostic   Final Result       1. There are advanced background emphysematous changes   2. Interlobular septal thickening, as well as some groundglass   attenuation, suggesting some edema.   3. Patient again appears to have stones within the distal common bile   duct and distention of the gallbladder.       Radiation dose reduction techniques were utilized, including automated   exposure control and exposure modulation based on body size.           This report was finalized on 3/4/2025 5:06 AM by Dr. Judi Chow M.D on Workstation: [id number]          CT Abdomen Pelvis Without Contrast   Final Result       1. Mild dilatation of the common bile duct. The patient does appear to   have stones within the distal common bile duct.   2. There is distention of the gallbladder.   3. Punctate nonobstructing left renal stone.       Radiation dose reduction techniques were utilized, including automated   exposure control and exposure modulation based on body size.           This report was finalized on 3/4/2025 12:11 AM by Dr. Judi Chow M.D on Workstation: [id number]          XR Chest 1 View   Final Result   Cardiomegaly with vascular congestion, suggesting congestive heart   failure.       This report was finalized on 3/3/2025 10:45 PM by Dr. Judi Chow M.D on Workstation: [id number]              Assessment & Plan     [facility]  Problems    Diagnosis     **Multi-organ failure with heart failure     ANDIE (acute kidney injury)        Assessment:  Transaminitis  Choledocholithiasis status post ERCP with stone extraction 3/4  Acute kidney injury  Acute on chronic diastolic CHF  A-fib with RVR      Plan:  Patient with multisystem organ failure now with significantly worsened hypotension.  LFTs reflective of this change with worsening transaminitis suggestive of shock liver as underlying issue possible for this elevation.  Continue supportive care  Prognosis is poor    I discussed the patients findings and my recommendations with patient, family, and nursing staff.            Susanne Kee M.D.  [facility]  [phone]

## 2025-03-07 NOTE — PROGRESS NOTES
"  [facility] Progress Note    Chief Complaint: None    Patient went on the vent this afternoon.  Quite ill.  His irrigation catheter is going at a very slow rate minimal issues.    Review of Systems:    Patient on the ventilator          Vital Signs  BP (!) 80/51   Pulse 87   Temp (!) 102.4 °F (39.1 °C)   Resp 20   Ht 167.6 cm (66\")   Wt 111 kg (244 lb 7.8 oz)   SpO2 100%   BMI 39.46 kg/m²     Physical Exam:     General Appearance:   intubated in the ICU   HEENT:    No trauma, pupils reactive, hearing intact   Back:     No CVA tenderness   Lungs:     Respirations unlabored, no wheezing    Heart:    RRR, intact peripheral pulses   Abdomen:   No bladder distention   :  Giraldo anchored   Extremities:   No edema, no deformity   Lymphatic:   No neck or groin LAD   Skin:   No bleeding, bruising or rashes   Neuro/Psych:           Results Review:     I reviewed the patient's new clinical results.  Lab Results (last 24 hours)       Procedure Component Value Units Date/Time    Blood Gas, Arterial -Obtain on: Current Settings [[id number]]  (Abnormal) Collected: 03/06/25 2012    Specimen: Arterial Blood Updated: 03/06/25 2018     Site Right Radial     Elton's Test Positive     pH, Arterial 7.243 pH units      pCO2, Arterial 48.1 mm Hg      pO2, Arterial 77.6 mm Hg      HCO3, Arterial 20.8 mmol/L      Base Excess, Arterial -6.5 mmol/L      Comment: Serial Number: [id number]:  [id number]        O2 Saturation, Arterial 92.7 %      A-a DO2 0.5 mmHg      Barometric Pressure for Blood Gas 748.4000 mmHg      Modality Adult Vent     FIO2 30 %      Ventilator Mode VC     Set Tidal Volume 500     Set Mech Resp Rate 20     Rate 22 Breaths/minute      PEEP 5     Notified Who sam quiñonez rn     Read Back Yes     Notified Time --     Hemodilution No     PO2/FIO2 259    POC Lactate [[id number]]  (Abnormal) Collected: 03/06/25 2012    Specimen: Arterial Blood Updated: 03/06/25 2018     Lactate 4.4 mmol/L      Comment: Serial Number: " [id number]:  [id number]        Notified Time --     Notified Who sam quiñonez rn     Read Back Yes    POC H&H [[id number]]  (Abnormal) Collected: 03/06/25 2012    Specimen: Arterial Blood Updated: 03/06/25 2018     Hemoglobin 10.7 g/dL      Comment: Serial Number: [id number]  [id number]        Hematocrit 31 %     POC Glucose Once [[id number]]  (Normal) Collected: 03/06/25 1816    Specimen: Blood Updated: 03/06/25 1817     Glucose 88 mg/dL     Potassium [[id number]]  (Normal) Collected: 03/06/25 1743    Specimen: Blood Updated: 03/06/25 1804     Potassium 5.0 mmol/L     Potassium [[id number]]  (Normal) Collected: 03/06/25 1613    Specimen: Blood Updated: 03/06/25 1754     Potassium 4.6 mmol/L     Magnesium [[id number]]  (Abnormal) Collected: 03/06/25 1613    Specimen: Blood Updated: 03/06/25 1648     Magnesium 2.7 mg/dL     Anti-Smooth Muscle Antibody Titer [[id number]] Collected: 03/06/25 1613    Specimen: Blood Updated: 03/06/25 1624    POC Glucose Once [[id number]]  (Normal) Collected: 03/06/25 1612    Specimen: Blood Updated: 03/06/25 1623     Glucose 95 mg/dL     Blood Gas, Arterial - [[id number]]  (Abnormal) Collected: 03/06/25 1610    Specimen: Arterial Blood Updated: 03/06/25 1617     Site Right Radial     Elton's Test Positive     pH, Arterial 7.236 pH units      pCO2, Arterial 63.5 mm Hg      pO2, Arterial 431.5 mm Hg      HCO3, Arterial 27.0 mmol/L      Base Excess, Arterial -1.4 mmol/L      Comment: Serial Number: [id number]:  [id number]        O2 Saturation, Arterial 99.9 %      A-a DO2 0.6 mmHg      Barometric Pressure for Blood Gas 747.7000 mmHg      Modality Adult Vent     FIO2 100 %      Ventilator Mode AC     Set Tidal Volume 500     Set Mech Resp Rate 16     Rate 21 Breaths/minute      PEEP 5     Notified Who      Read Back Yes     Notified Time --     Hemodilution No     Device Comment sats 100     PO2/FIO2 432    POC Glucose Once [[id number]]  (Normal) Collected: 03/06/25 1326    Specimen: Blood  Updated: 03/06/25 1327     Glucose 106 mg/dL     Hepatitis B Surface Antigen [[id number]]  (Normal) Collected: 03/06/25 1130    Specimen: Blood Updated: 03/06/25 1314     Hepatitis B Surface Ag Non-Reactive    Ammonia [[id number]]  (Abnormal) Collected: 03/06/25 1130    Specimen: Blood Updated: 03/06/25 1158     Ammonia 84 umol/L     POC Glucose Once [[id number]]  (Normal) Collected: 03/06/25 1120    Specimen: Blood Updated: 03/06/25 1122     Glucose 121 mg/dL     POC Glucose Once [[id number]]  (Abnormal) Collected: 03/06/25 0614    Specimen: Blood Updated: 03/06/25 0616     Glucose 132 mg/dL     Manual Differential [[id number]]  (Abnormal) Collected: 03/06/25 0322    Specimen: Blood Updated: 03/06/25 0436     Neutrophil % 79.2 %      Lymphocyte % 5.2 %      Monocyte % 13.5 %      Eosinophil % 0.0 %      Basophil % 0.0 %      Metamyelocyte % 1.0 %      Myelocyte % 1.0 %      Neutrophils Absolute 9.76 10*3/mm3      Lymphocytes Absolute 0.64 10*3/mm3      Monocytes Absolute 1.66 10*3/mm3      Eosinophils Absolute 0.00 10*3/mm3      Basophils Absolute 0.00 10*3/mm3      nRBC 7.3 /100 WBC      Anisocytosis Slight/1+     Hypochromia Slight/1+     Microcytes Slight/1+     Poikilocytes Slight/1+     Polychromasia Slight/1+     WBC Morphology Normal     Giant Platelets Slight/1+    Comprehensive Metabolic Panel [[id number]]  (Abnormal) Collected: 03/06/25 0322    Specimen: Blood Updated: 03/06/25 0426     Glucose 120 mg/dL       mg/dL      Creatinine 6.61 mg/dL      Sodium 129 mmol/L      Potassium 5.3 mmol/L      Chloride 80 mmol/L      CO2 18.0 mmol/L      Calcium 7.5 mg/dL      Total Protein 6.2 g/dL      Albumin 3.3 g/dL      ALT (SGPT) 1,148 U/L      AST (SGOT) 1,461 U/L      Alkaline Phosphatase 159 U/L      Total Bilirubin 1.1 mg/dL      Globulin 2.9 gm/dL      A/G Ratio 1.1 g/dL      BUN/Creatinine Ratio 17.4     Anion Gap 31.0 mmol/L      eGFR 8.6 mL/min/1.73     Narrative:      GFR Categories in Chronic Kidney  Disease (CKD)      GFR Category          GFR (mL/min/1.73)    Interpretation  G1                     90 or greater         Normal or high (1)  G2                      60-89                Mild decrease (1)  G3a                   45-59                Mild to moderate decrease  G3b                   30-44                Moderate to severe decrease  G4                    15-29                Severe decrease  G5                    14 or less           Kidney failure          (1)In the absence of evidence of kidney disease, neither GFR category G1 or G2 fulfill the criteria for CKD.    eGFR calculation 2021 CKD-EPI creatinine equation, which does not include race as a factor    CK [[id number]]  (Abnormal) Collected: 03/06/25 0322    Specimen: Blood Updated: 03/06/25 0424     Creatine Kinase 8,174 U/L     Protime-INR [[id number]]  (Abnormal) Collected: 03/06/25 0322    Specimen: Blood Updated: 03/06/25 0409     Protime 21.3 Seconds      INR 1.84    CBC & Differential [[id number]]  (Abnormal) Collected: 03/06/25 0322    Specimen: Blood Updated: 03/06/25 0405    Narrative:      The following orders were created for panel order CBC & Differential.  Procedure                               Abnormality         Status                     ---------                               -----------         ------                     CBC Auto Differential[[id number]]        Abnormal            Final result                 Please view results for these tests on the individual orders.    CBC Auto Differential [[id number]]  (Abnormal) Collected: 03/06/25 0322    Specimen: Blood Updated: 03/06/25 0405     WBC 12.32 10*3/mm3      RBC 3.94 10*6/mm3      Hemoglobin 8.7 g/dL      Hematocrit 29.3 %      MCV 74.4 fL      MCH 22.1 pg      MCHC 29.7 g/dL      RDW 17.4 %      RDW-SD 46.1 fl      MPV 9.2 fL      Platelets 297 10*3/mm3      nRBC 3.6 /100 WBC     Blood Gas, Venous - [[id number]]  (Abnormal) Collected: 03/06/25 0348    Specimen: Venous Blood  Updated: 03/06/25 0405     pH, Venous 7.223 pH Units      pCO2, Venous 51.8 mm Hg      pO2, Venous 38.7 mm Hg      HCO3, Venous 21.3 mmol/L      Base Excess, Venous -6.3 mmol/L      Comment: Serial Number: [id number]:  [id number]        O2 Saturation, Venous 62.0 %      Barometric Pressure for Blood Gas 743.5000 mmHg      Modality Cannula     Flow Rate 2 lpm      Rate 18 Breaths/minute      Notified Who adwoa heredia    POC Lactate [[id number]]  (Abnormal) Collected: 03/06/25 0348    Specimen: Venous Blood Updated: 03/06/25 0356     Lactate 2.1 mmol/L      Comment: Serial Number: [id number]:  [id number]        Notified Time --     Notified Who adwoa heredia     Read Back Yes    POC Chem Panel [[id number]]  (Abnormal) Collected: 03/06/25 0348    Specimen: Venous Blood Updated: 03/06/25 0356     Glucose 105 mg/dL      Comment: Serial Number: [id number]:  [id number]        Sodium 121 mmol/L      POC Potassium 5.0 mmol/L      Ionized Calcium 0.81 mmol/L      Chloride 92 mmol/L      Creatinine 7.62 mg/dL      BUN >118 mg/dL      CO2 Content 20.9 mmol/L      Notified [facility] adwoa heredia     Read Back Yes    POC H&H [[id number]]  (Abnormal) Collected: 03/06/25 0348    Specimen: Venous Blood Updated: 03/06/25 0356     Hemoglobin 9.8 g/dL      Comment: Serial Number: [id number]:  [id number]        Hematocrit 29 %     Blood Culture - Blood, Arm, Right [[id number]]  (Normal) Collected: 03/04/25 0035    Specimen: Blood from Arm, Right Updated: 03/06/25 0045     Blood Culture No growth at 2 days    POC Glucose Once [[id number]]  (Abnormal) Collected: 03/06/25 0011    Specimen: Blood Updated: 03/06/25 0012     Glucose 133 mg/dL           Imaging Results (Last 24 Hours)       Procedure Component Value Units Date/Time    XR Chest Post CVA Port [[id number]] Collected: 03/06/25 1925     Updated: 03/06/25 1925    Narrative:      XR CHEST POST CVA PORT-     HISTORY: 65-year-old male status post central line insertion.     FINDINGS:  Left IJ  central line tip is within the brachiocephalic vein. There is no  pneumothorax.  Right central line remains within the SVC. ET tube is approximately 4 cm  proximal to the mitesh. There is no significant change in the pulmonary  vascular congestion since the radiograph earlier today. No new opacities  are seen. Enlarged cardiac silhouette is unchanged.       XR Chest 1 View [[id number]] Collected: 03/06/25 1612     Updated: 03/06/25 1617    Narrative:      XR CHEST 1 VW-     HISTORY: Male who is 65 years-old, endotracheal intubation     TECHNIQUE: Frontal views of the chest     COMPARISON: 3/6/2025 at 1017 hours     FINDINGS: Endotracheal tube tip is about 4.1 cm above the mitesh. NG  tube extends beyond the diaphragm and off the image inferiorly.  Left-sided ICD, cardiac lead. Stable appearing right-sided central  venous catheter. The heart is enlarged. Pulmonary vasculature is  congested. No focal pulmonary consolidation, large pleural effusion, or  pneumothorax. No acute osseous process.       Impression:      Endotracheal intubation.     This report was finalized on 3/6/2025 4:14 PM by Dr. Librado Orantes M.D on Workstation: [id number]       XR Chest Post CVA Port [[id number]] Collected: 03/06/25 1105     Updated: 03/06/25 1114    Narrative:      XR CHEST POST CVA PORT-, XR ABDOMEN KUB-     HISTORY: Tunnel catheter placement. Feeding tube placement.     COMPARISON: AP chest 03/04/2025, CT abdomen and pelvis 03/03/2025.     FINDINGS: AP chest 10:17 a.m.: Heart size is enlarged. Left subclavian  cardiac pacer/defibrillator is present. There has been placement of a  dual-lumen right central venous catheter with tip extending to the right  atrium. There is cardiomegaly and vascular engorgement. No evidence for  pneumothorax or effusion.     KUB: 10:46 a.m.: Feeding tube has been placed and the tip extends into  the region of the proximal stomach/gastric fundus and this could be  advanced for better positioning with  recheck of position. No air-filled,  dilated bowel loops are demonstrated. The right lateral abdomen and the  central lower pelvis not included in the field-of-view.       Impression:      1. Placement of a dual-lumen right central venous catheter with tip  extending to right atrium. No pneumothorax or effusion.  2. Cardiomegaly with pulmonary vascular engorgement.   3. Feeding tube is in place and the tip extends barely into the stomach  and this could be advanced for better positioning with recheck position.           This report was finalized on 3/6/2025 11:11 AM by Frandy Skelton M.D  on Workstation: [id number]       XR Abdomen KUB [[id number]] Collected: 03/06/25 1105     Updated: 03/06/25 1114    Narrative:      XR CHEST POST CVA PORT-, XR ABDOMEN KUB-     HISTORY: Tunnel catheter placement. Feeding tube placement.     COMPARISON: AP chest 03/04/2025, CT abdomen and pelvis 03/03/2025.     FINDINGS: AP chest 10:17 a.m.: Heart size is enlarged. Left subclavian  cardiac pacer/defibrillator is present. There has been placement of a  dual-lumen right central venous catheter with tip extending to the right  atrium. There is cardiomegaly and vascular engorgement. No evidence for  pneumothorax or effusion.     KUB: 10:46 a.m.: Feeding tube has been placed and the tip extends into  the region of the proximal stomach/gastric fundus and this could be  advanced for better positioning with recheck of position. No air-filled,  dilated bowel loops are demonstrated. The right lateral abdomen and the  central lower pelvis not included in the field-of-view.       Impression:      1. Placement of a dual-lumen right central venous catheter with tip  extending to right atrium. No pneumothorax or effusion.  2. Cardiomegaly with pulmonary vascular engorgement.   3. Feeding tube is in place and the tip extends barely into the stomach  and this could be advanced for better positioning with recheck position.           This report  was finalized on 3/6/2025 11:11 AM by Frandy Skelton M.D  on Workstation: [id number]       FL C Arm During Surgery [[id number]] Resulted: 03/06/25 0902     Updated: 03/06/25 0902    Narrative:      This procedure was auto-finalized with no dictation required.            Medication Review:   I have personally reviewed    Current Facility-Administered Medications:     albumin human 25 % IV SOLN 12.5 g, 12.5 g, Intravenous, PRN, Sang Stout MD, 12.5 g at 03/06/25 1400    [START ON 3/7/2025] albumin human 25 % IV SOLN 12.5 g, 12.5 g, Intravenous, PRN, Sang Stout MD    sennosides-docusate (PERICOLACE) 8.6-50 MG per tablet 2 tablet, 2 tablet, Oral, BID, 2 tablet at 03/06/25 2006 **AND** polyethylene glycol (MIRALAX) packet 17 g, 17 g, Oral, Daily PRN **AND** bisacodyl (DULCOLAX) EC tablet 5 mg, 5 mg, Oral, Daily PRN **AND** bisacodyl (DULCOLAX) suppository 10 mg, 10 mg, Rectal, Daily PRN, Garry Huff MD    cefepime 2000 mg IVPB in 100 mL NS (MBP), 2,000 mg, Intravenous, Q24H, Yanely Roth MD, 2,000 mg at 03/06/25 1603    dexmedetomidine (PRECEDEX) 400 mcg in 100 mL NS infusion, 0.2-1.5 mcg/kg/hr, Intravenous, Titrated, Garry Huff MD, Last Rate: 13.1 mL/hr at 03/06/25 1743, 0.5 mcg/kg/hr at 03/06/25 1743    dextrose (D50W) (25 g/50 mL) IV injection 25 g, 25 g, Intravenous, Q15 Min PRN, Garry Huff MD    dextrose (GLUTOSE) oral gel 15 g, 15 g, Oral, Q15 Min PRN, Garry Huff MD    fentaNYL citrate (PF) (SUBLIMAZE) injection 25 mcg, 25 mcg, Intravenous, Q1H PRN, Garry Huff MD, 25 mcg at 03/05/25 1617    glucagon (GLUCAGEN) injection 1 mg, 1 mg, Intramuscular, Q15 Min PRN, Garry Huff MD    insulin regular (humuLIN R,novoLIN R) injection 2-7 Units, 2-7 Units, Subcutaneous, Q6H, Garry Huff MD, 4 Units at 03/05/25 1149    ipratropium-albuterol (DUO-NEB) nebulizer solution 3 mL, 3 mL, Nebulization, 4x Daily - RT, Garry Huff MD, 3 mL at  03/06/25 2017    lactulose (CHRONULAC) 10 GM/15ML solution 20 g, 20 g, Oral, BID, Yanely Roth MD, 20 g at 03/06/25 2005    metoprolol tartrate (LOPRESSOR) injection 5 mg, 5 mg, Intravenous, Q4H PRN, Garry Huff MD, 5 mg at 03/04/25 1258    metroNIDAZOLE (FLAGYL) IVPB 500 mg, 500 mg, Intravenous, Q8H, Garry Huff MD, Last Rate: 200 mL/hr at 03/06/25 2006, 500 mg at 03/06/25 2006    midodrine (PROAMATINE) tablet 5 mg, 5 mg, Oral, TID AC, Garry Huff MD, 5 mg at 03/06/25 1634    mupirocin (BACTROBAN) 2 % nasal ointment 1 Application, 1 Application, Each Nare, BID, Garry Huff MD, 1 Application at 03/06/25 2005    nitroglycerin (NITROSTAT) SL tablet 0.4 mg, 0.4 mg, Sublingual, Q5 Min PRN, Garry Huff MD    norepinephrine (LEVOPHED) 8 mg in 250 mL NS infusion (premix), 0.02-0.3 mcg/kg/min, Intravenous, Titrated, Yanely Roth MD, Last Rate: 62.4 mL/hr at 03/06/25 2006, 0.3 mcg/kg/min at 03/06/25 2006    ondansetron (ZOFRAN) injection 4 mg, 4 mg, Intravenous, Q6H PRN, Garry Huff MD, 4 mg at 03/05/25 1815    pantoprazole (PROTONIX) EC tablet 40 mg, 40 mg, Oral, QAM HILDA, Garry Huff MD, 40 mg at 03/05/25 1243    sodium chloride 0.9 % bolus 1,000 mL, 1,000 mL, Intravenous, Once, Dana Lane MD    [COMPLETED] Insert Peripheral IV, , , Once **AND** sodium chloride 0.9 % flush 10 mL, 10 mL, Intravenous, PRN, Garry Huff MD    sodium chloride 0.9 % flush 10 mL, 10 mL, Intravenous, Q12H, Garry Huff MD, 10 mL at 03/06/25 0900    sodium chloride 0.9 % flush 10 mL, 10 mL, Intravenous, PRN, Garry Huff MD    sodium chloride 0.9 % infusion 40 mL, 40 mL, Intravenous, PRBUTCH, Garry Huff MD    vasopressin (PITRESSIN) 20 units in 100 mL NS infusion, 0.03 Units/min, Intravenous, Continuous, Yanely Roth MD, Last Rate: 9 mL/hr at 03/06/25 2019, 0.03 Units/min at 03/06/25 2019    Allergies:    Patient has no known allergies.    Assessment:    Active Problems:     Multi-organ failure with heart failure    ANDIE (acute kidney injury)       Gross hematuria largely resolved.  Irrigation had a minimal drip.    Plan:    Will follow along peripherally given all the acute ongoing problems.  Call if needed this admission.      Richi Rodriguez MD    3/6/2025  20:40 EST

## 2025-03-08 NOTE — CASE MANAGEMENT/SOCIAL WORK
Case Management Discharge Note      Final Note: pt          Selected Continued Care - Discharged on 3/8/2025 Admission date: 3/3/2025 - Discharge disposition:       Destination    No services have been selected for the patient.                Durable Medical Equipment    No services have been selected for the patient.                Dialysis/Infusion    No services have been selected for the patient.                Home Medical Care    No services have been selected for the patient.                Therapy    No services have been selected for the patient.                Community Resources    No services have been selected for the patient.                Community & DME    No services have been selected for the patient.                         Final Discharge Disposition Code: 20 -

## 2025-03-08 NOTE — NURSING NOTE
Pt dropped pressures. Maxed on all pressors at that time. 2 amps bicarb ordered by Dr. Sanford emergently. Pt went PEA. Time of death 0228. Verified with Cuco Ruiz RN. Family attempted to be notified multiple times with no call back. TEDDY notified. Donation ruled out, case [id number]. Doctors notified by monitor tech

## 2025-03-09 LAB — BACTERIA SPEC AEROBE CULT: NORMAL

## 2025-03-12 NOTE — DISCHARGE SUMMARY
PHYSICIAN DISCHARGE SUMMARY                                                                        [facility]    Patient Identification:  Name: Sebastien Tang  Age: 65 y.o.  Sex: male  :  1960  MRN: [id number]  Primary Care Physician: Provider, No Known    Admit date: 3/3/2025  Discharge date and time: 3/8/2025  5:02 AM   Discharged Condition:      Discharge Diagnoses:  Multi-organ failure with heart failure    ANDIE (acute kidney injury)     ConAcute hypoxemic and hypercapnic respiratory failure  Septic shock  Multiorgan system failure  Pancreatitis  Acute liver injury  Transaminitis  Lactic acidosis  Sepsis  Stone in the distal common bile duct status post ERCP  Coagulopathy  Rhabdomyolysis  Gross hematuria  ANDIE on dialysis  A-fib RVR  Hyponatremia  Pulmonary hypertension, moderate per echo 3/22/2024.  Possibly group 2 due to dilated LA.    Hospital Course: Sebastien Tang presented to [facility]    Patient admitted with acute liver failure sepsis rhabdomyolysis and despite aggressive care continue tentatively admitted to acute multiorgan system failure.  Discussion with patient's daughter who proceeded to make him a DNR.  Patient subsequently passed away despite aggressive care.  Family was notified     Chronic anemia  A-fib, on Eliquis  AICDsults:   IP CONSULT TO NEPHROLOGY  IP CONSULT TO CASE MANAGEMENT   IP CONSULT TO GASTROENTEROLOGY  IP CONSULT TO CARDIOLOGY  IP CONSULT TO UROLOGY  IP CONSULT TO VASCULAR SURGERY  IP CONSULT TO GASTROENTEROLOGY  IP CONSULT TO INFECTIOUS DISEASES    Significant Diagnostic Studies:   [unfilled]       Disposition:          Signed:  Yanely Roth MD  3/12/2025  12:42 EDT

## 2025-06-19 NOTE — ANESTHESIA PREPROCEDURE EVALUATION
Anesthesia Evaluation     Patient summary reviewed and Nursing notes reviewed   NPO Solid Status: > 8 hours  NPO Liquid Status: > 2 hours           Airway   Mallampati: II  TM distance: <3 FB  Neck ROM: full  Possible difficult intubation  Dental    (+) poor dentition    Comment: Many missing teeth, none loose    Pulmonary - normal exam    breath sounds clear to auscultation  (+) a smoker Former, COPD, asthma,shortness of breath    ROS comment: Hx acute hypoxic respiratory failure  Cardiovascular     ECG reviewed  Rhythm: irregular  Rate: normal    (+) pacemaker ICD, hypertension 2 medications or greater, CAD, dysrhythmias Atrial Flutter, Paroxysmal Atrial Fib, CHF , orthopnea, PVD, hyperlipidemia    ROS comment: Ischemic cardiomyopathy/EF 45%,mild MR, mild-mod TR by ECHO 3/24/afib/flutter  PE comment: Aflutter/normal VR    Neuro/Psych  (+) psychiatric history Anxiety and Depression  GI/Hepatic/Renal/Endo    (+) obesity, morbid obesity, diabetes mellitus type 2    Musculoskeletal     Abdominal   (+) obese   Substance History      OB/GYN          Other   blood dyscrasia anemia,       Other Comment: Hgb 11.4      Phys Exam Other: ICD left chest              Anesthesia Plan    ASA 3     MAC     intravenous induction     Anesthetic plan, risks, benefits, and alternatives have been provided, discussed and informed consent has been obtained with: patient.      CODE STATUS:    Code Status (Patient has no pulse and is not breathing): CPR (Attempt to Resuscitate)  Medical Interventions (Patient has pulse or is breathing): Full Support      
No

## (undated) DEVICE — GLIDESHEATH SLENDER STAINLESS STEEL KIT: Brand: GLIDESHEATH SLENDER

## (undated) DEVICE — LOU PACE DEFIB: Brand: MEDLINE INDUSTRIES, INC.

## (undated) DEVICE — HI-TORQUE WHISPER ES GUIDE WIRE .014 STRAIGHTTIP 3.0 CM X 190 CM: Brand: HI-TORQUE WHISPER

## (undated) DEVICE — DEV LK WIREGUIDE FUSN OLYMP SCP

## (undated) DEVICE — PENCL E/S HNDSWCH ROCKR CB

## (undated) DEVICE — SENSR O2 OXIMAX FNGR A/ 18IN NONSTR

## (undated) DEVICE — LN SMPL CO2 SHTRM SD STREAM W/M LUER

## (undated) DEVICE — NDL HYPO PRECISIONGLIDE REG 25G 1 1/2

## (undated) DEVICE — CATH DIAG IMPULSE FL3.5 5F 100CM

## (undated) DEVICE — KT MANIFLD CARDIAC

## (undated) DEVICE — TUBING, SUCTION, 1/4" X 10', STRAIGHT: Brand: MEDLINE

## (undated) DEVICE — Device

## (undated) DEVICE — TR BAND RADIAL ARTERY COMPRESSION DEVICE: Brand: TR BAND

## (undated) DEVICE — INFLATION DEVICE: Brand: ENCORE 26

## (undated) DEVICE — SPHINCTEROTOME: Brand: HYDRATOME RX 44

## (undated) DEVICE — INTENDED FOR TISSUE SEPARATION, AND OTHER PROCEDURES THAT REQUIRE A SHARP SURGICAL BLADE TO PUNCTURE OR CUT.: Brand: BARD-PARKER ® CARBON RIB-BACK BLADES

## (undated) DEVICE — CANNULATING SPHINCTEROTOME: Brand: JAGTOME™ REVOLUTION RX

## (undated) DEVICE — GOWN,SIRUS,POLYRNF,BRTHSLV,XLN/XXL,18/CS: Brand: MEDLINE

## (undated) DEVICE — INTRO SHEATH ART/FEM ENGAGE .035 5F12CM

## (undated) DEVICE — EXOFIN PRECISION PEN HIGH VISCOSITY TOPICAL SKIN ADHESIVE: Brand: EXOFIN PRECISION PEN, 1G

## (undated) DEVICE — GW HITORQUE/BAL MID/WT J W/HCOAT .014 3X190CM

## (undated) DEVICE — INTRO SHEATH PRELUDE SNAP .038 8F 13CM W/SDPRT

## (undated) DEVICE — RETRIEVAL BALLOON CATHETER: Brand: EXTRACTOR™ PRO RX

## (undated) DEVICE — CANN O2 ETCO2 FITS ALL CONN CO2 SMPL A/ 7IN DISP LF

## (undated) DEVICE — GLV SURG BIOGEL LTX PF 8 1/2

## (undated) DEVICE — ADAPT CLN BIOGUARD AIR/H2O DISP

## (undated) DEVICE — GUIDE CATHETER: Brand: MACH1™

## (undated) DEVICE — 6F .070 XB 3.5 100CM: Brand: VISTA BRITE TIP

## (undated) DEVICE — SYR LUERLOK 5CC

## (undated) DEVICE — SYR LL TP 10ML STRL

## (undated) DEVICE — PK CATH CARD 40

## (undated) DEVICE — CVR PROB 96IN LF STRL

## (undated) DEVICE — TRY CVC VANTEX PI 7F 3L 16CM 40

## (undated) DEVICE — SYR LUERLOK 20CC BX/50

## (undated) DEVICE — ST ACC MICROPUNCTURE STFF .018 ECHO/PLAT/TP 4F/10CM 21G/7CM

## (undated) DEVICE — CATH VENT MIV RADL PIG ST TIP 5F 110CM

## (undated) DEVICE — BIOPATCH™ ANTIMICROBIAL DRESSING WITH CHLORHEXIDINE GLUCONATE IS A HYDROPHILLIC POLYURETHANE ABSORPTIVE FOAM WITH CHLORHEXIDINE GLUCONATE (CHG) WHICH INHIBITS BACTERIAL GROWTH UNDER THE DRESSING. THE DRESSING IS INTENDED TO BE USED TO ABSORB EXUDATE, COVER A WOUND CAUSED BY VASCULAR AND NONVASCULAR PERCUTANEOUS MEDICAL DEVICES DURING SURGERY, AS WELL AS REDUCE LOCAL INFECTION AND COLONIZATION OF MICROORGANISMS.: Brand: BIOPATCH

## (undated) DEVICE — SINGLE USE DISTAL COVER MAJ-2315: Brand: SINGLE USE DISTAL COVER

## (undated) DEVICE — LOU MINOR PROCEDURE: Brand: MEDLINE INDUSTRIES, INC.

## (undated) DEVICE — 3M™ IOBAN™ 2 ANTIMICROBIAL INCISE DRAPE 6650EZ: Brand: IOBAN™ 2

## (undated) DEVICE — ERBE NESSY®PLATE 170 SPLIT; 168CM²; CABLE 3M: Brand: ERBE

## (undated) DEVICE — ANTIBACTERIAL UNDYED BRAIDED (POLYGLACTIN 910), SYNTHETIC ABSORBABLE SUTURE: Brand: COATED VICRYL

## (undated) DEVICE — GW EMR FIX EXCHG J STD .035 3MM 260CM

## (undated) DEVICE — DECANTER BAG 9": Brand: MEDLINE INDUSTRIES, INC.

## (undated) DEVICE — KT CATH TAL PALINDROME SAPPHIRE 14.5F23CM

## (undated) DEVICE — KT ORCA ORCAPOD DISP STRL

## (undated) DEVICE — CATH DIAG IMPULSE FR4 5F 100CM

## (undated) DEVICE — BALLOON DILATATION CATHETER: Brand: HURRICANE™ RX

## (undated) DEVICE — ST. SORBAVIEW ULTIMATE IJ SYSTEM A,C: Brand: CENTURION

## (undated) DEVICE — DEV INDEFLATOR P/N 580289

## (undated) DEVICE — UNDERGLV SURG BIOGEL INDICAT PI SZ8.5 BLU

## (undated) DEVICE — TRIPLE LUMEN NEEDLE KNIFE: Brand: RX NEEDLE KNIFE XL

## (undated) DEVICE — LIMB HOLDER, WRIST/ANKLE: Brand: DEROYAL

## (undated) DEVICE — RUNTHROUGH NS EXTRA FLOPPY PTCA GUIDEWIRE: Brand: RUNTHROUGH

## (undated) DEVICE — SUT ETHLN 2/0 PS 18IN 585H

## (undated) DEVICE — BLCK/BITE BLOX W/DENTL/RIM W/STRAP 54F

## (undated) DEVICE — SYR LL W/SCALE/MARK 3ML STRL